# Patient Record
Sex: FEMALE | URBAN - METROPOLITAN AREA
[De-identification: names, ages, dates, MRNs, and addresses within clinical notes are randomized per-mention and may not be internally consistent; named-entity substitution may affect disease eponyms.]

---

## 2017-12-03 ENCOUNTER — INPATIENT (INPATIENT)
Facility: HOSPITAL | Age: 62
LOS: 0 days | Discharge: HOME | DRG: 204 | End: 2017-12-04
Attending: STUDENT IN AN ORGANIZED HEALTH CARE EDUCATION/TRAINING PROGRAM | Admitting: STUDENT IN AN ORGANIZED HEALTH CARE EDUCATION/TRAINING PROGRAM
Payer: COMMERCIAL

## 2017-12-03 VITALS
RESPIRATION RATE: 18 BRPM | SYSTOLIC BLOOD PRESSURE: 166 MMHG | DIASTOLIC BLOOD PRESSURE: 88 MMHG | HEIGHT: 65 IN | HEART RATE: 88 BPM | WEIGHT: 208.56 LBS | TEMPERATURE: 98 F | OXYGEN SATURATION: 99 %

## 2017-12-03 LAB
ALBUMIN SERPL ELPH-MCNC: 4.3 G/DL — SIGNIFICANT CHANGE UP (ref 3.3–5)
ALP SERPL-CCNC: 104 U/L — SIGNIFICANT CHANGE UP (ref 40–120)
ALT FLD-CCNC: 15 U/L — SIGNIFICANT CHANGE UP (ref 10–45)
ANION GAP SERPL CALC-SCNC: 15 MMOL/L — SIGNIFICANT CHANGE UP (ref 5–17)
AST SERPL-CCNC: 20 U/L — SIGNIFICANT CHANGE UP (ref 10–40)
BASOPHILS NFR BLD AUTO: 0.4 % — SIGNIFICANT CHANGE UP (ref 0–2)
BILIRUB SERPL-MCNC: 1 MG/DL — SIGNIFICANT CHANGE UP (ref 0.2–1.2)
BUN SERPL-MCNC: 16 MG/DL — SIGNIFICANT CHANGE UP (ref 7–23)
CALCIUM SERPL-MCNC: 9.3 MG/DL — SIGNIFICANT CHANGE UP (ref 8.4–10.5)
CHLORIDE SERPL-SCNC: 103 MMOL/L — SIGNIFICANT CHANGE UP (ref 96–108)
CO2 SERPL-SCNC: 23 MMOL/L — SIGNIFICANT CHANGE UP (ref 22–31)
CREAT SERPL-MCNC: 1.08 MG/DL — SIGNIFICANT CHANGE UP (ref 0.5–1.3)
EOSINOPHIL NFR BLD AUTO: 0.8 % — SIGNIFICANT CHANGE UP (ref 0–6)
GLUCOSE SERPL-MCNC: 94 MG/DL — SIGNIFICANT CHANGE UP (ref 70–99)
HCT VFR BLD CALC: 35.8 % — SIGNIFICANT CHANGE UP (ref 34.5–45)
HGB BLD-MCNC: 11.6 G/DL — SIGNIFICANT CHANGE UP (ref 11.5–15.5)
LACTATE SERPL-SCNC: 1.4 MMOL/L — SIGNIFICANT CHANGE UP (ref 0.5–2)
LIDOCAIN IGE QN: 109 U/L — HIGH (ref 7–60)
LYMPHOCYTES # BLD AUTO: 26.3 % — SIGNIFICANT CHANGE UP (ref 13–44)
MCHC RBC-ENTMCNC: 26.6 PG — LOW (ref 27–34)
MCHC RBC-ENTMCNC: 32.4 G/DL — SIGNIFICANT CHANGE UP (ref 32–36)
MCV RBC AUTO: 82.1 FL — SIGNIFICANT CHANGE UP (ref 80–100)
MONOCYTES NFR BLD AUTO: 7.4 % — SIGNIFICANT CHANGE UP (ref 2–14)
NEUTROPHILS NFR BLD AUTO: 65.1 % — SIGNIFICANT CHANGE UP (ref 43–77)
PLATELET # BLD AUTO: 369 K/UL — SIGNIFICANT CHANGE UP (ref 150–400)
POTASSIUM SERPL-MCNC: 4.4 MMOL/L — SIGNIFICANT CHANGE UP (ref 3.5–5.3)
POTASSIUM SERPL-SCNC: 4.4 MMOL/L — SIGNIFICANT CHANGE UP (ref 3.5–5.3)
PROT SERPL-MCNC: 8 G/DL — SIGNIFICANT CHANGE UP (ref 6–8.3)
RBC # BLD: 4.36 M/UL — SIGNIFICANT CHANGE UP (ref 3.8–5.2)
RBC # FLD: 13.9 % — SIGNIFICANT CHANGE UP (ref 10.3–16.9)
SODIUM SERPL-SCNC: 141 MMOL/L — SIGNIFICANT CHANGE UP (ref 135–145)
WBC # BLD: 7.5 K/UL — SIGNIFICANT CHANGE UP (ref 3.8–10.5)
WBC # FLD AUTO: 7.5 K/UL — SIGNIFICANT CHANGE UP (ref 3.8–10.5)

## 2017-12-03 PROCEDURE — 71020: CPT | Mod: 26

## 2017-12-03 PROCEDURE — 71275 CT ANGIOGRAPHY CHEST: CPT | Mod: 26

## 2017-12-03 PROCEDURE — 99285 EMERGENCY DEPT VISIT HI MDM: CPT

## 2017-12-03 PROCEDURE — 99223 1ST HOSP IP/OBS HIGH 75: CPT | Mod: GC

## 2017-12-03 RX ORDER — SODIUM CHLORIDE 9 MG/ML
1000 INJECTION INTRAMUSCULAR; INTRAVENOUS; SUBCUTANEOUS ONCE
Qty: 0 | Refills: 0 | Status: COMPLETED | OUTPATIENT
Start: 2017-12-03 | End: 2017-12-03

## 2017-12-03 RX ORDER — MORPHINE SULFATE 50 MG/1
4 CAPSULE, EXTENDED RELEASE ORAL ONCE
Qty: 0 | Refills: 0 | Status: DISCONTINUED | OUTPATIENT
Start: 2017-12-03 | End: 2017-12-03

## 2017-12-03 RX ORDER — SODIUM CHLORIDE 9 MG/ML
3 INJECTION INTRAMUSCULAR; INTRAVENOUS; SUBCUTANEOUS ONCE
Qty: 0 | Refills: 0 | Status: COMPLETED | OUTPATIENT
Start: 2017-12-03 | End: 2017-12-03

## 2017-12-03 RX ADMIN — SODIUM CHLORIDE 1000 MILLILITER(S): 9 INJECTION INTRAMUSCULAR; INTRAVENOUS; SUBCUTANEOUS at 20:50

## 2017-12-03 RX ADMIN — MORPHINE SULFATE 4 MILLIGRAM(S): 50 CAPSULE, EXTENDED RELEASE ORAL at 20:48

## 2017-12-03 RX ADMIN — SODIUM CHLORIDE 3 MILLILITER(S): 9 INJECTION INTRAMUSCULAR; INTRAVENOUS; SUBCUTANEOUS at 20:40

## 2017-12-03 RX ADMIN — MORPHINE SULFATE 4 MILLIGRAM(S): 50 CAPSULE, EXTENDED RELEASE ORAL at 23:28

## 2017-12-03 NOTE — ED ADULT NURSE NOTE - OBJECTIVE STATEMENT
Pt presents to ED c/o RUQ pain progressively worsening for 4 days. Pt states "on wednesday 11/29 I tripped and caught myself, I didn't hit any part of my body but I'm not sure if this is from that." Pt reports RUQ pain currently 8/10 since Thursday 11/30 with mild nausea. Pt denies fevers, chills, HA, dizziness/lightheadedness, CP, SOB, vomiting, diarrhea, light stools, difficulty urinating. Pt presents in NAD speaking full sentences ambulatory through triage.

## 2017-12-03 NOTE — ED PROVIDER NOTE - MUSCULOSKELETAL, MLM
Spine appears normal, range of motion is not limited, no muscle or joint tenderness except for right lower chest wall

## 2017-12-03 NOTE — H&P ADULT - PROBLEM SELECTOR PLAN 4
-on metformin at home  -will give 1L bolus as pt is post CTA with contrast  -SSI while in house -hypertensive on admission most likely 2/2 pain; patient endorses compliance with home BP meds and states she took her amlodipine and valsartan prior to coming to ED today  -cont home meds in AM -incental adrenal mass noted on CTA  -recommend further workup with abdominal CT o/p  -no inpatient intervention needed at this time

## 2017-12-03 NOTE — ED PROVIDER NOTE - CONSTITUTIONAL, MLM
normal... diaphoretic appearing, well nourished, awake, alert, oriented to person, place, time/situation and in mild apparent distress.

## 2017-12-03 NOTE — H&P ADULT - HISTORY OF PRESENT ILLNESS
62 yr old female with a PMHx of HTN, DMT2 (on metformin at home) and remote smoking hx (quit in 1999; 13 pack yr hx) that presented to ED with sharp R chest and R epigastric chest pain for the past 3-4 days. patient was in her usual state of health until 3-4 days ago when she tripped and felt she sprained her chest when she went to catch herself after falling. Ever since she tripped she states she has had persistent R lower chest and R epigastric pain that hasnt improved with NSAIDs. She has no hx of gallstones, and the pain she is experiencing is not worsened with eating and not relieved by anything. The pain is sharp, doesnt radiate and is reproducible. She denies any concomitant shortness of breath, chest pressure, nausea, vomiting or diaphoresis associated with the pain. on ROS, patient does endorse a 1 mo hx of drenching night sweats. She denies any fevers, chills, recent weight loss, anorexia, change in bowel habits, nausea, or vomiting.  ED Course:  VS   T(F): 98.5 HR: 88 BP: 166/88 RR: 18 SpO2: 99%  CBC and CMP wnl  CXR with large RLL infiltrate vs mass  CTA chest (-) for PE but significant for large R lung mass with perihiral and sibcarinal lymphadenopathy

## 2017-12-03 NOTE — H&P ADULT - PROBLEM SELECTOR PLAN 6
-lovenox 40 qd for dvt ppx -consistent carb diet -hypertensive on admission most likely 2/2 pain; patient endorses compliance with home BP meds and states she took her amlodipine and valsartan prior to coming to ED today  -cont home meds in AM

## 2017-12-03 NOTE — H&P ADULT - NSHPSOCIALHISTORY_GEN_ALL_CORE
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

## 2017-12-03 NOTE — H&P ADULT - PROBLEM SELECTOR PLAN 2
-most likely 2/2 irritation of R lung mass when patient tripped vs pulmonary artery obstruction by R lung mass   -trop (-); BNP mildly elevated to 430  -EKG with RBBB without q-waves; again-this finding could be 2/2 pulmonary vein infitration by mass  -further evaluation by Echo in am  -repeat trops at 5:30 am   -symptomatic pain control with dilaudid

## 2017-12-03 NOTE — H&P ADULT - NSHPLABSRESULTS_GEN_ALL_CORE
.  LABS:                         11.6   7.5   )-----------( 369      ( 03 Dec 2017 20:47 )             35.8     12-03    141  |  103  |  16  ----------------------------<  94  4.4   |  23  |  1.08    Ca    9.3      03 Dec 2017 20:47    TPro  8.0  /  Alb  4.3  /  TBili  1.0  /  DBili  x   /  AST  20  /  ALT  15  /  AlkPhos  104  12-03        CARDIAC MARKERS ( 03 Dec 2017 20:47 )  x     / <0.01 ng/mL / 101 U/L / x     / 1.1 ng/mL      Serum Pro-Brain Natriuretic Peptide: 434 pg/mL (12-03 @ 20:47)    Lactate, Blood: 1.4 mmoL/L (12-03 @ 20:46)      RADIOLOGY, EKG & ADDITIONAL TESTS:   CT Angio Chest PE Protocol w/ IV Cont     IMPRESSION:   1.  No pulmonary embolism.  2.  4.3 cm mass within the right lower lobe with associated hilar and   subcarinal lymphadenopathy as above. Findings are highly suspicious for   malignancy.   3.  2.6 cm groundglass nodule within the right upper lobe.  4.  1.5 cm left adrenal mass. Recommend nonemergent follow-up examination   with a dedicated adrenal CT for further characterization.

## 2017-12-03 NOTE — H&P ADULT - PROBLEM SELECTOR PLAN 7
-lovenox 40 qd for dvt ppx -on metformin at home  -will give 1L bolus as pt is post CTA with contrast  -SSI while in house

## 2017-12-03 NOTE — H&P ADULT - ASSESSMENT
62 yr old female with a PMHx of HTN, DMT2, and remote smoking hx who presented to ED with 4 day hx of R lower chest and R epigastric abdominal pain, incidentally found to have RLL mass with adenopathy admitted to medicine for further workup.

## 2017-12-03 NOTE — H&P ADULT - PROBLEM SELECTOR PROBLEM 5
Nutrition, metabolism, and development symptoms Type 2 diabetes mellitus with other neurologic complication, without long-term current use of insulin Anxiety

## 2017-12-03 NOTE — H&P ADULT - NSHPPHYSICALEXAM_GEN_ALL_CORE
.  VITAL SIGNS:  T(F): 98.2 (12-04-17 @ 01:12), Max: 98.5 (12-03-17 @ 20:02)  HR: 70 (12-04-17 @ 01:12) (70 - 88)  BP: 149/82 (12-04-17 @ 01:12) (149/82 - 166/88)  BP(mean): --  RR: 15 (12-04-17 @ 01:12) (15 - 18)  SpO2: 99% (12-04-17 @ 01:12) (99% - 99%)    PHYSICAL EXAM:    Constitutional: WDWN resting comfortably in bed; NAD  HEENT: NC/AT, PERRL, EOMI, MMM  Neck: supple; no JVD or thyromegaly  Respiratory: CTA B/L; no W/R/R, no retractions  Cardiac: +S1/S2; RRR; no M/R/G; PMI non-displaced; reproducible R lower chest pain   Gastrointestinal: soft, NT/ND; no rebound or guarding; +BSx4  Extremities: WWP, no clubbing or cyanosis; no peripheral edema; b/l hand hypertrophic osteoarthropathy  Musculoskeletal: NROM x4; no joint swelling, tenderness or erythema  Vascular: 2+ radial, femoral, DP/PT pulses B/L  Dermatologic: skin warm, dry and intact; no rashes, wounds, or scars  Lymphatic: no submandibular or cervical LAD  Neurologic: AAOx3

## 2017-12-03 NOTE — H&P ADULT - FAMILY HISTORY
Sibling  Still living? Yes, Estimated age: Age Unknown  Family history of renal cell carcinoma, Age at diagnosis: Age Unknown  Family history of transitional cell carcinoma of bladder, Age at diagnosis: Age Unknown

## 2017-12-03 NOTE — ED PROVIDER NOTE - OBJECTIVE STATEMENT
DM, HTN, here with pain in right upper abd/lower rib cage for past 3-4 days.  Started after she stumbled and caught herself without actually falling or hitting anything.  Notes mild cough.  Pain worse with cough, deep breath.  No fever, n/v/d.  Unchanged with eating.  Took naproxen today and got very sweaty afterwords.

## 2017-12-03 NOTE — H&P ADULT - ATTENDING COMMENTS
patient seen and examined twice , once in ED holding and again on regional   reviewed vs, labs, CXR, CT report, EKG    agree w/ PE findings as above; in addition pt is anxious    1. R sided  lung mass: will need further evaluation by heme/onc and CT surgery services   2. chest pain : in setting of lung mass; monitor for worsening sxs; check ECHO given EKG findings   3. adrenal mass: outpt evaluation  4. anxiety: given ativan PO x 1 last night; monitor for recurrence

## 2017-12-03 NOTE — H&P ADULT - PROBLEM SELECTOR PROBLEM 4
Type 2 diabetes mellitus with other neurologic complication, without long-term current use of insulin Hypertension, unspecified type Adrenal mass, left

## 2017-12-03 NOTE — ED PROVIDER NOTE - DIAGNOSTIC INTERPRETATION
CXR: right middle lobe focal consolidation vs mass, normal bones, normal cardiac contour.  read by Dr. Iazguirre

## 2017-12-03 NOTE — H&P ADULT - PROBLEM SELECTOR PROBLEM 6
Need for prophylactic measure Nutrition, metabolism, and development symptoms Hypertension, unspecified type

## 2017-12-03 NOTE — H&P ADULT - NSHPREVIEWOFSYSTEMS_GEN_ALL_CORE
REVIEW OF SYSTEMS:    CONSTITUTIONAL: No weakness, fevers or chills; (+) drenching night sweats  EYES/ENT: No visual changes;  No vertigo or throat pain   NECK: No pain or stiffness  RESPIRATORY: No cough, wheezing, hemoptysis; No shortness of breath  CARDIOVASCULAR: (+) chest pain  GASTROINTESTINAL:(+) RUQ abdominal pain; No nausea, vomiting, or hematemesis; No diarrhea or constipation. No melena or hematochezia.  GENITOURINARY: No dysuria, frequency or hematuria  NEUROLOGICAL: No numbness or weakness  SKIN: No itching, rashes

## 2017-12-03 NOTE — ED PROVIDER NOTE - MEDICAL DECISION MAKING DETAILS
right chest pain post fall.  initial concern for biliary etiology given pain on exam.  labs obtained and unremarkable.  ruq us done without acute finding.  cxr with right sided abnormality.  cta of chest obtained showing large mass.  morphine for pain control without improvement.  will admit for pain control, further management

## 2017-12-03 NOTE — H&P ADULT - PROBLEM SELECTOR PLAN 1
-incidentally found R lung mass with perihilar and subcarinal lymphadenopathy  -no prior hx of malignancy per patient  -pt's brother follows with Dr. Avendaño o/p for RCC and bladder cancer; if heme/onc consulted in house, patient requested Dr. Avendaño  -supportive care with pain management for now -incidentally found R lung mass with perihilar and subcarinal lymphadenopathy  -no prior hx of malignancy per patient  -pt's brother follows with Dr. Tom o/p for RCC and bladder cancer; if heme/onc consulted in house, patient requested Dr. Tom  -please call CT Surgery to evaluate patient for possible intervention  -supportive care with pain management for now

## 2017-12-03 NOTE — H&P ADULT - PROBLEM SELECTOR PLAN 3
-hypertensive on admission most likely 2/2 pain; patient endorses compliance with home BP meds and states she took her amlodipine and valsartan prior to coming to ED today  -cont home meds in AM -incental adrenal mass noted on CTA  -recommend further workup with abdominal CT o/p  -no inpatient intervention needed at this time see #2

## 2017-12-03 NOTE — H&P ADULT - PROBLEM SELECTOR PROBLEM 7
Need for prophylactic measure Type 2 diabetes mellitus with other neurologic complication, without long-term current use of insulin

## 2017-12-04 ENCOUNTER — TRANSCRIPTION ENCOUNTER (OUTPATIENT)
Age: 62
End: 2017-12-04

## 2017-12-04 VITALS
DIASTOLIC BLOOD PRESSURE: 74 MMHG | OXYGEN SATURATION: 96 % | RESPIRATION RATE: 16 BRPM | HEART RATE: 81 BPM | SYSTOLIC BLOOD PRESSURE: 160 MMHG

## 2017-12-04 DIAGNOSIS — R91.8 OTHER NONSPECIFIC ABNORMAL FINDING OF LUNG FIELD: ICD-10-CM

## 2017-12-04 DIAGNOSIS — E11.49 TYPE 2 DIABETES MELLITUS WITH OTHER DIABETIC NEUROLOGICAL COMPLICATION: ICD-10-CM

## 2017-12-04 DIAGNOSIS — I10 ESSENTIAL (PRIMARY) HYPERTENSION: ICD-10-CM

## 2017-12-04 DIAGNOSIS — F41.9 ANXIETY DISORDER, UNSPECIFIED: ICD-10-CM

## 2017-12-04 DIAGNOSIS — I45.10 UNSPECIFIED RIGHT BUNDLE-BRANCH BLOCK: ICD-10-CM

## 2017-12-04 DIAGNOSIS — R07.9 CHEST PAIN, UNSPECIFIED: ICD-10-CM

## 2017-12-04 DIAGNOSIS — Z29.9 ENCOUNTER FOR PROPHYLACTIC MEASURES, UNSPECIFIED: ICD-10-CM

## 2017-12-04 DIAGNOSIS — E27.9 DISORDER OF ADRENAL GLAND, UNSPECIFIED: ICD-10-CM

## 2017-12-04 DIAGNOSIS — R63.8 OTHER SYMPTOMS AND SIGNS CONCERNING FOOD AND FLUID INTAKE: ICD-10-CM

## 2017-12-04 LAB
ALBUMIN SERPL ELPH-MCNC: 4.2 G/DL — SIGNIFICANT CHANGE UP (ref 3.3–5)
ALP SERPL-CCNC: 111 U/L — SIGNIFICANT CHANGE UP (ref 40–120)
ALT FLD-CCNC: 12 U/L — SIGNIFICANT CHANGE UP (ref 10–45)
ANION GAP SERPL CALC-SCNC: 16 MMOL/L — SIGNIFICANT CHANGE UP (ref 5–17)
AST SERPL-CCNC: 15 U/L — SIGNIFICANT CHANGE UP (ref 10–40)
BASOPHILS NFR BLD AUTO: 0.2 % — SIGNIFICANT CHANGE UP (ref 0–2)
BILIRUB SERPL-MCNC: 0.4 MG/DL — SIGNIFICANT CHANGE UP (ref 0.2–1.2)
BUN SERPL-MCNC: 11 MG/DL — SIGNIFICANT CHANGE UP (ref 7–23)
CALCIUM SERPL-MCNC: 9.5 MG/DL — SIGNIFICANT CHANGE UP (ref 8.4–10.5)
CHLORIDE SERPL-SCNC: 98 MMOL/L — SIGNIFICANT CHANGE UP (ref 96–108)
CK MB CFR SERPL CALC: 1.1 NG/ML — SIGNIFICANT CHANGE UP (ref 0–6.7)
CK MB CFR SERPL CALC: 1.1 NG/ML — SIGNIFICANT CHANGE UP (ref 0–6.7)
CK MB CFR SERPL CALC: 1.2 NG/ML — SIGNIFICANT CHANGE UP (ref 0–6.7)
CK SERPL-CCNC: 101 U/L — SIGNIFICANT CHANGE UP (ref 25–170)
CK SERPL-CCNC: 83 U/L — SIGNIFICANT CHANGE UP (ref 25–170)
CK SERPL-CCNC: 95 U/L — SIGNIFICANT CHANGE UP (ref 25–170)
CO2 SERPL-SCNC: 22 MMOL/L — SIGNIFICANT CHANGE UP (ref 22–31)
CREAT SERPL-MCNC: 0.87 MG/DL — SIGNIFICANT CHANGE UP (ref 0.5–1.3)
EOSINOPHIL NFR BLD AUTO: 0.2 % — SIGNIFICANT CHANGE UP (ref 0–6)
GLUCOSE BLDC GLUCOMTR-MCNC: 110 MG/DL — HIGH (ref 70–99)
GLUCOSE BLDC GLUCOMTR-MCNC: 111 MG/DL — HIGH (ref 70–99)
GLUCOSE BLDC GLUCOMTR-MCNC: 125 MG/DL — HIGH (ref 70–99)
GLUCOSE SERPL-MCNC: 99 MG/DL — SIGNIFICANT CHANGE UP (ref 70–99)
HBA1C BLD-MCNC: 6.2 % — HIGH (ref 4–5.6)
HCT VFR BLD CALC: 35.5 % — SIGNIFICANT CHANGE UP (ref 34.5–45)
HGB BLD-MCNC: 11.9 G/DL — SIGNIFICANT CHANGE UP (ref 11.5–15.5)
INR BLD: 1.17 — HIGH (ref 0.88–1.16)
LYMPHOCYTES # BLD AUTO: 20.5 % — SIGNIFICANT CHANGE UP (ref 13–44)
MCHC RBC-ENTMCNC: 27.6 PG — SIGNIFICANT CHANGE UP (ref 27–34)
MCHC RBC-ENTMCNC: 33.5 G/DL — SIGNIFICANT CHANGE UP (ref 32–36)
MCV RBC AUTO: 82.4 FL — SIGNIFICANT CHANGE UP (ref 80–100)
MONOCYTES NFR BLD AUTO: 6.2 % — SIGNIFICANT CHANGE UP (ref 2–14)
NEUTROPHILS NFR BLD AUTO: 72.9 % — SIGNIFICANT CHANGE UP (ref 43–77)
NT-PROBNP SERPL-SCNC: 434 PG/ML — HIGH (ref 0–300)
PLATELET # BLD AUTO: 318 K/UL — SIGNIFICANT CHANGE UP (ref 150–400)
POTASSIUM SERPL-MCNC: 3.7 MMOL/L — SIGNIFICANT CHANGE UP (ref 3.5–5.3)
POTASSIUM SERPL-SCNC: 3.7 MMOL/L — SIGNIFICANT CHANGE UP (ref 3.5–5.3)
PROT SERPL-MCNC: 8.4 G/DL — HIGH (ref 6–8.3)
PROTHROM AB SERPL-ACNC: 13 SEC — HIGH (ref 9.8–12.7)
RBC # BLD: 4.31 M/UL — SIGNIFICANT CHANGE UP (ref 3.8–5.2)
RBC # FLD: 14 % — SIGNIFICANT CHANGE UP (ref 10.3–16.9)
SODIUM SERPL-SCNC: 136 MMOL/L — SIGNIFICANT CHANGE UP (ref 135–145)
TROPONIN T SERPL-MCNC: <0.01 NG/ML — SIGNIFICANT CHANGE UP (ref 0–0.01)
TROPONIN T SERPL-MCNC: <0.01 NG/ML — SIGNIFICANT CHANGE UP (ref 0–0.01)
WBC # BLD: 8.6 K/UL — SIGNIFICANT CHANGE UP (ref 3.8–10.5)
WBC # FLD AUTO: 8.6 K/UL — SIGNIFICANT CHANGE UP (ref 3.8–10.5)

## 2017-12-04 PROCEDURE — 83880 ASSAY OF NATRIURETIC PEPTIDE: CPT

## 2017-12-04 PROCEDURE — 82553 CREATINE MB FRACTION: CPT

## 2017-12-04 PROCEDURE — 71275 CT ANGIOGRAPHY CHEST: CPT

## 2017-12-04 PROCEDURE — 74177 CT ABD & PELVIS W/CONTRAST: CPT

## 2017-12-04 PROCEDURE — 87040 BLOOD CULTURE FOR BACTERIA: CPT

## 2017-12-04 PROCEDURE — 83605 ASSAY OF LACTIC ACID: CPT

## 2017-12-04 PROCEDURE — 80053 COMPREHEN METABOLIC PANEL: CPT

## 2017-12-04 PROCEDURE — 82962 GLUCOSE BLOOD TEST: CPT

## 2017-12-04 PROCEDURE — 82550 ASSAY OF CK (CPK): CPT

## 2017-12-04 PROCEDURE — 84484 ASSAY OF TROPONIN QUANT: CPT

## 2017-12-04 PROCEDURE — 85025 COMPLETE CBC W/AUTO DIFF WBC: CPT

## 2017-12-04 PROCEDURE — 99239 HOSP IP/OBS DSCHRG MGMT >30: CPT

## 2017-12-04 PROCEDURE — 76705 ECHO EXAM OF ABDOMEN: CPT | Mod: 26

## 2017-12-04 PROCEDURE — 85610 PROTHROMBIN TIME: CPT

## 2017-12-04 PROCEDURE — 99285 EMERGENCY DEPT VISIT HI MDM: CPT | Mod: 25

## 2017-12-04 PROCEDURE — 96374 THER/PROPH/DIAG INJ IV PUSH: CPT | Mod: XU

## 2017-12-04 PROCEDURE — 71046 X-RAY EXAM CHEST 2 VIEWS: CPT

## 2017-12-04 PROCEDURE — 76705 ECHO EXAM OF ABDOMEN: CPT

## 2017-12-04 PROCEDURE — 74177 CT ABD & PELVIS W/CONTRAST: CPT | Mod: 26

## 2017-12-04 PROCEDURE — 83690 ASSAY OF LIPASE: CPT

## 2017-12-04 PROCEDURE — 83036 HEMOGLOBIN GLYCOSYLATED A1C: CPT

## 2017-12-04 PROCEDURE — 36415 COLL VENOUS BLD VENIPUNCTURE: CPT

## 2017-12-04 RX ORDER — ONDANSETRON 8 MG/1
4 TABLET, FILM COATED ORAL ONCE
Qty: 0 | Refills: 0 | Status: COMPLETED | OUTPATIENT
Start: 2017-12-04 | End: 2017-12-04

## 2017-12-04 RX ORDER — AMLODIPINE BESYLATE 2.5 MG/1
10 TABLET ORAL DAILY
Qty: 0 | Refills: 0 | Status: DISCONTINUED | OUTPATIENT
Start: 2017-12-04 | End: 2017-12-04

## 2017-12-04 RX ORDER — DEXTROSE 50 % IN WATER 50 %
1 SYRINGE (ML) INTRAVENOUS ONCE
Qty: 0 | Refills: 0 | Status: DISCONTINUED | OUTPATIENT
Start: 2017-12-04 | End: 2017-12-04

## 2017-12-04 RX ORDER — INSULIN LISPRO 100/ML
VIAL (ML) SUBCUTANEOUS
Qty: 0 | Refills: 0 | Status: DISCONTINUED | OUTPATIENT
Start: 2017-12-04 | End: 2017-12-04

## 2017-12-04 RX ORDER — SODIUM CHLORIDE 9 MG/ML
1000 INJECTION INTRAMUSCULAR; INTRAVENOUS; SUBCUTANEOUS
Qty: 0 | Refills: 0 | Status: DISCONTINUED | OUTPATIENT
Start: 2017-12-04 | End: 2017-12-04

## 2017-12-04 RX ORDER — HYDROMORPHONE HYDROCHLORIDE 2 MG/ML
1 INJECTION INTRAMUSCULAR; INTRAVENOUS; SUBCUTANEOUS ONCE
Qty: 0 | Refills: 0 | Status: DISCONTINUED | OUTPATIENT
Start: 2017-12-04 | End: 2017-12-04

## 2017-12-04 RX ORDER — GLUCAGON INJECTION, SOLUTION 0.5 MG/.1ML
1 INJECTION, SOLUTION SUBCUTANEOUS ONCE
Qty: 0 | Refills: 0 | Status: DISCONTINUED | OUTPATIENT
Start: 2017-12-04 | End: 2017-12-04

## 2017-12-04 RX ORDER — SODIUM CHLORIDE 9 MG/ML
1000 INJECTION, SOLUTION INTRAVENOUS
Qty: 0 | Refills: 0 | Status: DISCONTINUED | OUTPATIENT
Start: 2017-12-04 | End: 2017-12-04

## 2017-12-04 RX ORDER — AMLODIPINE BESYLATE 2.5 MG/1
1 TABLET ORAL
Qty: 0 | Refills: 0 | COMMUNITY
Start: 2017-12-04

## 2017-12-04 RX ORDER — DEXTROSE 50 % IN WATER 50 %
12.5 SYRINGE (ML) INTRAVENOUS ONCE
Qty: 0 | Refills: 0 | Status: DISCONTINUED | OUTPATIENT
Start: 2017-12-04 | End: 2017-12-04

## 2017-12-04 RX ORDER — DEXTROSE 50 % IN WATER 50 %
25 SYRINGE (ML) INTRAVENOUS ONCE
Qty: 0 | Refills: 0 | Status: DISCONTINUED | OUTPATIENT
Start: 2017-12-04 | End: 2017-12-04

## 2017-12-04 RX ORDER — ENOXAPARIN SODIUM 100 MG/ML
40 INJECTION SUBCUTANEOUS EVERY 24 HOURS
Qty: 0 | Refills: 0 | Status: DISCONTINUED | OUTPATIENT
Start: 2017-12-04 | End: 2017-12-04

## 2017-12-04 RX ORDER — IOHEXOL 300 MG/ML
50 INJECTION, SOLUTION INTRAVENOUS ONCE
Qty: 0 | Refills: 0 | Status: COMPLETED | OUTPATIENT
Start: 2017-12-04 | End: 2017-12-04

## 2017-12-04 RX ORDER — AMLODIPINE BESYLATE 2.5 MG/1
0 TABLET ORAL
Qty: 0 | Refills: 0 | COMMUNITY

## 2017-12-04 RX ORDER — MORPHINE SULFATE 50 MG/1
2 CAPSULE, EXTENDED RELEASE ORAL ONCE
Qty: 0 | Refills: 0 | Status: DISCONTINUED | OUTPATIENT
Start: 2017-12-04 | End: 2017-12-04

## 2017-12-04 RX ORDER — VALSARTAN 80 MG/1
40 TABLET ORAL DAILY
Qty: 0 | Refills: 0 | Status: DISCONTINUED | OUTPATIENT
Start: 2017-12-04 | End: 2017-12-04

## 2017-12-04 RX ORDER — MORPHINE SULFATE 50 MG/1
2 CAPSULE, EXTENDED RELEASE ORAL EVERY 4 HOURS
Qty: 0 | Refills: 0 | Status: DISCONTINUED | OUTPATIENT
Start: 2017-12-04 | End: 2017-12-04

## 2017-12-04 RX ORDER — HYDROMORPHONE HYDROCHLORIDE 2 MG/ML
2 INJECTION INTRAMUSCULAR; INTRAVENOUS; SUBCUTANEOUS ONCE
Qty: 0 | Refills: 0 | Status: DISCONTINUED | OUTPATIENT
Start: 2017-12-04 | End: 2017-12-04

## 2017-12-04 RX ADMIN — HYDROMORPHONE HYDROCHLORIDE 1 MILLIGRAM(S): 2 INJECTION INTRAMUSCULAR; INTRAVENOUS; SUBCUTANEOUS at 09:17

## 2017-12-04 RX ADMIN — MORPHINE SULFATE 2 MILLIGRAM(S): 50 CAPSULE, EXTENDED RELEASE ORAL at 15:20

## 2017-12-04 RX ADMIN — ENOXAPARIN SODIUM 40 MILLIGRAM(S): 100 INJECTION SUBCUTANEOUS at 07:04

## 2017-12-04 RX ADMIN — ONDANSETRON 4 MILLIGRAM(S): 8 TABLET, FILM COATED ORAL at 12:52

## 2017-12-04 RX ADMIN — IOHEXOL 50 MILLILITER(S): 300 INJECTION, SOLUTION INTRAVENOUS at 11:00

## 2017-12-04 RX ADMIN — MORPHINE SULFATE 2 MILLIGRAM(S): 50 CAPSULE, EXTENDED RELEASE ORAL at 15:05

## 2017-12-04 RX ADMIN — Medication 0.5 MILLIGRAM(S): at 04:15

## 2017-12-04 RX ADMIN — SODIUM CHLORIDE 100 MILLILITER(S): 9 INJECTION INTRAMUSCULAR; INTRAVENOUS; SUBCUTANEOUS at 07:00

## 2017-12-04 RX ADMIN — SODIUM CHLORIDE 100 MILLILITER(S): 9 INJECTION INTRAMUSCULAR; INTRAVENOUS; SUBCUTANEOUS at 02:00

## 2017-12-04 RX ADMIN — IOHEXOL 50 MILLILITER(S): 300 INJECTION, SOLUTION INTRAVENOUS at 13:07

## 2017-12-04 RX ADMIN — MORPHINE SULFATE 2 MILLIGRAM(S): 50 CAPSULE, EXTENDED RELEASE ORAL at 01:00

## 2017-12-04 RX ADMIN — MORPHINE SULFATE 2 MILLIGRAM(S): 50 CAPSULE, EXTENDED RELEASE ORAL at 01:12

## 2017-12-04 RX ADMIN — HYDROMORPHONE HYDROCHLORIDE 1 MILLIGRAM(S): 2 INJECTION INTRAMUSCULAR; INTRAVENOUS; SUBCUTANEOUS at 09:35

## 2017-12-04 RX ADMIN — VALSARTAN 40 MILLIGRAM(S): 80 TABLET ORAL at 07:04

## 2017-12-04 NOTE — DISCHARGE NOTE ADULT - HOSPITAL COURSE
62F PMH of HTN, DM2 (on metformin), remote smoking hx (quit in 1999; 13 pack yr hx) presented to ED with sharp R chest pain for 3-4 days. ED Course: T(F): 98.5 HR: 88 BP: 166/88 RR: 18 SpO2: 99%. CBC and CMP wnl. CXR demonstrated large RLL infiltrate vs mass, a CTA chest was performed and showed 4.3cm R lung mass with perihilar and subcarinal lymphadenopathy, as well as 2.6cm R lung ground glass opacity and 1.5cm adrenal nodule. Heme/onc team was consulted in light of these findings, recommended CT abdomen and pelvis with contrast. This scan showed 62F PMH of HTN, DM2 (on metformin), remote smoking hx (quit in 1999; 13 pack yr hx) presented to ED with sharp R chest pain for 3-4 days. ED Course: T(F): 98.5 HR: 88 BP: 166/88 RR: 18 SpO2: 99%. CBC and CMP wnl. CXR demonstrated large RLL infiltrate vs mass, a CTA chest was performed and showed 4.3cm R lung mass with perihilar and subcarinal lymphadenopathy, as well as 2.6cm R lung ground glass opacity and 1.5cm adrenal nodule. Heme/onc team was consulted in light of these findings, recommended CT abdomen and pelvis with contrast. This scan showed no additional masses. Patient was discharged, HD stable, 12/4.     *Of note, patient will need outpatient PET scan, radiation oncology and CT surgery follow up, MRI brain due to headaches

## 2017-12-04 NOTE — DISCHARGE NOTE ADULT - CARE PLAN
Principal Discharge DX:	Lung mass  Secondary Diagnosis:	Hypertension, unspecified type  Goal:	Normal blood pressure  Instructions for follow-up, activity and diet:	You have high blood pressure, and should continue to take your home blood pressure medications as prescribed. If you notice any dizziness or lightheadedness upon standing, please follow up with your primary care provider. If you notice any sudden severe headaches, palpitations, or weakness, please proceed to your nearest emergency department or call 911.  Secondary Diagnosis:	Chest pain  Secondary Diagnosis:	Type 2 diabetes mellitus with other neurologic complication, without long-term current use of insulin Principal Discharge DX:	Lung mass  Goal:	Discharge home with outpatient follow up  Instructions for follow-up, activity and diet:	You were admitted due to chest pain, and received a CT scan which demonstrated a 4.3 centimeter mass in your lung. As this is concerning for cancer, you received an additional CT scan of the abdomen and pelvis which demonstrated     The oncology service was consulted regarding these findings and recommended that you follow up outpatient with Dr. Noonan. Please attend your appointment with Dr. Noonan.  Secondary Diagnosis:	Hypertension, unspecified type  Goal:	Normal blood pressure  Instructions for follow-up, activity and diet:	You have high blood pressure, and should continue to take your home blood pressure medications as prescribed. If you notice any dizziness or lightheadedness upon standing, please follow up with your primary care provider. If you notice any sudden severe headaches, palpitations, or weakness, please proceed to your nearest emergency department or call 911.  Secondary Diagnosis:	Chest pain  Goal:	Workup outpatient  Instructions for follow-up, activity and diet:	You were admitted with chest pain, and found to have a mass in your lung which was the most likely cause of your chest pain. You received a CT scan and the oncology service was consulted regarding workup of the lung mass. Please follow up outpatient with Dr. Noonan.  Secondary Diagnosis:	Type 2 diabetes mellitus with other neurologic complication, without long-term current use of insulin  Goal:	Normal blood sugar levels  Instructions for follow-up, activity and diet:	You have a history of diabetes, and should continue to take your home medication regimen as prescribed. Please follow up with your primary care provider and continue to monitor your blood sugar levels.

## 2017-12-04 NOTE — DISCHARGE NOTE ADULT - CARE PROVIDER_API CALL
Afshan Noonan), Internal Medicine; Medical Oncology  [address] [place] [address]  Phone: [phone]  Fax: [phone]

## 2017-12-04 NOTE — DISCHARGE NOTE ADULT - PATIENT PORTAL LINK FT
“You can access the FollowHealth Patient Portal, offered by [facility], by registering with the following website: [email or web address]”

## 2017-12-04 NOTE — DISCHARGE NOTE ADULT - MEDICATION SUMMARY - MEDICATIONS TO TAKE
I will START or STAY ON the medications listed below when I get home from the hospital:    valsartan 40 mg oral tablet  -- 1 tab(s) by mouth 2 times a day  -- Indication: For Hypertension    metFORMIN  -- Indication: For Diabetes    amLODIPine 10 mg oral tablet  -- 1 tab(s) by mouth once a day  -- Indication: For Hypertension

## 2017-12-04 NOTE — PROGRESS NOTE ADULT - ATTENDING COMMENTS
diagnoses:  lung mass, rule out malignancy - for ct a/p , and may need a biopsy of lung mass depending on findings of any biopsiable lesions on ct a/p   right sided chest pain -etiology unclear,  suspect due to lung mass mass vs musculoskeletal dz. though no bony mets seen on CT  adrenal mass - will need biopsy , depending on ct a/p results  hypertension- amlodipine  DM II - SSI

## 2017-12-04 NOTE — PATIENT PROFILE ADULT. - MUTUALITY COMMENT, PROFILE
patient reassured that doctors will make rounds everyday & she should verbalized her concerns & whatever is haunting her

## 2017-12-04 NOTE — CONSULT NOTE ADULT - SUBJECTIVE AND OBJECTIVE BOX
Hematology Oncology Consult Note (Dr Noonan)    The patient was seen and examined at bedside.    63 yo F with PMHx HTN, DM2 and former 20 pk yr smoker presented to [facility] with sharped R sided chest pain under her R breast after tripping and falling on her chest. She reports pain became intractable over 3-4 days and broguth ehr to hospital. She had CTA chest done to r/o PE and found to have incidental RUL and RLL lung mass. Pt reprots she has been having drenching ngiht sweats over past month. No loss of weigh, no loss of appetite or energy, no fevers/chills. She has been able to continue her daily routine of activities without any limitations. She has never had imaging of her chest in the past. Last c -scope a couple of yrs ago was normal, papsmear and mammogram earlier this yr were normal.    Of note, pt has brother with diagnosis of RCC s/p nephrectomy and mom  at age 37 due to Hogkin's lymphoma. She denies any complaints right now besides a headache that she has had in past.    ROS is otherwise negative.    PAST MEDICAL & SURGICAL HISTORY:  Hypertension  Diabetes  No significant past surgical history    Allergies: NKDA    Medications:  MEDICATIONS  (STANDING):  amLODIPine   Tablet 10 milliGRAM(s) Oral daily  dextrose 5%. 1000 milliLiter(s) IV Continuous <Continuous>  dextrose 50% Injectable 12.5 Gram(s) IV Push once  dextrose 50% Injectable 25 Gram(s) IV Push once  dextrose 50% Injectable 25 Gram(s) IV Push once  enoxaparin Injectable 40 milliGRAM(s) SubCutaneous every 24 hours  insulin lispro (HumaLOG) corrective regimen sliding scale   SubCutaneous Before meals and at bedtime  sodium chloride 0.9%. 1000 milliLiter(s) IV Continuous <Continuous>  valsartan 40 milliGRAM(s) Oral daily      Social History: 20-25 pk yr smoker (quit ), no illicit drug use, no alcohol use, former  for 311 sv around  area on that day    FAMILY HISTORY:  Family history of transitional cell carcinoma of bladder (Sibling)  Family history of renal cell carcinoma (Sibling)      PHYSICAL EXAM:    T(F): 98 (17 @ 09:34), Max: 98.5 (17 @ 20:02)  HR: 83 (17 @ 09:34) (70 - 88)  BP: 158/86 (17 @ 09:34) (129/58 - 166/88)  RR: 15 (17 @ 09:34) (14 - 18)  SpO2: 98% (17 @ 09:34) (98% - 99%)    Daily Height in cm: 165.1 (03 Dec 2017 20:02)      Gen: well developed, well nourished, comfortable  HEENT: normocephalic/atraumatic, no conjunctival pallor, no scleral icterus, no oral thrush/mucosal bleeding/mucositis  Neck: supple, no masses, no JVD  Back: nontender  Chest: TTP under right breast and right ribs  Cardiovascular: RR, nl S1S2, no murmurs/rubs/gallops  Respiratory: clear air entry   Gastrointestinal: BS+, soft, NT/ND, no masses, no splenomegaly, no hepatomegaly, no evidence for ascites  Extremities: no clubbing/cyanosis, no edema, no calf tenderness, DP/PT 2+ b/l  Neurological: no focal deficits  Skin: no rash on visible skin  Lymph Nodes:  no cervical/supraclavicular LAD, no axillary/groin LAD                              11.9   8.6   )-----------( 318      ( 04 Dec 2017 13:07 )             35.5         CBC Full  -  ( 04 Dec 2017 13:07 )  WBC Count : 8.6 K/uL  Hemoglobin : 11.9 g/dL  Hematocrit : 35.5 %  Platelet Count - Automated : 318 K/uL  Mean Cell Volume : 82.4 fL  Mean Cell Hemoglobin : 27.6 pg  Mean Cell Hemoglobin Concentration : 33.5 g/dL  Auto Neutrophil % : 72.9 %  Auto Lymphocyte % : 20.5 %  Auto Monocyte % : 6.2 %  Auto Eosinophil % : 0.2 %  Auto Basophil % : 0.2 %            136  |  98  |  11  ----------------------------<  99  3.7   |  22  |  0.87    Ca    9.5      04 Dec 2017 13:07    TPro  8.4<H>  /  Alb  4.2  /  TBili  0.4  /  DBili  x   /  AST  15  /  ALT  12  /  AlkPhos  111  12-        PT/INR - ( 04 Dec 2017 02:19 )   PT: 13.0 sec;   INR: 1.17

## 2017-12-04 NOTE — PROGRESS NOTE ADULT - SUBJECTIVE AND OBJECTIVE BOX
OVERNIGHT EVENTS: admission to [facility]    SUBJECTIVE / INTERVAL HPI: Patient seen and examined at bedside. Patient complains of a few months night sweats, R ear ache since Gregorio day in October, and R low chest pain (high RUQ area). Of note, patient with FH mother with Hodgkins lymphoma ( 37), father smoking hx with lung cancer ( at 52 years old).     Review of systems negative except as noted above.     VITAL SIGNS:  Vital Signs Last 24 Hrs  T(C): 36.7 (04 Dec 2017 07:00), Max: 36.9 (03 Dec 2017 20:02)  T(F): 98.1 (04 Dec 2017 07:00), Max: 98.5 (03 Dec 2017 20:02)  HR: 74 (04 Dec 2017 07:00) (70 - 88)  BP: 129/58 (04 Dec 2017 07:00) (129/58 - 166/88)  BP(mean): --  RR: 14 (04 Dec 2017 07:00) (14 - 18)  SpO2: 98% (04 Dec 2017 07:00) (98% - 99%)      17 @ 07:01  -  17 @ 07:00  --------------------------------------------------------  IN: 200 mL / OUT: 400 mL / NET: -200 mL        PHYSICAL EXAM:    General: WDWN F in NAD, resting comfortably in bed at time of exam  HEENT: NC/AT; anicteric sclera  Neck: supple, no JVD, no lymphadenopathy in supraclavicular lymph nodes  Cardiovascular: +S1/S2; RRR, no murmurs, rubs, or gallops  Respiratory: CTA B/L; no W/R/R  Gastrointestinal: soft, NTND with exception of far lateral and superior RUQ TTP. +BS x4  Extremities: WWP; no edema. Of note, clubbing present in digits  Vascular: 2+ radial, DP pulses B/L  Neurological: AAOx3; no focal deficits    MEDICATIONS:  MEDICATIONS  (STANDING):  amLODIPine   Tablet 10 milliGRAM(s) Oral daily  dextrose 5%. 1000 milliLiter(s) (50 mL/Hr) IV Continuous <Continuous>  dextrose 50% Injectable 12.5 Gram(s) IV Push once  dextrose 50% Injectable 25 Gram(s) IV Push once  dextrose 50% Injectable 25 Gram(s) IV Push once  enoxaparin Injectable 40 milliGRAM(s) SubCutaneous every 24 hours  insulin lispro (HumaLOG) corrective regimen sliding scale   SubCutaneous Before meals and at bedtime  sodium chloride 0.9%. 1000 milliLiter(s) (100 mL/Hr) IV Continuous <Continuous>  valsartan 40 milliGRAM(s) Oral daily    MEDICATIONS  (PRN):  dextrose Gel 1 Dose(s) Oral once PRN Blood Glucose LESS THAN 70 milliGRAM(s)/deciliter  glucagon  Injectable 1 milliGRAM(s) IntraMuscular once PRN Glucose LESS THAN 70 milligrams/deciliter      ALLERGIES:  Allergies    penicillins (Hives)    Intolerances        LABS:                        11.6   7.5   )-----------( 369      ( 03 Dec 2017 20:47 )             35.8     12-    141  |  103  |  16  ----------------------------<  94  4.4   |  23  |  1.08    Ca    9.3      03 Dec 2017 20:47    TPro  8.0  /  Alb  4.3  /  TBili  1.0  /  DBili  x   /  AST  20  /  ALT  15  /  AlkPhos  104  12-03    PT/INR - ( 04 Dec 2017 02:19 )   PT: 13.0 sec;   INR: 1.17              CAPILLARY BLOOD GLUCOSE      POCT Blood Glucose.: 111 mg/dL (04 Dec 2017 06:11)    CARDIAC MARKERS ( 04 Dec 2017 02:19 )  x     / <0.01 ng/mL / 83 U/L / x     / 1.1 ng/mL  CARDIAC MARKERS ( 03 Dec 2017 20:47 )  x     / <0.01 ng/mL / 101 U/L / x     / 1.1 ng/mL        RADIOLOGY & ADDITIONAL TESTS: Reviewed.

## 2017-12-04 NOTE — CONSULT NOTE ADULT - ASSESSMENT
63 yo F with finding of incidental lung mass w/ concern for lung malignancy    #Lung Mass  - etiology suspected 2/2 to primary lung malignancy due to significant smoking hx  - CT Chest shows 4.3 cm mass within the right lower lobe with associated hilar and   subcarinal lymphadenopathy as above. Findings are highly suspicious for   malignancy, 2.6 cm ground glass nodule within the right upper lobe.  - CT Abd/Pelvis shows adrenal nodule concerning for mets but no other gross metastatic site. Will need PET scan to evaluate for FDG avidity of adrenal nodule to confirm Stage IV disease. For now, subcarinal lymphadenopathy suggests atleast a radiographic Stage IIIA or IIIB tumor.  - given headaches, pt should have MRI head to r/o brain mets  - needs rad/onc and CT surgery evaluation as well (can be done as outpt)  - will discuss case with Dr Noonan.

## 2017-12-04 NOTE — PATIENT PROFILE ADULT. - VISION (WITH CORRECTIVE LENSES IF THE PATIENT USUALLY WEARS THEM):
wears reading eyeglass/Partially impaired: cannot see medication labels or newsprint, but can see obstacles in path, and the surrounding layout; can count fingers at arm's length

## 2017-12-04 NOTE — DISCHARGE NOTE ADULT - SECONDARY DIAGNOSIS.
Hypertension, unspecified type Chest pain Type 2 diabetes mellitus with other neurologic complication, without long-term current use of insulin

## 2017-12-04 NOTE — PROGRESS NOTE ADULT - ASSESSMENT
62F PMH HTN, DMT2, 13PY smoking hx presented to ED with 4d R lower chest and R epigastric abdominal pain, found to have RLL mass with adenopathy

## 2017-12-04 NOTE — PROGRESS NOTE ADULT - PROBLEM SELECTOR PLAN 5
- HTN on admission most likely 2/2 pain; patient endorses compliance with home BP meds and states she took her amlodipine and valsartan prior to coming to ED today  - continue Norvasc 10mg qd and Valsartan 40mg qd

## 2017-12-04 NOTE — PROGRESS NOTE ADULT - PROBLEM SELECTOR PLAN 7
- No IVF following 1L NS post contrast  - Replete K<4 Mg<2  - Consistent carb diet - 50cc/hr gentle NS hydration with second dose contrast for CT A/P  - Replete K<4 Mg<2  - Consistent carb diet

## 2017-12-04 NOTE — PROGRESS NOTE ADULT - PROBLEM SELECTOR PLAN 1
- 4.3cm R lung mass with perihilar and subcarinal lymphadenopathy  - Patient with 13PY smoking history, former smoker  - pt's brother follows with Dr. Noonan o/p for RCC and bladder cancer; if heme/onc consulted in house, patient requested Dr. Noonan  - will c/s CT Surgery to evaluate patient  - supportive care with pain management for now - 4.3cm R lung mass with perihilar and subcarinal lymphadenopathy  - Patient with 13PY smoking history, former smoker  - pt's brother follows with Dr. Noonan o/p for RCC and bladder cancer; patient requested Dr. Noonan if possible.   - supportive care with pain management for now - 4.3cm R lung mass with perihilar and subcarinal lymphadenopathy  - Patient with 13PY smoking history, former smoker  - Heme onc consulted, will obtain CT A/P with contrast. Will follow with Dr. Noonan outpatient  - supportive care with pain management for now

## 2017-12-04 NOTE — PROGRESS NOTE ADULT - PROBLEM SELECTOR PLAN 2
- most likely 2/2 irritation of R lung mass when patient tripped vs pulmonary artery obstruction by R lung mass. EKG with RBBB, unlikely cardiac chest pain  - RUQ echo negative  - troponins negative x2

## 2017-12-04 NOTE — DISCHARGE NOTE ADULT - PLAN OF CARE
Normal blood pressure You have high blood pressure, and should continue to take your home blood pressure medications as prescribed. If you notice any dizziness or lightheadedness upon standing, please follow up with your primary care provider. If you notice any sudden severe headaches, palpitations, or weakness, please proceed to your nearest emergency department or call 911. Discharge home with outpatient follow up You were admitted due to chest pain, and received a CT scan which demonstrated a 4.3 centimeter mass in your lung. As this is concerning for cancer, you received an additional CT scan of the abdomen and pelvis which demonstrated     The oncology service was consulted regarding these findings and recommended that you follow up outpatient with Dr. Noonan. Please attend your appointment with Dr. Noonan. Workup outpatient You were admitted with chest pain, and found to have a mass in your lung which was the most likely cause of your chest pain. You received a CT scan and the oncology service was consulted regarding workup of the lung mass. Please follow up outpatient with Dr. Noonan. Normal blood sugar levels You have a history of diabetes, and should continue to take your home medication regimen as prescribed. Please follow up with your primary care provider and continue to monitor your blood sugar levels.

## 2017-12-06 DIAGNOSIS — E11.49 TYPE 2 DIABETES MELLITUS WITH OTHER DIABETIC NEUROLOGICAL COMPLICATION: ICD-10-CM

## 2017-12-06 DIAGNOSIS — Z88.0 ALLERGY STATUS TO PENICILLIN: ICD-10-CM

## 2017-12-06 DIAGNOSIS — R51 HEADACHE: ICD-10-CM

## 2017-12-06 DIAGNOSIS — H92.01 OTALGIA, RIGHT EAR: ICD-10-CM

## 2017-12-06 DIAGNOSIS — Z80.51 FAMILY HISTORY OF MALIGNANT NEOPLASM OF KIDNEY: ICD-10-CM

## 2017-12-06 DIAGNOSIS — Z87.891 PERSONAL HISTORY OF NICOTINE DEPENDENCE: ICD-10-CM

## 2017-12-06 DIAGNOSIS — R91.8 OTHER NONSPECIFIC ABNORMAL FINDING OF LUNG FIELD: ICD-10-CM

## 2017-12-06 DIAGNOSIS — Z80.7 FAMILY HISTORY OF OTHER MALIGNANT NEOPLASMS OF LYMPHOID, HEMATOPOIETIC AND RELATED TISSUES: ICD-10-CM

## 2017-12-06 DIAGNOSIS — R07.9 CHEST PAIN, UNSPECIFIED: ICD-10-CM

## 2017-12-06 DIAGNOSIS — F41.9 ANXIETY DISORDER, UNSPECIFIED: ICD-10-CM

## 2017-12-06 DIAGNOSIS — E27.9 DISORDER OF ADRENAL GLAND, UNSPECIFIED: ICD-10-CM

## 2017-12-06 DIAGNOSIS — I45.10 UNSPECIFIED RIGHT BUNDLE-BRANCH BLOCK: ICD-10-CM

## 2017-12-06 DIAGNOSIS — I10 ESSENTIAL (PRIMARY) HYPERTENSION: ICD-10-CM

## 2017-12-06 DIAGNOSIS — Z79.84 LONG TERM (CURRENT) USE OF ORAL HYPOGLYCEMIC DRUGS: ICD-10-CM

## 2017-12-06 PROBLEM — E11.9 TYPE 2 DIABETES MELLITUS WITHOUT COMPLICATIONS: Chronic | Status: ACTIVE | Noted: 2017-12-03

## 2017-12-08 ENCOUNTER — RESULT REVIEW (OUTPATIENT)
Age: 62
End: 2017-12-08

## 2017-12-08 ENCOUNTER — OUTPATIENT (OUTPATIENT)
Dept: OUTPATIENT SERVICES | Facility: HOSPITAL | Age: 62
LOS: 1 days | End: 2017-12-08
Payer: COMMERCIAL

## 2017-12-08 PROCEDURE — 77012 CT SCAN FOR NEEDLE BIOPSY: CPT | Mod: 26

## 2017-12-08 PROCEDURE — 71035: CPT | Mod: 26

## 2017-12-08 PROCEDURE — 32405: CPT

## 2017-12-08 PROCEDURE — C1769: CPT

## 2017-12-08 PROCEDURE — 71035: CPT

## 2017-12-08 PROCEDURE — 88305 TISSUE EXAM BY PATHOLOGIST: CPT

## 2017-12-08 PROCEDURE — 88173 CYTOPATH EVAL FNA REPORT: CPT

## 2017-12-08 PROCEDURE — 88341 IMHCHEM/IMCYTCHM EA ADD ANTB: CPT

## 2017-12-08 PROCEDURE — 77012 CT SCAN FOR NEEDLE BIOPSY: CPT

## 2017-12-09 LAB
CULTURE RESULTS: SIGNIFICANT CHANGE UP
CULTURE RESULTS: SIGNIFICANT CHANGE UP
SPECIMEN SOURCE: SIGNIFICANT CHANGE UP
SPECIMEN SOURCE: SIGNIFICANT CHANGE UP

## 2017-12-11 ENCOUNTER — OUTPATIENT (OUTPATIENT)
Dept: OUTPATIENT SERVICES | Facility: HOSPITAL | Age: 62
LOS: 1 days | End: 2017-12-11
Payer: COMMERCIAL

## 2017-12-11 LAB
GLUCOSE BLDC GLUCOMTR-MCNC: 97 MG/DL — SIGNIFICANT CHANGE UP (ref 70–99)
NON-GYNECOLOGICAL CYTOLOGY STUDY: SIGNIFICANT CHANGE UP

## 2017-12-11 PROCEDURE — A9552: CPT

## 2017-12-11 PROCEDURE — 78815 PET IMAGE W/CT SKULL-THIGH: CPT | Mod: 26

## 2017-12-11 PROCEDURE — 70552 MRI BRAIN STEM W/DYE: CPT

## 2017-12-11 PROCEDURE — 70552 MRI BRAIN STEM W/DYE: CPT | Mod: 26

## 2017-12-11 PROCEDURE — A9585: CPT

## 2017-12-11 PROCEDURE — 78815 PET IMAGE W/CT SKULL-THIGH: CPT

## 2017-12-11 PROCEDURE — 82962 GLUCOSE BLOOD TEST: CPT

## 2017-12-12 LAB — SURGICAL PATHOLOGY STUDY: SIGNIFICANT CHANGE UP

## 2017-12-27 ENCOUNTER — APPOINTMENT (OUTPATIENT)
Dept: NEUROSURGERY | Facility: CLINIC | Age: 62
End: 2017-12-27
Payer: COMMERCIAL

## 2017-12-27 ENCOUNTER — APPOINTMENT (OUTPATIENT)
Dept: RADIATION ONCOLOGY | Facility: CLINIC | Age: 62
End: 2017-12-27
Payer: COMMERCIAL

## 2017-12-27 VITALS
HEART RATE: 88 BPM | BODY MASS INDEX: 34.31 KG/M2 | WEIGHT: 201 LBS | OXYGEN SATURATION: 96 % | SYSTOLIC BLOOD PRESSURE: 117 MMHG | DIASTOLIC BLOOD PRESSURE: 74 MMHG | HEIGHT: 64 IN

## 2017-12-27 VITALS
HEART RATE: 98 BPM | SYSTOLIC BLOOD PRESSURE: 123 MMHG | WEIGHT: 201 LBS | OXYGEN SATURATION: 99 % | DIASTOLIC BLOOD PRESSURE: 79 MMHG

## 2017-12-27 DIAGNOSIS — Z86.79 PERSONAL HISTORY OF OTHER DISEASES OF THE CIRCULATORY SYSTEM: ICD-10-CM

## 2017-12-27 DIAGNOSIS — C79.31 SECONDARY MALIGNANT NEOPLASM OF BRAIN: ICD-10-CM

## 2017-12-27 DIAGNOSIS — C34.91 MALIGNANT NEOPLASM OF UNSPECIFIED PART OF RIGHT BRONCHUS OR LUNG: ICD-10-CM

## 2017-12-27 DIAGNOSIS — E11.9 TYPE 2 DIABETES MELLITUS W/OUT COMPLICATIONS: ICD-10-CM

## 2017-12-27 PROCEDURE — 99205 OFFICE O/P NEW HI 60 MIN: CPT

## 2017-12-27 PROCEDURE — 99245 OFF/OP CONSLTJ NEW/EST HI 55: CPT | Mod: 25

## 2018-01-02 VITALS
HEART RATE: 102 BPM | BODY MASS INDEX: 34.48 KG/M2 | WEIGHT: 200.9 LBS | DIASTOLIC BLOOD PRESSURE: 76 MMHG | OXYGEN SATURATION: 98 % | RESPIRATION RATE: 18 BRPM | SYSTOLIC BLOOD PRESSURE: 113 MMHG

## 2018-01-02 DIAGNOSIS — C79.51 SECONDARY MALIGNANT NEOPLASM OF BONE: ICD-10-CM

## 2018-01-02 RX ORDER — ONDANSETRON 8 MG/1
8 TABLET, ORALLY DISINTEGRATING ORAL EVERY 8 HOURS
Qty: 21 | Refills: 0 | Status: ACTIVE | COMMUNITY
Start: 2018-01-02 | End: 1900-01-01

## 2018-01-04 ENCOUNTER — OUTPATIENT (OUTPATIENT)
Dept: OUTPATIENT SERVICES | Facility: HOSPITAL | Age: 63
LOS: 1 days | Discharge: HOME | End: 2018-01-04
Payer: COMMERCIAL

## 2018-01-09 ENCOUNTER — OUTPATIENT (OUTPATIENT)
Dept: OUTPATIENT SERVICES | Facility: HOSPITAL | Age: 63
LOS: 1 days | End: 2018-01-09
Payer: COMMERCIAL

## 2018-01-09 ENCOUNTER — APPOINTMENT (OUTPATIENT)
Dept: MRI IMAGING | Facility: IMAGING CENTER | Age: 63
End: 2018-01-09

## 2018-01-09 ENCOUNTER — APPOINTMENT (OUTPATIENT)
Dept: NEUROSURGERY | Facility: AMBULATORY SURGERY CENTER | Age: 63
End: 2018-01-09
Payer: COMMERCIAL

## 2018-01-09 DIAGNOSIS — C79.51 SECONDARY MALIGNANT NEOPLASM OF BONE: ICD-10-CM

## 2018-01-09 PROCEDURE — 61797 SRS CRAN LES SIMPLE ADDL: CPT

## 2018-01-09 PROCEDURE — 77295 3-D RADIOTHERAPY PLAN: CPT | Mod: 26

## 2018-01-09 PROCEDURE — 61796 SRS CRANIAL LESION SIMPLE: CPT

## 2018-01-09 PROCEDURE — 82565 ASSAY OF CREATININE: CPT

## 2018-01-09 PROCEDURE — 77263 THER RADIOLOGY TX PLNG CPLX: CPT

## 2018-01-09 PROCEDURE — A9585: CPT

## 2018-01-09 PROCEDURE — 77432 STEREOTACTIC RADIATION TRMT: CPT

## 2018-01-09 PROCEDURE — 61800 APPLY SRS HEADFRAME ADD-ON: CPT

## 2018-01-09 PROCEDURE — 76498 UNLISTED MR PROCEDURE: CPT

## 2018-01-09 PROCEDURE — 77334 RADIATION TREATMENT AID(S): CPT | Mod: 26

## 2018-01-09 PROCEDURE — 77300 RADIATION THERAPY DOSE PLAN: CPT | Mod: 26

## 2018-01-09 RX ORDER — AMLODIPINE BESYLATE 5 MG/1
5 TABLET ORAL
Refills: 0 | Status: ACTIVE | COMMUNITY

## 2018-01-09 RX ORDER — OXYCODONE AND ACETAMINOPHEN 5; 325 MG/1; MG/1
5-325 TABLET ORAL
Refills: 0 | Status: ACTIVE | COMMUNITY

## 2018-01-09 RX ORDER — VALSARTAN 40 MG/1
40 TABLET, COATED ORAL
Refills: 0 | Status: ACTIVE | COMMUNITY

## 2018-01-13 ENCOUNTER — INPATIENT (INPATIENT)
Facility: HOSPITAL | Age: 63
LOS: 12 days | Discharge: HOME HEALTH | DRG: 853 | End: 2018-01-26
Attending: INTERNAL MEDICINE | Admitting: INTERNAL MEDICINE
Payer: COMMERCIAL

## 2018-01-13 VITALS
TEMPERATURE: 100 F | HEIGHT: 64 IN | DIASTOLIC BLOOD PRESSURE: 78 MMHG | OXYGEN SATURATION: 97 % | RESPIRATION RATE: 18 BRPM | HEART RATE: 118 BPM | WEIGHT: 198.42 LBS | SYSTOLIC BLOOD PRESSURE: 130 MMHG

## 2018-01-13 LAB
ALBUMIN SERPL ELPH-MCNC: 3.2 G/DL — LOW (ref 3.3–5)
ALP SERPL-CCNC: 113 U/L — SIGNIFICANT CHANGE UP (ref 40–120)
ALT FLD-CCNC: 36 U/L — SIGNIFICANT CHANGE UP (ref 10–45)
ANION GAP SERPL CALC-SCNC: 17 MMOL/L — SIGNIFICANT CHANGE UP (ref 5–17)
APTT BLD: 30.8 SEC — SIGNIFICANT CHANGE UP (ref 27.5–37.4)
AST SERPL-CCNC: 22 U/L — SIGNIFICANT CHANGE UP (ref 10–40)
BASOPHILS NFR BLD AUTO: 0.2 % — SIGNIFICANT CHANGE UP (ref 0–2)
BILIRUB SERPL-MCNC: 0.4 MG/DL — SIGNIFICANT CHANGE UP (ref 0.2–1.2)
BUN SERPL-MCNC: 12 MG/DL — SIGNIFICANT CHANGE UP (ref 7–23)
CALCIUM SERPL-MCNC: 9 MG/DL — SIGNIFICANT CHANGE UP (ref 8.4–10.5)
CHLORIDE SERPL-SCNC: 99 MMOL/L — SIGNIFICANT CHANGE UP (ref 96–108)
CO2 SERPL-SCNC: 23 MMOL/L — SIGNIFICANT CHANGE UP (ref 22–31)
CREAT SERPL-MCNC: 1.04 MG/DL — SIGNIFICANT CHANGE UP (ref 0.5–1.3)
EOSINOPHIL NFR BLD AUTO: 0 % — SIGNIFICANT CHANGE UP (ref 0–6)
GLUCOSE SERPL-MCNC: 124 MG/DL — HIGH (ref 70–99)
HCT VFR BLD CALC: 28.6 % — LOW (ref 34.5–45)
HGB BLD-MCNC: 9.3 G/DL — LOW (ref 11.5–15.5)
INR BLD: 1.33 — HIGH (ref 0.88–1.16)
LYMPHOCYTES # BLD AUTO: 11 % — LOW (ref 13–44)
MCHC RBC-ENTMCNC: 25.9 PG — LOW (ref 27–34)
MCHC RBC-ENTMCNC: 32.5 G/DL — SIGNIFICANT CHANGE UP (ref 32–36)
MCV RBC AUTO: 79.7 FL — LOW (ref 80–100)
MONOCYTES NFR BLD AUTO: 5.5 % — SIGNIFICANT CHANGE UP (ref 2–14)
NEUTROPHILS NFR BLD AUTO: 83.3 % — HIGH (ref 43–77)
PLATELET # BLD AUTO: 615 K/UL — HIGH (ref 150–400)
POTASSIUM SERPL-MCNC: 3.4 MMOL/L — LOW (ref 3.5–5.3)
POTASSIUM SERPL-SCNC: 3.4 MMOL/L — LOW (ref 3.5–5.3)
PROT SERPL-MCNC: 7.7 G/DL — SIGNIFICANT CHANGE UP (ref 6–8.3)
PROTHROM AB SERPL-ACNC: 14.9 SEC — HIGH (ref 9.8–12.7)
RBC # BLD: 3.59 M/UL — LOW (ref 3.8–5.2)
RBC # FLD: 14.9 % — SIGNIFICANT CHANGE UP (ref 10.3–16.9)
SODIUM SERPL-SCNC: 139 MMOL/L — SIGNIFICANT CHANGE UP (ref 135–145)
WBC # BLD: 12.1 K/UL — HIGH (ref 3.8–10.5)
WBC # FLD AUTO: 12.1 K/UL — HIGH (ref 3.8–10.5)

## 2018-01-13 PROCEDURE — 99223 1ST HOSP IP/OBS HIGH 75: CPT | Mod: GC

## 2018-01-13 PROCEDURE — 71045 X-RAY EXAM CHEST 1 VIEW: CPT | Mod: 26

## 2018-01-13 PROCEDURE — 99285 EMERGENCY DEPT VISIT HI MDM: CPT | Mod: 25

## 2018-01-13 PROCEDURE — 93010 ELECTROCARDIOGRAM REPORT: CPT

## 2018-01-13 RX ORDER — METFORMIN HYDROCHLORIDE 850 MG/1
0 TABLET ORAL
Qty: 0 | Refills: 0 | COMMUNITY

## 2018-01-13 RX ORDER — ONDANSETRON 8 MG/1
4 TABLET, FILM COATED ORAL ONCE
Qty: 0 | Refills: 0 | Status: COMPLETED | OUTPATIENT
Start: 2018-01-13 | End: 2018-01-13

## 2018-01-13 RX ORDER — VALSARTAN 80 MG/1
1 TABLET ORAL
Qty: 0 | Refills: 0 | COMMUNITY

## 2018-01-13 RX ORDER — FAMOTIDINE 10 MG/ML
20 INJECTION INTRAVENOUS ONCE
Qty: 0 | Refills: 0 | Status: COMPLETED | OUTPATIENT
Start: 2018-01-13 | End: 2018-01-13

## 2018-01-13 RX ORDER — SODIUM CHLORIDE 9 MG/ML
1000 INJECTION INTRAMUSCULAR; INTRAVENOUS; SUBCUTANEOUS ONCE
Qty: 0 | Refills: 0 | Status: COMPLETED | OUTPATIENT
Start: 2018-01-13 | End: 2018-01-13

## 2018-01-13 RX ADMIN — ONDANSETRON 4 MILLIGRAM(S): 8 TABLET, FILM COATED ORAL at 22:36

## 2018-01-13 RX ADMIN — FAMOTIDINE 20 MILLIGRAM(S): 10 INJECTION INTRAVENOUS at 22:36

## 2018-01-13 RX ADMIN — SODIUM CHLORIDE 1000 MILLILITER(S): 9 INJECTION INTRAMUSCULAR; INTRAVENOUS; SUBCUTANEOUS at 22:36

## 2018-01-13 NOTE — ED ADULT NURSE NOTE - OBJECTIVE STATEMENT
pt has a hx of newly diagnosed stage 4 lung cancer with mets to the brain and right rib.  pt began radiation this week and since has had n/v/d with malaise and low grade fever.

## 2018-01-13 NOTE — ED PROVIDER NOTE - CARE PLAN
Principal Discharge DX:	Vomiting and diarrhea  Secondary Diagnosis:	Malignant neoplasm of right lung, unspecified part of lung

## 2018-01-13 NOTE — ED PROVIDER NOTE - MEDICAL DECISION MAKING DETAILS
Pt p/w 4 days n/v/d, low-grade fever today at home, afebrile in the ED, unable to tolerate PO, gen weakness. Likely dehydration, r/o underlying electrolyte abnormalities, infection. benign, non tender abdomen. Likely admit for continued treatment

## 2018-01-13 NOTE — ED PROVIDER NOTE - RESPIRATORY, MLM
Scattered wheezing RLL. No increased WOB, tachypnea, hypoxia, or accessory mm use. Pt speaks in full sentences.

## 2018-01-13 NOTE — ED PROVIDER NOTE - OBJECTIVE STATEMENT
Pt w/ PMHx stage IV lung CA w/ mets to brain and bone, recently dx'd, underwent RT to bony mets on L rib last week, and had gamma knife tx to brain 5 days ago p/w 4 days of n/v/d, daily, non bloody, non bilious. No abdominal pain. + worsening, persistent cough, sometimes w/ blood-tinged sputum. no naresh hemoptysis. No CP, SOB. + dry / sore throat. No urinary complaints. No flank pain. Pt has been unable to tolerate PO. Pt was supposed to start chemo this week, but has been unable to 2/2 v/d. Pt had low grade fever today of 101, at 5 pm, took Tylenol at that time.

## 2018-01-13 NOTE — ED PROVIDER NOTE - DIAGNOSTIC INTERPRETATION
ER Physician: Supriya Marcus, DO  CHEST XRAY INTERPRETATION: RLL mass, heart shadow normal, bony structures intact

## 2018-01-14 DIAGNOSIS — Z92.3 PERSONAL HISTORY OF IRRADIATION: Chronic | ICD-10-CM

## 2018-01-14 DIAGNOSIS — R65.10 SYSTEMIC INFLAMMATORY RESPONSE SYNDROME (SIRS) OF NON-INFECTIOUS ORIGIN WITHOUT ACUTE ORGAN DYSFUNCTION: ICD-10-CM

## 2018-01-14 DIAGNOSIS — C34.90 MALIGNANT NEOPLASM OF UNSPECIFIED PART OF UNSPECIFIED BRONCHUS OR LUNG: ICD-10-CM

## 2018-01-14 DIAGNOSIS — R11.10 VOMITING, UNSPECIFIED: ICD-10-CM

## 2018-01-14 DIAGNOSIS — D64.9 ANEMIA, UNSPECIFIED: ICD-10-CM

## 2018-01-14 DIAGNOSIS — E11.9 TYPE 2 DIABETES MELLITUS WITHOUT COMPLICATIONS: ICD-10-CM

## 2018-01-14 DIAGNOSIS — Z29.9 ENCOUNTER FOR PROPHYLACTIC MEASURES, UNSPECIFIED: ICD-10-CM

## 2018-01-14 DIAGNOSIS — I10 ESSENTIAL (PRIMARY) HYPERTENSION: ICD-10-CM

## 2018-01-14 DIAGNOSIS — C71.9 MALIGNANT NEOPLASM OF BRAIN, UNSPECIFIED: ICD-10-CM

## 2018-01-14 DIAGNOSIS — C79.51 SECONDARY MALIGNANT NEOPLASM OF BONE: ICD-10-CM

## 2018-01-14 DIAGNOSIS — R94.31 ABNORMAL ELECTROCARDIOGRAM [ECG] [EKG]: ICD-10-CM

## 2018-01-14 DIAGNOSIS — I45.10 UNSPECIFIED RIGHT BUNDLE-BRANCH BLOCK: ICD-10-CM

## 2018-01-14 LAB
ANION GAP SERPL CALC-SCNC: 13 MMOL/L — SIGNIFICANT CHANGE UP (ref 5–17)
BASOPHILS NFR BLD AUTO: 0.2 % — SIGNIFICANT CHANGE UP (ref 0–2)
BLD GP AB SCN SERPL QL: POSITIVE — SIGNIFICANT CHANGE UP
BUN SERPL-MCNC: 11 MG/DL — SIGNIFICANT CHANGE UP (ref 7–23)
CALCIUM SERPL-MCNC: 8.7 MG/DL — SIGNIFICANT CHANGE UP (ref 8.4–10.5)
CHLORIDE SERPL-SCNC: 101 MMOL/L — SIGNIFICANT CHANGE UP (ref 96–108)
CO2 SERPL-SCNC: 24 MMOL/L — SIGNIFICANT CHANGE UP (ref 22–31)
CREAT SERPL-MCNC: 0.93 MG/DL — SIGNIFICANT CHANGE UP (ref 0.5–1.3)
EOSINOPHIL NFR BLD AUTO: 0.1 % — SIGNIFICANT CHANGE UP (ref 0–6)
FERRITIN SERPL-MCNC: 413.5 NG/ML — HIGH (ref 15–150)
GLUCOSE BLDC GLUCOMTR-MCNC: 108 MG/DL — HIGH (ref 70–99)
GLUCOSE BLDC GLUCOMTR-MCNC: 122 MG/DL — HIGH (ref 70–99)
GLUCOSE BLDC GLUCOMTR-MCNC: 136 MG/DL — HIGH (ref 70–99)
GLUCOSE BLDC GLUCOMTR-MCNC: 99 MG/DL — SIGNIFICANT CHANGE UP (ref 70–99)
GLUCOSE SERPL-MCNC: 112 MG/DL — HIGH (ref 70–99)
HBA1C BLD-MCNC: 6.3 % — HIGH (ref 4–5.6)
HCT VFR BLD CALC: 26.5 % — LOW (ref 34.5–45)
HCV AB S/CO SERPL IA: 0.11 S/CO — SIGNIFICANT CHANGE UP
HCV AB SERPL-IMP: SIGNIFICANT CHANGE UP
HGB BLD-MCNC: 8.2 G/DL — LOW (ref 11.5–15.5)
HIV 1+2 AB+HIV1 P24 AG SERPL QL IA: SIGNIFICANT CHANGE UP
IRON SATN MFR SERPL: 12 % — LOW (ref 14–50)
IRON SATN MFR SERPL: 16 UG/DL — LOW (ref 30–160)
LACTATE SERPL-SCNC: 0.9 MMOL/L — SIGNIFICANT CHANGE UP (ref 0.5–2)
LYMPHOCYTES # BLD AUTO: 13.3 % — SIGNIFICANT CHANGE UP (ref 13–44)
MAGNESIUM SERPL-MCNC: 2.1 MG/DL — SIGNIFICANT CHANGE UP (ref 1.6–2.6)
MCHC RBC-ENTMCNC: 25 PG — LOW (ref 27–34)
MCHC RBC-ENTMCNC: 30.9 G/DL — LOW (ref 32–36)
MCV RBC AUTO: 80.8 FL — SIGNIFICANT CHANGE UP (ref 80–100)
MONOCYTES NFR BLD AUTO: 6.5 % — SIGNIFICANT CHANGE UP (ref 2–14)
NEUTROPHILS NFR BLD AUTO: 79.9 % — HIGH (ref 43–77)
PLATELET # BLD AUTO: 631 K/UL — HIGH (ref 150–400)
POTASSIUM SERPL-MCNC: 3.8 MMOL/L — SIGNIFICANT CHANGE UP (ref 3.5–5.3)
POTASSIUM SERPL-SCNC: 3.8 MMOL/L — SIGNIFICANT CHANGE UP (ref 3.5–5.3)
RAPID RVP RESULT: SIGNIFICANT CHANGE UP
RBC # BLD: 3.28 M/UL — LOW (ref 3.8–5.2)
RBC # BLD: 3.28 M/UL — LOW (ref 3.8–5.2)
RBC # FLD: 14.9 % — SIGNIFICANT CHANGE UP (ref 10.3–16.9)
RETICS/RBC NFR: 1.8 % — SIGNIFICANT CHANGE UP (ref 0.5–2.5)
RH IG SCN BLD-IMP: NEGATIVE — SIGNIFICANT CHANGE UP
SODIUM SERPL-SCNC: 138 MMOL/L — SIGNIFICANT CHANGE UP (ref 135–145)
TIBC SERPL-MCNC: 129 UG/DL — LOW (ref 220–430)
TRANSFERRIN SERPL-MCNC: 108 MG/DL — LOW (ref 200–360)
TROPONIN T SERPL-MCNC: <0.01 NG/ML — SIGNIFICANT CHANGE UP (ref 0–0.01)
UIBC SERPL-MCNC: 113 UG/DL — SIGNIFICANT CHANGE UP (ref 110–370)
WBC # BLD: 11.4 K/UL — HIGH (ref 3.8–10.5)
WBC # FLD AUTO: 11.4 K/UL — HIGH (ref 3.8–10.5)

## 2018-01-14 PROCEDURE — 71275 CT ANGIOGRAPHY CHEST: CPT | Mod: 26

## 2018-01-14 PROCEDURE — 99233 SBSQ HOSP IP/OBS HIGH 50: CPT

## 2018-01-14 PROCEDURE — 70450 CT HEAD/BRAIN W/O DYE: CPT | Mod: 26

## 2018-01-14 PROCEDURE — 86077 PHYS BLOOD BANK SERV XMATCH: CPT

## 2018-01-14 RX ORDER — MEROPENEM 1 G/30ML
1000 INJECTION INTRAVENOUS EVERY 8 HOURS
Qty: 0 | Refills: 0 | Status: DISCONTINUED | OUTPATIENT
Start: 2018-01-15 | End: 2018-01-15

## 2018-01-14 RX ORDER — INSULIN LISPRO 100/ML
VIAL (ML) SUBCUTANEOUS
Qty: 0 | Refills: 0 | Status: DISCONTINUED | OUTPATIENT
Start: 2018-01-14 | End: 2018-01-26

## 2018-01-14 RX ORDER — ONDANSETRON 8 MG/1
4 TABLET, FILM COATED ORAL EVERY 6 HOURS
Qty: 0 | Refills: 0 | Status: DISCONTINUED | OUTPATIENT
Start: 2018-01-14 | End: 2018-01-14

## 2018-01-14 RX ORDER — OXYCODONE HYDROCHLORIDE 5 MG/1
10 TABLET ORAL EVERY 12 HOURS
Qty: 0 | Refills: 0 | Status: DISCONTINUED | OUTPATIENT
Start: 2018-01-14 | End: 2018-01-14

## 2018-01-14 RX ORDER — VANCOMYCIN HCL 1 G
1250 VIAL (EA) INTRAVENOUS EVERY 12 HOURS
Qty: 0 | Refills: 0 | Status: DISCONTINUED | OUTPATIENT
Start: 2018-01-14 | End: 2018-01-16

## 2018-01-14 RX ORDER — IPRATROPIUM/ALBUTEROL SULFATE 18-103MCG
3 AEROSOL WITH ADAPTER (GRAM) INHALATION EVERY 6 HOURS
Qty: 0 | Refills: 0 | Status: DISCONTINUED | OUTPATIENT
Start: 2018-01-14 | End: 2018-01-20

## 2018-01-14 RX ORDER — OXYCODONE HYDROCHLORIDE 5 MG/1
5 TABLET ORAL EVERY 4 HOURS
Qty: 0 | Refills: 0 | Status: DISCONTINUED | OUTPATIENT
Start: 2018-01-14 | End: 2018-01-19

## 2018-01-14 RX ORDER — GLUCAGON INJECTION, SOLUTION 0.5 MG/.1ML
1 INJECTION, SOLUTION SUBCUTANEOUS ONCE
Qty: 0 | Refills: 0 | Status: DISCONTINUED | OUTPATIENT
Start: 2018-01-14 | End: 2018-01-26

## 2018-01-14 RX ORDER — OLANZAPINE 15 MG/1
5 TABLET, FILM COATED ORAL DAILY
Qty: 0 | Refills: 0 | Status: DISCONTINUED | OUTPATIENT
Start: 2018-01-14 | End: 2018-01-22

## 2018-01-14 RX ORDER — OXYCODONE HYDROCHLORIDE 5 MG/1
5 TABLET ORAL EVERY 4 HOURS
Qty: 0 | Refills: 0 | Status: DISCONTINUED | OUTPATIENT
Start: 2018-01-14 | End: 2018-01-14

## 2018-01-14 RX ORDER — DEXTROSE 50 % IN WATER 50 %
12.5 SYRINGE (ML) INTRAVENOUS ONCE
Qty: 0 | Refills: 0 | Status: DISCONTINUED | OUTPATIENT
Start: 2018-01-14 | End: 2018-01-26

## 2018-01-14 RX ORDER — DEXTROSE 50 % IN WATER 50 %
25 SYRINGE (ML) INTRAVENOUS ONCE
Qty: 0 | Refills: 0 | Status: DISCONTINUED | OUTPATIENT
Start: 2018-01-14 | End: 2018-01-26

## 2018-01-14 RX ORDER — SODIUM CHLORIDE 9 MG/ML
250 INJECTION INTRAMUSCULAR; INTRAVENOUS; SUBCUTANEOUS ONCE
Qty: 0 | Refills: 0 | Status: COMPLETED | OUTPATIENT
Start: 2018-01-14 | End: 2018-01-14

## 2018-01-14 RX ORDER — MEROPENEM 1 G/30ML
INJECTION INTRAVENOUS
Qty: 0 | Refills: 0 | Status: DISCONTINUED | OUTPATIENT
Start: 2018-01-14 | End: 2018-01-14

## 2018-01-14 RX ORDER — LOPERAMIDE HCL 2 MG
2 TABLET ORAL DAILY
Qty: 0 | Refills: 0 | Status: DISCONTINUED | OUTPATIENT
Start: 2018-01-14 | End: 2018-01-14

## 2018-01-14 RX ORDER — AMLODIPINE BESYLATE 2.5 MG/1
10 TABLET ORAL DAILY
Qty: 0 | Refills: 0 | Status: DISCONTINUED | OUTPATIENT
Start: 2018-01-14 | End: 2018-01-26

## 2018-01-14 RX ORDER — OXYCODONE HYDROCHLORIDE 5 MG/1
5 TABLET ORAL EVERY 6 HOURS
Qty: 0 | Refills: 0 | Status: DISCONTINUED | OUTPATIENT
Start: 2018-01-14 | End: 2018-01-14

## 2018-01-14 RX ORDER — ALPRAZOLAM 0.25 MG
0.25 TABLET ORAL AT BEDTIME
Qty: 0 | Refills: 0 | Status: DISCONTINUED | OUTPATIENT
Start: 2018-01-14 | End: 2018-01-19

## 2018-01-14 RX ORDER — DEXTROSE 50 % IN WATER 50 %
1 SYRINGE (ML) INTRAVENOUS ONCE
Qty: 0 | Refills: 0 | Status: DISCONTINUED | OUTPATIENT
Start: 2018-01-14 | End: 2018-01-26

## 2018-01-14 RX ORDER — MEROPENEM 1 G/30ML
1000 INJECTION INTRAVENOUS EVERY 8 HOURS
Qty: 0 | Refills: 0 | Status: COMPLETED | OUTPATIENT
Start: 2018-01-14 | End: 2018-01-15

## 2018-01-14 RX ORDER — PSYLLIUM SEED (WITH DEXTROSE)
1 POWDER (GRAM) ORAL
Qty: 0 | Refills: 0 | Status: DISCONTINUED | OUTPATIENT
Start: 2018-01-14 | End: 2018-01-24

## 2018-01-14 RX ORDER — ENOXAPARIN SODIUM 100 MG/ML
40 INJECTION SUBCUTANEOUS DAILY
Qty: 0 | Refills: 0 | Status: DISCONTINUED | OUTPATIENT
Start: 2018-01-14 | End: 2018-01-17

## 2018-01-14 RX ORDER — POTASSIUM CHLORIDE 20 MEQ
20 PACKET (EA) ORAL ONCE
Qty: 0 | Refills: 0 | Status: COMPLETED | OUTPATIENT
Start: 2018-01-14 | End: 2018-01-14

## 2018-01-14 RX ORDER — SODIUM CHLORIDE 9 MG/ML
1000 INJECTION, SOLUTION INTRAVENOUS
Qty: 0 | Refills: 0 | Status: DISCONTINUED | OUTPATIENT
Start: 2018-01-14 | End: 2018-01-26

## 2018-01-14 RX ORDER — OXYCODONE HYDROCHLORIDE 5 MG/1
4 TABLET ORAL EVERY 6 HOURS
Qty: 0 | Refills: 0 | Status: DISCONTINUED | OUTPATIENT
Start: 2018-01-14 | End: 2018-01-14

## 2018-01-14 RX ORDER — LOPERAMIDE HCL 2 MG
2 TABLET ORAL
Qty: 0 | Refills: 0 | Status: DISCONTINUED | OUTPATIENT
Start: 2018-01-14 | End: 2018-01-23

## 2018-01-14 RX ORDER — ACETAMINOPHEN 500 MG
650 TABLET ORAL EVERY 6 HOURS
Qty: 0 | Refills: 0 | Status: DISCONTINUED | OUTPATIENT
Start: 2018-01-14 | End: 2018-01-26

## 2018-01-14 RX ORDER — SODIUM CHLORIDE 9 MG/ML
1000 INJECTION INTRAMUSCULAR; INTRAVENOUS; SUBCUTANEOUS
Qty: 0 | Refills: 0 | Status: DISCONTINUED | OUTPATIENT
Start: 2018-01-14 | End: 2018-01-15

## 2018-01-14 RX ORDER — OXYCODONE HYDROCHLORIDE 5 MG/1
5 TABLET ORAL EVERY 6 HOURS
Qty: 0 | Refills: 0 | Status: DISCONTINUED | OUTPATIENT
Start: 2018-01-14 | End: 2018-01-15

## 2018-01-14 RX ORDER — POTASSIUM CHLORIDE 20 MEQ
40 PACKET (EA) ORAL EVERY 4 HOURS
Qty: 0 | Refills: 0 | Status: COMPLETED | OUTPATIENT
Start: 2018-01-14 | End: 2018-01-14

## 2018-01-14 RX ADMIN — SODIUM CHLORIDE 1000 MILLILITER(S): 9 INJECTION INTRAMUSCULAR; INTRAVENOUS; SUBCUTANEOUS at 01:43

## 2018-01-14 RX ADMIN — Medication 100 MILLIGRAM(S): at 21:04

## 2018-01-14 RX ADMIN — OLANZAPINE 5 MILLIGRAM(S): 15 TABLET, FILM COATED ORAL at 13:03

## 2018-01-14 RX ADMIN — OXYCODONE HYDROCHLORIDE 5 MILLIGRAM(S): 5 TABLET ORAL at 14:49

## 2018-01-14 RX ADMIN — OXYCODONE HYDROCHLORIDE 5 MILLIGRAM(S): 5 TABLET ORAL at 21:00

## 2018-01-14 RX ADMIN — Medication 100 MILLIGRAM(S): at 06:57

## 2018-01-14 RX ADMIN — Medication 40 MILLIEQUIVALENT(S): at 01:45

## 2018-01-14 RX ADMIN — Medication 0.25 MILLIGRAM(S): at 21:04

## 2018-01-14 RX ADMIN — Medication 2 MILLIGRAM(S): at 14:49

## 2018-01-14 RX ADMIN — SODIUM CHLORIDE 100 MILLILITER(S): 9 INJECTION INTRAMUSCULAR; INTRAVENOUS; SUBCUTANEOUS at 19:33

## 2018-01-14 RX ADMIN — MEROPENEM 100 MILLIGRAM(S): 1 INJECTION INTRAVENOUS at 19:33

## 2018-01-14 RX ADMIN — Medication 3 MILLILITER(S): at 21:04

## 2018-01-14 RX ADMIN — Medication 110 MILLIGRAM(S): at 09:27

## 2018-01-14 RX ADMIN — Medication 1 PACKET(S): at 17:19

## 2018-01-14 RX ADMIN — Medication 20 MILLIEQUIVALENT(S): at 11:16

## 2018-01-14 RX ADMIN — Medication 3 MILLILITER(S): at 17:18

## 2018-01-14 RX ADMIN — AMLODIPINE BESYLATE 10 MILLIGRAM(S): 2.5 TABLET ORAL at 17:18

## 2018-01-14 RX ADMIN — SODIUM CHLORIDE 100 MILLILITER(S): 9 INJECTION INTRAMUSCULAR; INTRAVENOUS; SUBCUTANEOUS at 08:02

## 2018-01-14 RX ADMIN — SODIUM CHLORIDE 100 MILLILITER(S): 9 INJECTION INTRAMUSCULAR; INTRAVENOUS; SUBCUTANEOUS at 01:43

## 2018-01-14 RX ADMIN — Medication 40 MILLIEQUIVALENT(S): at 06:57

## 2018-01-14 RX ADMIN — Medication 100 MILLIGRAM(S): at 13:04

## 2018-01-14 RX ADMIN — Medication 650 MILLIGRAM(S): at 17:18

## 2018-01-14 RX ADMIN — Medication 166.67 MILLIGRAM(S): at 20:43

## 2018-01-14 RX ADMIN — ENOXAPARIN SODIUM 40 MILLIGRAM(S): 100 INJECTION SUBCUTANEOUS at 11:15

## 2018-01-14 NOTE — H&P ADULT - ATTENDING COMMENTS
patient seen and examined; son present in room ; son and patient report increasing cough x 2 weeks, sometime severe, requesting cough medication; pt w/ dyspnea over 1 month prior to lung cancer diagnosis. pt reports fevers x 1days  reviewed vs, labs, CXR,  ekg  agree w/ PE findings as above w/ additions/ exceptions/ pertinent findings: pt in NAD, awake, alert, slightly anxious; no JVD,  +decreased breath sounds at R base; no lower ext tenderness b/l , no edema b/l     1. vomiting and diarrhea: occuring in setting of gamma radiation therapy, on IVFs and antiemetics prn; follow up CT head report  2. metastatic lung cancer/ cough:  CXR appears to show increasing lung mass, though may be positional; in setting of worsening cough, blood tinged sputum and now fevers, will need to consider postobstructive PNA ; will start abx empirically, obtain CTA lungs and call pulm for possible bronch if needed.   3. anemia: monitor hb/hct,   4. RBBB: ECHO, check CTA  5. DM: on ISS patient seen and examined; son present in room ; son and patient report increasing cough x 2 weeks, sometime severe, requesting cough medication; pt w/ dyspnea over 1 month prior to lung cancer diagnosis. pt reports fevers x 1days  reviewed vs, labs, CXR,  ekg  agree w/ PE findings as above w/ additions/ exceptions/ pertinent findings: pt in NAD, awake, alert, slightly anxious; no JVD,  +decreased breath sounds at R base; no lower ext tenderness b/l , no edema b/l     1. vomiting and diarrhea: occuring in setting of gamma radiation therapy, on IVFs and antiemetics prn; follow up CT head report  2. metastatic lung cancer/ cough:  CXR appears to show increasing lung mass, though may be positional; in setting of worsening cough, blood tinged sputum and now fevers, will need to consider postobstructive PNA ; will start abx empirically, obtain CTA lungs and call pulm for possible bronch if needed.   3. anemia: monitor hb/hct,   4. RBBB: RBBB seen on last admission (see 12/2017 h&P); ECHO ordered   5. DM: on ISS patient seen and examined; son present in room ; son and patient report increasing cough x 2 weeks, sometime severe, requesting cough medication; pt w/ dyspnea over 1 month prior to lung cancer diagnosis. pt reports fevers x 1days  reviewed vs, labs, CXR,  ekg  agree w/ PE findings as above w/ additions/ exceptions/ pertinent findings: pt in NAD, awake, alert, slightly anxious; no JVD,  +decreased breath sounds at R base; no lower ext tenderness b/l , no edema b/l     1. vomiting and diarrhea: occuring in setting of gamma radiation therapy, on IVFs and antiemetics prn; follow up CT head report  2. metastatic lung cancer/ cough:  CXR appears to show increasing lung mass, though may be positional; in setting of worsening cough, blood tinged sputum and now fevers, will need to consider postobstructive PNA ; will start abx empirically, obtain CTA lungs and call pulm for possible bronch if needed.   3. anemia: monitor hb/hct,   4. RBBB: RBBB seen on last admission (see 12/2017 h&P); ECHO ordered   5. DM: on ISS  6. HTN: pt on amlodipine and valsartan per last admission; monitor BP, restart BP medications if BP elevated

## 2018-01-14 NOTE — DIETITIAN INITIAL EVALUATION ADULT. - PROBLEM SELECTOR PLAN 1
Patient does meet SIRS criteria. Most likely related to gamma knife tx since symptoms in close temporal proximity to treatment. Symptoms have not progressed. Fever may also be from tumor burden. Today, she only had 1 loose BM. CTH done and prelim read without acute process. Patient without abdominal pain thus making intra-abdominal pathology less likely. Viral URI or pna is still possible. Atypical ACS must be considered given RBBB w/o prior to compare to.   -NS at 100 cc/hr.  -F/u Troponin.   -F/u RVP.   -F/u Dr. Noonan -- notified.   -F/u Bcx.   -If patient shows signs of sepsis, would start broad spectrum antibiotics.  -The patient had only 1 loose stool in 24 hrs. If patient meets criteria for 3 loose BMs per 24 hours, would send stool studies including C.diff.

## 2018-01-14 NOTE — H&P ADULT - PROBLEM SELECTOR PLAN 8
MSSI  -Hga1c in AM Lovenox ppx    #FEN  -S/p 1.25 L bolus. NS at 100 cc/hr.   -Replete lytes prn.  -Regular diet.    #CODE  -FULL CODE

## 2018-01-14 NOTE — DIETITIAN INITIAL EVALUATION ADULT. - PROBLEM SELECTOR PLAN 9
-pt on BP medications per last admission note; restart BP medications if BP remains elevated (was on amlodipine and valsartan)

## 2018-01-14 NOTE — H&P ADULT - PROBLEM SELECTOR PLAN 3
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 Iqbal aware. Likely cause of patient's cough. 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 Iqbal aware -- f/u in AM Likely cause of patient's cough. 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 Noonan aware -- f/u in AM

## 2018-01-14 NOTE — H&P ADULT - PROBLEM SELECTOR PLAN 7
MSSI  -Hga1c in AM Denies orthopnea. Denies heart attack or cardiac stress test in the past. WELLS SCORE 3.5 for tachycardia, active malignancy, and blood tinged sputum from cough. PE still unlikely based on score. Fever can be from potential VTE however more likely cause would be from tumor burden. Tachycardia likely hypovolemia as it improved with fluid resuscitation. Also currently had fever, thus contributing to tachycardia. Further patient is NOT tachypneic and oyxgen saturation stable on RA. HR only mildly improved after Tylenol given and further fluid resuscitation, thus would do CTA.   -Echocardiogram and EKG in AM.  -CTA PE protocol

## 2018-01-14 NOTE — H&P ADULT - PROBLEM/PLAN-4
History and Physical      July Haynes is a 19 y.o. year old female   at 35w0d via 24w US who presents for LOF. Patient states that she felt a gush of fluid around 7am this morning. She states the fluid was clear and she continued to leak fluid after the gush. Also feeling contractions that are painful and occurring every 5-10 minutes. Feeling nausea and had several episodes of diarrhea overnight and one episode of vomiting. Feels better this AM. Denies VB, HA, vision changes, SOB, chest pain, unusual swelling of hands and feet. +FM.    Subjective:   positive  For CTXS.   positive Feels pain   positive for LOF  negative for vaginal bleeding.   positive for fetal movement    ROS: Pertinent items are noted in HPI.    No past medical history on file.  No past surgical history on file.  OB History    Para Term  AB Living   3       3     SAB TAB Ectopic Molar Multiple Live Births   2   1            # Outcome Date GA Lbr Maurilio/2nd Weight Sex Delivery Anes PTL Lv   3 Ectopic 2016 6w0d             Birth Comments: Right fallopian tube. Passed on its own   2 2016 6w0d             Birth Comments: Passed on its own   1 2012              Birth Comments: Passed on its own        Social History     Social History   • Marital status: Single     Spouse name: N/A   • Number of children: N/A   • Years of education: N/A     Occupational History   • Not on file.     Social History Main Topics   • Smoking status: Never Smoker   • Smokeless tobacco: Never Used   • Alcohol use No   • Drug use: No   • Sexual activity: Yes     Partners: Male      Comment: Planned pregnancy     Other Topics Concern   • Not on file     Social History Narrative   • No narrative on file     Allergies: Patient has no known allergies.    Current Facility-Administered Medications:   •  LR infusion, , Intravenous, Continuous, Karis Hernandez M.D.  •  fentaNYL (SUBLIMAZE) injection 50 mcg, 50 mcg, Intravenous, Q HOUR PRN,  "Karis Hernandez M.D.  •  fentaNYL (SUBLIMAZE) injection 100 mcg, 100 mcg, Intravenous, Q HOUR PRN, Karis Hernandez M.D.  •  hydrOXYzine HCl (ATARAX) tablet 50 mg, 50 mg, Oral, Q6HRS PRN, Karis Hernandez M.D.  •  mag hydrox-al hydrox-simeth (MAALOX PLUS ES or MYLANTA DS) suspension 30 mL, 30 mL, Oral, Q6HRS PRN, Karis Hernandez M.D.  •  penicillin G potassium injection 5 Million Units, 5 Million Units, Intravenous, Once **FOLLOWED BY** penicillin G potassium 2.5 Million Units in  mL IVPB, 2.5 Million Units, Intravenous, Q4HRS, Karis Hernandez M.D.  •  oxytocin (PITOCIN) injection 10 Units, 10 Units, Intramuscular, Once PRN, Karis Hernandez M.D.  •  misoprostol (CYTOTEC) tablet 800 mcg, 800 mcg, Rectal, Once PRN, Karis Hernandez M.D.  •  methylergonovine (METHERGINE) injection 0.2 mg, 0.2 mg, Intramuscular, Once PRN, Karis Hernandez M.D.    Prenatal care with [facility] starting at 24w with the following problems:  Patient Active Problem List    Diagnosis Date Noted   • Pregnancy 01/12/2018   • Rubella non-immune status, antepartum 11/01/2017   • Supervision of normal pregnancy 10/31/2017               Objective:      Height 1.702 m (5' 7\"), weight 99.8 kg (220 lb), last menstrual period 05/12/2017, unknown if currently breastfeeding.    General:   no acute distress   Skin:   normal   HEENT:  EOMI   Lungs:   CTA bilateral   Heart:   S1, S2 normal, no murmur, click, rub or gallop, regular rate and rhythm, brisk carotid upstroke without bruits, peripheral pulses very brisk, chest is clear without rales or wheezing, no pedal edema, no JVD, no hepatosplenomegaly   Abdomen:   gravid, NT   EFW:  3500g   Pelvis:  adequate with gynecoid pelvis   FHT:  135 BPM   Uterine Size: S=D   Presentations: Cephalic   Cervix:     Dilation: 2-3 cm    Effacement: 80%    Station:  -1    Consistency: Soft    Position:      Lab Review  Lab:   Blood type: O     Recent Results (from the past 5880 " hour(s))   CBC WITH DIFFERENTIAL    Collection Time: 08/03/17  9:29 PM   Result Value Ref Range    WBC 12.2 (H) 4.8 - 10.8 K/uL    RBC 4.99 4.20 - 5.40 M/uL    Hemoglobin 14.4 12.0 - 16.0 g/dL    Hematocrit 42.2 37.0 - 47.0 %    MCV 84.6 81.4 - 97.8 fL    MCH 28.9 27.0 - 33.0 pg    MCHC 34.1 33.6 - 35.0 g/dL    RDW 41.1 35.9 - 50.0 fL    Platelet Count 279 164 - 446 K/uL    MPV 10.1 9.0 - 12.9 fL    Neutrophils-Polys 68.40 44.00 - 72.00 %    Lymphocytes 22.20 22.00 - 41.00 %    Monocytes 7.90 0.00 - 13.40 %    Eosinophils 0.80 0.00 - 6.90 %    Basophils 0.30 0.00 - 1.80 %    Immature Granulocytes 0.40 0.00 - 0.90 %    Nucleated RBC 0.00 /100 WBC    Neutrophils (Absolute) 8.34 (H) 2.00 - 7.15 K/uL    Lymphs (Absolute) 2.71 1.00 - 4.80 K/uL    Monos (Absolute) 0.96 (H) 0.00 - 0.85 K/uL    Eos (Absolute) 0.10 0.00 - 0.51 K/uL    Baso (Absolute) 0.04 0.00 - 0.12 K/uL    Immature Granulocytes (abs) 0.05 0.00 - 0.11 K/uL    NRBC (Absolute) 0.00 K/uL   COMP METABOLIC PANEL    Collection Time: 08/03/17  9:29 PM   Result Value Ref Range    Sodium 137 135 - 145 mmol/L    Potassium 3.7 3.6 - 5.5 mmol/L    Chloride 105 96 - 112 mmol/L    Co2 24 20 - 33 mmol/L    Anion Gap 8.0 0.0 - 11.9    Glucose 82 65 - 99 mg/dL    Bun 7 (L) 8 - 22 mg/dL    Creatinine 0.66 0.50 - 1.40 mg/dL    Calcium 9.6 8.5 - 10.5 mg/dL    AST(SGOT) 17 12 - 45 U/L    ALT(SGPT) 17 2 - 50 U/L    Alkaline Phosphatase 41 30 - 99 U/L    Total Bilirubin 0.3 0.1 - 1.5 mg/dL    Albumin 4.2 3.2 - 4.9 g/dL    Total Protein 7.7 6.0 - 8.2 g/dL    Globulin 3.5 1.9 - 3.5 g/dL    A-G Ratio 1.2 g/dL   HCG QUANTITATIVE SERUM    Collection Time: 08/03/17  9:29 PM   Result Value Ref Range    Bhcg 25880.8 (H) 0.0 - 5.0 mIU/mL   RH TYPE FOR RHOGAM FROM E.D.    Collection Time: 08/03/17  9:29 PM   Result Value Ref Range    Emergency Department Rh Typing POS     Number Of Rh Doses Indicated ZERO    ESTIMATED GFR    Collection Time: 08/03/17  9:29 PM   Result Value Ref Range     GFR If African American >60 >60 mL/min/1.73 m 2    GFR If Non African American >60 >60 mL/min/1.73 m 2   POCT Urinalysis    Collection Time: 09/22/17  8:31 AM   Result Value Ref Range    POC Color  Negative    POC Appearance  Negative    POC Leukocyte Esterase negitive Negative    POC Nitrites negative Negative    POC Urobiligen  Negative (0.2) mg/dL    POC Protein trace Negative mg/dL    POC Urine PH 6.5 5.0 - 8.0    POC Blood negative Negative    POC Specific Gravity 1.015 <1.005 - >1.030    POC Ketones trace Negative mg/dL    POC Biliruben  Negative mg/dL    POC Glucose negative Negative mg/dL   POCT Urinalysis    Collection Time: 10/31/17  1:00 PM   Result Value Ref Range    POC Color  Negative    POC Appearance  Negative    POC Leukocyte Esterase Neg Negative    POC Nitrites Neg Negative    POC Urobiligen  Negative (0.2) mg/dL    POC Protein Neg Negative mg/dL    POC Urine PH 7.5 5.0 - 8.0    POC Blood Neg Negative    POC Specific Gravity 1.010 <1.005 - >1.030    POC Ketones Neg Negative mg/dL    POC Biliruben  Negative mg/dL    POC Glucose Neg Negative mg/dL   CHLAMYDIA/GC PCR URINE OR SWAB    Collection Time: 10/31/17  2:25 PM   Result Value Ref Range    Source Vaginal     C. trachomatis by PCR Negative Negative    N. gonorrhoeae by PCR Negative Negative   PREG CNTR PRENATAL PN    Collection Time: 10/31/17  3:43 PM   Result Value Ref Range    WBC 15.0 (H) 4.8 - 10.8 K/uL    RBC 4.42 4.20 - 5.40 M/uL    Hemoglobin 12.9 12.0 - 16.0 g/dL    Hematocrit 39.2 37.0 - 47.0 %    MCV 88.7 81.4 - 97.8 fL    MCH 29.2 27.0 - 33.0 pg    MCHC 32.9 (L) 33.6 - 35.0 g/dL    RDW 45.3 35.9 - 50.0 fL    Platelet Count 334 164 - 446 K/uL    MPV 9.9 9.0 - 12.9 fL    Neutrophils-Polys 75.00 (H) 44.00 - 72.00 %    Lymphocytes 14.80 (L) 22.00 - 41.00 %    Monocytes 6.20 0.00 - 13.40 %    Eosinophils 0.60 0.00 - 6.90 %    Basophils 0.90 0.00 - 1.80 %    Immature Granulocytes 2.50 (H) 0.00 - 0.90 %    Nucleated RBC 0.00 /100 WBC     Neutrophils (Absolute) 11.22 (H) 2.00 - 7.15 K/uL    Lymphs (Absolute) 2.22 1.00 - 4.80 K/uL    Monos (Absolute) 0.93 (H) 0.00 - 0.85 K/uL    Eos (Absolute) 0.09 0.00 - 0.51 K/uL    Baso (Absolute) 0.13 (H) 0.00 - 0.12 K/uL    Immature Granulocytes (abs) 0.37 (H) 0.00 - 0.11 K/uL    NRBC (Absolute) 0.00 K/uL    Color Yellow     Character Turbid (A)     Specific Gravity 1.022 <1.035    Ph 7.0 5.0 - 8.0    Glucose Negative Negative mg/dL    Ketones Trace (A) Negative mg/dL    Protein Negative Negative mg/dL    Bilirubin Negative Negative    Urobilinogen, Urine 0.2 Negative    Nitrite Negative Negative    Leukocyte Esterase Negative Negative    Occult Blood Negative Negative    Micro Urine Req Microscopic     Culture Indicated No UA Culture    Rubella IgG Antibody 4.10 IU/mL    Syphilis, Treponemal Qual Non Reactive Non Reactive    Hepatitis B Surface Antigen Negative Negative   GLUCOSE 1HR GESTATIONAL    Collection Time: 10/31/17  3:43 PM   Result Value Ref Range    Glucose, Post Dose 107 70 - 139 mg/dL   HIV ANTIBODIES    Collection Time: 10/31/17  3:43 PM   Result Value Ref Range    HIV Ag/Ab Combo Assay Non Reactive Non Reactive   OP PRENATAL PANEL-BLOOD BANK    Collection Time: 10/31/17  3:43 PM   Result Value Ref Range    ABO Grouping Only O     Rh Grouping Only POS     Antibody Screen Scrn NEG    URINE MICROSCOPIC (W/UA)    Collection Time: 10/31/17  3:43 PM   Result Value Ref Range    WBC 2-5 /hpf    RBC 0-2 /hpf    Bacteria Few (A) None /hpf    Epithelial Cells Few /hpf    Ca Oxalate Crystal Few /hpf    Hyaline Cast 0-2 /lpf   POC UA    Collection Time: 11/29/17 11:35 PM   Result Value Ref Range    POC Color Yellow     POC Appearance Clear     POC Glucose Negative Negative mg/dL    POC Ketones Negative Negative mg/dL    POC Specific Gravity 1.010 1.005 - 1.030    POC Blood Negative Negative    POC Urine PH 7.0 5.0 - 8.0    POC Protein Negative Negative mg/dL    POC Nitrites Negative Negative    POC Leukocyte  Esterase Trace (A) Negative        Assessment:   July Haynes at 35w0d via 24 week US  Labor status: PPROM  Eric regularly on TOCO Q 5-6 min  Category 1 FHT  Rubella non-immune  GBS unknown    Obstetrical history significant for   Patient Active Problem List    Diagnosis Date Noted   • Pregnancy 01/12/2018   • Rubella non-immune status, antepartum 11/01/2017   • Supervision of normal pregnancy 10/31/2017   .      Plan:     Admit to L&D  GBS unknown  PCN for GBS prophylaxis  Augment labor with Pitocin if needed              DISPLAY PLAN FREE TEXT

## 2018-01-14 NOTE — PROGRESS NOTE ADULT - ASSESSMENT
63 yo F with  1. Stage 4 lung ca with mets to LN, rib, and brain  - s/p radiation to rib, gamma knife to brain  - starting chemo with [facility] on tues, will find out regimen- pt needs to be medically optimized before starting tx as written below  - awaiting CTA to r/o infectious causes like post obs PNA  2. Nausea/vomiting  - pt with prolonged QT, repeat EKG today  - no zofran, will start zyprexa ODT 5 mg daily   - if needs additional meds will add on ativan and decadron as these are not QT prolonging and will help with other sx  3. Prolonged QT  - repeat EKG today  4. Diarrhea  - start metamucil and immodium, noninfectious and chornic  5. Shortness of breath 2/2 lung ca   - oxycodone 5 mg q6hrs standing with 5 mg q4hrs prn for sob/pain  6. Anxiety  - xanax qhs  7. Insomnia  - xanax qhs  8. ANemia  - likley ACD  9. HTN  - will f/u once sx better managed  10. DM  - FS well controlled  11. Chronic cough  - oxycodone should help with this, on tessalon perles as well  12. Severe protein calorie malnutrition  - will f/u response to xyprexa and if still appetite remains poor will supplement with steroids if FS tolerate  - nutrition consult

## 2018-01-14 NOTE — DIETITIAN INITIAL EVALUATION ADULT. - ENERGY NEEDS
Height: 64" Weight: 196lbs, IBW 120lbs+/-10%, %%, BMI - 33.8  IBW used to calculate energy needs due to pt's current body weight exceeding 120% of IBW   Nutrient needs based on [facility] standards of care for repletion in adults.

## 2018-01-14 NOTE — H&P ADULT - PROBLEM SELECTOR PLAN 1
Patient does meet SIRS criteria. But no clear source of infection. Most likely related to gamma knife tx since symptoms in close temporal proximity to treatment. Symptoms have not progressed. Fever may also be from tumor burden. Today, she only had 1 loose BM. CTH done and prelim read without acute process. Patient without abdominal pain thus making intra-abdominal pathology less likely. CXR showing malignancy in lungs but not showing definite focal infiltrate. Viral URI or pna is still possible. Atypical ACS must be considered given RBBB w/o prior to compare to.   -NS at 100 cc/hr.  -F/u Troponin.   -F/u RVP.   -F/u Dr. Noonan -- notified.   -F/u Bcx.   -If patient shows signs of sepsis, would start broad spectrum antibiotics. Patient does meet SIRS criteria. But no clear source of infection. Most likely related to gamma knife tx since symptoms in close temporal proximity to treatment. Symptoms have not progressed. Fever may also be from tumor burden. Today, she only had 1 loose BM. CTH done and prelim read without acute process. Patient without abdominal pain thus making intra-abdominal pathology less likely. CXR showing malignancy in lungs but not showing definite focal infiltrate. Viral URI or pna is still possible. Atypical ACS must be considered given RBBB w/o prior to compare to.   -NS at 100 cc/hr.  -F/u Troponin.   -F/u RVP.   -F/u Dr. Noonan -- notified.   -F/u Bcx.   -If patient shows signs of sepsis, would start broad spectrum antibiotics.  -The patient had only 1 loose stool in 24 hrs. If patient meets criteria for 3 loose BMs per 24 hours, would send stool studies including C.diff. Patient does meet SIRS criteria. But no clear source of infection. Most likely related to gamma knife tx since symptoms in close temporal proximity to treatment. Symptoms have not progressed. Fever may also be from tumor burden. Today, she only had 1 loose BM. CTH done and prelim read without acute process. Patient without abdominal pain thus making intra-abdominal pathology less likely. CXR showing malignancy in lungs but not showing definite focal infiltrate. Viral URI or pna is still possible. Atypical ACS must be considered given RBBB w/o prior to compare to.   -NS at 100 cc/hr.  -F/u Troponin.   -F/u RVP.   -F/u Dr. Noonan -- notified.   -F/u Bcx.   -If patient shows signs of sepsis, would start broad spectrum antibiotics.  -The patient had only 1 loose stool in 24 hrs. If patient meets criteria for 3 loose BMs per 24 hours, would send stool studies including C.diff.  -Lactate pending given AG. However higher suspicion for starvation ketoacidosis. Patient does meet SIRS criteria. Pt with 2 weeks of cough. Repeat CXR showing possible worsening of mass. Possible that patient has post-obstructive pneumonia. Nausea and vomiting most likely related to gamma knife tx since symptoms in close temporal proximity to treatment. Symptoms have not progressed. Fever may also be from tumor burden. Today, she only had 1 loose BM. CTH done and prelim read without acute process. Patient without abdominal pain thus making intra-abdominal pathology less likely. Viral URI or pna is still possible. Atypical ACS and PE must be considered given RBBB w/o prior to compare to. Of note, PE still on differential as patient is still mildly tachycardic despite treating fever and giving IVF.   -Levaquin for empiric coverage. Repeat EKG to monitor QTC.  -CTA to eval for PE and for possible post-obstructive pneumonia.   -Pulm consult in AM for possible bronch.   -NS at 100 cc/hr.  -F/u Troponin -- negative.   -F/u RVP - negative.   -F/u Dr. Noonan -- notified.   -F/u Bcx.   -If patient shows signs of sepsis, would start broad spectrum antibiotics.  -The patient had only 1 loose stool in 24 hrs. If patient meets criteria for 3 loose BMs per 24 hours, would send stool studies including C.diff. Patient does meet SIRS criteria. Pt with 2 weeks of cough. Repeat CXR showing possible worsening of mass. Possible that patient has post-obstructive pneumonia. Nausea and vomiting most likely related to gamma knife tx since symptoms in close temporal proximity to treatment. Symptoms have not progressed. Fever may also be from tumor burden. Today, she only had 1 loose BM. CTH done and prelim read without acute process. Patient without abdominal pain thus making intra-abdominal pathology less likely. Viral URI or pna is still possible. Atypical ACS and PE must be considered given RBBB w/o prior to compare to. Of note, PE still on differential as patient is still mildly tachycardic despite treating fever and giving IVF.   -Doxycycline for empiric coverage of gram positives and atypicals. Avoiding macrolide and levaquin given QTc of 513.   -CTA to eval for PE and for possible post-obstructive pneumonia.   -Pulm consult in AM for possible bronch.   -NS at 100 cc/hr.  -F/u Troponin -- negative.   -F/u RVP - negative.   -F/u Dr. Noonan -- notified.   -F/u Bcx.   -If patient shows signs of sepsis, would start broad spectrum antibiotics.  -The patient had only 1 loose stool in 24 hrs. If patient meets criteria for 3 loose BMs per 24 hours, would send stool studies including C.diff. Patient does meet SIRS criteria. Pt with 2 weeks of cough. Repeat CXR showing possible worsening of mass. Possible that patient has post-obstructive pneumonia. Nausea and vomiting most likely related to gamma knife tx since symptoms in close temporal proximity to treatment. Symptoms have not progressed. Fever may also be from tumor burden. Today, she only had 1 loose BM. CTH done and prelim read without acute process. Patient without abdominal pain thus making intra-abdominal pathology less likely. Viral URI or pna is still possible. Atypical ACS and PE must be considered given RBBB w/o prior to compare to. Of note, PE still on differential as patient is still mildly tachycardic despite treating fever and giving IVF.   -Doxycycline for empiric coverage of gram positives and atypicals. Avoiding macrolide and levaquin given QTc of 513. Of note, patient has severe anaphylactoid reaction to PCN in the past, thus avoiding CTX as well. Consider PCN skin testing.   -CTA to eval for PE and for possible post-obstructive pneumonia.   -Pulm consult in AM for possible bronch.   -NS at 100 cc/hr.  -F/u Troponin -- negative.   -F/u RVP - negative.   -F/u Dr. Noonan -- notified.   -F/u Bcx.   -If patient shows signs of sepsis, would start broad spectrum antibiotics.  -The patient had only 1 loose stool in 24 hrs. If patient meets criteria for 3 loose BMs per 24 hours, would send stool studies including C.diff. Patient does meet SIRS criteria. Most likely related to gamma knife tx since symptoms in close temporal proximity to treatment. Symptoms have not progressed. Fever may also be from tumor burden. Today, she only had 1 loose BM. CTH done and prelim read without acute process. Patient without abdominal pain thus making intra-abdominal pathology less likely. Viral URI or pna is still possible. Atypical ACS must be considered given RBBB w/o prior to compare to.   -NS at 100 cc/hr.  -F/u Troponin.   -F/u RVP.   -F/u Dr. Noonan -- notified.   -F/u Bcx.   -If patient shows signs of sepsis, would start broad spectrum antibiotics.  -The patient had only 1 loose stool in 24 hrs. If patient meets criteria for 3 loose BMs per 24 hours, would send stool studies including C.diff.

## 2018-01-14 NOTE — H&P ADULT - PROBLEM SELECTOR PLAN 6
Denies orthopnea. Denies heart attack or cardiac stress test in the past. WELLS SCORE 3.5 for tachycardia, active malignancy, and blood tinged sputum from cough. PE still unlikely based on score. Fever can be from potential VTE however more likely cause would be from tumor burden. Tachycardia likely hypovolemia as it improved with fluid resuscitation. Also currently had fever, thus contributing to tachycardia. Further patient is NOT tachypneic and oyxgen saturation stable on RA.  -Recheck HR after Tylenol given and further fluid resuscitation. If HR does not correct, would consider PE w/u.   -Echocardiogram and EKG in AM. Denies orthopnea. Denies heart attack or cardiac stress test in the past. WELLS SCORE 3.5 for tachycardia, active malignancy, and blood tinged sputum from cough. PE still unlikely based on score. Fever can be from potential VTE however more likely cause would be from tumor burden. Tachycardia likely hypovolemia as it improved with fluid resuscitation. Also currently had fever, thus contributing to tachycardia. Further patient is NOT tachypneic and oyxgen saturation stable on RA. HR improved after Tylenol given and further fluid resuscitation making PE less likely. Possible that patient has pHTN secondary to lung pathology or left heart structural disease.   -Echocardiogram and EKG in AM.  -If patient's respiratory status worsens, can consider CTA PE protocol. Microcytic.   -T&S in AM.  -Maintain 2 large peripheral IVs.  -workup:  Retic and iron panel.

## 2018-01-14 NOTE — H&P ADULT - NSHPLABSRESULTS_GEN_ALL_CORE
LABS:                        9.3    12.1  )-----------( 615      ( 13 Jan 2018 22:20 )             28.6     01-13    139  |  99  |  12  ----------------------------<  124<H>  3.4<L>   |  23  |  1.04    Ca    9.0      13 Jan 2018 22:20  Phos  2.7     01-13  Mg     2.1     01-13    TPro  7.7  /  Alb  3.2<L>  /  TBili  0.4  /  DBili  x   /  AST  22  /  ALT  36  /  AlkPhos  113  01-13    PT/INR - ( 13 Jan 2018 22:20 )   PT: 14.9 sec;   INR: 1.33          PTT - ( 13 Jan 2018 22:20 )  PTT:30.8 sec LABS:                        9.3    12.1  )-----------( 615      ( 13 Jan 2018 22:20 )             28.6     01-13    139  |  99  |  12  ----------------------------<  124<H>  3.4<L>   |  23  |  1.04    Ca    9.0      13 Jan 2018 22:20  Phos  2.7     01-13  Mg     2.1     01-13    TPro  7.7  /  Alb  3.2<L>  /  TBili  0.4  /  DBili  x   /  AST  22  /  ALT  36  /  AlkPhos  113  01-13    PT/INR - ( 13 Jan 2018 22:20 )   PT: 14.9 sec;   INR: 1.33        PTT - ( 13 Jan 2018 22:20 )  PTT:30.8 sec

## 2018-01-14 NOTE — DIETITIAN INITIAL EVALUATION ADULT. - PROBLEM SELECTOR PLAN 7
Denies orthopnea. Denies heart attack or cardiac stress test in the past. WELLS SCORE 3.5 for tachycardia, active malignancy, and blood tinged sputum from cough. PE still unlikely based on score. Fever can be from potential VTE however more likely cause would be from tumor burden. Tachycardia likely hypovolemia as it improved with fluid resuscitation. Also currently had fever, thus contributing to tachycardia. Further patient is NOT tachypneic and oyxgen saturation stable on RA. HR only mildly improved after Tylenol given and further fluid resuscitation, thus would do CTA.   -Echocardiogram and EKG in AM.  -CTA PE protocol

## 2018-01-14 NOTE — PROGRESS NOTE ADULT - ADDITIONAL PE
pt in NAD, pt in NAD initially but after walking from bathroom it took her several minutes to catch her breath, pt slightly tachypneic, RRR, lungs with b/l mild wheezing throughout, abd soft, LE no edema

## 2018-01-14 NOTE — H&P ADULT - PROBLEM SELECTOR PLAN 4
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 Iqbal aware.  -Tylenol prn. 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 Iqbal aware. -see #3  -Tylenol prn.

## 2018-01-14 NOTE — CHART NOTE - NSCHARTNOTEFT_GEN_A_CORE
Upon Nutritional Assessment by the Registered Dietitian your patient was determined to meet criteria / has evidence of the following diagnosis/diagnoses:          [ ]  Mild Protein Calorie Malnutrition        [ ]  Moderate Protein Calorie Malnutrition        [X] Severe Protein Calorie Malnutrition        [ ] Unspecified Protein Calorie Malnutrition        [ ] Underweight / BMI <19        [ ] Morbid Obesity / BMI > 40      Findings as based on:  •  Comprehensive nutrition assessment and consultation  •  Calorie counts (nutrient intake analysis)  •  Food acceptance and intake status from observations by staff  •  Follow up  •  Patient education  •  Intervention secondary to interdisciplinary rounds  •   concerns      Treatment:    The following diet has been recommended:  1.) Rec. Ensure Clear TID       PROVIDER Section:     By signing this assessment you are acknowledging and agree with the diagnosis/diagnoses assigned by the Registered Dietitian    Comments:

## 2018-01-14 NOTE — DIETITIAN INITIAL EVALUATION ADULT. - OTHER INFO
61 y/o F with a PMHX of HTN, DM, and Stage IV lung CA w/ mets to brain and bone who presents with 4 days of n/v/d.  Pt reports poor PO intake PTA.  Repots nausea/vomiting. Only able to consume small amounts of liquid and some fruit. Pt agreeable to trying Ensure Clear supplements. Reports the regular Ensure upsets her stomach.  Pt reports ~15 lb wt loss over the past couple months due to decreased PO intake. NKFA. Pain controlled.

## 2018-01-14 NOTE — H&P ADULT - PROBLEM SELECTOR PLAN 5
Microcytic.   -T&S in AM.  -Maintain 2 large peripheral IVs.  -workup:  Retic and iron panel. 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 Iqbal aware.  -Tylenol prn. seen on EKG, monitor serial daily EKGs, avoid QTc prolonging medications including antiemetics; may need telemetry if worsening

## 2018-01-14 NOTE — H&P ADULT - NSHPPHYSICALEXAM_GEN_ALL_CORE
Appearance: NAD. Speaking in full sentences.   HEENT:   Conjunctiva clear b/l. Dry oral mucosa.   Cardiovascular: Tachycardic but regular.   Respiratory: Decreased breath sounds of the right lung base. Good air entry b/l. No w/r/r b/l.   Gastrointestinal:  Soft, nontender. Non-distended. Non-rigid. No ecchymosis of the back or flanks. No CVA tenderness b/l. 	  Extremities: No edema b/l. No erythema b/l. LE WWP b/l. Thigh comparments soft w/o signs of hematoma or ecchymosis b/l.   Vascular: DP 2+ b/l.  Neurologic:  Alert and awake. Moving all extremities. Following commands. Making eye contact.

## 2018-01-14 NOTE — DIETITIAN INITIAL EVALUATION ADULT. - PROBLEM SELECTOR PLAN 2
Patient does meet SIRS criteria. Pt with 2 weeks of cough. Repeat CXR showing possible worsening of mass. Possible that patient has post-obstructive pneumonia. Nausea and vomiting most likely related to gamma knife tx since symptoms in close temporal proximity to treatment. Symptoms have not progressed. Fever may also be from tumor burden. Today, she only had 1 loose BM. CTH done and prelim read without acute process. Patient without abdominal pain thus making intra-abdominal pathology less likely. Viral URI or pna is still possible. Atypical ACS and PE must be considered given RBBB w/o prior to compare to. Of note, PE still on differential as patient is still mildly tachycardic despite treating fever and giving IVF.   -Doxycycline for empiric coverage of gram positives and atypicals. Avoiding macrolide and levaquin given QTc of 513. Of note, patient has severe anaphylactoid reaction to PCN in the past, thus avoiding CTX as well. Consider PCN skin testing.   -CTA to eval for PE and for possible post-obstructive pneumonia.   -Pulm consult in AM for possible bronch.   -NS at 100 cc/hr.  -F/u Troponin -- negative.   -F/u RVP - negative.   -F/u Dr. Noonan -- notified.   -F/u Bcx.   -If patient shows signs of sepsis, would start broad spectrum antibiotics.  -The patient had only 1 loose stool in 24 hrs. If patient meets criteria for 3 loose BMs per 24 hours, would send stool studies including C.diff.

## 2018-01-14 NOTE — PROGRESS NOTE ADULT - SUBJECTIVE AND OBJECTIVE BOX
Pt seen and examined at bedside.  Pt c/o minimal pain, SOB at rest and exertion, n/v, diarrhea, appetite decreased, 15 lbs weight loss, poor sleep.  Son endorses depression/anxiety as well. Pt supposed to start CTX on tuesday.      INTERVAL HPI/OVERNIGHT EVENTS:    Review of Systems: 12 point review of systems otherwise negative    MEDICATIONS  (STANDING):  ALBUTerol/ipratropium for Nebulization 3 milliLiter(s) Nebulizer every 6 hours  ALPRAZolam 0.25 milliGRAM(s) Oral at bedtime  amLODIPine   Tablet 10 milliGRAM(s) Oral daily  benzonatate 100 milliGRAM(s) Oral every 8 hours  dextrose 5%. 1000 milliLiter(s) (50 mL/Hr) IV Continuous <Continuous>  dextrose 50% Injectable 12.5 Gram(s) IV Push once  dextrose 50% Injectable 25 Gram(s) IV Push once  dextrose 50% Injectable 25 Gram(s) IV Push once  doxycycline IVPB 100 milliGRAM(s) IV Intermittent every 12 hours  doxycycline IVPB      enoxaparin Injectable 40 milliGRAM(s) SubCutaneous daily  insulin lispro (HumaLOG) corrective regimen sliding scale   SubCutaneous Before meals and at bedtime  OLANZapine Disintegrating Tablet 5 milliGRAM(s) Oral daily  oxyCODONE    IR 5 milliGRAM(s) Oral every 6 hours  psyllium Powder 1 Packet(s) Oral two times a day  sodium chloride 0.9%. 1000 milliLiter(s) (100 mL/Hr) IV Continuous <Continuous>    MEDICATIONS  (PRN):  dextrose Gel 1 Dose(s) Oral once PRN Blood Glucose LESS THAN 70 milliGRAM(s)/deciliter  glucagon  Injectable 1 milliGRAM(s) IntraMuscular once PRN Glucose LESS THAN 70 milligrams/deciliter  loperamide 2 milliGRAM(s) Oral every 2 hours PRN Diarrhea  oxyCODONE    IR 5 milliGRAM(s) Oral every 4 hours PRN Severe Pain (7 - 10), SOB      Allergies    penicillins (Hives)    Intolerances        Vital Signs Last 24 Hrs  T(C): 37.1 (14 Jan 2018 08:46), Max: 38.2 (14 Jan 2018 01:35)  T(F): 98.8 (14 Jan 2018 08:46), Max: 100.8 (14 Jan 2018 01:35)  HR: 106 (14 Jan 2018 08:46) (101 - 118)  BP: 144/88 (14 Jan 2018 08:46) (130/78 - 153/78)  BP(mean): --  RR: 18 (14 Jan 2018 08:46) (17 - 18)  SpO2: 95% (14 Jan 2018 08:46) (94% - 97%)  CAPILLARY BLOOD GLUCOSE      POCT Blood Glucose.: 122 mg/dL (14 Jan 2018 11:44)  POCT Blood Glucose.: 99 mg/dL (14 Jan 2018 07:16)      01-13 @ 07:01  -  01-14 @ 07:00  --------------------------------------------------------  IN: 950 mL / OUT: 0 mL / NET: 950 mL    01-14 @ 07:01 - 01-14 @ 14:22  --------------------------------------------------------  IN: 600 mL / OUT: 0 mL / NET: 600 mL        LABS:                        8.2    11.4  )-----------( 631      ( 14 Jan 2018 06:53 )             26.5     01-14    138  |  101  |  11  ----------------------------<  112<H>  3.8   |  24  |  0.93    Ca    8.7      14 Jan 2018 06:53  Phos  2.7     01-13  Mg     2.1     01-14    TPro  7.7  /  Alb  3.2<L>  /  TBili  0.4  /  DBili  x   /  AST  22  /  ALT  36  /  AlkPhos  113  01-13    PT/INR - ( 13 Jan 2018 22:20 )   PT: 14.9 sec;   INR: 1.33          PTT - ( 13 Jan 2018 22:20 )  PTT:30.8 sec          RADIOLOGY & ADDITIONAL TESTS:    ---------------------------------------------------------------------------  I personally reviewed: [  ]EKG   [  ]CXR    [  ] CT    [  ]Other  ---------------------------------------------------------------------------

## 2018-01-14 NOTE — DIETITIAN INITIAL EVALUATION ADULT. - PROBLEM SELECTOR PLAN 5
seen on EKG, monitor serial daily EKGs, avoid QTc prolonging medications including antiemetics; may need telemetry if worsening

## 2018-01-14 NOTE — DIETITIAN INITIAL EVALUATION ADULT. - PROBLEM SELECTOR PLAN 10
Lovenox ppx    #FEN  -S/p 1.25 L bolus. NS at 100 cc/hr.   -Replete lytes prn.  -Regular diet.    #CODE  -FULL CODE

## 2018-01-14 NOTE — DIETITIAN INITIAL EVALUATION ADULT. - PROBLEM SELECTOR PLAN 3
Likely cause of patient's cough. 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 Noonan aware -- f/u in AM

## 2018-01-14 NOTE — H&P ADULT - ASSESSMENT
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

## 2018-01-14 NOTE — H&P ADULT - PROBLEM SELECTOR PLAN 2
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 Iqbal aware -- f/u in AM Likely cause of patient's cough. 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 Iqbal aware -- f/u in AM Patient does meet SIRS criteria. Most likely related to gamma knife tx since symptoms in close temporal proximity to treatment. Symptoms have not progressed. Fever may also be from tumor burden. Today, she only had 1 loose BM. CTH done and prelim read without acute process. Patient without abdominal pain thus making intra-abdominal pathology less likely. Viral URI or pna is still possible. Atypical ACS must be considered given RBBB w/o prior to compare to.   -NS at 100 cc/hr.  -F/u Troponin.   -F/u RVP.   -F/u Dr. Noonan -- notified.   -F/u Bcx.   -If patient shows signs of sepsis, would start broad spectrum antibiotics.  -The patient had only 1 loose stool in 24 hrs. If patient meets criteria for 3 loose BMs per 24 hours, would send stool studies including C.diff. Patient does meet SIRS criteria. Pt with 2 weeks of cough. Repeat CXR showing possible worsening of mass. Possible that patient has post-obstructive pneumonia. Nausea and vomiting most likely related to gamma knife tx since symptoms in close temporal proximity to treatment. Symptoms have not progressed. Fever may also be from tumor burden. Today, she only had 1 loose BM. CTH done and prelim read without acute process. Patient without abdominal pain thus making intra-abdominal pathology less likely. Viral URI or pna is still possible. Atypical ACS and PE must be considered given RBBB w/o prior to compare to. Of note, PE still on differential as patient is still mildly tachycardic despite treating fever and giving IVF.   -Doxycycline for empiric coverage of gram positives and atypicals. Avoiding macrolide and levaquin given QTc of 513. Of note, patient has severe anaphylactoid reaction to PCN in the past, thus avoiding CTX as well. Consider PCN skin testing.   -CTA to eval for PE and for possible post-obstructive pneumonia.   -Pulm consult in AM for possible bronch.   -NS at 100 cc/hr.  -F/u Troponin -- negative.   -F/u RVP - negative.   -F/u Dr. Noonan -- notified.   -F/u Bcx.   -If patient shows signs of sepsis, would start broad spectrum antibiotics.  -The patient had only 1 loose stool in 24 hrs. If patient meets criteria for 3 loose BMs per 24 hours, would send stool studies including C.diff.

## 2018-01-14 NOTE — H&P ADULT - HISTORY OF PRESENT ILLNESS
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    Patient endorses cough as well sometimes w/ blood-tinged sputum, but no naresh hemoptysis.     Denies chest pain or shortness of breath. Denies dysuria or frequency.    Pt had low grade fever today of 101, at 5 pm, took Tylenol at that time.    ED vitals notable for rectal temp of 100. . BP stable. RR and O2 stable.  ED gave:  Famotidine 20 mg iv, zofran 4 mg iv and 1L NS bolus.   Repeat HR improved to 101. 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 Her vomitus is NBNB. Her stool is without blood. Denies hematemesis. Patient endorses cough as well sometimes w/ blood-tinged sputum, but no naresh hemoptysis, for several weeks since her diagnosis of lung cancer. Denies chest pain or shortness of breath. Denies abd pain, dysuria or frequency. Denies rash.   Pt had low grade fever today of 101, at 5 pm, took Tylenol at that time. The patient reports never having any cardiac event. Denies heart attack or cardiac stress test in the past.     ED vitals notable for rectal temp of 100. . BP stable. RR and O2 stable.  ED gave:  Famotidine 20 mg iv, zofran 4 mg iv and 1L NS bolus.   Repeat HR improved to 101. 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 Her vomitus is NBNB. She states that her stool has been lose but she has not been eating much. Some days she has 3 loose BMs, but today she only had 1 loose BM. Her stool is without blood. Denies hematemesis. Patient endorses cough as well sometimes w/ blood-tinged sputum, but no naresh hemoptysis, for several weeks since her diagnosis of lung cancer. She also endorses shortness of breath with exertion since a month prior to her diagnosis of lung cancer which is stable in severity. She does endorse chest pain associated with cough. Denies chest pain with exertion or at rest. Denies orthopnea. Denies heart attack or cardiac stress test in the past.  Denies abd pain, dysuria or frequency. Denies rash. Pt had low grade fever today of 101, at 5 pm, took Tylenol at that time. The patient reports never having any cardiac event. Denies sick contacts or recent travel. Last c -scope a couple of yrs ago was normal, pap smear and mammogram earlier last yr were normal.    ED vitals notable for rectal temp of 100. . BP stable. RR and O2 stable.  ED gave:  Famotidine 20 mg iv, zofran 4 mg iv and 1L NS bolus.   Repeat HR improved to 101.   CT head noncon done en route to floor -- prelim read from resident --> no acute process.     On the floors, the patient had a oral temp of 100.5    cc bolus given and NS started at 100 cc/hr.

## 2018-01-15 LAB
ALBUMIN SERPL ELPH-MCNC: 2.6 G/DL — LOW (ref 3.3–5)
ALP SERPL-CCNC: 84 U/L — SIGNIFICANT CHANGE UP (ref 40–120)
ALT FLD-CCNC: 26 U/L — SIGNIFICANT CHANGE UP (ref 10–45)
ANION GAP SERPL CALC-SCNC: 10 MMOL/L — SIGNIFICANT CHANGE UP (ref 5–17)
ANION GAP SERPL CALC-SCNC: 14 MMOL/L — SIGNIFICANT CHANGE UP (ref 5–17)
APPEARANCE UR: CLEAR — SIGNIFICANT CHANGE UP
AST SERPL-CCNC: 20 U/L — SIGNIFICANT CHANGE UP (ref 10–40)
BASOPHILS NFR BLD AUTO: 0.1 % — SIGNIFICANT CHANGE UP (ref 0–2)
BILIRUB SERPL-MCNC: 0.3 MG/DL — SIGNIFICANT CHANGE UP (ref 0.2–1.2)
BILIRUB UR-MCNC: NEGATIVE — SIGNIFICANT CHANGE UP
BLD GP AB SCN SERPL QL: POSITIVE — SIGNIFICANT CHANGE UP
BUN SERPL-MCNC: 6 MG/DL — LOW (ref 7–23)
BUN SERPL-MCNC: 8 MG/DL — SIGNIFICANT CHANGE UP (ref 7–23)
CALCIUM SERPL-MCNC: 7.9 MG/DL — LOW (ref 8.4–10.5)
CALCIUM SERPL-MCNC: 8.9 MG/DL — SIGNIFICANT CHANGE UP (ref 8.4–10.5)
CHLORIDE SERPL-SCNC: 97 MMOL/L — SIGNIFICANT CHANGE UP (ref 96–108)
CHLORIDE SERPL-SCNC: 98 MMOL/L — SIGNIFICANT CHANGE UP (ref 96–108)
CO2 SERPL-SCNC: 23 MMOL/L — SIGNIFICANT CHANGE UP (ref 22–31)
CO2 SERPL-SCNC: 24 MMOL/L — SIGNIFICANT CHANGE UP (ref 22–31)
COLOR SPEC: YELLOW — SIGNIFICANT CHANGE UP
CREAT SERPL-MCNC: 0.89 MG/DL — SIGNIFICANT CHANGE UP (ref 0.5–1.3)
CREAT SERPL-MCNC: 1.08 MG/DL — SIGNIFICANT CHANGE UP (ref 0.5–1.3)
DIFF PNL FLD: NEGATIVE — SIGNIFICANT CHANGE UP
EOSINOPHIL NFR BLD AUTO: 0.2 % — SIGNIFICANT CHANGE UP (ref 0–6)
GLUCOSE BLDC GLUCOMTR-MCNC: 113 MG/DL — HIGH (ref 70–99)
GLUCOSE BLDC GLUCOMTR-MCNC: 131 MG/DL — HIGH (ref 70–99)
GLUCOSE BLDC GLUCOMTR-MCNC: 142 MG/DL — HIGH (ref 70–99)
GLUCOSE BLDC GLUCOMTR-MCNC: 152 MG/DL — HIGH (ref 70–99)
GLUCOSE SERPL-MCNC: 125 MG/DL — HIGH (ref 70–99)
GLUCOSE SERPL-MCNC: 156 MG/DL — HIGH (ref 70–99)
GLUCOSE UR QL: NEGATIVE — SIGNIFICANT CHANGE UP
HCT VFR BLD CALC: 27.9 % — LOW (ref 34.5–45)
HCT VFR BLD CALC: 29 % — LOW (ref 34.5–45)
HGB BLD-MCNC: 8.7 G/DL — LOW (ref 11.5–15.5)
HGB BLD-MCNC: 8.9 G/DL — LOW (ref 11.5–15.5)
KETONES UR-MCNC: NEGATIVE — SIGNIFICANT CHANGE UP
LEUKOCYTE ESTERASE UR-ACNC: NEGATIVE — SIGNIFICANT CHANGE UP
LYMPHOCYTES # BLD AUTO: 10.1 % — LOW (ref 13–44)
MAGNESIUM SERPL-MCNC: 1.6 MG/DL — SIGNIFICANT CHANGE UP (ref 1.6–2.6)
MAGNESIUM SERPL-MCNC: 2 MG/DL — SIGNIFICANT CHANGE UP (ref 1.6–2.6)
MCHC RBC-ENTMCNC: 24.7 PG — LOW (ref 27–34)
MCHC RBC-ENTMCNC: 25.2 PG — LOW (ref 27–34)
MCHC RBC-ENTMCNC: 30.7 G/DL — LOW (ref 32–36)
MCHC RBC-ENTMCNC: 31.2 G/DL — LOW (ref 32–36)
MCV RBC AUTO: 80.6 FL — SIGNIFICANT CHANGE UP (ref 80–100)
MCV RBC AUTO: 80.9 FL — SIGNIFICANT CHANGE UP (ref 80–100)
MONOCYTES NFR BLD AUTO: 6.8 % — SIGNIFICANT CHANGE UP (ref 2–14)
NEUTROPHILS NFR BLD AUTO: 82.8 % — HIGH (ref 43–77)
NITRITE UR-MCNC: NEGATIVE — SIGNIFICANT CHANGE UP
PH UR: 5.5 — SIGNIFICANT CHANGE UP (ref 5–8)
PLATELET # BLD AUTO: 630 K/UL — HIGH (ref 150–400)
PLATELET # BLD AUTO: 688 K/UL — HIGH (ref 150–400)
POTASSIUM SERPL-MCNC: 3.2 MMOL/L — LOW (ref 3.5–5.3)
POTASSIUM SERPL-MCNC: 4 MMOL/L — SIGNIFICANT CHANGE UP (ref 3.5–5.3)
POTASSIUM SERPL-SCNC: 3.2 MMOL/L — LOW (ref 3.5–5.3)
POTASSIUM SERPL-SCNC: 4 MMOL/L — SIGNIFICANT CHANGE UP (ref 3.5–5.3)
PROT SERPL-MCNC: 6.2 G/DL — SIGNIFICANT CHANGE UP (ref 6–8.3)
PROT UR-MCNC: NEGATIVE MG/DL — SIGNIFICANT CHANGE UP
RBC # BLD: 3.45 M/UL — LOW (ref 3.8–5.2)
RBC # BLD: 3.6 M/UL — LOW (ref 3.8–5.2)
RBC # FLD: 14.9 % — SIGNIFICANT CHANGE UP (ref 10.3–16.9)
RBC # FLD: 15 % — SIGNIFICANT CHANGE UP (ref 10.3–16.9)
RH IG SCN BLD-IMP: NEGATIVE — SIGNIFICANT CHANGE UP
SODIUM SERPL-SCNC: 131 MMOL/L — LOW (ref 135–145)
SODIUM SERPL-SCNC: 135 MMOL/L — SIGNIFICANT CHANGE UP (ref 135–145)
SP GR SPEC: 1.01 — SIGNIFICANT CHANGE UP (ref 1–1.03)
UROBILINOGEN FLD QL: 0.2 E.U./DL — SIGNIFICANT CHANGE UP
WBC # BLD: 12.7 K/UL — HIGH (ref 3.8–10.5)
WBC # BLD: 14.5 K/UL — HIGH (ref 3.8–10.5)
WBC # FLD AUTO: 12.7 K/UL — HIGH (ref 3.8–10.5)
WBC # FLD AUTO: 14.5 K/UL — HIGH (ref 3.8–10.5)

## 2018-01-15 PROCEDURE — 93010 ELECTROCARDIOGRAM REPORT: CPT

## 2018-01-15 PROCEDURE — 99233 SBSQ HOSP IP/OBS HIGH 50: CPT

## 2018-01-15 PROCEDURE — 93306 TTE W/DOPPLER COMPLETE: CPT | Mod: 26

## 2018-01-15 PROCEDURE — 71045 X-RAY EXAM CHEST 1 VIEW: CPT | Mod: 26

## 2018-01-15 RX ORDER — SODIUM CHLORIDE 9 MG/ML
1000 INJECTION INTRAMUSCULAR; INTRAVENOUS; SUBCUTANEOUS
Qty: 0 | Refills: 0 | Status: DISCONTINUED | OUTPATIENT
Start: 2018-01-15 | End: 2018-01-17

## 2018-01-15 RX ORDER — AZTREONAM 2 G
2000 VIAL (EA) INJECTION EVERY 8 HOURS
Qty: 0 | Refills: 0 | Status: DISCONTINUED | OUTPATIENT
Start: 2018-01-15 | End: 2018-01-15

## 2018-01-15 RX ORDER — POTASSIUM CHLORIDE 20 MEQ
40 PACKET (EA) ORAL
Qty: 0 | Refills: 0 | Status: COMPLETED | OUTPATIENT
Start: 2018-01-15 | End: 2018-01-15

## 2018-01-15 RX ORDER — MEROPENEM 1 G/30ML
1000 INJECTION INTRAVENOUS EVERY 8 HOURS
Qty: 0 | Refills: 0 | Status: DISCONTINUED | OUTPATIENT
Start: 2018-01-16 | End: 2018-01-19

## 2018-01-15 RX ORDER — OXYCODONE HYDROCHLORIDE 5 MG/1
10 TABLET ORAL EVERY 6 HOURS
Qty: 0 | Refills: 0 | Status: DISCONTINUED | OUTPATIENT
Start: 2018-01-15 | End: 2018-01-22

## 2018-01-15 RX ORDER — MAGNESIUM SULFATE 500 MG/ML
2 VIAL (ML) INJECTION ONCE
Qty: 0 | Refills: 0 | Status: COMPLETED | OUTPATIENT
Start: 2018-01-15 | End: 2018-01-15

## 2018-01-15 RX ORDER — POTASSIUM CHLORIDE 20 MEQ
40 PACKET (EA) ORAL
Qty: 0 | Refills: 0 | Status: DISCONTINUED | OUTPATIENT
Start: 2018-01-15 | End: 2018-01-15

## 2018-01-15 RX ORDER — SODIUM CHLORIDE 9 MG/ML
250 INJECTION INTRAMUSCULAR; INTRAVENOUS; SUBCUTANEOUS ONCE
Qty: 0 | Refills: 0 | Status: COMPLETED | OUTPATIENT
Start: 2018-01-15 | End: 2018-01-15

## 2018-01-15 RX ORDER — ACETAMINOPHEN 500 MG
650 TABLET ORAL ONCE
Qty: 0 | Refills: 0 | Status: COMPLETED | OUTPATIENT
Start: 2018-01-15 | End: 2018-01-15

## 2018-01-15 RX ADMIN — Medication 3 MILLILITER(S): at 05:09

## 2018-01-15 RX ADMIN — Medication 100 MILLIGRAM(S): at 21:11

## 2018-01-15 RX ADMIN — SODIUM CHLORIDE 100 MILLILITER(S): 9 INJECTION INTRAMUSCULAR; INTRAVENOUS; SUBCUTANEOUS at 11:22

## 2018-01-15 RX ADMIN — Medication 166.67 MILLIGRAM(S): at 17:31

## 2018-01-15 RX ADMIN — OXYCODONE HYDROCHLORIDE 5 MILLIGRAM(S): 5 TABLET ORAL at 11:26

## 2018-01-15 RX ADMIN — OXYCODONE HYDROCHLORIDE 5 MILLIGRAM(S): 5 TABLET ORAL at 03:59

## 2018-01-15 RX ADMIN — SODIUM CHLORIDE 100 MILLILITER(S): 9 INJECTION INTRAMUSCULAR; INTRAVENOUS; SUBCUTANEOUS at 05:23

## 2018-01-15 RX ADMIN — Medication 50 GRAM(S): at 21:14

## 2018-01-15 RX ADMIN — Medication 2: at 09:02

## 2018-01-15 RX ADMIN — ENOXAPARIN SODIUM 40 MILLIGRAM(S): 100 INJECTION SUBCUTANEOUS at 11:21

## 2018-01-15 RX ADMIN — Medication 3 MILLILITER(S): at 21:11

## 2018-01-15 RX ADMIN — MEROPENEM 100 MILLIGRAM(S): 1 INJECTION INTRAVENOUS at 14:07

## 2018-01-15 RX ADMIN — OXYCODONE HYDROCHLORIDE 5 MILLIGRAM(S): 5 TABLET ORAL at 09:34

## 2018-01-15 RX ADMIN — Medication 40 MILLIEQUIVALENT(S): at 22:05

## 2018-01-15 RX ADMIN — OXYCODONE HYDROCHLORIDE 5 MILLIGRAM(S): 5 TABLET ORAL at 09:32

## 2018-01-15 RX ADMIN — Medication 40 MILLIEQUIVALENT(S): at 23:58

## 2018-01-15 RX ADMIN — OXYCODONE HYDROCHLORIDE 10 MILLIGRAM(S): 5 TABLET ORAL at 22:10

## 2018-01-15 RX ADMIN — SODIUM CHLORIDE 750 MILLILITER(S): 9 INJECTION INTRAMUSCULAR; INTRAVENOUS; SUBCUTANEOUS at 16:50

## 2018-01-15 RX ADMIN — AMLODIPINE BESYLATE 10 MILLIGRAM(S): 2.5 TABLET ORAL at 05:09

## 2018-01-15 RX ADMIN — Medication 650 MILLIGRAM(S): at 16:54

## 2018-01-15 RX ADMIN — OXYCODONE HYDROCHLORIDE 10 MILLIGRAM(S): 5 TABLET ORAL at 21:10

## 2018-01-15 RX ADMIN — Medication 166.67 MILLIGRAM(S): at 05:10

## 2018-01-15 RX ADMIN — OXYCODONE HYDROCHLORIDE 5 MILLIGRAM(S): 5 TABLET ORAL at 11:31

## 2018-01-15 RX ADMIN — Medication 1 PACKET(S): at 05:09

## 2018-01-15 RX ADMIN — OLANZAPINE 5 MILLIGRAM(S): 15 TABLET, FILM COATED ORAL at 14:07

## 2018-01-15 RX ADMIN — Medication 100 MILLIGRAM(S): at 14:07

## 2018-01-15 RX ADMIN — Medication 650 MILLIGRAM(S): at 06:00

## 2018-01-15 RX ADMIN — Medication 3 MILLILITER(S): at 09:32

## 2018-01-15 RX ADMIN — OXYCODONE HYDROCHLORIDE 5 MILLIGRAM(S): 5 TABLET ORAL at 03:00

## 2018-01-15 RX ADMIN — Medication 650 MILLIGRAM(S): at 15:03

## 2018-01-15 RX ADMIN — Medication 3 MILLILITER(S): at 16:50

## 2018-01-15 RX ADMIN — MEROPENEM 100 MILLIGRAM(S): 1 INJECTION INTRAVENOUS at 05:09

## 2018-01-15 RX ADMIN — Medication 100 MILLIGRAM(S): at 05:09

## 2018-01-15 RX ADMIN — SODIUM CHLORIDE 1000 MILLILITER(S): 9 INJECTION INTRAMUSCULAR; INTRAVENOUS; SUBCUTANEOUS at 19:06

## 2018-01-15 RX ADMIN — Medication 0.25 MILLIGRAM(S): at 21:11

## 2018-01-15 NOTE — PROVIDER CONTACT NOTE (CHANGE IN STATUS NOTIFICATION) - SITUATION
Patient currently febrile to 100.7 orally, tachycardic to 120, oxygen saturation 91 to 93% on room air.

## 2018-01-15 NOTE — PROVIDER CONTACT NOTE (CHANGE IN STATUS NOTIFICATION) - ASSESSMENT
Patient lying supine in bed, no c/o CP at this time.  Temp 100.7 orally, /71, , respirations 20-22, oxygen 90 to 93% on room air.

## 2018-01-15 NOTE — PROGRESS NOTE ADULT - ASSESSMENT
61 yo F with  1. Stage 4 lung ca with mets to LN, rib, and brain  - s/p radiation to rib, gamma knife to brain  - starting chemo with [facility] on tues, will find out regimen- pt needs to be medically optimized before starting tx and no longer infected; pt understands that chemo will likely be delayed and agreeable  2. Post obstructive PNA 2/2/ lung ca  - vanco/meropenem  #. Nausea/vomiting  - pt with prolonged QT, repeat EKG normal  - no zofran, started zyprexa ODT 5 mg daily   - if needs additional meds will add on decadron as these are not QT prolonging and will help with other sx  3. Prolonged QT  - repeat EKG   4. Diarrhea  - start metamucil and immodium, noninfectious and chronic- now improved so both are prn  5. Shortness of breath 2/2 lung ca   - oxycodone increased to 10 mg q6hrs standing with 5 mg q4hrs prn for sob/pain- pt feels incomplete relief with periods of breathlessness on 5 mg dosing  6. Anxiety  - xanax qhs  7. Insomnia  - xanax qhs  8. ANemia  - likley ACD  9. HTN  - will f/u once sx better managed  10. DM  - FS well controlled  11. Chronic cough  - oxycodone should help with this, on tessalon perles as well  12. Severe protein calorie malnutrition  - will f/u response to xyprexa and if still appetite remains poor will supplement with steroids if FS tolerate and when no longer infected  - nutrition consult  13. Sinus tachycardia  - will control sx- eg SOB, pain and f/u response

## 2018-01-15 NOTE — PROVIDER CONTACT NOTE (CHANGE IN STATUS NOTIFICATION) - BACKGROUND
Patient with history of lung Ca, DM, HTN, bone metastasis. febrile earlier to 100.4 at 1515, received 650 mg Tylenol PO, can become tachy to 118-119 while febrile, baseline -110.

## 2018-01-15 NOTE — PROGRESS NOTE ADULT - SUBJECTIVE AND OBJECTIVE BOX
Pt seen and examined at bedside.  Says she feels much better overall.    INTERVAL HPI/OVERNIGHT EVENTS:    Review of Systems: 12 point review of systems otherwise negative    MEDICATIONS  (STANDING):  ALBUTerol/ipratropium for Nebulization 3 milliLiter(s) Nebulizer every 6 hours  ALPRAZolam 0.25 milliGRAM(s) Oral at bedtime  amLODIPine   Tablet 10 milliGRAM(s) Oral daily  benzonatate 100 milliGRAM(s) Oral every 8 hours  dextrose 5%. 1000 milliLiter(s) (50 mL/Hr) IV Continuous <Continuous>  dextrose 50% Injectable 12.5 Gram(s) IV Push once  dextrose 50% Injectable 25 Gram(s) IV Push once  dextrose 50% Injectable 25 Gram(s) IV Push once  enoxaparin Injectable 40 milliGRAM(s) SubCutaneous daily  insulin lispro (HumaLOG) corrective regimen sliding scale   SubCutaneous Before meals and at bedtime  meropenem IVPB 1000 milliGRAM(s) IV Intermittent every 8 hours  meropenem IVPB 1000 milliGRAM(s) IV Intermittent every 8 hours  OLANZapine Disintegrating Tablet 5 milliGRAM(s) Oral daily  oxyCODONE    IR 10 milliGRAM(s) Oral every 6 hours  psyllium Powder 1 Packet(s) Oral two times a day  sodium chloride 0.9%. 1000 milliLiter(s) (100 mL/Hr) IV Continuous <Continuous>  vancomycin  IVPB 1250 milliGRAM(s) IV Intermittent every 12 hours    MEDICATIONS  (PRN):  acetaminophen   Tablet 650 milliGRAM(s) Oral every 6 hours PRN For Temp greater than 38 C (100.4 F)  dextrose Gel 1 Dose(s) Oral once PRN Blood Glucose LESS THAN 70 milliGRAM(s)/deciliter  glucagon  Injectable 1 milliGRAM(s) IntraMuscular once PRN Glucose LESS THAN 70 milligrams/deciliter  loperamide 2 milliGRAM(s) Oral every 2 hours PRN Diarrhea  oxyCODONE    IR 5 milliGRAM(s) Oral every 4 hours PRN Severe Pain (7 - 10), SOB      Allergies    penicillins (Hives)    Intolerances        Vital Signs Last 24 Hrs  T(C): 37.7 (15 Ignacio 2018 11:30), Max: 38.7 (2018 16:58)  T(F): 99.8 (15 Ignacio 2018 11:30), Max: 101.7 (2018 16:58)  HR: 110 (15 Ignacio 2018 11:30) (108 - 119)  BP: 125/73 (15 Ignacio 2018 11:30) (111/64 - 161/72)  BP(mean): --  RR: 20 (15 Ignacio 2018 11:30) (16 - 20)  SpO2: 96% (15 Ignacio 2018 11:30) (94% - 96%)  CAPILLARY BLOOD GLUCOSE      POCT Blood Glucose.: 142 mg/dL (15 Ignacio 2018 11:40)  POCT Blood Glucose.: 152 mg/dL (15 Ignacio 2018 07:14)  POCT Blood Glucose.: 136 mg/dL (2018 21:38)  POCT Blood Glucose.: 108 mg/dL (2018 17:37)       @ 07:01  -  01-15 @ 07:00  --------------------------------------------------------  IN: 1100 mL / OUT: 0 mL / NET: 1100 mL    01-15 @ 07:01  -  01-15 @ 13:43  --------------------------------------------------------  IN: 600 mL / OUT: 0 mL / NET: 600 mL        LABS:                        8.9    12.7  )-----------( 688      ( 15 Ignacio 2018 05:49 )             29.0     01-15    135  |  97  |  6<L>  ----------------------------<  125<H>  4.0   |  24  |  0.89    Ca    8.9      15 Ignacio 2018 05:49  Phos  2.7       Mg     2.0     01-15    TPro  7.7  /  Alb  3.2<L>  /  TBili  0.4  /  DBili  x   /  AST  22  /  ALT  36  /  AlkPhos  113      PT/INR - ( 2018 22:20 )   PT: 14.9 sec;   INR: 1.33          PTT - ( 2018 22:20 )  PTT:30.8 sec  Urinalysis Basic - ( 15 Ignacio 2018 09:37 )    Color: Yellow / Appearance: Clear / S.010 / pH: x  Gluc: x / Ketone: NEGATIVE  / Bili: Negative / Urobili: 0.2 E.U./dL   Blood: x / Protein: NEGATIVE mg/dL / Nitrite: NEGATIVE   Leuk Esterase: NEGATIVE / RBC: x / WBC x   Sq Epi: x / Non Sq Epi: x / Bacteria: x      Culture Results:   No growth at 1 day. ( @ 07:35)  Culture Results:   No growth at 1 day. ( @ 07:35)        RADIOLOGY & ADDITIONAL TESTS:    ---------------------------------------------------------------------------  I personally reviewed: [  ]EKG   [  ]CXR    [  ] CT    [  ]Other  ---------------------------------------------------------------------------

## 2018-01-15 NOTE — PROGRESS NOTE ADULT - ADDITIONAL PE
pt in NAD initially but became labored while talking, pt slightly tachypneic, RRR, lungs with b/l mild wheezing throughout, abd soft, LE no edema

## 2018-01-16 DIAGNOSIS — J18.9 PNEUMONIA, UNSPECIFIED ORGANISM: ICD-10-CM

## 2018-01-16 LAB
ANION GAP SERPL CALC-SCNC: 13 MMOL/L — SIGNIFICANT CHANGE UP (ref 5–17)
ANION GAP SERPL CALC-SCNC: 9 MMOL/L — SIGNIFICANT CHANGE UP (ref 5–17)
BUN SERPL-MCNC: 8 MG/DL — SIGNIFICANT CHANGE UP (ref 7–23)
BUN SERPL-MCNC: 9 MG/DL — SIGNIFICANT CHANGE UP (ref 7–23)
CALCIUM SERPL-MCNC: 8.2 MG/DL — LOW (ref 8.4–10.5)
CALCIUM SERPL-MCNC: 8.2 MG/DL — LOW (ref 8.4–10.5)
CHLORIDE SERPL-SCNC: 102 MMOL/L — SIGNIFICANT CHANGE UP (ref 96–108)
CHLORIDE SERPL-SCNC: 98 MMOL/L — SIGNIFICANT CHANGE UP (ref 96–108)
CO2 SERPL-SCNC: 20 MMOL/L — LOW (ref 22–31)
CO2 SERPL-SCNC: 23 MMOL/L — SIGNIFICANT CHANGE UP (ref 22–31)
CREAT SERPL-MCNC: 0.89 MG/DL — SIGNIFICANT CHANGE UP (ref 0.5–1.3)
CREAT SERPL-MCNC: 1.01 MG/DL — SIGNIFICANT CHANGE UP (ref 0.5–1.3)
GLUCOSE BLDC GLUCOMTR-MCNC: 119 MG/DL — HIGH (ref 70–99)
GLUCOSE BLDC GLUCOMTR-MCNC: 125 MG/DL — HIGH (ref 70–99)
GLUCOSE BLDC GLUCOMTR-MCNC: 147 MG/DL — HIGH (ref 70–99)
GLUCOSE BLDC GLUCOMTR-MCNC: 90 MG/DL — SIGNIFICANT CHANGE UP (ref 70–99)
GLUCOSE SERPL-MCNC: 115 MG/DL — HIGH (ref 70–99)
GLUCOSE SERPL-MCNC: 154 MG/DL — HIGH (ref 70–99)
HCT VFR BLD CALC: 26.2 % — LOW (ref 34.5–45)
HGB BLD-MCNC: 8.2 G/DL — LOW (ref 11.5–15.5)
MAGNESIUM SERPL-MCNC: 2.2 MG/DL — SIGNIFICANT CHANGE UP (ref 1.6–2.6)
MCHC RBC-ENTMCNC: 25.4 PG — LOW (ref 27–34)
MCHC RBC-ENTMCNC: 31.3 G/DL — LOW (ref 32–36)
MCV RBC AUTO: 81.1 FL — SIGNIFICANT CHANGE UP (ref 80–100)
PLATELET # BLD AUTO: 610 K/UL — HIGH (ref 150–400)
POTASSIUM SERPL-MCNC: 4 MMOL/L — SIGNIFICANT CHANGE UP (ref 3.5–5.3)
POTASSIUM SERPL-MCNC: 4.5 MMOL/L — SIGNIFICANT CHANGE UP (ref 3.5–5.3)
POTASSIUM SERPL-SCNC: 4 MMOL/L — SIGNIFICANT CHANGE UP (ref 3.5–5.3)
POTASSIUM SERPL-SCNC: 4.5 MMOL/L — SIGNIFICANT CHANGE UP (ref 3.5–5.3)
RAPID RVP RESULT: SIGNIFICANT CHANGE UP
RBC # BLD: 3.23 M/UL — LOW (ref 3.8–5.2)
RBC # FLD: 15 % — SIGNIFICANT CHANGE UP (ref 10.3–16.9)
SODIUM SERPL-SCNC: 130 MMOL/L — LOW (ref 135–145)
SODIUM SERPL-SCNC: 135 MMOL/L — SIGNIFICANT CHANGE UP (ref 135–145)
VANCOMYCIN TROUGH SERPL-MCNC: 14 UG/ML — SIGNIFICANT CHANGE UP (ref 10–20)
VANCOMYCIN TROUGH SERPL-MCNC: 54 UG/ML — CRITICAL HIGH (ref 10–20)
WBC # BLD: 15.5 K/UL — HIGH (ref 3.8–10.5)
WBC # FLD AUTO: 15.5 K/UL — HIGH (ref 3.8–10.5)

## 2018-01-16 PROCEDURE — 99497 ADVNCD CARE PLAN 30 MIN: CPT

## 2018-01-16 PROCEDURE — 71045 X-RAY EXAM CHEST 1 VIEW: CPT | Mod: 26

## 2018-01-16 PROCEDURE — 99223 1ST HOSP IP/OBS HIGH 75: CPT | Mod: GC

## 2018-01-16 PROCEDURE — 99222 1ST HOSP IP/OBS MODERATE 55: CPT

## 2018-01-16 PROCEDURE — 99233 SBSQ HOSP IP/OBS HIGH 50: CPT

## 2018-01-16 RX ORDER — IBUPROFEN 200 MG
400 TABLET ORAL ONCE
Qty: 0 | Refills: 0 | Status: COMPLETED | OUTPATIENT
Start: 2018-01-16 | End: 2018-01-16

## 2018-01-16 RX ORDER — SODIUM CHLORIDE 9 MG/ML
500 INJECTION INTRAMUSCULAR; INTRAVENOUS; SUBCUTANEOUS ONCE
Qty: 0 | Refills: 0 | Status: COMPLETED | OUTPATIENT
Start: 2018-01-16 | End: 2018-01-16

## 2018-01-16 RX ORDER — VANCOMYCIN HCL 1 G
1500 VIAL (EA) INTRAVENOUS EVERY 12 HOURS
Qty: 0 | Refills: 0 | Status: DISCONTINUED | OUTPATIENT
Start: 2018-01-16 | End: 2018-01-19

## 2018-01-16 RX ORDER — SODIUM CHLORIDE 9 MG/ML
250 INJECTION INTRAMUSCULAR; INTRAVENOUS; SUBCUTANEOUS
Qty: 0 | Refills: 0 | Status: COMPLETED | OUTPATIENT
Start: 2018-01-16 | End: 2018-01-16

## 2018-01-16 RX ADMIN — SODIUM CHLORIDE 100 MILLILITER(S): 9 INJECTION INTRAMUSCULAR; INTRAVENOUS; SUBCUTANEOUS at 21:39

## 2018-01-16 RX ADMIN — SODIUM CHLORIDE 100 MILLILITER(S): 9 INJECTION INTRAMUSCULAR; INTRAVENOUS; SUBCUTANEOUS at 04:23

## 2018-01-16 RX ADMIN — Medication 3 MILLILITER(S): at 04:22

## 2018-01-16 RX ADMIN — OXYCODONE HYDROCHLORIDE 10 MILLIGRAM(S): 5 TABLET ORAL at 22:00

## 2018-01-16 RX ADMIN — OXYCODONE HYDROCHLORIDE 10 MILLIGRAM(S): 5 TABLET ORAL at 21:38

## 2018-01-16 RX ADMIN — Medication 400 MILLIGRAM(S): at 17:05

## 2018-01-16 RX ADMIN — OXYCODONE HYDROCHLORIDE 10 MILLIGRAM(S): 5 TABLET ORAL at 05:22

## 2018-01-16 RX ADMIN — Medication 650 MILLIGRAM(S): at 08:18

## 2018-01-16 RX ADMIN — Medication 0.25 MILLIGRAM(S): at 21:40

## 2018-01-16 RX ADMIN — Medication 100 MILLIGRAM(S): at 13:27

## 2018-01-16 RX ADMIN — Medication 3 MILLILITER(S): at 21:38

## 2018-01-16 RX ADMIN — OXYCODONE HYDROCHLORIDE 10 MILLIGRAM(S): 5 TABLET ORAL at 10:30

## 2018-01-16 RX ADMIN — SODIUM CHLORIDE 100 MILLILITER(S): 9 INJECTION INTRAMUSCULAR; INTRAVENOUS; SUBCUTANEOUS at 13:27

## 2018-01-16 RX ADMIN — AMLODIPINE BESYLATE 10 MILLIGRAM(S): 2.5 TABLET ORAL at 05:20

## 2018-01-16 RX ADMIN — SODIUM CHLORIDE 250 MILLILITER(S): 9 INJECTION INTRAMUSCULAR; INTRAVENOUS; SUBCUTANEOUS at 07:25

## 2018-01-16 RX ADMIN — Medication 100 MILLIGRAM(S): at 05:20

## 2018-01-16 RX ADMIN — OLANZAPINE 5 MILLIGRAM(S): 15 TABLET, FILM COATED ORAL at 13:27

## 2018-01-16 RX ADMIN — OXYCODONE HYDROCHLORIDE 10 MILLIGRAM(S): 5 TABLET ORAL at 04:22

## 2018-01-16 RX ADMIN — Medication 1 PACKET(S): at 05:20

## 2018-01-16 RX ADMIN — MEROPENEM 100 MILLIGRAM(S): 1 INJECTION INTRAVENOUS at 02:02

## 2018-01-16 RX ADMIN — MEROPENEM 100 MILLIGRAM(S): 1 INJECTION INTRAVENOUS at 18:39

## 2018-01-16 RX ADMIN — OXYCODONE HYDROCHLORIDE 10 MILLIGRAM(S): 5 TABLET ORAL at 16:43

## 2018-01-16 RX ADMIN — Medication 3 MILLILITER(S): at 16:43

## 2018-01-16 RX ADMIN — OXYCODONE HYDROCHLORIDE 10 MILLIGRAM(S): 5 TABLET ORAL at 09:54

## 2018-01-16 RX ADMIN — SODIUM CHLORIDE 500 MILLILITER(S): 9 INJECTION INTRAMUSCULAR; INTRAVENOUS; SUBCUTANEOUS at 15:40

## 2018-01-16 RX ADMIN — Medication 100 MILLIGRAM(S): at 21:40

## 2018-01-16 RX ADMIN — ENOXAPARIN SODIUM 40 MILLIGRAM(S): 100 INJECTION SUBCUTANEOUS at 13:27

## 2018-01-16 RX ADMIN — Medication 3 MILLILITER(S): at 09:54

## 2018-01-16 RX ADMIN — Medication 166.67 MILLIGRAM(S): at 05:20

## 2018-01-16 RX ADMIN — MEROPENEM 100 MILLIGRAM(S): 1 INJECTION INTRAVENOUS at 09:53

## 2018-01-16 RX ADMIN — Medication 650 MILLIGRAM(S): at 14:09

## 2018-01-16 RX ADMIN — Medication 300 MILLIGRAM(S): at 23:25

## 2018-01-16 RX ADMIN — OXYCODONE HYDROCHLORIDE 10 MILLIGRAM(S): 5 TABLET ORAL at 17:00

## 2018-01-16 NOTE — CONSULT NOTE ADULT - CONSULT REASON
Interventional Pulmonology Consultation    Reason for consult: Post obstructive pneumonia/ adenocarcinoma of the lung / possible bronchoscopy

## 2018-01-16 NOTE — PROGRESS NOTE ADULT - SUBJECTIVE AND OBJECTIVE BOX
OVERNIGHT EVENTS/SUBJECTIVE/ROS: Pt febrile overnight to 100.1 at 7:40p and 101.4 at 5:20am. Tachycardic to 125.     VITAL SIGNS:  Vital Signs Last 24 Hrs  T(C): 38.1 (2018 14:12), Max: 39.4 (15 Ignacio 2018 16:35)  T(F): 100.5 (2018 14:12), Max: 102.9 (15 Ignacio 2018 16:35)  HR: 112 (2018 13:32) (99 - 125)  BP: 114/70 (2018 13:32) (105/66 - 141/77)  BP(mean): --  RR: 18 (2018 13:32) (18 - 22)  SpO2: 96% (2018 13:32) (94% - 96%)    PHYSICAL EXAM:    General: lying in bed, in NAD  HEENT: EOMI anicteric  Neck: supple  Cardiovascular: S1 S2 RRR   Respiratory: decreased breath sounds at RLL  Gastrointestinal: soft NTND abdomen, +BS  Extremities: no peripheral edema  Vascular: 2+ pulses radial; DP/PT  Neurological: alert awake oriented    MEDICATIONS:  MEDICATIONS  (STANDING):  ALBUTerol/ipratropium for Nebulization 3 milliLiter(s) Nebulizer every 6 hours  ALPRAZolam 0.25 milliGRAM(s) Oral at bedtime  amLODIPine   Tablet 10 milliGRAM(s) Oral daily  benzonatate 100 milliGRAM(s) Oral every 8 hours  dextrose 5%. 1000 milliLiter(s) (50 mL/Hr) IV Continuous <Continuous>  dextrose 50% Injectable 12.5 Gram(s) IV Push once  dextrose 50% Injectable 25 Gram(s) IV Push once  dextrose 50% Injectable 25 Gram(s) IV Push once  enoxaparin Injectable 40 milliGRAM(s) SubCutaneous daily  insulin lispro (HumaLOG) corrective regimen sliding scale   SubCutaneous Before meals and at bedtime  levoFLOXacin IVPB 750 milliGRAM(s) IV Intermittent every 24 hours  meropenem IVPB 1000 milliGRAM(s) IV Intermittent every 8 hours  OLANZapine Disintegrating Tablet 5 milliGRAM(s) Oral daily  oxyCODONE    IR 10 milliGRAM(s) Oral every 6 hours  psyllium Powder 1 Packet(s) Oral two times a day  sodium chloride 0.9%. 1000 milliLiter(s) (100 mL/Hr) IV Continuous <Continuous>    MEDICATIONS  (PRN):  acetaminophen   Tablet 650 milliGRAM(s) Oral every 6 hours PRN For Temp greater than 38 C (100.4 F)  dextrose Gel 1 Dose(s) Oral once PRN Blood Glucose LESS THAN 70 milliGRAM(s)/deciliter  glucagon  Injectable 1 milliGRAM(s) IntraMuscular once PRN Glucose LESS THAN 70 milligrams/deciliter  loperamide 2 milliGRAM(s) Oral every 2 hours PRN Diarrhea  oxyCODONE    IR 5 milliGRAM(s) Oral every 4 hours PRN Severe Pain (7 - 10), SOB      ALLERGIES:  Allergies    penicillins (Hives)    Intolerances        LABS:                        8.2    15.5  )-----------( 610      ( 2018 07:47 )             26.2     01-16    130<L>  |  98  |  8   ----------------------------<  154<H>  4.5   |  23  |  1.01    Ca    8.2<L>      2018 07:47  Mg     2.2     -16    TPro  6.2  /  Alb  2.6<L>  /  TBili  0.3  /  DBili  x   /  AST  20  /  ALT  26  /  AlkPhos  84  -15      Urinalysis Basic - ( 15 Ignacio 2018 09:37 )    Color: Yellow / Appearance: Clear / S.010 / pH: x  Gluc: x / Ketone: NEGATIVE  / Bili: Negative / Urobili: 0.2 E.U./dL   Blood: x / Protein: NEGATIVE mg/dL / Nitrite: NEGATIVE   Leuk Esterase: NEGATIVE / RBC: x / WBC x   Sq Epi: x / Non Sq Epi: x / Bacteria: x      CAPILLARY BLOOD GLUCOSE      POCT Blood Glucose.: 125 mg/dL (2018 11:48)      RADIOLOGY & ADDITIONAL TESTS: Reviewed.

## 2018-01-16 NOTE — CONSULT NOTE ADULT - SUBJECTIVE AND OBJECTIVE BOX
Per outpatient record 12/20     New diagnosis s/p biopsy of RLL mass consistent with adenocarcinoma. PET/CT c/w FDG avidity in R 8th rib, hilar, supraclavicular, and mediastinal nodes. MRI brain c/w 7mm lesion s/p Gamma knife with Dr. Storm in . Stage IV lung adenocarcinoma, PDL1 40%, next gen sequencing shows KRAS mutation no EGFR mutation. FISH studies negative for ALK/ROS, thus patient not ideal candidate for targeted agents. Intent of systemic treatment palliative with cytotoxic chemotherapy with the addition of immunotherapy based upon the KEYNOTE 021 trial which showed improvement in PATIÑO and PFS in patients with PDL1 <50.     Patient with active post-obstructive pneumonia, poor PO intake on IV antibiotics broad spectrum. Continue with sepsis treatment, pulm following for likely bronch. Hematology Oncology Consult Note (Dr. Noonan)     62 year old female with Stage IV lung adenocarcinoma, PDL1 40%, EGFR/ROS negative, HTN, DM, who underwent RT to bony mets on R 8th rib last week, and had gamma knife tx to brain () who presented with 4 days of n/v. Her vomitus is NBNB. She states that her stool has been lose but she has not been eating much. Some days she has 3 loose BMs, but today she only had 1 loose BM. Her stool is without blood. Denies hematemesis. Patient endorses cough as well sometimes w/ blood-tinged sputum, but no naresh hemoptysis, for several weeks since her diagnosis of lung cancer. She also endorses shortness of breath with exertion since a month prior to her diagnosis of lung cancer which is stable in severity. She does endorse chest pain associated with cough. Denies chest pain with exertion or at rest. Denies orthopnea. Denies heart attack or cardiac stress test in the past.  Denies abd pain, dysuria or frequency. Denies rash. Pt had low grade fever today of 101, at 5 pm, took Tylenol at that time. The patient reports never having any cardiac event. Denies sick contacts or recent travel. Last c -scope a couple of yrs ago was normal, pap smear and mammogram earlier last yr were normal. Pulmonary consulted.     PAST MEDICAL & SURGICAL HISTORY:  Bone metastasis  Brain cancer  Lung cancer  Hypertension  Diabetes  History of radiation therapy  Status post gamma knife treatment      Allergies: penicillins       Medications:  enoxaparin Injectable 40 milliGRAM(s) SubCutaneous daily        Social History:  Prior smoker. Quit 20 years ago.    	Denies etoh or illicit substance use.    FAMILY HISTORY:  Family history of transitional cell carcinoma of bladder (Sibling)  Family history of renal cell carcinoma (Sibling)      PHYSICAL EXAM:    T(F): 100.5 (18 @ 14:12), Max: 102.9 (01-15-18 @ 16:35)  HR: 112 (18 @ 13:32) (99 - 125)  BP: 114/70 (18 @ 13:32) (105/66 - 141/77)  RR: 18 (18 @ 13:32) (18 - 22)  SpO2: 96% (18 @ 13:32) (94% - 96%)  Wt(kg): --    Daily     Daily     GEN: mild distress, tremulous at times  HEENT: AT/NC, EOMI, no oral lesions  NECK: supple  CVS: +S1S2 tachycardic  LUNG: Decerased breath sounds on R, good air entry  ABD: NT, +BS, obese  : no cva tenderness  EXT: No c/c/e  NEURO: aaox3, non focal, no sensory changes          Labs:                          8.2    15.5  )-----------( 610      ( 2018 07:47 )             26.2     CBC Full  -  ( 2018 07:47 )  WBC Count : 15.5 K/uL  Hemoglobin : 8.2 g/dL  Hematocrit : 26.2 %  Platelet Count - Automated : 610 K/uL  Mean Cell Volume : 81.1 fL  Mean Cell Hemoglobin : 25.4 pg  Mean Cell Hemoglobin Concentration : 31.3 g/dL  Auto Neutrophil # : x  Auto Lymphocyte # : x  Auto Monocyte # : x  Auto Eosinophil # : x  Auto Basophil # : x  Auto Neutrophil % : x  Auto Lymphocyte % : x  Auto Monocyte % : x  Auto Eosinophil % : x  Auto Basophil % : x        01-16    130<L>  |  98  |  8   ----------------------------<  154<H>  4.5   |  23  |  1.01    Ca    8.2<L>      2018 07:47  Mg     2.2     -16    TPro  6.2  /  Alb  2.6<L>  /  TBili  0.3  /  DBili  x   /  AST  20  /  ALT  26  /  AlkPhos  84  -15      Urinalysis Basic - ( 15 Ignacio 2018 09:37 )    Color: Yellow / Appearance: Clear / S.010 / pH: x  Gluc: x / Ketone: NEGATIVE  / Bili: Negative / Urobili: 0.2 E.U./dL   Blood: x / Protein: NEGATIVE mg/dL / Nitrite: NEGATIVE   Leuk Esterase: NEGATIVE / RBC: x / WBC x   Sq Epi: x / Non Sq Epi: x / Bacteria: x      CXR - FINDINGS: There is confluent opacity noted over the right midlung zone   unchanged in extent. The remainder the lung zones are clear. Soft tissues   and osseous structures are unchanged.    IMPRESSION:  No significant interval change.    TTE = The left atrium is borderline dilated. Right atrial size is normal.There   is   mild aortic valve thickening. No aortic regurgitation noted.There is mild   mitral valve thickening. There is mild to moderate mitral regurgitation.   Structurally normal tricuspid valve. No tricuspid regurgitation   noted.There   was insufficient TR detected from which to calculate pulmonary artery   systolic   pressure.  The pulmonic valve is not well visualized. There is trace to   mild   pulmonic regurgitation.  There is mild concentric left ventricular   hypertrophy. The left ventricular wall motion is normal. The left   ventricle is   hyperdynamic and the overall ejection fraction is increased (>75%).   There is   significant left ventricular outflow obstruction. The resting gradient   across   LVOT is 48 mmHg which increases to 98 mmHg with Valsalva.  No aortic root   dilatation.A small to moderate pericardial effusion noted. No chamber   collapse   seen. There is no echocardiographic evidence for cardiac tamponade.  No   prior   study for comparison.      CT CHEST angio 18 - 1.  No pulmonary embolus, pulmonary artery dilatation or right   ventricular strain.  2. Marked interval increase in the size of a right hilar and right lower   lobe lesion which are now more coalescent extending from the right hilum   to the right lower lobe with masslike consolidation which is in part   represent a postobstructive pneumonitis.  obstructive.   3. Progressive encasement of the right tracheal bronchial tree with   narrowing and obstruction of the bronchus intermedius, right lower right   middle lobe bronchi. There is attenuation in the caliber of the right   upper lobe bronchus with progressive thickening of its posterior wall   compatible with tumor infiltration. And right lower lobe bronchus with a   probable postobstructive pneumonitis with additional narrowing and cut   off of the right middle lobe bronchus. There is progressive encasement of   the right upper lobe bronchus by confluent progressive right   paratracheal, right hilar as well as subcarinal and progressive   azygoesophageal lymphadenopathy.  3. No naresh productive or lytic osseous lesions are identified.  4. Slight interval increase in right-sided pleural effusion with some   adjacent compressive atelectasis.

## 2018-01-16 NOTE — CONSULT NOTE ADULT - PROBLEM SELECTOR RECOMMENDATION 9
Given that she has progression of the right lower lobe consolidation with hilar and perihilar fullness and narrowing of the bronchus intermedius we would recommend full diagnostic airway evaluation with therapeutic bronchoscopy which could potentially include cryotherapy, laser therapy, and tumor debulking.  Plan would be for Bronchoscopy on Thursday 1/18/2018.  She would need to be NPO after midnight 1/18/2018 and prophylactic anticoagulation with Lovenox should be held the evening of 1/17/2018.  Agree with continuing antibiotics

## 2018-01-16 NOTE — CONSULT NOTE ADULT - ASSESSMENT
Mrs. Bhatti is a 63 yo Female with a PMH of adenocarcinoma of the right lung with brain and bone metastasis s/p radiation to 8th rib and gammaknife radiation to brain mets.  She is initially admitted with nausea and vomiting with shortness of breath.  CT scan of the chest shows progression of her right lower lobe consolidation with narrowing of the right bronchus intermedius. These imaging findings are consistent from a post-obstructive pneumonia.  Blood cultures have no growth to date and she is on meropenem and levofloxacin.

## 2018-01-16 NOTE — PROGRESS NOTE ADULT - ADDITIONAL PE
pt in bed, speaking in full sentences but beccomes labored while talking though pt says she is less SOB, tachycardic with RRR, lungs with b/l mild wheezing throughout, abd soft, LE no edema, RUE phlebitis

## 2018-01-16 NOTE — PROGRESS NOTE ADULT - PROBLEM SELECTOR PLAN 3
Likely cause of patient's cough. 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

## 2018-01-16 NOTE — PROGRESS NOTE ADULT - ASSESSMENT
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

## 2018-01-16 NOTE — PROGRESS NOTE ADULT - PROBLEM SELECTOR PLAN 2
symptoms may be 2/2 gamma knife tx since symptoms in close temporal proximity to treatment, now improved without diarrhea, vomiting or nausea. CTH (1/14) negative for acute changes.   -NS at 100 cc/hr.  -RVP neg  -BCx NGTD  -c/w metamucil and Imodium

## 2018-01-16 NOTE — CONSULT NOTE ADULT - SUBJECTIVE AND OBJECTIVE BOX
PULMONARY SERVICE INITIAL CONSULT NOTE    HPI:  The patient is a 61 y/o F with a PMHx of HTN, pre-DM and recent diagnosis of lung adenocarcinoma (s/p RTx, stage IV, metastases to brain and bone) who presented three days ago with nonspecific symptoms of N/V, diarrhea and two weeks of cough/shortness of breath. She was initially ruled out for PE, however the CT scan showed a possible consolidation near the area of the RLL mass. During the course of her admission she has continued to have fevers, the curve of which is uptrending despite broad-spectrum treatment with vancomycin and meropenem. She was tentatively scheduled to start her first round of chemotherapy today with Dr. Noonan, however, this is being delayed due to her continued fevers.         REVIEW OF SYSTEMS:  Constitutional: +fevers, + weight loss, +fatigue  Eyes: No eye pain, visual disturbances, or discharge  ENMT:  No difficulty hearing, tinnitus, vertigo; No sinus or throat pain  Neck: No pain, stiffness or neck swelling  Respiratory: +shortness of breath  Cardiovascular: No chest pain, palpitations, +leg swelling  Gastrointestinal: No abdominal or epigastric pain. No nausea, vomiting or hematemesis; No diarrhea or constipation. No melena or hematochezia.  Genitourinary: No dysuria, frequency, hematuria or incontinence  Neurological: No headaches, memory loss, loss of strength, numbness or tremors  Skin: No itching, burning, rashes or lesions   Lymph Nodes: No enlarged glands  Endocrine: No heat or cold intolerance; No hair loss  Musculoskeletal: No joint pain or swelling; No muscle, back or extremity pain  Psychiatric: No depression, mood swings, +anxiety  Heme/Lymph: No easy bruising or bleeding gums  Allergy and Immunologic: No hives or eczema    PAST MEDICAL & SURGICAL HISTORY:  Bone metastasis  Brain cancer  Lung cancer  Hypertension  Pre-diabetes  History of radiation therapy  Status post gamma knife treatment      FAMILY HISTORY:  Family history of transitional cell carcinoma of bladder (Sibling)  Family history of renal cell carcinoma (Sibling)      SOCIAL HISTORY:  Smoking Status: [ ] Current, [x] Former, [ ] Never      MEDICATIONS:  Pulmonary:  ALBUTerol/ipratropium for Nebulization 3 milliLiter(s) Nebulizer every 6 hours  benzonatate 100 milliGRAM(s) Oral every 8 hours    Antimicrobials:  meropenem IVPB 1000 milliGRAM(s) IV Intermittent every 8 hours    Anticoagulants:  enoxaparin Injectable 40 milliGRAM(s) SubCutaneous daily    Onc:    GI/:  loperamide 2 milliGRAM(s) Oral every 2 hours PRN  psyllium Powder 1 Packet(s) Oral two times a day    Endocrine:  dextrose 50% Injectable 12.5 Gram(s) IV Push once  dextrose 50% Injectable 25 Gram(s) IV Push once  dextrose 50% Injectable 25 Gram(s) IV Push once  dextrose Gel 1 Dose(s) Oral once PRN  glucagon  Injectable 1 milliGRAM(s) IntraMuscular once PRN  insulin lispro (HumaLOG) corrective regimen sliding scale   SubCutaneous Before meals and at bedtime    Cardiac:  amLODIPine   Tablet 10 milliGRAM(s) Oral daily    Other Medications:  acetaminophen   Tablet 650 milliGRAM(s) Oral every 6 hours PRN  ALPRAZolam 0.25 milliGRAM(s) Oral at bedtime  dextrose 5%. 1000 milliLiter(s) IV Continuous <Continuous>  OLANZapine Disintegrating Tablet 5 milliGRAM(s) Oral daily  oxyCODONE    IR 5 milliGRAM(s) Oral every 4 hours PRN  oxyCODONE    IR 10 milliGRAM(s) Oral every 6 hours  sodium chloride 0.9%. 1000 milliLiter(s) IV Continuous <Continuous>      Allergies    penicillins (Hives)    Intolerances        Vital Signs Last 24 Hrs  T(C): 37.7 (2018 09:52), Max: 39.4 (15 Ignacio 2018 16:35)  T(F): 99.9 (2018 09:52), Max: 102.9 (15 Ignacio 2018 16:35)  HR: 117 (2018 09:52) (99 - 125)  BP: 105/66 (2018 09:52) (105/66 - 141/77)  BP(mean): --  RR: 22 (2018 09:52) (18 - 22)  SpO2: 95% (2018 09:52) (94% - 96%)    01-15 @ 07:  -  16 @ 07:00  --------------------------------------------------------  IN: 2950 mL / OUT: 0 mL / NET: 2950 mL          PHYSICAL EXAM:  Constitutional: WDWN  Head: NC/AT  EENT: PERRL, anicteric sclera; oropharynx clear, MMM  Neck: supple, no appreciable JVD  Respiratory: decreased breath sounds right base, rhonchi right middle, inspiratory crackles left base  Cardiovascular: +S1/S2, tachycardia, regular rhythym  Gastrointestinal: soft, NT/ND; +BSx4  Extremities: WWP; no clubbing or cyanosis, +2 pitting edema b/l LE  Vascular: 2+ radial, DP/PT pulses B/L  Neurological: AAOx3; no focal deficits    LABS:      CBC Full  -  ( 2018 07:47 )  WBC Count : 15.5 K/uL  Hemoglobin : 8.2 g/dL  Hematocrit : 26.2 %  Platelet Count - Automated : 610 K/uL  Mean Cell Volume : 81.1 fL  Mean Cell Hemoglobin : 25.4 pg  Mean Cell Hemoglobin Concentration : 31.3 g/dL  Auto Neutrophil # : x  Auto Lymphocyte # : x  Auto Monocyte # : x  Auto Eosinophil # : x  Auto Basophil # : x  Auto Neutrophil % : x  Auto Lymphocyte % : x  Auto Monocyte % : x  Auto Eosinophil % : x  Auto Basophil % : x    01-16    130<L>  |  98  |  8   ----------------------------<  154<H>  4.5   |  23  |  1.01    Ca    8.2<L>      2018 07:47  Mg     2.2     -16    TPro  6.2  /  Alb  2.6<L>  /  TBili  0.3  /  DBili  x   /  AST  20  /  ALT  26  /  AlkPhos  84  01-15          Urinalysis Basic - ( 15 Ignacio 2018 09:37 )    Color: Yellow / Appearance: Clear / S.010 / pH: x  Gluc: x / Ketone: NEGATIVE  / Bili: Negative / Urobili: 0.2 E.U./dL   Blood: x / Protein: NEGATIVE mg/dL / Nitrite: NEGATIVE   Leuk Esterase: NEGATIVE / RBC: x / WBC x   Sq Epi: x / Non Sq Epi: x / Bacteria: x                RADIOLOGY & ADDITIONAL STUDIES:      EXAM:  CT ANGIO CHEST PE PROTOCOL IC                          PROCEDURE DATE:  2018      IMPRESSION:     1. 6 x 3 cm mass in the right chest wall (7:126). There is a lytic   lesion involving the right eighth rib.  This has increased in size   significantly since December. Consistent with metastatic disease    2. Small right pleural effusion.    3. Mass in the right hilum extends to the pleural surface in the right   lower lobe. The mass has  increased in size since the prior study. It measures 6.5 x 7.7 cm.   consistent with malignancy    4. 2.5 x 2 cm right paratracheal mass. Adenopathy or metastatic disease    5. Pathologic Post caval/precarinal node 3.3 x 2.4 cm. adenopathy or   metastatic disease    6. Consolidation more inferiorly in the right lower lobe may represent   pneumonia PULMONARY SERVICE INITIAL CONSULT NOTE    HPI:  The patient is a 61 y/o F with a PMHx of HTN, pre-DM and recent diagnosis of lung adenocarcinoma (stage IV w/ metastases to brain and bone s/p RTx to bony rib mets as well as brain mets) who presented three days ago with nonspecific symptoms of N/V, diarrhea and two weeks of productive cough/shortness of breath. Her productive cough is occasionally blood tinged. She was initially ruled out for PE, however the CT scan showed a possible consolidation near the area of the RLL mass. During the course of her admission she has continued to have fevers, the curve of which is uptrending despite broad-spectrum treatment with vancomycin and meropenem. She was tentatively scheduled to start her first round of chemotherapy today with Dr. Noonan, however, this is being delayed due to her continued fevers.         REVIEW OF SYSTEMS:  Constitutional: +fevers, + weight loss, +fatigue  Eyes: No eye pain, visual disturbances, or discharge  ENMT:  No difficulty hearing, tinnitus, vertigo; No sinus or throat pain  Neck: No pain, stiffness or neck swelling  Respiratory: +shortness of breath  Cardiovascular: No chest pain, palpitations, +leg swelling  Gastrointestinal: No abdominal or epigastric pain. No nausea, vomiting or hematemesis; No diarrhea or constipation. No melena or hematochezia.  Genitourinary: No dysuria, frequency, hematuria or incontinence  Neurological: No headaches, memory loss, loss of strength, numbness or tremors  Skin: No itching, burning, rashes or lesions   Lymph Nodes: No enlarged glands  Endocrine: No heat or cold intolerance; No hair loss  Musculoskeletal: No joint pain or swelling; No muscle, back or extremity pain  Psychiatric: No depression, mood swings, +anxiety  Heme/Lymph: No easy bruising or bleeding gums  Allergy and Immunologic: No hives or eczema    PAST MEDICAL & SURGICAL HISTORY:  Bone metastasis  Brain cancer  Lung cancer  Hypertension  Pre-diabetes  History of radiation therapy  Status post gamma knife treatment      FAMILY HISTORY:  Family history of transitional cell carcinoma of bladder (Sibling)  Family history of renal cell carcinoma (Sibling)      SOCIAL HISTORY:  Smoking Status: [ ] Current, [x] Former, [ ] Never      MEDICATIONS:  Pulmonary:  ALBUTerol/ipratropium for Nebulization 3 milliLiter(s) Nebulizer every 6 hours  benzonatate 100 milliGRAM(s) Oral every 8 hours    Antimicrobials:  meropenem IVPB 1000 milliGRAM(s) IV Intermittent every 8 hours    Anticoagulants:  enoxaparin Injectable 40 milliGRAM(s) SubCutaneous daily    Onc:    GI/:  loperamide 2 milliGRAM(s) Oral every 2 hours PRN  psyllium Powder 1 Packet(s) Oral two times a day    Endocrine:  dextrose 50% Injectable 12.5 Gram(s) IV Push once  dextrose 50% Injectable 25 Gram(s) IV Push once  dextrose 50% Injectable 25 Gram(s) IV Push once  dextrose Gel 1 Dose(s) Oral once PRN  glucagon  Injectable 1 milliGRAM(s) IntraMuscular once PRN  insulin lispro (HumaLOG) corrective regimen sliding scale   SubCutaneous Before meals and at bedtime    Cardiac:  amLODIPine   Tablet 10 milliGRAM(s) Oral daily    Other Medications:  acetaminophen   Tablet 650 milliGRAM(s) Oral every 6 hours PRN  ALPRAZolam 0.25 milliGRAM(s) Oral at bedtime  dextrose 5%. 1000 milliLiter(s) IV Continuous <Continuous>  OLANZapine Disintegrating Tablet 5 milliGRAM(s) Oral daily  oxyCODONE    IR 5 milliGRAM(s) Oral every 4 hours PRN  oxyCODONE    IR 10 milliGRAM(s) Oral every 6 hours  sodium chloride 0.9%. 1000 milliLiter(s) IV Continuous <Continuous>      Allergies    penicillins (Hives)    Intolerances        Vital Signs Last 24 Hrs  T(C): 37.7 (2018 09:52), Max: 39.4 (15 Ignacio 2018 16:35)  T(F): 99.9 (2018 09:52), Max: 102.9 (15 Ignacio 2018 16:35)  HR: 117 (2018 09:52) (99 - 125)  BP: 105/66 (2018 09:52) (105/66 - 141/77)  BP(mean): --  RR: 22 (2018 09:52) (18 - 22)  SpO2: 95% (2018 09:52) (94% - 96%)    01-15 @ 07:01  -  -16 @ 07:00  --------------------------------------------------------  IN: 2950 mL / OUT: 0 mL / NET: 2950 mL          PHYSICAL EXAM:  Constitutional: WDWN  Head: NC/AT  EENT: PERRL, anicteric sclera; oropharynx clear, MMM  Neck: supple, no appreciable JVD  Respiratory: decreased breath sounds right base, rhonchi right middle lung field, inspiratory crackles left base  Cardiovascular: +S1/S2, tachycardia, regular rhythym  Gastrointestinal: soft, NT/ND; +BSx4  Extremities: WWP; no cyanosis, +clubbing, +2 pitting edema b/l LE  Vascular: 2+ radial, DP/PT pulses B/L  Neurological: AAOx3; no focal deficits    LABS:      CBC Full  -  ( 2018 07:47 )  WBC Count : 15.5 K/uL  Hemoglobin : 8.2 g/dL  Hematocrit : 26.2 %  Platelet Count - Automated : 610 K/uL  Mean Cell Volume : 81.1 fL  Mean Cell Hemoglobin : 25.4 pg  Mean Cell Hemoglobin Concentration : 31.3 g/dL  Auto Neutrophil # : x  Auto Lymphocyte # : x  Auto Monocyte # : x  Auto Eosinophil # : x  Auto Basophil # : x  Auto Neutrophil % : x  Auto Lymphocyte % : x  Auto Monocyte % : x  Auto Eosinophil % : x  Auto Basophil % : x    01-16    130<L>  |  98  |  8   ----------------------------<  154<H>  4.5   |  23  |  1.01    Ca    8.2<L>      2018 07:47  Mg     2.2     -    TPro  6.2  /  Alb  2.6<L>  /  TBili  0.3  /  DBili  x   /  AST  20  /  ALT  26  /  AlkPhos  84  01-15          Urinalysis Basic - ( 15 Ignacio 2018 09:37 )    Color: Yellow / Appearance: Clear / S.010 / pH: x  Gluc: x / Ketone: NEGATIVE  / Bili: Negative / Urobili: 0.2 E.U./dL   Blood: x / Protein: NEGATIVE mg/dL / Nitrite: NEGATIVE   Leuk Esterase: NEGATIVE / RBC: x / WBC x   Sq Epi: x / Non Sq Epi: x / Bacteria: x                RADIOLOGY & ADDITIONAL STUDIES:      EXAM:  CT ANGIO CHEST PE PROTOCOL IC                          PROCEDURE DATE:  2018      IMPRESSION:     1. 6 x 3 cm mass in the right chest wall (7:126). There is a lytic   lesion involving the right eighth rib.  This has increased in size   significantly since December. Consistent with metastatic disease    2. Small right pleural effusion.    3. Mass in the right hilum extends to the pleural surface in the right   lower lobe. The mass has  increased in size since the prior study. It measures 6.5 x 7.7 cm.   consistent with malignancy    4. 2.5 x 2 cm right paratracheal mass. Adenopathy or metastatic disease    5. Pathologic Post caval/precarinal node 3.3 x 2.4 cm. adenopathy or   metastatic disease    6. Consolidation more inferiorly in the right lower lobe may represent   pneumonia

## 2018-01-16 NOTE — CONSULT NOTE ADULT - PROBLEM SELECTOR RECOMMENDATION 9
Recommend trial of 72 hours of broad spectrum IV antibiotics. If she continues to be febrile through the end of tomorrow, will plan for bronchoscopy on Thursday 1/18/18 AM as post-obstructive pneumonia would be the likely etiology of her sepsis. She has adeno ca ,metastatic s/p gamma knife and radiation to the rib now presenting with possible post obstructive PNA. She has also increasing masslike lesion in RLL with complete obliteration of the RLL bronchus since 6 weeks ago. She may have endobronchial lesion as she has hemoptysis. Recommend trial of 72 hours of broad spectrum IV antibiotics. If she continues to be febrile through the end of tomorrow, will plan for bronchoscopy on Thursday 1/18/18 AM as post-obstructive pneumonia would be the likely etiology of her sepsis. Bronchoscopy will be both therapeutic and diagnostic. Should she found to have endobronchial obstructive lesion she may need debulking/cryo Vs balloon dilatation to drain secretion. Otherwise she is on radiation treatment with a plan to try chemo with oncology.  Continue supplemental o2  Will follow

## 2018-01-16 NOTE — CONSULT NOTE ADULT - ASSESSMENT
61 y/o F with recent diagnosis of stage IV lung adenocarcinoma admitted with fevers, which she is continuing to have. CT scan shows progression of lung mass from 12/3/17. Unable to r/o post-obstructive pneumonia based on CT scan. She has currently been on broad spectrum IV antibiotics for ~40 hours.

## 2018-01-16 NOTE — PROGRESS NOTE ADULT - ASSESSMENT
61 yo F with  1. Stage 4 lung ca with mets to LN, rib, and brain  - s/p radiation to rib, gamma knife to brain  - chemo will be delayed- pt needs to be medically optimized before starting tx and no longer infected; pt understands and agreeable  2. Sepsis 2/2 post obstructive PNA 2/2/ lung ca  - vanco/meropenem  - pulm consult, bronchoscopy if pt still febrile on abx for another day  3. Nausea/vomiting  - pt with prolonged QT, now resolved  - no zofran, started zyprexa ODT 5 mg daily and will uptitrate to 10 mg if still naseous   4. Prolonged QT  - repeat EKG normal  5. Diarrhea  - start metamucil and immodium, noninfectious and chronic- now improved so both are prn  6. Shortness of breath 2/2 lung ca   - oxycodone increased to 10 mg q6hrs standing with 5 mg q4hrs prn for sob/pain- feeling better  7. Anxiety  - xanax qhs  8. Insomnia  - xanax qhs  9. Anemia  - likely ACD  10. HTN  - will f/u once sx better managed  11. DM  - FS well controlled  12. Chronic cough  - oxycodone should help with this, on tessalon perles as well  13. Severe protein calorie malnutrition  - appetite improved on xyprexa  - nutrition consult  14. Sinus tachycardia  - from sepsis, treating underlying disease  15. Phlebitis  - c/w vanco and arm elevation/ice  16. Advanced directives  - spoke with pt and daughter about advanced directives- 16 min on conversation- daughter believes pt wants to be "comfortable" if she declines but she would like to review conversation with pt.  Will discuss further tomorrow. 63 yo F with  1. Stage 4 lung ca with mets to LN, rib, and brain  - s/p radiation to rib, gamma knife to brain  - chemo will be delayed- pt needs to be medically optimized before starting tx and no longer infected; pt understands and agreeable  2. Sepsis 2/2 post obstructive PNA 2/2/ lung ca  - sepsis was present on admission  - vanco/meropenem  - pulm consult, bronchoscopy if pt still febrile on abx for another day  3. Nausea/vomiting  - pt with prolonged QT, now resolved  - no zofran, started zyprexa ODT 5 mg daily and will uptitrate to 10 mg if still naseous   4. Prolonged QT  - repeat EKG normal  5. Diarrhea  - start metamucil and immodium, noninfectious and chronic- now improved so both are prn  6. Shortness of breath 2/2 lung ca   - oxycodone increased to 10 mg q6hrs standing with 5 mg q4hrs prn for sob/pain- feeling better  7. Anxiety  - xanax qhs  8. Insomnia  - xanax qhs  9. Anemia  - likely ACD  10. HTN  - will f/u once sx better managed  11. DM  - FS well controlled  12. Chronic cough  - oxycodone should help with this, on tessalon perles as well  13. Severe protein calorie malnutrition  - appetite improved on xyprexa  - nutrition consult  14. Sinus tachycardia  - from sepsis, treating underlying disease  15. Phlebitis  - c/w vanco and arm elevation/ice  16. Advanced directives  - spoke with pt and daughter about advanced directives- 16 min on conversation- daughter believes pt wants to be "comfortable" if she declines but she would like to review conversation with pt.  Will discuss further tomorrow.

## 2018-01-16 NOTE — PROGRESS NOTE ADULT - PROBLEM SELECTOR PLAN 1
SIRS 2/2 postobstructive pneumonia  - c/w NS @ 100cc/hr  - CTA chest showed enlargement of lung mass and postobstructive pneumonia of RLL, no PE  - off doxycycline  - c/w Vancomycin (1/14- ), meropenem (1/14- ), and IV Levaquin (1/16- )  - as per pulm, no bronch at this time  - RVP neg, BCx NGTD

## 2018-01-16 NOTE — CONSULT NOTE ADULT - SUBJECTIVE AND OBJECTIVE BOX
Interventional Pulmonology Consultation    Patient is a 62y old  Female who presents with a chief complaint of n/v/diarrhea, worsening cough (2018 00:25)    HPI:  Mrs. Bhatti is a 63 y/o F with a PMHX of HTN, DM, and Stage IV lung CA diagnosed in w/ mets to brain and bone (Dec 8th, 2017) who underwent RT to bony mets on R 8th rib last week, and had gamma knife tx to brain () who presents with 4 days of n/v.     Patient endorses intermittent blood streaked sputum.  Last time she noticed blood tinged sputum was three days ago.  Is having right sided pleuritic type chest pain with cough.  States that she has shortness of breath at baseline but is having symptomatic relief since admission to the hospital.    Denies chest pain, palpitations,     ED vitals notable for rectal temp of 100. . BP stable. RR and O2 stable.  ED gave:  Famotidine 20 mg iv, zofran 4 mg iv and 1L NS bolus.   Repeat HR improved to 101.   CT head noncon done en route to floor -- prelim read from resident --> no acute process.     On the floors, the patient had a oral temp of 100.5    cc bolus given and NS started at 100 cc/hr. (2018 00:25)      REVIEW OF SYSTEMS:  Constitutional: No fever, weight loss or fatigue  Eyes: No eye pain, visual disturbances, or discharge  ENMT:  No difficulty hearing, tinnitus, vertigo; No sinus or throat pain  Neck: No pain, stiffness or neck swelling  Respiratory: No cough, wheezing, chills or hemoptysis  Cardiovascular: No chest pain, palpitations, dizziness or leg swelling  Gastrointestinal: No abdominal or epigastric pain. No nausea, vomiting or hematemesis; No diarrhea or constipation. No melena or hematochezia.  Genitourinary: No dysuria, frequency, hematuria or incontinence  Neurological: No headaches, memory loss, loss of strength, numbness or tremors  Skin: No itching, burning, rashes or lesions   Lymph Nodes: No enlarged glands  Endocrine: No heat or cold intolerance; No hair loss  Musculoskeletal: No joint pain or swelling; No muscle, back or extremity pain  Psychiatric: No depression, anxiety, mood swings or difficulty sleeping  Heme/Lymph: No easy bruising or bleeding gums  Allergy and Immunologic: No hives or eczema    PAST MEDICAL & SURGICAL HISTORY:  Bone metastasis  Brain cancer  Lung cancer  Hypertension  Diabetes  History of radiation therapy  Status post gamma knife treatment      FAMILY HISTORY:  Family history of transitional cell carcinoma of bladder (Sibling)  Family history of renal cell carcinoma (Sibling)      SOCIAL HISTORY:  Smoking Status: [ ] Current, [ ] Former, [ ] Never  Pack Years:    MEDICATIONS:  Pulmonary:  ALBUTerol/ipratropium for Nebulization 3 milliLiter(s) Nebulizer every 6 hours  benzonatate 100 milliGRAM(s) Oral every 8 hours    Antimicrobials:  levoFLOXacin IVPB 750 milliGRAM(s) IV Intermittent every 24 hours  meropenem IVPB 1000 milliGRAM(s) IV Intermittent every 8 hours    Anticoagulants:  enoxaparin Injectable 40 milliGRAM(s) SubCutaneous daily    Onc:    GI/:  loperamide 2 milliGRAM(s) Oral every 2 hours PRN  psyllium Powder 1 Packet(s) Oral two times a day    Endocrine:  dextrose 50% Injectable 12.5 Gram(s) IV Push once  dextrose 50% Injectable 25 Gram(s) IV Push once  dextrose 50% Injectable 25 Gram(s) IV Push once  dextrose Gel 1 Dose(s) Oral once PRN  glucagon  Injectable 1 milliGRAM(s) IntraMuscular once PRN  insulin lispro (HumaLOG) corrective regimen sliding scale   SubCutaneous Before meals and at bedtime    Cardiac:  amLODIPine   Tablet 10 milliGRAM(s) Oral daily    Other Medications:  acetaminophen   Tablet 650 milliGRAM(s) Oral every 6 hours PRN  ALPRAZolam 0.25 milliGRAM(s) Oral at bedtime  dextrose 5%. 1000 milliLiter(s) IV Continuous <Continuous>  OLANZapine Disintegrating Tablet 5 milliGRAM(s) Oral daily  oxyCODONE    IR 5 milliGRAM(s) Oral every 4 hours PRN  oxyCODONE    IR 10 milliGRAM(s) Oral every 6 hours  sodium chloride 0.9%. 1000 milliLiter(s) IV Continuous <Continuous>      Allergies    penicillins (Hives)    Intolerances        Vital Signs Last 24 Hrs  T(C): 37.9 (2018 15:25), Max: 39.4 (15 Ignacio 2018 16:35)  T(F): 100.2 (2018 15:25), Max: 102.9 (15 Ignacio 2018 16:35)  HR: 110 (2018 15:25) (99 - 125)  BP: 100/61 (2018 15:25) (100/61 - 130/72)  BP(mean): --  RR: 20 (2018 15:25) (18 - 22)  SpO2: 96% (2018 15:25) (94% - 96%)    01-15 @ 07: @ 07:00  --------------------------------------------------------  IN: 2950 mL / OUT: 0 mL / NET: 2950 mL     @ 07: @ 15:59  --------------------------------------------------------  IN: 1100 mL / OUT: 310 mL / NET: 790 mL          PHYSICAL EXAM:    General: Well developed; well nourished; in no acute distress  Eyes: PERRL, EOM intact; conjunctiva and sclera clear  Head: Normocephalic; atraumatic  ENMT: No nasal discharge; airway clear  Neck: Supple; non tender; no masses  Respiratory: Clear to auscultation bilaterally without wheezing, rhonchi or rales  Cardiovascular: Regular rate and rhythm. S1 and S2 Normal; No murmurs, gallops or rubs  Gastrointestinal: Soft non-tender non-distended; Normal bowel sounds; No hepatosplenomegaly  Genitourinary: No costovertebral angle tenderness  Extremities: Normal range of motion, No clubbing, cyanosis or edema  Vascular: Peripheral pulses palpable 2+ bilaterally  Neurological: Alert and oriented x3  Skin: Warm and dry. No obvious rash  Lymph Nodes: No acute cervical or supraclavicular adenopathy  Musculoskeletal: Normal gait, tone, without deformities  Psychiatric: Cooperative and appropriate mood    LABS:      CBC Full  -  ( 2018 07:47 )  WBC Count : 15.5 K/uL  Hemoglobin : 8.2 g/dL  Hematocrit : 26.2 %  Platelet Count - Automated : 610 K/uL  Mean Cell Volume : 81.1 fL  Mean Cell Hemoglobin : 25.4 pg  Mean Cell Hemoglobin Concentration : 31.3 g/dL  Auto Neutrophil # : x  Auto Lymphocyte # : x  Auto Monocyte # : x  Auto Eosinophil # : x  Auto Basophil # : x  Auto Neutrophil % : x  Auto Lymphocyte % : x  Auto Monocyte % : x  Auto Eosinophil % : x  Auto Basophil % : x    -    130<L>  |  98  |  8   ----------------------------<  154<H>  4.5   |  23  |  1.01    Ca    8.2<L>      2018 07:47  Mg     2.2         TPro  6.2  /  Alb  2.6<L>  /  TBili  0.3  /  DBili  x   /  AST  20  /  ALT  26  /  AlkPhos  84  01-15          Urinalysis Basic - ( 15 Ignacio 2018 09:37 )    Color: Yellow / Appearance: Clear / S.010 / pH: x  Gluc: x / Ketone: NEGATIVE  / Bili: Negative / Urobili: 0.2 E.U./dL   Blood: x / Protein: NEGATIVE mg/dL / Nitrite: NEGATIVE   Leuk Esterase: NEGATIVE / RBC: x / WBC x   Sq Epi: x / Non Sq Epi: x / Bacteria: x                RADIOLOGY & ADDITIONAL STUDIES (The following images were personally reviewed): Interventional Pulmonology Consultation    Patient is a 62y old  Female who presents with a chief complaint of n/v/diarrhea, worsening cough (2018 00:25)    HPI:  Mrs. Bhatti is a 63 y/o F with a PMHX of HTN, DM, and Stage IV lung CA diagnosed in w/ mets to brain and bone (Dec 8th, 2017, FNA of RIGHT lung mass) who underwent RT to bony mets on R 8th rib last week, and had gamma knife tx to brain () who presents with 4 days of n/v.     Patient endorses intermittent blood streaked sputum.  Last time she noticed blood tinged sputum was three days ago.  Is having right sided pleuritic type chest pain with cough.  States that she has shortness of breath at baseline but is having symptomatic relief since admission to the hospital.      Denies chest pain, palpitations, orthopnea, wheeze, nausea, vomiting, abdominal pain, or syncope.    Patient is an ex-smoker with >20 pack year history.  She quit in .  No occupational exposure.    REVIEW OF SYSTEMS:  Constitutional: Endorses fever, weight loss  ENMT:  No difficulty hearing, tinnitus, vertigo; No sinus or throat pain  Neck: No pain, stiffness or neck swelling  Respiratory: dry cough sometimes blood tinged  Cardiovascular: No chest pain, palpitations, dizziness or leg swelling  Gastrointestinal: Having 1 loose bowel movement per day.  No abdominal or epigastric pain. No nausea, vomiting or hematemesis;. No melena or hematochezia.  Genitourinary: No dysuria, frequency, hematuria or incontinence  Neurological: No headaches, memory loss, loss of strength, numbness or tremors  Skin: No itching, burning, rashes or lesions   Lymph Nodes: No enlarged glands  Endocrine: No heat or cold intolerance; No hair loss  Musculoskeletal: No joint pain or swelling; No muscle, back or extremity pain  Psychiatric: No depression, anxiety, mood swings or difficulty sleeping  Heme/Lymph: No easy bruising or bleeding gums  Allergy and Immunologic: No hives or eczema    PAST MEDICAL & SURGICAL HISTORY:  Bone metastasis  Brain cancer  Lung cancer  Hypertension  Diabetes  History of radiation therapy  Status post gamma knife treatment      FAMILY HISTORY:  Family history of transitional cell carcinoma of bladder (Sibling)  Family history of renal cell carcinoma (Sibling)      SOCIAL HISTORY:  Smoking Status: [ ] Current, [xx ] Former, [ ] Never  Pack Years: approx 20 pack year    MEDICATIONS:  Pulmonary:  ALBUTerol/ipratropium for Nebulization 3 milliLiter(s) Nebulizer every 6 hours  benzonatate 100 milliGRAM(s) Oral every 8 hours    Antimicrobials:  levoFLOXacin IVPB 750 milliGRAM(s) IV Intermittent every 24 hours  meropenem IVPB 1000 milliGRAM(s) IV Intermittent every 8 hours    Anticoagulants:  enoxaparin Injectable 40 milliGRAM(s) SubCutaneous daily    Onc:    GI/:  loperamide 2 milliGRAM(s) Oral every 2 hours PRN  psyllium Powder 1 Packet(s) Oral two times a day    Endocrine:  dextrose 50% Injectable 12.5 Gram(s) IV Push once  dextrose 50% Injectable 25 Gram(s) IV Push once  dextrose 50% Injectable 25 Gram(s) IV Push once  dextrose Gel 1 Dose(s) Oral once PRN  glucagon  Injectable 1 milliGRAM(s) IntraMuscular once PRN  insulin lispro (HumaLOG) corrective regimen sliding scale   SubCutaneous Before meals and at bedtime    Cardiac:  amLODIPine   Tablet 10 milliGRAM(s) Oral daily    Other Medications:  acetaminophen   Tablet 650 milliGRAM(s) Oral every 6 hours PRN  ALPRAZolam 0.25 milliGRAM(s) Oral at bedtime  dextrose 5%. 1000 milliLiter(s) IV Continuous <Continuous>  OLANZapine Disintegrating Tablet 5 milliGRAM(s) Oral daily  oxyCODONE    IR 5 milliGRAM(s) Oral every 4 hours PRN  oxyCODONE    IR 10 milliGRAM(s) Oral every 6 hours  sodium chloride 0.9%. 1000 milliLiter(s) IV Continuous <Continuous>      Allergies:  penicillins (Hives)    Vital Signs Last 24 Hrs  T(C): 37.9 (2018 15:25), Max: 39.4 (15 Ignacio 2018 16:35)  T(F): 100.2 (2018 15:25), Max: 102.9 (15 Ignacio 2018 16:35)  HR: 110 (2018 15:25) (99 - 125)  BP: 100/61 (2018 15:25) (100/61 - 130/72)  BP(mean): --  RR: 20 (2018 15:25) (18 - 22)  SpO2: 96% (2018 15:25) (94% - 96%)    -15 @ 07:01  -  -16 @ 07:00  --------------------------------------------------------  IN: 2950 mL / OUT: 0 mL / NET: 2950 mL     @ 07:01  -   @ 15:59  --------------------------------------------------------  IN: 1100 mL / OUT: 310 mL / NET: 790 mL          PHYSICAL EXAM:    General: toxic appearing  Eyes: PERRL, EOM intact; conjunctiva and sclera clear  Head: Normocephalic; atraumatic  ENMT: No nasal discharge; airway clear  Neck: Supple; non tender; no masses  Respiratory: bronchial breath sounds in the right middle lung zone.  Rales appreciated in right lower lung zone.  Cardiovascular: tachycardic, s1/s2, no murmurs appreciated   Gastrointestinal: Soft non-tender non-distended; Normal bowel sounds; No hepatosplenomegaly  Genitourinary: No costovertebral angle tenderness  Extremities: Normal range of motion, No clubbing, cyanosis or edema  Vascular: Peripheral pulses palpable 2+ bilaterally  Neurological: Alert and oriented x3, mild resting tremor noted  Skin: Warm and dry. No obvious rash  Lymph Nodes: No acute cervical or supraclavicular adenopathy  Musculoskeletal: FROM x 4  Psychiatric: Cooperative and appropriate mood    LABS:  CBC Full  -  ( 2018 07:47 )  WBC Count : 15.5 K/uL  Hemoglobin : 8.2 g/dL  Hematocrit : 26.2 %  Platelet Count - Automated : 610 K/uL  Mean Cell Volume : 81.1 fL  Mean Cell Hemoglobin : 25.4 pg  Mean Cell Hemoglobin Concentration : 31.3 g/dL        130<L>  |  98  |  8   ----------------------------<  154<H>  4.5   |  23  |  1.01    Ca    8.2<L>      2018 07:47  Mg     2.2         TPro  6.2  /  Alb  2.6<L>  /  TBili  0.3  /  DBili  x   /  AST  20  /  ALT  26  /  AlkPhos  84  01-15    Urinalysis Basic - ( 15 Ignacio 2018 09:37 )    Color: Yellow / Appearance: Clear / S.010 / pH: x  Gluc: x / Ketone: NEGATIVE  / Bili: Negative / Urobili: 0.2 E.U./dL   Blood: x / Protein: NEGATIVE mg/dL / Nitrite: NEGATIVE   Leuk Esterase: NEGATIVE / RBC: x / WBC x   Sq Epi: x / Non Sq Epi: x / Bacteria: x

## 2018-01-16 NOTE — CONSULT NOTE ADULT - ASSESSMENT
62 year old female with Stage IV lung adenocarcinoma, PDL1 40%, EGFR/ALK/ROS negative, HTN, DM, who underwent RT to bony mets on R 8th rib last week, and had gamma knife tx to brain (Jan 9th) who presented with 4 days of n/v, cough found to have post obstructive pneumonia.     #Stage IV lung adenocarcinoma - metastatic to brain s/p gamma knife in LI and bone seen on PET/CT. Now with marked increased in RLL and R hilar lesions however also in setting of pneumonia and other inflammatory changes. Patient has PDL1 expression of 40%, however with other negative targets including EGFR/ALK/ROS, not a candidate for targeted agents. Intent of systemic treatment palliative not curative with cytotoxic chemotherapy with the addition of immunotherapy based on the KEYNOTE 021 trial which showed improvement in PATIÑO and PFS in patients with PDL <50.     -Continue with supportive care and broad spectrum antibiotics   -cultures currently negative to date  -pulmonary recs   -will hold off treatment with chemo/immunotherapy until infection/sepsis has resolved     Discussed with Dr. Noonan 62 year old female with Stage IV lung adenocarcinoma, PDL1 40%, EGFR/ALK/ROS negative, HTN, DM, who underwent RT to bony mets on R 8th rib last week, and had gamma knife tx to brain (Jan 9th) who presented with 4 days of n/v, cough found to have post obstructive pneumonia.     #Stage IV lung adenocarcinoma - Metastatic to brain s/p gamma knife in LI and bone seen on PET/CT. Now with marked increased in RLL and R hilar lesions however also in setting of pneumonia and other inflammatory changes. Patient has PDL1 expression of 40%, however with other negative targets including EGFR/ALK/ROS, not a candidate for targeted agents. Intent of systemic treatment palliative not curative with cytotoxic chemotherapy with the addition of immunotherapy based on the KEYNOTE 021 trial which showed improvement in PATIÑO and PFS in patients with PDL <50.     -Continue with supportive care and broad spectrum antibiotics   -cultures currently negative to date  -pulmonary recs   -will hold off treatment with chemo/immunotherapy until infection/sepsis has resolved     #Anemia - Likely of chronic inflammation given iron studies and ferritin level. No evidence of bilirubinemia, jaundice. No gross signs of bleeding. Retic 1.8%. Plts likely reactive, mild coagulopathy in setting of infection likely.     -Trend Hg, if persistently dropping consider occult stool testing  -Transfuse for Hg>7  -lovenox for dvt ppx    Discussed with Dr. Noonan

## 2018-01-16 NOTE — PROGRESS NOTE ADULT - SUBJECTIVE AND OBJECTIVE BOX
Pt seen and examined at bedside.  Says she feels better in terms of breathing, however overnight was febrile and tachycardic persistently.      INTERVAL HPI/OVERNIGHT EVENTS:    Review of Systems: 12 point review of systems otherwise negative    MEDICATIONS  (STANDING):  ALBUTerol/ipratropium for Nebulization 3 milliLiter(s) Nebulizer every 6 hours  ALPRAZolam 0.25 milliGRAM(s) Oral at bedtime  amLODIPine   Tablet 10 milliGRAM(s) Oral daily  benzonatate 100 milliGRAM(s) Oral every 8 hours  dextrose 5%. 1000 milliLiter(s) (50 mL/Hr) IV Continuous <Continuous>  dextrose 50% Injectable 12.5 Gram(s) IV Push once  dextrose 50% Injectable 25 Gram(s) IV Push once  dextrose 50% Injectable 25 Gram(s) IV Push once  enoxaparin Injectable 40 milliGRAM(s) SubCutaneous daily  insulin lispro (HumaLOG) corrective regimen sliding scale   SubCutaneous Before meals and at bedtime  levoFLOXacin IVPB 750 milliGRAM(s) IV Intermittent every 24 hours  meropenem IVPB 1000 milliGRAM(s) IV Intermittent every 8 hours  OLANZapine Disintegrating Tablet 5 milliGRAM(s) Oral daily  oxyCODONE    IR 10 milliGRAM(s) Oral every 6 hours  psyllium Powder 1 Packet(s) Oral two times a day  sodium chloride 0.9%. 1000 milliLiter(s) (100 mL/Hr) IV Continuous <Continuous>    MEDICATIONS  (PRN):  acetaminophen   Tablet 650 milliGRAM(s) Oral every 6 hours PRN For Temp greater than 38 C (100.4 F)  dextrose Gel 1 Dose(s) Oral once PRN Blood Glucose LESS THAN 70 milliGRAM(s)/deciliter  glucagon  Injectable 1 milliGRAM(s) IntraMuscular once PRN Glucose LESS THAN 70 milligrams/deciliter  loperamide 2 milliGRAM(s) Oral every 2 hours PRN Diarrhea  oxyCODONE    IR 5 milliGRAM(s) Oral every 4 hours PRN Severe Pain (7 - 10), SOB      Allergies    penicillins (Hives)    Intolerances        Vital Signs Last 24 Hrs  T(C): 38.1 (2018 14:12), Max: 39.4 (15 Ignacio 2018 16:35)  T(F): 100.5 (2018 14:12), Max: 102.9 (15 Ignacio 2018 16:35)  HR: 112 (2018 13:32) (99 - 125)  BP: 114/70 (2018 13:32) (105/66 - 141/77)  BP(mean): --  RR: 18 (2018 13:32) (18 - 22)  SpO2: 96% (2018 13:32) (94% - 96%)  CAPILLARY BLOOD GLUCOSE      POCT Blood Glucose.: 125 mg/dL (2018 11:48)  POCT Blood Glucose.: 147 mg/dL (2018 07:41)  POCT Blood Glucose.: 131 mg/dL (15 Ignacio 2018 21:47)  POCT Blood Glucose.: 113 mg/dL (15 Ignacio 2018 17:00)      01-15 @ 07:  -  16 @ 07:00  --------------------------------------------------------  IN: 2950 mL / OUT: 0 mL / NET: 2950 mL     @ 07:01   @ 15:01  --------------------------------------------------------  IN: 900 mL / OUT: 310 mL / NET: 590 mL        LABS:                        8.2    15.5  )-----------( 610      ( 2018 07:47 )             26.2     -    130<L>  |  98  |  8   ----------------------------<  154<H>  4.5   |  23  |  1.01    Ca    8.2<L>      2018 07:47  Mg     2.2         TPro  6.2  /  Alb  2.6<L>  /  TBili  0.3  /  DBili  x   /  AST  20  /  ALT  26  /  AlkPhos  84  01-15      Urinalysis Basic - ( 15 Ignacio 2018 09:37 )    Color: Yellow / Appearance: Clear / S.010 / pH: x  Gluc: x / Ketone: NEGATIVE  / Bili: Negative / Urobili: 0.2 E.U./dL   Blood: x / Protein: NEGATIVE mg/dL / Nitrite: NEGATIVE   Leuk Esterase: NEGATIVE / RBC: x / WBC x   Sq Epi: x / Non Sq Epi: x / Bacteria: x            RADIOLOGY & ADDITIONAL TESTS:    ---------------------------------------------------------------------------  I personally reviewed: [  ]EKG   [  ]CXR    [  ] CT    [  ]Other  ---------------------------------------------------------------------------

## 2018-01-17 LAB
ANION GAP SERPL CALC-SCNC: 11 MMOL/L — SIGNIFICANT CHANGE UP (ref 5–17)
BUN SERPL-MCNC: 8 MG/DL — SIGNIFICANT CHANGE UP (ref 7–23)
CALCIUM SERPL-MCNC: 8.1 MG/DL — LOW (ref 8.4–10.5)
CHLORIDE SERPL-SCNC: 102 MMOL/L — SIGNIFICANT CHANGE UP (ref 96–108)
CO2 SERPL-SCNC: 22 MMOL/L — SIGNIFICANT CHANGE UP (ref 22–31)
CREAT SERPL-MCNC: 0.86 MG/DL — SIGNIFICANT CHANGE UP (ref 0.5–1.3)
GLUCOSE BLDC GLUCOMTR-MCNC: 119 MG/DL — HIGH (ref 70–99)
GLUCOSE BLDC GLUCOMTR-MCNC: 137 MG/DL — HIGH (ref 70–99)
GLUCOSE BLDC GLUCOMTR-MCNC: 155 MG/DL — HIGH (ref 70–99)
GLUCOSE BLDC GLUCOMTR-MCNC: 95 MG/DL — SIGNIFICANT CHANGE UP (ref 70–99)
GLUCOSE SERPL-MCNC: 114 MG/DL — HIGH (ref 70–99)
HCT VFR BLD CALC: 24.6 % — LOW (ref 34.5–45)
HGB BLD-MCNC: 7.7 G/DL — LOW (ref 11.5–15.5)
MAGNESIUM SERPL-MCNC: 1.9 MG/DL — SIGNIFICANT CHANGE UP (ref 1.6–2.6)
MCHC RBC-ENTMCNC: 24.9 PG — LOW (ref 27–34)
MCHC RBC-ENTMCNC: 31.3 G/DL — LOW (ref 32–36)
MCV RBC AUTO: 79.6 FL — LOW (ref 80–100)
PLATELET # BLD AUTO: 566 K/UL — HIGH (ref 150–400)
POTASSIUM SERPL-MCNC: 4.3 MMOL/L — SIGNIFICANT CHANGE UP (ref 3.5–5.3)
POTASSIUM SERPL-SCNC: 4.3 MMOL/L — SIGNIFICANT CHANGE UP (ref 3.5–5.3)
RBC # BLD: 3.09 M/UL — LOW (ref 3.8–5.2)
RBC # FLD: 14.9 % — SIGNIFICANT CHANGE UP (ref 10.3–16.9)
SODIUM SERPL-SCNC: 135 MMOL/L — SIGNIFICANT CHANGE UP (ref 135–145)
WBC # BLD: 12.9 K/UL — HIGH (ref 3.8–10.5)
WBC # FLD AUTO: 12.9 K/UL — HIGH (ref 3.8–10.5)

## 2018-01-17 PROCEDURE — 99233 SBSQ HOSP IP/OBS HIGH 50: CPT | Mod: GC

## 2018-01-17 PROCEDURE — 99232 SBSQ HOSP IP/OBS MODERATE 35: CPT

## 2018-01-17 RX ORDER — IBUPROFEN 200 MG
400 TABLET ORAL ONCE
Qty: 0 | Refills: 0 | Status: COMPLETED | OUTPATIENT
Start: 2018-01-17 | End: 2018-01-17

## 2018-01-17 RX ORDER — POLYETHYLENE GLYCOL 3350 17 G/17G
17 POWDER, FOR SOLUTION ORAL ONCE
Qty: 0 | Refills: 0 | Status: COMPLETED | OUTPATIENT
Start: 2018-01-17 | End: 2018-01-17

## 2018-01-17 RX ORDER — POLYETHYLENE GLYCOL 3350 17 G/17G
17 POWDER, FOR SOLUTION ORAL DAILY
Qty: 0 | Refills: 0 | Status: DISCONTINUED | OUTPATIENT
Start: 2018-01-17 | End: 2018-01-20

## 2018-01-17 RX ORDER — SENNA PLUS 8.6 MG/1
2 TABLET ORAL AT BEDTIME
Qty: 0 | Refills: 0 | Status: DISCONTINUED | OUTPATIENT
Start: 2018-01-17 | End: 2018-01-26

## 2018-01-17 RX ADMIN — MEROPENEM 100 MILLIGRAM(S): 1 INJECTION INTRAVENOUS at 01:34

## 2018-01-17 RX ADMIN — SODIUM CHLORIDE 100 MILLILITER(S): 9 INJECTION INTRAMUSCULAR; INTRAVENOUS; SUBCUTANEOUS at 09:36

## 2018-01-17 RX ADMIN — MEROPENEM 100 MILLIGRAM(S): 1 INJECTION INTRAVENOUS at 09:29

## 2018-01-17 RX ADMIN — Medication 650 MILLIGRAM(S): at 21:33

## 2018-01-17 RX ADMIN — Medication 100 MILLIGRAM(S): at 05:28

## 2018-01-17 RX ADMIN — Medication 100 MILLIGRAM(S): at 21:38

## 2018-01-17 RX ADMIN — Medication 3 MILLILITER(S): at 17:33

## 2018-01-17 RX ADMIN — Medication 650 MILLIGRAM(S): at 05:28

## 2018-01-17 RX ADMIN — Medication 300 MILLIGRAM(S): at 22:40

## 2018-01-17 RX ADMIN — OXYCODONE HYDROCHLORIDE 10 MILLIGRAM(S): 5 TABLET ORAL at 22:34

## 2018-01-17 RX ADMIN — POLYETHYLENE GLYCOL 3350 17 GRAM(S): 17 POWDER, FOR SOLUTION ORAL at 17:33

## 2018-01-17 RX ADMIN — OXYCODONE HYDROCHLORIDE 10 MILLIGRAM(S): 5 TABLET ORAL at 10:08

## 2018-01-17 RX ADMIN — OXYCODONE HYDROCHLORIDE 10 MILLIGRAM(S): 5 TABLET ORAL at 21:34

## 2018-01-17 RX ADMIN — Medication 400 MILLIGRAM(S): at 10:07

## 2018-01-17 RX ADMIN — OXYCODONE HYDROCHLORIDE 10 MILLIGRAM(S): 5 TABLET ORAL at 05:29

## 2018-01-17 RX ADMIN — OLANZAPINE 5 MILLIGRAM(S): 15 TABLET, FILM COATED ORAL at 13:32

## 2018-01-17 RX ADMIN — Medication 300 MILLIGRAM(S): at 10:33

## 2018-01-17 RX ADMIN — OXYCODONE HYDROCHLORIDE 10 MILLIGRAM(S): 5 TABLET ORAL at 05:26

## 2018-01-17 RX ADMIN — OXYCODONE HYDROCHLORIDE 10 MILLIGRAM(S): 5 TABLET ORAL at 17:44

## 2018-01-17 RX ADMIN — Medication 3 MILLILITER(S): at 21:33

## 2018-01-17 RX ADMIN — Medication 1 PACKET(S): at 17:33

## 2018-01-17 RX ADMIN — Medication 3 MILLILITER(S): at 05:27

## 2018-01-17 RX ADMIN — OXYCODONE HYDROCHLORIDE 10 MILLIGRAM(S): 5 TABLET ORAL at 17:33

## 2018-01-17 RX ADMIN — OXYCODONE HYDROCHLORIDE 10 MILLIGRAM(S): 5 TABLET ORAL at 09:30

## 2018-01-17 RX ADMIN — Medication 0.25 MILLIGRAM(S): at 21:34

## 2018-01-17 RX ADMIN — MEROPENEM 100 MILLIGRAM(S): 1 INJECTION INTRAVENOUS at 17:33

## 2018-01-17 RX ADMIN — Medication 100 MILLIGRAM(S): at 13:33

## 2018-01-17 RX ADMIN — AMLODIPINE BESYLATE 10 MILLIGRAM(S): 2.5 TABLET ORAL at 05:28

## 2018-01-17 RX ADMIN — Medication 1 PACKET(S): at 05:28

## 2018-01-17 RX ADMIN — Medication 3 MILLILITER(S): at 09:29

## 2018-01-17 RX ADMIN — Medication 400 MILLIGRAM(S): at 09:29

## 2018-01-17 RX ADMIN — Medication 2: at 13:33

## 2018-01-17 NOTE — PROGRESS NOTE ADULT - SUBJECTIVE AND OBJECTIVE BOX
OVERNIGHT EVENTS/SUBJECTIVE/ROS: Pt with fevers overnight, given Tylenol. Pt with no complaints this AM, no nausea/vomiting/diarrhea; pt with gas    VITAL SIGNS:  Vital Signs Last 24 Hrs  T(C): 38.1 (17 Jan 2018 08:57), Max: 38.1 (17 Jan 2018 08:57)  T(F): 100.5 (17 Jan 2018 08:57), Max: 100.5 (17 Jan 2018 08:57)  HR: 116 (17 Jan 2018 08:57) (101 - 121)  BP: 144/73 (17 Jan 2018 08:57) (100/61 - 144/73)  BP(mean): --  RR: 20 (17 Jan 2018 08:57) (16 - 22)  SpO2: 94% (17 Jan 2018 08:57) (92% - 96%)    PHYSICAL EXAM:    General: lying in bed in NAD, warm to touch  HEENT: EOMI, anicteric  Neck: supple  Cardiovascular: S1, S2, RRR   Respiratory: diffuse rhonchi bilaterally  Gastrointestinal: soft NTND abdomen, +BS  Extremities: bilateral pitting edema to knees  Vascular: 2+ pulses radial; dp/pt  Neurological: alert awake oriented    MEDICATIONS:  MEDICATIONS  (STANDING):  ALBUTerol/ipratropium for Nebulization 3 milliLiter(s) Nebulizer every 6 hours  ALPRAZolam 0.25 milliGRAM(s) Oral at bedtime  amLODIPine   Tablet 10 milliGRAM(s) Oral daily  benzonatate 100 milliGRAM(s) Oral every 8 hours  dextrose 5%. 1000 milliLiter(s) (50 mL/Hr) IV Continuous <Continuous>  dextrose 50% Injectable 12.5 Gram(s) IV Push once  dextrose 50% Injectable 25 Gram(s) IV Push once  dextrose 50% Injectable 25 Gram(s) IV Push once  insulin lispro (HumaLOG) corrective regimen sliding scale   SubCutaneous Before meals and at bedtime  levoFLOXacin IVPB 750 milliGRAM(s) IV Intermittent every 24 hours  meropenem IVPB 1000 milliGRAM(s) IV Intermittent every 8 hours  OLANZapine Disintegrating Tablet 5 milliGRAM(s) Oral daily  oxyCODONE    IR 10 milliGRAM(s) Oral every 6 hours  psyllium Powder 1 Packet(s) Oral two times a day  vancomycin  IVPB 1500 milliGRAM(s) IV Intermittent every 12 hours    MEDICATIONS  (PRN):  acetaminophen   Tablet 650 milliGRAM(s) Oral every 6 hours PRN For Temp greater than 38 C (100.4 F)  dextrose Gel 1 Dose(s) Oral once PRN Blood Glucose LESS THAN 70 milliGRAM(s)/deciliter  glucagon  Injectable 1 milliGRAM(s) IntraMuscular once PRN Glucose LESS THAN 70 milligrams/deciliter  loperamide 2 milliGRAM(s) Oral every 2 hours PRN Diarrhea  oxyCODONE    IR 5 milliGRAM(s) Oral every 4 hours PRN Severe Pain (7 - 10), SOB      ALLERGIES:  Allergies    penicillins (Hives)    Intolerances        LABS:                        7.7    12.9  )-----------( 566      ( 17 Jan 2018 08:07 )             24.6     01-17    135  |  102  |  8   ----------------------------<  114<H>  4.3   |  22  |  0.86    Ca    8.1<L>      17 Jan 2018 08:07  Mg     1.9     01-17    TPro  6.2  /  Alb  2.6<L>  /  TBili  0.3  /  DBili  x   /  AST  20  /  ALT  26  /  AlkPhos  84  01-15        CAPILLARY BLOOD GLUCOSE      POCT Blood Glucose.: 155 mg/dL (17 Jan 2018 11:45)      RADIOLOGY & ADDITIONAL TESTS: Reviewed. OVERNIGHT EVENTS/SUBJECTIVE/ROS: Pt with fevers overnight, given Tylenol. Pt with no complaints this AM, no nausea/vomiting/diarrhea; +gas, fevers, chills    VITAL SIGNS:  Vital Signs Last 24 Hrs  T(C): 38.1 (17 Jan 2018 08:57), Max: 38.1 (17 Jan 2018 08:57)  T(F): 100.5 (17 Jan 2018 08:57), Max: 100.5 (17 Jan 2018 08:57)  HR: 116 (17 Jan 2018 08:57) (101 - 121)  BP: 144/73 (17 Jan 2018 08:57) (100/61 - 144/73)  BP(mean): --  RR: 20 (17 Jan 2018 08:57) (16 - 22)  SpO2: 94% (17 Jan 2018 08:57) (92% - 96%)    PHYSICAL EXAM:    General: lying in bed in NAD, warm to touch  HEENT: EOMI, anicteric  Neck: supple  Cardiovascular: S1, S2, RRR   Respiratory: diffuse rhonchi bilaterally  Gastrointestinal: soft NTND abdomen, +BS  Extremities: bilateral pitting edema to knees  Vascular: 2+ pulses radial; dp/pt  Neurological: alert awake oriented    MEDICATIONS:  MEDICATIONS  (STANDING):  ALBUTerol/ipratropium for Nebulization 3 milliLiter(s) Nebulizer every 6 hours  ALPRAZolam 0.25 milliGRAM(s) Oral at bedtime  amLODIPine   Tablet 10 milliGRAM(s) Oral daily  benzonatate 100 milliGRAM(s) Oral every 8 hours  dextrose 5%. 1000 milliLiter(s) (50 mL/Hr) IV Continuous <Continuous>  dextrose 50% Injectable 12.5 Gram(s) IV Push once  dextrose 50% Injectable 25 Gram(s) IV Push once  dextrose 50% Injectable 25 Gram(s) IV Push once  insulin lispro (HumaLOG) corrective regimen sliding scale   SubCutaneous Before meals and at bedtime  levoFLOXacin IVPB 750 milliGRAM(s) IV Intermittent every 24 hours  meropenem IVPB 1000 milliGRAM(s) IV Intermittent every 8 hours  OLANZapine Disintegrating Tablet 5 milliGRAM(s) Oral daily  oxyCODONE    IR 10 milliGRAM(s) Oral every 6 hours  psyllium Powder 1 Packet(s) Oral two times a day  vancomycin  IVPB 1500 milliGRAM(s) IV Intermittent every 12 hours    MEDICATIONS  (PRN):  acetaminophen   Tablet 650 milliGRAM(s) Oral every 6 hours PRN For Temp greater than 38 C (100.4 F)  dextrose Gel 1 Dose(s) Oral once PRN Blood Glucose LESS THAN 70 milliGRAM(s)/deciliter  glucagon  Injectable 1 milliGRAM(s) IntraMuscular once PRN Glucose LESS THAN 70 milligrams/deciliter  loperamide 2 milliGRAM(s) Oral every 2 hours PRN Diarrhea  oxyCODONE    IR 5 milliGRAM(s) Oral every 4 hours PRN Severe Pain (7 - 10), SOB      ALLERGIES:  Allergies    penicillins (Hives)    Intolerances        LABS:                        7.7    12.9  )-----------( 566      ( 17 Jan 2018 08:07 )             24.6     01-17    135  |  102  |  8   ----------------------------<  114<H>  4.3   |  22  |  0.86    Ca    8.1<L>      17 Jan 2018 08:07  Mg     1.9     01-17    TPro  6.2  /  Alb  2.6<L>  /  TBili  0.3  /  DBili  x   /  AST  20  /  ALT  26  /  AlkPhos  84  01-15        CAPILLARY BLOOD GLUCOSE      POCT Blood Glucose.: 155 mg/dL (17 Jan 2018 11:45)      RADIOLOGY & ADDITIONAL TESTS: Reviewed. Transfer Note from [facility] to [facility]:  62F with PMH DM, HTN, Stage 4 lung cancer with mets to brain and bone (s/p RT to R 8th rib last week and gamma knife to brain 1/9) presenting with nausea, vomiting and diarrhea for 4 days. Pt found to have a RLL pneumonia in area of RLL mass, concerning for postobstructive pneumonia, starting on doxycycline initially as pt allergic to PCN and later switched to vancomycin and meropenem (started 1/14- ). Pt spiked fevers despite antibiotic therapy and therefore started on IV Levaquin for double Pseudomonas coverage (1/16- ). RVP negative. On admission, EKG showed RBBB with no prior EKG to determine age, so CTA PE protocol performed to r/o PE and evaluate lung mass and pneumonia. CTA chest showing R hilar mass extending to pleural surface in RLL and "possible RLL infiltrate". Echocardiogram also performed 1/15 showing significant LVOT obstruction; EF>75%; moderate MR. Initially pt was placed on fluids to cover for insensible losses but pt started to become overloaded with lower extremity edema, so fluids were discontinued. Pt placed on oxycodone 10mg q6h IR around the clock with       OVERNIGHT EVENTS/SUBJECTIVE/ROS: Pt with fevers overnight, given Tylenol. Pt with no complaints this AM, no nausea/vomiting/diarrhea; +gas, fevers, chills    VITAL SIGNS:  Vital Signs Last 24 Hrs  T(C): 38.1 (17 Jan 2018 08:57), Max: 38.1 (17 Jan 2018 08:57)  T(F): 100.5 (17 Jan 2018 08:57), Max: 100.5 (17 Jan 2018 08:57)  HR: 116 (17 Jan 2018 08:57) (101 - 121)  BP: 144/73 (17 Jan 2018 08:57) (100/61 - 144/73)  BP(mean): --  RR: 20 (17 Jan 2018 08:57) (16 - 22)  SpO2: 94% (17 Jan 2018 08:57) (92% - 96%)    PHYSICAL EXAM:    General: lying in bed in NAD, warm to touch  HEENT: EOMI, anicteric  Neck: supple  Cardiovascular: S1, S2, RRR   Respiratory: diffuse rhonchi bilaterally  Gastrointestinal: soft NTND abdomen, +BS  Extremities: bilateral pitting edema to knees  Vascular: 2+ pulses radial; dp/pt  Neurological: alert awake oriented    MEDICATIONS:  MEDICATIONS  (STANDING):  ALBUTerol/ipratropium for Nebulization 3 milliLiter(s) Nebulizer every 6 hours  ALPRAZolam 0.25 milliGRAM(s) Oral at bedtime  amLODIPine   Tablet 10 milliGRAM(s) Oral daily  benzonatate 100 milliGRAM(s) Oral every 8 hours  dextrose 5%. 1000 milliLiter(s) (50 mL/Hr) IV Continuous <Continuous>  dextrose 50% Injectable 12.5 Gram(s) IV Push once  dextrose 50% Injectable 25 Gram(s) IV Push once  dextrose 50% Injectable 25 Gram(s) IV Push once  insulin lispro (HumaLOG) corrective regimen sliding scale   SubCutaneous Before meals and at bedtime  levoFLOXacin IVPB 750 milliGRAM(s) IV Intermittent every 24 hours  meropenem IVPB 1000 milliGRAM(s) IV Intermittent every 8 hours  OLANZapine Disintegrating Tablet 5 milliGRAM(s) Oral daily  oxyCODONE    IR 10 milliGRAM(s) Oral every 6 hours  psyllium Powder 1 Packet(s) Oral two times a day  vancomycin  IVPB 1500 milliGRAM(s) IV Intermittent every 12 hours    MEDICATIONS  (PRN):  acetaminophen   Tablet 650 milliGRAM(s) Oral every 6 hours PRN For Temp greater than 38 C (100.4 F)  dextrose Gel 1 Dose(s) Oral once PRN Blood Glucose LESS THAN 70 milliGRAM(s)/deciliter  glucagon  Injectable 1 milliGRAM(s) IntraMuscular once PRN Glucose LESS THAN 70 milligrams/deciliter  loperamide 2 milliGRAM(s) Oral every 2 hours PRN Diarrhea  oxyCODONE    IR 5 milliGRAM(s) Oral every 4 hours PRN Severe Pain (7 - 10), SOB      ALLERGIES:  Allergies    penicillins (Hives)    Intolerances        LABS:                        7.7    12.9  )-----------( 566      ( 17 Jan 2018 08:07 )             24.6     01-17    135  |  102  |  8   ----------------------------<  114<H>  4.3   |  22  |  0.86    Ca    8.1<L>      17 Jan 2018 08:07  Mg     1.9     01-17    TPro  6.2  /  Alb  2.6<L>  /  TBili  0.3  /  DBili  x   /  AST  20  /  ALT  26  /  AlkPhos  84  01-15        CAPILLARY BLOOD GLUCOSE      POCT Blood Glucose.: 155 mg/dL (17 Jan 2018 11:45)      RADIOLOGY & ADDITIONAL TESTS: Reviewed. Transfer Note from [facility] to [facility]:  62F with PMH DM, HTN, Stage 4 lung cancer with mets to brain and bone (s/p RT to R 8th rib last week and gamma knife to brain 1/9) presenting with nausea, vomiting and diarrhea for 4 days. Pt found to have a RLL pneumonia in area of RLL mass, concerning for postobstructive pneumonia, starting on doxycycline initially as pt allergic to PCN and later switched to vancomycin and meropenem (started 1/14- ). Pt spiked fevers despite antibiotic therapy and therefore started on IV Levaquin for double Pseudomonas coverage (1/16- ). RVP negative. On admission, EKG showed RBBB with no prior EKG to determine age, so CTA PE protocol performed to r/o PE and evaluate lung mass and pneumonia. CTA chest showing R hilar mass extending to pleural surface in RLL and "possible RLL infiltrate". Echocardiogram also performed 1/15 showing significant LVOT obstruction; EF>75%; moderate MR. Initially pt was placed on fluids to cover for insensible losses but pt started to become overloaded with lower extremity edema, so fluids were discontinued. Pt placed on oxycodone 10mg q6h IR around the clock with PRN oxycodone 5mg q4h IR PRN for SOB/pain. Xanax 0.25mg placed for insomnia. Patient was taken for bronch on 1/18 which revealed RLL occlusion with slight oozing and hemorrhage. MICU consulted for airway management post bronch and pt transferred to [facility] post-bronch.      OVERNIGHT EVENTS/SUBJECTIVE/ROS: Pt with fevers overnight, given Tylenol. Pt with no complaints this AM, no nausea/vomiting/diarrhea; +gas, fevers, chills    VITAL SIGNS:  Vital Signs Last 24 Hrs  T(C): 38.1 (17 Jan 2018 08:57), Max: 38.1 (17 Jan 2018 08:57)  T(F): 100.5 (17 Jan 2018 08:57), Max: 100.5 (17 Jan 2018 08:57)  HR: 116 (17 Jan 2018 08:57) (101 - 121)  BP: 144/73 (17 Jan 2018 08:57) (100/61 - 144/73)  BP(mean): --  RR: 20 (17 Jan 2018 08:57) (16 - 22)  SpO2: 94% (17 Jan 2018 08:57) (92% - 96%)    PHYSICAL EXAM:    General: lying in bed in NAD, warm to touch  HEENT: EOMI, anicteric  Neck: supple  Cardiovascular: S1, S2, RRR   Respiratory: diffuse rhonchi bilaterally  Gastrointestinal: soft NTND abdomen, +BS  Extremities: bilateral pitting edema to knees  Vascular: 2+ pulses radial; dp/pt  Neurological: alert awake oriented    MEDICATIONS:  MEDICATIONS  (STANDING):  ALBUTerol/ipratropium for Nebulization 3 milliLiter(s) Nebulizer every 6 hours  ALPRAZolam 0.25 milliGRAM(s) Oral at bedtime  amLODIPine   Tablet 10 milliGRAM(s) Oral daily  benzonatate 100 milliGRAM(s) Oral every 8 hours  dextrose 5%. 1000 milliLiter(s) (50 mL/Hr) IV Continuous <Continuous>  dextrose 50% Injectable 12.5 Gram(s) IV Push once  dextrose 50% Injectable 25 Gram(s) IV Push once  dextrose 50% Injectable 25 Gram(s) IV Push once  insulin lispro (HumaLOG) corrective regimen sliding scale   SubCutaneous Before meals and at bedtime  levoFLOXacin IVPB 750 milliGRAM(s) IV Intermittent every 24 hours  meropenem IVPB 1000 milliGRAM(s) IV Intermittent every 8 hours  OLANZapine Disintegrating Tablet 5 milliGRAM(s) Oral daily  oxyCODONE    IR 10 milliGRAM(s) Oral every 6 hours  psyllium Powder 1 Packet(s) Oral two times a day  vancomycin  IVPB 1500 milliGRAM(s) IV Intermittent every 12 hours    MEDICATIONS  (PRN):  acetaminophen   Tablet 650 milliGRAM(s) Oral every 6 hours PRN For Temp greater than 38 C (100.4 F)  dextrose Gel 1 Dose(s) Oral once PRN Blood Glucose LESS THAN 70 milliGRAM(s)/deciliter  glucagon  Injectable 1 milliGRAM(s) IntraMuscular once PRN Glucose LESS THAN 70 milligrams/deciliter  loperamide 2 milliGRAM(s) Oral every 2 hours PRN Diarrhea  oxyCODONE    IR 5 milliGRAM(s) Oral every 4 hours PRN Severe Pain (7 - 10), SOB      ALLERGIES:  Allergies    penicillins (Hives)    Intolerances        LABS:                        7.7    12.9  )-----------( 566      ( 17 Jan 2018 08:07 )             24.6     01-17    135  |  102  |  8   ----------------------------<  114<H>  4.3   |  22  |  0.86    Ca    8.1<L>      17 Jan 2018 08:07  Mg     1.9     01-17    TPro  6.2  /  Alb  2.6<L>  /  TBili  0.3  /  DBili  x   /  AST  20  /  ALT  26  /  AlkPhos  84  01-15        CAPILLARY BLOOD GLUCOSE      POCT Blood Glucose.: 155 mg/dL (17 Jan 2018 11:45)      RADIOLOGY & ADDITIONAL TESTS: Reviewed.

## 2018-01-17 NOTE — PROGRESS NOTE ADULT - ASSESSMENT
61 y/o F with recent diagnosis of stage IV lung adenocarcinoma admitted with fevers, which she is continuing to have. CT scan shows progression of lung mass from 12/3/17. Unable to r/o post-obstructive pneumonia based on CT scan. She has currently been on broad spectrum IV antibiotics for ~40 hours. 61 y/o F with recent diagnosis of stage IV lung adenocarcinoma admitted with fevers, which she is continuing to have. CT scan shows progression of lung mass from 12/3/17. Unable to r/o post-obstructive pneumonia based on CT scan.

## 2018-01-17 NOTE — PROGRESS NOTE ADULT - SUBJECTIVE AND OBJECTIVE BOX
Patient seen and examined today Plan discussed/Questions answered  (with patient/ancillary staff/colleagues) Chart notes reviewd More detailed Pulm/Critical Care notes, assessment and plan  will be added later today as needed after reviewing further labs as they become available      INTERVAL HISTORY  Shortness of breath and cough unchanged. Fever curve somewhat improved today, however continues to have low grade fever.     ALLERGIES  penicillins (Hives)    Home Medications:  Zofran ODT 4 mg oral tablet, disintegratin milligram(s) orally , As Needed (2018 21:32)      HOSPITAL MEDS   MEDICATIONS  (STANDING):  ALBUTerol/ipratropium for Nebulization 3 milliLiter(s) Nebulizer every 6 hours  ALPRAZolam 0.25 milliGRAM(s) Oral at bedtime  amLODIPine   Tablet 10 milliGRAM(s) Oral daily  benzonatate 100 milliGRAM(s) Oral every 8 hours  dextrose 5%. 1000 milliLiter(s) (50 mL/Hr) IV Continuous <Continuous>  dextrose 50% Injectable 12.5 Gram(s) IV Push once  dextrose 50% Injectable 25 Gram(s) IV Push once  dextrose 50% Injectable 25 Gram(s) IV Push once  enoxaparin Injectable 40 milliGRAM(s) SubCutaneous daily  insulin lispro (HumaLOG) corrective regimen sliding scale   SubCutaneous Before meals and at bedtime  levoFLOXacin IVPB 750 milliGRAM(s) IV Intermittent every 24 hours  meropenem IVPB 1000 milliGRAM(s) IV Intermittent every 8 hours  OLANZapine Disintegrating Tablet 5 milliGRAM(s) Oral daily  oxyCODONE    IR 10 milliGRAM(s) Oral every 6 hours  psyllium Powder 1 Packet(s) Oral two times a day  sodium chloride 0.9%. 1000 milliLiter(s) (100 mL/Hr) IV Continuous <Continuous>  vancomycin  IVPB 1500 milliGRAM(s) IV Intermittent every 12 hours      PAST MEDICAL & SURGICAL HISTORY:  Bone metastasis  Brain cancer  Lung cancer  Hypertension  Diabetes  History of radiation therapy  Status post gamma knife treatment      Vital Signs Last 24 Hrs  T(C): 38.1 (2018 08:57), Max: 38.1 (2018 14:12)  T(F): 100.5 (2018 08:57), Max: 100.5 (2018 14:12)  HR: 116 (2018 08:57) (101 - 121)  BP: 144/73 (2018 08:57) (100/61 - 144/73)  BP(mean): --  RR: 20 (2018 08:57) (16 - 22)  SpO2: 94% (2018 08:57) (92% - 96%)    PHYSICAL EXAMINATION:    Constitutional: WDWN  Head: NC/AT  EENT: PERRL, anicteric sclera; oropharynx clear, MMM  Neck: supple, no appreciable JVD  Respiratory: decreased breath sounds right base, rhonchi right middle lung field, inspiratory crackles left base. Unchanged from yesterday.  Cardiovascular: +S1/S2, tachycardia, regular rhythm  Gastrointestinal: soft, NT/ND; +BSx4  Extremities: WWP; no cyanosis, +clubbing, +2 pitting edema b/l LE  Vascular: 2+ radial, DP/PT pulses B/L  Neurological: AAOx3; no focal deficits but with intention tremor of upper extremities today    LABS:                        7.7    12.9  )-----------( 566      ( 2018 08:07 )             24.6     01-    135  |  102  |  8   ----------------------------<  114<H>  4.3   |  22  |  0.86    Ca    8.1<L>      2018 08:07  Mg     1.9     -17    TPro  6.2  /  Alb  2.6<L>  /  TBili  0.3  /  DBili  x   /  AST  20  /  ALT  26  /  AlkPhos  84  01-15              Culture - Blood (collected 2018 13:33)  Source: .Blood Blood  Preliminary Report (2018 02:01):    No growth at 12 hours

## 2018-01-17 NOTE — PROGRESS NOTE ADULT - PROBLEM SELECTOR PLAN 1
Fever curve somewhat improved, but still with low grade temp. Will continue to monitor her status today on IV antibiotics and unless fevers resolved, will stay with plan for bronchoscopy tomorrow AM. Hold today's Lovenox dose (scheduled for noon). NPO after midnight Fever curve somewhat improved and leukocytosis slightly down, but still with low grade temp. Will continue to monitor her status today on IV antibiotics and unless fevers resolved, will stay with plan for bronchoscopy tomorrow AM. Hold today's Lovenox dose (scheduled for noon). NPO after midnight

## 2018-01-17 NOTE — CONSULT NOTE ADULT - SUBJECTIVE AND OBJECTIVE BOX
Interventional Pulmonology Follow Up Note    Interval Events:  Patient seen and examined at bedside.  States that her breathing has improved since yesterday.  Fever curve is improving.  Tmax 100.5 F.  No hemoptysis overnight.  Is having cough with some productive sputum.    REVIEW OF SYSTEMS:  Constitutional: fever  Respiratory: As per subjective  Cardiovascular: No chest pain, palpitations, dizziness.  lower extremity edema  Gastrointestinal: No abdominal or epigastric pain. No nausea, vomiting or hematemesis; No diarrhea or constipation. No melena or hematochezia.  Neurological: has an intention tremor. No headaches, memory loss, loss of strength, numbness.  Skin: No itching, burning, rashes or lesions     MEDICATIONS:  Pulmonary:  ALBUTerol/ipratropium for Nebulization 3 milliLiter(s) Nebulizer every 6 hours  benzonatate 100 milliGRAM(s) Oral every 8 hours    Antimicrobials:  levoFLOXacin IVPB 750 milliGRAM(s) IV Intermittent every 24 hours  meropenem IVPB 1000 milliGRAM(s) IV Intermittent every 8 hours  vancomycin  IVPB 1500 milliGRAM(s) IV Intermittent every 12 hours    Anticoagulants:    Cardiac:  amLODIPine   Tablet 10 milliGRAM(s) Oral daily      Allergies    penicillins (Hives)    Intolerances    Vital Signs Last 24 Hrs  T(C): 36.8 (17 Jan 2018 15:22), Max: 38.1 (17 Jan 2018 08:57)  T(F): 98.3 (17 Jan 2018 15:22), Max: 100.5 (17 Jan 2018 08:57)  HR: 97 (17 Jan 2018 15:22) (97 - 121)  BP: 109/66 (17 Jan 2018 15:22) (100/61 - 144/73)  BP(mean): --  RR: 19 (17 Jan 2018 15:22) (16 - 22)  SpO2: 97% (17 Jan 2018 15:22) (92% - 97%)    01-16 @ 07:01  -  01-17 @ 07:00  --------------------------------------------------------  IN: 1450 mL / OUT: 1485 mL / NET: -35 mL          PHYSICAL EXAM:    General: NAD, speaking in full sentences  Eyes: moist mucus membranes  ENMT: No nasal discharge; airway clear  Neck: Supple; non tender; no masses  Respiratory: bronchial breath sounds in right middle lung zone, decreased breath sounds in right lower lung field.    Cardiovascular: Regular rate and rhythm. S1 and S2 Normal; No murmurs, gallops or rubs  Gastrointestinal: Soft non-tender non-distended; Normal bowel sounds  Extremities: +1 lower extremity edema  Neurological: Alert and oriented x3  Skin: Warm and dry. No obvious rash    LABS:      CBC Full  -  ( 17 Jan 2018 08:07 )  WBC Count : 12.9 K/uL  Hemoglobin : 7.7 g/dL  Hematocrit : 24.6 %  Platelet Count - Automated : 566 K/uL  Mean Cell Volume : 79.6 fL  Mean Cell Hemoglobin : 24.9 pg  Mean Cell Hemoglobin Concentration : 31.3 g/dL  Auto Neutrophil # : x  Auto Lymphocyte # : x  Auto Monocyte # : x  Auto Eosinophil # : x  Auto Basophil # : x  Auto Neutrophil % : x  Auto Lymphocyte % : x  Auto Monocyte % : x  Auto Eosinophil % : x  Auto Basophil % : x    01-17    135  |  102  |  8   ----------------------------<  114<H>  4.3   |  22  |  0.86    Ca    8.1<L>      17 Jan 2018 08:07  Mg     1.9     01-17    TPro  6.2  /  Alb  2.6<L>  /  TBili  0.3  /  DBili  x   /  AST  20  /  ALT  26  /  AlkPhos  84  01-15                      RADIOLOGY & ADDITIONAL STUDIES (The following images were personally reviewed):

## 2018-01-17 NOTE — CONSULT NOTE ADULT - PROBLEM SELECTOR PROBLEM 1
Pneumonia of right lung due to infectious organism, unspecified part of lung
Pneumonia of right lung due to infectious organism, unspecified part of lung
Pneumonia

## 2018-01-17 NOTE — CONSULT NOTE ADULT - PROBLEM SELECTOR RECOMMENDATION 9
Mrs. Bhatti's history of adenocarcinoma with CT evidence of enlargement of the tumor site and narrowing of the RLL airways make the diagnosis of post-obstructive pneumonia very likely.  Airway examination will likely show an endobronchial lesion.  If she does have an endobronchial lesion then we would recommend therapeutic intervention with tumor debulking with laser, cryo, and balloon dilation.  We will discuss with Dr. Madden how to proceed as a BAL for micro evaluation will also be helpful in guiding antibiotic management.

## 2018-01-17 NOTE — PROGRESS NOTE ADULT - PROBLEM SELECTOR PLAN 3
Likely cause of patient's cough. pt has pmhx of Stage IV lung CA w/ mets to brain and bone (Dec 2nd) who underwent RT to bony mets on R 8th rib last week, and had gamma knife tx to brain (Jan 9th)  -Heme Onc following, continue to treat infection at this time  -oxycodone

## 2018-01-17 NOTE — PROGRESS NOTE ADULT - ATTENDING COMMENTS
Patient seen and examined with house-staff during bedside rounds.  Resident note read, including vitals, physical findings, laboratory data, and radiological reports.   Revisions included below.  Direct personal management at bed side and extensive interpretation of the data.  Plan was outlined and discussed in details with the housestaff.  Decision making of high complexity  No hemoptysis.  I explaine dthe idication fro bronchoscopy and risk associated

## 2018-01-17 NOTE — PROGRESS NOTE ADULT - PROBLEM SELECTOR PLAN 2
symptoms may be 2/2 gamma knife tx since symptoms in close temporal proximity to treatment, now improved without diarrhea, vomiting or nausea. CTH (1/14) negative for acute changes.   -NS at 100 cc/hr.  -RVP neg  -BCx NGTD  -c/w metamucil and Imodium  - disintegrating Zyprexa for nausea

## 2018-01-17 NOTE — PROGRESS NOTE ADULT - PROBLEM SELECTOR PLAN 1
SIRS 2/2 postobstructive pneumonia  - dc'd fluids as pt with lower extremity edema  - CTA chest showed enlargement of lung mass and postobstructive pneumonia of RLL, no PE  - off doxycycline  - c/w Vancomycin (1/14- ), meropenem (1/14- ), and IV Levaquin (1/16- )  - as per pulm, bronch tomorrow (1/18), NPO aftermidnight  - RVP neg, BCx NGTD  - oxycodone 10mg q6h IR for SOB/pain w/ PRN IVP oxycodone 5mg q4h  - bowel regimen Miralax and senna in place SIRS 2/2 postobstructive pneumonia  - dc'd fluids as pt with lower extremity edema  - CTA chest showed enlargement of lung mass and postobstructive pneumonia of RLL, no PE  - off doxycycline  - c/w Vancomycin (1/14- ), meropenem (1/14- ), and IV Levaquin (1/16- )  - as per pulm, bronch tomorrow (1/18), NPO after midnight  - RVP neg, BCx NGTD  - Tylenol PRN for fever, add on ibuprofen 400mg for fever as well  - oxycodone 10mg q6h IR for SOB/pain w/ PRN IVP oxycodone 5mg q4h  - bowel regimen Miralax and senna in place

## 2018-01-17 NOTE — PROGRESS NOTE ADULT - ATTENDING COMMENTS
Pt. seen and examined by me at 9:15AM; I have read Dr. White's note, I agree w/ her findings and plan of care as documented; cont. abx, supportive care, f/u Pulm and Heme-Onc recs; plan for bronchoscopy tomorrow

## 2018-01-17 NOTE — CONSULT NOTE ADULT - ASSESSMENT
Mrs. 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  Presents with nausea and vomiting and blood streaked hemoptysis.  Found to have enlarging RLL mass with consolidation.

## 2018-01-18 LAB
ANION GAP SERPL CALC-SCNC: 12 MMOL/L — SIGNIFICANT CHANGE UP (ref 5–17)
APTT BLD: 29.5 SEC — SIGNIFICANT CHANGE UP (ref 27.5–37.4)
BASOPHILS NFR BLD AUTO: 0.1 % — SIGNIFICANT CHANGE UP (ref 0–2)
BLD GP AB SCN SERPL QL: POSITIVE — SIGNIFICANT CHANGE UP
BUN SERPL-MCNC: 7 MG/DL — SIGNIFICANT CHANGE UP (ref 7–23)
CALCIUM SERPL-MCNC: 8.4 MG/DL — SIGNIFICANT CHANGE UP (ref 8.4–10.5)
CHLORIDE SERPL-SCNC: 95 MMOL/L — LOW (ref 96–108)
CO2 SERPL-SCNC: 23 MMOL/L — SIGNIFICANT CHANGE UP (ref 22–31)
CREAT SERPL-MCNC: 0.79 MG/DL — SIGNIFICANT CHANGE UP (ref 0.5–1.3)
CULTURE RESULTS: SIGNIFICANT CHANGE UP
EOSINOPHIL NFR BLD AUTO: 0.8 % — SIGNIFICANT CHANGE UP (ref 0–6)
GLUCOSE BLDC GLUCOMTR-MCNC: 119 MG/DL — HIGH (ref 70–99)
GLUCOSE BLDC GLUCOMTR-MCNC: 127 MG/DL — HIGH (ref 70–99)
GLUCOSE SERPL-MCNC: 124 MG/DL — HIGH (ref 70–99)
GRAM STN FLD: SIGNIFICANT CHANGE UP
HCT VFR BLD CALC: 27.2 % — LOW (ref 34.5–45)
HGB BLD-MCNC: 8.2 G/DL — LOW (ref 11.5–15.5)
INR BLD: 1.38 — HIGH (ref 0.88–1.16)
LYMPHOCYTES # BLD AUTO: 7 % — LOW (ref 13–44)
MAGNESIUM SERPL-MCNC: 1.8 MG/DL — SIGNIFICANT CHANGE UP (ref 1.6–2.6)
MCHC RBC-ENTMCNC: 25.5 PG — LOW (ref 27–34)
MCHC RBC-ENTMCNC: 30.1 G/DL — LOW (ref 32–36)
MCV RBC AUTO: 84.7 FL — SIGNIFICANT CHANGE UP (ref 80–100)
MONOCYTES NFR BLD AUTO: 7.3 % — SIGNIFICANT CHANGE UP (ref 2–14)
NEUTROPHILS NFR BLD AUTO: 84.8 % — HIGH (ref 43–77)
NT-PROBNP SERPL-SCNC: 380 PG/ML — HIGH (ref 0–300)
PHOSPHATE SERPL-MCNC: 3 MG/DL — SIGNIFICANT CHANGE UP (ref 2.5–4.5)
PLATELET # BLD AUTO: 600 K/UL — HIGH (ref 150–400)
POTASSIUM SERPL-MCNC: 3.9 MMOL/L — SIGNIFICANT CHANGE UP (ref 3.5–5.3)
POTASSIUM SERPL-SCNC: 3.9 MMOL/L — SIGNIFICANT CHANGE UP (ref 3.5–5.3)
PROTHROM AB SERPL-ACNC: 15.4 SEC — HIGH (ref 9.8–12.7)
RBC # BLD: 3.21 M/UL — LOW (ref 3.8–5.2)
RBC # FLD: 15.5 % — SIGNIFICANT CHANGE UP (ref 10.3–16.9)
RH IG SCN BLD-IMP: NEGATIVE — SIGNIFICANT CHANGE UP
SODIUM SERPL-SCNC: 130 MMOL/L — LOW (ref 135–145)
SPECIMEN SOURCE: SIGNIFICANT CHANGE UP
VANCOMYCIN TROUGH SERPL-MCNC: 14 UG/ML — SIGNIFICANT CHANGE UP (ref 10–20)
WBC # BLD: 11 K/UL — HIGH (ref 3.8–10.5)
WBC # FLD AUTO: 11 K/UL — HIGH (ref 3.8–10.5)

## 2018-01-18 PROCEDURE — 99222 1ST HOSP IP/OBS MODERATE 55: CPT

## 2018-01-18 PROCEDURE — 99233 SBSQ HOSP IP/OBS HIGH 50: CPT | Mod: 25,GC

## 2018-01-18 PROCEDURE — 31622 DX BRONCHOSCOPE/WASH: CPT | Mod: GC

## 2018-01-18 RX ORDER — ONDANSETRON 8 MG/1
4 TABLET, FILM COATED ORAL ONCE
Qty: 0 | Refills: 0 | Status: COMPLETED | OUTPATIENT
Start: 2018-01-18 | End: 2018-01-18

## 2018-01-18 RX ORDER — MAGNESIUM SULFATE 500 MG/ML
2 VIAL (ML) INJECTION ONCE
Qty: 0 | Refills: 0 | Status: DISCONTINUED | OUTPATIENT
Start: 2018-01-18 | End: 2018-01-23

## 2018-01-18 RX ADMIN — Medication 3 MILLILITER(S): at 10:01

## 2018-01-18 RX ADMIN — Medication 3 MILLILITER(S): at 21:41

## 2018-01-18 RX ADMIN — Medication 3 MILLILITER(S): at 16:31

## 2018-01-18 RX ADMIN — Medication 650 MILLIGRAM(S): at 15:25

## 2018-01-18 RX ADMIN — MEROPENEM 100 MILLIGRAM(S): 1 INJECTION INTRAVENOUS at 01:18

## 2018-01-18 RX ADMIN — MEROPENEM 100 MILLIGRAM(S): 1 INJECTION INTRAVENOUS at 19:28

## 2018-01-18 RX ADMIN — AMLODIPINE BESYLATE 10 MILLIGRAM(S): 2.5 TABLET ORAL at 05:42

## 2018-01-18 RX ADMIN — ONDANSETRON 4 MILLIGRAM(S): 8 TABLET, FILM COATED ORAL at 17:08

## 2018-01-18 RX ADMIN — Medication 300 MILLIGRAM(S): at 17:40

## 2018-01-18 RX ADMIN — Medication 100 MILLIGRAM(S): at 05:42

## 2018-01-18 RX ADMIN — OXYCODONE HYDROCHLORIDE 10 MILLIGRAM(S): 5 TABLET ORAL at 15:35

## 2018-01-18 RX ADMIN — Medication 1 PACKET(S): at 05:42

## 2018-01-18 RX ADMIN — Medication 3 MILLILITER(S): at 04:03

## 2018-01-18 RX ADMIN — OXYCODONE HYDROCHLORIDE 10 MILLIGRAM(S): 5 TABLET ORAL at 03:58

## 2018-01-18 NOTE — CONSULT NOTE ADULT - SUBJECTIVE AND OBJECTIVE BOX
Patient is a 62y old  Female who presents with a chief complaint of n/v/diarrhea, worsening cough (14 Jan 2018 00:25)      HPI:  61 y/o F with a PMHX of HTN, DM, and Stage IV lung CA w/ mets to brain and bone (Dec 2nd) who underwent RT to bony mets on R 8th rib last week, and had gamma knife tx to brain (Jan 9th) who presents with 4 days of n/v. Her vomitus is NBNB. She states that her stool has been lose but she has not been eating much. Some days she has 3 loose BMs, but today she only had 1 loose BM. Her stool is without blood. Denies hematemesis. Patient endorses cough as well sometimes w/ blood-tinged sputum, but no naresh hemoptysis, for several weeks since her diagnosis of lung cancer. She also endorses shortness of breath with exertion since a month prior to her diagnosis of lung cancer which is stable in severity. She does endorse chest pain associated with cough. Denies chest pain with exertion or at rest. Denies orthopnea. Denies heart attack or cardiac stress test in the past.  Denies abd pain, dysuria or frequency. Denies rash. Pt had low grade fever today of 101, at 5 pm, took Tylenol at that time. The patient reports never having any cardiac event. Denies sick contacts or recent travel. Last c -scope a couple of yrs ago was normal, pap smear and mammogram earlier last yr were normal.    ED vitals notable for rectal temp of 100. . BP stable. RR and O2 stable.  ED gave:  Famotidine 20 mg iv, zofran 4 mg iv and 1L NS bolus.   Repeat HR improved to 101.   CT head noncon done en route to floor -- prelim read from resident --> no acute process.     On the floors, the patient had a oral temp of 100.5    cc bolus given and NS started at 100 cc/hr. (14 Jan 2018 00:25)      Allergies    penicillins (Hives)    Intolerances        MEDICATIONS  (STANDING):  ALBUTerol/ipratropium for Nebulization 3 milliLiter(s) Nebulizer every 6 hours  ALPRAZolam 0.25 milliGRAM(s) Oral at bedtime  amLODIPine   Tablet 10 milliGRAM(s) Oral daily  benzonatate 100 milliGRAM(s) Oral every 8 hours  dextrose 5%. 1000 milliLiter(s) (50 mL/Hr) IV Continuous <Continuous>  dextrose 50% Injectable 12.5 Gram(s) IV Push once  dextrose 50% Injectable 25 Gram(s) IV Push once  dextrose 50% Injectable 25 Gram(s) IV Push once  insulin lispro (HumaLOG) corrective regimen sliding scale   SubCutaneous Before meals and at bedtime  levoFLOXacin IVPB 750 milliGRAM(s) IV Intermittent every 24 hours  magnesium sulfate  IVPB 2 Gram(s) IV Intermittent once  meropenem IVPB 1000 milliGRAM(s) IV Intermittent every 8 hours  OLANZapine Disintegrating Tablet 5 milliGRAM(s) Oral daily  oxyCODONE    IR 10 milliGRAM(s) Oral every 6 hours  psyllium Powder 1 Packet(s) Oral two times a day  senna 2 Tablet(s) Oral at bedtime  vancomycin  IVPB 1500 milliGRAM(s) IV Intermittent every 12 hours    MEDICATIONS  (PRN):  acetaminophen   Tablet 650 milliGRAM(s) Oral every 6 hours PRN For Temp greater than 38 C (100.4 F)  dextrose Gel 1 Dose(s) Oral once PRN Blood Glucose LESS THAN 70 milliGRAM(s)/deciliter  glucagon  Injectable 1 milliGRAM(s) IntraMuscular once PRN Glucose LESS THAN 70 milligrams/deciliter  loperamide 2 milliGRAM(s) Oral every 2 hours PRN Diarrhea  oxyCODONE    IR 5 milliGRAM(s) Oral every 4 hours PRN Severe Pain (7 - 10), SOB  polyethylene glycol 3350 17 Gram(s) Oral daily PRN Constipation      Drug Dosing Weight  Height (cm): 162.56 (14 Jan 2018 01:33)  Weight (kg): 89.2 (14 Jan 2018 01:33)  BMI (kg/m2): 33.8 (14 Jan 2018 01:33)  BSA (m2): 1.94 (14 Jan 2018 01:33)    PAST MEDICAL & SURGICAL HISTORY:  Bone metastasis  Brain cancer  Lung cancer  Hypertension  Diabetes  History of radiation therapy  Status post gamma knife treatment      FAMILY HISTORY:  Family history of transitional cell carcinoma of bladder (Sibling)  Family history of renal cell carcinoma (Sibling)      SOCIAL HISTORY:    ADVANCE DIRECTIVES:    REVIEW OF SYSTEMS:    CONSTITUTIONAL: No weakness, fevers or chills  EYES/ENT: No visual changes;  No vertigo or throat pain   NECK: No pain or stiffness  RESPIRATORY: No cough, wheezing, hemoptysis; No shortness of breath  CARDIOVASCULAR: No chest pain or palpitations  GASTROINTESTINAL: No abdominal or epigastric pain. No nausea, vomiting, or hematemesis; No diarrhea or constipation. No melena or hematochezia.  GENITOURINARY: No dysuria, frequency or hematuria  NEUROLOGICAL: No numbness or weakness  SKIN: No itching, burning, rashes, or lesions   All other review of systems is negative unless indicated above.        ICU Vital Signs Last 24 Hrs  T(C): 37.6 (18 Jan 2018 05:10), Max: 38.3 (17 Jan 2018 21:18)  T(F): 99.6 (18 Jan 2018 05:10), Max: 100.9 (17 Jan 2018 21:18)  HR: 118 (18 Jan 2018 05:10) (97 - 118)  BP: 125/68 (18 Jan 2018 05:10) (109/66 - 132/65)  BP(mean): --  ABP: --  ABP(mean): --  RR: 19 (18 Jan 2018 05:10) (19 - 19)  SpO2: 93% (18 Jan 2018 05:10) (93% - 97%)          I&O's Detail      PHYSICAL EXAM:    Constitutional: WDWN resting comfortably in bed; NAD  Head: NC/AT  Eyes: PERRL, EOMI, clear conjunctiva  ENT: no nasal discharge; uvula midline, no oropharyngeal erythema or exudates; MMM  Neck: supple; no JVD or thyromegaly  Respiratory: CTA B/L; no W/R/R, no retractions  Cardiac: +S1/S2; RRR; no M/R/G; PMI non-displaced  Gastrointestinal: soft, NT/ND; no rebound or guarding; +BSx4  Genitourinary: normal external genitalia  Back: spine midline, no bony tenderness or step-offs; no CVAT B/L  Extremities: WWP, no clubbing or cyanosis; no peripheral edema  Musculoskeletal: NROM x4; no joint swelling, tenderness or erythema  Vascular: 2+ radial, femoral, DP/PT pulses B/L  Dermatologic: skin warm, dry and intact; no rashes, wounds, or scars  Lymphatic: no submandibular or cervical LAD  Neurologic: AAOx3; CNII-XII grossly intact; no focal deficits  Psychiatric: affect and characteristics of appearance, verbalizations, behaviors are appropriate    LABS:  CBC Full  -  ( 18 Jan 2018 05:41 )  WBC Count : 11.0 K/uL  Hemoglobin : 8.2 g/dL  Hematocrit : 27.2 %  Platelet Count - Automated : 600 K/uL  Mean Cell Volume : 84.7 fL  Mean Cell Hemoglobin : 25.5 pg  Mean Cell Hemoglobin Concentration : 30.1 g/dL  Auto Neutrophil # : x  Auto Lymphocyte # : x  Auto Monocyte # : x  Auto Eosinophil # : x  Auto Basophil # : x  Auto Neutrophil % : 84.8 %  Auto Lymphocyte % : 7.0 %  Auto Monocyte % : 7.3 %  Auto Eosinophil % : 0.8 %  Auto Basophil % : 0.1 %    01-18    130<L>  |  95<L>  |  7   ----------------------------<  124<H>  3.9   |  23  |  0.79    Ca    8.4      18 Jan 2018 05:41  Phos  3.0     01-18  Mg     1.8     01-18      CAPILLARY BLOOD GLUCOSE      POCT Blood Glucose.: 127 mg/dL (18 Jan 2018 06:52)    PT/INR - ( 18 Jan 2018 05:41 )   PT: 15.4 sec;   INR: 1.38          PTT - ( 18 Jan 2018 05:41 )  PTT:29.5 sec      EKG:    ECHO, US:    RADIOLOGY:    CRITICAL CARE TIME SPENT: Patient is a 62y old  Female who presents with a chief complaint of n/v/diarrhea, worsening cough (14 Jan 2018 00:25)      HPI:  62F with PMH DM, HTN, Stage 4 lung cancer with mets to brain and bone (s/p RT to R 8th rib last week and gamma knife to brain 1/9) presenting with nausea, vomiting and diarrhea for 4 days. Pt found to have a RLL pneumonia in area of RLL mass. Admitted for sepsis 2/2 postobstructive pneumonia, starting on doxycycline initially as pt allergic to PCN and later switched to vancomycin and meropenem (started 1/14- ). Pt spiked fevers despite antibiotic therapy and therefore started on IV Levaquin for double Pseudomonas coverage (1/16- ). RVP negative. On admission, EKG showed RBBB with no prior EKG to determine age, so CTA PE protocol performed to r/o PE and evaluate lung mass and pneumonia. CTA chest showing R hilar mass extending to pleural surface in RLL and "possible RLL infiltrate". Echocardiogram also performed 1/15 showing significant LVOT obstruction; EF>75%; moderate MR. Initially pt was placed on fluids to cover for insensible losses but pt started to become overloaded with lower extremity edema, so fluids were discontinued. Patient taken for bronch on 1/18 which revealed RLL occlusion with slight oozing and hmorrhage. MICU consulted for airway management post bronch.      Allergies    penicillins (Hives)    Intolerances        MEDICATIONS  (STANDING):  ALBUTerol/ipratropium for Nebulization 3 milliLiter(s) Nebulizer every 6 hours  ALPRAZolam 0.25 milliGRAM(s) Oral at bedtime  amLODIPine   Tablet 10 milliGRAM(s) Oral daily  benzonatate 100 milliGRAM(s) Oral every 8 hours  dextrose 5%. 1000 milliLiter(s) (50 mL/Hr) IV Continuous <Continuous>  dextrose 50% Injectable 12.5 Gram(s) IV Push once  dextrose 50% Injectable 25 Gram(s) IV Push once  dextrose 50% Injectable 25 Gram(s) IV Push once  insulin lispro (HumaLOG) corrective regimen sliding scale   SubCutaneous Before meals and at bedtime  levoFLOXacin IVPB 750 milliGRAM(s) IV Intermittent every 24 hours  magnesium sulfate  IVPB 2 Gram(s) IV Intermittent once  meropenem IVPB 1000 milliGRAM(s) IV Intermittent every 8 hours  OLANZapine Disintegrating Tablet 5 milliGRAM(s) Oral daily  oxyCODONE    IR 10 milliGRAM(s) Oral every 6 hours  psyllium Powder 1 Packet(s) Oral two times a day  senna 2 Tablet(s) Oral at bedtime  vancomycin  IVPB 1500 milliGRAM(s) IV Intermittent every 12 hours    MEDICATIONS  (PRN):  acetaminophen   Tablet 650 milliGRAM(s) Oral every 6 hours PRN For Temp greater than 38 C (100.4 F)  dextrose Gel 1 Dose(s) Oral once PRN Blood Glucose LESS THAN 70 milliGRAM(s)/deciliter  glucagon  Injectable 1 milliGRAM(s) IntraMuscular once PRN Glucose LESS THAN 70 milligrams/deciliter  loperamide 2 milliGRAM(s) Oral every 2 hours PRN Diarrhea  oxyCODONE    IR 5 milliGRAM(s) Oral every 4 hours PRN Severe Pain (7 - 10), SOB  polyethylene glycol 3350 17 Gram(s) Oral daily PRN Constipation      Drug Dosing Weight  Height (cm): 162.56 (14 Jan 2018 01:33)  Weight (kg): 89.2 (14 Jan 2018 01:33)  BMI (kg/m2): 33.8 (14 Jan 2018 01:33)  BSA (m2): 1.94 (14 Jan 2018 01:33)    PAST MEDICAL & SURGICAL HISTORY:  Bone metastasis  Brain cancer  Lung cancer  Hypertension  Diabetes  History of radiation therapy  Status post gamma knife treatment      FAMILY HISTORY:  Family history of transitional cell carcinoma of bladder (Sibling)  Family history of renal cell carcinoma (Sibling)      SOCIAL HISTORY:    ADVANCE DIRECTIVES:    REVIEW OF SYSTEMS:    CONSTITUTIONAL: No weakness, fevers or chills  EYES/ENT: No visual changes;  No vertigo or throat pain   NECK: No pain or stiffness  RESPIRATORY: No cough, wheezing, hemoptysis; No shortness of breath  CARDIOVASCULAR: No chest pain or palpitations  GASTROINTESTINAL: No abdominal or epigastric pain. No nausea, vomiting, or hematemesis; No diarrhea or constipation. No melena or hematochezia.  GENITOURINARY: No dysuria, frequency or hematuria  NEUROLOGICAL: No numbness or weakness  SKIN: No itching, burning, rashes, or lesions   All other review of systems is negative unless indicated above.        ICU Vital Signs Last 24 Hrs  T(C): 37.6 (18 Jan 2018 05:10), Max: 38.3 (17 Jan 2018 21:18)  T(F): 99.6 (18 Jan 2018 05:10), Max: 100.9 (17 Jan 2018 21:18)  HR: 118 (18 Jan 2018 05:10) (97 - 118)  BP: 125/68 (18 Jan 2018 05:10) (109/66 - 132/65)  BP(mean): --  ABP: --  ABP(mean): --  RR: 19 (18 Jan 2018 05:10) (19 - 19)  SpO2: 93% (18 Jan 2018 05:10) (93% - 97%)          I&O's Detail      PHYSICAL EXAM: Pt seen in holding post bronchoscopy.    Constitutional: drowsy, mild respiratory distress  Head: NC/AT  Eyes: PERRL, EOMI, clear conjunctiva  ENT: no nasal discharge; uvula midline, no oropharyngeal erythema or exudates; MMM  Neck: supple; no JVD or thyromegaly  Respiratory: Rhonchi b/l across multiple posterior lung fields, decreased breath sounds at R lung base. No retractions  Cardiac: +S1/S2; tachy, RR; no M/R/G; PMI non-displaced  Gastrointestinal: soft, NT/ND; no rebound or guarding; +BSx4  Genitourinary: normal external genitalia  Extremities: WWP, no clubbing or cyanosis; no peripheral edema  Vascular: 2+ radial, femoral, DP/PT pulses B/L  Lymphatic: no submandibular or cervical LAD  Neurologic: AAOx3; CNII-XII grossly intact; no focal deficits  Psychiatric: affect and characteristics of appearance, verbalizations, behaviors are appropriate    LABS:  CBC Full  -  ( 18 Jan 2018 05:41 )  WBC Count : 11.0 K/uL  Hemoglobin : 8.2 g/dL  Hematocrit : 27.2 %  Platelet Count - Automated : 600 K/uL  Mean Cell Volume : 84.7 fL  Mean Cell Hemoglobin : 25.5 pg  Mean Cell Hemoglobin Concentration : 30.1 g/dL  Auto Neutrophil # : x  Auto Lymphocyte # : x  Auto Monocyte # : x  Auto Eosinophil # : x  Auto Basophil # : x  Auto Neutrophil % : 84.8 %  Auto Lymphocyte % : 7.0 %  Auto Monocyte % : 7.3 %  Auto Eosinophil % : 0.8 %  Auto Basophil % : 0.1 %    01-18    130<L>  |  95<L>  |  7   ----------------------------<  124<H>  3.9   |  23  |  0.79    Ca    8.4      18 Jan 2018 05:41  Phos  3.0     01-18  Mg     1.8     01-18      CAPILLARY BLOOD GLUCOSE      POCT Blood Glucose.: 127 mg/dL (18 Jan 2018 06:52)    PT/INR - ( 18 Jan 2018 05:41 )   PT: 15.4 sec;   INR: 1.38          PTT - ( 18 Jan 2018 05:41 )  PTT:29.5 sec      EKG:    ECHO, US:    RADIOLOGY:    CRITICAL CARE TIME SPENT:

## 2018-01-18 NOTE — CONSULT NOTE ADULT - ASSESSMENT
62/female with stage 4 lung cancer, s/p recent radiation to right rib lesion and gamma knife to brain lesion, presenting with nausea, vomiting, SOB, productive cough. Found to have postobstructive pneumonia.   -Radiation team will follow; can offer radiation if bronchoscopy unsuccessful in opening airway 62/female with stage 4 lung cancer, s/p recent radiation to right rib lesion and gamma knife to brain lesion, presenting with nausea, vomiting, SOB, productive cough. Found to have postobstructive pneumonia.   -Radiation team will follow; can offer radiation if bronchoscopy unsuccessful in opening airway  -The staging, natural history, and management options of metastatic cancer to the mediastinal and hilar lymph nodes causing compression of the large airways leading to respiratory compromise and postobstructive pneumonia was reviewed today with the patient. Management options include bronchoscopy with debulking and cautery, observation, systemic therapy, or consideration of a course of palliative radiotherapy. These can also be performed in combination. The rationale, risks, benefits, alternatives treatments, as was expected short long-term outcome side effects of each of these management approaches with emphasis on radiotherapy was reviewed today with the patient and her family members at the bedside. She showed good apparent understanding of this conversation and tentatively offered witnessed informed consent to proceed with treatment. She did decline to sign the informed consent form but will do so prior to any treatment. We will continue to coordinate with her team and will followup with the results of the bronchoscopy. I will also coordinate her care with Dr. Noonan. Contact information was provided and confirmed.

## 2018-01-18 NOTE — PROGRESS NOTE ADULT - PROBLEM SELECTOR PLAN 2
Patient has Stage IV lung CA w/ mets to brain and bone s/p RT to bony mets on R 8th rib last week, and had gamma knife tx to brain (Jan 9th). Likely etiology of Post obstructive PNA.  -Heme Onc following, continue to treat infection at this time  - Rad Onc consulted today. F/u recs.

## 2018-01-18 NOTE — CONSULT NOTE ADULT - SUBJECTIVE AND OBJECTIVE BOX
Hematology/Oncology Consult Note     Patient is a 62year old female with stage IV lung cancer, mets to brain, recently diagnosed in December 2017 after initial presentation with Right lower wall chest pain.   Notably, she recently underwent radiation treatments to a metastasis to the right 8th rib on 1/2/18, as well as gamma knife to a brain lesion on 1/9/18.  She has not yet received chemotherapy.     She presented to [facility] on 1/14/18 with nausea, vomiting, diarrhea, shortness of breath, and worsening productive cough, clear and blood tinged. She additionally with with low grade fevers. These symptoms started worsening on 1/10/18. She underwent a CXR on admission which revealed opacities in her right lung. CT angio revealed a 6X3 cm mass in the right chest wall, a mass in the right hilum extending to the pleural surface int he right lower lobe, increased in size. There was additionally a 2.5 X2 cm paratracheal mass, adenopathy vs. metastatic diseaseThere was pathologic post caval/precarinal node 3.3 X 2.4 cm, adenopathy or metastatic. Consolidation in the RLL may represent PNA.     She was started on vancomycin, merem and levaquin.     A bronchoscopy is planned to attempt to open up her airway, through possible intervention.    Overall, she is feeling better than when she arrived in the hospital. Nausea and vomiting are improving. She continues to have fevers.       PAST MEDICAL & SURGICAL HISTORY:  Bone metastasis  Brain cancer  Lung cancer  Hypertension  Diabetes  History of radiation therapy  Status post gamma knife treatment      Allergies:      Social History:    FAMILY HISTORY:  Family history of transitional cell carcinoma of bladder (Sibling)  Family history of renal cell carcinoma (Sibling)      MEDICATIONS  (STANDING):  ALBUTerol/ipratropium for Nebulization 3 milliLiter(s) Nebulizer every 6 hours  ALPRAZolam 0.25 milliGRAM(s) Oral at bedtime  amLODIPine   Tablet 10 milliGRAM(s) Oral daily  benzonatate 100 milliGRAM(s) Oral every 8 hours  dextrose 5%. 1000 milliLiter(s) (50 mL/Hr) IV Continuous <Continuous>  dextrose 50% Injectable 12.5 Gram(s) IV Push once  dextrose 50% Injectable 25 Gram(s) IV Push once  dextrose 50% Injectable 25 Gram(s) IV Push once  insulin lispro (HumaLOG) corrective regimen sliding scale   SubCutaneous Before meals and at bedtime  levoFLOXacin IVPB 750 milliGRAM(s) IV Intermittent every 24 hours  magnesium sulfate  IVPB 2 Gram(s) IV Intermittent once  meropenem IVPB 1000 milliGRAM(s) IV Intermittent every 8 hours  OLANZapine Disintegrating Tablet 5 milliGRAM(s) Oral daily  oxyCODONE    IR 10 milliGRAM(s) Oral every 6 hours  psyllium Powder 1 Packet(s) Oral two times a day  senna 2 Tablet(s) Oral at bedtime  vancomycin  IVPB 1500 milliGRAM(s) IV Intermittent every 12 hours    MEDICATIONS  (PRN):  acetaminophen   Tablet 650 milliGRAM(s) Oral every 6 hours PRN For Temp greater than 38 C (100.4 F)  dextrose Gel 1 Dose(s) Oral once PRN Blood Glucose LESS THAN 70 milliGRAM(s)/deciliter  glucagon  Injectable 1 milliGRAM(s) IntraMuscular once PRN Glucose LESS THAN 70 milligrams/deciliter  loperamide 2 milliGRAM(s) Oral every 2 hours PRN Diarrhea  oxyCODONE    IR 5 milliGRAM(s) Oral every 4 hours PRN Severe Pain (7 - 10), SOB  polyethylene glycol 3350 17 Gram(s) Oral daily PRN Constipation      PHYSICAL EXAM:    T(F): 101.2 (01-18-18 @ 14:00), Max: 101.2 (01-18-18 @ 14:00)  HR: 120 (01-18-18 @ 16:00) (112 - 122)  BP: 140/53 (01-18-18 @ 16:00) (122/60 - 151/61)  RR: 28 (01-18-18 @ 16:00) (19 - 28)  SpO2: 94% (01-18-18 @ 16:00) (93% - 97%)  Wt(kg): --    Daily     Daily     Gen: well developed, well nourished, comfortable  Neck: supple, no masses, no JVD  Cardiovascular: RR, nl S1S2, no murmurs/rubs/gallops  Respiratory: expiratory wheezing noted to entire left side  Gastrointestinal: BS+, soft, NT/ND, no masses, no splenomegaly, no hepatomegaly, no evidence for ascites  Extremities: +2 edemal bilateral LE  Vascular:  DP/PT 2+ b/l  Neurological: CN 2-12 grossly intact, no focal deficits  Skin: no rash on visible skin  Musculoskeletal:  full ROM  Psychiatric:  mood stable        CBC Full  -  ( 18 Jan 2018 05:41 )  WBC Count : 11.0 K/uL  Hemoglobin : 8.2 g/dL  Hematocrit : 27.2 %  Platelet Count - Automated : 600 K/uL  Mean Cell Volume : 84.7 fL  Mean Cell Hemoglobin : 25.5 pg  Mean Cell Hemoglobin Concentration : 30.1 g/dL  Auto Neutrophil # : x  Auto Lymphocyte # : x  Auto Monocyte # : x  Auto Eosinophil # : x  Auto Basophil # : x  Auto Neutrophil % : 84.8 %  Auto Lymphocyte % : 7.0 %  Auto Monocyte % : 7.3 %  Auto Eosinophil % : 0.8 %  Auto Basophil % : 0.1 %      01-18    130<L>  |  95<L>  |  7   ----------------------------<  124<H>  3.9   |  23  |  0.79    Ca    8.4      18 Jan 2018 05:41  Phos  3.0     01-18  Mg     1.8     01-18        PT/INR - ( 18 Jan 2018 05:41 )   PT: 15.4 sec;   INR: 1.38          PTT - ( 18 Jan 2018 05:41 )  PTT:29.5 sec

## 2018-01-18 NOTE — PROGRESS NOTE ADULT - SUBJECTIVE AND OBJECTIVE BOX
TRANSFER Acceptance [facility] to [facility]    Hospital Course        Interval History:   ROS:    OBJECTIVE  Vitals:  T(C): 37.6 (01-18-18 @ 05:10), Max: 38.3 (01-17-18 @ 21:18)  HR: 118 (01-18-18 @ 05:10) (97 - 118)  BP: 125/68 (01-18-18 @ 05:10) (109/66 - 132/65)  RR: 19 (01-18-18 @ 05:10) (19 - 19)  SpO2: 93% (01-18-18 @ 05:10) (93% - 97%)  Wt(kg): --    I/O:  I&O's Summary      PHYSICAL EXAM:  Appearance: NAD. Speaking in full sentences. No respiratory  HEENT:   Conjunctiva clear b/l. Moist oral mucosa.  Cardiovascular: RRR with no murmurs.  Respiratory: Lungs CTAB.   Gastrointestinal:  Soft, nontender. Non-distended. Non-rigid.	  Extremities: No edema b/l. No erythema b/l. LE WWP b/l.  Vascular: DP 2+ b/l.  Neurologic:  Alert and awake. Moving all extremities. Following commands. Making eye contact.  	  LABS:                        8.2    11.0  )-----------( 600      ( 18 Jan 2018 05:41 )             27.2     01-18    130<L>  |  95<L>  |  7   ----------------------------<  124<H>  3.9   |  23  |  0.79    Ca    8.4      18 Jan 2018 05:41  Phos  3.0     01-18  Mg     1.8     01-18      PT/INR - ( 18 Jan 2018 05:41 )   PT: 15.4 sec;   INR: 1.38          PTT - ( 18 Jan 2018 05:41 )  PTT:29.5 sec      RADIOLOGY & ADDITIONAL TESTS:  Reviewed .    MEDICATIONS  (STANDING):  ALBUTerol/ipratropium for Nebulization 3 milliLiter(s) Nebulizer every 6 hours  ALPRAZolam 0.25 milliGRAM(s) Oral at bedtime  amLODIPine   Tablet 10 milliGRAM(s) Oral daily  benzonatate 100 milliGRAM(s) Oral every 8 hours  dextrose 5%. 1000 milliLiter(s) (50 mL/Hr) IV Continuous <Continuous>  dextrose 50% Injectable 12.5 Gram(s) IV Push once  dextrose 50% Injectable 25 Gram(s) IV Push once  dextrose 50% Injectable 25 Gram(s) IV Push once  insulin lispro (HumaLOG) corrective regimen sliding scale   SubCutaneous Before meals and at bedtime  levoFLOXacin IVPB 750 milliGRAM(s) IV Intermittent every 24 hours  magnesium sulfate  IVPB 2 Gram(s) IV Intermittent once  meropenem IVPB 1000 milliGRAM(s) IV Intermittent every 8 hours  OLANZapine Disintegrating Tablet 5 milliGRAM(s) Oral daily  oxyCODONE    IR 10 milliGRAM(s) Oral every 6 hours  psyllium Powder 1 Packet(s) Oral two times a day  senna 2 Tablet(s) Oral at bedtime  vancomycin  IVPB 1500 milliGRAM(s) IV Intermittent every 12 hours    MEDICATIONS  (PRN):  acetaminophen   Tablet 650 milliGRAM(s) Oral every 6 hours PRN For Temp greater than 38 C (100.4 F)  dextrose Gel 1 Dose(s) Oral once PRN Blood Glucose LESS THAN 70 milliGRAM(s)/deciliter  glucagon  Injectable 1 milliGRAM(s) IntraMuscular once PRN Glucose LESS THAN 70 milligrams/deciliter  loperamide 2 milliGRAM(s) Oral every 2 hours PRN Diarrhea  oxyCODONE    IR 5 milliGRAM(s) Oral every 4 hours PRN Severe Pain (7 - 10), SOB  polyethylene glycol 3350 17 Gram(s) Oral daily PRN Constipation      ASSESSMENT:    PLAN: TRANSFER Acceptance [facility] to [facility]    Hospital Course        Interval History:   ROS:    OBJECTIVE  Vitals:  T(C): 37.6 (01-18-18 @ 05:10), Max: 38.3 (01-17-18 @ 21:18)  HR: 118 (01-18-18 @ 05:10) (97 - 118)  BP: 125/68 (01-18-18 @ 05:10) (109/66 - 132/65)  RR: 19 (01-18-18 @ 05:10) (19 - 19)  SpO2: 93% (01-18-18 @ 05:10) (93% - 97%)  Wt(kg): --    I/O:  I&O's Summary      PHYSICAL EXAM:  Appearance: NAD. Speaking in full sentences. No respiratory accessory muscle use.  HEENT:   Conjunctiva clear b/l. Moist oral mucosa.  Cardiovascular: Tachycardia with regular rhythm, S1, S2 no murmurs appreciated  Respiratory: Rhonchi b/l with wheezes intermittently throughout, decreased breath sounds on R base.   Gastrointestinal:  Soft, nontender. Non-distended. Non-rigid.	  Extremities: No edema b/l. No erythema b/l. LE WWP b/l.  Vascular: DP 2+ b/l.  Neurologic:  Alert and awake. Moving all extremities. Following commands. Making eye contact.  	  LABS:                        8.2    11.0  )-----------( 600      ( 18 Jan 2018 05:41 )             27.2     01-18    130<L>  |  95<L>  |  7   ----------------------------<  124<H>  3.9   |  23  |  0.79    Ca    8.4      18 Jan 2018 05:41  Phos  3.0     01-18  Mg     1.8     01-18      PT/INR - ( 18 Jan 2018 05:41 )   PT: 15.4 sec;   INR: 1.38          PTT - ( 18 Jan 2018 05:41 )  PTT:29.5 sec      RADIOLOGY & ADDITIONAL TESTS:  Reviewed .    MEDICATIONS  (STANDING):  ALBUTerol/ipratropium for Nebulization 3 milliLiter(s) Nebulizer every 6 hours  ALPRAZolam 0.25 milliGRAM(s) Oral at bedtime  amLODIPine   Tablet 10 milliGRAM(s) Oral daily  benzonatate 100 milliGRAM(s) Oral every 8 hours  dextrose 5%. 1000 milliLiter(s) (50 mL/Hr) IV Continuous <Continuous>  dextrose 50% Injectable 12.5 Gram(s) IV Push once  dextrose 50% Injectable 25 Gram(s) IV Push once  dextrose 50% Injectable 25 Gram(s) IV Push once  insulin lispro (HumaLOG) corrective regimen sliding scale   SubCutaneous Before meals and at bedtime  levoFLOXacin IVPB 750 milliGRAM(s) IV Intermittent every 24 hours  magnesium sulfate  IVPB 2 Gram(s) IV Intermittent once  meropenem IVPB 1000 milliGRAM(s) IV Intermittent every 8 hours  OLANZapine Disintegrating Tablet 5 milliGRAM(s) Oral daily  oxyCODONE    IR 10 milliGRAM(s) Oral every 6 hours  psyllium Powder 1 Packet(s) Oral two times a day  senna 2 Tablet(s) Oral at bedtime  vancomycin  IVPB 1500 milliGRAM(s) IV Intermittent every 12 hours    MEDICATIONS  (PRN):  acetaminophen   Tablet 650 milliGRAM(s) Oral every 6 hours PRN For Temp greater than 38 C (100.4 F)  dextrose Gel 1 Dose(s) Oral once PRN Blood Glucose LESS THAN 70 milliGRAM(s)/deciliter  glucagon  Injectable 1 milliGRAM(s) IntraMuscular once PRN Glucose LESS THAN 70 milligrams/deciliter  loperamide 2 milliGRAM(s) Oral every 2 hours PRN Diarrhea  oxyCODONE    IR 5 milliGRAM(s) Oral every 4 hours PRN Severe Pain (7 - 10), SOB  polyethylene glycol 3350 17 Gram(s) Oral daily PRN Constipation TRANSFER Acceptance [facility] to [facility]    Hospital Course    62F with PMH DM, HTN, Stage 4 lung cancer with mets to brain and bone (s/p RT to R 8th rib last week and gamma knife to brain 1/9) presenting with nausea, vomiting and diarrhea for 4 days. Pt found to have a RLL pneumonia in area of RLL mass, concerning for postobstructive pneumonia, starting on doxycycline initially as pt allergic to PCN and later switched to vancomycin and meropenem (started 1/14- ). Pt spiked fevers despite antibiotic therapy and therefore started on IV Levaquin for double Pseudomonas coverage (1/16- ). RVP negative. On admission, EKG showed RBBB with no prior EKG to determine age, so CTA PE protocol performed to r/o PE and evaluate lung mass and pneumonia. CTA chest showing R hilar mass extending to pleural surface in RLL and "possible RLL infiltrate". Echocardiogram also performed 1/15 showing significant LVOT obstruction; EF>75%; moderate MR. Initially pt was placed on fluids to cover for insensible losses but pt started to become overloaded with lower extremity edema, so fluids were discontinued. Pt placed on oxycodone 10mg q6h IR around the clock with PRN oxycodone 5mg q4h IR PRN for SOB/pain. Xanax 0.25mg placed for insomnia. Patient was taken for bronch on 1/18 which revealed RLL occlusion with slight oozing and hemorrhage. MICU consulted for airway management post bronch and pt transferred to [facility] post-bronch.    Interval History: No pain s/p bronch. No Nausea, vomiting, abdominal pain, shortness of breath or chest pain. 0  ROS: As above.    OBJECTIVE  Vitals:  T(C): 37.6 (01-18-18 @ 05:10), Max: 38.3 (01-17-18 @ 21:18)  HR: 118 (01-18-18 @ 05:10) (97 - 118)  BP: 125/68 (01-18-18 @ 05:10) (109/66 - 132/65)  RR: 19 (01-18-18 @ 05:10) (19 - 19)  SpO2: 93% (01-18-18 @ 05:10) (93% - 97%)  Wt(kg): --    I/O:  I&O's Summary      PHYSICAL EXAM:  Appearance: NAD. Speaking in full sentences. No respiratory accessory muscle use.  HEENT:   Conjunctiva clear b/l. Moist oral mucosa.  Cardiovascular: Tachycardia with regular rhythm, S1, S2 no murmurs appreciated  Respiratory: Rhonchi b/l with wheezes intermittently throughout, decreased breath sounds on R base.   Gastrointestinal:  Soft, nontender. Non-distended. Non-rigid.	  Extremities: No edema b/l. No erythema b/l. LE WWP b/l.  Vascular: DP 2+ b/l.  Neurologic:  Alert and awake. Moving all extremities. Following commands. Making eye contact.  	  LABS:                        8.2    11.0  )-----------( 600      ( 18 Jan 2018 05:41 )             27.2     01-18    130<L>  |  95<L>  |  7   ----------------------------<  124<H>  3.9   |  23  |  0.79    Ca    8.4      18 Jan 2018 05:41  Phos  3.0     01-18  Mg     1.8     01-18      PT/INR - ( 18 Jan 2018 05:41 )   PT: 15.4 sec;   INR: 1.38          PTT - ( 18 Jan 2018 05:41 )  PTT:29.5 sec      RADIOLOGY & ADDITIONAL TESTS:  Reviewed .    MEDICATIONS  (STANDING):  ALBUTerol/ipratropium for Nebulization 3 milliLiter(s) Nebulizer every 6 hours  ALPRAZolam 0.25 milliGRAM(s) Oral at bedtime  amLODIPine   Tablet 10 milliGRAM(s) Oral daily  benzonatate 100 milliGRAM(s) Oral every 8 hours  dextrose 5%. 1000 milliLiter(s) (50 mL/Hr) IV Continuous <Continuous>  dextrose 50% Injectable 12.5 Gram(s) IV Push once  dextrose 50% Injectable 25 Gram(s) IV Push once  dextrose 50% Injectable 25 Gram(s) IV Push once  insulin lispro (HumaLOG) corrective regimen sliding scale   SubCutaneous Before meals and at bedtime  levoFLOXacin IVPB 750 milliGRAM(s) IV Intermittent every 24 hours  magnesium sulfate  IVPB 2 Gram(s) IV Intermittent once  meropenem IVPB 1000 milliGRAM(s) IV Intermittent every 8 hours  OLANZapine Disintegrating Tablet 5 milliGRAM(s) Oral daily  oxyCODONE    IR 10 milliGRAM(s) Oral every 6 hours  psyllium Powder 1 Packet(s) Oral two times a day  senna 2 Tablet(s) Oral at bedtime  vancomycin  IVPB 1500 milliGRAM(s) IV Intermittent every 12 hours    MEDICATIONS  (PRN):  acetaminophen   Tablet 650 milliGRAM(s) Oral every 6 hours PRN For Temp greater than 38 C (100.4 F)  dextrose Gel 1 Dose(s) Oral once PRN Blood Glucose LESS THAN 70 milliGRAM(s)/deciliter  glucagon  Injectable 1 milliGRAM(s) IntraMuscular once PRN Glucose LESS THAN 70 milligrams/deciliter  loperamide 2 milliGRAM(s) Oral every 2 hours PRN Diarrhea  oxyCODONE    IR 5 milliGRAM(s) Oral every 4 hours PRN Severe Pain (7 - 10), SOB  polyethylene glycol 3350 17 Gram(s) Oral daily PRN Constipation TRANSFER Acceptance [facility] to [facility]    Hospital Course    62F with PMH DM, HTN, Stage 4 lung cancer with mets to brain and bone (s/p RT to R 8th rib and gamma knife to brain 1/9) presented with nausea, vomiting and diarrhea for 4 days. On admission, EKG significant for RBBB with no prior EKG to determine age, so CTA PE protocol performed to r/o PE and evaluate lung mass and pneumonia. CTA chest showing R hilar mass extending to pleural surface in RLL and "possible RLL infiltrate". Echocardiogram also performed 1/15 showing significant LVOT obstruction; EF>75%; moderate MR. Pt found to have a RLL pneumonia in area of RLL mass and admitted to [facility] for sepsis  concerned for postobstructive pneumonia. Patient initially started on doxycycline (PCN allergy) but transitioned to vancomycin and meropenem (1/14 ). Pt spiked fevers through regimen and therefore IV Levaquin added for double Pseudomonas coverage (1/16). RVP negative.  Initially pt was placed on fluids to cover for insensible losses but pt started to become overloaded with lower extremity edema, so fluids were discontinued. Pt placed on oxycodone 10mg q6h IR around the clock with PRN oxycodone 5mg q4h IR PRN for SOB/pain. Xanax 0.25mg placed for insomnia. Patient was taken for bronch on 1/18 which revealed RLL occlusion with slight oozing and hemorrhage. MICU consulted for airway management post bronch and pt transferred to [facility] post-bronch.    Interval History: No pain s/p bronch. No Nausea, vomiting, abdominal pain, shortness of breath or chest pain. 0  ROS: As above.    OBJECTIVE  Vitals:  T(C): 37.6 (01-18-18 @ 05:10), Max: 38.3 (01-17-18 @ 21:18)  HR: 118 (01-18-18 @ 05:10) (97 - 118)  BP: 125/68 (01-18-18 @ 05:10) (109/66 - 132/65)  RR: 19 (01-18-18 @ 05:10) (19 - 19)  SpO2: 93% (01-18-18 @ 05:10) (93% - 97%)  Wt(kg): --    I/O:  I&O's Summary      PHYSICAL EXAM:  Appearance: NAD. Speaking in full sentences. No respiratory accessory muscle use.  HEENT:   Conjunctiva clear b/l. Moist oral mucosa.  Cardiovascular: Tachycardia with regular rhythm, S1, S2 no murmurs appreciated  Respiratory: Rhonchi b/l with wheezes intermittently throughout, decreased breath sounds on R base.   Gastrointestinal:  Soft, nontender. Non-distended. Non-rigid.	  Extremities: No edema b/l. No erythema b/l. LE WWP b/l.  Vascular: DP 2+ b/l.  Neurologic:  Alert and awake. Moving all extremities. Following commands. Making eye contact.  	  LABS:                        8.2    11.0  )-----------( 600      ( 18 Jan 2018 05:41 )             27.2     01-18    130<L>  |  95<L>  |  7   ----------------------------<  124<H>  3.9   |  23  |  0.79    Ca    8.4      18 Jan 2018 05:41  Phos  3.0     01-18  Mg     1.8     01-18      PT/INR - ( 18 Jan 2018 05:41 )   PT: 15.4 sec;   INR: 1.38          PTT - ( 18 Jan 2018 05:41 )  PTT:29.5 sec      RADIOLOGY & ADDITIONAL TESTS:  Reviewed .    MEDICATIONS  (STANDING):  ALBUTerol/ipratropium for Nebulization 3 milliLiter(s) Nebulizer every 6 hours  ALPRAZolam 0.25 milliGRAM(s) Oral at bedtime  amLODIPine   Tablet 10 milliGRAM(s) Oral daily  benzonatate 100 milliGRAM(s) Oral every 8 hours  dextrose 5%. 1000 milliLiter(s) (50 mL/Hr) IV Continuous <Continuous>  dextrose 50% Injectable 12.5 Gram(s) IV Push once  dextrose 50% Injectable 25 Gram(s) IV Push once  dextrose 50% Injectable 25 Gram(s) IV Push once  insulin lispro (HumaLOG) corrective regimen sliding scale   SubCutaneous Before meals and at bedtime  levoFLOXacin IVPB 750 milliGRAM(s) IV Intermittent every 24 hours  magnesium sulfate  IVPB 2 Gram(s) IV Intermittent once  meropenem IVPB 1000 milliGRAM(s) IV Intermittent every 8 hours  OLANZapine Disintegrating Tablet 5 milliGRAM(s) Oral daily  oxyCODONE    IR 10 milliGRAM(s) Oral every 6 hours  psyllium Powder 1 Packet(s) Oral two times a day  senna 2 Tablet(s) Oral at bedtime  vancomycin  IVPB 1500 milliGRAM(s) IV Intermittent every 12 hours    MEDICATIONS  (PRN):  acetaminophen   Tablet 650 milliGRAM(s) Oral every 6 hours PRN For Temp greater than 38 C (100.4 F)  dextrose Gel 1 Dose(s) Oral once PRN Blood Glucose LESS THAN 70 milliGRAM(s)/deciliter  glucagon  Injectable 1 milliGRAM(s) IntraMuscular once PRN Glucose LESS THAN 70 milligrams/deciliter  loperamide 2 milliGRAM(s) Oral every 2 hours PRN Diarrhea  oxyCODONE    IR 5 milliGRAM(s) Oral every 4 hours PRN Severe Pain (7 - 10), SOB  polyethylene glycol 3350 17 Gram(s) Oral daily PRN Constipation TRANSFER Acceptance [facility] to [facility]    Hospital Course    62F with PMH DM, HTN, Stage 4 lung cancer with mets to brain and bone (s/p RT to R 8th rib and gamma knife to brain 1/9) presented with nausea, vomiting and diarrhea for 4 days. On admission, EKG significant for RBBB with no prior EKG to determine age, so CTA PE protocol performed to r/o PE and evaluate lung mass and pneumonia. CTA chest showing R hilar mass extending to pleural surface in RLL and "possible RLL infiltrate". Echocardiogram also performed 1/15 showing significant LVOT obstruction; EF>75%; moderate MR. Pt found to have a RLL pneumonia in area of RLL mass and admitted to [facility] for sepsis  concerned for postobstructive pneumonia. Patient initially started on doxycycline (PCN allergy) but transitioned to vancomycin and meropenem (1/14 ). Pt spiked fevers through regimen and therefore IV Levaquin added for double Pseudomonas coverage (1/16). RVP negative.  Initially pt was placed on fluids to cover for insensible losses but pt started to become overloaded with lower extremity edema, so fluids were discontinued. Pt placed on oxycodone 10mg q6h IR around the clock with PRN oxycodone 5mg q4h IR PRN for SOB/pain. Xanax 0.25mg placed for insomnia. Patient was taken for bronch on 1/18 which revealed RLL occlusion with slight oozing and hemorrhage. MICU consulted for airway management post bronch and pt transferred to [facility] post-bronch.    Interval History: No pain s/p bronch. No Nausea, vomiting, abdominal pain, shortness of breath or chest pain. 0  ROS: As above.    OBJECTIVE  Vitals:  T(C): 37.6 (01-18-18 @ 05:10), Max: 38.3 (01-17-18 @ 21:18)  HR: 118 (01-18-18 @ 05:10) (97 - 118)  BP: 125/68 (01-18-18 @ 05:10) (109/66 - 132/65)  RR: 19 (01-18-18 @ 05:10) (19 - 19)  SpO2: 93% (01-18-18 @ 05:10) (93% - 97%)  Wt(kg): --    I/O:  I&O's Summary      PHYSICAL EXAM:  Appearance: NAD. Speaking in full sentences. No respiratory accessory muscle use.  HEENT:   Conjunctiva clear b/l. Moist oral mucosa.  Cardiovascular: Tachycardia with regular rhythm, S1, S2.  Respiratory: Rhonchi b/l with wheezes intermittently throughout, decreased breath sounds on R base.   Gastrointestinal:  Soft, nontender. Non-distended. Non-rigid.	  Extremities: No edema b/l. No erythema b/l. LE WWP b/l.  Vascular: DP 2+ b/l.  Neurologic:  Alert and awake. Moving all extremities. Following commands. Making eye contact.  	  LABS:                        8.2    11.0  )-----------( 600      ( 18 Jan 2018 05:41 )             27.2     01-18    130<L>  |  95<L>  |  7   ----------------------------<  124<H>  3.9   |  23  |  0.79    Ca    8.4      18 Jan 2018 05:41  Phos  3.0     01-18  Mg     1.8     01-18      PT/INR - ( 18 Jan 2018 05:41 )   PT: 15.4 sec;   INR: 1.38          PTT - ( 18 Jan 2018 05:41 )  PTT:29.5 sec      RADIOLOGY & ADDITIONAL TESTS:  Reviewed .    MEDICATIONS  (STANDING):  ALBUTerol/ipratropium for Nebulization 3 milliLiter(s) Nebulizer every 6 hours  ALPRAZolam 0.25 milliGRAM(s) Oral at bedtime  amLODIPine   Tablet 10 milliGRAM(s) Oral daily  benzonatate 100 milliGRAM(s) Oral every 8 hours  dextrose 5%. 1000 milliLiter(s) (50 mL/Hr) IV Continuous <Continuous>  dextrose 50% Injectable 12.5 Gram(s) IV Push once  dextrose 50% Injectable 25 Gram(s) IV Push once  dextrose 50% Injectable 25 Gram(s) IV Push once  insulin lispro (HumaLOG) corrective regimen sliding scale   SubCutaneous Before meals and at bedtime  levoFLOXacin IVPB 750 milliGRAM(s) IV Intermittent every 24 hours  magnesium sulfate  IVPB 2 Gram(s) IV Intermittent once  meropenem IVPB 1000 milliGRAM(s) IV Intermittent every 8 hours  OLANZapine Disintegrating Tablet 5 milliGRAM(s) Oral daily  oxyCODONE    IR 10 milliGRAM(s) Oral every 6 hours  psyllium Powder 1 Packet(s) Oral two times a day  senna 2 Tablet(s) Oral at bedtime  vancomycin  IVPB 1500 milliGRAM(s) IV Intermittent every 12 hours    MEDICATIONS  (PRN):  acetaminophen   Tablet 650 milliGRAM(s) Oral every 6 hours PRN For Temp greater than 38 C (100.4 F)  dextrose Gel 1 Dose(s) Oral once PRN Blood Glucose LESS THAN 70 milliGRAM(s)/deciliter  glucagon  Injectable 1 milliGRAM(s) IntraMuscular once PRN Glucose LESS THAN 70 milligrams/deciliter  loperamide 2 milliGRAM(s) Oral every 2 hours PRN Diarrhea  oxyCODONE    IR 5 milliGRAM(s) Oral every 4 hours PRN Severe Pain (7 - 10), SOB  polyethylene glycol 3350 17 Gram(s) Oral daily PRN Constipation TRANSFER Acceptance [facility] to [facility]    Hospital Course    62F with PMH DM, HTN, Stage 4 lung cancer with mets to brain and bone (s/p RT to R 8th rib and gamma knife to brain 1/9) presented with nausea, vomiting and diarrhea for 4 days. On admission, EKG significant for RBBB with no prior EKG to determine age, so CTA PE protocol performed to r/o PE and evaluate lung mass and pneumonia. CTA chest showing R hilar mass extending to pleural surface in RLL and "possible RLL infiltrate". Echocardiogram also performed 1/15 showing significant LVOT obstruction; EF>75%; moderate MR. Pt found to have a RLL pneumonia in area of RLL mass and admitted to [facility] for sepsis  concerned for postobstructive pneumonia. Patient initially started on doxycycline (PCN allergy) but transitioned to vancomycin and meropenem (1/14). Pt spiked fevers through regimen and therefore IV Levaquin added for double Pseudomonas coverage (1/16). RVP negative.  Initially pt was placed on fluids to cover for insensible losses but pt started to become overloaded with lower extremity edema, so fluids were discontinued. Pt placed on oxycodone 10mg q6h IR around the clock with PRN oxycodone 5mg q4h IR PRN for SOB/pain. Xanax 0.25mg placed for insomnia. Patient was taken for bronch on 1/18 which revealed RLL occlusion with slight oozing and hemorrhage. MICU consulted for airway management post bronch and pt transferred to [facility] post-bronch.    Interval History: No pain s/p bronch. No Nausea, vomiting, abdominal pain, shortness of breath or chest pain.   ROS: As above.    OBJECTIVE  Vitals:  T(C): 37.6 (01-18-18 @ 05:10), Max: 38.3 (01-17-18 @ 21:18)  HR: 118 (01-18-18 @ 05:10) (97 - 118)  BP: 125/68 (01-18-18 @ 05:10) (109/66 - 132/65)  RR: 19 (01-18-18 @ 05:10) (19 - 19)  SpO2: 93% (01-18-18 @ 05:10) (93% - 97%)  Wt(kg): --    I/O:  I&O's Summary      PHYSICAL EXAM:  Appearance: NAD. Speaking in full sentences. No respiratory accessory muscle use.  HEENT:   Conjunctiva clear b/l. Moist oral mucosa.  Cardiovascular: Tachycardia with regular rhythm, S1, S2.  Respiratory: Rhonchi b/l with wheezes intermittently throughout, decreased breath sounds on R base.   Gastrointestinal:  Soft, nontender. Non-distended. Non-rigid.	  Extremities: No edema b/l. No erythema b/l. LE WWP b/l.  Vascular: DP 2+ b/l.  Neurologic:  Alert and awake. Moving all extremities. Following commands. Making eye contact.  	  LABS:                        8.2    11.0  )-----------( 600      ( 18 Jan 2018 05:41 )             27.2     01-18    130<L>  |  95<L>  |  7   ----------------------------<  124<H>  3.9   |  23  |  0.79    Ca    8.4      18 Jan 2018 05:41  Phos  3.0     01-18  Mg     1.8     01-18      PT/INR - ( 18 Jan 2018 05:41 )   PT: 15.4 sec;   INR: 1.38          PTT - ( 18 Jan 2018 05:41 )  PTT:29.5 sec      RADIOLOGY & ADDITIONAL TESTS:  Reviewed .    MEDICATIONS  (STANDING):  ALBUTerol/ipratropium for Nebulization 3 milliLiter(s) Nebulizer every 6 hours  ALPRAZolam 0.25 milliGRAM(s) Oral at bedtime  amLODIPine   Tablet 10 milliGRAM(s) Oral daily  benzonatate 100 milliGRAM(s) Oral every 8 hours  dextrose 5%. 1000 milliLiter(s) (50 mL/Hr) IV Continuous <Continuous>  dextrose 50% Injectable 12.5 Gram(s) IV Push once  dextrose 50% Injectable 25 Gram(s) IV Push once  dextrose 50% Injectable 25 Gram(s) IV Push once  insulin lispro (HumaLOG) corrective regimen sliding scale   SubCutaneous Before meals and at bedtime  levoFLOXacin IVPB 750 milliGRAM(s) IV Intermittent every 24 hours  magnesium sulfate  IVPB 2 Gram(s) IV Intermittent once  meropenem IVPB 1000 milliGRAM(s) IV Intermittent every 8 hours  OLANZapine Disintegrating Tablet 5 milliGRAM(s) Oral daily  oxyCODONE    IR 10 milliGRAM(s) Oral every 6 hours  psyllium Powder 1 Packet(s) Oral two times a day  senna 2 Tablet(s) Oral at bedtime  vancomycin  IVPB 1500 milliGRAM(s) IV Intermittent every 12 hours    MEDICATIONS  (PRN):  acetaminophen   Tablet 650 milliGRAM(s) Oral every 6 hours PRN For Temp greater than 38 C (100.4 F)  dextrose Gel 1 Dose(s) Oral once PRN Blood Glucose LESS THAN 70 milliGRAM(s)/deciliter  glucagon  Injectable 1 milliGRAM(s) IntraMuscular once PRN Glucose LESS THAN 70 milligrams/deciliter  loperamide 2 milliGRAM(s) Oral every 2 hours PRN Diarrhea  oxyCODONE    IR 5 milliGRAM(s) Oral every 4 hours PRN Severe Pain (7 - 10), SOB  polyethylene glycol 3350 17 Gram(s) Oral daily PRN Constipation

## 2018-01-18 NOTE — CONSULT NOTE ADULT - CONSULT REQUESTED DATE/TIME
15-Ignacio-2018
16-Jan-2018
16-Jan-2018 09:25
17-Jan-2018 15:29
18-Jan-2018 08:45
18-Jan-2018 16:07
16-Jan-2018 04:49

## 2018-01-18 NOTE — PROGRESS NOTE ADULT - PROBLEM SELECTOR PLAN 9
F: No IVF- PO intake  E: Replete electrolytes to maintain K>4 and Mg>2.   N: NPO F: No IVF- PO intake  E: Replete electrolytes to maintain K>4 and Mg>2.   N: NPO    VTE: HSQ held in setting of intervention.    Dispo: Transferred to MICU for further respiratory monitoring.

## 2018-01-18 NOTE — CONSULT NOTE ADULT - ATTENDING COMMENTS
Patient seen and examined with house-staff during bedside rounds.  Resident note read, including vitals, physical findings, laboratory data, and radiological reports.   Revisions included below.  Direct personal management at bed side and extensive interpretation of the data.  Plan was outlined and discussed in details with the housestaff.  Decision making of high complexity  bronch findings discussed with IP and she is scheduled for intervention tomorrow.  ICU to monitor for hemoptysis and airway. Discussed with radiation oncologist
Patient seen and examined with house-staff during bedside rounds.  Resident note read, including vitals, physical findings, laboratory data, and radiological reports.   Revisions included below.  Direct personal management at bed side and extensive interpretation of the data.  Plan was outlined and discussed in details with the housestaff.  Decision making of high complexity  I reviewed the case with the fellow and will plan for bronchoscopy and evalaution fo the airways.  continue treatment for postobstrutive pneumonia

## 2018-01-18 NOTE — PROGRESS NOTE ADULT - PROBLEM SELECTOR PLAN 8
Hx of HTN on amlodipne and valasartan last admission. Held in setting of presenting sepsis and remains normotensive.  -will continue to hold and monitor HD.

## 2018-01-18 NOTE — PROGRESS NOTE ADULT - SUBJECTIVE AND OBJECTIVE BOX
Heme/Onc Progress Note (Dr. Noonan)       Interval History: Patient s/p bronchoscopy found to have RLL occlusion with oozing and hemorrhage. Patient clinically upgraded to ICu and planned for interventional procedure on 1/19. Patient admits to fatigue and shortness of breath. No further bleeding noted, including brbpr, melena or hematuria.     ROS is otherwise negative.      Allergies    penicillins (Hives)    Intolerances        Medications:  MEDICATIONS  (STANDING):  ALBUTerol/ipratropium for Nebulization 3 milliLiter(s) Nebulizer every 6 hours  ALPRAZolam 0.25 milliGRAM(s) Oral at bedtime  amLODIPine   Tablet 10 milliGRAM(s) Oral daily  benzonatate 100 milliGRAM(s) Oral every 8 hours  dextrose 5%. 1000 milliLiter(s) (50 mL/Hr) IV Continuous <Continuous>  dextrose 50% Injectable 12.5 Gram(s) IV Push once  dextrose 50% Injectable 25 Gram(s) IV Push once  dextrose 50% Injectable 25 Gram(s) IV Push once  insulin lispro (HumaLOG) corrective regimen sliding scale   SubCutaneous Before meals and at bedtime  levoFLOXacin IVPB 750 milliGRAM(s) IV Intermittent every 24 hours  magnesium sulfate  IVPB 2 Gram(s) IV Intermittent once  meropenem IVPB 1000 milliGRAM(s) IV Intermittent every 8 hours  OLANZapine Disintegrating Tablet 5 milliGRAM(s) Oral daily  oxyCODONE    IR 10 milliGRAM(s) Oral every 6 hours  psyllium Powder 1 Packet(s) Oral two times a day  senna 2 Tablet(s) Oral at bedtime  vancomycin  IVPB 1500 milliGRAM(s) IV Intermittent every 12 hours    MEDICATIONS  (PRN):  acetaminophen   Tablet 650 milliGRAM(s) Oral every 6 hours PRN For Temp greater than 38 C (100.4 F)  dextrose Gel 1 Dose(s) Oral once PRN Blood Glucose LESS THAN 70 milliGRAM(s)/deciliter  glucagon  Injectable 1 milliGRAM(s) IntraMuscular once PRN Glucose LESS THAN 70 milligrams/deciliter  loperamide 2 milliGRAM(s) Oral every 2 hours PRN Diarrhea  oxyCODONE    IR 5 milliGRAM(s) Oral every 4 hours PRN Severe Pain (7 - 10), SOB  polyethylene glycol 3350 17 Gram(s) Oral daily PRN Constipation            PHYSICAL EXAM:    T(F): 99.6 (01-18-18 @ 05:10), Max: 100.9 (01-17-18 @ 21:18)  HR: 118 (01-18-18 @ 05:10) (97 - 118)  BP: 125/68 (01-18-18 @ 05:10) (109/66 - 132/65)  RR: 19 (01-18-18 @ 05:10) (19 - 19)  SpO2: 93% (01-18-18 @ 05:10) (93% - 97%)  Wt(kg): --    Daily     Daily     GEN: fatigued, no resp accessory muscle use  HEENT: AT/NC, EOMI, no oral lesions  NECK: supple  CVS: +S1S2 tachycardic  LUNG: Decerased breath sounds on R,  ABD: NT, +BS, obese  : no cva tenderness  EXT: No c/c/e  NEURO: aaox3, non focal, no sensory changes      Labs:                          8.2    11.0  )-----------( 600      ( 18 Jan 2018 05:41 )             27.2     CBC Full  -  ( 18 Jan 2018 05:41 )  WBC Count : 11.0 K/uL  Hemoglobin : 8.2 g/dL  Hematocrit : 27.2 %  Platelet Count - Automated : 600 K/uL  Mean Cell Volume : 84.7 fL  Mean Cell Hemoglobin : 25.5 pg  Mean Cell Hemoglobin Concentration : 30.1 g/dL  Auto Neutrophil # : x  Auto Lymphocyte # : x  Auto Monocyte # : x  Auto Eosinophil # : x  Auto Basophil # : x  Auto Neutrophil % : 84.8 %  Auto Lymphocyte % : 7.0 %  Auto Monocyte % : 7.3 %  Auto Eosinophil % : 0.8 %  Auto Basophil % : 0.1 %    PT/INR - ( 18 Jan 2018 05:41 )   PT: 15.4 sec;   INR: 1.38          PTT - ( 18 Jan 2018 05:41 )  PTT:29.5 sec    01-18    130<L>  |  95<L>  |  7   ----------------------------<  124<H>  3.9   |  23  |  0.79    Ca    8.4      18 Jan 2018 05:41  Phos  3.0     01-18  Mg     1.8     01-18

## 2018-01-18 NOTE — PROGRESS NOTE ADULT - PROBLEM SELECTOR PLAN 1
P/w sepsis found to have post-obstructive PNA of RLL (in setting of metastatic lung Ca) for which patient went for bronchoscopy found RLL oozing and hemorrhage and transferred to MICU for respiratory status monitoring. Has been on vancomycin (1/14/18), meropenem (1/14/18) and levaquin (1/16/18)- levaquin added for breakthrough fevers for dual pseudomonas coverage.  -c/w Vanc and meropenem (Day 5). Vanc trough today therapeutic though of note- drawn and given late.   -c/w Levaquin (Day 3)  -c/w monitoring respiratory status, with concern for hemopytosis as found hemorrhage on bronch.   - Interventional pulm procedure planned for tomorrow.

## 2018-01-18 NOTE — PROGRESS NOTE ADULT - ASSESSMENT
62 year old female with Stage IV lung adenocarcinoma, PDL1 40%, EGFR/ALK/ROS negative, HTN, DM, who underwent RT to bony mets on R 8th rib last week, and had gamma knife tx to brain (Jan 9th) who presented with 4 days of n/v, cough found to have post obstructive pneumonia.       #Post obstructive Pneumonia - 2/2 RLL endobronchial lesion on broad spectrum abx, following cultures    -Continue with supportive care and broad spectrum antibiotics   -will hold off treatment with chemo/immunotherapy until infection/sepsis has resolved   -consider XRT consult     #Stage IV lung adenocarcinoma - Metastatic to brain s/p gamma knife in LI and bone seen on PET/CT. Now with marked increased in RLL and R hilar lesions however also in setting of pneumonia and other inflammatory changes. Patient has PDL1 expression of 40%, however with other negative targets including EGFR/ALK/ROS, not a candidate for targeted agents. Intent of systemic treatment palliative not curative with cytotoxic chemotherapy with the addition of immunotherapy based on the KEYNOTE 021 trial which showed improvement in PATIÑO and PFS in patients with PDL <50.     #Anemia - Likely of chronic inflammation given iron studies and ferritin level. No evidence of bilirubinemia, jaundice. Pulm oozing/hemorrhage seen on bronch. Retic 1.8%. Plts likely reactive, mild coagulopathy in setting of infection likely.     -Trend Hg, if persistently dropping consider occult stool testing  -Transfuse for Hg>7  -off lovenox for dvt ppx given pulm hemorrhage, consider SCDs    Discussed with Dr. Noonan 62 year old female with Stage IV lung adenocarcinoma, PDL1 40%, EGFR/ALK/ROS negative, HTN, DM, who underwent RT to bony mets on R 8th rib last week, and had gamma knife tx to brain (Jan 9th) who presented with 4 days of n/v, cough found to have post obstructive pneumonia.       #Post obstructive Pneumonia - 2/2 RLL endobronchial lesion on broad spectrum abx, following cultures    -Continue with supportive care and broad spectrum antibiotics   -will hold off treatment with chemo/immunotherapy until infection/sepsis has resolved   -discussed case with Dr. Ace from Radiation Oncology, will follow along for possible intervention    #Stage IV lung adenocarcinoma - Metastatic to brain s/p gamma knife in LI and bone seen on PET/CT. Now with marked increased in RLL and R hilar lesions however also in setting of pneumonia and other inflammatory changes. Patient has PDL1 expression of 40%, however with other negative targets including EGFR/ALK/ROS, not a candidate for targeted agents. Intent of systemic treatment palliative not curative with cytotoxic chemotherapy with the addition of immunotherapy based on the KEYNOTE 021 trial which showed improvement in PATIÑO and PFS in patients with PDL <50.     #Anemia - Likely of chronic inflammation given iron studies and ferritin level. No evidence of bilirubinemia, jaundice. Pulm oozing/hemorrhage seen on bronch. Retic 1.8%. Plts likely reactive, mild coagulopathy in setting of infection likely.     -Trend Hg, if persistently dropping consider occult stool testing  -Transfuse for Hg>7  -off lovenox for dvt ppx given pulm hemorrhage, consider SCDs    Discussed with Dr. Noonan

## 2018-01-18 NOTE — CONSULT NOTE ADULT - CONSULT REASON
62/female with with stage 62/female with stage IV lung cancer, now with post-obstructive pneumonia. Evaluate patient for radiation therapy

## 2018-01-18 NOTE — PROGRESS NOTE ADULT - ASSESSMENT
62F PMHX of HTN, DM, and Stage IV lung CA w/ mets to brain and bone s/p RT to bony met on R rib and gamma knife tx to brain presented with 4 days of nausea, vomiting, diarrhea admitted initially to F for sepsis 2/2 to obstructive PNA in setting of metastatic lung cancer s/p bronschoscopy and now transferred to MICU for bronchoscopy.

## 2018-01-19 ENCOUNTER — RESULT REVIEW (OUTPATIENT)
Age: 63
End: 2018-01-19

## 2018-01-19 LAB
ANION GAP SERPL CALC-SCNC: 12 MMOL/L — SIGNIFICANT CHANGE UP (ref 5–17)
BUN SERPL-MCNC: 7 MG/DL — SIGNIFICANT CHANGE UP (ref 7–23)
CALCIUM SERPL-MCNC: 8.5 MG/DL — SIGNIFICANT CHANGE UP (ref 8.4–10.5)
CHLORIDE SERPL-SCNC: 96 MMOL/L — SIGNIFICANT CHANGE UP (ref 96–108)
CO2 SERPL-SCNC: 26 MMOL/L — SIGNIFICANT CHANGE UP (ref 22–31)
CREAT SERPL-MCNC: 0.82 MG/DL — SIGNIFICANT CHANGE UP (ref 0.5–1.3)
CULTURE RESULTS: SIGNIFICANT CHANGE UP
CULTURE RESULTS: SIGNIFICANT CHANGE UP
GLUCOSE BLDC GLUCOMTR-MCNC: 110 MG/DL — HIGH (ref 70–99)
GLUCOSE BLDC GLUCOMTR-MCNC: 112 MG/DL — HIGH (ref 70–99)
GLUCOSE BLDC GLUCOMTR-MCNC: 114 MG/DL — HIGH (ref 70–99)
GLUCOSE BLDC GLUCOMTR-MCNC: 116 MG/DL — HIGH (ref 70–99)
GLUCOSE BLDC GLUCOMTR-MCNC: 136 MG/DL — HIGH (ref 70–99)
GLUCOSE SERPL-MCNC: 113 MG/DL — HIGH (ref 70–99)
GRAM STN FLD: SIGNIFICANT CHANGE UP
HCT VFR BLD CALC: 25.2 % — LOW (ref 34.5–45)
HGB BLD-MCNC: 8 G/DL — LOW (ref 11.5–15.5)
MAGNESIUM SERPL-MCNC: 2 MG/DL — SIGNIFICANT CHANGE UP (ref 1.6–2.6)
MCHC RBC-ENTMCNC: 25.1 PG — LOW (ref 27–34)
MCHC RBC-ENTMCNC: 31.7 G/DL — LOW (ref 32–36)
MCV RBC AUTO: 79 FL — LOW (ref 80–100)
PHOSPHATE SERPL-MCNC: 3.4 MG/DL — SIGNIFICANT CHANGE UP (ref 2.5–4.5)
PLATELET # BLD AUTO: 580 K/UL — HIGH (ref 150–400)
POTASSIUM SERPL-MCNC: 4.3 MMOL/L — SIGNIFICANT CHANGE UP (ref 3.5–5.3)
POTASSIUM SERPL-SCNC: 4.3 MMOL/L — SIGNIFICANT CHANGE UP (ref 3.5–5.3)
RBC # BLD: 3.19 M/UL — LOW (ref 3.8–5.2)
RBC # FLD: 15.1 % — SIGNIFICANT CHANGE UP (ref 10.3–16.9)
SODIUM SERPL-SCNC: 134 MMOL/L — LOW (ref 135–145)
SPECIMEN SOURCE: SIGNIFICANT CHANGE UP
VANCOMYCIN TROUGH SERPL-MCNC: 14 UG/ML — SIGNIFICANT CHANGE UP (ref 10–20)
WBC # BLD: 12 K/UL — HIGH (ref 3.8–10.5)
WBC # FLD AUTO: 12 K/UL — HIGH (ref 3.8–10.5)

## 2018-01-19 PROCEDURE — 99232 SBSQ HOSP IP/OBS MODERATE 35: CPT | Mod: 25

## 2018-01-19 PROCEDURE — 31641 BRONCHOSCOPY TREAT BLOCKAGE: CPT

## 2018-01-19 PROCEDURE — 99233 SBSQ HOSP IP/OBS HIGH 50: CPT | Mod: GC

## 2018-01-19 PROCEDURE — 31630 BRONCHOSCOPY DILATE/FX REPR: CPT

## 2018-01-19 PROCEDURE — 31640 BRONCHOSCOPY W/TUMOR EXCISE: CPT | Mod: 59

## 2018-01-19 PROCEDURE — 31624 DX BRONCHOSCOPE/LAVAGE: CPT

## 2018-01-19 RX ORDER — OXYCODONE HYDROCHLORIDE 5 MG/1
5 TABLET ORAL EVERY 4 HOURS
Qty: 0 | Refills: 0 | Status: DISCONTINUED | OUTPATIENT
Start: 2018-01-19 | End: 2018-01-19

## 2018-01-19 RX ORDER — SODIUM CHLORIDE 9 MG/ML
500 INJECTION INTRAMUSCULAR; INTRAVENOUS; SUBCUTANEOUS ONCE
Qty: 0 | Refills: 0 | Status: COMPLETED | OUTPATIENT
Start: 2018-01-19 | End: 2018-01-19

## 2018-01-19 RX ORDER — ACETAMINOPHEN 500 MG
1000 TABLET ORAL ONCE
Qty: 0 | Refills: 0 | Status: COMPLETED | OUTPATIENT
Start: 2018-01-19 | End: 2018-01-19

## 2018-01-19 RX ORDER — ALPRAZOLAM 0.25 MG
0.25 TABLET ORAL AT BEDTIME
Qty: 0 | Refills: 0 | Status: COMPLETED | OUTPATIENT
Start: 2018-01-19 | End: 2018-01-26

## 2018-01-19 RX ORDER — ENOXAPARIN SODIUM 100 MG/ML
40 INJECTION SUBCUTANEOUS DAILY
Qty: 0 | Refills: 0 | Status: DISCONTINUED | OUTPATIENT
Start: 2018-01-19 | End: 2018-01-22

## 2018-01-19 RX ADMIN — Medication 3 MILLILITER(S): at 16:36

## 2018-01-19 RX ADMIN — SODIUM CHLORIDE 1000 MILLILITER(S): 9 INJECTION INTRAMUSCULAR; INTRAVENOUS; SUBCUTANEOUS at 01:00

## 2018-01-19 RX ADMIN — Medication 3 MILLILITER(S): at 05:00

## 2018-01-19 RX ADMIN — OXYCODONE HYDROCHLORIDE 10 MILLIGRAM(S): 5 TABLET ORAL at 01:17

## 2018-01-19 RX ADMIN — Medication 0.25 MILLIGRAM(S): at 00:37

## 2018-01-19 RX ADMIN — AMLODIPINE BESYLATE 10 MILLIGRAM(S): 2.5 TABLET ORAL at 06:11

## 2018-01-19 RX ADMIN — Medication 100 MILLIGRAM(S): at 21:31

## 2018-01-19 RX ADMIN — MEROPENEM 100 MILLIGRAM(S): 1 INJECTION INTRAVENOUS at 11:15

## 2018-01-19 RX ADMIN — ENOXAPARIN SODIUM 40 MILLIGRAM(S): 100 INJECTION SUBCUTANEOUS at 21:31

## 2018-01-19 RX ADMIN — Medication 100 MILLIGRAM(S): at 14:27

## 2018-01-19 RX ADMIN — MEROPENEM 100 MILLIGRAM(S): 1 INJECTION INTRAVENOUS at 01:22

## 2018-01-19 RX ADMIN — OXYCODONE HYDROCHLORIDE 10 MILLIGRAM(S): 5 TABLET ORAL at 19:45

## 2018-01-19 RX ADMIN — Medication 300 MILLIGRAM(S): at 06:13

## 2018-01-19 RX ADMIN — OXYCODONE HYDROCHLORIDE 10 MILLIGRAM(S): 5 TABLET ORAL at 15:00

## 2018-01-19 RX ADMIN — SENNA PLUS 2 TABLET(S): 8.6 TABLET ORAL at 00:37

## 2018-01-19 RX ADMIN — Medication 100 MILLIGRAM(S): at 06:22

## 2018-01-19 RX ADMIN — Medication 400 MILLIGRAM(S): at 12:25

## 2018-01-19 RX ADMIN — Medication 650 MILLIGRAM(S): at 19:45

## 2018-01-19 RX ADMIN — Medication 650 MILLIGRAM(S): at 00:37

## 2018-01-19 RX ADMIN — OXYCODONE HYDROCHLORIDE 10 MILLIGRAM(S): 5 TABLET ORAL at 20:15

## 2018-01-19 RX ADMIN — Medication 1 PACKET(S): at 17:54

## 2018-01-19 RX ADMIN — Medication 3 MILLILITER(S): at 21:40

## 2018-01-19 RX ADMIN — OLANZAPINE 5 MILLIGRAM(S): 15 TABLET, FILM COATED ORAL at 14:28

## 2018-01-19 RX ADMIN — OXYCODONE HYDROCHLORIDE 10 MILLIGRAM(S): 5 TABLET ORAL at 14:30

## 2018-01-19 RX ADMIN — Medication 0.25 MILLIGRAM(S): at 21:31

## 2018-01-19 RX ADMIN — OXYCODONE HYDROCHLORIDE 10 MILLIGRAM(S): 5 TABLET ORAL at 06:59

## 2018-01-19 RX ADMIN — OXYCODONE HYDROCHLORIDE 10 MILLIGRAM(S): 5 TABLET ORAL at 00:36

## 2018-01-19 RX ADMIN — Medication 100 MILLIGRAM(S): at 00:38

## 2018-01-19 NOTE — PROGRESS NOTE ADULT - SUBJECTIVE AND OBJECTIVE BOX
OVERNIGHT EVENTS:   No acute events overnight.  Febrile to 101.2F    VITALS:  T(C): , Max: 38.4 (01-18-18 @ 14:00)  HR:  (96 - 120)  BP:  (96/51 - 151/61)  ABP: --  RR:  (19 - 45)  SpO2:  (91% - 98%)  Wt(kg): --      01-18-18 @ 07:01  -  01-19-18 @ 07:00  --------------------------------------------------------  IN: 2140 mL / OUT: 1025 mL / NET: 1115 mL    01-19-18 @ 07:01  -  01-19-18 @ 13:38  --------------------------------------------------------  IN: 150 mL / OUT: 0 mL / NET: 150 mL      LABS:  Na: 134 (01-19 @ 06:22), 130 (01-18 @ 05:41), 135 (01-17 @ 08:07), 135 (01-16 @ 18:32)  K: 4.3 (01-19 @ 06:22), 3.9 (01-18 @ 05:41), 4.3 (01-17 @ 08:07), 4.0 (01-16 @ 18:32)  Cl: 96 (01-19 @ 06:22), 95 (01-18 @ 05:41), 102 (01-17 @ 08:07), 102 (01-16 @ 18:32)  CO2: 26 (01-19 @ 06:22), 23 (01-18 @ 05:41), 22 (01-17 @ 08:07), 20 (01-16 @ 18:32)  BUN: 7 (01-19 @ 06:22), 7 (01-18 @ 05:41), 8 (01-17 @ 08:07), 9 (01-16 @ 18:32)  Cr: 0.82 (01-19 @ 06:22), 0.79 (01-18 @ 05:41), 0.86 (01-17 @ 08:07), 0.89 (01-16 @ 18:32)  Glu: 113(01-19 @ 06:22), 124(01-18 @ 05:41), 114(01-17 @ 08:07), 115(01-16 @ 18:32)    Hgb: 8.0 (01-19 @ 06:22), 8.2 (01-18 @ 05:41), 7.7 (01-17 @ 08:07)  Hct: 25.2 (01-19 @ 06:22), 27.2 (01-18 @ 05:41), 24.6 (01-17 @ 08:07)  WBC: 12.0 (01-19 @ 06:22), 11.0 (01-18 @ 05:41), 12.9 (01-17 @ 08:07)  Plt: 580 (01-19 @ 06:22), 600 (01-18 @ 05:41), 566 (01-17 @ 08:07)    INR: 1.38 01-18-18 @ 05:41  PTT: 29.5 01-18-18 @ 05:41    IMAGING:   Recent imaging studies were reviewed.    MEDICATIONS:  acetaminophen   Tablet 650 milliGRAM(s) Oral every 6 hours PRN  ALBUTerol/ipratropium for Nebulization 3 milliLiter(s) Nebulizer every 6 hours  ALPRAZolam 0.25 milliGRAM(s) Oral at bedtime  amLODIPine   Tablet 10 milliGRAM(s) Oral daily  benzonatate 100 milliGRAM(s) Oral every 8 hours  dextrose 5%. 1000 milliLiter(s) IV Continuous <Continuous>  dextrose 50% Injectable 12.5 Gram(s) IV Push once  dextrose 50% Injectable 25 Gram(s) IV Push once  dextrose 50% Injectable 25 Gram(s) IV Push once  dextrose Gel 1 Dose(s) Oral once PRN  glucagon  Injectable 1 milliGRAM(s) IntraMuscular once PRN  insulin lispro (HumaLOG) corrective regimen sliding scale   SubCutaneous Before meals and at bedtime  levoFLOXacin  Tablet 750 milliGRAM(s) Oral every 24 hours  loperamide 2 milliGRAM(s) Oral every 2 hours PRN  magnesium sulfate  IVPB 2 Gram(s) IV Intermittent once  OLANZapine Disintegrating Tablet 5 milliGRAM(s) Oral daily  oxyCODONE    IR 5 milliGRAM(s) Oral every 4 hours PRN  oxyCODONE    IR 10 milliGRAM(s) Oral every 6 hours  polyethylene glycol 3350 17 Gram(s) Oral daily PRN  psyllium Powder 1 Packet(s) Oral two times a day  senna 2 Tablet(s) Oral at bedtime    EXAMINATION:  General:  NAD  HEENT: MP: Neck is supple, No JVD  Cardio: s1s2 RRR. No murmurs.   Respiratory: Decreased BS b/l bases.   Adomen:  soft, NT  Extremities:  no edema  Skin:  warm/dry  Neuro:  awake, alert, oriented x 3, follows commands, moves all extremities

## 2018-01-19 NOTE — PROGRESS NOTE ADULT - PROBLEM SELECTOR PLAN 7
Hb1AC at 6.3-demonstrating good control.  -c/w Mod ISS. Hx of HTN on amlodipne and valasartan last admission. Held in setting of presenting sepsis and remains normotensive.  -will continue to hold and monitor HD.

## 2018-01-19 NOTE — PROGRESS NOTE ADULT - ASSESSMENT
62F PMHX of HTN, DM, and Stage IV lung CA w/ mets to brain and bone s/p RT to bony met on R rib and gamma knife tx to brain presented with 4 days of nausea, vomiting, diarrhea admitted initially to F for sepsis 2/2 to obstructive PNA in setting of metastatic lung cancer s/p bronschoscopy and now transferred to MICU for bronchoscopy. 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 Patient went for intervention today for which RLL obstructing mass debulked with balloon dilation, lazer and cryo for bleeding.

## 2018-01-19 NOTE — PROGRESS NOTE ADULT - PROBLEM SELECTOR PLAN 1
Mrs. Bhatti's history of adenocarcinoma with CT evidence of enlargement of the tumor site and narrowing of the RLL airways make the diagnosis of post-obstructive pneumonia very likely.  Airway examination will likely show an endobronchial lesion.  If she does have an endobronchial lesion then we would recommend therapeutic intervention with tumor debulking with laser, cryo, and balloon dilation.  We have discussed with Dr. Madden to proceed with rigid bronchoscopy for tumor debulking.

## 2018-01-19 NOTE — PROGRESS NOTE ADULT - SUBJECTIVE AND OBJECTIVE BOX
PROGRESS NOTE    CC: nausea, vomiting, diarrhea, RLL post obstructive PNA  Overnight Events:   Interval History: Breathing at time of exam reported to be good, states intermittently short of breath, but improved that she could sleep overnight. No chest pain, nausea, vomiting, abdominal pain.  ROS: As above.    OBJECTIVE  Vitals:  T(C): 37.6 (01-19-18 @ 01:50), Max: 38.4 (01-18-18 @ 14:00)  HR: 112 (01-19-18 @ 06:25) (96 - 122)  BP: 126/60 (01-19-18 @ 06:00) (96/51 - 151/61)  RR: 19 (01-19-18 @ 06:00) (19 - 28)  SpO2: 94% (01-19-18 @ 06:00) (93% - 98%)  Wt(kg): --    I/O:  I&O's Summary    18 Jan 2018 07:01  -  19 Jan 2018 06:58  --------------------------------------------------------  IN: 1590 mL / OUT: 1025 mL / NET: 565 mL        PHYSICAL EXAM:  Appearance: NAD. Speaking in full sentences- on NC.  HEENT:  PERRL.  Conjunctiva clear b/l. Moist oral mucosa.  Cardiovascular: Tachycardia, S1, S2 with 2/6 systolic murmur along left sb.  Respiratory: Lungs wheezing R>L with egophany, decreased breath sounds on R.   Gastrointestinal:  Soft, nontender. Non-distended. Non-rigid.	  Extremities: No edema b/l. No erythema b/l. LE WWP b/l.  Vascular: DP 2+ b/l.  Neurologic:  Alert and awake. Moving all extremities. Following commands. Making eye contact.  	  LABS:                        8.0    12.0  )-----------( 580      ( 19 Jan 2018 06:22 )             25.2     01-18    130<L>  |  95<L>  |  7   ----------------------------<  124<H>  3.9   |  23  |  0.79    Ca    8.4      18 Jan 2018 05:41  Phos  3.0     01-18  Mg     1.8     01-18      PT/INR - ( 18 Jan 2018 05:41 )   PT: 15.4 sec;   INR: 1.38          PTT - ( 18 Jan 2018 05:41 )  PTT:29.5 sec      RADIOLOGY & ADDITIONAL TESTS:  Reviewed .    MEDICATIONS  (STANDING):  ALBUTerol/ipratropium for Nebulization 3 milliLiter(s) Nebulizer every 6 hours  ALPRAZolam 0.25 milliGRAM(s) Oral at bedtime  amLODIPine   Tablet 10 milliGRAM(s) Oral daily  benzonatate 100 milliGRAM(s) Oral every 8 hours  dextrose 5%. 1000 milliLiter(s) (50 mL/Hr) IV Continuous <Continuous>  dextrose 50% Injectable 12.5 Gram(s) IV Push once  dextrose 50% Injectable 25 Gram(s) IV Push once  dextrose 50% Injectable 25 Gram(s) IV Push once  insulin lispro (HumaLOG) corrective regimen sliding scale   SubCutaneous Before meals and at bedtime  levoFLOXacin IVPB 750 milliGRAM(s) IV Intermittent every 24 hours  magnesium sulfate  IVPB 2 Gram(s) IV Intermittent once  meropenem IVPB 1000 milliGRAM(s) IV Intermittent every 8 hours  OLANZapine Disintegrating Tablet 5 milliGRAM(s) Oral daily  oxyCODONE    IR 10 milliGRAM(s) Oral every 6 hours  psyllium Powder 1 Packet(s) Oral two times a day  senna 2 Tablet(s) Oral at bedtime  vancomycin  IVPB 1500 milliGRAM(s) IV Intermittent every 12 hours    MEDICATIONS  (PRN):  acetaminophen   Tablet 650 milliGRAM(s) Oral every 6 hours PRN For Temp greater than 38 C (100.4 F)  dextrose Gel 1 Dose(s) Oral once PRN Blood Glucose LESS THAN 70 milliGRAM(s)/deciliter  glucagon  Injectable 1 milliGRAM(s) IntraMuscular once PRN Glucose LESS THAN 70 milligrams/deciliter  loperamide 2 milliGRAM(s) Oral every 2 hours PRN Diarrhea  oxyCODONE    IR 5 milliGRAM(s) Oral every 4 hours PRN Severe Pain (7 - 10), SOB  polyethylene glycol 3350 17 Gram(s) Oral daily PRN Constipation      ASSESSMENT:    PLAN: PROGRESS NOTE    CC: nausea, vomiting, diarrhea, RLL post obstructive PNA  Overnight Events: Complaining of cough overnight, nonproductive. Otherwise maintained O2 sats. VSS.  Interval History: Breathing at time of exam reported to be good, states intermittently short of breath, but improved that she could sleep overnight. No chest pain, nausea, vomiting, abdominal pain.  ROS: As above.    OBJECTIVE  Vitals:  T(C): 37.6 (01-19-18 @ 01:50), Max: 38.4 (01-18-18 @ 14:00)  HR: 112 (01-19-18 @ 06:25) (96 - 122)  BP: 126/60 (01-19-18 @ 06:00) (96/51 - 151/61)  RR: 19 (01-19-18 @ 06:00) (19 - 28)  SpO2: 94% (01-19-18 @ 06:00) (93% - 98%)  Wt(kg): --    I/O:  I&O's Summary    18 Jan 2018 07:01  -  19 Jan 2018 06:58  --------------------------------------------------------  IN: 1590 mL / OUT: 1025 mL / NET: 565 mL        PHYSICAL EXAM:  Appearance: NAD. Speaking in full sentences- on NC.  HEENT:  PERRL.  Conjunctiva clear b/l. Moist oral mucosa.  Cardiovascular: Tachycardia, S1, S2 with 2/6 systolic murmur along left sb.  Respiratory: Lungs wheezing R>L with egophany, decreased breath sounds on R.   Gastrointestinal:  Soft, nontender. Non-distended. Non-rigid.	  Extremities: No edema b/l. No erythema b/l. LE WWP b/l.  Vascular: DP 2+ b/l.  Neurologic:  Alert and awake. Moving all extremities. Following commands. Making eye contact.  	  LABS:                        8.0    12.0  )-----------( 580      ( 19 Jan 2018 06:22 )             25.2     01-18    130<L>  |  95<L>  |  7   ----------------------------<  124<H>  3.9   |  23  |  0.79    Ca    8.4      18 Jan 2018 05:41  Phos  3.0     01-18  Mg     1.8     01-18      PT/INR - ( 18 Jan 2018 05:41 )   PT: 15.4 sec;   INR: 1.38          PTT - ( 18 Jan 2018 05:41 )  PTT:29.5 sec      RADIOLOGY & ADDITIONAL TESTS:  Reviewed .    MEDICATIONS  (STANDING):  ALBUTerol/ipratropium for Nebulization 3 milliLiter(s) Nebulizer every 6 hours  ALPRAZolam 0.25 milliGRAM(s) Oral at bedtime  amLODIPine   Tablet 10 milliGRAM(s) Oral daily  benzonatate 100 milliGRAM(s) Oral every 8 hours  dextrose 5%. 1000 milliLiter(s) (50 mL/Hr) IV Continuous <Continuous>  dextrose 50% Injectable 12.5 Gram(s) IV Push once  dextrose 50% Injectable 25 Gram(s) IV Push once  dextrose 50% Injectable 25 Gram(s) IV Push once  insulin lispro (HumaLOG) corrective regimen sliding scale   SubCutaneous Before meals and at bedtime  levoFLOXacin IVPB 750 milliGRAM(s) IV Intermittent every 24 hours  magnesium sulfate  IVPB 2 Gram(s) IV Intermittent once  meropenem IVPB 1000 milliGRAM(s) IV Intermittent every 8 hours  OLANZapine Disintegrating Tablet 5 milliGRAM(s) Oral daily  oxyCODONE    IR 10 milliGRAM(s) Oral every 6 hours  psyllium Powder 1 Packet(s) Oral two times a day  senna 2 Tablet(s) Oral at bedtime  vancomycin  IVPB 1500 milliGRAM(s) IV Intermittent every 12 hours    MEDICATIONS  (PRN):  acetaminophen   Tablet 650 milliGRAM(s) Oral every 6 hours PRN For Temp greater than 38 C (100.4 F)  dextrose Gel 1 Dose(s) Oral once PRN Blood Glucose LESS THAN 70 milliGRAM(s)/deciliter  glucagon  Injectable 1 milliGRAM(s) IntraMuscular once PRN Glucose LESS THAN 70 milligrams/deciliter  loperamide 2 milliGRAM(s) Oral every 2 hours PRN Diarrhea  oxyCODONE    IR 5 milliGRAM(s) Oral every 4 hours PRN Severe Pain (7 - 10), SOB  polyethylene glycol 3350 17 Gram(s) Oral daily PRN Constipation      ASSESSMENT:    PLAN:

## 2018-01-19 NOTE — PROGRESS NOTE ADULT - PROBLEM SELECTOR PLAN 8
Hx of HTN on amlodipne and valasartan last admission. Held in setting of presenting sepsis and remains normotensive.  -will continue to hold and monitor HD. F: PO intake  E: Replete electrolytes to maintain K>4 and Mg>2  N:  Restarted DASH/TLC/DM Diet as tolerated    VTE: Restarted on Lovenox    FULL CODE    DIspo: F: PO intake  E: Replete electrolytes to maintain K>4 and Mg>2  N:  Restarted DASH/TLC/DM Diet as tolerated    VTE: Restarted on Lovenox    FULL CODE    DIspo: Admitted to MICU for monitoring respiratory status in setting of bronchoscopy for debulking of obstructive mass on RLL.

## 2018-01-19 NOTE — PROGRESS NOTE ADULT - PROBLEM SELECTOR PLAN 1
P/w sepsis found to have post-obstructive PNA of RLL (in setting of metastatic lung Ca) for which patient went for bronchoscopy found RLL oozing and hemorrhage and transferred to MICU for respiratory status monitoring. Has been on vancomycin (1/14/18), meropenem (1/14/18) and levaquin (1/16/18)- levaquin added for breakthrough fevers for dual pseudomonas coverage.  -c/w Vanc and meropenem (Day 6).   -c/w Levaquin (Day 3)  -c/w monitoring respiratory status, with concern for hemopytosis as found hemorrhage on bronch.   - Interventional pulm procedure planned for today. P/w sepsis found to have post-obstructive PNA of RLL (in setting of metastatic lung Ca) for which patient went for bronchoscopy found RLL oozing and hemorrhage and transferred to MICU for respiratory status monitoring. Has been on vancomycin (1/14/18), meropenem (1/14/18) and levaquin (1/16/18)- levaquin added for breakthrough fevers for dual pseudomonas coverage.   -Went for bronch today with findings of obstructed RUL, patent Rmiddle Lobe, and obstructive mass in RLL debulked with lazer and balloon dilation. Cryo for bleeding stopped.   -c/w Levaquin (Day 3). D/c Vanc and Zosyn No evidence of acute post-obstructive PNA based on bronch - therefore will only continue with levaquin.  F/u BAL.   -Radiation planned for lungs on Monday.

## 2018-01-19 NOTE — PROGRESS NOTE ADULT - PROBLEM SELECTOR PLAN 2
Patient has Stage IV lung CA w/ mets to brain and bone s/p RT to bony mets on R 8th rib last week, and had gamma knife tx to brain (Jan 9th). Likely etiology of Post obstructive PNA.  -Heme Onc  - Rad Onc Patient has Stage IV lung CA w/ mets to brain and bone s/p RT to bony mets on R 8th rib last week, and had gamma knife tx to brain (Jan 9th). Likely etiology of Post obstructive PNA.  - S/p Bronch today- with debulking of obstructive tumor with lazer, balloon dilation and cry for bleeding.  -Plan for Radiation to lung on Monday

## 2018-01-19 NOTE — PROGRESS NOTE ADULT - PROBLEM SELECTOR PLAN 5
Findings of RBBB on EKG. Repeat EKG ordred and pending. Findings of RBBB on EKG. Repeat EKG ordered and pending.

## 2018-01-19 NOTE — PROGRESS NOTE ADULT - PROBLEM SELECTOR PLAN 2
No evidence of acute post-obstructive pneumonia. F/U BAL cultures however not likely to have significant infectious burden.

## 2018-01-19 NOTE — PROGRESS NOTE ADULT - SUBJECTIVE AND OBJECTIVE BOX
REASON FOR CONSULTATION: Patient with stage 4 lung cancer, presenting with tumor progression due to tumor.    INTERVAL HISTORY:  Patient underwent bronchoscopy today. Cryotherapy and cautery were used. No stent was placed. At the time of evaluation this afternoon Ms. Bhatti is feeling well.     Allergies    penicillins (Hives)    Intolerances        MEDICATIONS  (STANDING):  ALBUTerol/ipratropium for Nebulization 3 milliLiter(s) Nebulizer every 6 hours  ALPRAZolam 0.25 milliGRAM(s) Oral at bedtime  amLODIPine   Tablet 10 milliGRAM(s) Oral daily  benzonatate 100 milliGRAM(s) Oral every 8 hours  dextrose 5%. 1000 milliLiter(s) (50 mL/Hr) IV Continuous <Continuous>  dextrose 50% Injectable 12.5 Gram(s) IV Push once  dextrose 50% Injectable 25 Gram(s) IV Push once  dextrose 50% Injectable 25 Gram(s) IV Push once  insulin lispro (HumaLOG) corrective regimen sliding scale   SubCutaneous Before meals and at bedtime  levoFLOXacin  Tablet 750 milliGRAM(s) Oral every 24 hours  magnesium sulfate  IVPB 2 Gram(s) IV Intermittent once  OLANZapine Disintegrating Tablet 5 milliGRAM(s) Oral daily  oxyCODONE    IR 10 milliGRAM(s) Oral every 6 hours  psyllium Powder 1 Packet(s) Oral two times a day  senna 2 Tablet(s) Oral at bedtime    MEDICATIONS  (PRN):  acetaminophen   Tablet 650 milliGRAM(s) Oral every 6 hours PRN For Temp greater than 38 C (100.4 F)  dextrose Gel 1 Dose(s) Oral once PRN Blood Glucose LESS THAN 70 milliGRAM(s)/deciliter  glucagon  Injectable 1 milliGRAM(s) IntraMuscular once PRN Glucose LESS THAN 70 milligrams/deciliter  loperamide 2 milliGRAM(s) Oral every 2 hours PRN Diarrhea  oxyCODONE    IR 5 milliGRAM(s) Oral every 4 hours PRN Severe Pain (7 - 10), SOB  polyethylene glycol 3350 17 Gram(s) Oral daily PRN Constipation      Vital Signs Last 24 Hrs  T(C): 37.1 (19 Jan 2018 13:00), Max: 38.4 (19 Jan 2018 00:00)  T(F): 98.7 (19 Jan 2018 13:00), Max: 101.2 (19 Jan 2018 00:00)  HR: 104 (19 Jan 2018 16:00) (98 - 114)  BP: 93/48 (19 Jan 2018 15:05) (77/54 - 136/58)  BP(mean): 58 (19 Jan 2018 15:05) (58 - 85)  RR: 21 (19 Jan 2018 16:00) (15 - 45)  SpO2: 90% (19 Jan 2018 16:00) (89% - 98%)      LABS:                        8.0    12.0  )-----------( 580      ( 19 Jan 2018 06:22 )             25.2     01-19    134<L>  |  96  |  7   ----------------------------<  113<H>  4.3   |  26  |  0.82    Ca    8.5      19 Jan 2018 06:22  Phos  3.4     01-19  Mg     2.0     01-19      PT/INR - ( 18 Jan 2018 05:41 )   PT: 15.4 sec;   INR: 1.38          PTT - ( 18 Jan 2018 05:41 )  PTT:29.5 sec        RADIOLOGY & ADDITIONAL STUDIES:    PATHOLOGY:

## 2018-01-19 NOTE — PROGRESS NOTE ADULT - PROBLEM SELECTOR PLAN 3
Found to have prolonged QTc last at 490. Repeat EKG today demonstrates: Found to have prolonged QTc last at 490. Pending repeat EKG- went for bronchoscopy prior to AM EKG, Reordered for tomorrow.

## 2018-01-19 NOTE — PROGRESS NOTE ADULT - ASSESSMENT
62/female with stage 4 lung cancer, s/p recent radiation to right rib lesion and gamma knife to brain lesion, presenting with nausea, vomiting, SOB, productive cough. Found to have postobstructive pneumonia.   -will plan for radiation simulation likely monday in order to prevent further bleeding and minimize airway obstruction 2/2 tumor.   -will see patient on monday for consent and further explanation

## 2018-01-19 NOTE — PROVIDER CONTACT NOTE (OTHER) - BACKGROUND
Patient with stage IV Lung CA with METS to the brain.  Patient underwent bronchoscopy today and had pieces of lung mass removed to assist with patient breathing

## 2018-01-20 LAB
ANION GAP SERPL CALC-SCNC: 13 MMOL/L — SIGNIFICANT CHANGE UP (ref 5–17)
BLD GP AB SCN SERPL QL: POSITIVE — SIGNIFICANT CHANGE UP
BUN SERPL-MCNC: 10 MG/DL — SIGNIFICANT CHANGE UP (ref 7–23)
CALCIUM SERPL-MCNC: 8 MG/DL — LOW (ref 8.4–10.5)
CHLORIDE SERPL-SCNC: 95 MMOL/L — LOW (ref 96–108)
CO2 SERPL-SCNC: 25 MMOL/L — SIGNIFICANT CHANGE UP (ref 22–31)
CREAT SERPL-MCNC: 0.96 MG/DL — SIGNIFICANT CHANGE UP (ref 0.5–1.3)
GLUCOSE BLDC GLUCOMTR-MCNC: 114 MG/DL — HIGH (ref 70–99)
GLUCOSE BLDC GLUCOMTR-MCNC: 114 MG/DL — HIGH (ref 70–99)
GLUCOSE BLDC GLUCOMTR-MCNC: 116 MG/DL — HIGH (ref 70–99)
GLUCOSE BLDC GLUCOMTR-MCNC: 117 MG/DL — HIGH (ref 70–99)
GLUCOSE SERPL-MCNC: 112 MG/DL — HIGH (ref 70–99)
HCT VFR BLD CALC: 23.4 % — LOW (ref 34.5–45)
HGB BLD-MCNC: 7.5 G/DL — LOW (ref 11.5–15.5)
MAGNESIUM SERPL-MCNC: 1.8 MG/DL — SIGNIFICANT CHANGE UP (ref 1.6–2.6)
MCHC RBC-ENTMCNC: 25.1 PG — LOW (ref 27–34)
MCHC RBC-ENTMCNC: 32.1 G/DL — SIGNIFICANT CHANGE UP (ref 32–36)
MCV RBC AUTO: 78.3 FL — LOW (ref 80–100)
NIGHT BLUE STAIN TISS: SIGNIFICANT CHANGE UP
PHOSPHATE SERPL-MCNC: 3.1 MG/DL — SIGNIFICANT CHANGE UP (ref 2.5–4.5)
PLATELET # BLD AUTO: 520 K/UL — HIGH (ref 150–400)
POTASSIUM SERPL-MCNC: 4 MMOL/L — SIGNIFICANT CHANGE UP (ref 3.5–5.3)
POTASSIUM SERPL-SCNC: 4 MMOL/L — SIGNIFICANT CHANGE UP (ref 3.5–5.3)
RBC # BLD: 2.99 M/UL — LOW (ref 3.8–5.2)
RBC # FLD: 15.3 % — SIGNIFICANT CHANGE UP (ref 10.3–16.9)
RH IG SCN BLD-IMP: NEGATIVE — SIGNIFICANT CHANGE UP
SODIUM SERPL-SCNC: 133 MMOL/L — LOW (ref 135–145)
SPECIMEN SOURCE: SIGNIFICANT CHANGE UP
WBC # BLD: 11.8 K/UL — HIGH (ref 3.8–10.5)
WBC # FLD AUTO: 11.8 K/UL — HIGH (ref 3.8–10.5)

## 2018-01-20 PROCEDURE — 99233 SBSQ HOSP IP/OBS HIGH 50: CPT | Mod: GC

## 2018-01-20 PROCEDURE — 71045 X-RAY EXAM CHEST 1 VIEW: CPT | Mod: 26

## 2018-01-20 PROCEDURE — 99232 SBSQ HOSP IP/OBS MODERATE 35: CPT

## 2018-01-20 RX ORDER — SODIUM CHLORIDE 9 MG/ML
500 INJECTION INTRAMUSCULAR; INTRAVENOUS; SUBCUTANEOUS ONCE
Qty: 0 | Refills: 0 | Status: DISCONTINUED | OUTPATIENT
Start: 2018-01-20 | End: 2018-01-20

## 2018-01-20 RX ORDER — MAGNESIUM SULFATE 500 MG/ML
1 VIAL (ML) INJECTION ONCE
Qty: 0 | Refills: 0 | Status: COMPLETED | OUTPATIENT
Start: 2018-01-20 | End: 2018-01-20

## 2018-01-20 RX ORDER — IPRATROPIUM/ALBUTEROL SULFATE 18-103MCG
3 AEROSOL WITH ADAPTER (GRAM) INHALATION ONCE
Qty: 0 | Refills: 0 | Status: COMPLETED | OUTPATIENT
Start: 2018-01-20 | End: 2018-01-20

## 2018-01-20 RX ORDER — ONDANSETRON 8 MG/1
4 TABLET, FILM COATED ORAL ONCE
Qty: 0 | Refills: 0 | Status: COMPLETED | OUTPATIENT
Start: 2018-01-20 | End: 2018-01-20

## 2018-01-20 RX ORDER — IPRATROPIUM/ALBUTEROL SULFATE 18-103MCG
3 AEROSOL WITH ADAPTER (GRAM) INHALATION EVERY 4 HOURS
Qty: 0 | Refills: 0 | Status: DISCONTINUED | OUTPATIENT
Start: 2018-01-20 | End: 2018-01-26

## 2018-01-20 RX ORDER — POLYETHYLENE GLYCOL 3350 17 G/17G
17 POWDER, FOR SOLUTION ORAL AT BEDTIME
Qty: 0 | Refills: 0 | Status: DISCONTINUED | OUTPATIENT
Start: 2018-01-20 | End: 2018-01-26

## 2018-01-20 RX ADMIN — OXYCODONE HYDROCHLORIDE 10 MILLIGRAM(S): 5 TABLET ORAL at 07:08

## 2018-01-20 RX ADMIN — Medication 100 MILLIGRAM(S): at 12:47

## 2018-01-20 RX ADMIN — ENOXAPARIN SODIUM 40 MILLIGRAM(S): 100 INJECTION SUBCUTANEOUS at 11:05

## 2018-01-20 RX ADMIN — OXYCODONE HYDROCHLORIDE 10 MILLIGRAM(S): 5 TABLET ORAL at 19:00

## 2018-01-20 RX ADMIN — OXYCODONE HYDROCHLORIDE 10 MILLIGRAM(S): 5 TABLET ORAL at 06:25

## 2018-01-20 RX ADMIN — Medication 3 MILLILITER(S): at 18:14

## 2018-01-20 RX ADMIN — Medication 0.25 MILLIGRAM(S): at 22:44

## 2018-01-20 RX ADMIN — OLANZAPINE 5 MILLIGRAM(S): 15 TABLET, FILM COATED ORAL at 11:05

## 2018-01-20 RX ADMIN — Medication 100 GRAM(S): at 07:31

## 2018-01-20 RX ADMIN — OXYCODONE HYDROCHLORIDE 10 MILLIGRAM(S): 5 TABLET ORAL at 18:15

## 2018-01-20 RX ADMIN — SENNA PLUS 2 TABLET(S): 8.6 TABLET ORAL at 22:44

## 2018-01-20 RX ADMIN — OXYCODONE HYDROCHLORIDE 10 MILLIGRAM(S): 5 TABLET ORAL at 13:59

## 2018-01-20 RX ADMIN — Medication 650 MILLIGRAM(S): at 12:46

## 2018-01-20 RX ADMIN — AMLODIPINE BESYLATE 10 MILLIGRAM(S): 2.5 TABLET ORAL at 06:24

## 2018-01-20 RX ADMIN — Medication 3 MILLILITER(S): at 08:43

## 2018-01-20 RX ADMIN — ONDANSETRON 4 MILLIGRAM(S): 8 TABLET, FILM COATED ORAL at 11:05

## 2018-01-20 RX ADMIN — Medication 100 MILLIGRAM(S): at 06:24

## 2018-01-20 RX ADMIN — Medication 100 MILLIGRAM(S): at 22:43

## 2018-01-20 RX ADMIN — Medication 3 MILLILITER(S): at 12:01

## 2018-01-20 RX ADMIN — OXYCODONE HYDROCHLORIDE 10 MILLIGRAM(S): 5 TABLET ORAL at 12:46

## 2018-01-20 NOTE — PROGRESS NOTE ADULT - SUBJECTIVE AND OBJECTIVE BOX
PROGRESS NOTE    CC:   Overnight Events: No additional fevers, some desaturations but asymptomatic on 4L NC.  Interval History:    ROS:    OBJECTIVE  Vitals:  T(C): 37.4 (01-20-18 @ 04:11), Max: 38.2 (01-19-18 @ 11:00)  HR: 116 (01-20-18 @ 07:00) (104 - 118)  BP: 119/72 (01-20-18 @ 07:00) (77/54 - 157/66)  RR: 18 (01-20-18 @ 07:00) (15 - 45)  SpO2: 89% (01-20-18 @ 07:00) (86% - 95%)  Wt(kg): --    I/O:  I&O's Summary    19 Jan 2018 07:01  -  20 Jan 2018 07:00  --------------------------------------------------------  IN: 530 mL / OUT: 150 mL / NET: 380 mL        PHYSICAL EXAM:  Appearance: NAD. Speaking in full sentences.   HEENT:   Conjunctiva clear b/l. Moist oral mucosa.  Cardiovascular: RRR with no murmurs.  Respiratory: Lungs CTAB.   Gastrointestinal:  Soft, nontender. Non-distended. Non-rigid.	  Extremities: No edema b/l. No erythema b/l. LE WWP b/l.  Vascular: DP 2+ b/l.  Neurologic:  Alert and awake. Moving all extremities. Following commands. Making eye contact.  	  LABS:                        7.5    11.8  )-----------( 520      ( 20 Jan 2018 04:52 )             23.4     01-20    133<L>  |  95<L>  |  10  ----------------------------<  112<H>  4.0   |  25  |  0.96    Ca    8.0<L>      20 Jan 2018 04:52  Phos  3.1     01-20  Mg     1.8     01-20            RADIOLOGY & ADDITIONAL TESTS:  Reviewed .    MEDICATIONS  (STANDING):  ALBUTerol/ipratropium for Nebulization 3 milliLiter(s) Nebulizer every 6 hours  ALPRAZolam 0.25 milliGRAM(s) Oral at bedtime  amLODIPine   Tablet 10 milliGRAM(s) Oral daily  benzonatate 100 milliGRAM(s) Oral every 8 hours  dextrose 5%. 1000 milliLiter(s) (50 mL/Hr) IV Continuous <Continuous>  dextrose 50% Injectable 12.5 Gram(s) IV Push once  dextrose 50% Injectable 25 Gram(s) IV Push once  dextrose 50% Injectable 25 Gram(s) IV Push once  enoxaparin Injectable 40 milliGRAM(s) SubCutaneous daily  insulin lispro (HumaLOG) corrective regimen sliding scale   SubCutaneous Before meals and at bedtime  levoFLOXacin  Tablet 750 milliGRAM(s) Oral every 24 hours  magnesium sulfate  IVPB 2 Gram(s) IV Intermittent once  magnesium sulfate  IVPB 1 Gram(s) IV Intermittent once  OLANZapine Disintegrating Tablet 5 milliGRAM(s) Oral daily  oxyCODONE    IR 10 milliGRAM(s) Oral every 6 hours  psyllium Powder 1 Packet(s) Oral two times a day  senna 2 Tablet(s) Oral at bedtime    MEDICATIONS  (PRN):  acetaminophen   Tablet 650 milliGRAM(s) Oral every 6 hours PRN For Temp greater than 38 C (100.4 F)  dextrose Gel 1 Dose(s) Oral once PRN Blood Glucose LESS THAN 70 milliGRAM(s)/deciliter  glucagon  Injectable 1 milliGRAM(s) IntraMuscular once PRN Glucose LESS THAN 70 milligrams/deciliter  loperamide 2 milliGRAM(s) Oral every 2 hours PRN Diarrhea  oxyCODONE    IR 5 milliGRAM(s) Oral every 4 hours PRN Severe Pain (7 - 10) or SOB  polyethylene glycol 3350 17 Gram(s) Oral daily PRN Constipation      ASSESSMENT:    PLAN: PROGRESS NOTE    CC: nausea, vomiting diarrhea, RLL obstructing mass concerning for post obstructive PNA.   Overnight Events: No additional fevers, some desaturations but asymptomatic on 4L NC.  Interval History:  Reports some dyspnea intermittently. No pain otherwise. No chest pain, nausea vomiting. Of note, after initial exam, called to bedside for vomiting after starting IV Mag supplementation. Given 500cc bolus, stopped magnesium and repeating 12 lead to evaluate QTc.   ROS: As above.    OBJECTIVE  Vitals:  T(C): 37.4 (01-20-18 @ 04:11), Max: 38.2 (01-19-18 @ 11:00)  HR: 116 (01-20-18 @ 07:00) (104 - 118)  BP: 119/72 (01-20-18 @ 07:00) (77/54 - 157/66)  RR: 18 (01-20-18 @ 07:00) (15 - 45)  SpO2: 89% (01-20-18 @ 07:00) (86% - 95%)  Wt(kg): --    I/O:  I&O's Summary    19 Jan 2018 07:01  -  20 Jan 2018 07:00  --------------------------------------------------------  IN: 530 mL / OUT: 150 mL / NET: 380 mL        PHYSICAL EXAM:  Appearance: NAD. Speaking in full sentences- though noted desats after talking for prolonged time- no noted accessory muscle use.  HEENT:   Conjunctiva clear b/l. Dry oral mucosa.  Cardiovascular: Tachycardia with regular rhythm. S1, S2 with 3/6 systolic murmur along upper right and left sternal border.   Respiratory: Lungs with intermittent wheezing throughout and decreased breath sounds on R side.   Gastrointestinal:  Soft, nontender. Non-distended. Non-rigid.	  Extremities: No edema b/l. No erythema b/l. LE WWP b/l.  Vascular: DP 2+ b/l.  Neurologic:  Alert and awake. Moving all extremities. Following commands. Making eye contact.  	  LABS:                        7.5    11.8  )-----------( 520      ( 20 Jan 2018 04:52 )             23.4     01-20    133<L>  |  95<L>  |  10  ----------------------------<  112<H>  4.0   |  25  |  0.96    Ca    8.0<L>      20 Jan 2018 04:52  Phos  3.1     01-20  Mg     1.8     01-20            RADIOLOGY & ADDITIONAL TESTS:  Reviewed .    MEDICATIONS  (STANDING):  ALBUTerol/ipratropium for Nebulization 3 milliLiter(s) Nebulizer every 6 hours  ALPRAZolam 0.25 milliGRAM(s) Oral at bedtime  amLODIPine   Tablet 10 milliGRAM(s) Oral daily  benzonatate 100 milliGRAM(s) Oral every 8 hours  dextrose 5%. 1000 milliLiter(s) (50 mL/Hr) IV Continuous <Continuous>  dextrose 50% Injectable 12.5 Gram(s) IV Push once  dextrose 50% Injectable 25 Gram(s) IV Push once  dextrose 50% Injectable 25 Gram(s) IV Push once  enoxaparin Injectable 40 milliGRAM(s) SubCutaneous daily  insulin lispro (HumaLOG) corrective regimen sliding scale   SubCutaneous Before meals and at bedtime  levoFLOXacin  Tablet 750 milliGRAM(s) Oral every 24 hours  magnesium sulfate  IVPB 2 Gram(s) IV Intermittent once  magnesium sulfate  IVPB 1 Gram(s) IV Intermittent once  OLANZapine Disintegrating Tablet 5 milliGRAM(s) Oral daily  oxyCODONE    IR 10 milliGRAM(s) Oral every 6 hours  psyllium Powder 1 Packet(s) Oral two times a day  senna 2 Tablet(s) Oral at bedtime    MEDICATIONS  (PRN):  acetaminophen   Tablet 650 milliGRAM(s) Oral every 6 hours PRN For Temp greater than 38 C (100.4 F)  dextrose Gel 1 Dose(s) Oral once PRN Blood Glucose LESS THAN 70 milliGRAM(s)/deciliter  glucagon  Injectable 1 milliGRAM(s) IntraMuscular once PRN Glucose LESS THAN 70 milligrams/deciliter  loperamide 2 milliGRAM(s) Oral every 2 hours PRN Diarrhea  oxyCODONE    IR 5 milliGRAM(s) Oral every 4 hours PRN Severe Pain (7 - 10) or SOB  polyethylene glycol 3350 17 Gram(s) Oral daily PRN Constipation      ASSESSMENT:    PLAN:

## 2018-01-20 NOTE — PROGRESS NOTE ADULT - PROBLEM SELECTOR PLAN 5
Findings of RBBB on EKG. Repeat 12 lead in setting of vomiting: Findings of RBBB on EKG. 12 lead from monitor consistent with RBBB with NSR. Official EKG ordered and pending.

## 2018-01-20 NOTE — PROGRESS NOTE ADULT - PROBLEM SELECTOR PLAN 2
Patient has Stage IV lung CA w/ mets to brain and bone s/p RT to bony mets on R 8th rib last week, and had gamma knife tx to brain (Jan 9th). Concern initially for post obstructive PNA ongoing from RLL obstructing mass S/p Bronch with debulking of obstructive tumor with lazer, balloon dilation and cry for bleeding. Findings on bronch not consistent with infectious- though will continue to monitor and remains on levaquin.  -c/w levaquin  -Plan for Radiation to lung on Monday Patient has Stage IV lung CA w/ mets to brain and bone s/p RT to bony mets on R 8th rib last week, and had gamma knife tx to brain (Jan 9th). Concern initially for post obstructive PNA ongoing from RLL obstructing mass S/p Bronch with debulking of obstructive tumor with lazer, balloon dilation and cry for bleeding. Findings on bronch not consistent with infectious- though will continue to monitor and remains on levaquin.  -c/w levaquin PO  -Plan for Radiation to lung on Monday

## 2018-01-20 NOTE — PROGRESS NOTE ADULT - ASSESSMENT
Mrs. 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  Presents with nausea and vomiting and blood streaked hemoptysis.  Found to have enlarging RLL mass with consolidation now s/p debulking of endobronchial lesion in RLL.

## 2018-01-20 NOTE — PROGRESS NOTE ADULT - PROBLEM SELECTOR PLAN 2
No evidence of acute post-obstructive pneumonia. F/U BAL cultures however not likely to have significant infectious burden. Agree with de-escalation of antibiotics and recommend completing course of levaquin  - SOB this morning more likely related to vomiting episodes. Would avoid BiPAP if she continues to feel nauseated/bringing up secretions. Oxygen saturation has been stable, if needed can try high flow nasal cannula

## 2018-01-20 NOTE — PROGRESS NOTE ADULT - PROBLEM SELECTOR PLAN 8
F: PO intake  E: Replete electrolytes to maintain K>4 and Mg>2  N:  Restarted DASH/TLC/DM Diet as tolerated    VTE: Lovenox    FULL CODE    DIspo: Admitted to MICU for monitoring respiratory status in setting of bronchoscopy for debulking of obstructive mass on RLL.

## 2018-01-20 NOTE — PROGRESS NOTE ADULT - PROBLEM SELECTOR PLAN 1
Met sepsis criteria on presentation with concern for etiology of post-obstructive PNA of RLL (in setting of metastatic lung Ca) for which patient went for bronchoscopy found RLL oozing and hemorrhage and transferred to MICU for respiratory status monitoring. Has been on vancomycin (1/14/18), meropenem (1/14/18) and levaquin (1/16/18)- levaquin added for breakthrough fevers for dual pseudomonas coverage. Went for bronch with findings of obstructed RUL, patent Rmiddle Lobe, and obstructive mass in RLL debulked with lazer and balloon dilation. Cryo for bleeding stopped. Also based on findings no evidence for infectious process ongoing- BAL obtained.   -c/w Levaquin (Day 4). No evidence of acute post-obstructive PNA based on bronch (discontinued vanc and meropenem yesterday)  F/u BAL and monitor for signs of infection.    -Radiation planned for lungs on Monday. Met sepsis criteria on presentation with concern for etiology of post-obstructive PNA of RLL (in setting of metastatic lung Ca) for which patient went for bronchoscopy found RLL oozing and hemorrhage and transferred to MICU for respiratory status monitoring. Has been on vancomycin (1/14/18), meropenem (1/14/18) and levaquin (1/16/18)- levaquin added for breakthrough fevers for dual pseudomonas coverage. Went for bronch with findings of obstructed RUL, patent Rmiddle Lobe, and obstructive mass in RLL debulked with lazer and balloon dilation. Cryo for bleeding stopped. Also based on findings no evidence for infectious process ongoing- BAL obtained.   -c/w Levaquin (Day 4). No evidence of acute post-obstructive PNA based on bronch (discontinued vanc and meropenem yesterday)  F/u BAL and monitor for signs of infection and can further broaden.  -Radiation planned for lungs on Monday.

## 2018-01-20 NOTE — PROGRESS NOTE ADULT - PROBLEM SELECTOR PLAN 3
Found to have prolonged QTc last at 490. EKG not performed prior to bronch yesterday- official EKG ordered, s/p vomiting 12 lead from monitor obtained: Found to have prolonged QTc last at 490. EKG not performed prior to bronch yesterday- official EKG ordered, s/p vomiting 12 lead from monitor obtained: QTc 498. Cautious with QT prolonging agents.

## 2018-01-20 NOTE — PROGRESS NOTE ADULT - SUBJECTIVE AND OBJECTIVE BOX
OVERNIGHT EVENTS:   Pt felt extremely well after procedure, this morning had an episode of vomiting after getting magnesium repletion and felt some SOB. Continues to cough up clear to whitish phlegm. Low grade fevers overnight.    ICU Vital Signs Last 24 Hrs  T(C): 37.6 (20 Jan 2018 09:58), Max: 38 (19 Jan 2018 19:00)  T(F): 99.7 (20 Jan 2018 09:58), Max: 100.4 (19 Jan 2018 19:00)  HR: 116 (20 Jan 2018 11:00) (104 - 154)  BP: 105/54 (20 Jan 2018 11:00) (77/54 - 157/66)  BP(mean): 75 (20 Jan 2018 11:00) (55 - 98)  ABP: --  ABP(mean): --  RR: 21 (20 Jan 2018 11:00) (15 - 27)  SpO2: 97% (20 Jan 2018 11:00) (86% - 98%)    01-19 @ 07:01  -  01-20 @ 07:00  --------------------------------------------------------  IN: 530 mL / OUT: 150 mL / NET: 380 mL          MEDICATIONS:      EXAMINATION:  General:  Appears slightly uncomfortable, had just vomited, but no acute respiratory distress  HEENT: MP: Neck is supple, No JVD  Cardio: s1s2 RRR. No murmurs.   Respiratory: Decreased BS b/l bases, mild expiratory wheezes   Adomen:  soft, NT  Extremities:  no edema  Skin:  warm/dry  Neuro:  awake, alert, oriented x 3, follows commands, moves all extremities    < from: Xray Chest 1 View AP -PORTABLE-Routine (01.20.18 @ 06:07) >  Impression: Right effusion. Underlying mass and/or infiltrates cannot be   excluded    < end of copied text >

## 2018-01-20 NOTE — PROGRESS NOTE ADULT - PROBLEM SELECTOR PLAN 1
Mrs. Bhatti's history of adenocarcinoma with CT evidence of enlargement of the tumor site and narrowing of the RLL airways make the diagnosis of post-obstructive pneumonia very likely.    - She is now s/p rigid bronchoscopy with tumor debulking and opening of the medial basal segment of RLL. There was no apparent pus in airway once opened, less likely acute ongoing infection and more likely symptoms related to tumor progression  - pt to receive palliative radiation therapy.

## 2018-01-20 NOTE — PROGRESS NOTE ADULT - PROBLEM SELECTOR PLAN 7
Hx of HTN on amlodipine and valasartan last admission. Held in setting of presenting sepsis and remains normotensive.  -will continue to hold and monitor HD.

## 2018-01-20 NOTE — PROGRESS NOTE ADULT - ASSESSMENT
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 Patient went for intervention today for which RLL obstructing mass debulked with balloon dilation, lazer and cryo for bleeding.

## 2018-01-21 LAB
ANION GAP SERPL CALC-SCNC: 12 MMOL/L — SIGNIFICANT CHANGE UP (ref 5–17)
APPEARANCE UR: (no result)
BILIRUB UR-MCNC: (no result)
BUN SERPL-MCNC: 11 MG/DL — SIGNIFICANT CHANGE UP (ref 7–23)
CALCIUM SERPL-MCNC: 8.4 MG/DL — SIGNIFICANT CHANGE UP (ref 8.4–10.5)
CHLORIDE SERPL-SCNC: 97 MMOL/L — SIGNIFICANT CHANGE UP (ref 96–108)
CO2 SERPL-SCNC: 26 MMOL/L — SIGNIFICANT CHANGE UP (ref 22–31)
COLOR SPEC: YELLOW — SIGNIFICANT CHANGE UP
CREAT SERPL-MCNC: 1 MG/DL — SIGNIFICANT CHANGE UP (ref 0.5–1.3)
CULTURE RESULTS: NO GROWTH — SIGNIFICANT CHANGE UP
CULTURE RESULTS: SIGNIFICANT CHANGE UP
DIFF PNL FLD: (no result)
GLUCOSE BLDC GLUCOMTR-MCNC: 111 MG/DL — HIGH (ref 70–99)
GLUCOSE BLDC GLUCOMTR-MCNC: 121 MG/DL — HIGH (ref 70–99)
GLUCOSE BLDC GLUCOMTR-MCNC: 161 MG/DL — HIGH (ref 70–99)
GLUCOSE SERPL-MCNC: 113 MG/DL — HIGH (ref 70–99)
GLUCOSE UR QL: NEGATIVE — SIGNIFICANT CHANGE UP
HCT VFR BLD CALC: 23.1 % — LOW (ref 34.5–45)
HGB BLD-MCNC: 7.2 G/DL — LOW (ref 11.5–15.5)
KETONES UR-MCNC: (no result) MG/DL
LEGIONELLA AG UR QL: NEGATIVE — SIGNIFICANT CHANGE UP
LEUKOCYTE ESTERASE UR-ACNC: NEGATIVE — SIGNIFICANT CHANGE UP
MAGNESIUM SERPL-MCNC: 2 MG/DL — SIGNIFICANT CHANGE UP (ref 1.6–2.6)
MCHC RBC-ENTMCNC: 25.1 PG — LOW (ref 27–34)
MCHC RBC-ENTMCNC: 31.2 G/DL — LOW (ref 32–36)
MCV RBC AUTO: 80.5 FL — SIGNIFICANT CHANGE UP (ref 80–100)
NITRITE UR-MCNC: NEGATIVE — SIGNIFICANT CHANGE UP
PH UR: 5.5 — SIGNIFICANT CHANGE UP (ref 5–8)
PHOSPHATE SERPL-MCNC: 3 MG/DL — SIGNIFICANT CHANGE UP (ref 2.5–4.5)
PLATELET # BLD AUTO: 606 K/UL — HIGH (ref 150–400)
POTASSIUM SERPL-MCNC: 3.9 MMOL/L — SIGNIFICANT CHANGE UP (ref 3.5–5.3)
POTASSIUM SERPL-SCNC: 3.9 MMOL/L — SIGNIFICANT CHANGE UP (ref 3.5–5.3)
PROT UR-MCNC: (no result) MG/DL
RBC # BLD: 2.87 M/UL — LOW (ref 3.8–5.2)
RBC # FLD: 15.3 % — SIGNIFICANT CHANGE UP (ref 10.3–16.9)
SODIUM SERPL-SCNC: 135 MMOL/L — SIGNIFICANT CHANGE UP (ref 135–145)
SP GR SPEC: 1.02 — SIGNIFICANT CHANGE UP (ref 1–1.03)
SPECIMEN SOURCE: SIGNIFICANT CHANGE UP
SPECIMEN SOURCE: SIGNIFICANT CHANGE UP
UROBILINOGEN FLD QL: 0.2 E.U./DL — SIGNIFICANT CHANGE UP
WBC # BLD: 13.6 K/UL — HIGH (ref 3.8–10.5)
WBC # FLD AUTO: 13.6 K/UL — HIGH (ref 3.8–10.5)

## 2018-01-21 PROCEDURE — 99232 SBSQ HOSP IP/OBS MODERATE 35: CPT

## 2018-01-21 PROCEDURE — 99233 SBSQ HOSP IP/OBS HIGH 50: CPT | Mod: GC

## 2018-01-21 PROCEDURE — 93010 ELECTROCARDIOGRAM REPORT: CPT

## 2018-01-21 PROCEDURE — 71045 X-RAY EXAM CHEST 1 VIEW: CPT | Mod: 26

## 2018-01-21 RX ORDER — POTASSIUM CHLORIDE 20 MEQ
10 PACKET (EA) ORAL ONCE
Qty: 0 | Refills: 0 | Status: COMPLETED | OUTPATIENT
Start: 2018-01-21 | End: 2018-01-21

## 2018-01-21 RX ADMIN — Medication 3 MILLILITER(S): at 05:12

## 2018-01-21 RX ADMIN — OXYCODONE HYDROCHLORIDE 10 MILLIGRAM(S): 5 TABLET ORAL at 19:04

## 2018-01-21 RX ADMIN — Medication 1 PACKET(S): at 06:09

## 2018-01-21 RX ADMIN — Medication 0.25 MILLIGRAM(S): at 22:45

## 2018-01-21 RX ADMIN — Medication 2: at 23:31

## 2018-01-21 RX ADMIN — POLYETHYLENE GLYCOL 3350 17 GRAM(S): 17 POWDER, FOR SOLUTION ORAL at 22:45

## 2018-01-21 RX ADMIN — Medication 300 MILLIGRAM(S): at 23:10

## 2018-01-21 RX ADMIN — AMLODIPINE BESYLATE 10 MILLIGRAM(S): 2.5 TABLET ORAL at 06:09

## 2018-01-21 RX ADMIN — Medication 1 PACKET(S): at 19:15

## 2018-01-21 RX ADMIN — OXYCODONE HYDROCHLORIDE 10 MILLIGRAM(S): 5 TABLET ORAL at 06:24

## 2018-01-21 RX ADMIN — Medication 650 MILLIGRAM(S): at 12:43

## 2018-01-21 RX ADMIN — OLANZAPINE 5 MILLIGRAM(S): 15 TABLET, FILM COATED ORAL at 12:43

## 2018-01-21 RX ADMIN — ENOXAPARIN SODIUM 40 MILLIGRAM(S): 100 INJECTION SUBCUTANEOUS at 13:30

## 2018-01-21 RX ADMIN — OXYCODONE HYDROCHLORIDE 10 MILLIGRAM(S): 5 TABLET ORAL at 01:07

## 2018-01-21 RX ADMIN — Medication 2: at 17:04

## 2018-01-21 RX ADMIN — Medication 100 MILLIGRAM(S): at 16:28

## 2018-01-21 RX ADMIN — Medication 100 MILLIGRAM(S): at 06:09

## 2018-01-21 RX ADMIN — OXYCODONE HYDROCHLORIDE 10 MILLIGRAM(S): 5 TABLET ORAL at 18:00

## 2018-01-21 RX ADMIN — Medication 10 MILLIEQUIVALENT(S): at 13:29

## 2018-01-21 RX ADMIN — OXYCODONE HYDROCHLORIDE 10 MILLIGRAM(S): 5 TABLET ORAL at 12:44

## 2018-01-21 RX ADMIN — Medication 3 MILLILITER(S): at 23:31

## 2018-01-21 RX ADMIN — Medication 3 MILLILITER(S): at 02:06

## 2018-01-21 RX ADMIN — OXYCODONE HYDROCHLORIDE 10 MILLIGRAM(S): 5 TABLET ORAL at 13:09

## 2018-01-21 RX ADMIN — Medication 100 MILLIGRAM(S): at 22:45

## 2018-01-21 RX ADMIN — Medication 3 MILLILITER(S): at 11:47

## 2018-01-21 RX ADMIN — POLYETHYLENE GLYCOL 3350 17 GRAM(S): 17 POWDER, FOR SOLUTION ORAL at 06:14

## 2018-01-21 RX ADMIN — Medication 3 MILLILITER(S): at 19:15

## 2018-01-21 RX ADMIN — Medication 650 MILLIGRAM(S): at 01:07

## 2018-01-21 RX ADMIN — SENNA PLUS 2 TABLET(S): 8.6 TABLET ORAL at 22:48

## 2018-01-21 RX ADMIN — OXYCODONE HYDROCHLORIDE 10 MILLIGRAM(S): 5 TABLET ORAL at 01:20

## 2018-01-21 RX ADMIN — OXYCODONE HYDROCHLORIDE 5 MILLIGRAM(S): 5 TABLET ORAL at 06:15

## 2018-01-21 NOTE — PROGRESS NOTE ADULT - ATTENDING COMMENTS
Patient seen and examined with house-staff during bedside rounds.  Resident note read, including vitals, physical findings, laboratory data, and radiological reports.   Revisions included below.  Direct personal management at bed side and extensive interpretation of the data.  Plan was outlined and discussed in details with the housestaff.  Decision making of high complexity  she is stable and she is to continue on the current regimen.  Await RT

## 2018-01-21 NOTE — PROGRESS NOTE ADULT - PROBLEM SELECTOR PLAN 8
F: PO intake  E: Replete electrolytes to maintain K>4 and Mg>2  N:  Restarted DASH/TLC/DM Diet as tolerated    VTE: Lovenox    FULL CODE    DIspo: [id number]

## 2018-01-21 NOTE — PROGRESS NOTE ADULT - PROBLEM SELECTOR PLAN 2
Patient has Stage IV lung CA w/ mets to brain and bone s/p RT to bony mets on R 8th rib last week, and had gamma knife tx to brain (Jan 9th).   -Plan for Radiation to lung on Monday

## 2018-01-21 NOTE — PROGRESS NOTE ADULT - SUBJECTIVE AND OBJECTIVE BOX
INTERVAL HPI/OVERNIGHT EVENTS:    SUBJECTIVE: Patient seen and examined at bedside.    OBJECTIVE:    VITAL SIGNS:  ICU Vital Signs Last 24 Hrs  T(C): 36.9 (2018 04:35), Max: 38.7 (2018 13:00)  T(F): 98.5 (2018 04:35), Max: 101.7 (2018 13:00)  HR: 112 (2018 05:00) (102 - 154)  BP: 119/56 (2018 05:00) (91/52 - 141/60)  BP(mean): 71 (2018 05:00) (60 - 92)  ABP: --  ABP(mean): --  RR: 23 (2018 05:00) (12 - 25)  SpO2: 95% (2018 05:00) (88% - 98%)         @ 07:  -  20 @ 07:00  --------------------------------------------------------  IN: 530 mL / OUT: 150 mL / NET: 380 mL     @ 07:01  -  -21 @ 06:14  --------------------------------------------------------  IN: 300 mL / OUT: 1125 mL / NET: -825 mL      CAPILLARY BLOOD GLUCOSE  116 (2018 11:15)      POCT Blood Glucose.: 116 mg/dL (2018 22:17)      PHYSICAL EXAM:    Constitutional: WDWN resting comfortably in bed; NAD  HEENT: NC/AT; PERRL, anicteric sclera; MMM  Neck: supple, no JVD  Cardiovascular: +S1/S2; RRR; no M/R/G  Respiratory: CTA B/L; no W/R/R; respirations appear non-labored  Gastrointestinal: abdomen soft, NT/ND; +BS x4  Extremities: WWP; no clubbing, cyanosis or edema  Vascular: 2+ radial, femoral, DP/PT pulses B/L  Dermatologic: skin normal color and turgor; no visible rashes  Neurological:     MEDICATIONS:  MEDICATIONS  (STANDING):  ALBUTerol/ipratropium for Nebulization 3 milliLiter(s) Nebulizer every 4 hours  ALPRAZolam 0.25 milliGRAM(s) Oral at bedtime  amLODIPine   Tablet 10 milliGRAM(s) Oral daily  benzonatate 100 milliGRAM(s) Oral every 8 hours  dextrose 5%. 1000 milliLiter(s) (50 mL/Hr) IV Continuous <Continuous>  dextrose 50% Injectable 12.5 Gram(s) IV Push once  dextrose 50% Injectable 25 Gram(s) IV Push once  dextrose 50% Injectable 25 Gram(s) IV Push once  enoxaparin Injectable 40 milliGRAM(s) SubCutaneous daily  insulin lispro (HumaLOG) corrective regimen sliding scale   SubCutaneous Before meals and at bedtime  levoFLOXacin  Tablet 750 milliGRAM(s) Oral every 24 hours  magnesium sulfate  IVPB 2 Gram(s) IV Intermittent once  OLANZapine Disintegrating Tablet 5 milliGRAM(s) Oral daily  oxyCODONE    IR 10 milliGRAM(s) Oral every 6 hours  polyethylene glycol 3350 17 Gram(s) Oral at bedtime  psyllium Powder 1 Packet(s) Oral two times a day  senna 2 Tablet(s) Oral at bedtime    MEDICATIONS  (PRN):  acetaminophen   Tablet 650 milliGRAM(s) Oral every 6 hours PRN For Temp greater than 38 C (100.4 F)  dextrose Gel 1 Dose(s) Oral once PRN Blood Glucose LESS THAN 70 milliGRAM(s)/deciliter  glucagon  Injectable 1 milliGRAM(s) IntraMuscular once PRN Glucose LESS THAN 70 milligrams/deciliter  loperamide 2 milliGRAM(s) Oral every 2 hours PRN Diarrhea  oxyCODONE    IR 5 milliGRAM(s) Oral every 4 hours PRN Severe Pain (7 - 10) or SOB      ALLERGIES:  Allergies    penicillins (Hives)    Intolerances        LABS:                        7.2    13.6  )-----------( 606      ( 2018 04:20 )             23.1     01-21    135  |  97  |  11  ----------------------------<  113<H>  3.9   |  26  |  1.00    Ca    8.4      2018 04:20  Phos  3.0       Mg     2.0               Urinalysis Basic - ( 2018 01:04 )    Color: Yellow / Appearance: Cloudy / S.025 / pH: x  Gluc: x / Ketone: Trace mg/dL  / Bili: Small / Urobili: 0.2 E.U./dL   Blood: x / Protein: Trace mg/dL / Nitrite: NEGATIVE   Leuk Esterase: NEGATIVE / RBC: < 5 /HPF / WBC < 5 /HPF   Sq Epi: x / Non Sq Epi: 5-10 /HPF / Bacteria: Many /HPF            RADIOLOGY & ADDITIONAL TESTS: Reviewed. INTERVAL HPI/OVERNIGHT EVENTS:  Increased frequency of duonebs to q4h for increased wheezing. Patient was retaining 800ccs at 10PM, UA sent. Patient given tylenol for fever to 101.1.    SUBJECTIVE:   Patient seen and examined at bedside. Patient says she felt feverish overnight but is now feeling better. She denies cough, shortness of breath, chills, headache, arthralgias, nausea or vomiting. She does express concern about the lability of her fevers.     OBJECTIVE:    VITAL SIGNS:  ICU Vital Signs Last 24 Hrs  T(C): 36.9 (2018 04:35), Max: 38.7 (2018 13:00)  T(F): 98.5 (2018 04:35), Max: 101.7 (2018 13:00)  HR: 112 (2018 05:00) (102 - 154)  BP: 119/56 (2018 05:00) (91/52 - 141/60)  BP(mean): 71 (2018 05:00) (60 - 92)  ABP: --  ABP(mean): --  RR: 23 (2018 05:00) (12 - 25)  SpO2: 95% (2018 05:00) (88% - 98%)         @ 07: @ 07:00  --------------------------------------------------------  IN: 530 mL / OUT: 150 mL / NET: 380 mL     @ 07: @ 06:14  --------------------------------------------------------  IN: 300 mL / OUT: 1125 mL / NET: -825 mL      CAPILLARY BLOOD GLUCOSE  116 (2018 11:15)      POCT Blood Glucose.: 116 mg/dL (2018 22:17)      PHYSICAL EXAM:    Constitutional: WDWN resting comfortably in bed; NAD  HEENT: NC/AT; PERRL, anicteric sclera; MMM  Neck: supple, no JVD  Cardiovascular: +S1/S2; RRR; no M/R/G  Respiratory: course breath sounds in the right lower lobe. respirations appear non-labored  Gastrointestinal: abdomen soft, NT/ND; +BS x4  Extremities: WWP; no clubbing, cyanosis or edema  Vascular: 2+ radial, femoral, DP/PT pulses B/L  Dermatologic: skin normal color and turgor; no visible rashes  Neurological: AAOx3, appropriately interactive    MEDICATIONS:  MEDICATIONS  (STANDING):  ALBUTerol/ipratropium for Nebulization 3 milliLiter(s) Nebulizer every 4 hours  ALPRAZolam 0.25 milliGRAM(s) Oral at bedtime  amLODIPine   Tablet 10 milliGRAM(s) Oral daily  benzonatate 100 milliGRAM(s) Oral every 8 hours  dextrose 5%. 1000 milliLiter(s) (50 mL/Hr) IV Continuous <Continuous>  dextrose 50% Injectable 12.5 Gram(s) IV Push once  dextrose 50% Injectable 25 Gram(s) IV Push once  dextrose 50% Injectable 25 Gram(s) IV Push once  enoxaparin Injectable 40 milliGRAM(s) SubCutaneous daily  insulin lispro (HumaLOG) corrective regimen sliding scale   SubCutaneous Before meals and at bedtime  levoFLOXacin  Tablet 750 milliGRAM(s) Oral every 24 hours  magnesium sulfate  IVPB 2 Gram(s) IV Intermittent once  OLANZapine Disintegrating Tablet 5 milliGRAM(s) Oral daily  oxyCODONE    IR 10 milliGRAM(s) Oral every 6 hours  polyethylene glycol 3350 17 Gram(s) Oral at bedtime  psyllium Powder 1 Packet(s) Oral two times a day  senna 2 Tablet(s) Oral at bedtime    MEDICATIONS  (PRN):  acetaminophen   Tablet 650 milliGRAM(s) Oral every 6 hours PRN For Temp greater than 38 C (100.4 F)  dextrose Gel 1 Dose(s) Oral once PRN Blood Glucose LESS THAN 70 milliGRAM(s)/deciliter  glucagon  Injectable 1 milliGRAM(s) IntraMuscular once PRN Glucose LESS THAN 70 milligrams/deciliter  loperamide 2 milliGRAM(s) Oral every 2 hours PRN Diarrhea  oxyCODONE    IR 5 milliGRAM(s) Oral every 4 hours PRN Severe Pain (7 - 10) or SOB      ALLERGIES:  Allergies    penicillins (Hives)    Intolerances        LABS:                        7.2    13.6  )-----------( 606      ( 2018 04:20 )             23.1         135  |  97  |  11  ----------------------------<  113<H>  3.9   |  26  |  1.00    Ca    8.4      2018 04:20  Phos  3.0       Mg     2.0               Urinalysis Basic - ( 2018 01:04 )    Color: Yellow / Appearance: Cloudy / S.025 / pH: x  Gluc: x / Ketone: Trace mg/dL  / Bili: Small / Urobili: 0.2 E.U./dL   Blood: x / Protein: Trace mg/dL / Nitrite: NEGATIVE   Leuk Esterase: NEGATIVE / RBC: < 5 /HPF / WBC < 5 /HPF   Sq Epi: x / Non Sq Epi: 5-10 /HPF / Bacteria: Many /HPF            RADIOLOGY & ADDITIONAL TESTS: Reviewed. TRANSFER NOTE 7EA TO 7LA    HOSPITAL COURSE:     Patient is a 62 year  PMHX of HTN, DM, and Stage IV lung CA w/ mets to brain and bone s/p RT to bony met on R rib and gamma knife tx to brain presented with 4 days of nausea, vomiting, diarrhea admitted initially to [facility] for sepsis 2/2 to obstructive PNA in setting of metastatic lung cancer s/p bronschoscopy and now transferred to MICU for monitoring respiratory status s/p bronchoscopy as found to have hemorrhage in RLL. Patient went for intervention yesterday for which RLL obstructing mass debulked with balloon dilation, lazer and cryo for bleeding. Patient remains intermittently febrile with negative bcx but saturating well on room air.       INTERVAL HPI/OVERNIGHT EVENTS:  Increased frequency of duonebs to q4h for increased wheezing. Patient was retaining 800ccs at 10PM, UA sent. Patient given tylenol for fever to 101.1.    SUBJECTIVE:   Patient seen and examined at bedside. Patient says she felt feverish overnight but is now feeling better. She denies cough, shortness of breath, chills, headache, arthralgias, nausea or vomiting. She does express concern about the lability of her fevers.     OBJECTIVE:    VITAL SIGNS:  ICU Vital Signs Last 24 Hrs  T(C): 36.9 (2018 04:35), Max: 38.7 (2018 13:00)  T(F): 98.5 (2018 04:35), Max: 101.7 (2018 13:00)  HR: 112 (2018 05:00) (102 - 154)  BP: 119/56 (2018 05:00) (91/52 - 141/60)  BP(mean): 71 (2018 05:00) (60 - 92)  ABP: --  ABP(mean): --  RR: 23 (2018 05:00) (12 - 25)  SpO2: 95% (2018 05:00) (88% - 98%)         @ 07:01  -   @ 07:00  --------------------------------------------------------  IN: 530 mL / OUT: 150 mL / NET: 380 mL     @ 07: @ 06:14  --------------------------------------------------------  IN: 300 mL / OUT: 1125 mL / NET: -825 mL      CAPILLARY BLOOD GLUCOSE  116 (2018 11:15)      POCT Blood Glucose.: 116 mg/dL (2018 22:17)      PHYSICAL EXAM:    Constitutional: WDWN resting comfortably in bed; NAD  HEENT: NC/AT; PERRL, anicteric sclera; MMM  Neck: supple, no JVD  Cardiovascular: +S1/S2; RRR; no M/R/G  Respiratory: course breath sounds in the right lower lobe. respirations appear non-labored  Gastrointestinal: abdomen soft, NT/ND; +BS x4  Extremities: WWP; no clubbing, cyanosis or edema  Vascular: 2+ radial, femoral, DP/PT pulses B/L  Dermatologic: skin normal color and turgor; no visible rashes  Neurological: AAOx3, appropriately interactive    MEDICATIONS:  MEDICATIONS  (STANDING):  ALBUTerol/ipratropium for Nebulization 3 milliLiter(s) Nebulizer every 4 hours  ALPRAZolam 0.25 milliGRAM(s) Oral at bedtime  amLODIPine   Tablet 10 milliGRAM(s) Oral daily  benzonatate 100 milliGRAM(s) Oral every 8 hours  dextrose 5%. 1000 milliLiter(s) (50 mL/Hr) IV Continuous <Continuous>  dextrose 50% Injectable 12.5 Gram(s) IV Push once  dextrose 50% Injectable 25 Gram(s) IV Push once  dextrose 50% Injectable 25 Gram(s) IV Push once  enoxaparin Injectable 40 milliGRAM(s) SubCutaneous daily  insulin lispro (HumaLOG) corrective regimen sliding scale   SubCutaneous Before meals and at bedtime  levoFLOXacin  Tablet 750 milliGRAM(s) Oral every 24 hours  magnesium sulfate  IVPB 2 Gram(s) IV Intermittent once  OLANZapine Disintegrating Tablet 5 milliGRAM(s) Oral daily  oxyCODONE    IR 10 milliGRAM(s) Oral every 6 hours  polyethylene glycol 3350 17 Gram(s) Oral at bedtime  psyllium Powder 1 Packet(s) Oral two times a day  senna 2 Tablet(s) Oral at bedtime    MEDICATIONS  (PRN):  acetaminophen   Tablet 650 milliGRAM(s) Oral every 6 hours PRN For Temp greater than 38 C (100.4 F)  dextrose Gel 1 Dose(s) Oral once PRN Blood Glucose LESS THAN 70 milliGRAM(s)/deciliter  glucagon  Injectable 1 milliGRAM(s) IntraMuscular once PRN Glucose LESS THAN 70 milligrams/deciliter  loperamide 2 milliGRAM(s) Oral every 2 hours PRN Diarrhea  oxyCODONE    IR 5 milliGRAM(s) Oral every 4 hours PRN Severe Pain (7 - 10) or SOB      ALLERGIES:  Allergies    penicillins (Hives)    Intolerances        LABS:                        7.2    13.6  )-----------( 606      ( 2018 04:20 )             23.1     01-    135  |  97  |  11  ----------------------------<  113<H>  3.9   |  26  |  1.00    Ca    8.4      2018 04:20  Phos  3.0     -  Mg     2.0               Urinalysis Basic - ( 2018 01:04 )    Color: Yellow / Appearance: Cloudy / S.025 / pH: x  Gluc: x / Ketone: Trace mg/dL  / Bili: Small / Urobili: 0.2 E.U./dL   Blood: x / Protein: Trace mg/dL / Nitrite: NEGATIVE   Leuk Esterase: NEGATIVE / RBC: < 5 /HPF / WBC < 5 /HPF   Sq Epi: x / Non Sq Epi: 5-10 /HPF / Bacteria: Many /HPF            RADIOLOGY & ADDITIONAL TESTS: Reviewed. TRANSFER NOTE 7EA TO 7LA    HOSPITAL COURSE:     Patient is a 62 year  PMHX of HTN, DM, and Stage IV lung CA w/ mets to brain and bone s/p RT to bony met on R rib and gamma knife tx to brain presented with 4 days of nausea, vomiting, diarrhea admitted initially to [facility] for sepsis 2/2 to obstructive PNA in setting of metastatic lung cancer. Patient underwent bronchoscopy on  which identified a RLL hemorrhage. Patient was subsequently transferred to the MICU for monitoring of her respiratory status.   In the MICU, patient has been intermittently febrile    levaquin for post-obstructive pna       RUL obstructed by tumor, R middle lobe patent, RLL obstruction w/ necrotic tissue and debulked with balloon, stopped bleeding with cryotherapy. Radiation oncology plans to start on Monday.    Patient went for intervention yesterday for which RLL obstructing mass debulked with balloon dilation, lazer and cryo for bleeding. Patient remains intermittently febrile with negative bcx but saturating well on room air.       INTERVAL HPI/OVERNIGHT EVENTS:  Increased frequency of duonebs to q4h for increased wheezing. Patient was retaining 800ccs at 10PM, UA sent. Patient given tylenol for fever to 101.1.    SUBJECTIVE:   Patient seen and examined at bedside. Patient says she felt feverish overnight but is now feeling better. She denies cough, shortness of breath, chills, headache, arthralgias, nausea or vomiting. She does express concern about the lability of her fevers.     OBJECTIVE:    VITAL SIGNS:  ICU Vital Signs Last 24 Hrs  T(C): 36.9 (2018 04:35), Max: 38.7 (2018 13:00)  T(F): 98.5 (2018 04:35), Max: 101.7 (2018 13:00)  HR: 112 (2018 05:00) (102 - 154)  BP: 119/56 (2018 05:00) (91/52 - 141/60)  BP(mean): 71 (2018 05:00) (60 - 92)  ABP: --  ABP(mean): --  RR: 23 (2018 05:00) (12 - 25)  SpO2: 95% (2018 05:00) (88% - 98%)        - @ 07: @ 07:00  --------------------------------------------------------  IN: 530 mL / OUT: 150 mL / NET: 380 mL     @ 07:01   @ 06:14  --------------------------------------------------------  IN: 300 mL / OUT: 1125 mL / NET: -825 mL      CAPILLARY BLOOD GLUCOSE  116 (2018 11:15)      POCT Blood Glucose.: 116 mg/dL (2018 22:17)      PHYSICAL EXAM:    Constitutional: WDWN resting comfortably in bed; NAD  HEENT: NC/AT; PERRL, anicteric sclera; MMM  Neck: supple, no JVD  Cardiovascular: +S1/S2; RRR; no M/R/G  Respiratory: course breath sounds in the right lower lobe. respirations appear non-labored  Gastrointestinal: abdomen soft, NT/ND; +BS x4  Extremities: WWP; no clubbing, cyanosis or edema  Vascular: 2+ radial, femoral, DP/PT pulses B/L  Dermatologic: skin normal color and turgor; no visible rashes  Neurological: AAOx3, appropriately interactive    MEDICATIONS:  MEDICATIONS  (STANDING):  ALBUTerol/ipratropium for Nebulization 3 milliLiter(s) Nebulizer every 4 hours  ALPRAZolam 0.25 milliGRAM(s) Oral at bedtime  amLODIPine   Tablet 10 milliGRAM(s) Oral daily  benzonatate 100 milliGRAM(s) Oral every 8 hours  dextrose 5%. 1000 milliLiter(s) (50 mL/Hr) IV Continuous <Continuous>  dextrose 50% Injectable 12.5 Gram(s) IV Push once  dextrose 50% Injectable 25 Gram(s) IV Push once  dextrose 50% Injectable 25 Gram(s) IV Push once  enoxaparin Injectable 40 milliGRAM(s) SubCutaneous daily  insulin lispro (HumaLOG) corrective regimen sliding scale   SubCutaneous Before meals and at bedtime  levoFLOXacin  Tablet 750 milliGRAM(s) Oral every 24 hours  magnesium sulfate  IVPB 2 Gram(s) IV Intermittent once  OLANZapine Disintegrating Tablet 5 milliGRAM(s) Oral daily  oxyCODONE    IR 10 milliGRAM(s) Oral every 6 hours  polyethylene glycol 3350 17 Gram(s) Oral at bedtime  psyllium Powder 1 Packet(s) Oral two times a day  senna 2 Tablet(s) Oral at bedtime    MEDICATIONS  (PRN):  acetaminophen   Tablet 650 milliGRAM(s) Oral every 6 hours PRN For Temp greater than 38 C (100.4 F)  dextrose Gel 1 Dose(s) Oral once PRN Blood Glucose LESS THAN 70 milliGRAM(s)/deciliter  glucagon  Injectable 1 milliGRAM(s) IntraMuscular once PRN Glucose LESS THAN 70 milligrams/deciliter  loperamide 2 milliGRAM(s) Oral every 2 hours PRN Diarrhea  oxyCODONE    IR 5 milliGRAM(s) Oral every 4 hours PRN Severe Pain (7 - 10) or SOB      ALLERGIES:  Allergies    penicillins (Hives)    Intolerances        LABS:                        7.2    13.6  )-----------( 606      ( 2018 04:20 )             23.1     01-21    135  |  97  |  11  ----------------------------<  113<H>  3.9   |  26  |  1.00    Ca    8.4      2018 04:20  Phos  3.0     01-21  Mg     2.0     -          Urinalysis Basic - ( 2018 01:04 )    Color: Yellow / Appearance: Cloudy / S.025 / pH: x  Gluc: x / Ketone: Trace mg/dL  / Bili: Small / Urobili: 0.2 E.U./dL   Blood: x / Protein: Trace mg/dL / Nitrite: NEGATIVE   Leuk Esterase: NEGATIVE / RBC: < 5 /HPF / WBC < 5 /HPF   Sq Epi: x / Non Sq Epi: 5-10 /HPF / Bacteria: Many /HPF            RADIOLOGY & ADDITIONAL TESTS: Reviewed. TRANSFER NOTE 7EA TO 7LA    HOSPITAL COURSE:     Patient is a 62 year  PMHX of HTN, DM, and Stage IV lung CA w/ mets to brain and bone s/p RT to bony met on R rib and gamma knife tx to brain presented with 4 days of nausea, vomiting, diarrhea admitted initially to [facility] for sepsis 2/2 to obstructive PNA in setting of metastatic lung cancer. Patient underwent bronchoscopy on  which identified a RLL hemorrhage. Patient was subsequently transferred to the MICU for monitoring of her respiratory status.     In the MICU, patient was treated with levaquin for post-obstructive pneumonia secondary to compression of the lung from her tumor burden. She is currently        RUL obstructed by tumor, R middle lobe patent, RLL obstruction w/ necrotic tissue and debulked with balloon, stopped bleeding with cryotherapy. Radiation oncology plans to start on Monday.    Patient went for intervention yesterday for which RLL obstructing mass debulked with balloon dilation, lazer and cryo for bleeding. Patient remains intermittently febrile with negative bcx but saturating well on room air.       INTERVAL HPI/OVERNIGHT EVENTS:  Increased frequency of duonebs to q4h for increased wheezing. Patient was retaining 800ccs at 10PM, UA sent. Patient given tylenol for fever to 101.1.    SUBJECTIVE:   Patient seen and examined at bedside. Patient says she felt feverish overnight but is now feeling better. She denies cough, shortness of breath, chills, headache, arthralgias, nausea or vomiting. She does express concern about the lability of her fevers.     OBJECTIVE:    VITAL SIGNS:  ICU Vital Signs Last 24 Hrs  T(C): 36.9 (2018 04:35), Max: 38.7 (2018 13:00)  T(F): 98.5 (2018 04:35), Max: 101.7 (2018 13:00)  HR: 112 (2018 05:00) (102 - 154)  BP: 119/56 (2018 05:00) (91/52 - 141/60)  BP(mean): 71 (2018 05:00) (60 - 92)  ABP: --  ABP(mean): --  RR: 23 (2018 05:00) (12 - 25)  SpO2: 95% (2018 05:00) (88% - 98%)         @ 07: @ 07:00  --------------------------------------------------------  IN: 530 mL / OUT: 150 mL / NET: 380 mL     @ 07: @ 06:14  --------------------------------------------------------  IN: 300 mL / OUT: 1125 mL / NET: -825 mL      CAPILLARY BLOOD GLUCOSE  116 (2018 11:15)      POCT Blood Glucose.: 116 mg/dL (2018 22:17)      PHYSICAL EXAM:    Constitutional: WDWN resting comfortably in bed; NAD  HEENT: NC/AT; PERRL, anicteric sclera; MMM  Neck: supple, no JVD  Cardiovascular: +S1/S2; RRR; no M/R/G  Respiratory: course breath sounds in the right lower lobe. respirations appear non-labored  Gastrointestinal: abdomen soft, NT/ND; +BS x4  Extremities: WWP; no clubbing, cyanosis or edema  Vascular: 2+ radial, femoral, DP/PT pulses B/L  Dermatologic: skin normal color and turgor; no visible rashes  Neurological: AAOx3, appropriately interactive    MEDICATIONS:  MEDICATIONS  (STANDING):  ALBUTerol/ipratropium for Nebulization 3 milliLiter(s) Nebulizer every 4 hours  ALPRAZolam 0.25 milliGRAM(s) Oral at bedtime  amLODIPine   Tablet 10 milliGRAM(s) Oral daily  benzonatate 100 milliGRAM(s) Oral every 8 hours  dextrose 5%. 1000 milliLiter(s) (50 mL/Hr) IV Continuous <Continuous>  dextrose 50% Injectable 12.5 Gram(s) IV Push once  dextrose 50% Injectable 25 Gram(s) IV Push once  dextrose 50% Injectable 25 Gram(s) IV Push once  enoxaparin Injectable 40 milliGRAM(s) SubCutaneous daily  insulin lispro (HumaLOG) corrective regimen sliding scale   SubCutaneous Before meals and at bedtime  levoFLOXacin  Tablet 750 milliGRAM(s) Oral every 24 hours  magnesium sulfate  IVPB 2 Gram(s) IV Intermittent once  OLANZapine Disintegrating Tablet 5 milliGRAM(s) Oral daily  oxyCODONE    IR 10 milliGRAM(s) Oral every 6 hours  polyethylene glycol 3350 17 Gram(s) Oral at bedtime  psyllium Powder 1 Packet(s) Oral two times a day  senna 2 Tablet(s) Oral at bedtime    MEDICATIONS  (PRN):  acetaminophen   Tablet 650 milliGRAM(s) Oral every 6 hours PRN For Temp greater than 38 C (100.4 F)  dextrose Gel 1 Dose(s) Oral once PRN Blood Glucose LESS THAN 70 milliGRAM(s)/deciliter  glucagon  Injectable 1 milliGRAM(s) IntraMuscular once PRN Glucose LESS THAN 70 milligrams/deciliter  loperamide 2 milliGRAM(s) Oral every 2 hours PRN Diarrhea  oxyCODONE    IR 5 milliGRAM(s) Oral every 4 hours PRN Severe Pain (7 - 10) or SOB      ALLERGIES:  Allergies    penicillins (Hives)    Intolerances        LABS:                        7.2    13.6  )-----------( 606      ( 2018 04:20 )             23.1     01-    135  |  97  |  11  ----------------------------<  113<H>  3.9   |  26  |  1.00    Ca    8.4      2018 04:20  Phos  3.0     -  Mg     2.0     -          Urinalysis Basic - ( 2018 01:04 )    Color: Yellow / Appearance: Cloudy / S.025 / pH: x  Gluc: x / Ketone: Trace mg/dL  / Bili: Small / Urobili: 0.2 E.U./dL   Blood: x / Protein: Trace mg/dL / Nitrite: NEGATIVE   Leuk Esterase: NEGATIVE / RBC: < 5 /HPF / WBC < 5 /HPF   Sq Epi: x / Non Sq Epi: 5-10 /HPF / Bacteria: Many /HPF            RADIOLOGY & ADDITIONAL TESTS: Reviewed. TRANSFER NOTE 7EA TO 7LA    HOSPITAL COURSE:     Patient is a 62 year  PMHX of HTN, DM, and Stage IV lung CA w/ mets to brain and bone s/p RT to bony met on R rib and gamma knife tx to brain presented with 4 days of nausea, vomiting, diarrhea admitted initially to [facility] for sepsis 2/2 to obstructive PNA in setting of metastatic lung cancer. Patient underwent bronchoscopy on  which identified a RLL hemorrhage. Patient was subsequently transferred to the MICU for monitoring of her respiratory status.     In the MICU, patient was treated with levaquin for post-obstructive pneumonia secondary to compression of the lung from her tumor burden. She is currently intermittently febrile on day 6 of treatment. Followed by radiation oncology, heme/onc and pulm services, patient went for second bronchoscopy on  to release obstruction of RLL by necrotic tissue. Obstructive tumor was debulked and bleeding was cauderized with cryotherapy. To date, culture from bronchoalveolar lavage taken during intervention has remained negative, as have blood cultures, legionella and RVP. Patient is scheduled for radiation therapy on Monday, . At this time, patient is saturating well on room air with vital signs stable and is appropriate for step down to telemetry floor for ongoing management of cancer and pneumonia treatment.        INTERVAL HPI/OVERNIGHT EVENTS:  Increased frequency of duonebs to q4h for increased wheezing. Patient was retaining 800ccs at 10PM, UA sent. Patient given tylenol for fever to 101.1.    SUBJECTIVE:   Patient seen and examined at bedside. Patient says she felt feverish overnight but is now feeling better. She denies cough, shortness of breath, chills, headache, arthralgias, nausea or vomiting. She does express concern about the lability of her fevers.     OBJECTIVE:    VITAL SIGNS:  ICU Vital Signs Last 24 Hrs  T(C): 36.9 (2018 04:35), Max: 38.7 (2018 13:00)  T(F): 98.5 (2018 04:35), Max: 101.7 (2018 13:00)  HR: 112 (2018 05:00) (102 - 154)  BP: 119/56 (2018 05:00) (91/52 - 141/60)  BP(mean): 71 (2018 05:00) (60 - 92)  ABP: --  ABP(mean): --  RR: 23 (2018 05:00) (12 - 25)  SpO2: 95% (2018 05:00) (88% - 98%)         @ 07: @ 07:00  --------------------------------------------------------  IN: 530 mL / OUT: 150 mL / NET: 380 mL     @ 07: @ 06:14  --------------------------------------------------------  IN: 300 mL / OUT: 1125 mL / NET: -825 mL      CAPILLARY BLOOD GLUCOSE  116 (2018 11:15)      POCT Blood Glucose.: 116 mg/dL (2018 22:17)      PHYSICAL EXAM:    Constitutional: WDWN resting comfortably in bed; NAD  HEENT: NC/AT; PERRL, anicteric sclera; MMM  Neck: supple, no JVD  Cardiovascular: +S1/S2; RRR; no M/R/G  Respiratory: course breath sounds in the right lower lobe. respirations appear non-labored  Gastrointestinal: abdomen soft, NT/ND; +BS x4  Extremities: WWP; no clubbing, cyanosis or edema  Vascular: 2+ radial, femoral, DP/PT pulses B/L  Dermatologic: skin normal color and turgor; no visible rashes  Neurological: AAOx3, appropriately interactive    MEDICATIONS:  MEDICATIONS  (STANDING):  ALBUTerol/ipratropium for Nebulization 3 milliLiter(s) Nebulizer every 4 hours  ALPRAZolam 0.25 milliGRAM(s) Oral at bedtime  amLODIPine   Tablet 10 milliGRAM(s) Oral daily  benzonatate 100 milliGRAM(s) Oral every 8 hours  dextrose 5%. 1000 milliLiter(s) (50 mL/Hr) IV Continuous <Continuous>  dextrose 50% Injectable 12.5 Gram(s) IV Push once  dextrose 50% Injectable 25 Gram(s) IV Push once  dextrose 50% Injectable 25 Gram(s) IV Push once  enoxaparin Injectable 40 milliGRAM(s) SubCutaneous daily  insulin lispro (HumaLOG) corrective regimen sliding scale   SubCutaneous Before meals and at bedtime  levoFLOXacin  Tablet 750 milliGRAM(s) Oral every 24 hours  magnesium sulfate  IVPB 2 Gram(s) IV Intermittent once  OLANZapine Disintegrating Tablet 5 milliGRAM(s) Oral daily  oxyCODONE    IR 10 milliGRAM(s) Oral every 6 hours  polyethylene glycol 3350 17 Gram(s) Oral at bedtime  psyllium Powder 1 Packet(s) Oral two times a day  senna 2 Tablet(s) Oral at bedtime    MEDICATIONS  (PRN):  acetaminophen   Tablet 650 milliGRAM(s) Oral every 6 hours PRN For Temp greater than 38 C (100.4 F)  dextrose Gel 1 Dose(s) Oral once PRN Blood Glucose LESS THAN 70 milliGRAM(s)/deciliter  glucagon  Injectable 1 milliGRAM(s) IntraMuscular once PRN Glucose LESS THAN 70 milligrams/deciliter  loperamide 2 milliGRAM(s) Oral every 2 hours PRN Diarrhea  oxyCODONE    IR 5 milliGRAM(s) Oral every 4 hours PRN Severe Pain (7 - 10) or SOB      ALLERGIES:  Allergies    penicillins (Hives)    Intolerances        LABS:                        7.2    13.6  )-----------( 606      ( 2018 04:20 )             23.1     -    135  |  97  |  11  ----------------------------<  113<H>  3.9   |  26  |  1.00    Ca    8.4      2018 04:20  Phos  3.0     -  Mg     2.0     -          Urinalysis Basic - ( 2018 01:04 )    Color: Yellow / Appearance: Cloudy / S.025 / pH: x  Gluc: x / Ketone: Trace mg/dL  / Bili: Small / Urobili: 0.2 E.U./dL   Blood: x / Protein: Trace mg/dL / Nitrite: NEGATIVE   Leuk Esterase: NEGATIVE / RBC: < 5 /HPF / WBC < 5 /HPF   Sq Epi: x / Non Sq Epi: 5-10 /HPF / Bacteria: Many /HPF            RADIOLOGY & ADDITIONAL TESTS: Reviewed. TRANSFER NOTE 7EA TO 7LA    HOSPITAL COURSE:     Patient is a 62 year  PMHX of HTN, DM, and Stage IV lung CA w/ mets to brain and bone s/p RT to bony met on R rib and gamma knife tx to brain presented with 4 days of nausea, vomiting, diarrhea admitted initially to [facility] for sepsis 2/2 to obstructive PNA in setting of metastatic lung cancer. Patient underwent bronchoscopy on  which identified a RLL hemorrhage. Patient was subsequently transferred to the MICU for monitoring of her respiratory status.     In the MICU, patient was treated with levaquin for post-obstructive pneumonia secondary to compression of the lung from her tumor burden. She is currently intermittently febrile on day 6 of treatment. Followed by radiation oncology, heme/onc and pulm services, patient went for second bronchoscopy on  to release obstruction of RLL by necrotic tissue. Obstructive tumor was debulked and bleeding was cauderized with cryotherapy. To date, culture from bronchoalveolar lavage taken during intervention has remained negative, as have blood cultures, legionella and RVP. Given negative cultures and known cancer burden in lungs, likely fevers are tumor oriented rather than infectious. However, patient continues on levaquin for breakthrough fevers as await further culture results. Patient is scheduled for radiation therapy on Monday, . At this time, patient is saturating well on room air with vital signs stable and is appropriate for step down to telemetry floor for ongoing management of cancer treatment.        INTERVAL HPI/OVERNIGHT EVENTS:  Increased frequency of duonebs to q4h for increased wheezing. Patient was retaining 800ccs at 10PM, UA sent. Patient given tylenol for fever to 101.1.    SUBJECTIVE:   Patient seen and examined at bedside. Patient says she felt feverish overnight but is now feeling better. She denies cough, shortness of breath, chills, headache, arthralgias, nausea or vomiting. She does express concern about the lability of her fevers.     OBJECTIVE:    VITAL SIGNS:  ICU Vital Signs Last 24 Hrs  T(C): 36.9 (2018 04:35), Max: 38.7 (2018 13:00)  T(F): 98.5 (2018 04:35), Max: 101.7 (2018 13:00)  HR: 112 (2018 05:00) (102 - 154)  BP: 119/56 (2018 05:00) (91/52 - 141/60)  BP(mean): 71 (2018 05:00) (60 - 92)  ABP: --  ABP(mean): --  RR: 23 (2018 05:00) (12 - 25)  SpO2: 95% (2018 05:00) (88% - 98%)         @ 07: @ 07:00  --------------------------------------------------------  IN: 530 mL / OUT: 150 mL / NET: 380 mL     @ 07:  -   @ 06:14  --------------------------------------------------------  IN: 300 mL / OUT: 1125 mL / NET: -825 mL      CAPILLARY BLOOD GLUCOSE  116 (2018 11:15)      POCT Blood Glucose.: 116 mg/dL (2018 22:17)      PHYSICAL EXAM:    Constitutional: WDWN resting comfortably in bed; NAD  HEENT: NC/AT; PERRL, anicteric sclera; MMM  Neck: supple, no JVD  Cardiovascular: +S1/S2; RRR; no M/R/G  Respiratory: course breath sounds in the right lower lobe. respirations appear non-labored  Gastrointestinal: abdomen soft, NT/ND; +BS x4  Extremities: WWP; no clubbing, cyanosis or edema  Vascular: 2+ radial, femoral, DP/PT pulses B/L  Dermatologic: skin normal color and turgor; no visible rashes  Neurological: AAOx3, appropriately interactive    MEDICATIONS:  MEDICATIONS  (STANDING):  ALBUTerol/ipratropium for Nebulization 3 milliLiter(s) Nebulizer every 4 hours  ALPRAZolam 0.25 milliGRAM(s) Oral at bedtime  amLODIPine   Tablet 10 milliGRAM(s) Oral daily  benzonatate 100 milliGRAM(s) Oral every 8 hours  dextrose 5%. 1000 milliLiter(s) (50 mL/Hr) IV Continuous <Continuous>  dextrose 50% Injectable 12.5 Gram(s) IV Push once  dextrose 50% Injectable 25 Gram(s) IV Push once  dextrose 50% Injectable 25 Gram(s) IV Push once  enoxaparin Injectable 40 milliGRAM(s) SubCutaneous daily  insulin lispro (HumaLOG) corrective regimen sliding scale   SubCutaneous Before meals and at bedtime  levoFLOXacin  Tablet 750 milliGRAM(s) Oral every 24 hours  magnesium sulfate  IVPB 2 Gram(s) IV Intermittent once  OLANZapine Disintegrating Tablet 5 milliGRAM(s) Oral daily  oxyCODONE    IR 10 milliGRAM(s) Oral every 6 hours  polyethylene glycol 3350 17 Gram(s) Oral at bedtime  psyllium Powder 1 Packet(s) Oral two times a day  senna 2 Tablet(s) Oral at bedtime    MEDICATIONS  (PRN):  acetaminophen   Tablet 650 milliGRAM(s) Oral every 6 hours PRN For Temp greater than 38 C (100.4 F)  dextrose Gel 1 Dose(s) Oral once PRN Blood Glucose LESS THAN 70 milliGRAM(s)/deciliter  glucagon  Injectable 1 milliGRAM(s) IntraMuscular once PRN Glucose LESS THAN 70 milligrams/deciliter  loperamide 2 milliGRAM(s) Oral every 2 hours PRN Diarrhea  oxyCODONE    IR 5 milliGRAM(s) Oral every 4 hours PRN Severe Pain (7 - 10) or SOB      ALLERGIES:  Allergies    penicillins (Hives)    Intolerances        LABS:                        7.2    13.6  )-----------( 606      ( 2018 04:20 )             23.1     -    135  |  97  |  11  ----------------------------<  113<H>  3.9   |  26  |  1.00    Ca    8.4      2018 04:20  Phos  3.0     -  Mg     2.0               Urinalysis Basic - ( 2018 01:04 )    Color: Yellow / Appearance: Cloudy / S.025 / pH: x  Gluc: x / Ketone: Trace mg/dL  / Bili: Small / Urobili: 0.2 E.U./dL   Blood: x / Protein: Trace mg/dL / Nitrite: NEGATIVE   Leuk Esterase: NEGATIVE / RBC: < 5 /HPF / WBC < 5 /HPF   Sq Epi: x / Non Sq Epi: 5-10 /HPF / Bacteria: Many /HPF            RADIOLOGY & ADDITIONAL TESTS: Reviewed. TRANSFER NOTE 7EA TO 7LA    HOSPITAL COURSE:     Patient is a 62 year  PMHX of HTN, DM, and Stage IV lung CA w/ mets to brain and bone s/p RT to bony met on R rib and gamma knife tx to brain presented with 4 days of nausea, vomiting, diarrhea admitted initially to [facility] for sepsis 2/2 to obstructive PNA in setting of metastatic lung cancer. Patient underwent bronchoscopy on  which identified a RLL hemorrhage. Patient was subsequently transferred to the MICU for monitoring of her respiratory status.     In the MICU, patient was treated with levaquin for post-obstructive pneumonia secondary to compression of the lung from her tumor burden. She is currently intermittently febrile on day 6 of treatment. Followed by radiation oncology, heme/onc and pulm services, patient went for second bronchoscopy on  to release obstruction of RLL by necrotic tissue. Obstructive tumor was debulked and bleeding was cauderized with cryotherapy.     To date, cultures from bronchoalveolar lavage taken during bronchoscopy  have remained negative, as have blood cultures, legionella and RVP. Given negative cultures and known cancer burden in lungs, likely fevers are tumor oriented rather than infectious. However, patient continues on levaquin for breakthrough fevers as await further culture results.     Patient is scheduled for radiation therapy on Monday, . At this time, patient is saturating well on room air with vital signs stable and is appropriate for step down to telemetry floor for ongoing management of cancer treatment.        INTERVAL HPI/OVERNIGHT EVENTS:  Increased frequency of duonebs to q4h for increased wheezing. Patient was retaining 800ccs at 10PM, UA sent. Patient given tylenol for fever to 101.1.    SUBJECTIVE:   Patient seen and examined at bedside. Patient says she felt feverish overnight but is now feeling better. She denies cough, shortness of breath, chills, headache, arthralgias, nausea or vomiting. She does express concern about the lability of her fevers.     OBJECTIVE:    VITAL SIGNS:  ICU Vital Signs Last 24 Hrs  T(C): 36.9 (2018 04:35), Max: 38.7 (2018 13:00)  T(F): 98.5 (2018 04:35), Max: 101.7 (2018 13:00)  HR: 112 (2018 05:00) (102 - 154)  BP: 119/56 (2018 05:00) (91/52 - 141/60)  BP(mean): 71 (2018 05:00) (60 - 92)  ABP: --  ABP(mean): --  RR: 23 (2018 05:00) (12 - 25)  SpO2: 95% (2018 05:00) (88% - 98%)         @ 07:  -   @ 07:00  --------------------------------------------------------  IN: 530 mL / OUT: 150 mL / NET: 380 mL     @ 07:01  -   @ 06:14  --------------------------------------------------------  IN: 300 mL / OUT: 1125 mL / NET: -825 mL      CAPILLARY BLOOD GLUCOSE  116 (2018 11:15)      POCT Blood Glucose.: 116 mg/dL (2018 22:17)      PHYSICAL EXAM:    Constitutional: WDWN resting comfortably in bed; NAD  HEENT: NC/AT; PERRL, anicteric sclera; MMM  Neck: supple, no JVD  Cardiovascular: +S1/S2; RRR; no M/R/G  Respiratory: course breath sounds in the right lower lobe. respirations appear non-labored  Gastrointestinal: abdomen soft, NT/ND; +BS x4  Extremities: WWP; no clubbing, cyanosis or edema  Vascular: 2+ radial, femoral, DP/PT pulses B/L  Dermatologic: skin normal color and turgor; no visible rashes  Neurological: AAOx3, appropriately interactive    MEDICATIONS:  MEDICATIONS  (STANDING):  ALBUTerol/ipratropium for Nebulization 3 milliLiter(s) Nebulizer every 4 hours  ALPRAZolam 0.25 milliGRAM(s) Oral at bedtime  amLODIPine   Tablet 10 milliGRAM(s) Oral daily  benzonatate 100 milliGRAM(s) Oral every 8 hours  dextrose 5%. 1000 milliLiter(s) (50 mL/Hr) IV Continuous <Continuous>  dextrose 50% Injectable 12.5 Gram(s) IV Push once  dextrose 50% Injectable 25 Gram(s) IV Push once  dextrose 50% Injectable 25 Gram(s) IV Push once  enoxaparin Injectable 40 milliGRAM(s) SubCutaneous daily  insulin lispro (HumaLOG) corrective regimen sliding scale   SubCutaneous Before meals and at bedtime  levoFLOXacin  Tablet 750 milliGRAM(s) Oral every 24 hours  magnesium sulfate  IVPB 2 Gram(s) IV Intermittent once  OLANZapine Disintegrating Tablet 5 milliGRAM(s) Oral daily  oxyCODONE    IR 10 milliGRAM(s) Oral every 6 hours  polyethylene glycol 3350 17 Gram(s) Oral at bedtime  psyllium Powder 1 Packet(s) Oral two times a day  senna 2 Tablet(s) Oral at bedtime    MEDICATIONS  (PRN):  acetaminophen   Tablet 650 milliGRAM(s) Oral every 6 hours PRN For Temp greater than 38 C (100.4 F)  dextrose Gel 1 Dose(s) Oral once PRN Blood Glucose LESS THAN 70 milliGRAM(s)/deciliter  glucagon  Injectable 1 milliGRAM(s) IntraMuscular once PRN Glucose LESS THAN 70 milligrams/deciliter  loperamide 2 milliGRAM(s) Oral every 2 hours PRN Diarrhea  oxyCODONE    IR 5 milliGRAM(s) Oral every 4 hours PRN Severe Pain (7 - 10) or SOB      ALLERGIES:  Allergies    penicillins (Hives)    Intolerances        LABS:                        7.2    13.6  )-----------( 606      ( 2018 04:20 )             23.1     -    135  |  97  |  11  ----------------------------<  113<H>  3.9   |  26  |  1.00    Ca    8.4      2018 04:20  Phos  3.0     -  Mg     2.0               Urinalysis Basic - ( 2018 01:04 )    Color: Yellow / Appearance: Cloudy / S.025 / pH: x  Gluc: x / Ketone: Trace mg/dL  / Bili: Small / Urobili: 0.2 E.U./dL   Blood: x / Protein: Trace mg/dL / Nitrite: NEGATIVE   Leuk Esterase: NEGATIVE / RBC: < 5 /HPF / WBC < 5 /HPF   Sq Epi: x / Non Sq Epi: 5-10 /HPF / Bacteria: Many /HPF            RADIOLOGY & ADDITIONAL TESTS: Reviewed.

## 2018-01-21 NOTE — PROGRESS NOTE ADULT - PROBLEM SELECTOR PLAN 2
Patient has Stage IV lung CA w/ mets to brain and bone s/p RT to bony mets on R 8th rib last week, and had gamma knife tx to brain (Jan 9th). Concern initially for post obstructive PNA ongoing from RLL obstructing mass S/p Bronch with debulking of obstructive tumor with lazer, balloon dilation and cry for bleeding. Findings on bronch not consistent with infectious- though will continue to monitor and remains on levaquin.  -c/w levaquin PO  -Plan for Radiation to lung on Monday Patient has Stage IV lung CA w/ mets to brain and bone s/p RT to bony mets on R 8th rib last week, and had gamma knife tx to brain (Jan 9th). Concern initially for post obstructive PNA ongoing from RLL obstructing mass S/p Bronch with debulking of obstructive tumor with lazer, balloon dilation and cry for bleeding. Findings on bronch not consistent with infectious- though will continue to monitor and remains on levaquin.  -c/w levaquin PO (day 6/7)  -Plan for Radiation to lung on Monday

## 2018-01-21 NOTE — PROGRESS NOTE ADULT - PROBLEM SELECTOR PLAN 1
Met sepsis criteria on presentation with concern for etiology of post-obstructive PNA of RLL (in setting of metastatic lung Ca) for which patient went for bronchoscopy found RLL oozing and hemorrhage and transferred to MICU for respiratory status monitoring. Has been on vancomycin (1/14/18), meropenem (1/14/18) and levaquin (1/16/18)- levaquin added for breakthrough fevers for dual pseudomonas coverage. Went for bronch with findings of obstructed RUL, patent Rmiddle Lobe, and obstructive mass in RLL debulked with lazer and balloon dilation. Cryo for bleeding stopped. Also based on findings no evidence for infectious process ongoing- BAL obtained. Persistent low grade fever in setting of post-procedure and high tumor burden.   -c/w Levaquin (1/16-->)   - F/u BAL and monitor for signs of infection and can further broaden. Thus far, remains clear.

## 2018-01-21 NOTE — PROGRESS NOTE ADULT - PROBLEM SELECTOR PLAN 8
F: PO intake  E: Replete electrolytes to maintain K>4 and Mg>2  N:  Restarted DASH/TLC/DM Diet as tolerated    VTE: Lovenox    FULL CODE    DIspo: Admitted to MICU for monitoring respiratory status in setting of bronchoscopy for debulking of obstructive mass on RLL. F: PO intake  E: Replete electrolytes to maintain K>4 and Mg>2  N:  Restarted DASH/TLC/DM Diet as tolerated    VTE: Lovenox    FULL CODE    DIspo: Admitted to MICU for monitoring respiratory status in setting of bronchoscopy for debulking of obstructive mass on RLL. Now stable for transfer to [facility].

## 2018-01-21 NOTE — PROGRESS NOTE ADULT - SUBJECTIVE AND OBJECTIVE BOX
TRANSFER NOTE:    62F PMHX of HTN, DM, and Stage IV lung CA w/ mets to brain and bone s/p RT to bony met on R rib and gamma knife tx to brain presented with 4 days of nausea, vomiting, diarrhea admitted initially to [facility] for sepsis 2/2 to obstructive PNA in setting of metastatic lung cancer s/p bronschoscopy x2 with tumor debulking, laser, and cryo. Also found to have hemorrhage in RLL. Post procedure patient remained intermittently febrile with negative bcx but saturating well on room air. Now stable on 4L NC and duonebs standing q4.     OBJECTIVE:    VITAL SIGNS:  ICU Vital Signs Last 24 Hrs  T(C): 36.6 (2018 17:00), Max: 38.8 (2018 12:38)  T(F): 97.8 (2018 17:00), Max: 101.9 (2018 12:38)  HR: 112 (2018 17:00) (102 - 118)  BP: 113/47 (2018 17:00) (104/54 - 141/60)  BP(mean): 64 (2018 17:00) (63 - 89)  ABP: --  ABP(mean): --  RR: 19 (2018 17:00) (12 - 37)  SpO2: 97% (2018 17:00) (90% - 98%)         @ 07: @ 07:00  --------------------------------------------------------  IN: 400 mL / OUT: 1125 mL / NET: -725 mL     @ 07: @ 18:27  --------------------------------------------------------  IN: 357 mL / OUT: 400 mL / NET: -43 mL      CAPILLARY BLOOD GLUCOSE      POCT Blood Glucose.: 161 mg/dL (2018 16:26)      PHYSICAL EXAM:    Constitutional: WDWN resting comfortably in bed; NAD  HEENT: NC/AT; PERRL, anicteric sclera; MMM  Neck: supple, no JVD  Cardiovascular: +S1/S2; RRR; no M/R/G  Respiratory: course breath sounds in the right lower lobe. respirations appear non-labored  Gastrointestinal: abdomen soft, NT/ND; +BS x4  Extremities: WWP; no clubbing, cyanosis or edema  Vascular: 2+ radial, femoral, DP/PT pulses B/L  Dermatologic: skin normal color and turgor; no visible rashes  Neurological: AAOx3, appropriately interactive    MEDICATIONS:  MEDICATIONS  (STANDING):  ALBUTerol/ipratropium for Nebulization 3 milliLiter(s) Nebulizer every 4 hours  ALPRAZolam 0.25 milliGRAM(s) Oral at bedtime  amLODIPine   Tablet 10 milliGRAM(s) Oral daily  benzonatate 100 milliGRAM(s) Oral every 8 hours  dextrose 5%. 1000 milliLiter(s) (50 mL/Hr) IV Continuous <Continuous>  dextrose 50% Injectable 12.5 Gram(s) IV Push once  dextrose 50% Injectable 25 Gram(s) IV Push once  dextrose 50% Injectable 25 Gram(s) IV Push once  enoxaparin Injectable 40 milliGRAM(s) SubCutaneous daily  insulin lispro (HumaLOG) corrective regimen sliding scale   SubCutaneous Before meals and at bedtime  levoFLOXacin  Tablet 750 milliGRAM(s) Oral every 24 hours  magnesium sulfate  IVPB 2 Gram(s) IV Intermittent once  OLANZapine Disintegrating Tablet 5 milliGRAM(s) Oral daily  oxyCODONE    IR 10 milliGRAM(s) Oral every 6 hours  polyethylene glycol 3350 17 Gram(s) Oral at bedtime  psyllium Powder 1 Packet(s) Oral two times a day  senna 2 Tablet(s) Oral at bedtime    MEDICATIONS  (PRN):  acetaminophen   Tablet 650 milliGRAM(s) Oral every 6 hours PRN For Temp greater than 38 C (100.4 F)  dextrose Gel 1 Dose(s) Oral once PRN Blood Glucose LESS THAN 70 milliGRAM(s)/deciliter  glucagon  Injectable 1 milliGRAM(s) IntraMuscular once PRN Glucose LESS THAN 70 milligrams/deciliter  loperamide 2 milliGRAM(s) Oral every 2 hours PRN Diarrhea  oxyCODONE    IR 5 milliGRAM(s) Oral every 4 hours PRN Severe Pain (7 - 10) or SOB      ALLERGIES:  Allergies    penicillins (Hives)    Intolerances        LABS:                        7.2    13.6  )-----------( 606      ( 2018 04:20 )             23.1         135  |  97  |  11  ----------------------------<  113<H>  3.9   |  26  |  1.00    Ca    8.4      2018 04:20  Phos  3.0       Mg     2.0             Urinalysis Basic - ( 2018 01:04 )    Color: Yellow / Appearance: Cloudy / S.025 / pH: x  Gluc: x / Ketone: Trace mg/dL  / Bili: Small / Urobili: 0.2 E.U./dL   Blood: x / Protein: Trace mg/dL / Nitrite: NEGATIVE   Leuk Esterase: NEGATIVE / RBC: < 5 /HPF / WBC < 5 /HPF   Sq Epi: x / Non Sq Epi: 5-10 /HPF / Bacteria: Many /HPF        RADIOLOGY & ADDITIONAL TESTS: Reviewed.

## 2018-01-21 NOTE — PROGRESS NOTE ADULT - PROBLEM SELECTOR PLAN 1
Met sepsis criteria on presentation with concern for etiology of post-obstructive PNA of RLL (in setting of metastatic lung Ca) for which patient went for bronchoscopy found RLL oozing and hemorrhage and transferred to MICU for respiratory status monitoring. Has been on vancomycin (1/14/18), meropenem (1/14/18) and levaquin (1/16/18)- levaquin added for breakthrough fevers for dual pseudomonas coverage. Went for bronch with findings of obstructed RUL, patent Rmiddle Lobe, and obstructive mass in RLL debulked with lazer and balloon dilation. Cryo for bleeding stopped. Also based on findings no evidence for infectious process ongoing- BAL obtained.   -c/w Levaquin (Day 4). No evidence of acute post-obstructive PNA based on bronch (discontinued vanc and meropenem yesterday)  F/u BAL and monitor for signs of infection and can further broaden.  -Radiation planned for lungs on Monday. Met sepsis criteria on presentation with concern for etiology of post-obstructive PNA of RLL (in setting of metastatic lung Ca) for which patient went for bronchoscopy found RLL oozing and hemorrhage and transferred to MICU for respiratory status monitoring. Has been on vancomycin (1/14/18), meropenem (1/14/18) and levaquin (1/16/18)- levaquin added for breakthrough fevers for dual pseudomonas coverage. Went for bronch with findings of obstructed RUL, patent Rmiddle Lobe, and obstructive mass in RLL debulked with lazer and balloon dilation. Cryo for bleeding stopped. Also based on findings no evidence for infectious process ongoing- BAL obtained.   -c/w Levaquin (Day 6). No evidence of acute post-obstructive PNA based on bronch (discontinued vanc and meropenem)  F/u BAL and monitor for signs of infection and can further broaden. Thus far, remains clear.   -Radiation planned for lungs on Monday.

## 2018-01-21 NOTE — PROGRESS NOTE ADULT - ASSESSMENT
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 Patient went for intervention yesterday for which RLL obstructing mass debulked with balloon dilation, lazer and cryo for bleeding. Patient remains intermittently febrile with negative bcx but saturating well on room air.

## 2018-01-21 NOTE — PROGRESS NOTE ADULT - ASSESSMENT
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 Patient went for intervention today for which RLL obstructing mass debulked with balloon dilation, lazer and cryo for bleeding. 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 Patient went for intervention yesterday for which RLL obstructing mass debulked with balloon dilation, lazer and cryo for bleeding. Patient remains intermittently febrile with negative bcx but saturating well on room air.

## 2018-01-21 NOTE — PROGRESS NOTE ADULT - PROBLEM SELECTOR PLAN 5
Findings of RBBB on EKG. 12 lead from monitor consistent with RBBB with NSR. Official EKG ordered and pending.

## 2018-01-21 NOTE — PROGRESS NOTE ADULT - PROBLEM SELECTOR PLAN 2
No evidence of acute post-obstructive pneumonia. F/U BAL cultures however not likely to have significant infectious burden. Agree with de-escalation of antibiotics and recommend completing course of levaquin  - Vomiting and SOB improved, pt likely stable for step down to telemetry

## 2018-01-21 NOTE — PROGRESS NOTE ADULT - SUBJECTIVE AND OBJECTIVE BOX
Interval Events:  Patient seen and examined at bedside. Feeling better compared with yesterday, no nausea, SOB improved.     MEDICATIONS:  Pulmonary:  ALBUTerol/ipratropium for Nebulization 3 milliLiter(s) Nebulizer every 4 hours  benzonatate 100 milliGRAM(s) Oral every 8 hours    Antimicrobials:  levoFLOXacin  Tablet 750 milliGRAM(s) Oral every 24 hours    Anticoagulants:  enoxaparin Injectable 40 milliGRAM(s) SubCutaneous daily    Cardiac:  amLODIPine   Tablet 10 milliGRAM(s) Oral daily    Endocrine:  dextrose 50% Injectable 12.5 Gram(s) IV Push once  dextrose 50% Injectable 25 Gram(s) IV Push once  dextrose 50% Injectable 25 Gram(s) IV Push once  dextrose Gel 1 Dose(s) Oral once PRN  glucagon  Injectable 1 milliGRAM(s) IntraMuscular once PRN  insulin lispro (HumaLOG) corrective regimen sliding scale   SubCutaneous Before meals and at bedtime    Allergies    penicillins (Hives)    Intolerances        Vital Signs Last 24 Hrs  T(C): 38.8 (2018 12:38), Max: 38.8 (2018 12:38)  T(F): 101.9 (2018 12:38), Max: 101.9 (2018 12:38)  HR: 108 (2018 14:00) (102 - 118)  BP: 116/56 (2018 14:00) (104/54 - 141/60)  BP(mean): 71 (2018 14:00) (60 - 89)  RR: 21 (2018 14:00) (12 - 37)  SpO2: 92% (2018 14:00) (90% - 98%)     @ : @ 07:00  --------------------------------------------------------  IN: 400 mL / OUT: 1125 mL / NET: -725 mL     @ 07: @ 15:27  --------------------------------------------------------  IN: 120 mL / OUT: 400 mL / NET: -280 mL      Physical Exam  General - NAD, laying comfortably in bed  HEENT - MMM, PERRL, EOMI  Neck - No JVD  Chest - B/L rhonchi, decreased breath sounds on RLL  Cardiac - S1S2 tachycardic, no M/R/G  Abd - soft, nondistended, nontender  Ext - no C/C/E    LABS:      CBC Full  -  ( 2018 04:20 )  WBC Count : 13.6 K/uL  Hemoglobin : 7.2 g/dL  Hematocrit : 23.1 %  Platelet Count - Automated : 606 K/uL  Mean Cell Volume : 80.5 fL  Mean Cell Hemoglobin : 25.1 pg  Mean Cell Hemoglobin Concentration : 31.2 g/dL        135  |  97  |  11  ----------------------------<  113<H>  3.9   |  26  |  1.00    Ca    8.4      2018 04:20  Phos  3.0       Mg     2.0                 Urinalysis Basic - ( 2018 01:04 )    Color: Yellow / Appearance: Cloudy / S.025 / pH: x  Gluc: x / Ketone: Trace mg/dL  / Bili: Small / Urobili: 0.2 E.U./dL   Blood: x / Protein: Trace mg/dL / Nitrite: NEGATIVE   Leuk Esterase: NEGATIVE / RBC: < 5 /HPF / WBC < 5 /HPF   Sq Epi: x / Non Sq Epi: 5-10 /HPF / Bacteria: Many /HPF                RADIOLOGY & ADDITIONAL STUDIES (The following images were personally reviewed):  < from: Xray Chest 1 View AP -PORTABLE-Routine (18 @ 02:47) >  Impression: No significant change lung pathology    < end of copied text >

## 2018-01-21 NOTE — PROGRESS NOTE ADULT - PROBLEM SELECTOR PLAN 3
Found to have prolonged QTc last at 490. EKG not performed prior to bronch yesterday- official EKG ordered, s/p vomiting 12 lead from monitor obtained: QTc 498. Cautious with QT prolonging agents.

## 2018-01-22 LAB
ALBUMIN SERPL ELPH-MCNC: 2.3 G/DL — LOW (ref 3.3–5)
ALP SERPL-CCNC: 92 U/L — SIGNIFICANT CHANGE UP (ref 40–120)
ALT FLD-CCNC: 25 U/L — SIGNIFICANT CHANGE UP (ref 10–45)
ANION GAP SERPL CALC-SCNC: 13 MMOL/L — SIGNIFICANT CHANGE UP (ref 5–17)
AST SERPL-CCNC: 27 U/L — SIGNIFICANT CHANGE UP (ref 10–40)
BILIRUB SERPL-MCNC: 0.4 MG/DL — SIGNIFICANT CHANGE UP (ref 0.2–1.2)
BUN SERPL-MCNC: 13 MG/DL — SIGNIFICANT CHANGE UP (ref 7–23)
CALCIUM SERPL-MCNC: 8.5 MG/DL — SIGNIFICANT CHANGE UP (ref 8.4–10.5)
CHLORIDE SERPL-SCNC: 95 MMOL/L — LOW (ref 96–108)
CO2 SERPL-SCNC: 26 MMOL/L — SIGNIFICANT CHANGE UP (ref 22–31)
CREAT SERPL-MCNC: 1.03 MG/DL — SIGNIFICANT CHANGE UP (ref 0.5–1.3)
GLUCOSE BLDC GLUCOMTR-MCNC: 119 MG/DL — HIGH (ref 70–99)
GLUCOSE BLDC GLUCOMTR-MCNC: 123 MG/DL — HIGH (ref 70–99)
GLUCOSE BLDC GLUCOMTR-MCNC: 124 MG/DL — HIGH (ref 70–99)
GLUCOSE BLDC GLUCOMTR-MCNC: 130 MG/DL — HIGH (ref 70–99)
GLUCOSE SERPL-MCNC: 116 MG/DL — HIGH (ref 70–99)
HCT VFR BLD CALC: 22.1 % — LOW (ref 34.5–45)
HGB BLD-MCNC: 7 G/DL — CRITICAL LOW (ref 11.5–15.5)
MAGNESIUM SERPL-MCNC: 1.9 MG/DL — SIGNIFICANT CHANGE UP (ref 1.6–2.6)
MCHC RBC-ENTMCNC: 24.7 PG — LOW (ref 27–34)
MCHC RBC-ENTMCNC: 31.7 G/DL — LOW (ref 32–36)
MCV RBC AUTO: 78.1 FL — LOW (ref 80–100)
PHOSPHATE SERPL-MCNC: 3.6 MG/DL — SIGNIFICANT CHANGE UP (ref 2.5–4.5)
PLATELET # BLD AUTO: 552 K/UL — HIGH (ref 150–400)
POTASSIUM SERPL-MCNC: 3.9 MMOL/L — SIGNIFICANT CHANGE UP (ref 3.5–5.3)
POTASSIUM SERPL-SCNC: 3.9 MMOL/L — SIGNIFICANT CHANGE UP (ref 3.5–5.3)
PROT SERPL-MCNC: 6.1 G/DL — SIGNIFICANT CHANGE UP (ref 6–8.3)
RBC # BLD: 2.83 M/UL — LOW (ref 3.8–5.2)
RBC # FLD: 15.6 % — SIGNIFICANT CHANGE UP (ref 10.3–16.9)
SODIUM SERPL-SCNC: 134 MMOL/L — LOW (ref 135–145)
SURGICAL PATHOLOGY STUDY: SIGNIFICANT CHANGE UP
WBC # BLD: 12.9 K/UL — HIGH (ref 3.8–10.5)
WBC # FLD AUTO: 12.9 K/UL — HIGH (ref 3.8–10.5)

## 2018-01-22 PROCEDURE — 71045 X-RAY EXAM CHEST 1 VIEW: CPT | Mod: 26

## 2018-01-22 PROCEDURE — 77263 THER RADIOLOGY TX PLNG CPLX: CPT

## 2018-01-22 PROCEDURE — 74018 RADEX ABDOMEN 1 VIEW: CPT | Mod: 26,59

## 2018-01-22 PROCEDURE — 99232 SBSQ HOSP IP/OBS MODERATE 35: CPT | Mod: GC

## 2018-01-22 PROCEDURE — 99232 SBSQ HOSP IP/OBS MODERATE 35: CPT

## 2018-01-22 PROCEDURE — 99233 SBSQ HOSP IP/OBS HIGH 50: CPT | Mod: GC

## 2018-01-22 RX ORDER — LACTULOSE 10 G/15ML
10 SOLUTION ORAL DAILY
Qty: 0 | Refills: 0 | Status: DISCONTINUED | OUTPATIENT
Start: 2018-01-22 | End: 2018-01-24

## 2018-01-22 RX ORDER — APREPITANT 80 MG/1
40 CAPSULE ORAL
Qty: 0 | Refills: 0 | Status: DISCONTINUED | OUTPATIENT
Start: 2018-01-22 | End: 2018-01-22

## 2018-01-22 RX ORDER — OXYCODONE HYDROCHLORIDE 5 MG/1
10 TABLET ORAL EVERY 6 HOURS
Qty: 0 | Refills: 0 | Status: DISCONTINUED | OUTPATIENT
Start: 2018-01-22 | End: 2018-01-23

## 2018-01-22 RX ORDER — LACTULOSE 10 G/15ML
10 SOLUTION ORAL ONCE
Qty: 0 | Refills: 0 | Status: COMPLETED | OUTPATIENT
Start: 2018-01-22 | End: 2018-01-22

## 2018-01-22 RX ADMIN — Medication 3 MILLILITER(S): at 21:37

## 2018-01-22 RX ADMIN — Medication 3 MILLILITER(S): at 23:25

## 2018-01-22 RX ADMIN — POLYETHYLENE GLYCOL 3350 17 GRAM(S): 17 POWDER, FOR SOLUTION ORAL at 21:37

## 2018-01-22 RX ADMIN — Medication 3 MILLILITER(S): at 12:27

## 2018-01-22 RX ADMIN — Medication 100 MILLIGRAM(S): at 15:18

## 2018-01-22 RX ADMIN — SENNA PLUS 2 TABLET(S): 8.6 TABLET ORAL at 21:37

## 2018-01-22 RX ADMIN — Medication 3 MILLILITER(S): at 15:19

## 2018-01-22 RX ADMIN — Medication 300 MILLIGRAM(S): at 23:26

## 2018-01-22 RX ADMIN — OLANZAPINE 5 MILLIGRAM(S): 15 TABLET, FILM COATED ORAL at 12:30

## 2018-01-22 RX ADMIN — AMLODIPINE BESYLATE 10 MILLIGRAM(S): 2.5 TABLET ORAL at 06:14

## 2018-01-22 RX ADMIN — OXYCODONE HYDROCHLORIDE 10 MILLIGRAM(S): 5 TABLET ORAL at 23:25

## 2018-01-22 RX ADMIN — OXYCODONE HYDROCHLORIDE 10 MILLIGRAM(S): 5 TABLET ORAL at 03:00

## 2018-01-22 RX ADMIN — OXYCODONE HYDROCHLORIDE 10 MILLIGRAM(S): 5 TABLET ORAL at 07:50

## 2018-01-22 RX ADMIN — Medication 650 MILLIGRAM(S): at 06:09

## 2018-01-22 RX ADMIN — Medication 3 MILLILITER(S): at 06:14

## 2018-01-22 RX ADMIN — Medication 100 MILLIGRAM(S): at 06:10

## 2018-01-22 RX ADMIN — OXYCODONE HYDROCHLORIDE 10 MILLIGRAM(S): 5 TABLET ORAL at 12:29

## 2018-01-22 RX ADMIN — OXYCODONE HYDROCHLORIDE 10 MILLIGRAM(S): 5 TABLET ORAL at 19:18

## 2018-01-22 RX ADMIN — Medication 3 MILLILITER(S): at 12:31

## 2018-01-22 RX ADMIN — Medication 10 MILLIGRAM(S): at 12:28

## 2018-01-22 RX ADMIN — Medication 1 PACKET(S): at 06:10

## 2018-01-22 RX ADMIN — Medication 0.25 MILLIGRAM(S): at 21:38

## 2018-01-22 RX ADMIN — OXYCODONE HYDROCHLORIDE 10 MILLIGRAM(S): 5 TABLET ORAL at 08:04

## 2018-01-22 RX ADMIN — OXYCODONE HYDROCHLORIDE 10 MILLIGRAM(S): 5 TABLET ORAL at 00:32

## 2018-01-22 RX ADMIN — LACTULOSE 10 GRAM(S): 10 SOLUTION ORAL at 15:17

## 2018-01-22 RX ADMIN — Medication 100 MILLIGRAM(S): at 21:38

## 2018-01-22 RX ADMIN — OXYCODONE HYDROCHLORIDE 10 MILLIGRAM(S): 5 TABLET ORAL at 18:38

## 2018-01-22 NOTE — PROGRESS NOTE ADULT - ATTENDING COMMENTS
Patient seen and examined with house-staff during bedside rounds.  Resident note read, including vitals, physical findings, laboratory data, and radiological reports.   Revisions included below.  Direct personal management at bed side and extensive interpretation of the data.  Plan was outlined and discussed in details with the housestaff.  Decision making of high complexity  For RT to chest and no hemoptysis

## 2018-01-22 NOTE — PROGRESS NOTE ADULT - ASSESSMENT
Mrs. Bhatti is a pleasant 63 yo F with PMH of Adenocarcinoma Ca of the lung with metastasis to the Right 8th rib and brain undergoing radiation therapy for bone metastasis and gammaknife radiation for brain mets.  Presents with nausea and vomiting and blood streaked hemoptysis.  Found to have enlarging RLL mass with consolidation now s/p debulking of endobronchial lesion in RLL.

## 2018-01-22 NOTE — PROGRESS NOTE ADULT - ASSESSMENT
62 year old female with Stage IV lung adenocarcinoma, PDL1 40%, EGFR/ALK/ROS negative, HTN, DM, who underwent RT to bony mets on R 8th rib last week, and had gamma knife tx to brain (Jan 9th) who presented with 4 days of n/v, cough found to have post obstructive pneumonia.       #Post obstructive Pneumonia - 2/2 RLL endobronchial lesion on broad spectrum abx, following cultures    -Continue with supportive care and broad spectrum antibiotics   -will hold off treatment with chemo/immunotherapy until infection/sepsis has resolved   -discussed case with Dr. Ace from Radiation Oncology, will follow along for possible intervention    #Stage IV lung adenocarcinoma - Metastatic to brain s/p gamma knife in LI and bone seen on PET/CT. Now with marked increased in RLL and R hilar lesions however also in setting of pneumonia and other inflammatory changes. Patient has PDL1 expression of 40%, however with other negative targets including EGFR/ALK/ROS, not a candidate for targeted agents. Intent of systemic treatment palliative not curative with cytotoxic chemotherapy with the addition of immunotherapy based on the KEYNOTE 021 trial which showed improvement in PATIÑO and PFS in patients with PDL <50.     #Anemia - Likely of chronic inflammation given iron studies and ferritin level. No evidence of bilirubinemia, jaundice. Pulm oozing/hemorrhage seen on bronch. Retic 1.8%. Plts likely reactive, mild coagulopathy in setting of infection likely.     -Trend Hg, if persistently dropping consider occult stool testing  -Transfuse for Hg>7  -off lovenox for dvt ppx given pulm hemorrhage, consider SCDs    Discussed with Dr. Noonan 62 year old female with Stage IV lung adenocarcinoma, PDL1 40%, EGFR/ALK/ROS negative, HTN, DM, who underwent RT to bony mets on R 8th rib last week, and had gamma knife tx to brain (Jan 9th) who presented with 4 days of n/v, cough found to have post obstructive pneumonia.       #Post obstructive Pneumonia - 2/2 RLL endobronchial lesion on broad spectrum abx, s/p interventional bronchoscopy with tumor debulking and airway opening, patient continues to have fevers, following cultures    -Continue with supportive care and broad spectrum antibiotics   -will hold off treatment with chemo/immunotherapy until infection/sepsis has resolved   -[facility] on board, planned for palliative XRT    #Stage IV lung adenocarcinoma - Metastatic to brain s/p gamma knife in LI and bone seen on PET/CT. Now with marked increased in RLL and R hilar lesions however also in setting of pneumonia and other inflammatory changes. Patient has PDL1 expression of 40%, however with other negative targets including EGFR/ALK/ROS, not a candidate for targeted agents. Intent of systemic treatment palliative not curative with cytotoxic chemotherapy with the addition of immunotherapy based on the KEYNOTE 021 trial which showed improvement in PATIÑO and PFS in patients with PDL <50.     #Anemia - Likely of chronic inflammation given iron studies and ferritin level. No evidence of bilirubinemia, jaundice. Pulm oozing/hemorrhage seen on bronch. Retic 1.8%. Plts likely reactive, mild coagulopathy in setting of infection likely.     -Trend Hg, if persistently dropping consider occult stool testing  -Transfuse for Hg>7  -off lovenox for dvt ppx given pulm hemorrhage, consider SCDs    Discussed with Dr. Noonan 62 year old female with Stage IV lung adenocarcinoma, PDL1 40%, EGFR/ALK/ROS negative, HTN, DM, who underwent RT to bony mets on R 8th rib last week, and had gamma knife tx to brain (Jan 9th) who presented with 4 days of n/v, cough found to have post obstructive pneumonia.       #Post obstructive Pneumonia - 2/2 RLL endobronchial lesion on broad spectrum abx, s/p interventional bronchoscopy with tumor debulking and airway opening, patient continues to have fevers, following cultures    -Continue with supportive care and broad spectrum antibiotics   -will hold off treatment with chemo/immunotherapy until infection/sepsis has resolved   -[facility] on board, planned for palliative XRT    #Stage IV lung adenocarcinoma - Metastatic to brain s/p gamma knife in LI and bone seen on PET/CT. Now with marked increased in RLL and R hilar lesions however also in setting of pneumonia and other inflammatory changes. Patient has PDL1 expression of 40%, however with other negative targets including EGFR/ALK/ROS, not a candidate for targeted agents. Intent of systemic treatment palliative not curative with cytotoxic chemotherapy with the addition of immunotherapy based on the KEYNOTE 021 trial which showed improvement in PATIÑO and PFS in patients with PDL <50.     #Anemia - Likely of chronic inflammation given iron studies and ferritin level. No evidence of bilirubinemia, jaundice. Pulm oozing/hemorrhage seen on bronch. Retic 1.8%. Plts likely reactive, mild coagulopathy in setting of infection likely.     -Trend Hg, if persistently dropping consider occult stool testing  -Transfuse for goal Hg>7  -off lovenox for dvt ppx given pulm hemorrhage, consider SCDs    Discussed with Dr. Noonan

## 2018-01-22 NOTE — PROGRESS NOTE ADULT - SUBJECTIVE AND OBJECTIVE BOX
INTERVAL HPI/OVERNIGHT EVENTS: Mild nausea treated with tygan x1. Spiked several low temperatures intermittently and one of 101 this am.     SUBJECTIVE: Patient seen and examined at bedside. ros negative.     OBJECTIVE:    VITAL SIGNS:  ICU Vital Signs Last 24 Hrs  T(C): 38.8 (2018 05:05), Max: 38.8 (2018 12:38)  T(F): 101.9 (2018 05:05), Max: 101.9 (2018 12:38)  HR: 118 (2018 05:00) (108 - 118)  BP: 124/54 (2018 05:00) (107/55 - 128/56)  BP(mean): 78 (2018 05:00) (64 - 89)  ABP: --  ABP(mean): --  RR: 15 (2018 05:00) (15 - 37)  SpO2: 86% (2018 05:00) (86% - 97%)        - @ 07:01  -  - @ 07:00  --------------------------------------------------------  IN: 357 mL / OUT: 400 mL / NET: -43 mL      CAPILLARY BLOOD GLUCOSE      POCT Blood Glucose.: 124 mg/dL (2018 07:32)      PHYSICAL EXAM:    Constitutional: WDWN resting comfortably in bed; NAD  HEENT: NC/AT; PERRL, anicteric sclera; MMM  Neck: supple, no JVD  Cardiovascular: +S1/S2; RRR; no M/R/G  Respiratory: course breath sounds in the right lower lobe. respirations appear non-labored  Gastrointestinal: abdomen soft, NT/ND; +BS x4  Extremities: WWP; no clubbing, cyanosis or edema  Vascular: 2+ radial, femoral, DP/PT pulses B/L  Dermatologic: skin normal color and turgor; no visible rashes  Neurological: AAOx3, appropriately interactive    MEDICATIONS:  MEDICATIONS  (STANDING):  ALBUTerol/ipratropium for Nebulization 3 milliLiter(s) Nebulizer every 4 hours  ALPRAZolam 0.25 milliGRAM(s) Oral at bedtime  amLODIPine   Tablet 10 milliGRAM(s) Oral daily  benzonatate 100 milliGRAM(s) Oral every 8 hours  dextrose 5%. 1000 milliLiter(s) (50 mL/Hr) IV Continuous <Continuous>  dextrose 50% Injectable 12.5 Gram(s) IV Push once  dextrose 50% Injectable 25 Gram(s) IV Push once  dextrose 50% Injectable 25 Gram(s) IV Push once  enoxaparin Injectable 40 milliGRAM(s) SubCutaneous daily  insulin lispro (HumaLOG) corrective regimen sliding scale   SubCutaneous Before meals and at bedtime  levoFLOXacin  Tablet 750 milliGRAM(s) Oral every 24 hours  magnesium sulfate  IVPB 2 Gram(s) IV Intermittent once  OLANZapine Disintegrating Tablet 5 milliGRAM(s) Oral daily  oxyCODONE    IR 10 milliGRAM(s) Oral every 6 hours  polyethylene glycol 3350 17 Gram(s) Oral at bedtime  psyllium Powder 1 Packet(s) Oral two times a day  senna 2 Tablet(s) Oral at bedtime    MEDICATIONS  (PRN):  acetaminophen   Tablet 650 milliGRAM(s) Oral every 6 hours PRN For Temp greater than 38 C (100.4 F)  dextrose Gel 1 Dose(s) Oral once PRN Blood Glucose LESS THAN 70 milliGRAM(s)/deciliter  glucagon  Injectable 1 milliGRAM(s) IntraMuscular once PRN Glucose LESS THAN 70 milligrams/deciliter  loperamide 2 milliGRAM(s) Oral every 2 hours PRN Diarrhea  oxyCODONE    IR 5 milliGRAM(s) Oral every 4 hours PRN Severe Pain (7 - 10) or SOB  trimethobenzamide 300 milliGRAM(s) Oral two times a day PRN Nausea and/or Vomiting      ALLERGIES:  Allergies    penicillins (Hives)    Intolerances        LABS:                        7.0    12.9  )-----------( 552      ( 2018 06:11 )             22.1     -    134<L>  |  95<L>  |  13  ----------------------------<  116<H>  3.9   |  26  |  1.03    Ca    8.5      2018 06:11  Phos  3.6     -  Mg     1.9         TPro  6.1  /  Alb  2.3<L>  /  TBili  0.4  /  DBili  x   /  AST  27  /  ALT  25  /  AlkPhos  92  -      Urinalysis Basic - ( 2018 01:04 )    Color: Yellow / Appearance: Cloudy / S.025 / pH: x  Gluc: x / Ketone: Trace mg/dL  / Bili: Small / Urobili: 0.2 E.U./dL   Blood: x / Protein: Trace mg/dL / Nitrite: NEGATIVE   Leuk Esterase: NEGATIVE / RBC: < 5 /HPF / WBC < 5 /HPF   Sq Epi: x / Non Sq Epi: 5-10 /HPF / Bacteria: Many /HPF        RADIOLOGY & ADDITIONAL TESTS: Reviewed. INTERVAL HPI/OVERNIGHT EVENTS: Mild nausea treated with tygan x1. Spiked several low temperatures intermittently and one of 101 this am.     SUBJECTIVE: Patient seen and examined at bedside. ros negative.     OBJECTIVE:    VITAL SIGNS:  ICU Vital Signs Last 24 Hrs  T(C): 38.8 (2018 05:05), Max: 38.8 (2018 12:38)  T(F): 101.9 (2018 05:05), Max: 101.9 (2018 12:38)  HR: 118 (2018 05:00) (108 - 118)  BP: 124/54 (2018 05:00) (107/55 - 128/56)  BP(mean): 78 (2018 05:00) (64 - 89)  ABP: --  ABP(mean): --  RR: 15 (2018 05:00) (15 - 37)  SpO2: 86% (2018 05:00) (86% - 97%)        - @ 07:01  -  - @ 07:00  --------------------------------------------------------  IN: 357 mL / OUT: 400 mL / NET: -43 mL      CAPILLARY BLOOD GLUCOSE      POCT Blood Glucose.: 124 mg/dL (2018 07:32)      PHYSICAL EXAM:    Constitutional: WDWN resting comfortably in bed; NAD  HEENT: NC/AT; PERRL, anicteric sclera; MMM  Neck: supple, no JVD  Cardiovascular: +S1/S2; RRR; no M/R/G  Respiratory: course breath sounds in the right lower lobe. respirations appear non-labored. Loud superior expiratory wheeze bilaterally.   Gastrointestinal: abdomen soft, NT/ND; +BS x4  Extremities: WWP; no clubbing, cyanosis, with mild edema in the upper extremities bilaterally   Vascular: 2+ radial, femoral, DP/PT pulses B/L  Dermatologic: skin normal color and turgor; no visible rashes  Neurological: AAOx3, appropriately interactive    MEDICATIONS:  MEDICATIONS  (STANDING):  ALBUTerol/ipratropium for Nebulization 3 milliLiter(s) Nebulizer every 4 hours  ALPRAZolam 0.25 milliGRAM(s) Oral at bedtime  amLODIPine   Tablet 10 milliGRAM(s) Oral daily  benzonatate 100 milliGRAM(s) Oral every 8 hours  dextrose 5%. 1000 milliLiter(s) (50 mL/Hr) IV Continuous <Continuous>  dextrose 50% Injectable 12.5 Gram(s) IV Push once  dextrose 50% Injectable 25 Gram(s) IV Push once  dextrose 50% Injectable 25 Gram(s) IV Push once  enoxaparin Injectable 40 milliGRAM(s) SubCutaneous daily  insulin lispro (HumaLOG) corrective regimen sliding scale   SubCutaneous Before meals and at bedtime  levoFLOXacin  Tablet 750 milliGRAM(s) Oral every 24 hours  magnesium sulfate  IVPB 2 Gram(s) IV Intermittent once  OLANZapine Disintegrating Tablet 5 milliGRAM(s) Oral daily  oxyCODONE    IR 10 milliGRAM(s) Oral every 6 hours  polyethylene glycol 3350 17 Gram(s) Oral at bedtime  psyllium Powder 1 Packet(s) Oral two times a day  senna 2 Tablet(s) Oral at bedtime    MEDICATIONS  (PRN):  acetaminophen   Tablet 650 milliGRAM(s) Oral every 6 hours PRN For Temp greater than 38 C (100.4 F)  dextrose Gel 1 Dose(s) Oral once PRN Blood Glucose LESS THAN 70 milliGRAM(s)/deciliter  glucagon  Injectable 1 milliGRAM(s) IntraMuscular once PRN Glucose LESS THAN 70 milligrams/deciliter  loperamide 2 milliGRAM(s) Oral every 2 hours PRN Diarrhea  oxyCODONE    IR 5 milliGRAM(s) Oral every 4 hours PRN Severe Pain (7 - 10) or SOB  trimethobenzamide 300 milliGRAM(s) Oral two times a day PRN Nausea and/or Vomiting      ALLERGIES:  Allergies    penicillins (Hives)    Intolerances        LABS:                        7.0    12.9  )-----------( 552      ( 2018 06:11 )             22.1     -    134<L>  |  95<L>  |  13  ----------------------------<  116<H>  3.9   |  26  |  1.03    Ca    8.5      2018 06:11  Phos  3.6     -22  Mg     1.9         TPro  6.1  /  Alb  2.3<L>  /  TBili  0.4  /  DBili  x   /  AST  27  /  ALT  25  /  AlkPhos  92        Urinalysis Basic - ( 2018 01:04 )    Color: Yellow / Appearance: Cloudy / S.025 / pH: x  Gluc: x / Ketone: Trace mg/dL  / Bili: Small / Urobili: 0.2 E.U./dL   Blood: x / Protein: Trace mg/dL / Nitrite: NEGATIVE   Leuk Esterase: NEGATIVE / RBC: < 5 /HPF / WBC < 5 /HPF   Sq Epi: x / Non Sq Epi: 5-10 /HPF / Bacteria: Many /HPF        RADIOLOGY & ADDITIONAL TESTS: Reviewed. INTERVAL HPI/OVERNIGHT EVENTS: Mild nausea treated with tygan x1. Spiked several low temperatures intermittently and one of 101 this am.     SUBJECTIVE: Patient seen and examined at bedside. ros positive for slight SOB with nausea and constipation.     OBJECTIVE:    VITAL SIGNS:  ICU Vital Signs Last 24 Hrs  T(C): 38.8 (2018 05:05), Max: 38.8 (2018 12:38)  T(F): 101.9 (2018 05:05), Max: 101.9 (2018 12:38)  HR: 118 (2018 05:00) (108 - 118)  BP: 124/54 (2018 05:00) (107/55 - 128/56)  BP(mean): 78 (2018 05:00) (64 - 89)  ABP: --  ABP(mean): --  RR: 15 (2018 05:00) (15 - 37)  SpO2: 86% (2018 05:00) (86% - 97%)        - @ 07:01  -  - @ 07:00  --------------------------------------------------------  IN: 357 mL / OUT: 400 mL / NET: -43 mL      CAPILLARY BLOOD GLUCOSE      POCT Blood Glucose.: 124 mg/dL (2018 07:32)      PHYSICAL EXAM:    Constitutional: WDWN resting comfortably in bed; NAD  HEENT: NC/AT; PERRL, anicteric sclera; MMM  Neck: supple, no JVD  Cardiovascular: +S1/S2; RRR; no M/R/G  Respiratory: course breath sounds in the right lower lobe. respirations appear non-labored. Loud superior expiratory wheeze bilaterally.   Gastrointestinal: abdomen soft, NT/ND; +BS x4  Extremities: WWP; no clubbing, cyanosis, with mild edema in the upper extremities bilaterally   Vascular: 2+ radial, femoral, DP/PT pulses B/L  Dermatologic: skin normal color and turgor; no visible rashes  Neurological: AAOx3, appropriately interactive    MEDICATIONS:  MEDICATIONS  (STANDING):  ALBUTerol/ipratropium for Nebulization 3 milliLiter(s) Nebulizer every 4 hours  ALPRAZolam 0.25 milliGRAM(s) Oral at bedtime  amLODIPine   Tablet 10 milliGRAM(s) Oral daily  benzonatate 100 milliGRAM(s) Oral every 8 hours  dextrose 5%. 1000 milliLiter(s) (50 mL/Hr) IV Continuous <Continuous>  dextrose 50% Injectable 12.5 Gram(s) IV Push once  dextrose 50% Injectable 25 Gram(s) IV Push once  dextrose 50% Injectable 25 Gram(s) IV Push once  enoxaparin Injectable 40 milliGRAM(s) SubCutaneous daily  insulin lispro (HumaLOG) corrective regimen sliding scale   SubCutaneous Before meals and at bedtime  levoFLOXacin  Tablet 750 milliGRAM(s) Oral every 24 hours  magnesium sulfate  IVPB 2 Gram(s) IV Intermittent once  OLANZapine Disintegrating Tablet 5 milliGRAM(s) Oral daily  oxyCODONE    IR 10 milliGRAM(s) Oral every 6 hours  polyethylene glycol 3350 17 Gram(s) Oral at bedtime  psyllium Powder 1 Packet(s) Oral two times a day  senna 2 Tablet(s) Oral at bedtime    MEDICATIONS  (PRN):  acetaminophen   Tablet 650 milliGRAM(s) Oral every 6 hours PRN For Temp greater than 38 C (100.4 F)  dextrose Gel 1 Dose(s) Oral once PRN Blood Glucose LESS THAN 70 milliGRAM(s)/deciliter  glucagon  Injectable 1 milliGRAM(s) IntraMuscular once PRN Glucose LESS THAN 70 milligrams/deciliter  loperamide 2 milliGRAM(s) Oral every 2 hours PRN Diarrhea  oxyCODONE    IR 5 milliGRAM(s) Oral every 4 hours PRN Severe Pain (7 - 10) or SOB  trimethobenzamide 300 milliGRAM(s) Oral two times a day PRN Nausea and/or Vomiting      ALLERGIES:  Allergies    penicillins (Hives)    Intolerances        LABS:                        7.0    12.9  )-----------( 552      ( 2018 06:11 )             22.1     01-22    134<L>  |  95<L>  |  13  ----------------------------<  116<H>  3.9   |  26  |  1.03    Ca    8.5      2018 06:11  Phos  3.6     01-22  Mg     1.9         TPro  6.1  /  Alb  2.3<L>  /  TBili  0.4  /  DBili  x   /  AST  27  /  ALT  25  /  AlkPhos  92        Urinalysis Basic - ( 2018 01:04 )    Color: Yellow / Appearance: Cloudy / S.025 / pH: x  Gluc: x / Ketone: Trace mg/dL  / Bili: Small / Urobili: 0.2 E.U./dL   Blood: x / Protein: Trace mg/dL / Nitrite: NEGATIVE   Leuk Esterase: NEGATIVE / RBC: < 5 /HPF / WBC < 5 /HPF   Sq Epi: x / Non Sq Epi: 5-10 /HPF / Bacteria: Many /HPF        RADIOLOGY & ADDITIONAL TESTS: Reviewed.

## 2018-01-22 NOTE — PROGRESS NOTE ADULT - SUBJECTIVE AND OBJECTIVE BOX
Heme/Onc Progress Note (Dr. Noonan)     Interval History: Patient admitted to cough, nausea and lack of BM. Patient otherwise denies acute bruising or bleeding, no BRBPR, melena or hematuria. Denies chest pain or acute shortness of breath. Patient able to walk around. Continues to have fever/chills.     ROS is otherwise negative.      Allergies    penicillins (Hives)    Intolerances        Medications:  MEDICATIONS  (STANDING):  ALBUTerol/ipratropium for Nebulization 3 milliLiter(s) Nebulizer every 4 hours  ALPRAZolam 0.25 milliGRAM(s) Oral at bedtime  amLODIPine   Tablet 10 milliGRAM(s) Oral daily  benzonatate 100 milliGRAM(s) Oral every 8 hours  bisacodyl Suppository 10 milliGRAM(s) Rectal daily  dextrose 5%. 1000 milliLiter(s) (50 mL/Hr) IV Continuous <Continuous>  dextrose 50% Injectable 12.5 Gram(s) IV Push once  dextrose 50% Injectable 25 Gram(s) IV Push once  dextrose 50% Injectable 25 Gram(s) IV Push once  insulin lispro (HumaLOG) corrective regimen sliding scale   SubCutaneous Before meals and at bedtime  levoFLOXacin  Tablet 750 milliGRAM(s) Oral every 24 hours  magnesium sulfate  IVPB 2 Gram(s) IV Intermittent once  OLANZapine Disintegrating Tablet 5 milliGRAM(s) Oral daily  oxyCODONE    IR 10 milliGRAM(s) Oral every 6 hours  polyethylene glycol 3350 17 Gram(s) Oral at bedtime  psyllium Powder 1 Packet(s) Oral two times a day  senna 2 Tablet(s) Oral at bedtime    MEDICATIONS  (PRN):  acetaminophen   Tablet 650 milliGRAM(s) Oral every 6 hours PRN For Temp greater than 38 C (100.4 F)  dextrose Gel 1 Dose(s) Oral once PRN Blood Glucose LESS THAN 70 milliGRAM(s)/deciliter  glucagon  Injectable 1 milliGRAM(s) IntraMuscular once PRN Glucose LESS THAN 70 milligrams/deciliter  loperamide 2 milliGRAM(s) Oral every 2 hours PRN Diarrhea  oxyCODONE    IR 5 milliGRAM(s) Oral every 4 hours PRN Severe Pain (7 - 10) or SOB  trimethobenzamide 300 milliGRAM(s) Oral two times a day PRN Nausea and/or Vomiting            PHYSICAL EXAM:    T(F): 99.5 (18 @ 09:05), Max: 101.9 (18 @ 12:38)  HR: 110 (18 @ 08:50) (108 - 118)  BP: 112/57 (18 @ 08:50) (107/55 - 128/56)  RR: 19 (18 @ 08:50) (15 - 37)  SpO2: 95% (18 @ 08:50) (86% - 97%)  Wt(kg): --    Daily     Daily     GEN: fatigued, no resp accessory muscle use  HEENT: AT/NC, EOMI, no oral lesions  NECK: supple  CVS: +S1S2 tachycardic  LUNG: Decerased breath sounds on R,  ABD: NT, +BS, obese  : no cva tenderness  EXT: No c/c/e  NEURO: aaox3, non focal, no sensory changes        Labs:                          7.0    12.9  )-----------( 552      ( 2018 06:11 )             22.1     CBC Full  -  ( 2018 06:11 )  WBC Count : 12.9 K/uL  Hemoglobin : 7.0 g/dL  Hematocrit : 22.1 %  Platelet Count - Automated : 552 K/uL  Mean Cell Volume : 78.1 fL  Mean Cell Hemoglobin : 24.7 pg  Mean Cell Hemoglobin Concentration : 31.7 g/dL  Auto Neutrophil # : x  Auto Lymphocyte # : x  Auto Monocyte # : x  Auto Eosinophil # : x  Auto Basophil # : x  Auto Neutrophil % : x  Auto Lymphocyte % : x  Auto Monocyte % : x  Auto Eosinophil % : x  Auto Basophil % : x            134<L>  |  95<L>  |  13  ----------------------------<  116<H>  3.9   |  26  |  1.03    Ca    8.5      2018 06:11  Phos  3.6       Mg     1.9         TPro  6.1  /  Alb  2.3<L>  /  TBili  0.4  /  DBili  x   /  AST  27  /  ALT  25  /  AlkPhos  92        Urinalysis Basic - ( 2018 01:04 )    Color: Yellow / Appearance: Cloudy / S.025 / pH: x  Gluc: x / Ketone: Trace mg/dL  / Bili: Small / Urobili: 0.2 E.U./dL   Blood: x / Protein: Trace mg/dL / Nitrite: NEGATIVE   Leuk Esterase: NEGATIVE / RBC: < 5 /HPF / WBC < 5 /HPF   Sq Epi: x / Non Sq Epi: 5-10 /HPF / Bacteria: Many /HPF    CXR -   IMPRESSION:  No significant interval change from 2018. Correlation should be made   with CTPA dated 2018.    Legionella - negative

## 2018-01-22 NOTE — PROGRESS NOTE ADULT - PROBLEM SELECTOR PLAN 1
- s/p rigid bronchoscopy with tumor debulking of the RLL endobronchial lesion and opening of the medial basal segment of RLL on 01/19/18. There was no apparent pus in airway once opened.  - She continues to have fever. 101.9 overnight, but there does not appear to be any on going pneumonia at this point. WBC is stable, bronchoscopy cultures are negative and CXR has improved right base haziness. Rec completing the course of levaquin.  - Should start concomitant chemo/RT as soon as possible with Dr Saran DUNLAP and Incentive anton use.  - Bowel regimen - s/p rigid bronchoscopy with tumor debulking of the RLL endobronchial lesion and opening of the medial basal segment of RLL on 01/19/18. There was no apparent pus in airway once opened. 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 101.9 overnight, but there does not appear to be any on going pneumonia at this point. WBC is stable, bronchoscopy cultures are negative and CXR has improved right base haziness. Rec completing the course of levaquin.  - Should start concomitant chemo/RT as soon as possible with Dr Saran DUNLAP and Incentive anton use.  - Bowel regimen

## 2018-01-22 NOTE — PROGRESS NOTE ADULT - SUBJECTIVE AND OBJECTIVE BOX
Interval Events:  Patient seen and examined at bedside. Breathing better. Has cough, but no hemoptysis. One episode of fever.    REVIEW OF SYSTEMS:  Constitutional: No fever, weight loss or fatigue  Respiratory: As per subjective  Cardiovascular: No chest pain, palpitations, dizziness or leg swelling  Gastrointestinal: No abdominal or epigastric pain. No nausea, vomiting or hematemesis; No diarrhea or constipation. No melena or hematochezia.  Neurological: No headaches, memory loss, loss of strength, numbness or tremors  Skin: No itching, burning, rashes or lesions     MEDICATIONS:  Pulmonary:  ALBUTerol/ipratropium for Nebulization 3 milliLiter(s) Nebulizer every 4 hours  benzonatate 100 milliGRAM(s) Oral every 8 hours    Antimicrobials:  levoFLOXacin  Tablet 750 milliGRAM(s) Oral every 24 hours    Anticoagulants:    Cardiac:  amLODIPine   Tablet 10 milliGRAM(s) Oral daily      Allergies    penicillins (Hives)    Vital Signs Last 24 Hrs  T(C): 38.8 (2018 05:05), Max: 38.8 (2018 12:38)  T(F): 101.9 (2018 05:05), Max: 101.9 (2018 12:38)  HR: 118 (2018 05:00) (108 - 118)  BP: 124/54 (2018 05:00) (107/55 - 128/56)  BP(mean): 78 (2018 05:00) (64 - 84)  RR: 15 (2018 05:00) (15 - 37)  SpO2: 86% (2018 05:00) (86% - 97%)     @ 07:01  -   @ 07:00  --------------------------------------------------------  IN: 357 mL / OUT: 400 mL / NET: -43 mL      PHYSICAL EXAM:    General: Well developed; well nourished; in no acute distress  Eyes: PERRL, EOM intact; conjunctiva and sclera clear  ENMT: No nasal discharge; airway clear  Neck: Supple; non tender; no masses  Respiratory: Decreased breath sounds in RLL zone. No wheezing, rhonchi or rales  Cardiovascular: Regular rate and rhythm. S1 and S2 Normal; No murmurs, gallops or rubs  Gastrointestinal: Soft non-tender non-distended; Normal bowel sounds  Extremities: Normal range of motion, No clubbing, cyanosis or edema  Neurological: Alert and oriented x3  Skin: Warm and dry. No obvious rash    LABS:      CBC Full  -  ( 2018 06:11 )  WBC Count : 12.9 K/uL  Hemoglobin : 7.0 g/dL  Hematocrit : 22.1 %  Platelet Count - Automated : 552 K/uL  Mean Cell Volume : 78.1 fL  Mean Cell Hemoglobin : 24.7 pg  Mean Cell Hemoglobin Concentration : 31.7 g/dL        134<L>  |  95<L>  |  13  ----------------------------<  116<H>  3.9   |  26  |  1.03    Ca    8.5      2018 06:11  Phos  3.6       Mg     1.9         TPro  6.1  /  Alb  2.3<L>  /  TBili  0.4  /  DBili  x   /  AST  27  /  ALT  25  /  AlkPhos  92        Urinalysis Basic - ( 2018 01:04 )    Color: Yellow / Appearance: Cloudy / S.025 / pH: x  Gluc: x / Ketone: Trace mg/dL  / Bili: Small / Urobili: 0.2 E.U./dL   Blood: x / Protein: Trace mg/dL / Nitrite: NEGATIVE   Leuk Esterase: NEGATIVE / RBC: < 5 /HPF / WBC < 5 /HPF   Sq Epi: x / Non Sq Epi: 5-10 /HPF / Bacteria: Many /HPF    RADIOLOGY & ADDITIONAL STUDIES (The following images were personally reviewed):< from: Xray Chest 1 View AP -PORTABLE-Routine (18 @ 02:47) >   Portable examination of chest demonstrates no significant change lung   pathology in comparison to prior examination of the chest 2015.    < end of copied text >

## 2018-01-22 NOTE — PROGRESS NOTE ADULT - ATTENDING COMMENTS
Patient clinically improved, s/p 1st treatment of palliative radiation this morning and tolerated well.  Plan to finish full course of 5 days and start patient on systemic chemotherapy with Carbo/Alimta +/- Pembrolizumab at that time if clinically stable and afebrile for past 48 hours.  Discussed in detail with patient and her daughter who understand and agree with plan.  Will give IV or IM therapy for current mild nausea.  Patient seen with Dr. Alvarado on 1/23.

## 2018-01-22 NOTE — PROGRESS NOTE ADULT - ASSESSMENT
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 Patient went for intervention 1/20 for which RLL obstructing mass debulked with balloon dilation, lazer and cryo for bleeding. Patient remains intermittently febrile with negative bcx but saturating well on room air.

## 2018-01-22 NOTE — PROGRESS NOTE ADULT - PROBLEM SELECTOR PLAN 1
Clinically stable, no hemoptysis. She is now s/p rigid bronchoscopy with tumor debulking and opening of the medial basal segment of RLL. There was no apparent pus in airway once opened, less likely acute ongoing infection and more likely symptoms related to tumor progression.  Has adenocarcinoma with CT evidence of enlargement of the tumor site and narrowing of the RLL airways with a  post-obstructive pneumonia.    - She was previously treated with Gamma Knife and palliation radiation to chest. She is to palliative radiation therapy. Clinically stable, no hemoptysis. She is now s/p rigid bronchoscopy with tumor debulking and opening of the medial basal segment of RLL. There was no apparent pus in airway once opened, less likely acute ongoing infection and more likely symptoms related to tumor progression.  Has adenocarcinoma with CT evidence of enlargement of the tumor site and narrowing of the RLL airways with a  post-obstructive pneumonia.    - She was previously treated with Gamma Knife and palliation radiation to chest. -She is to have a palliative radiation therapy and may be concomitant chemo

## 2018-01-23 DIAGNOSIS — K59.00 CONSTIPATION, UNSPECIFIED: ICD-10-CM

## 2018-01-23 DIAGNOSIS — R60.9 EDEMA, UNSPECIFIED: ICD-10-CM

## 2018-01-23 DIAGNOSIS — R52 PAIN, UNSPECIFIED: ICD-10-CM

## 2018-01-23 DIAGNOSIS — A41.9 SEPSIS, UNSPECIFIED ORGANISM: ICD-10-CM

## 2018-01-23 LAB
ANION GAP SERPL CALC-SCNC: 13 MMOL/L — SIGNIFICANT CHANGE UP (ref 5–17)
BUN SERPL-MCNC: 13 MG/DL — SIGNIFICANT CHANGE UP (ref 7–23)
CALCIUM SERPL-MCNC: 8.6 MG/DL — SIGNIFICANT CHANGE UP (ref 8.4–10.5)
CHLORIDE SERPL-SCNC: 94 MMOL/L — LOW (ref 96–108)
CO2 SERPL-SCNC: 26 MMOL/L — SIGNIFICANT CHANGE UP (ref 22–31)
CREAT SERPL-MCNC: 1.02 MG/DL — SIGNIFICANT CHANGE UP (ref 0.5–1.3)
GLUCOSE BLDC GLUCOMTR-MCNC: 125 MG/DL — HIGH (ref 70–99)
GLUCOSE BLDC GLUCOMTR-MCNC: 145 MG/DL — HIGH (ref 70–99)
GLUCOSE SERPL-MCNC: 118 MG/DL — HIGH (ref 70–99)
HCT VFR BLD CALC: 26.2 % — LOW (ref 34.5–45)
HGB BLD-MCNC: 8.3 G/DL — LOW (ref 11.5–15.5)
MCHC RBC-ENTMCNC: 25.2 PG — LOW (ref 27–34)
MCHC RBC-ENTMCNC: 31.7 G/DL — LOW (ref 32–36)
MCV RBC AUTO: 79.4 FL — LOW (ref 80–100)
PLATELET # BLD AUTO: 674 K/UL — HIGH (ref 150–400)
POTASSIUM SERPL-MCNC: 4.1 MMOL/L — SIGNIFICANT CHANGE UP (ref 3.5–5.3)
POTASSIUM SERPL-SCNC: 4.1 MMOL/L — SIGNIFICANT CHANGE UP (ref 3.5–5.3)
RBC # BLD: 3.3 M/UL — LOW (ref 3.8–5.2)
RBC # FLD: 15.9 % — SIGNIFICANT CHANGE UP (ref 10.3–16.9)
SODIUM SERPL-SCNC: 133 MMOL/L — LOW (ref 135–145)
WBC # BLD: 12.5 K/UL — HIGH (ref 3.8–10.5)
WBC # FLD AUTO: 12.5 K/UL — HIGH (ref 3.8–10.5)

## 2018-01-23 PROCEDURE — 77334 RADIATION TREATMENT AID(S): CPT | Mod: 26

## 2018-01-23 PROCEDURE — 99233 SBSQ HOSP IP/OBS HIGH 50: CPT | Mod: GC

## 2018-01-23 PROCEDURE — 99232 SBSQ HOSP IP/OBS MODERATE 35: CPT

## 2018-01-23 PROCEDURE — 77300 RADIATION THERAPY DOSE PLAN: CPT | Mod: 26

## 2018-01-23 PROCEDURE — 99232 SBSQ HOSP IP/OBS MODERATE 35: CPT | Mod: GC

## 2018-01-23 PROCEDURE — 77295 3-D RADIOTHERAPY PLAN: CPT | Mod: 26

## 2018-01-23 PROCEDURE — 93010 ELECTROCARDIOGRAM REPORT: CPT

## 2018-01-23 PROCEDURE — 77333 RADIATION TREATMENT AID(S): CPT | Mod: 26,59

## 2018-01-23 PROCEDURE — 74019 RADEX ABDOMEN 2 VIEWS: CPT | Mod: 26

## 2018-01-23 RX ORDER — OXYCODONE HYDROCHLORIDE 5 MG/1
10 TABLET ORAL EVERY 6 HOURS
Qty: 0 | Refills: 0 | Status: DISCONTINUED | OUTPATIENT
Start: 2018-01-23 | End: 2018-01-25

## 2018-01-23 RX ORDER — ONDANSETRON 8 MG/1
4 TABLET, FILM COATED ORAL ONCE
Qty: 0 | Refills: 0 | Status: COMPLETED | OUTPATIENT
Start: 2018-01-23 | End: 2018-01-23

## 2018-01-23 RX ORDER — POTASSIUM CHLORIDE 20 MEQ
10 PACKET (EA) ORAL
Qty: 0 | Refills: 0 | Status: COMPLETED | OUTPATIENT
Start: 2018-01-23 | End: 2018-01-23

## 2018-01-23 RX ORDER — POTASSIUM CHLORIDE 20 MEQ
40 PACKET (EA) ORAL ONCE
Qty: 0 | Refills: 0 | Status: COMPLETED | OUTPATIENT
Start: 2018-01-23 | End: 2018-01-23

## 2018-01-23 RX ORDER — FUROSEMIDE 40 MG
20 TABLET ORAL ONCE
Qty: 0 | Refills: 0 | Status: COMPLETED | OUTPATIENT
Start: 2018-01-23 | End: 2018-01-23

## 2018-01-23 RX ADMIN — Medication 300 MILLIGRAM(S): at 18:04

## 2018-01-23 RX ADMIN — Medication 3 MILLILITER(S): at 03:41

## 2018-01-23 RX ADMIN — Medication 300 MILLIGRAM(S): at 12:14

## 2018-01-23 RX ADMIN — Medication 0.25 MILLIGRAM(S): at 22:47

## 2018-01-23 RX ADMIN — Medication 3 MILLILITER(S): at 23:32

## 2018-01-23 RX ADMIN — Medication 100 MILLIGRAM(S): at 06:46

## 2018-01-23 RX ADMIN — Medication 300 MILLIGRAM(S): at 23:35

## 2018-01-23 RX ADMIN — POLYETHYLENE GLYCOL 3350 17 GRAM(S): 17 POWDER, FOR SOLUTION ORAL at 22:47

## 2018-01-23 RX ADMIN — Medication 100 MILLIEQUIVALENT(S): at 18:09

## 2018-01-23 RX ADMIN — OXYCODONE HYDROCHLORIDE 10 MILLIGRAM(S): 5 TABLET ORAL at 06:46

## 2018-01-23 RX ADMIN — Medication 3 MILLILITER(S): at 09:54

## 2018-01-23 RX ADMIN — Medication 10 MILLIGRAM(S): at 12:11

## 2018-01-23 RX ADMIN — Medication 100 MILLIGRAM(S): at 23:32

## 2018-01-23 RX ADMIN — LACTULOSE 10 GRAM(S): 10 SOLUTION ORAL at 12:11

## 2018-01-23 RX ADMIN — Medication 3 MILLILITER(S): at 18:03

## 2018-01-23 RX ADMIN — Medication 100 MILLIEQUIVALENT(S): at 14:17

## 2018-01-23 RX ADMIN — SENNA PLUS 2 TABLET(S): 8.6 TABLET ORAL at 22:46

## 2018-01-23 RX ADMIN — ONDANSETRON 4 MILLIGRAM(S): 8 TABLET, FILM COATED ORAL at 19:20

## 2018-01-23 RX ADMIN — Medication 100 MILLIEQUIVALENT(S): at 15:40

## 2018-01-23 RX ADMIN — Medication 650 MILLIGRAM(S): at 13:12

## 2018-01-23 RX ADMIN — OXYCODONE HYDROCHLORIDE 10 MILLIGRAM(S): 5 TABLET ORAL at 12:09

## 2018-01-23 RX ADMIN — OXYCODONE HYDROCHLORIDE 10 MILLIGRAM(S): 5 TABLET ORAL at 23:32

## 2018-01-23 RX ADMIN — Medication 300 MILLIGRAM(S): at 06:45

## 2018-01-23 RX ADMIN — Medication 20 MILLIGRAM(S): at 13:12

## 2018-01-23 RX ADMIN — OXYCODONE HYDROCHLORIDE 10 MILLIGRAM(S): 5 TABLET ORAL at 19:20

## 2018-01-23 RX ADMIN — AMLODIPINE BESYLATE 10 MILLIGRAM(S): 2.5 TABLET ORAL at 06:46

## 2018-01-23 RX ADMIN — Medication 650 MILLIGRAM(S): at 01:03

## 2018-01-23 RX ADMIN — Medication 3 MILLILITER(S): at 12:11

## 2018-01-23 RX ADMIN — OXYCODONE HYDROCHLORIDE 10 MILLIGRAM(S): 5 TABLET ORAL at 14:45

## 2018-01-23 RX ADMIN — Medication 100 MILLIGRAM(S): at 15:39

## 2018-01-23 RX ADMIN — Medication 40 MILLIEQUIVALENT(S): at 13:12

## 2018-01-23 NOTE — PROGRESS NOTE ADULT - PROBLEM SELECTOR PLAN 3
Given lasix 20mg IVP x1 w/ 40mg K+ for increasing peripheral edema. Met sepsis criteria on presentation with concern for etiology of post-obstructive PNA of RLL (in setting of metastatic lung Ca) for which patient went for bronchoscopy found RLL oozing and hemorrhage and transferred to MICU for respiratory status monitoring. Has been on vancomycin (1/14/18), meropenem (1/14/18) and levaquin (1/16/18)- levaquin added for breakthrough fevers for dual pseudomonas coverage. Went for bronch with findings of obstructed RUL, patent Rmiddle Lobe, and obstructive mass in RLL debulked with lazer and balloon dilation. Cryo for bleeding stopped. Also based on findings no evidence for infectious process ongoing- BAL obtained. Persistent low grade fever in setting of post-procedure and high tumor burden.   - Levaquin (1/16-->1/23)   - F/u BAL and monitor for signs of infection and can further broaden. Thus far, remains clear.

## 2018-01-23 NOTE — PROGRESS NOTE ADULT - PROBLEM SELECTOR PLAN 9
Hx of HTN on amlodipine and valasartan last admission.  - c/w home amlodipine 10mg, currently normotensive Hx of HTN on amlodipine and valasartan last admission.  - c/w home amlodipine 10mg, currently normotensive    F: PO intake  E: Replete electrolytes to maintain K>4 and Mg>2  N:  Restarted DASH/TLC/DM Diet as tolerated    VTE: Lovenox    FULL CODE    DIspo: 7LA

## 2018-01-23 NOTE — CHART NOTE - NSCHARTNOTEFT_GEN_A_CORE
Admitting Diagnosis:   Patient is a 62y old  Female admitted initially to [facility] for sepsis 2/2 to obstructive PNA in setting of metastatic lung cancer s/p bronschoscopy and now transferred to MICU for monitoring respiratory status s/p bronchoscopy as found to have hemorrhage in RLL. Patient went for intervention 1/20 for which RLL obstructing mass debulked with balloon dilation, lazer and cryo for bleeding. Patient remains intermittently febrile with negative bcx but saturating well on room air.         PAST MEDICAL & SURGICAL HISTORY:  Bone metastasis  Brain cancer  Lung cancer  Hypertension  Diabetes  History of radiation therapy  Status post gamma knife treatment      Current Nutrition Order: Regular w/ Ensure Clear TID       PO Intake: Good (%) [   ]  Fair (50-75%) [ X  ] Poor (<25%) [   ]    GI Issues: NONE    Pain:none    Skin Integrity: intact    Labs:   01-22    134<L>  |  95<L>  |  13  ----------------------------<  116<H>  3.9   |  26  |  1.03    Ca    8.5      22 Jan 2018 06:11  Phos  3.6     01-22  Mg     1.9     01-22    TPro  6.1  /  Alb  2.3<L>  /  TBili  0.4  /  DBili  x   /  AST  27  /  ALT  25  /  AlkPhos  92  01-22    CAPILLARY BLOOD GLUCOSE      POCT Blood Glucose.: 145 mg/dL (23 Jan 2018 11:09)  POCT Blood Glucose.: 125 mg/dL (23 Jan 2018 06:52)  POCT Blood Glucose.: 123 mg/dL (22 Jan 2018 21:29)      Medications:  MEDICATIONS  (STANDING):  ALBUTerol/ipratropium for Nebulization 3 milliLiter(s) Nebulizer every 4 hours  ALPRAZolam 0.25 milliGRAM(s) Oral at bedtime  amLODIPine   Tablet 10 milliGRAM(s) Oral daily  benzonatate 100 milliGRAM(s) Oral every 8 hours  bisacodyl Suppository 10 milliGRAM(s) Rectal daily  dextrose 5%. 1000 milliLiter(s) (50 mL/Hr) IV Continuous <Continuous>  dextrose 50% Injectable 12.5 Gram(s) IV Push once  dextrose 50% Injectable 25 Gram(s) IV Push once  dextrose 50% Injectable 25 Gram(s) IV Push once  insulin lispro (HumaLOG) corrective regimen sliding scale   SubCutaneous Before meals and at bedtime  lactulose Syrup 10 Gram(s) Oral daily  magnesium sulfate  IVPB 2 Gram(s) IV Intermittent once  oxyCODONE    IR 10 milliGRAM(s) Oral every 6 hours  polyethylene glycol 3350 17 Gram(s) Oral at bedtime  potassium chloride  10 mEq/100 mL IVPB 10 milliEquivalent(s) IV Intermittent every 1 hour  psyllium Powder 1 Packet(s) Oral two times a day  senna 2 Tablet(s) Oral at bedtime  trimethobenzamide 300 milliGRAM(s) Oral every 6 hours    MEDICATIONS  (PRN):  acetaminophen   Tablet 650 milliGRAM(s) Oral every 6 hours PRN For Temp greater than 38 C (100.4 F)  dextrose Gel 1 Dose(s) Oral once PRN Blood Glucose LESS THAN 70 milliGRAM(s)/deciliter  glucagon  Injectable 1 milliGRAM(s) IntraMuscular once PRN Glucose LESS THAN 70 milligrams/deciliter  oxyCODONE    IR 5 milliGRAM(s) Oral every 4 hours PRN Severe Pain (7 - 10) or SOB      Weight: 1/19: 93.3kg  1/14: 89.2kg  Daily     Daily     Weight Change: 4.1kg wt gain noted, however pt w/ 1+ L/R arm, 2+ generalized edema.   Estimated energy needs:   IBW: 54.5kg  %IBW: 166  BMI:  34.3; IBW utilized for nutritional needs as ABW >120% of IBW; Nutrient needs based on [facility] standards of care for repletion in adults.  Kcal: 30-35kcal/IBW: 3571-5566  protein: 1.2-1.4g/IBW: 65.3-76.2g  fluid: 30-35ml/IBW: 1632-1904ml     Subjective: Prepots eating 30-50% from meals and drinking all the ensure clear; encouraged to increase po intake w/ meals; denies N/V/D/C; last BM was yesterday.     Previous Nutrition Diagnosis: Unintended weight loss r/t decreased PO intake 2/2 n/v  AEB 7% wt loss over the past 1-2 months    Active [ X  ]  Resolved [   ]    If resolved, new PES:     Goal: Pt to meet 75% of needs PO    Recommendations: Continue on Ensure Clear tid (720kcal and 24g protein) and  encourage po intake w/ meals and snacks; provide pt's food preferences.   -please update wt and trend changes    Education: increased nutritional needs    Risk Level: High [   ] Moderate [   X] Low [   ]

## 2018-01-23 NOTE — PROGRESS NOTE ADULT - PROBLEM SELECTOR PLAN 9
Hx of HTN on amlodipine and valasartan last admission.  - c/w home amlodipine 10mg, currently normotensive

## 2018-01-23 NOTE — PROGRESS NOTE ADULT - SUBJECTIVE AND OBJECTIVE BOX
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 Also found to have hemorrhage in RLL. Post procedure patient remained intermittently febrile with negative bcx but saturating well on room air. Unlikely post-obstructive in nature, completed course of levaquin 5 days. Persistent low grade temperatures and tachycardia to the  range likely central in nature from BM. Improving SOB and serial CXRs, 90% on 4L NC. Some increasing peripheral edema treated with 20mg Lasix IVP x1. Severe constipation >1wk treated w/ standing regimen, 1 small BM on 1/22, no evidence of SBO on AXR, moderate stool burden (oxy likely contributing).    INTERVAL HPI/OVERNIGHT EVENTS:  Patient was seen and examined at bedside. As per nurse and patient, no o/n events, patient resting comfortably. Endorses nausea, shortness of breath, mild hemoptysis, fevers, and chills. Patient denies: dizziness, weakness, HA, Changes in vision, CP, palpitations, dysuria. ROS otherwise negative.    VITAL SIGNS:  T(F): 101.4 (01-23-18 @ 14:00)  HR: 114 (01-23-18 @ 14:17)  BP: 107/60 (01-23-18 @ 14:17)  RR: 21 (01-23-18 @ 14:17)  SpO2: 98% (01-23-18 @ 14:17)  Wt(kg): --    PHYSICAL EXAM:    Constitutional: WDWN, NAD  HEENT: PERRL, EOMI, sclera non-icteric, neck supple, trachea midline, no masses, no JVD, MMM, good dentition  Respiratory: End expiratory rhonchi of right middle lobe, CTA otherwise, good air entry b/l, no wheezing, no rales, without accessory muscle use and no intercostal retractions  Cardiovascular: RRR, normal S1S2, no M/R/G  Gastrointestinal: soft, NTND, no masses palpable, BS normal  Extremities: Warm, well perfused, pulses equal bilateral upper and lower extremities, no edema, no clubbing  Neurological: Intention tremor of b/l extremities.  AAOx3, CN Grossly intact  Skin: Normal temperature, warm, dry    MEDICATIONS  (STANDING):  ALBUTerol/ipratropium for Nebulization 3 milliLiter(s) Nebulizer every 4 hours  ALPRAZolam 0.25 milliGRAM(s) Oral at bedtime  amLODIPine   Tablet 10 milliGRAM(s) Oral daily  benzonatate 100 milliGRAM(s) Oral every 8 hours  bisacodyl Suppository 10 milliGRAM(s) Rectal daily  dextrose 5%. 1000 milliLiter(s) (50 mL/Hr) IV Continuous <Continuous>  dextrose 50% Injectable 12.5 Gram(s) IV Push once  dextrose 50% Injectable 25 Gram(s) IV Push once  dextrose 50% Injectable 25 Gram(s) IV Push once  insulin lispro (HumaLOG) corrective regimen sliding scale   SubCutaneous Before meals and at bedtime  lactulose Syrup 10 Gram(s) Oral daily  magnesium sulfate  IVPB 2 Gram(s) IV Intermittent once  oxyCODONE    IR 10 milliGRAM(s) Oral every 6 hours  polyethylene glycol 3350 17 Gram(s) Oral at bedtime  potassium chloride  10 mEq/100 mL IVPB 10 milliEquivalent(s) IV Intermittent every 1 hour  psyllium Powder 1 Packet(s) Oral two times a day  senna 2 Tablet(s) Oral at bedtime  trimethobenzamide 300 milliGRAM(s) Oral every 6 hours    MEDICATIONS  (PRN):  acetaminophen   Tablet 650 milliGRAM(s) Oral every 6 hours PRN For Temp greater than 38 C (100.4 F)  dextrose Gel 1 Dose(s) Oral once PRN Blood Glucose LESS THAN 70 milliGRAM(s)/deciliter  glucagon  Injectable 1 milliGRAM(s) IntraMuscular once PRN Glucose LESS THAN 70 milligrams/deciliter  oxyCODONE    IR 5 milliGRAM(s) Oral every 4 hours PRN Severe Pain (7 - 10) or SOB      Allergies    penicillins (Hives)    Intolerances        LABS:                        7.0    12.9  )-----------( 552      ( 22 Jan 2018 06:11 )             22.1     01-22    134<L>  |  95<L>  |  13  ----------------------------<  116<H>  3.9   |  26  |  1.03    Ca    8.5      22 Jan 2018 06:11  Phos  3.6     01-22  Mg     1.9     01-22    TPro  6.1  /  Alb  2.3<L>  /  TBili  0.4  /  DBili  x   /  AST  27  /  ALT  25  /  AlkPhos  92  01-22

## 2018-01-23 NOTE — PROGRESS NOTE ADULT - PROBLEM SELECTOR PLAN 1
- s/p rigid bronchoscopy with tumor debulking of the RLL endobronchial lesion and opening of the medial basal segment of RLL on 01/19/18. There was no apparent pus in airway once opened. 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 WBC is stable, bronchoscopy cultures are negative and CXR has improved right base haziness. Levaquin course completed.   - Should start concomitant chemo/RT as soon as possible with Dr Saran DUNLAP and Incentive anton use.  - Bowel regimen

## 2018-01-23 NOTE — PROGRESS NOTE ADULT - SUBJECTIVE AND OBJECTIVE BOX
INTERVAL HPI/OVERNIGHT EVENTS: none, intermittent nausea/ vomiting x1.     SUBJECTIVE: Patient seen and examined at bedside. Feels better, some SOB, but improved.     OBJECTIVE:    VITAL SIGNS:  ICU Vital Signs Last 24 Hrs  T(C): 37.9 (23 Jan 2018 09:16), Max: 38.2 (22 Jan 2018 17:34)  T(F): 100.2 (23 Jan 2018 09:16), Max: 100.8 (22 Jan 2018 17:34)  HR: 108 (23 Jan 2018 08:33) (106 - 116)  BP: 128/57 (23 Jan 2018 08:33) (117/53 - 128/57)  BP(mean): 82 (23 Jan 2018 08:33) (76 - 82)  ABP: --  ABP(mean): --  RR: 25 (23 Jan 2018 08:33) (17 - 25)  SpO2: 95% (23 Jan 2018 08:33) (90% - 95%)        CAPILLARY BLOOD GLUCOSE      POCT Blood Glucose.: 145 mg/dL (23 Jan 2018 11:09)      PHYSICAL EXAM:    Constitutional: WDWN resting comfortably in bed; NAD  HEENT: NC/AT; PERRL, anicteric sclera; MMM  Neck: supple, no JVD  Cardiovascular: +S1/S2; RRR; no M/R/G  Respiratory: course breath sounds in the right lower lobe. respirations appear non-labored. No wheeze.   Gastrointestinal: abdomen soft, NT/ND; +BS x4  Extremities: WWP; no clubbing, cyanosis, with increased edema in all four extremities.   Vascular: 2+ radial, femoral, DP/PT pulses B/L  Dermatologic: skin normal color and turgor; no visible rashes  Neurological: AAOx3, appropriately interactive    MEDICATIONS:  MEDICATIONS  (STANDING):  ALBUTerol/ipratropium for Nebulization 3 milliLiter(s) Nebulizer every 4 hours  ALPRAZolam 0.25 milliGRAM(s) Oral at bedtime  amLODIPine   Tablet 10 milliGRAM(s) Oral daily  benzonatate 100 milliGRAM(s) Oral every 8 hours  bisacodyl Suppository 10 milliGRAM(s) Rectal daily  dextrose 5%. 1000 milliLiter(s) (50 mL/Hr) IV Continuous <Continuous>  dextrose 50% Injectable 12.5 Gram(s) IV Push once  dextrose 50% Injectable 25 Gram(s) IV Push once  dextrose 50% Injectable 25 Gram(s) IV Push once  insulin lispro (HumaLOG) corrective regimen sliding scale   SubCutaneous Before meals and at bedtime  lactulose Syrup 10 Gram(s) Oral daily  magnesium sulfate  IVPB 2 Gram(s) IV Intermittent once  oxyCODONE    IR 10 milliGRAM(s) Oral every 6 hours  polyethylene glycol 3350 17 Gram(s) Oral at bedtime  psyllium Powder 1 Packet(s) Oral two times a day  senna 2 Tablet(s) Oral at bedtime  trimethobenzamide 300 milliGRAM(s) Oral every 6 hours    MEDICATIONS  (PRN):  acetaminophen   Tablet 650 milliGRAM(s) Oral every 6 hours PRN For Temp greater than 38 C (100.4 F)  dextrose Gel 1 Dose(s) Oral once PRN Blood Glucose LESS THAN 70 milliGRAM(s)/deciliter  glucagon  Injectable 1 milliGRAM(s) IntraMuscular once PRN Glucose LESS THAN 70 milligrams/deciliter  loperamide 2 milliGRAM(s) Oral every 2 hours PRN Diarrhea  oxyCODONE    IR 5 milliGRAM(s) Oral every 4 hours PRN Severe Pain (7 - 10) or SOB      ALLERGIES:  Allergies    penicillins (Hives)    Intolerances        LABS:                        7.0    12.9  )-----------( 552      ( 22 Jan 2018 06:11 )             22.1     01-22    134<L>  |  95<L>  |  13  ----------------------------<  116<H>  3.9   |  26  |  1.03    Ca    8.5      22 Jan 2018 06:11  Phos  3.6     01-22  Mg     1.9     01-22    TPro  6.1  /  Alb  2.3<L>  /  TBili  0.4  /  DBili  x   /  AST  27  /  ALT  25  /  AlkPhos  92  01-22          RADIOLOGY & ADDITIONAL TESTS: Reviewed. TRANSFER NOTE:    62F PMHX of HTN, DM, and Stage IV lung CA w/ mets to brain and bone s/p RT to bony met on R rib and gamma knife tx to brain presented with 4 days of nausea, vomiting, diarrhea admitted initially to [facility] for sepsis 2/2 to obstructive PNA in setting of metastatic lung cancer s/p bronchoscopy x2 with tumor debulking, laser, and cryo. Also found to have hemorrhage in RLL. Post procedure patient remained intermittently febrile with negative bcx but saturating well on room air. Unlikely post-obstructive in nature, completed course of levaquin 5 days. Persistent low grade temperatures and tachycardia to the  range likely central in nature from BM. Improving SOB and serial CXRs, 90% on 4L NC. Some increasing peripheral edema treated with 20mg Lasix IVP x1. Severe constipation >1wk treated w/ standing regimen, 1 small BM on 1/22, no evidence of SBO on AXR, moderate stool burden (oxy likely contributing).      Currently undergoing palliative RT therapy. BAL from bronch pending.           INTERVAL HPI/OVERNIGHT EVENTS: none, intermittent nausea/ vomiting x1.     SUBJECTIVE: Patient seen and examined at bedside. Feels better, some SOB, but improved.     OBJECTIVE:    VITAL SIGNS:  ICU Vital Signs Last 24 Hrs  T(C): 37.9 (23 Jan 2018 09:16), Max: 38.2 (22 Jan 2018 17:34)  T(F): 100.2 (23 Jan 2018 09:16), Max: 100.8 (22 Jan 2018 17:34)  HR: 108 (23 Jan 2018 08:33) (106 - 116)  BP: 128/57 (23 Jan 2018 08:33) (117/53 - 128/57)  BP(mean): 82 (23 Jan 2018 08:33) (76 - 82)  ABP: --  ABP(mean): --  RR: 25 (23 Jan 2018 08:33) (17 - 25)  SpO2: 95% (23 Jan 2018 08:33) (90% - 95%)        CAPILLARY BLOOD GLUCOSE      POCT Blood Glucose.: 145 mg/dL (23 Jan 2018 11:09)      PHYSICAL EXAM:    Constitutional: WDWN resting comfortably in bed; NAD  HEENT: NC/AT; PERRL, anicteric sclera; MMM  Neck: supple, no JVD  Cardiovascular: +S1/S2; RRR; no M/R/G  Respiratory: course breath sounds in the right lower lobe. respirations appear non-labored. No wheeze.   Gastrointestinal: abdomen soft, NT/ND; +BS x4  Extremities: WWP; no clubbing, cyanosis, with increased edema in all four extremities.   Vascular: 2+ radial, femoral, DP/PT pulses B/L  Dermatologic: skin normal color and turgor; no visible rashes  Neurological: AAOx3, appropriately interactive    MEDICATIONS:  MEDICATIONS  (STANDING):  ALBUTerol/ipratropium for Nebulization 3 milliLiter(s) Nebulizer every 4 hours  ALPRAZolam 0.25 milliGRAM(s) Oral at bedtime  amLODIPine   Tablet 10 milliGRAM(s) Oral daily  benzonatate 100 milliGRAM(s) Oral every 8 hours  bisacodyl Suppository 10 milliGRAM(s) Rectal daily  dextrose 5%. 1000 milliLiter(s) (50 mL/Hr) IV Continuous <Continuous>  dextrose 50% Injectable 12.5 Gram(s) IV Push once  dextrose 50% Injectable 25 Gram(s) IV Push once  dextrose 50% Injectable 25 Gram(s) IV Push once  insulin lispro (HumaLOG) corrective regimen sliding scale   SubCutaneous Before meals and at bedtime  lactulose Syrup 10 Gram(s) Oral daily  magnesium sulfate  IVPB 2 Gram(s) IV Intermittent once  oxyCODONE    IR 10 milliGRAM(s) Oral every 6 hours  polyethylene glycol 3350 17 Gram(s) Oral at bedtime  psyllium Powder 1 Packet(s) Oral two times a day  senna 2 Tablet(s) Oral at bedtime  trimethobenzamide 300 milliGRAM(s) Oral every 6 hours    MEDICATIONS  (PRN):  acetaminophen   Tablet 650 milliGRAM(s) Oral every 6 hours PRN For Temp greater than 38 C (100.4 F)  dextrose Gel 1 Dose(s) Oral once PRN Blood Glucose LESS THAN 70 milliGRAM(s)/deciliter  glucagon  Injectable 1 milliGRAM(s) IntraMuscular once PRN Glucose LESS THAN 70 milligrams/deciliter  loperamide 2 milliGRAM(s) Oral every 2 hours PRN Diarrhea  oxyCODONE    IR 5 milliGRAM(s) Oral every 4 hours PRN Severe Pain (7 - 10) or SOB      ALLERGIES:  Allergies    penicillins (Hives)    Intolerances        LABS:                        7.0    12.9  )-----------( 552      ( 22 Jan 2018 06:11 )             22.1     01-22    134<L>  |  95<L>  |  13  ----------------------------<  116<H>  3.9   |  26  |  1.03    Ca    8.5      22 Jan 2018 06:11  Phos  3.6     01-22  Mg     1.9     01-22    TPro  6.1  /  Alb  2.3<L>  /  TBili  0.4  /  DBili  x   /  AST  27  /  ALT  25  /  AlkPhos  92  01-22          RADIOLOGY & ADDITIONAL TESTS: Reviewed.

## 2018-01-23 NOTE — PROGRESS NOTE ADULT - SUBJECTIVE AND OBJECTIVE BOX
Interval Events:  Patient seen and examined at bedside. Breathing stable. Mild cough, but no hemoptysis. Had low gradae fever. Got planned radiation treatment.    REVIEW OF SYSTEMS:  Constitutional: No fever, weight loss or fatigue  Respiratory: As per subjective  Cardiovascular: No chest pain, palpitations, dizziness or leg swelling  Gastrointestinal: No abdominal or epigastric pain. No nausea, vomiting or hematemesis; No diarrhea or constipation. No melena or hematochezia.  Neurological: No headaches, memory loss, loss of strength, numbness or tremors  Skin: No itching, burning, rashes or lesions     MEDICATIONS:  Pulmonary:  ALBUTerol/ipratropium for Nebulization 3 milliLiter(s) Nebulizer every 4 hours  benzonatate 100 milliGRAM(s) Oral every 8 hours    Antimicrobials:    Anticoagulants:    Cardiac:  amLODIPine   Tablet 10 milliGRAM(s) Oral daily      Allergies    penicillins (Hives)    Intolerances        Vital Signs Last 24 Hrs  T(C): 37.9 (23 Jan 2018 09:16), Max: 38.2 (22 Jan 2018 17:34)  T(F): 100.2 (23 Jan 2018 09:16), Max: 100.8 (22 Jan 2018 17:34)  HR: 108 (23 Jan 2018 08:33) (106 - 116)  BP: 128/57 (23 Jan 2018 08:33) (117/53 - 128/57)  BP(mean): 82 (23 Jan 2018 08:33) (76 - 82)  RR: 25 (23 Jan 2018 08:33) (17 - 25)  SpO2: 95% (23 Jan 2018 08:33) (90% - 95%)        PHYSICAL EXAM:      General: Well developed; well nourished; in no acute distress  Eyes: PERRL, EOM intact; conjunctiva and sclera clear  ENMT: No nasal discharge; airway clear  Neck: Supple; non tender; no masses  Respiratory: Decreased breath sounds in RLL zone with some BBS noted. Scattered wheezing, rhonchi or rales  Cardiovascular: Regular rate and rhythm. S1 and S2 Normal; No murmurs, gallops or rubs  Gastrointestinal: Soft non-tender non-distended; Normal bowel sounds  Extremities: Normal range of motion, No clubbing, cyanosis or edema  Neurological: Alert and oriented x3  Skin: Warm and dry. No obvious rash        LABS:      CBC Full  -  ( 22 Jan 2018 06:11 )  WBC Count : 12.9 K/uL  Hemoglobin : 7.0 g/dL  Hematocrit : 22.1 %  Platelet Count - Automated : 552 K/uL  Mean Cell Volume : 78.1 fL  Mean Cell Hemoglobin : 24.7 pg  Mean Cell Hemoglobin Concentration : 31.7 g/dL  Auto Neutrophil # : x      01-22    134<L>  |  95<L>  |  13  ----------------------------<  116<H>  3.9   |  26  |  1.03    Ca    8.5      22 Jan 2018 06:11  Phos  3.6     01-22  Mg     1.9     01-22    TPro  6.1  /  Alb  2.3<L>  /  TBili  0.4  /  DBili  x   /  AST  27  /  ALT  25  /  AlkPhos  92  01-22      RADIOLOGY & ADDITIONAL STUDIES (The following images were personally reviewed):< from: Xray Chest 1 View AP- PORTABLE-Urgent (01.22.18 @ 09:15) >    FINDINGS: Heart size, mediastinal and hilar contours are unchanged. There   is continued silhouetting of the right cardiac silhouette. There may be   some fluid extending into the minor fissure. There is silhouetting of the   right hemidiaphragm medially. These findings may represent a combination   of right middle and right lower lobe pneumonia/atelectasis. No   pneumothorax is identified. Ipsilateral hilar mass and/or lymphadenopathy   cannot be excluded. Soft tissues and osseous structures are within normal   limits.    < end of copied text >

## 2018-01-23 NOTE — PROGRESS NOTE ADULT - PROBLEM SELECTOR PLAN 1
Met sepsis criteria on presentation with concern for etiology of post-obstructive PNA of RLL (in setting of metastatic lung Ca) for which patient went for bronchoscopy found RLL oozing and hemorrhage and transferred to MICU for respiratory status monitoring. Has been on vancomycin (1/14/18), meropenem (1/14/18) and levaquin (1/16/18)- levaquin added for breakthrough fevers for dual pseudomonas coverage. Went for bronch with findings of obstructed RUL, patent Rmiddle Lobe, and obstructive mass in RLL debulked with lazer and balloon dilation. Cryo for bleeding stopped. Also based on findings no evidence for infectious process ongoing- BAL obtained. Persistent low grade fever in setting of post-procedure and high tumor burden.   - Levaquin (1/16-->1/23)   - F/u BAL and monitor for signs of infection and can further broaden. Thus far, remains clear. Patient with pain 2/2 to metastatic lung cancer with bone met  - Oxycodone 10mg IR q6h standing with holding parameter for oversedation  - Oxcodone 5mg IR q4h PRN for severe breakthrough pain  - Reassess in AM and adjust dosing

## 2018-01-23 NOTE — PROGRESS NOTE ADULT - PROBLEM SELECTOR PLAN 10
F: PO intake  E: Replete electrolytes to maintain K>4 and Mg>2  N:  Restarted DASH/TLC/DM Diet as tolerated    VTE: Lovenox    FULL CODE    DIspo: 7LA Patient initially treated for post-obstructive pneumonia with 5 day course of levaquin  - Now resolved  - Patient continues to be febrile likely central in etiology vs. reactive to tumor burden

## 2018-01-23 NOTE — PROGRESS NOTE ADULT - PROBLEM SELECTOR PLAN 2
Patient has Stage IV lung CA w/ mets to brain and bone s/p RT to bony mets on R 8th rib last week, and had gamma knife tx to brain (Jan 9th).   - Palliative radiation treatment for 5 days (1/22 first treatment)  - Oxycodone standing for pain w/ good control Patient has Stage IV lung CA w/ mets to brain and bone s/p RT to bony mets on R 8th rib last week, and had gamma knife tx to brain (Jan 9th).   - Palliative radiation treatment for 5 days (1/22 first treatment)

## 2018-01-23 NOTE — PHYSICAL THERAPY INITIAL EVALUATION ADULT - ADDITIONAL COMMENTS
Patient reports independence with all ADLs/IADLs prior to admission. No HHA. Denies history of mechanical falls. No Home O2. Patient reports  will not be able to take care of patient upon discharge home should she need increased assist. Reports there are 15-steps in apartment to go down to living room area however full bathroom and bedroom are on main floor.

## 2018-01-23 NOTE — PROGRESS NOTE ADULT - PROBLEM SELECTOR PLAN 5
Some hemorrhage noted on initial bronch with cyro to stop bleeding on last bronch/intervention. No bloody output. Hgb at 7.5 from 8. Will continue to monitor for signs of bleeding. Mixed iron def and anemia of chronic disease on iron studies.   -Keep active T&S, monitor H&H.

## 2018-01-23 NOTE — PHYSICAL THERAPY INITIAL EVALUATION ADULT - GAIT DEVIATIONS NOTED, PT EVAL
decreased velocity of limb motion/decreased step length/fairly steady gait, no LOB noted however several standing rest breaks 2/2 reports of "feeling tired" and increased respiratory rate; ambulated on 3LO2NC and SpO2=96%/decreased shobha

## 2018-01-23 NOTE — PROGRESS NOTE ADULT - PROBLEM SELECTOR PLAN 1
S/P first dose of radiation today. Clinically stable, no hemoptysis. S/p rigid bronchoscopy with tumor debulking and opening of the medial basal segment of RLL. There was no apparent pus in airway once opened, less likely acute ongoing infection and more likely symptoms related to tumor progression.  Adenocarcinoma with CT evidence of enlargement of the tumor site and narrowing of the RLL airways with a  post-obstructive pneumonia.    - She was previously treated with Gamma Knife and palliation radiation to chest. -She is to have a palliative radiation therapy and may be concomitant chemo  -Continue on current ABX

## 2018-01-23 NOTE — PROGRESS NOTE ADULT - PROBLEM SELECTOR PLAN 2
Patient has Stage IV lung CA w/ mets to brain and bone s/p RT to bony mets on R 8th rib last week, and had gamma knife tx to brain (Jan 9th).   -Palliative radiation treatment for 5 days (1/22 first treatment) Patient has Stage IV lung CA w/ mets to brain and bone s/p RT to bony mets on R 8th rib last week, and had gamma knife tx to brain (Jan 9th).   -Palliative radiation treatment for 5 days (1/22 first treatment)  - oxycodone standing for pain w/ good control

## 2018-01-23 NOTE — PROGRESS NOTE ADULT - SUBJECTIVE AND OBJECTIVE BOX
Interventional Pulmonary    OVERNIGHT EVENTS:   No acute events overnight.    VITALS:  T(C): , Max: 38.6 (01-23-18 @ 14:00)  HR:  (104 - 116)  BP:  (105/59 - 128/57)  ABP: --  RR:  (17 - 27)  SpO2:  (91% - 99%)  Wt(kg): --      LABS:  Na: 133 (01-23 @ 19:18), 134 (01-22 @ 06:11), 135 (01-21 @ 04:20)  K: 4.1 (01-23 @ 19:18), 3.9 (01-22 @ 06:11), 3.9 (01-21 @ 04:20)  Cl: 94 (01-23 @ 19:18), 95 (01-22 @ 06:11), 97 (01-21 @ 04:20)  CO2: 26 (01-23 @ 19:18), 26 (01-22 @ 06:11), 26 (01-21 @ 04:20)  BUN: 13 (01-23 @ 19:18), 13 (01-22 @ 06:11), 11 (01-21 @ 04:20)  Cr: 1.02 (01-23 @ 19:18), 1.03 (01-22 @ 06:11), 1.00 (01-21 @ 04:20)  Glu: 118(01-23 @ 19:18), 116(01-22 @ 06:11), 113(01-21 @ 04:20)    Hgb: 8.3 (01-23 @ 19:18), 7.0 (01-22 @ 06:11), 7.2 (01-21 @ 04:20)  Hct: 26.2 (01-23 @ 19:18), 22.1 (01-22 @ 06:11), 23.1 (01-21 @ 04:20)  WBC: 12.5 (01-23 @ 19:18), 12.9 (01-22 @ 06:11), 13.6 (01-21 @ 04:20)  Plt: 674 (01-23 @ 19:18), 552 (01-22 @ 06:11), 606 (01-21 @ 04:20)    INR:   PTT:     IMAGING:   Recent imaging studies were reviewed.    MEDICATIONS:  acetaminophen   Tablet 650 milliGRAM(s) Oral every 6 hours PRN  ALBUTerol/ipratropium for Nebulization 3 milliLiter(s) Nebulizer every 4 hours  ALPRAZolam 0.25 milliGRAM(s) Oral at bedtime  amLODIPine   Tablet 10 milliGRAM(s) Oral daily  benzonatate 100 milliGRAM(s) Oral every 8 hours  bisacodyl Suppository 10 milliGRAM(s) Rectal daily  dextrose 5%. 1000 milliLiter(s) IV Continuous <Continuous>  dextrose 50% Injectable 12.5 Gram(s) IV Push once  dextrose 50% Injectable 25 Gram(s) IV Push once  dextrose 50% Injectable 25 Gram(s) IV Push once  dextrose Gel 1 Dose(s) Oral once PRN  glucagon  Injectable 1 milliGRAM(s) IntraMuscular once PRN  insulin lispro (HumaLOG) corrective regimen sliding scale   SubCutaneous Before meals and at bedtime  lactulose Syrup 10 Gram(s) Oral daily  oxyCODONE    IR 5 milliGRAM(s) Oral every 4 hours PRN  oxyCODONE    IR 10 milliGRAM(s) Oral every 6 hours  polyethylene glycol 3350 17 Gram(s) Oral at bedtime  psyllium Powder 1 Packet(s) Oral two times a day  senna 2 Tablet(s) Oral at bedtime  trimethobenzamide 300 milliGRAM(s) Oral every 6 hours    EXAMINATION:  General:  NAD  HEENT: MP: Neck is supple, No JVD  Cardio: s1s2 RRR. No murmurs.   Respiratory: Decreased BS at the right base.  Adomen:  soft, NT  Extremities:  no edema  Skin:  warm/dry  Neuro:  awake, alert, oriented x 3, follows commands, moves all extremities

## 2018-01-23 NOTE — PHYSICAL THERAPY INITIAL EVALUATION ADULT - PERTINENT HX OF CURRENT PROBLEM, REHAB EVAL
63 y/o F with a PMHX of HTN, DM, and Stage IV lung CA w/ mets to brain and bone (Dec 2nd) who underwent RT to bony mets on R 8th rib last week, and had gamma knife tx to brain (Jan 9th) who presents with 4 days of n/v. Her vomitus is NBNB. She states that her stool has been lose but she has not been eating much. Some days she has 3 loose BMs, but today she only had 1 loose BM. Her stool is without blood. Please refer to H&P on [place] remaining.

## 2018-01-23 NOTE — PROGRESS NOTE ADULT - PROBLEM SELECTOR PLAN 1
Met sepsis criteria on presentation with concern for etiology of post-obstructive PNA of RLL (in setting of metastatic lung Ca) for which patient went for bronchoscopy found RLL oozing and hemorrhage and transferred to MICU for respiratory status monitoring. Has been on vancomycin (1/14/18), meropenem (1/14/18) and levaquin (1/16/18)- levaquin added for breakthrough fevers for dual pseudomonas coverage. Went for bronch with findings of obstructed RUL, patent Rmiddle Lobe, and obstructive mass in RLL debulked with lazer and balloon dilation. Cryo for bleeding stopped. Also based on findings no evidence for infectious process ongoing- BAL obtained. Persistent low grade fever in setting of post-procedure and high tumor burden.   - Levaquin (1/16-->1/23)   - F/u BAL and monitor for signs of infection and can further broaden. Thus far, remains clear.

## 2018-01-24 DIAGNOSIS — C34.30 MALIGNANT NEOPLASM OF LOWER LOBE, UNSPECIFIED BRONCHUS OR LUNG: ICD-10-CM

## 2018-01-24 LAB
ANION GAP SERPL CALC-SCNC: 12 MMOL/L — SIGNIFICANT CHANGE UP (ref 5–17)
BUN SERPL-MCNC: 13 MG/DL — SIGNIFICANT CHANGE UP (ref 7–23)
CALCIUM SERPL-MCNC: 8.3 MG/DL — LOW (ref 8.4–10.5)
CHLORIDE SERPL-SCNC: 95 MMOL/L — LOW (ref 96–108)
CO2 SERPL-SCNC: 26 MMOL/L — SIGNIFICANT CHANGE UP (ref 22–31)
CREAT SERPL-MCNC: 1.01 MG/DL — SIGNIFICANT CHANGE UP (ref 0.5–1.3)
GLUCOSE SERPL-MCNC: 112 MG/DL — HIGH (ref 70–99)
HCT VFR BLD CALC: 23.9 % — LOW (ref 34.5–45)
HCT VFR BLD CALC: 25.9 % — LOW (ref 34.5–45)
HGB BLD-MCNC: 7.6 G/DL — LOW (ref 11.5–15.5)
HGB BLD-MCNC: 8.1 G/DL — LOW (ref 11.5–15.5)
MAGNESIUM SERPL-MCNC: 1.9 MG/DL — SIGNIFICANT CHANGE UP (ref 1.6–2.6)
MCHC RBC-ENTMCNC: 25 PG — LOW (ref 27–34)
MCHC RBC-ENTMCNC: 25.2 PG — LOW (ref 27–34)
MCHC RBC-ENTMCNC: 31.3 G/DL — LOW (ref 32–36)
MCHC RBC-ENTMCNC: 31.8 G/DL — LOW (ref 32–36)
MCV RBC AUTO: 79.1 FL — LOW (ref 80–100)
MCV RBC AUTO: 79.9 FL — LOW (ref 80–100)
PHOSPHATE SERPL-MCNC: 3 MG/DL — SIGNIFICANT CHANGE UP (ref 2.5–4.5)
PLATELET # BLD AUTO: 575 K/UL — HIGH (ref 150–400)
PLATELET # BLD AUTO: 649 K/UL — HIGH (ref 150–400)
POTASSIUM SERPL-MCNC: 4.3 MMOL/L — SIGNIFICANT CHANGE UP (ref 3.5–5.3)
POTASSIUM SERPL-SCNC: 4.3 MMOL/L — SIGNIFICANT CHANGE UP (ref 3.5–5.3)
RBC # BLD: 3.02 M/UL — LOW (ref 3.8–5.2)
RBC # BLD: 3.24 M/UL — LOW (ref 3.8–5.2)
RBC # FLD: 16 % — SIGNIFICANT CHANGE UP (ref 10.3–16.9)
RBC # FLD: 16.2 % — SIGNIFICANT CHANGE UP (ref 10.3–16.9)
SODIUM SERPL-SCNC: 133 MMOL/L — LOW (ref 135–145)
WBC # BLD: 13.4 K/UL — HIGH (ref 3.8–10.5)
WBC # BLD: 13.8 K/UL — HIGH (ref 3.8–10.5)
WBC # FLD AUTO: 13.4 K/UL — HIGH (ref 3.8–10.5)
WBC # FLD AUTO: 13.8 K/UL — HIGH (ref 3.8–10.5)

## 2018-01-24 PROCEDURE — 93010 ELECTROCARDIOGRAM REPORT: CPT

## 2018-01-24 PROCEDURE — 99497 ADVNCD CARE PLAN 30 MIN: CPT

## 2018-01-24 PROCEDURE — 99232 SBSQ HOSP IP/OBS MODERATE 35: CPT

## 2018-01-24 PROCEDURE — 99233 SBSQ HOSP IP/OBS HIGH 50: CPT

## 2018-01-24 PROCEDURE — G6002: CPT | Mod: 26

## 2018-01-24 PROCEDURE — 77280 THER RAD SIMULAJ FIELD SMPL: CPT | Mod: 26

## 2018-01-24 RX ADMIN — OXYCODONE HYDROCHLORIDE 10 MILLIGRAM(S): 5 TABLET ORAL at 23:25

## 2018-01-24 RX ADMIN — Medication 3 MILLILITER(S): at 15:57

## 2018-01-24 RX ADMIN — Medication 1 PACKET(S): at 07:11

## 2018-01-24 RX ADMIN — OXYCODONE HYDROCHLORIDE 10 MILLIGRAM(S): 5 TABLET ORAL at 07:03

## 2018-01-24 RX ADMIN — Medication 300 MILLIGRAM(S): at 23:28

## 2018-01-24 RX ADMIN — Medication 300 MILLIGRAM(S): at 18:15

## 2018-01-24 RX ADMIN — Medication 3 MILLILITER(S): at 20:05

## 2018-01-24 RX ADMIN — OXYCODONE HYDROCHLORIDE 10 MILLIGRAM(S): 5 TABLET ORAL at 11:49

## 2018-01-24 RX ADMIN — OXYCODONE HYDROCHLORIDE 10 MILLIGRAM(S): 5 TABLET ORAL at 07:44

## 2018-01-24 RX ADMIN — Medication 3 MILLILITER(S): at 07:14

## 2018-01-24 RX ADMIN — Medication 0.25 MILLIGRAM(S): at 21:01

## 2018-01-24 RX ADMIN — Medication 650 MILLIGRAM(S): at 09:32

## 2018-01-24 RX ADMIN — Medication 300 MILLIGRAM(S): at 11:49

## 2018-01-24 RX ADMIN — AMLODIPINE BESYLATE 10 MILLIGRAM(S): 2.5 TABLET ORAL at 07:03

## 2018-01-24 RX ADMIN — Medication 100 MILLIGRAM(S): at 15:59

## 2018-01-24 RX ADMIN — OXYCODONE HYDROCHLORIDE 10 MILLIGRAM(S): 5 TABLET ORAL at 01:26

## 2018-01-24 RX ADMIN — Medication 3 MILLILITER(S): at 11:49

## 2018-01-24 RX ADMIN — Medication 650 MILLIGRAM(S): at 21:00

## 2018-01-24 RX ADMIN — OXYCODONE HYDROCHLORIDE 10 MILLIGRAM(S): 5 TABLET ORAL at 18:14

## 2018-01-24 RX ADMIN — OXYCODONE HYDROCHLORIDE 10 MILLIGRAM(S): 5 TABLET ORAL at 12:15

## 2018-01-24 RX ADMIN — Medication 100 MILLIGRAM(S): at 21:01

## 2018-01-24 RX ADMIN — Medication 2: at 21:01

## 2018-01-24 RX ADMIN — Medication 3 MILLILITER(S): at 23:24

## 2018-01-24 RX ADMIN — Medication 100 MILLIGRAM(S): at 07:02

## 2018-01-24 RX ADMIN — Medication 300 MILLIGRAM(S): at 07:03

## 2018-01-24 NOTE — PROGRESS NOTE ADULT - ATTENDING COMMENTS
Pt seen and examined, c/o nausea, improved SOB, fevers.  VS reviewed- tachycardic and tmax 101.9.  Labs reviewed, EKG reviewed.  61 yo F with  1. Stage 4 lung ca with mets to LN, rib, and brain  - s/p radiation to rib, gamma knife to brain, actived started RT again tx 2/5  - outpt chemo with Dr Noonan  2. Sepsis 2/2 post obstructive PNA 2/2 lung ca  - completed tx with abx  3. Nausea/vomiting  - pt with prolonged QT  - no zofran, on Tigan, discussed starting steroids and pt wants tow ait until tomorrow   4. Prolonged QT  - repeat EKG with QTc 513, will /fu daily and replete electrolytes aggressively  5. Diarrhea  - miralax  6. Shortness of breath 2/2 lung ca   - oxycodone increased to 10 mg q6hrs standing with 5 mg q4hrs prn for sob/pain- feeling better  7. Anxiety  - xanax qhs  8. Insomnia  - xanax qhs  9. Anemia  - likely ACD  10. HTN  - will f/u once sx better managed  11. DM  - FS well controlled  12. Chronic cough  - oxycodone should help with this, on tessalon perles as well  13. Severe protein calorie malnutrition  - appetite improved on xyprexa  - nutrition consult  14. Sinus tachycardia  - from sepsis, treating underlying disease  15. Phlebitis  - resolved

## 2018-01-24 NOTE — PROGRESS NOTE ADULT - SUBJECTIVE AND OBJECTIVE BOX
INTERVAL HPI/OVERNIGHT EVENTS: none, given ondansetron x1.     SUBJECTIVE: Patient seen and examined at bedside. ros neg.     OBJECTIVE:    VITAL SIGNS:  ICU Vital Signs Last 24 Hrs  T(C): 38 (24 Jan 2018 04:57), Max: 38.6 (23 Jan 2018 14:00)  T(F): 100.4 (24 Jan 2018 04:57), Max: 101.4 (23 Jan 2018 14:00)  HR: 116 (24 Jan 2018 04:57) (100 - 116)  BP: 104/50 (24 Jan 2018 04:57) (104/50 - 139/71)  BP(mean): 75 (23 Jan 2018 15:56) (75 - 82)  ABP: --  ABP(mean): --  RR: 18 (24 Jan 2018 04:57) (17 - 27)  SpO2: 95% (24 Jan 2018 04:57) (95% - 99%)        CAPILLARY BLOOD GLUCOSE      POCT Blood Glucose.: 109 mg/dL (23 Jan 2018 21:47)      PHYSICAL EXAM:    Constitutional: WDWN, NAD  HEENT: PERRL, EOMI, sclera non-icteric, neck supple, trachea midline, no masses, no JVD, MMM, good dentition  Respiratory: End expiratory rhonchi of right middle lobe, CTA otherwise, good air entry b/l, no wheezing, no rales, without accessory muscle use and no intercostal retractions  Cardiovascular: RRR, normal S1S2, no M/R/G  Gastrointestinal: soft, NTND, no masses palpable, BS normal  Extremities: Warm, well perfused, pulses equal bilateral upper and lower extremities, no edema, no clubbing  Neurological: Intention tremor of b/l extremities.  AAOx3, CN Grossly intact  Skin: Normal temperature, warm, dry    MEDICATIONS:  MEDICATIONS  (STANDING):  ALBUTerol/ipratropium for Nebulization 3 milliLiter(s) Nebulizer every 4 hours  ALPRAZolam 0.25 milliGRAM(s) Oral at bedtime  amLODIPine   Tablet 10 milliGRAM(s) Oral daily  benzonatate 100 milliGRAM(s) Oral every 8 hours  bisacodyl Suppository 10 milliGRAM(s) Rectal daily  dextrose 5%. 1000 milliLiter(s) (50 mL/Hr) IV Continuous <Continuous>  dextrose 50% Injectable 12.5 Gram(s) IV Push once  dextrose 50% Injectable 25 Gram(s) IV Push once  dextrose 50% Injectable 25 Gram(s) IV Push once  insulin lispro (HumaLOG) corrective regimen sliding scale   SubCutaneous Before meals and at bedtime  lactulose Syrup 10 Gram(s) Oral daily  oxyCODONE    IR 10 milliGRAM(s) Oral every 6 hours  polyethylene glycol 3350 17 Gram(s) Oral at bedtime  psyllium Powder 1 Packet(s) Oral two times a day  senna 2 Tablet(s) Oral at bedtime  trimethobenzamide 300 milliGRAM(s) Oral every 6 hours    MEDICATIONS  (PRN):  acetaminophen   Tablet 650 milliGRAM(s) Oral every 6 hours PRN For Temp greater than 38 C (100.4 F)  dextrose Gel 1 Dose(s) Oral once PRN Blood Glucose LESS THAN 70 milliGRAM(s)/deciliter  glucagon  Injectable 1 milliGRAM(s) IntraMuscular once PRN Glucose LESS THAN 70 milligrams/deciliter  oxyCODONE    IR 5 milliGRAM(s) Oral every 4 hours PRN Severe Pain (7 - 10) or SOB      ALLERGIES:  Allergies    penicillins (Hives)    Intolerances        LABS:                        8.3    12.5  )-----------( 674      ( 23 Jan 2018 19:18 )             26.2     01-23    133<L>  |  94<L>  |  13  ----------------------------<  118<H>  4.1   |  26  |  1.02    Ca    8.6      23 Jan 2018 19:18            RADIOLOGY & ADDITIONAL TESTS: Reviewed.

## 2018-01-24 NOTE — PROGRESS NOTE ADULT - PROBLEM SELECTOR PLAN 2
Patient has Stage IV lung CA w/ mets to brain and bone s/p RT to bony mets on R 8th rib last week, and had gamma knife tx to brain (Jan 9th).   - Palliative radiation treatment for 5 days (1/22 first treatment)

## 2018-01-24 NOTE — PROGRESS NOTE ADULT - ASSESSMENT
Mrs. 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  Presents with nausea and vomiting and blood streaked hemoptysis.  Found to have enlarging RLL mass with consolidation now s/p debulking of endobronchial lesion in RLL.  Has started radiation therapy while as an inpatient.

## 2018-01-24 NOTE — PROGRESS NOTE ADULT - PROBLEM SELECTOR PLAN 1
Patient with pain 2/2 to metastatic lung cancer with bone met  - Oxycodone 10mg IR q6h standing with holding parameter for oversedation  - Oxcodone 5mg IR q4h PRN for severe breakthrough pain  - Reassess in AM and adjust dosing

## 2018-01-24 NOTE — PROGRESS NOTE ADULT - SUBJECTIVE AND OBJECTIVE BOX
Interventional Pulmonology Progress Note    Interval Events:  Patient seen and examined at bedside.  Continues to have fever.  Dry cough.  States that she is feeling better.        REVIEW OF SYSTEMS:  Constitutional: Fever  Respiratory: As per subjective  Cardiovascular: No chest pain, palpitations, dizziness or leg swelling  Gastrointestinal: No abdominal or epigastric pain. No nausea, vomiting or hematemesis; No diarrhea or constipation. No melena or hematochezia.  Neurological: No headaches, memory loss, loss of strength, numbness or tremors  Skin: No itching, burning, rashes or lesions     MEDICATIONS:  Pulmonary:  ALBUTerol/ipratropium for Nebulization 3 milliLiter(s) Nebulizer every 4 hours  benzonatate 100 milliGRAM(s) Oral every 8 hours    Cardiac:  amLODIPine   Tablet 10 milliGRAM(s) Oral daily    Allergies  penicillins (Hives)    Vital Signs Last 24 Hrs  T(C): 38.8 (24 Jan 2018 09:14), Max: 38.8 (24 Jan 2018 09:14)  T(F): 101.9 (24 Jan 2018 09:14), Max: 101.9 (24 Jan 2018 09:14)  HR: 114 (24 Jan 2018 09:14) (100 - 116)  BP: 109/65 (24 Jan 2018 09:14) (104/50 - 139/71)  BP(mean): 75 (23 Jan 2018 15:56) (75 - 76)  RR: 17 (24 Jan 2018 09:14) (17 - 27)  SpO2: 94% (24 Jan 2018 09:14) (94% - 99%)    PHYSICAL EXAM:  General: no acute distress  Eyes: PERRL, EOM intact; conjunctiva and sclera clear  ENMT: No nasal discharge; airway clear  Neck: Supple; non tender; no masses  Respiratory: Decrease right lower zone breath sounds. scattered ronchi  Cardiovascular: Regular rate and rhythm. S1 and S2 Normal; No murmurs, gallops or rubs  Gastrointestinal: Soft non-tender non-distended; Normal bowel sounds  Extremities: Normal range of motion,  Patient has finger clubbing  Neurological: Alert and oriented x3  Skin: Warm and dry. No obvious rash    LABS:      CBC Full  -  ( 24 Jan 2018 08:13 )  WBC Count : 13.4 K/uL  Hemoglobin : 7.6 g/dL  Hematocrit : 23.9 %  Platelet Count - Automated : 575 K/uL  Mean Cell Volume : 79.1 fL  Mean Cell Hemoglobin : 25.2 pg  Mean Cell Hemoglobin Concentration : 31.8 g/dL    01-24    133<L>  |  95<L>  |  13  ----------------------------<  112<H>  4.3   |  26  |  1.01    Ca    8.3<L>      24 Jan 2018 08:12  Phos  3.0     01-24  Mg     1.9     01-24    RADIOLOGY & ADDITIONAL STUDIES (The following images were personally reviewed):

## 2018-01-24 NOTE — PROGRESS NOTE ADULT - PROBLEM SELECTOR PLAN 9
Hx of HTN on amlodipine and valasartan last admission.  - c/w home amlodipine 10mg, currently normotensive    F: PO intake  E: Replete electrolytes to maintain K>4 and Mg>2  N:  Restarted DASH/TLC/DM Diet as tolerated    VTE: Lovenox    FULL CODE    DIspo: 7LA

## 2018-01-24 NOTE — PROGRESS NOTE ADULT - PROBLEM SELECTOR PLAN 10
Patient initially treated for post-obstructive pneumonia with 5 day course of levaquin  - Now resolved  - Patient continues to be febrile likely central in etiology vs. reactive to tumor burden

## 2018-01-24 NOTE — PROGRESS NOTE ADULT - PROBLEM SELECTOR PLAN 1
s/p rigid bronchoscopy with tumor debulking of the RLL endobronchial lesion and opening of the medial basal segment of RLL on 01/19/18. There was no apparent pus in airway once opened. Endobronchial biopsies have confirmed adenocarinoma of lung primary.  - She continues to have intermittent fever but there does not appear to be any on going pneumonia at this point. WBC is stable, bronchoscopy cultures are negative and CXR has improved right base haziness. Levaquin course completed.   - Patient received radiation therapy yesterday as per Heme/Onc (Dr. Noonan).  Plan is  to start concomitant chemo.  - OOB and Incentive anton use.  - Bowel regimen.  -Patient is scheduled to follow up in outpatient clinic in 1 week.

## 2018-01-25 ENCOUNTER — TRANSCRIPTION ENCOUNTER (OUTPATIENT)
Age: 63
End: 2018-01-25

## 2018-01-25 VITALS
SYSTOLIC BLOOD PRESSURE: 96 MMHG | DIASTOLIC BLOOD PRESSURE: 52 MMHG | OXYGEN SATURATION: 99 % | RESPIRATION RATE: 18 BRPM | HEART RATE: 102 BPM

## 2018-01-25 LAB
ANION GAP SERPL CALC-SCNC: 11 MMOL/L — SIGNIFICANT CHANGE UP (ref 5–17)
BUN SERPL-MCNC: 13 MG/DL — SIGNIFICANT CHANGE UP (ref 7–23)
CALCIUM SERPL-MCNC: 8.1 MG/DL — LOW (ref 8.4–10.5)
CHLORIDE SERPL-SCNC: 95 MMOL/L — LOW (ref 96–108)
CO2 SERPL-SCNC: 27 MMOL/L — SIGNIFICANT CHANGE UP (ref 22–31)
CREAT SERPL-MCNC: 0.98 MG/DL — SIGNIFICANT CHANGE UP (ref 0.5–1.3)
CULTURE RESULTS: SIGNIFICANT CHANGE UP
GLUCOSE BLDC GLUCOMTR-MCNC: 102 MG/DL — HIGH (ref 70–99)
GLUCOSE BLDC GLUCOMTR-MCNC: 133 MG/DL — HIGH (ref 70–99)
GLUCOSE SERPL-MCNC: 133 MG/DL — HIGH (ref 70–99)
HCT VFR BLD CALC: 24.1 % — LOW (ref 34.5–45)
HGB BLD-MCNC: 7.5 G/DL — LOW (ref 11.5–15.5)
MAGNESIUM SERPL-MCNC: 2.1 MG/DL — SIGNIFICANT CHANGE UP (ref 1.6–2.6)
MCHC RBC-ENTMCNC: 25.1 PG — LOW (ref 27–34)
MCHC RBC-ENTMCNC: 31.1 G/DL — LOW (ref 32–36)
MCV RBC AUTO: 80.6 FL — SIGNIFICANT CHANGE UP (ref 80–100)
PHOSPHATE SERPL-MCNC: 3.3 MG/DL — SIGNIFICANT CHANGE UP (ref 2.5–4.5)
PLATELET # BLD AUTO: 637 K/UL — HIGH (ref 150–400)
POTASSIUM SERPL-MCNC: 3.9 MMOL/L — SIGNIFICANT CHANGE UP (ref 3.5–5.3)
POTASSIUM SERPL-SCNC: 3.9 MMOL/L — SIGNIFICANT CHANGE UP (ref 3.5–5.3)
RBC # BLD: 2.99 M/UL — LOW (ref 3.8–5.2)
RBC # FLD: 16.3 % — SIGNIFICANT CHANGE UP (ref 10.3–16.9)
SODIUM SERPL-SCNC: 133 MMOL/L — LOW (ref 135–145)
SPECIMEN SOURCE: SIGNIFICANT CHANGE UP
WBC # BLD: 13.3 K/UL — HIGH (ref 3.8–10.5)
WBC # FLD AUTO: 13.3 K/UL — HIGH (ref 3.8–10.5)

## 2018-01-25 PROCEDURE — 99356: CPT

## 2018-01-25 PROCEDURE — 99232 SBSQ HOSP IP/OBS MODERATE 35: CPT

## 2018-01-25 PROCEDURE — G6002: CPT | Mod: 26

## 2018-01-25 PROCEDURE — 99233 SBSQ HOSP IP/OBS HIGH 50: CPT

## 2018-01-25 RX ORDER — OXYCODONE HYDROCHLORIDE 5 MG/1
20 TABLET ORAL EVERY 12 HOURS
Qty: 0 | Refills: 0 | Status: DISCONTINUED | OUTPATIENT
Start: 2018-01-25 | End: 2018-01-26

## 2018-01-25 RX ORDER — DEXAMETHASONE 0.5 MG/5ML
4 ELIXIR ORAL
Qty: 0 | Refills: 0 | Status: DISCONTINUED | OUTPATIENT
Start: 2018-01-25 | End: 2018-01-26

## 2018-01-25 RX ADMIN — Medication 650 MILLIGRAM(S): at 05:58

## 2018-01-25 RX ADMIN — Medication 3 MILLILITER(S): at 05:57

## 2018-01-25 RX ADMIN — Medication 4 MILLIGRAM(S): at 19:24

## 2018-01-25 RX ADMIN — Medication 3 MILLILITER(S): at 12:07

## 2018-01-25 RX ADMIN — Medication 650 MILLIGRAM(S): at 15:20

## 2018-01-25 RX ADMIN — OXYCODONE HYDROCHLORIDE 20 MILLIGRAM(S): 5 TABLET ORAL at 19:26

## 2018-01-25 RX ADMIN — AMLODIPINE BESYLATE 10 MILLIGRAM(S): 2.5 TABLET ORAL at 05:58

## 2018-01-25 RX ADMIN — Medication 650 MILLIGRAM(S): at 21:32

## 2018-01-25 RX ADMIN — Medication 3 MILLILITER(S): at 21:32

## 2018-01-25 RX ADMIN — OXYCODONE HYDROCHLORIDE 10 MILLIGRAM(S): 5 TABLET ORAL at 00:25

## 2018-01-25 RX ADMIN — Medication 300 MILLIGRAM(S): at 05:59

## 2018-01-25 RX ADMIN — OXYCODONE HYDROCHLORIDE 10 MILLIGRAM(S): 5 TABLET ORAL at 12:06

## 2018-01-25 RX ADMIN — Medication 0.25 MILLIGRAM(S): at 21:32

## 2018-01-25 RX ADMIN — OXYCODONE HYDROCHLORIDE 10 MILLIGRAM(S): 5 TABLET ORAL at 07:01

## 2018-01-25 RX ADMIN — Medication 100 MILLIGRAM(S): at 13:34

## 2018-01-25 RX ADMIN — Medication 0.5 MILLIGRAM(S): at 14:15

## 2018-01-25 RX ADMIN — Medication 3 MILLILITER(S): at 09:09

## 2018-01-25 RX ADMIN — OXYCODONE HYDROCHLORIDE 20 MILLIGRAM(S): 5 TABLET ORAL at 20:30

## 2018-01-25 RX ADMIN — OXYCODONE HYDROCHLORIDE 10 MILLIGRAM(S): 5 TABLET ORAL at 06:01

## 2018-01-25 RX ADMIN — Medication 3 MILLILITER(S): at 17:24

## 2018-01-25 RX ADMIN — Medication 100 MILLIGRAM(S): at 05:58

## 2018-01-25 RX ADMIN — OXYCODONE HYDROCHLORIDE 10 MILLIGRAM(S): 5 TABLET ORAL at 12:44

## 2018-01-25 NOTE — PROGRESS NOTE ADULT - ASSESSMENT
62 year old female with Stage IV lung adenocarcinoma, PDL1 40%, EGFR/ALK/ROS negative, HTN, DM, who underwent RT to bony mets on R 8th rib last week, and had gamma knife tx to brain (Jan 9th) who presented with 4 days of n/v, cough found to have post obstructive pneumonia.     #Post obstructive Pneumonia - 2/2 RLL endobronchial lesion on broad spectrum abx, s/p interventional bronchoscopy with tumor debulking and airway opening, patient continues to have fevers, following cultures    -Continue with supportive care off broad spectrum antibiotics  -will hold off treatment with chemo/immunotherapy until fevers have resolved     -[facility] on board patient getting XRT, will complete treatment monday 1/29    #Stage IV lung adenocarcinoma - Metastatic to brain s/p gamma knife in LI and bone seen on PET/CT. Now with marked increased in RLL and R hilar lesions however also in setting of pneumonia and other inflammatory changes. Patient has PDL1 expression of 40%, however with other negative targets including EGFR/ALK/ROS, not a candidate for targeted agents. Intent of systemic treatment palliative not curative with cytotoxic chemotherapy with the addition of immunotherapy based on the KEYNOTE 021 trial which showed improvement in PATIÑO and PFS in patients with PDL <50.     #Anemia - Likely of chronic inflammation given iron studies and ferritin level. No evidence of bilirubinemia, jaundice. Pulm oozing/hemorrhage seen on bronch. Retic 1.8%. Plts likely reactive, mild coagulopathy in setting of infection likely.      -Trend Hg, if persistently dropping consider occult stool testing  -Transfuse for goal Hg>7  -off lovenox for dvt ppx given pulm hemorrhage, consider SCDs    Discussed with Dr. Noonan

## 2018-01-25 NOTE — PROGRESS NOTE ADULT - PROBLEM SELECTOR PROBLEM 9
Need for prophylactic measure
Need for prophylactic measure
Essential hypertension

## 2018-01-25 NOTE — PROGRESS NOTE ADULT - SUBJECTIVE AND OBJECTIVE BOX
INTERVAL HPI/OVERNIGHT EVENTS: none     SUBJECTIVE: Patient seen and examined at bedside. ros neg, reduced n/v.     OBJECTIVE:    VITAL SIGNS:  ICU Vital Signs Last 24 Hrs  T(C): 37.7 (25 Jan 2018 08:52), Max: 38.8 (24 Jan 2018 09:14)  T(F): 99.8 (25 Jan 2018 08:52), Max: 101.9 (24 Jan 2018 09:14)  HR: 105 (25 Jan 2018 08:52) (101 - 115)  BP: 96/53 (25 Jan 2018 08:52) (96/53 - 127/66)  BP(mean): --  ABP: --  ABP(mean): --  RR: 18 (25 Jan 2018 08:52) (17 - 19)  SpO2: 94% (25 Jan 2018 08:52) (94% - 97%)        CAPILLARY BLOOD GLUCOSE      POCT Blood Glucose.: 129 mg/dL (25 Jan 2018 07:25)      PHYSICAL EXAM:    Constitutional: WDWN, NAD  HEENT: PERRL, EOMI, sclera non-icteric, neck supple, trachea midline, no masses, no JVD, MMM, good dentition  Respiratory: End expiratory rhonchi, CTA otherwise, good air entry b/l, no wheezing, no rales, without accessory muscle use and no intercostal retractions  Cardiovascular: RRR, normal S1S2, no M/R/G  Gastrointestinal: soft, NTND, no masses palpable, BS normal  Extremities: Warm, well perfused, pulses equal bilateral upper and lower extremities, no edema, no clubbing  Neurological: Intention tremor of b/l extremities.  AAOx3, CN Grossly intact  Skin: Normal temperature, warm, dry      MEDICATIONS:  MEDICATIONS  (STANDING):  ALBUTerol/ipratropium for Nebulization 3 milliLiter(s) Nebulizer every 4 hours  ALPRAZolam 0.25 milliGRAM(s) Oral at bedtime  amLODIPine   Tablet 10 milliGRAM(s) Oral daily  benzonatate 100 milliGRAM(s) Oral every 8 hours  dextrose 5%. 1000 milliLiter(s) (50 mL/Hr) IV Continuous <Continuous>  dextrose 50% Injectable 12.5 Gram(s) IV Push once  dextrose 50% Injectable 25 Gram(s) IV Push once  dextrose 50% Injectable 25 Gram(s) IV Push once  insulin lispro (HumaLOG) corrective regimen sliding scale   SubCutaneous Before meals and at bedtime  oxyCODONE    IR 10 milliGRAM(s) Oral every 6 hours  polyethylene glycol 3350 17 Gram(s) Oral at bedtime  senna 2 Tablet(s) Oral at bedtime    MEDICATIONS  (PRN):  acetaminophen   Tablet 650 milliGRAM(s) Oral every 6 hours PRN For Temp greater than 38 C (100.4 F)  dextrose Gel 1 Dose(s) Oral once PRN Blood Glucose LESS THAN 70 milliGRAM(s)/deciliter  glucagon  Injectable 1 milliGRAM(s) IntraMuscular once PRN Glucose LESS THAN 70 milligrams/deciliter  oxyCODONE    IR 5 milliGRAM(s) Oral every 4 hours PRN Severe Pain (7 - 10) or SOB  trimethobenzamide 300 milliGRAM(s) Oral every 6 hours PRN Nausea and/or Vomiting      ALLERGIES:  Allergies    penicillins (Hives)    Intolerances        LABS:                        7.5    13.3  )-----------( 637      ( 25 Jan 2018 08:30 )             24.1     01-25    133<L>  |  95<L>  |  13  ----------------------------<  133<H>  3.9   |  27  |  0.98    Ca    8.1<L>      25 Jan 2018 08:29  Phos  3.3     01-25  Mg     2.1     01-25            RADIOLOGY & ADDITIONAL TESTS: Reviewed.

## 2018-01-25 NOTE — DISCHARGE NOTE ADULT - CARE PLAN
Principal Discharge DX:	Pneumonia  Goal:	You were admitted because of a lung infection  Assessment and plan of treatment:	You were treated for a pneumonia with antibiotics. You're continued fevers are more likely because of malignancy and not an infection. There was no infection found when you were scoped.  Secondary Diagnosis:	Vomiting and diarrhea  Assessment and plan of treatment:	Please continue to use Ativan 0.5mg by mouth intermittently for nausea. you have a prolonged rhythm in your heart that makes taking other anti-emetics risky. Please consult with your primary care doctor or oncologist for better recommendations should the ativan stop working.  Secondary Diagnosis:	Bone metastasis  Assessment and plan of treatment:	Continue with radiation.  Secondary Diagnosis:	Constipation  Assessment and plan of treatment:	You can take an over the counter constipation medication as needed.  Secondary Diagnosis:	Essential hypertension  Assessment and plan of treatment:	Continue with amlodipine 10mg as prescribed  Secondary Diagnosis:	Pain management  Assessment and plan of treatment:	Continue with your pain medications as prescribed and follow up with pain management (listed).  Secondary Diagnosis:	Lung cancer  Assessment and plan of treatment:	Please follow up with your oncologist in 1-2 weeks time after radiation (listed). Principal Discharge DX:	Pneumonia  Goal:	You were admitted because of a lung infection  Assessment and plan of treatment:	You were treated for a pneumonia with antibiotics. You're continued fevers are more likely because of malignancy and not an infection. There was no infection found when you were scoped.  Secondary Diagnosis:	Vomiting and diarrhea  Assessment and plan of treatment:	Please continue to use Ativan 0.5mg by mouth intermittently for nausea. you have a prolonged rhythm in your heart that makes taking other anti-emetics risky. Please consult with your primary care doctor or oncologist for better recommendations should the ativan stop working.  Secondary Diagnosis:	Bone metastasis  Assessment and plan of treatment:	Continue with radiation.  Secondary Diagnosis:	Constipation  Assessment and plan of treatment:	You can take an over the counter constipation medication as needed.  Secondary Diagnosis:	Essential hypertension  Assessment and plan of treatment:	Continue with amlodipine 10mg as prescribed  Secondary Diagnosis:	Pain management  Assessment and plan of treatment:	Continue with your pain medications as prescribed and follow up with pain management (listed).  Secondary Diagnosis:	Insomnia  Assessment and plan of treatment:	Please use Ativan as needed for sleep (as well as nausea and vomiting)

## 2018-01-25 NOTE — PROGRESS NOTE ADULT - PROBLEM SELECTOR PLAN 2
Patient has Stage IV lung CA w/ mets to brain and bone s/p RT to bony mets on R 8th rib last week, and had gamma knife tx to brain (Jan 9th).   - Palliative radiation treatment for 5 days (1/23 first treatment)

## 2018-01-25 NOTE — DISCHARGE NOTE ADULT - HOSPITAL COURSE
62F PMHX of HTN, DM, and Stage IV lung CA w/ mets to brain and bone s/p RT to bony met on R rib and gamma knife tx to brain presented with 4 days of nausea, vomiting, diarrhea admitted initially to [facility] for sepsis 2/2 to obstructive PNA in setting of metastatic lung cancer s/p bronchoscopy x2 with tumor debulking, laser, and cryo. Also found to have hemorrhage in RLL. Post procedure patient remained intermittently febrile with negative bcx but saturating well on room air. Unlikely post-obstructive in nature, completed course of levaquin 5 days. Persistent low grade temperatures and tachycardia to the  range likely central in nature from BM. Improving SOB and serial CXRs, 90% on 4L NC. Some increasing peripheral edema treated with 20mg Lasix IVP x1. Severe constipation >1wk treated w/ standing regimen.

## 2018-01-25 NOTE — DISCHARGE NOTE ADULT - MEDICATION SUMMARY - MEDICATIONS TO STOP TAKING
I will STOP taking the medications listed below when I get home from the hospital:    Zofran ODT 4 mg oral tablet, disintegrating  -- 4 milligram(s) by mouth , As Needed

## 2018-01-25 NOTE — DISCHARGE NOTE ADULT - PLAN OF CARE
You were admitted because of a lung infection You were treated for a pneumonia with antibiotics. You're continued fevers are more likely because of malignancy and not an infection. There was no infection found when you were scoped. Please continue to use Ativan 0.5mg by mouth intermittently for nausea. you have a prolonged rhythm in your heart that makes taking other anti-emetics risky. Please consult with your primary care doctor or oncologist for better recommendations should the ativan stop working. Continue with radiation. You can take an over the counter constipation medication as needed. Continue with amlodipine 10mg as prescribed Continue with your pain medications as prescribed and follow up with pain management (listed). Please follow up with your oncologist in 1-2 weeks time after radiation (listed). Please use Ativan as needed for sleep (as well as nausea and vomiting)

## 2018-01-25 NOTE — DISCHARGE NOTE ADULT - CARE PROVIDERS DIRECT ADDRESSES
[email or web address]

## 2018-01-25 NOTE — PROGRESS NOTE ADULT - PROBLEM SELECTOR PLAN 1
-s/p rigid bronchoscopy with tumor debulking of the RLL endobronchial lesion and opening of the medial basal segment of RLL on 01/19/18. There was no apparent pus in airway once opened. Endobronchial biopsies have confirmed adenocarinoma of lung primary.  - She continues to have intermittent fever but there does not appear to be any on going pneumonia at this point. WBC is stable, bronchoscopy cultures are negative and CXR has improved right base haziness. Levaquin course completed.   - Patient received radiation therapy yesterday as per Heme/Onc (Dr. Noonan).  Plan is  to start concomitant chemo.  Nausea is improved with anti-emetics  - OOB and Incentive anton use.  - Bowel regimen.  -Patient is scheduled to follow up in outpatient clinic in 1 week.

## 2018-01-25 NOTE — PROGRESS NOTE ADULT - SUBJECTIVE AND OBJECTIVE BOX
Heme/Onc Progress Note (Dr. Noonan)     Interval History: Patient with documented fever overnight. Also admitted to some blood tinged sputum. Otherwise patient admitted to improved nausea control. Patient denied acute chest pain, shortness of breath, or gross bleeding, including dark stool BRBPR, or melena.     ROS is otherwise negative.      Allergies    penicillins (Hives)    Intolerances        Medications:  MEDICATIONS  (STANDING):  ALBUTerol/ipratropium for Nebulization 3 milliLiter(s) Nebulizer every 4 hours  ALPRAZolam 0.25 milliGRAM(s) Oral at bedtime  amLODIPine   Tablet 10 milliGRAM(s) Oral daily  benzonatate 100 milliGRAM(s) Oral every 8 hours  dexamethasone     Tablet 4 milliGRAM(s) Oral two times a day  dextrose 5%. 1000 milliLiter(s) (50 mL/Hr) IV Continuous <Continuous>  dextrose 50% Injectable 12.5 Gram(s) IV Push once  dextrose 50% Injectable 25 Gram(s) IV Push once  dextrose 50% Injectable 25 Gram(s) IV Push once  insulin lispro (HumaLOG) corrective regimen sliding scale   SubCutaneous Before meals and at bedtime  oxyCODONE    IR 10 milliGRAM(s) Oral every 6 hours  polyethylene glycol 3350 17 Gram(s) Oral at bedtime  senna 2 Tablet(s) Oral at bedtime    MEDICATIONS  (PRN):  acetaminophen   Tablet 650 milliGRAM(s) Oral every 6 hours PRN For Temp greater than 38 C (100.4 F)  dextrose Gel 1 Dose(s) Oral once PRN Blood Glucose LESS THAN 70 milliGRAM(s)/deciliter  glucagon  Injectable 1 milliGRAM(s) IntraMuscular once PRN Glucose LESS THAN 70 milligrams/deciliter  oxyCODONE    IR 5 milliGRAM(s) Oral every 4 hours PRN Severe Pain (7 - 10) or SOB  trimethobenzamide 300 milliGRAM(s) Oral every 6 hours PRN Nausea and/or Vomiting            PHYSICAL EXAM:    T(F): 99.8 (01-25-18 @ 08:52), Max: 101.9 (01-24-18 @ 21:00)  HR: 105 (01-25-18 @ 08:52) (103 - 115)  BP: 96/53 (01-25-18 @ 08:52) (96/53 - 127/66)  RR: 18 (01-25-18 @ 08:52) (18 - 19)  SpO2: 94% (01-25-18 @ 08:52) (94% - 97%)  Wt(kg): --    Daily     Daily     GEN: NAD, no resp accessory muscle use  HEENT: AT/NC, EOMI, no oral lesions  NECK: supple  CVS: +S1S2 tachycardic  LUNG: Decerased breath sounds on R  ABD: NT, +BS, obese  : no cva tenderness  EXT: No c/c/e  NEURO: aaox3, non focal, no sensory changes      Labs:                          7.5    13.3  )-----------( 637      ( 25 Jan 2018 08:30 )             24.1     CBC Full  -  ( 25 Jan 2018 08:30 )  WBC Count : 13.3 K/uL  Hemoglobin : 7.5 g/dL  Hematocrit : 24.1 %  Platelet Count - Automated : 637 K/uL  Mean Cell Volume : 80.6 fL  Mean Cell Hemoglobin : 25.1 pg  Mean Cell Hemoglobin Concentration : 31.1 g/dL  Auto Neutrophil # : x  Auto Lymphocyte # : x  Auto Monocyte # : x  Auto Eosinophil # : x  Auto Basophil # : x  Auto Neutrophil % : x  Auto Lymphocyte % : x  Auto Monocyte % : x  Auto Eosinophil % : x  Auto Basophil % : x        01-25    133<L>  |  95<L>  |  13  ----------------------------<  133<H>  3.9   |  27  |  0.98    Ca    8.1<L>      25 Jan 2018 08:29  Phos  3.3     01-25  Mg     2.1     01-25

## 2018-01-25 NOTE — PROGRESS NOTE ADULT - PROBLEM SELECTOR PLAN 6
Hb1AC at 6.3-demonstrating good control.  -c/w Mod ISS.
Hb1AC at 6.3-demonstrating good control.  -c/w Mod ISS.
Findings of RBBB on EKG. 12 lead from monitor consistent with RBBB with NSR.   - f/u AM EKG
Microcytic  - Fe studies indicative of anemia of chronic disease  - maintain active T&S
Microcytic  - Fe studies indicative of anemia of chronic disease  - maintain active T&S
Hb1AC at 6.3-demonstrating good control.  -c/w Mod ISS.
Findings of RBBB on EKG. 12 lead from monitor consistent with RBBB with NSR.   - f/u AM EKG
Hb1AC at 6.3-demonstrating good control.  -c/w Mod ISS.
Hgb at 8.2 this AM. In setting of hemorrhage demonstrated on bronch today, continue to monitor bloody output. Likely component of hx of metastatic lung cancer.   -Monitor H/H and respiratory status for hemopytosis.
Hb1AC at 6.3-demonstrating good control.  -c/w Mod ISS.

## 2018-01-25 NOTE — DISCHARGE NOTE ADULT - CARE PROVIDER_API CALL
Afshan Noonan), Internal Medicine; Medical Oncology  [address] [address]  Phone: [phone]  Fax: [phone]    Stiven Xiong), Internal Medicine; Pulmonary Disease  [address] [address]  Phone: [phone]  Fax: [phone]    Tara Tam), Internal Medicine  [address] [address]  Phone: [phone]  Fax: [phone]

## 2018-01-25 NOTE — PROGRESS NOTE ADULT - PROBLEM SELECTOR PLAN 1
Patient with pain 2/2 to metastatic lung cancer with bone met  - Oxycodone 10mg IR q6h standing with holding parameter for oversedation  - Oxcodone 5mg IR q4h PRN for severe breakthrough pain

## 2018-01-25 NOTE — DISCHARGE NOTE ADULT - PATIENT PORTAL LINK FT
“You can access the Follow[employer] Patient Portal, offered by [facility], by registering with the following website: [email or web address]”

## 2018-01-25 NOTE — PROGRESS NOTE ADULT - SUBJECTIVE AND OBJECTIVE BOX
Interventional Pulmonology Progress Note    Interval Events:  Patient seen and examined at bedside.  Had radiation therapy yesterday.  Has been having nausea and vomiting today.  Started on IV anti-emetics with some relief.  Some decreased appetite.  Had fever this AM.     REVIEW OF SYSTEMS:  Constitutional: No fever, weight loss or fatigue  Respiratory: As per subjective  Cardiovascular: No chest pain, palpitations, dizziness or leg swelling  Gastrointestinal: No abdominal or epigastric pain. No nausea, vomiting or hematemesis; No diarrhea or constipation. No melena or hematochezia.  Neurological: No headaches, memory loss, loss of strength, numbness or tremors  Skin: No itching, burning, rashes or lesions     MEDICATIONS:  Pulmonary:  ALBUTerol/ipratropium for Nebulization 3 milliLiter(s) Nebulizer every 4 hours  benzonatate 100 milliGRAM(s) Oral every 8 hours    Antimicrobials:    Anticoagulants:    Cardiac:  amLODIPine   Tablet 10 milliGRAM(s) Oral daily      Allergies    penicillins (Hives)    Intolerances        Vital Signs Last 24 Hrs  T(C): 37.7 (25 Jan 2018 08:52), Max: 38.8 (24 Jan 2018 21:00)  T(F): 99.8 (25 Jan 2018 08:52), Max: 101.9 (24 Jan 2018 21:00)  HR: 105 (25 Jan 2018 08:52) (103 - 115)  BP: 96/53 (25 Jan 2018 08:52) (96/53 - 127/66)  BP(mean): --  RR: 18 (25 Jan 2018 08:52) (18 - 19)  SpO2: 94% (25 Jan 2018 08:52) (94% - 97%)        PHYSICAL EXAM:    General: No acute distress  Eyes: PERRL, EOM intact; conjunctiva and sclera clear  Neck: Supple  Respiratory: decreased right lower zone breath sounds, no use of accessory muscles  Cardiovascular: Regular rate and rhythm. S1 and S2 Normal; No murmurs, gallops or rubs  Gastrointestinal: Soft non-tender non-distended; Normal bowel sounds  Extremities: Normal range of motion, No clubbing, cyanosis or edema  Neurological: Alert and oriented x3  Skin: Warm and dry. No obvious rash    LABS:      CBC Full  -  ( 25 Jan 2018 08:30 )  WBC Count : 13.3 K/uL  Hemoglobin : 7.5 g/dL  Hematocrit : 24.1 %  Platelet Count - Automated : 637 K/uL  Mean Cell Volume : 80.6 fL  Mean Cell Hemoglobin : 25.1 pg  Mean Cell Hemoglobin Concentration : 31.1 g/dL  Auto Neutrophil # : x  Auto Lymphocyte # : x  Auto Monocyte # : x  Auto Eosinophil # : x  Auto Basophil # : x  Auto Neutrophil % : x  Auto Lymphocyte % : x  Auto Monocyte % : x  Auto Eosinophil % : x  Auto Basophil % : x    01-25    133<L>  |  95<L>  |  13  ----------------------------<  133<H>  3.9   |  27  |  0.98    Ca    8.1<L>      25 Jan 2018 08:29  Phos  3.3     01-25  Mg     2.1     01-25                        RADIOLOGY & ADDITIONAL STUDIES (The following images were personally reviewed):

## 2018-01-25 NOTE — PROGRESS NOTE ADULT - ATTENDING COMMENTS
Pt seen and examined, c/o nausea and vomited while in room, having fevers.  VS reviewed- tachycardic and tmax 101.3.  Exam as above. Labs reviewed, EKG reviewed.  61 yo F with  1. Stage 4 lung ca with mets to LN, rib, and brain  - s/p radiation to rib, gamma knife to brain, started RT again- completes next week  - outpt chemo with Dr Noonan  2. Sepsis 2/2 post obstructive PNA 2/2 lung ca  - completed tx with abx  3. Nausea/vomiting  - pt with prolonged QT  - on Tigan, please use ativan prn for nausea  - added decadron for nausea   4. Prolonged QT  - repeat EKG with QTc elevated, reviewed all meds and electrolytes and d/w pt's daughter  5. Diarrhea  - miralax  6. Shortness of breath 2/2 lung ca   - oxycodone 10 mg q6hrs standing with 5 mg q4hrs prn for sob/pain- feeling better  7. Anxiety  - xanax qhs  8. Insomnia  - xanax qhs  9. Anemia  - likely ACD  10. HTN  - will f/u once sx better managed  11. DM  - FS well controlled  12. Chronic cough  - oxycodone should help with this, on tessalon perles as well  13. Severe protein calorie malnutrition  - appetite poor, will f/u response to steroids  - nutrition consult  14. Sinus tachycardia  - treating underlying disease  15. Phlebitis  - resolved  16. Central fevers  - f/u response to decadron  D/c home tomorrow Pt seen and examined, c/o nausea and vomited while in room, having fevers.  VS reviewed- tachycardic and tmax 101.3.  Exam as above. Labs reviewed, EKG reviewed.  63 yo F with  1. Stage 4 lung ca with mets to LN, rib, and brain  - s/p radiation to rib, gamma knife to brain, started RT again- completes next week  - outpt chemo with Dr Noonan  2. Sepsis 2/2 post obstructive PNA 2/2 lung ca  - completed tx with abx  3. Nausea/vomiting  - pt with prolonged QT  - on Tigan, please use ativan prn for nausea  - added decadron for nausea   4. Prolonged QT  - repeat EKG with QTc elevated, reviewed all meds and electrolytes and d/w pt's daughter  5. Diarrhea  - miralax  6. Shortness of breath 2/2 lung ca   - oxycodone er 20 mg q12hrs standing with 5 mg q4hrs prn for sob/pain- feeling better  7. Anxiety  - xanax qhs  8. Insomnia  - xanax qhs  9. Anemia  - likely ACD  10. HTN  - will f/u once sx better managed  11. DM  - FS well controlled  12. Chronic cough  - oxycodone should help with this, on tessalon perles as well  13. Severe protein calorie malnutrition  - appetite poor, will f/u response to steroids  - nutrition consult  14. Sinus tachycardia  - treating underlying disease  15. Phlebitis  - resolved  16. Central fevers  - f/u response to decadron  D/c home tomorrow Pt seen and examined, c/o nausea and vomited while in room, having fevers.  VS reviewed- tachycardic and tmax 101.3.  Exam as above. Labs reviewed, EKG reviewed.  63 yo F with  1. Stage 4 lung ca with mets to LN, rib, and brain  - s/p radiation to rib, gamma knife to brain, started RT again- completes next week  - outpt chemo with Dr Noonan  2. Sepsis 2/2 post obstructive PNA 2/2 lung ca  - completed tx with abx  3. Nausea/vomiting  - pt with prolonged QT  - on Tigan, please use ativan prn for nausea  - added decadron for nausea   4. Prolonged QT  - repeat EKG with QTc elevated, reviewed all meds and electrolytes and d/w pt's daughter  5. Diarrhea  - miralax  6. Shortness of breath 2/2 lung ca   - oxycodone er 20 mg q12hrs standing with 5 mg q4hrs prn for sob/pain- feeling better  7. Anxiety  - xanax qhs  8. Insomnia  - xanax qhs  9. Anemia  - likely ACD  10. HTN  - will f/u once sx better managed  11. DM  - FS well controlled  12. Chronic cough  - oxycodone should help with this, on tessalon perles as well  13. Severe protein calorie malnutrition  - appetite poor, will f/u response to steroids  - nutrition consult  14. Sinus tachycardia  - treating underlying disease  15. Phlebitis  - resolved  16. Central fevers  - f/u response to decadron  Spoke with pt and daughter for 75 min regarding pt care, transition to home, and outpt f/u  D/c home tomorrow

## 2018-01-25 NOTE — PROGRESS NOTE ADULT - PROBLEM SELECTOR PROBLEM 2
Pneumonia
Lung cancer metastatic to brain
Pneumonia
Lung cancer metastatic to brain
Pneumonia
Vomiting and diarrhea
Vomiting and diarrhea
Lung cancer metastatic to brain

## 2018-01-25 NOTE — DISCHARGE NOTE ADULT - SECONDARY DIAGNOSIS.
Vomiting and diarrhea Bone metastasis Constipation Essential hypertension Pain management Lung cancer Insomnia

## 2018-01-25 NOTE — DISCHARGE NOTE ADULT - MEDICATION SUMMARY - MEDICATIONS TO TAKE
I will START or STAY ON the medications listed below when I get home from the hospital:    dexamethasone 4 mg oral tablet  -- 1 tab(s) by mouth 2 times a day  -- Indication: For Vomiting and diarrhea    amLODIPine 10 mg oral tablet  -- 1 tab(s) by mouth once a day  -- Indication: For Essential hypertension    polyethylene glycol 3350 oral powder for reconstitution  -- 17 gram(s) by mouth once a day (at bedtime)  -- Indication: For Constipation    senna oral tablet  -- 2 tab(s) by mouth once a day (at bedtime)  -- Indication: For Constipation

## 2018-01-25 NOTE — PROGRESS NOTE ADULT - ASSESSMENT
Mrs. 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  Presents with nausea and vomiting and blood streaked hemoptysis.  Found to have enlarging RLL mass with consolidation now s/p debulking of endobronchial lesion in RLL.  Has started radiation therapy while as an inpatient.  Continues to have daily fever but has no elevation in her leukocytosis, no change in her cough, or productive sputum.

## 2018-01-25 NOTE — PROGRESS NOTE ADULT - PROBLEM SELECTOR PLAN 4
Some hemorrhage noted on initial bronch with cyro to stop bleeding on last bronch/intervention. No bloody output. Hgb at 7.5 from 8. Will continue to monitor for signs of bleeding.  -Keep active T&S, monitor H&H.
Some hemorrhage noted on initial bronch with cyro to stop bleeding on last bronch/intervention. No bloody output. Hgb at 7.5 from 8. Will continue to monitor for signs of bleeding. Mixed iron def and anemia of chronic disease on iron studies.   -Keep active T&S, monitor H&H.
-see #3  -Tylenol PRN
-see #3  -Tylenol PRN
Found to have prolonged QTc last at 490. EKG not performed prior to bronch yesterday- official EKG ordered, s/p vomiting 12 lead from monitor obtained: QTc 498. Cautious with QT prolonging agents.  - c/w Tigan standing q6 (not as QTC prolonging)
Found to have prolonged QTc last at 490. EKG not performed prior to bronch yesterday- official EKG ordered, s/p vomiting 12 lead from monitor obtained: QTc 498. Cautious with QT prolonging agents.  - c/w Tigan standing q6 (not as QTC prolonging)
Found to have prolonged QTc last at 490. EKG not performed prior to bronch yesterday- official EKG ordered, s/p vomiting 12 lead from monitor obtained: QTc 518 on 1/25 (uptrending). Cautious with QT prolonging agents.  - c/w Tigan q6 PRN (not as QTC prolonging)
Some hemorrhage noted on initial bronch with cyro to stop bleeding on last bronch/intervention. No bloody output. Hgb at 7.5 from 8. Will continue to monitor for signs of bleeding.  -Keep active T&S, monitor H&H.
Found to have prolonged QTc last at 490. EKG not performed prior to bronch yesterday- official EKG ordered, s/p vomiting 12 lead from monitor obtained: QTc 498. Cautious with QT prolonging agents.  - c/w Tigan standing q6 (not as QTC prolonging)
Some hemorrhage noted on initial bronch with cyro to stop bleeding on last bronch/intervention. No bloody output. Hgb at 7.5 from 8. Will continue to monitor for signs of bleeding. Mixed iron def and anemia of chronic disease on iron studies.   -Keep active T&S, monitor H&H.
Found to have prolonged QT interval (last EKG demonstrates 490).   -Avoid QT prolonging meds. Given 1x zofran today and ordered for repeat EKG.
Some hemorrhage noted on bronch yesterday. Monitored overnight with no hemopytosis. Hgb Stable at 8.

## 2018-01-26 VITALS
SYSTOLIC BLOOD PRESSURE: 127 MMHG | RESPIRATION RATE: 18 BRPM | OXYGEN SATURATION: 97 % | HEART RATE: 112 BPM | DIASTOLIC BLOOD PRESSURE: 65 MMHG | TEMPERATURE: 98 F

## 2018-01-26 DIAGNOSIS — C34.90 MALIGNANT NEOPLASM OF UNSPECIFIED PART OF UNSPECIFIED BRONCHUS OR LUNG: ICD-10-CM

## 2018-01-26 PROBLEM — C79.51 SECONDARY MALIGNANT NEOPLASM OF BONE: Chronic | Status: ACTIVE | Noted: 2018-01-13

## 2018-01-26 LAB
ANION GAP SERPL CALC-SCNC: 14 MMOL/L — SIGNIFICANT CHANGE UP (ref 5–17)
BUN SERPL-MCNC: 15 MG/DL — SIGNIFICANT CHANGE UP (ref 7–23)
CALCIUM SERPL-MCNC: 8.3 MG/DL — LOW (ref 8.4–10.5)
CHLORIDE SERPL-SCNC: 97 MMOL/L — SIGNIFICANT CHANGE UP (ref 96–108)
CO2 SERPL-SCNC: 27 MMOL/L — SIGNIFICANT CHANGE UP (ref 22–31)
CREAT SERPL-MCNC: 0.87 MG/DL — SIGNIFICANT CHANGE UP (ref 0.5–1.3)
GLUCOSE BLDC GLUCOMTR-MCNC: 138 MG/DL — HIGH (ref 70–99)
GLUCOSE SERPL-MCNC: 148 MG/DL — HIGH (ref 70–99)
HCT VFR BLD CALC: 26.6 % — LOW (ref 34.5–45)
HGB BLD-MCNC: 8.3 G/DL — LOW (ref 11.5–15.5)
MAGNESIUM SERPL-MCNC: 2.4 MG/DL — SIGNIFICANT CHANGE UP (ref 1.6–2.6)
MCHC RBC-ENTMCNC: 24.5 PG — LOW (ref 27–34)
MCHC RBC-ENTMCNC: 31.2 G/DL — LOW (ref 32–36)
MCV RBC AUTO: 78.5 FL — LOW (ref 80–100)
PHOSPHATE SERPL-MCNC: 3.8 MG/DL — SIGNIFICANT CHANGE UP (ref 2.5–4.5)
PLATELET # BLD AUTO: 652 K/UL — HIGH (ref 150–400)
POTASSIUM SERPL-MCNC: 4.8 MMOL/L — SIGNIFICANT CHANGE UP (ref 3.5–5.3)
POTASSIUM SERPL-SCNC: 4.8 MMOL/L — SIGNIFICANT CHANGE UP (ref 3.5–5.3)
RBC # BLD: 3.39 M/UL — LOW (ref 3.8–5.2)
RBC # FLD: 16.1 % — SIGNIFICANT CHANGE UP (ref 10.3–16.9)
SODIUM SERPL-SCNC: 138 MMOL/L — SIGNIFICANT CHANGE UP (ref 135–145)
WBC # BLD: 12.1 K/UL — HIGH (ref 3.8–10.5)
WBC # FLD AUTO: 12.1 K/UL — HIGH (ref 3.8–10.5)

## 2018-01-26 PROCEDURE — 83880 ASSAY OF NATRIURETIC PEPTIDE: CPT

## 2018-01-26 PROCEDURE — 83735 ASSAY OF MAGNESIUM: CPT

## 2018-01-26 PROCEDURE — 93005 ELECTROCARDIOGRAM TRACING: CPT

## 2018-01-26 PROCEDURE — 86850 RBC ANTIBODY SCREEN: CPT

## 2018-01-26 PROCEDURE — 96375 TX/PRO/DX INJ NEW DRUG ADDON: CPT

## 2018-01-26 PROCEDURE — 86922 COMPATIBILITY TEST ANTIGLOB: CPT

## 2018-01-26 PROCEDURE — 85025 COMPLETE CBC W/AUTO DIFF WBC: CPT

## 2018-01-26 PROCEDURE — 71275 CT ANGIOGRAPHY CHEST: CPT

## 2018-01-26 PROCEDURE — 74019 RADEX ABDOMEN 2 VIEWS: CPT

## 2018-01-26 PROCEDURE — 87102 FUNGUS ISOLATION CULTURE: CPT

## 2018-01-26 PROCEDURE — 77333 RADIATION TREATMENT AID(S): CPT

## 2018-01-26 PROCEDURE — 99285 EMERGENCY DEPT VISIT HI MDM: CPT | Mod: 25

## 2018-01-26 PROCEDURE — 36430 TRANSFUSION BLD/BLD COMPNT: CPT

## 2018-01-26 PROCEDURE — 85730 THROMBOPLASTIN TIME PARTIAL: CPT

## 2018-01-26 PROCEDURE — 85045 AUTOMATED RETICULOCYTE COUNT: CPT

## 2018-01-26 PROCEDURE — 86921 COMPATIBILITY TEST INCUBATE: CPT

## 2018-01-26 PROCEDURE — 96374 THER/PROPH/DIAG INJ IV PUSH: CPT

## 2018-01-26 PROCEDURE — 87070 CULTURE OTHR SPECIMN AEROBIC: CPT

## 2018-01-26 PROCEDURE — 85027 COMPLETE CBC AUTOMATED: CPT

## 2018-01-26 PROCEDURE — 87798 DETECT AGENT NOS DNA AMP: CPT

## 2018-01-26 PROCEDURE — 87206 SMEAR FLUORESCENT/ACID STAI: CPT

## 2018-01-26 PROCEDURE — 87252 VIRUS INOCULATION TISSUE: CPT

## 2018-01-26 PROCEDURE — 71045 X-RAY EXAM CHEST 1 VIEW: CPT

## 2018-01-26 PROCEDURE — 80048 BASIC METABOLIC PNL TOTAL CA: CPT

## 2018-01-26 PROCEDURE — 80053 COMPREHEN METABOLIC PANEL: CPT

## 2018-01-26 PROCEDURE — 83605 ASSAY OF LACTIC ACID: CPT

## 2018-01-26 PROCEDURE — 87015 SPECIMEN INFECT AGNT CONCNTJ: CPT

## 2018-01-26 PROCEDURE — 85610 PROTHROMBIN TIME: CPT

## 2018-01-26 PROCEDURE — 87389 HIV-1 AG W/HIV-1&-2 AB AG IA: CPT

## 2018-01-26 PROCEDURE — 87486 CHLMYD PNEUM DNA AMP PROBE: CPT

## 2018-01-26 PROCEDURE — 77412 RADIATION TX DELIVERY LVL 3: CPT

## 2018-01-26 PROCEDURE — 77290 THER RAD SIMULAJ FIELD CPLX: CPT

## 2018-01-26 PROCEDURE — 88305 TISSUE EXAM BY PATHOLOGIST: CPT

## 2018-01-26 PROCEDURE — 77295 3-D RADIOTHERAPY PLAN: CPT

## 2018-01-26 PROCEDURE — 87581 M.PNEUMON DNA AMP PROBE: CPT

## 2018-01-26 PROCEDURE — 74018 RADEX ABDOMEN 1 VIEW: CPT

## 2018-01-26 PROCEDURE — 86870 RBC ANTIBODY IDENTIFICATION: CPT

## 2018-01-26 PROCEDURE — 77300 RADIATION THERAPY DOSE PLAN: CPT

## 2018-01-26 PROCEDURE — 80202 ASSAY OF VANCOMYCIN: CPT

## 2018-01-26 PROCEDURE — 82728 ASSAY OF FERRITIN: CPT

## 2018-01-26 PROCEDURE — 36415 COLL VENOUS BLD VENIPUNCTURE: CPT

## 2018-01-26 PROCEDURE — 84100 ASSAY OF PHOSPHORUS: CPT

## 2018-01-26 PROCEDURE — 77280 THER RAD SIMULAJ FIELD SMPL: CPT

## 2018-01-26 PROCEDURE — G6002: CPT | Mod: 26

## 2018-01-26 PROCEDURE — 82962 GLUCOSE BLOOD TEST: CPT

## 2018-01-26 PROCEDURE — 87103 BLOOD FUNGUS CULTURE: CPT

## 2018-01-26 PROCEDURE — 84466 ASSAY OF TRANSFERRIN: CPT

## 2018-01-26 PROCEDURE — 77334 RADIATION TREATMENT AID(S): CPT

## 2018-01-26 PROCEDURE — 87116 MYCOBACTERIA CULTURE: CPT

## 2018-01-26 PROCEDURE — 83550 IRON BINDING TEST: CPT

## 2018-01-26 PROCEDURE — 86880 COOMBS TEST DIRECT: CPT

## 2018-01-26 PROCEDURE — 99232 SBSQ HOSP IP/OBS MODERATE 35: CPT

## 2018-01-26 PROCEDURE — 94640 AIRWAY INHALATION TREATMENT: CPT

## 2018-01-26 PROCEDURE — 86900 BLOOD TYPING SEROLOGIC ABO: CPT

## 2018-01-26 PROCEDURE — 77387 GUIDANCE FOR RADJ TX DLVR: CPT

## 2018-01-26 PROCEDURE — 86904 BLOOD TYPING PATIENT SERUM: CPT

## 2018-01-26 PROCEDURE — 86803 HEPATITIS C AB TEST: CPT

## 2018-01-26 PROCEDURE — 87633 RESP VIRUS 12-25 TARGETS: CPT

## 2018-01-26 PROCEDURE — 87040 BLOOD CULTURE FOR BACTERIA: CPT

## 2018-01-26 PROCEDURE — 83036 HEMOGLOBIN GLYCOSYLATED A1C: CPT

## 2018-01-26 PROCEDURE — 86901 BLOOD TYPING SEROLOGIC RH(D): CPT

## 2018-01-26 PROCEDURE — 81001 URINALYSIS AUTO W/SCOPE: CPT

## 2018-01-26 PROCEDURE — P9016: CPT

## 2018-01-26 PROCEDURE — 87449 NOS EACH ORGANISM AG IA: CPT

## 2018-01-26 PROCEDURE — 84484 ASSAY OF TROPONIN QUANT: CPT

## 2018-01-26 PROCEDURE — 81003 URINALYSIS AUTO W/O SCOPE: CPT

## 2018-01-26 PROCEDURE — 83930 ASSAY OF BLOOD OSMOLALITY: CPT

## 2018-01-26 PROCEDURE — 70450 CT HEAD/BRAIN W/O DYE: CPT

## 2018-01-26 PROCEDURE — 99239 HOSP IP/OBS DSCHRG MGMT >30: CPT

## 2018-01-26 PROCEDURE — 93306 TTE W/DOPPLER COMPLETE: CPT

## 2018-01-26 RX ORDER — DEXAMETHASONE 0.5 MG/5ML
1 ELIXIR ORAL
Qty: 28 | Refills: 0 | OUTPATIENT
Start: 2018-01-26 | End: 2018-02-08

## 2018-01-26 RX ORDER — OXYCODONE HYDROCHLORIDE 5 MG/1
1 TABLET ORAL
Qty: 60 | Refills: 0 | OUTPATIENT
Start: 2018-01-26 | End: 2018-02-14

## 2018-01-26 RX ORDER — SENNA PLUS 8.6 MG/1
2 TABLET ORAL
Qty: 60 | Refills: 0 | OUTPATIENT
Start: 2018-01-26 | End: 2018-02-24

## 2018-01-26 RX ORDER — POLYETHYLENE GLYCOL 3350 17 G/17G
17 POWDER, FOR SOLUTION ORAL
Qty: 510 | Refills: 0 | OUTPATIENT
Start: 2018-01-26 | End: 2018-02-24

## 2018-01-26 RX ORDER — AMLODIPINE BESYLATE 2.5 MG/1
1 TABLET ORAL
Qty: 30 | Refills: 1 | OUTPATIENT
Start: 2018-01-26 | End: 2018-03-26

## 2018-01-26 RX ORDER — OXYCODONE HYDROCHLORIDE 5 MG/1
1 TABLET ORAL
Qty: 30 | Refills: 0 | OUTPATIENT
Start: 2018-01-26

## 2018-01-26 RX ORDER — ONDANSETRON 8 MG/1
4 TABLET, FILM COATED ORAL
Qty: 0 | Refills: 0 | COMMUNITY

## 2018-01-26 RX ADMIN — Medication 3 MILLILITER(S): at 06:29

## 2018-01-26 RX ADMIN — OXYCODONE HYDROCHLORIDE 20 MILLIGRAM(S): 5 TABLET ORAL at 06:29

## 2018-01-26 RX ADMIN — OXYCODONE HYDROCHLORIDE 20 MILLIGRAM(S): 5 TABLET ORAL at 07:29

## 2018-01-26 RX ADMIN — Medication 4 MILLIGRAM(S): at 06:29

## 2018-01-26 RX ADMIN — Medication 3 MILLILITER(S): at 10:55

## 2018-01-26 NOTE — PROGRESS NOTE ADULT - SUBJECTIVE AND OBJECTIVE BOX
Interventional Pulmonary    OVERNIGHT EVENTS:   No acute events overnight.    VITALS:  T(C): , Max: 38.3 (01-25-18 @ 15:22)  HR:  (93 - 116)  BP:  (102/65 - 139/66)  ABP: --  RR:  (18 - 19)  SpO2:  (93% - 97%)  Wt(kg): --      LABS:  Na: 138 (01-26 @ 07:40), 133 (01-25 @ 08:29), 133 (01-24 @ 08:12), 133 (01-23 @ 19:18)  K: 4.8 (01-26 @ 07:40), 3.9 (01-25 @ 08:29), 4.3 (01-24 @ 08:12), 4.1 (01-23 @ 19:18)  Cl: 97 (01-26 @ 07:40), 95 (01-25 @ 08:29), 95 (01-24 @ 08:12), 94 (01-23 @ 19:18)  CO2: 27 (01-26 @ 07:40), 27 (01-25 @ 08:29), 26 (01-24 @ 08:12), 26 (01-23 @ 19:18)  BUN: 15 (01-26 @ 07:40), 13 (01-25 @ 08:29), 13 (01-24 @ 08:12), 13 (01-23 @ 19:18)  Cr: 0.87 (01-26 @ 07:40), 0.98 (01-25 @ 08:29), 1.01 (01-24 @ 08:12), 1.02 (01-23 @ 19:18)  Glu: 148(01-26 @ 07:40), 133(01-25 @ 08:29), 112(01-24 @ 08:12), 118(01-23 @ 19:18)    Hgb: 8.3 (01-26 @ 07:40), 7.5 (01-25 @ 08:30), 8.1 (01-24 @ 19:06), 7.6 (01-24 @ 08:13), 8.3 (01-23 @ 19:18)  Hct: 26.6 (01-26 @ 07:40), 24.1 (01-25 @ 08:30), 25.9 (01-24 @ 19:06), 23.9 (01-24 @ 08:13), 26.2 (01-23 @ 19:18)  WBC: 12.1 (01-26 @ 07:40), 13.3 (01-25 @ 08:30), 13.8 (01-24 @ 19:06), 13.4 (01-24 @ 08:13), 12.5 (01-23 @ 19:18)  Plt: 652 (01-26 @ 07:40), 637 (01-25 @ 08:30), 649 (01-24 @ 19:06), 575 (01-24 @ 08:13), 674 (01-23 @ 19:18)    INR:   PTT:     IMAGING:   Recent imaging studies were reviewed.    MEDICATIONS:  acetaminophen   Tablet 650 milliGRAM(s) Oral every 6 hours PRN  ALBUTerol/ipratropium for Nebulization 3 milliLiter(s) Nebulizer every 4 hours  ALPRAZolam 0.25 milliGRAM(s) Oral at bedtime  amLODIPine   Tablet 10 milliGRAM(s) Oral daily  dexamethasone     Tablet 4 milliGRAM(s) Oral two times a day  dextrose 5%. 1000 milliLiter(s) IV Continuous <Continuous>  dextrose 50% Injectable 12.5 Gram(s) IV Push once  dextrose 50% Injectable 25 Gram(s) IV Push once  dextrose 50% Injectable 25 Gram(s) IV Push once  dextrose Gel 1 Dose(s) Oral once PRN  glucagon  Injectable 1 milliGRAM(s) IntraMuscular once PRN  insulin lispro (HumaLOG) corrective regimen sliding scale   SubCutaneous Before meals and at bedtime  oxyCODONE    IR 5 milliGRAM(s) Oral every 4 hours PRN  oxyCODONE  ER Tablet 20 milliGRAM(s) Oral every 12 hours  polyethylene glycol 3350 17 Gram(s) Oral at bedtime  senna 2 Tablet(s) Oral at bedtime    EXAMINATION:  General:  NAD  HEENT: MP: Neck is supple, No JVD  Cardio: s1s2 RRR. No murmurs.   Respiratory: Clear to auscultation b/l  Adomen:  soft, NT  Extremities:  no edema  Skin:  warm/dry  Neuro:  awake, alert, oriented x 3, follows commands, moves all extremities

## 2018-01-26 NOTE — PROGRESS NOTE ADULT - PROBLEM SELECTOR PLAN 1
s/p rigid bronchoscopy with tumor debulking of the RLL endobronchial lesion and opening of the medial basal segment of RLL on 01/19/18. There was no apparent pus in airway once opened. Endobronchial biopsies have confirmed adenocarinoma of lung primary.  - Fever has continued without clearly being of infectious etiology.  She completed broad spectrum antibiotics. Now off.    - Received radiation therapy today.  Is scheduled to have radiation on Monday and Tuesday of next week as an outpatient.  Recommend taking anti-emetic medications prior to going to radiation therapy to alleviate some of the nausea that she has been having post procedure.  - OOB and Incentive anton use.  - Bowel regimen.  - She will follow up Dr. Xiong in his office next week.

## 2018-01-26 NOTE — PROGRESS NOTE ADULT - ASSESSMENT
Mrs. Bhatti is a pleasant 63 yo F with PMH of Adenocarcinoma Ca of the lung with metastasis to the Right 8th rib and brain undergoing radiation therapy for bone metastasis and gammaknife radiation for brain mets.  Presents with nausea and vomiting and blood streaked hemoptysis.  Found to have enlarging RLL mass with consolidation now s/p debulking of endobronchial lesion in RLL.  Has started radiation therapy while as an inpatient.  Continues to have daily fever but has no elevation in her leukocytosis, no change in her cough, or productive sputum.

## 2018-01-26 NOTE — PROGRESS NOTE ADULT - SUBJECTIVE AND OBJECTIVE BOX
Interventional Pulmonology Progress Note:    Interval Events:  Patient seen and examined at bedside.  She has documented fever this AM.  Went for radiation treatment in the AM and developed transient nausea with vomiting which has now resolved.    Breathing easier.  No hemoptysis or blood tinged sputum in the last 24 hours.    REVIEW OF SYSTEMS:  Constitutional: no acute distress  Respiratory: As per subjective  Cardiovascular: No chest pain, palpitations, dizziness or leg swelling  Gastrointestinal: nausea and vomiting improved with anti-emetics  Neurological: No headaches, memory loss, loss of strength, numbness or tremors  Skin: No itching, burning, rashes or lesions     MEDICATIONS:  Pulmonary:  ALBUTerol/ipratropium for Nebulization 3 milliLiter(s) Nebulizer every 4 hours  benzonatate 100 milliGRAM(s) Oral every 8 hours PRN    Antimicrobials:    Anticoagulants:    Cardiac:  amLODIPine   Tablet 10 milliGRAM(s) Oral daily      Allergies    penicillins (Hives)    Intolerances        Vital Signs Last 24 Hrs  T(C): 36.7 (26 Jan 2018 08:42), Max: 38.3 (25 Jan 2018 15:22)  T(F): 98.1 (26 Jan 2018 08:42), Max: 101 (25 Jan 2018 21:30)  HR: 112 (26 Jan 2018 08:42) (93 - 116)  BP: 127/65 (26 Jan 2018 08:42) (102/65 - 139/66)  BP(mean): --  RR: 18 (26 Jan 2018 08:42) (18 - 19)  SpO2: 97% (26 Jan 2018 08:42) (93% - 97%)        PHYSICAL EXAM:    General: no acute distress  Neck: Supple; non tender; no masses  Respiratory: decreased breath sounds in right lower lung zone  Cardiovascular: Regular rate and rhythm. S1 and S2 Normal; No murmurs, gallops or rubs  Gastrointestinal: Soft non-tender non-distended; Normal bowel sounds  Extremities: finger clubbing in both hands.   no cyanosis  Neurological: Alert and oriented x3  Skin: Warm and dry. No obvious rash    LABS:      CBC Full  -  ( 26 Jan 2018 07:40 )  WBC Count : 12.1 K/uL  Hemoglobin : 8.3 g/dL  Hematocrit : 26.6 %  Platelet Count - Automated : 652 K/uL  Mean Cell Volume : 78.5 fL  Mean Cell Hemoglobin : 24.5 pg  Mean Cell Hemoglobin Concentration : 31.2 g/dL  Auto Neutrophil # : x  Auto Lymphocyte # : x  Auto Monocyte # : x  Auto Eosinophil # : x  Auto Basophil # : x  Auto Neutrophil % : x  Auto Lymphocyte % : x  Auto Monocyte % : x  Auto Eosinophil % : x  Auto Basophil % : x    01-26    138  |  97  |  15  ----------------------------<  148<H>  4.8   |  27  |  0.87    Ca    8.3<L>      26 Jan 2018 07:40  Phos  3.8     01-26  Mg     2.4     01-26

## 2018-01-26 NOTE — PROGRESS NOTE ADULT - ASSESSMENT
Mrs. 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  Presents with nausea and vomiting and blood streaked hemoptysis.  Found to have enlarging RLL mass with consolidation now s/p debulking of endobronchial lesion in RLL.  Has started radiation therapy while as an inpatient.  Continues to have daily fever.

## 2018-01-26 NOTE — PROGRESS NOTE ADULT - PROBLEM SELECTOR PLAN 1
-s/p rigid bronchoscopy with tumor debulking of the RLL endobronchial lesion and opening of the medial basal segment of RLL on 01/19/18. There was no apparent pus in airway once opened. Endobronchial biopsies have confirmed adenocarinoma of lung primary.  - She continues to have intermittent fever but there does not appear to be any on going pneumonia at this point. WBC is stable, bronchoscopy cultures are negative and CXR has improved right base haziness. Levaquin course completed.   - Patient received radiation therapy yesterday as per Heme/Onc (Dr. Noonan).  Plan is  to start concomitant chemo.  Nausea is improved with anti-emetics  - OOB and Incentive anton use.  - Bowel regimen.  -Patient is scheduled to follow up in outpatient clinic in 1 week.  - Ok for discharge from our point today.

## 2018-01-26 NOTE — PROGRESS NOTE ADULT - PROVIDER SPECIALTY LIST ADULT
Heme/Onc
Heme/Onc
Internal Medicine
MICU
Pulmonology
Rad Onc
Heme/Onc
Internal Medicine
Internal Medicine
Pulmonology
Pulmonology
Internal Medicine
MICU

## 2018-01-29 VITALS
OXYGEN SATURATION: 97 % | SYSTOLIC BLOOD PRESSURE: 111 MMHG | DIASTOLIC BLOOD PRESSURE: 76 MMHG | RESPIRATION RATE: 18 BRPM | HEART RATE: 109 BPM

## 2018-01-29 RX ORDER — NYSTATIN 100000 [USP'U]/ML
100000 SUSPENSION ORAL
Qty: 200 | Refills: 0 | Status: ACTIVE | COMMUNITY
Start: 2018-01-29 | End: 1900-01-01

## 2018-01-29 RX ORDER — OMEPRAZOLE 20 MG/1
20 TABLET, DELAYED RELEASE ORAL
Qty: 30 | Refills: 3 | Status: ACTIVE | COMMUNITY
Start: 2018-01-29 | End: 1900-01-01

## 2018-01-30 VITALS
RESPIRATION RATE: 18 BRPM | SYSTOLIC BLOOD PRESSURE: 130 MMHG | OXYGEN SATURATION: 98 % | HEART RATE: 109 BPM | DIASTOLIC BLOOD PRESSURE: 80 MMHG

## 2018-01-30 RX ORDER — DEXAMETHASONE 4 MG/1
4 TABLET ORAL 3 TIMES DAILY
Qty: 42 | Refills: 0 | Status: ACTIVE | COMMUNITY
Start: 2018-01-30 | End: 1900-01-01

## 2018-02-01 DIAGNOSIS — G47.00 INSOMNIA, UNSPECIFIED: ICD-10-CM

## 2018-02-01 DIAGNOSIS — K59.00 CONSTIPATION, UNSPECIFIED: ICD-10-CM

## 2018-02-01 DIAGNOSIS — E86.1 HYPOVOLEMIA: ICD-10-CM

## 2018-02-01 DIAGNOSIS — I45.10 UNSPECIFIED RIGHT BUNDLE-BRANCH BLOCK: ICD-10-CM

## 2018-02-01 DIAGNOSIS — Z88.0 ALLERGY STATUS TO PENICILLIN: ICD-10-CM

## 2018-02-01 DIAGNOSIS — A41.9 SEPSIS, UNSPECIFIED ORGANISM: ICD-10-CM

## 2018-02-01 DIAGNOSIS — J85.0 GANGRENE AND NECROSIS OF LUNG: ICD-10-CM

## 2018-02-01 DIAGNOSIS — D50.9 IRON DEFICIENCY ANEMIA, UNSPECIFIED: ICD-10-CM

## 2018-02-01 DIAGNOSIS — D63.8 ANEMIA IN OTHER CHRONIC DISEASES CLASSIFIED ELSEWHERE: ICD-10-CM

## 2018-02-01 DIAGNOSIS — E11.9 TYPE 2 DIABETES MELLITUS WITHOUT COMPLICATIONS: ICD-10-CM

## 2018-02-01 DIAGNOSIS — E86.0 DEHYDRATION: ICD-10-CM

## 2018-02-01 DIAGNOSIS — R04.89 HEMORRHAGE FROM OTHER SITES IN RESPIRATORY PASSAGES: ICD-10-CM

## 2018-02-01 DIAGNOSIS — I45.81 LONG QT SYNDROME: ICD-10-CM

## 2018-02-01 DIAGNOSIS — D68.9 COAGULATION DEFECT, UNSPECIFIED: ICD-10-CM

## 2018-02-01 DIAGNOSIS — Z87.891 PERSONAL HISTORY OF NICOTINE DEPENDENCE: ICD-10-CM

## 2018-02-01 DIAGNOSIS — C79.31 SECONDARY MALIGNANT NEOPLASM OF BRAIN: ICD-10-CM

## 2018-02-01 DIAGNOSIS — J18.9 PNEUMONIA, UNSPECIFIED ORGANISM: ICD-10-CM

## 2018-02-01 DIAGNOSIS — F41.9 ANXIETY DISORDER, UNSPECIFIED: ICD-10-CM

## 2018-02-01 DIAGNOSIS — C34.90 MALIGNANT NEOPLASM OF UNSPECIFIED PART OF UNSPECIFIED BRONCHUS OR LUNG: ICD-10-CM

## 2018-02-01 DIAGNOSIS — R50.81 FEVER PRESENTING WITH CONDITIONS CLASSIFIED ELSEWHERE: ICD-10-CM

## 2018-02-01 DIAGNOSIS — I80.8 PHLEBITIS AND THROMBOPHLEBITIS OF OTHER SITES: ICD-10-CM

## 2018-02-01 DIAGNOSIS — C77.1 SECONDARY AND UNSPECIFIED MALIGNANT NEOPLASM OF INTRATHORACIC LYMPH NODES: ICD-10-CM

## 2018-02-01 DIAGNOSIS — C79.51 SECONDARY MALIGNANT NEOPLASM OF BONE: ICD-10-CM

## 2018-02-01 DIAGNOSIS — E43 UNSPECIFIED SEVERE PROTEIN-CALORIE MALNUTRITION: ICD-10-CM

## 2018-02-01 DIAGNOSIS — I10 ESSENTIAL (PRIMARY) HYPERTENSION: ICD-10-CM

## 2018-02-02 RX ORDER — DEXAMETHASONE 0.5 MG/5ML
12 ELIXIR ORAL ONCE
Qty: 0 | Refills: 0 | Status: DISCONTINUED | OUTPATIENT
Start: 2018-02-06 | End: 2018-02-21

## 2018-02-02 RX ORDER — FOSAPREPITANT DIMEGLUMINE 150 MG/5ML
150 INJECTION, POWDER, LYOPHILIZED, FOR SOLUTION INTRAVENOUS ONCE
Qty: 0 | Refills: 0 | Status: DISCONTINUED | OUTPATIENT
Start: 2018-02-06 | End: 2018-02-21

## 2018-02-02 RX ORDER — ONDANSETRON 8 MG/1
16 TABLET, FILM COATED ORAL ONCE
Qty: 0 | Refills: 0 | Status: DISCONTINUED | OUTPATIENT
Start: 2018-02-06 | End: 2018-02-21

## 2018-02-02 RX ORDER — PEMBROLIZUMAB 25 MG/ML
200 INJECTION, SOLUTION INTRAVENOUS ONCE
Qty: 0 | Refills: 0 | Status: DISCONTINUED | OUTPATIENT
Start: 2018-02-06 | End: 2018-02-21

## 2018-02-05 LAB — RESPIRATORY PATH PNL SPEC CULT: SIGNIFICANT CHANGE UP

## 2018-02-05 RX ORDER — CARBOPLATIN 50 MG
497 VIAL (EA) INTRAVENOUS ONCE
Qty: 0 | Refills: 0 | Status: DISCONTINUED | OUTPATIENT
Start: 2018-02-06 | End: 2018-02-21

## 2018-02-05 RX ORDER — PEMETREXED 500 MG/20ML
1000 INJECTION, SOLUTION, CONCENTRATE INTRAVENOUS ONCE
Qty: 0 | Refills: 0 | Status: DISCONTINUED | OUTPATIENT
Start: 2018-02-06 | End: 2018-02-21

## 2018-02-06 ENCOUNTER — APPOINTMENT (OUTPATIENT)
Dept: INFUSION THERAPY | Facility: HOSPITAL | Age: 63
End: 2018-02-06

## 2018-02-06 ENCOUNTER — OUTPATIENT (OUTPATIENT)
Dept: OUTPATIENT SERVICES | Facility: HOSPITAL | Age: 63
LOS: 1 days | End: 2018-02-06
Payer: COMMERCIAL

## 2018-02-06 DIAGNOSIS — Z92.3 PERSONAL HISTORY OF IRRADIATION: Chronic | ICD-10-CM

## 2018-02-06 PROCEDURE — 96413 CHEMO IV INFUSION 1 HR: CPT

## 2018-02-06 PROCEDURE — 96367 TX/PROPH/DG ADDL SEQ IV INF: CPT

## 2018-02-06 PROCEDURE — 96417 CHEMO IV INFUS EACH ADDL SEQ: CPT

## 2018-02-06 PROCEDURE — 96375 TX/PRO/DX INJ NEW DRUG ADDON: CPT

## 2018-02-11 ENCOUNTER — INPATIENT (INPATIENT)
Facility: HOSPITAL | Age: 63
LOS: 8 days | Discharge: HOME HEALTH | DRG: 871 | End: 2018-02-20
Attending: INTERNAL MEDICINE | Admitting: INTERNAL MEDICINE
Payer: COMMERCIAL

## 2018-02-11 VITALS
TEMPERATURE: 101 F | OXYGEN SATURATION: 96 % | HEIGHT: 65 IN | DIASTOLIC BLOOD PRESSURE: 89 MMHG | RESPIRATION RATE: 23 BRPM | HEART RATE: 120 BPM | SYSTOLIC BLOOD PRESSURE: 131 MMHG | WEIGHT: 199.96 LBS

## 2018-02-11 DIAGNOSIS — E11.9 TYPE 2 DIABETES MELLITUS WITHOUT COMPLICATIONS: ICD-10-CM
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
## 2018-02-11 DIAGNOSIS — D64.9 ANEMIA, UNSPECIFIED: ICD-10-CM

## 2018-02-11 DIAGNOSIS — R82.71 BACTERIURIA: ICD-10-CM

## 2018-02-11 DIAGNOSIS — J11.1 INFLUENZA DUE TO UNIDENTIFIED INFLUENZA VIRUS WITH OTHER RESPIRATORY MANIFESTATIONS: ICD-10-CM

## 2018-02-11 DIAGNOSIS — A41.9 SEPSIS, UNSPECIFIED ORGANISM: ICD-10-CM

## 2018-02-11 DIAGNOSIS — I10 ESSENTIAL (PRIMARY) HYPERTENSION: ICD-10-CM

## 2018-02-11 DIAGNOSIS — C34.90 MALIGNANT NEOPLASM OF UNSPECIFIED PART OF UNSPECIFIED BRONCHUS OR LUNG: ICD-10-CM

## 2018-02-11 DIAGNOSIS — E87.1 HYPO-OSMOLALITY AND HYPONATREMIA: ICD-10-CM

## 2018-02-11 DIAGNOSIS — Z92.3 PERSONAL HISTORY OF IRRADIATION: Chronic | ICD-10-CM

## 2018-02-11 DIAGNOSIS — Z29.9 ENCOUNTER FOR PROPHYLACTIC MEASURES, UNSPECIFIED: ICD-10-CM

## 2018-02-11 LAB
ALBUMIN SERPL ELPH-MCNC: 2.5 G/DL — LOW (ref 3.3–5)
ALP SERPL-CCNC: 130 U/L — HIGH (ref 40–120)
ALT FLD-CCNC: 88 U/L — HIGH (ref 10–45)
ANION GAP SERPL CALC-SCNC: 15 MMOL/L — SIGNIFICANT CHANGE UP (ref 5–17)
APPEARANCE UR: (no result)
APTT BLD: 23.7 SEC — LOW (ref 27.5–37.4)
AST SERPL-CCNC: 57 U/L — HIGH (ref 10–40)
BASE EXCESS BLDV CALC-SCNC: 2.5 MMOL/L — SIGNIFICANT CHANGE UP
BASOPHILS NFR BLD AUTO: 0.1 % — SIGNIFICANT CHANGE UP (ref 0–2)
BILIRUB SERPL-MCNC: 0.9 MG/DL — SIGNIFICANT CHANGE UP (ref 0.2–1.2)
BILIRUB UR-MCNC: NEGATIVE — SIGNIFICANT CHANGE UP
BUN SERPL-MCNC: 23 MG/DL — SIGNIFICANT CHANGE UP (ref 7–23)
CALCIUM SERPL-MCNC: 9 MG/DL — SIGNIFICANT CHANGE UP (ref 8.4–10.5)
CHLORIDE SERPL-SCNC: 97 MMOL/L — SIGNIFICANT CHANGE UP (ref 96–108)
CO2 SERPL-SCNC: 22 MMOL/L — SIGNIFICANT CHANGE UP (ref 22–31)
COLOR SPEC: YELLOW — SIGNIFICANT CHANGE UP
CREAT SERPL-MCNC: 0.8 MG/DL — SIGNIFICANT CHANGE UP (ref 0.5–1.3)
DIFF PNL FLD: NEGATIVE — SIGNIFICANT CHANGE UP
FLUAV H1 2009 PAND RNA SPEC QL NAA+PROBE: DETECTED
GAS PNL BLDV: SIGNIFICANT CHANGE UP
GLUCOSE BLDC GLUCOMTR-MCNC: 105 MG/DL — HIGH (ref 70–99)
GLUCOSE SERPL-MCNC: 148 MG/DL — HIGH (ref 70–99)
GLUCOSE UR QL: NEGATIVE — SIGNIFICANT CHANGE UP
HCO3 BLDV-SCNC: 24 MMOL/L — SIGNIFICANT CHANGE UP (ref 20–27)
HCT VFR BLD CALC: 32.4 % — LOW (ref 34.5–45)
HGB BLD-MCNC: 10.3 G/DL — LOW (ref 11.5–15.5)
INR BLD: 1.33 — HIGH (ref 0.88–1.16)
KETONES UR-MCNC: NEGATIVE — SIGNIFICANT CHANGE UP
LACTATE SERPL-SCNC: 1.6 MMOL/L — SIGNIFICANT CHANGE UP (ref 0.5–2)
LEUKOCYTE ESTERASE UR-ACNC: NEGATIVE — SIGNIFICANT CHANGE UP
LIDOCAIN IGE QN: 40 U/L — SIGNIFICANT CHANGE UP (ref 7–60)
LYMPHOCYTES # BLD AUTO: 1.9 % — LOW (ref 13–44)
MCHC RBC-ENTMCNC: 24.7 PG — LOW (ref 27–34)
MCHC RBC-ENTMCNC: 31.8 G/DL — LOW (ref 32–36)
MCV RBC AUTO: 77.7 FL — LOW (ref 80–100)
MONOCYTES NFR BLD AUTO: 1.1 % — LOW (ref 2–14)
NEUTROPHILS NFR BLD AUTO: 96.9 % — HIGH (ref 43–77)
NITRITE UR-MCNC: POSITIVE
PCO2 BLDV: 28 MMHG — LOW (ref 41–51)
PH BLDV: 7.55 — HIGH (ref 7.32–7.43)
PH UR: 6 — SIGNIFICANT CHANGE UP (ref 5–8)
PLATELET # BLD AUTO: 294 K/UL — SIGNIFICANT CHANGE UP (ref 150–400)
PO2 BLDV: 112 MMHG — SIGNIFICANT CHANGE UP
POTASSIUM SERPL-MCNC: 4.2 MMOL/L — SIGNIFICANT CHANGE UP (ref 3.5–5.3)
POTASSIUM SERPL-SCNC: 4.2 MMOL/L — SIGNIFICANT CHANGE UP (ref 3.5–5.3)
PROT SERPL-MCNC: 6.9 G/DL — SIGNIFICANT CHANGE UP (ref 6–8.3)
PROT UR-MCNC: 30 MG/DL
PROTHROM AB SERPL-ACNC: 14.9 SEC — HIGH (ref 9.8–12.7)
RAPID RVP RESULT: DETECTED
RBC # BLD: 4.17 M/UL — SIGNIFICANT CHANGE UP (ref 3.8–5.2)
RBC # FLD: 18.3 % — HIGH (ref 10.3–16.9)
SAO2 % BLDV: 98 % — SIGNIFICANT CHANGE UP
SODIUM SERPL-SCNC: 134 MMOL/L — LOW (ref 135–145)
SP GR SPEC: 1.02 — SIGNIFICANT CHANGE UP (ref 1–1.03)
UROBILINOGEN FLD QL: 1 E.U./DL — SIGNIFICANT CHANGE UP
WBC # BLD: 10.3 K/UL — SIGNIFICANT CHANGE UP (ref 3.8–10.5)
WBC # FLD AUTO: 10.3 K/UL — SIGNIFICANT CHANGE UP (ref 3.8–10.5)

## 2018-02-11 PROCEDURE — 70450 CT HEAD/BRAIN W/O DYE: CPT | Mod: 26

## 2018-02-11 PROCEDURE — 99222 1ST HOSP IP/OBS MODERATE 55: CPT | Mod: GC

## 2018-02-11 PROCEDURE — 71045 X-RAY EXAM CHEST 1 VIEW: CPT | Mod: 26

## 2018-02-11 PROCEDURE — 99285 EMERGENCY DEPT VISIT HI MDM: CPT

## 2018-02-11 RX ORDER — POLYETHYLENE GLYCOL 3350 17 G/17G
17 POWDER, FOR SOLUTION ORAL AT BEDTIME
Qty: 0 | Refills: 0 | Status: DISCONTINUED | OUTPATIENT
Start: 2018-02-11 | End: 2018-02-11

## 2018-02-11 RX ORDER — ACETAMINOPHEN 500 MG
975 TABLET ORAL ONCE
Qty: 0 | Refills: 0 | Status: COMPLETED | OUTPATIENT
Start: 2018-02-11 | End: 2018-02-11

## 2018-02-11 RX ORDER — DEXTROSE 50 % IN WATER 50 %
25 SYRINGE (ML) INTRAVENOUS ONCE
Qty: 0 | Refills: 0 | Status: DISCONTINUED | OUTPATIENT
Start: 2018-02-11 | End: 2018-02-20

## 2018-02-11 RX ORDER — SENNA PLUS 8.6 MG/1
2 TABLET ORAL AT BEDTIME
Qty: 0 | Refills: 0 | Status: DISCONTINUED | OUTPATIENT
Start: 2018-02-11 | End: 2018-02-11

## 2018-02-11 RX ORDER — SODIUM CHLORIDE 9 MG/ML
500 INJECTION INTRAMUSCULAR; INTRAVENOUS; SUBCUTANEOUS
Qty: 0 | Refills: 0 | Status: COMPLETED | OUTPATIENT
Start: 2018-02-11 | End: 2018-02-11

## 2018-02-11 RX ORDER — VANCOMYCIN HCL 1 G
1750 VIAL (EA) INTRAVENOUS ONCE
Qty: 0 | Refills: 0 | Status: COMPLETED | OUTPATIENT
Start: 2018-02-11 | End: 2018-02-11

## 2018-02-11 RX ORDER — SODIUM CHLORIDE 9 MG/ML
1000 INJECTION, SOLUTION INTRAVENOUS
Qty: 0 | Refills: 0 | Status: DISCONTINUED | OUTPATIENT
Start: 2018-02-11 | End: 2018-02-20

## 2018-02-11 RX ORDER — INSULIN LISPRO 100/ML
VIAL (ML) SUBCUTANEOUS
Qty: 0 | Refills: 0 | Status: DISCONTINUED | OUTPATIENT
Start: 2018-02-11 | End: 2018-02-20

## 2018-02-11 RX ORDER — AZTREONAM 2 G
2000 VIAL (EA) INJECTION ONCE
Qty: 0 | Refills: 0 | Status: COMPLETED | OUTPATIENT
Start: 2018-02-11 | End: 2018-02-11

## 2018-02-11 RX ORDER — SODIUM CHLORIDE 9 MG/ML
3 INJECTION INTRAMUSCULAR; INTRAVENOUS; SUBCUTANEOUS ONCE
Qty: 0 | Refills: 0 | Status: COMPLETED | OUTPATIENT
Start: 2018-02-11 | End: 2018-02-11

## 2018-02-11 RX ORDER — ACETAMINOPHEN 500 MG
650 TABLET ORAL EVERY 6 HOURS
Qty: 0 | Refills: 0 | Status: DISCONTINUED | OUTPATIENT
Start: 2018-02-11 | End: 2018-02-20

## 2018-02-11 RX ORDER — DEXTROSE 50 % IN WATER 50 %
1 SYRINGE (ML) INTRAVENOUS ONCE
Qty: 0 | Refills: 0 | Status: DISCONTINUED | OUTPATIENT
Start: 2018-02-11 | End: 2018-02-20

## 2018-02-11 RX ORDER — AMLODIPINE BESYLATE 2.5 MG/1
10 TABLET ORAL DAILY
Qty: 0 | Refills: 0 | Status: DISCONTINUED | OUTPATIENT
Start: 2018-02-12 | End: 2018-02-11

## 2018-02-11 RX ORDER — DEXTROSE 50 % IN WATER 50 %
12.5 SYRINGE (ML) INTRAVENOUS ONCE
Qty: 0 | Refills: 0 | Status: DISCONTINUED | OUTPATIENT
Start: 2018-02-11 | End: 2018-02-20

## 2018-02-11 RX ORDER — GLUCAGON INJECTION, SOLUTION 0.5 MG/.1ML
1 INJECTION, SOLUTION SUBCUTANEOUS ONCE
Qty: 0 | Refills: 0 | Status: DISCONTINUED | OUTPATIENT
Start: 2018-02-11 | End: 2018-02-20

## 2018-02-11 RX ADMIN — SODIUM CHLORIDE 2000 MILLILITER(S): 9 INJECTION INTRAMUSCULAR; INTRAVENOUS; SUBCUTANEOUS at 15:15

## 2018-02-11 RX ADMIN — Medication 75 MILLIGRAM(S): at 16:36

## 2018-02-11 RX ADMIN — SODIUM CHLORIDE 3 MILLILITER(S): 9 INJECTION INTRAMUSCULAR; INTRAVENOUS; SUBCUTANEOUS at 15:06

## 2018-02-11 RX ADMIN — SODIUM CHLORIDE 2000 MILLILITER(S): 9 INJECTION INTRAMUSCULAR; INTRAVENOUS; SUBCUTANEOUS at 15:05

## 2018-02-11 RX ADMIN — SODIUM CHLORIDE 2000 MILLILITER(S): 9 INJECTION INTRAMUSCULAR; INTRAVENOUS; SUBCUTANEOUS at 14:59

## 2018-02-11 RX ADMIN — Medication 250 MILLIGRAM(S): at 15:59

## 2018-02-11 RX ADMIN — Medication 975 MILLIGRAM(S): at 15:04

## 2018-02-11 RX ADMIN — SODIUM CHLORIDE 2000 MILLILITER(S): 9 INJECTION INTRAMUSCULAR; INTRAVENOUS; SUBCUTANEOUS at 14:44

## 2018-02-11 RX ADMIN — SODIUM CHLORIDE 2000 MILLILITER(S): 9 INJECTION INTRAMUSCULAR; INTRAVENOUS; SUBCUTANEOUS at 14:29

## 2018-02-11 RX ADMIN — Medication 50 MILLIGRAM(S): at 15:15

## 2018-02-11 NOTE — H&P ADULT - PROBLEM SELECTOR PLAN 7
likely 2/2 to poor po intake/ baseline for pt   Na 134, pt has not been eating or drinking much since her chemotherapy   On last admission, pt was d/c at 138 however during most of the hospitalization pt was 130-135  - will continue to trend BMP

## 2018-02-11 NOTE — H&P ADULT - NSHPSOCIALHISTORY_GEN_ALL_CORE
Pt lives at home with  and brother   Pt can ambulate with walker, on home O2 2L   Denies etoh and illicit drug use   Former smoker 15 pack year hx, quit 20+years ago

## 2018-02-11 NOTE — ED PROVIDER NOTE - OBJECTIVE STATEMENT
61 yo  HTN, DM, and Stage IV lung CA s/p 1st chemo tx this prior Tuesday, follows w Dr. Saran johnson complaints of disorientation w fall today while going to bathroom, states has been suffering from diarrhea and was eager to reach to bathroom and then fell next to toilet. Daughter noted fever since yesterday, has been having productive cough since dc, inpt stay for pna, s/p bronch. Currently dnr/dni. Lately, over the last few days, sputum has been greener in color. no abd pain, sob, cp, headache, neck pain, leg swelling.

## 2018-02-11 NOTE — ED PROVIDER NOTE - CARE PLAN
Principal Discharge DX:	Sepsis  Secondary Diagnosis:	Influenza  Secondary Diagnosis:	UTI (urinary tract infection)

## 2018-02-11 NOTE — H&P ADULT - ASSESSMENT
61 y/o F with a PMHX of HTN, DM, and Stage IV lung CA w/ mets to brain and bone (Dec 2nd) who underwent RT to bony mets on 8th rib, and had gamma knife tx to brain (Jan 9th) and chemo to lung 2/15 who presents with 1 day of fever, chills, and cough. Admitted for sepsis 2/2 influenza

## 2018-02-11 NOTE — ED ADULT TRIAGE NOTE - CHIEF COMPLAINT QUOTE
Pt's daughter states "she has been having fever for 24 hrs, but she didn't take anything for fever yet. She had pneumonia 2 weeks ago and she is coughing again. She has stage 4 lung cancer and last chemo was on Tuesday."

## 2018-02-11 NOTE — H&P ADULT - HISTORY OF PRESENT ILLNESS
63 y/o F with a PMHX of HTN, DM, and Stage IV lung CA w/ mets to brain and bone (Dec 2nd) who underwent RT to bony mets on R 8th, and had gamma knife tx to brain (Jan 9th) who presents with 1 day of fever, chills, and cough. The patient was recently admitted 1/14 and d/c 1/26 for PNA, treated with levaquin at the time. On that admission, the pt went for bronch with findings of obstructed RUL, patent R middle Lobe, and obstructive mass in RLL debulked with lazer and balloon dilation. At the time, based on findings no evidence for infectious process ongoing. During her prior admission, the pt had persistent low grade fever in setting of post-procedure and high tumor burden. She underwent chemotherapy this past Tuesday. Since then, she has had minor diarrhea over the past few days. Yesterday, she started to have a fever, cough, and feeling weak. This morning, her temperature at home was 102.3. She typically uses a walker, but had to run to the bathroom this morning and walked without her 61 y/o F with a PMHX of HTN, DM, and Stage IV lung CA w/ mets to brain and bone (Dec 2nd) who underwent RT to bony mets on R 8th, and had gamma knife tx to brain (Jan 9th) who presents with 1 day of fever, chills, and cough. The patient was recently admitted 1/14 and d/c 1/26 for PNA, treated with levaquin at the time. On that admission, the pt went for bronch with findings of obstructed RUL, patent R middle Lobe, and obstructive mass in RLL debulked with lazer and balloon dilation. At the time, based on findings no evidence for infectious process ongoing. During her prior admission, the pt had persistent low grade fever in setting of post-procedure and high tumor burden. She underwent chemotherapy this past Tuesday. Since then, she has had minor diarrhea over the past few days. Yesterday, she started to have a fever, cough, and feeling weak. This morning, her temperature at home was 102.3. She typically uses a walker, but had to run to the bathroom this morning and walked without her walker. She turned quickly at the bathroom and fell over. Per the daughter who witnessed the fall, the patient had loss consciousness for a few seconds, however had no complaints prior to the fall. The patient was confused for about 10 minutes as well. The patient at baseline has poor memory s/p brain radiation and does not remember what happened. The daughter then brought her mother to the hospital. The pt denies any dizziness, HA, weakness, increased lethargy, n/v/c, abdominal pain, LE edema, chills, urinary changes, vision changes, numbness and tingling to her extremities. The patient and daughter do endorse poor PO intake and that the patient has not been eating or drinking much lately due to lack of appetite.     In ED, T 101.1, , /89. RR 23, 96% on 6LNC. Labs significant for Na 134, LFTs mildly elevated, no leukocytosis, lactate 1.6, bacteruria with positive nitrates, neg leuk esterase, WBC 0-5. Pt was given 2.5L NS, aztreonam, vanc, tamiflu.  CXR looks improved when compared to film from prior XR 61 y/o F with a PMHX of HTN, DM, and Stage IV lung CA w/ mets to brain and bone (Dec 2nd) who underwent RT to bony mets on 8th rib, and had gamma knife tx to brain (Jan 9th) who presents with 1 day of fever, chills, and cough. The patient was recently admitted 1/14 and d/c 1/26 for PNA, treated with levaquin at the time. On that admission, the pt went for bronch with findings of obstructed RUL, patent R middle Lobe, and obstructive mass in RLL debulked with lazer and balloon dilation. At the time, based on findings no evidence for infectious process ongoing. During her prior admission, the pt had persistent low grade fever in setting of post-procedure and high tumor burden. She underwent chemotherapy this past Tuesday. Since then, she has had minor diarrhea over the past few days. Yesterday, she started to have a fever, cough, and feeling weak. This morning, her temperature at home was 102.3. She typically uses a walker, but had to run to the bathroom this morning and walked without her walker. She turned quickly at the bathroom and fell over. Per the daughter who witnessed the fall, the patient had loss consciousness for a few seconds, hit her head, however had no prodromal complaints prior to the fall. The patient was confused for about 10 minutes as well. The patient at baseline has poor memory s/p brain radiation and does not remember what happened. The daughter then brought her mother to the hospital. The pt denies any dizziness, HA, weakness, increased lethargy, n/v/c, abdominal pain, LE edema, chills, urinary changes, vision changes, numbness and tingling to her extremities. The patient and daughter do endorse poor PO intake and that the patient has not been eating or drinking much lately due to lack of appetite.     In ED, T 101.1, , /89. RR 23, 96% on 6LNC. Labs significant for Na 134, LFTs mildly elevated, no leukocytosis, lactate 1.6, bacteruria with positive nitrates, neg leuk esterase, WBC 0-5. RVP: Flu+. Pt was given 2.5L NS, aztreonam, vanc, tamiflu.  CXR looks improved when compared to film from prior XR  CTH Negative

## 2018-02-11 NOTE — H&P ADULT - ATTENDING COMMENTS
62yoF w/ h/o Stage 4 Lung CA w/ mets to brain and bone undergoing tx w/ RT, gamma knife, chemo (last 2/6) who was recently admitted for post-obstructive PNA s/p debulking of mass and balloon dilation presents after witnessed fall w/ LOC.  No obvious sz activity.  ROS neg except decreased appetite, decreased po intake, fever, prod cough, SOB.  Remainder of history per resident note.  Febrile, tachy, normotensive, tachypneic, 96% on 6LO2.  Appears lethargic, soft spoken, MMM, crackles diffusely, tachy but regular, no c/c/e.  WBC 10.8, 97%N, Na 134, Cr 0.8, Lactate normal, +Flu, Head CT w/o bleed, CXR improved aeration b/l.  Flu - tamiflu, supportive care w/ IVF, cough suppressant, O2.  Lung cancer - f/u Dr. Noonan, Dr. Xiong for further management.  Anemia - Hb stable.  HTN - hold CCB 2/2 sepsis.  Sepsis - flu + SIRS, care as above.  DM - SSI.  DNR/I.

## 2018-02-11 NOTE — H&P ADULT - PROBLEM SELECTOR PLAN 2
likely 2/2 orthostatic hypotension vs sepsis vs TEM from sepsis  CTH was negative   Pt walks with walker, and did not use it this am  - will consult PT in AM for reeval if respiratory status is improved

## 2018-02-11 NOTE — H&P ADULT - PROBLEM SELECTOR PLAN 5
Hx of HTN on amlodipine 10mg, currently normotensive  In the setting of sepsis, will hold anti-HTN for now   Once pt is clinically improved, will restart as tolerated    #Prolonged QTc  On prior admission, QTc was 490 and uptrending and qtc prolonging medications were held   Currently pt is asx, however if develops nausea will try to avoid Zofran  - f/u AM EKG  - can give Tigan q6 PRN (not as QTC prolonging).

## 2018-02-11 NOTE — H&P ADULT - NSHPLABSRESULTS_GEN_ALL_CORE
.  LABS:                         10.3   10.3  )-----------( 294      ( 2018 14:53 )             32.4         134<L>  |  97  |  23  ----------------------------<  148<H>  4.2   |  22  |  0.80    Ca    9.0      2018 14:53    TPro  6.9  /  Alb  2.5<L>  /  TBili  0.9  /  DBili  x   /  AST  57<H>  /  ALT  88<H>  /  AlkPhos  130<H>      PT/INR - ( 2018 14:53 )   PT: 14.9 sec;   INR: 1.33          PTT - ( 2018 14:53 )  PTT:23.7 sec  Urinalysis Basic - ( 2018 16:07 )    Color: Yellow / Appearance: SL Cloudy / S.025 / pH: x  Gluc: x / Ketone: NEGATIVE  / Bili: Negative / Urobili: 1.0 E.U./dL   Blood: x / Protein: 30 mg/dL / Nitrite: POSITIVE   Leuk Esterase: NEGATIVE / RBC: < 5 /HPF / WBC < 5 /HPF   Sq Epi: x / Non Sq Epi: 0-5 /HPF / Bacteria: Present /HPF            Lactate, Blood: 1.6 mmoL/L ( @ 14:54)      RADIOLOGY, EKG & ADDITIONAL TESTS: Reviewed.   XR looks improved from prior XR from earlier admission .  LABS:                         10.3   10.3  )-----------( 294      ( 2018 14:53 )             32.4         134<L>  |  97  |  23  ----------------------------<  148<H>  4.2   |  22  |  0.80    Ca    9.0      2018 14:53    TPro  6.9  /  Alb  2.5<L>  /  TBili  0.9  /  DBili  x   /  AST  57<H>  /  ALT  88<H>  /  AlkPhos  130<H>      PT/INR - ( 2018 14:53 )   PT: 14.9 sec;   INR: 1.33          PTT - ( 2018 14:53 )  PTT:23.7 sec  Urinalysis Basic - ( 2018 16:07 )    Color: Yellow / Appearance: SL Cloudy / S.025 / pH: x  Gluc: x / Ketone: NEGATIVE  / Bili: Negative / Urobili: 1.0 E.U./dL   Blood: x / Protein: 30 mg/dL / Nitrite: POSITIVE   Leuk Esterase: NEGATIVE / RBC: < 5 /HPF / WBC < 5 /HPF   Sq Epi: x / Non Sq Epi: 0-5 /HPF / Bacteria: Present /HPF            Lactate, Blood: 1.6 mmoL/L ( @ 14:54)      RADIOLOGY, EKG & ADDITIONAL TESTS: Reviewed.   XR looks improved from prior XR from earlier admission  < from: CT Head No Cont (18 @ 15:13) >    IMPRESSION: No intracranial hemorrhage or fracture.    < end of copied text >

## 2018-02-11 NOTE — ED PROVIDER NOTE - MEDICAL DECISION MAKING DETAILS
Pt s/p fall, fever X2d w cough, sepsis protocol initiated, pt flu +, uti, likely weakness also contributed s/p chemo

## 2018-02-11 NOTE — ED ADULT NURSE NOTE - OBJECTIVE STATEMENT
aox3 verbal, weak, biba for fever. hx of lung ca with mets to brain and ribs. +weakness. recent chem 5days pta first treatment for ca, previous ca treatment was radiation as per daughter at bedside, o2 dependent 2L nc, lung sounds diminshed, perkins. fatigue and decreased po intake. dr trent at bedside for elevated temp, placed patient on reverse isolation for ca.

## 2018-02-11 NOTE — H&P ADULT - PROBLEM SELECTOR PLAN 4
Patient has Stage IV lung CA w/ mets to brain and bone s/p RT to bony mets on R 8th rib last week, and had gamma knife tx to brain (Jan 9th).   Underwent chemotherapy on Tuesday   Has some mild diarrhea after chemo   Can be cause of her weakness  - will consult Heme-Onc in AM to follow up with pt

## 2018-02-11 NOTE — H&P ADULT - PROBLEM SELECTOR PLAN 8
Improved from last admission, currently hgb 10.3  Anemia workup performed on last admission, no need to repeat at this time   likely mixed anemia with iron deficiency and AOCD  - will keep active T&S  - trend CBC  - Hgb goal >7

## 2018-02-11 NOTE — H&P ADULT - NSHPPHYSICALEXAM_GEN_ALL_CORE
.  VITAL SIGNS:  T(F): 99.7 (02-11-18 @ 16:29), Max: 101.1 (02-11-18 @ 14:17)  HR: 59 (02-11-18 @ 16:29) (59 - 120)  BP: 126/74 (02-11-18 @ 16:29) (126/74 - 131/89)  BP(mean): --  RR: 20 (02-11-18 @ 16:29) (20 - 24)  SpO2: 98% (02-11-18 @ 16:29) (94% - 98%)    PHYSICAL EXAM:    Constitutional: WDWN resting comfortably in bed; NAD  HEENT: NC/AT, PERRL, EOMI, anicteric sclera, no nasal discharge; uvula midline, no oropharyngeal erythema or exudates; MMM  Neck: supple; no JVD or thyromegaly  Respiratory: CTA B/L; no W/R/R, no retractions  Cardiac: +S1/S2; RRR; no M/R/G; PMI non-displaced  Gastrointestinal: soft, NT/ND; no rebound or guarding; +BSx4  Back: spine midline, no bony tenderness or step-offs; no CVAT B/L  Extremities: WWP, no clubbing or cyanosis; no peripheral edema  Musculoskeletal: NROM x4; no joint swelling, tenderness or erythema  Vascular: 2+ radial, femoral, DP/PT pulses B/L  Dermatologic: skin warm, dry and intact; no rashes, wounds, or scars  Lymphatic: no submandibular or cervical LAD  Neurologic: AAOx3; CNII-XII grossly intact; no focal deficits  Psychiatric: affect and characteristics of appearance, verbalizations, behaviors are appropriate, denies SI/HI/AH/VH

## 2018-02-11 NOTE — PATIENT PROFILE ADULT. - NS PRO OT REFERRAL QUES 2 YN
Middle Ear Infection (Adult)  You have an infection of the middle ear (the space behind the eardrum). This is also called acute otitis media (AOM). Sometimes it is caused by the common cold. This is because congestion can block the internal passage (eustachian tube) that drains fluid from the middle ear. When the middle ear fills with fluid, bacteria can grow there and cause an infection. Oral antibiotics are used to treat this illness, not ear drops. Symptoms usually start to improve within 1 to 2 days of treatment.    Home care  The following are general care guidelines:  · Finish all of the antibiotic medicine given, even though you may feel better after the first few days.  · You may use acetaminophen or ibuprofen to control pain, unless something else was prescribed. [NOTE: If you have chronic liver or kidney disease or have ever had a stomach ulcer or GI bleeding, talk with your doctor before using these medicines.] Do not give aspirin to anyone under 18 years of age who has a fever. It may cause severe liver damage.  Follow-up care  Follow up with your doctor in 2 weeks if all symptoms have not gotten better, or if hearing doesn't go back to normal within 1 month.  When to seek medical care  Get prompt medical attention if any of the following occur:  · Ear pain gets worse or does not improve after 3 days of treatment  · Unusual drowsiness or confusion  · Neck pain, stiff neck, or headache  · Fluid or blood draining from the ear canal  · Fever of 100.4°F (38°C) or higher after 3 days of antibiotics, or as directed by your health care provider  · Convulsion (seizure)  © 7202-1032 [employer]. [address] [place] [address], [place] [address]. All rights reserved. This information is not intended as a substitute for professional medical care. Always follow your healthcare professional's instructions.        
no

## 2018-02-11 NOTE — H&P ADULT - PROBLEM SELECTOR PLAN 10
F: s/p 2.5L NS, no need for further fluids at this time  E: Replete K>4, Mg >2  N: Carb consistent diet    DVT: SCDs, has prior hx of pulmonary hemorrhage, ppx was deferred on last admission, will currently hold AC until confirmed with her pulmonologist and then consider starting Lovenox     DNR/DNI  Dispo: [facility] for sepsis management, wants to DC to home

## 2018-02-11 NOTE — H&P ADULT - PROBLEM SELECTOR PLAN 9
UA positive for bacteria and nitrates, no leuk esterase, no WBC  Pt is asx  No need for abx at this time, will continue to monitor pt status

## 2018-02-11 NOTE — H&P ADULT - PROBLEM SELECTOR PLAN 1
Pt met 3/4 SIRS criteria on admission, likely 2/2 influenza   Requiring increased oxygen requirements in ED   - will c/w Tamiflu for a total of 5 days (2/11-2/15)  - will attempt to wean O2 to home dose of 2L   - no need for further abx at this time as pt had recent chemo, recent abx tx for PNA, XR looks improved and has high risk for possible c diff infection. If pt condition deteriorates, consider adding abx   - supportive management for cough

## 2018-02-12 ENCOUNTER — APPOINTMENT (OUTPATIENT)
Dept: PULMONOLOGY | Facility: CLINIC | Age: 63
End: 2018-02-12

## 2018-02-12 LAB
ANION GAP SERPL CALC-SCNC: 14 MMOL/L — SIGNIFICANT CHANGE UP (ref 5–17)
BUN SERPL-MCNC: 13 MG/DL — SIGNIFICANT CHANGE UP (ref 7–23)
CALCIUM SERPL-MCNC: 8.6 MG/DL — SIGNIFICANT CHANGE UP (ref 8.4–10.5)
CHLORIDE SERPL-SCNC: 95 MMOL/L — LOW (ref 96–108)
CO2 SERPL-SCNC: 24 MMOL/L — SIGNIFICANT CHANGE UP (ref 22–31)
CREAT SERPL-MCNC: 0.71 MG/DL — SIGNIFICANT CHANGE UP (ref 0.5–1.3)
GLUCOSE BLDC GLUCOMTR-MCNC: 101 MG/DL — HIGH (ref 70–99)
GLUCOSE BLDC GLUCOMTR-MCNC: 107 MG/DL — HIGH (ref 70–99)
GLUCOSE BLDC GLUCOMTR-MCNC: 153 MG/DL — HIGH (ref 70–99)
GLUCOSE BLDC GLUCOMTR-MCNC: 97 MG/DL — SIGNIFICANT CHANGE UP (ref 70–99)
GLUCOSE SERPL-MCNC: 106 MG/DL — HIGH (ref 70–99)
HCT VFR BLD CALC: 31 % — LOW (ref 34.5–45)
HGB BLD-MCNC: 9.8 G/DL — LOW (ref 11.5–15.5)
INR BLD: 1.39 — HIGH (ref 0.88–1.16)
MAGNESIUM SERPL-MCNC: 1.8 MG/DL — SIGNIFICANT CHANGE UP (ref 1.6–2.6)
MCHC RBC-ENTMCNC: 24.7 PG — LOW (ref 27–34)
MCHC RBC-ENTMCNC: 31.6 G/DL — LOW (ref 32–36)
MCV RBC AUTO: 78.1 FL — LOW (ref 80–100)
PLATELET # BLD AUTO: 266 K/UL — SIGNIFICANT CHANGE UP (ref 150–400)
POTASSIUM SERPL-MCNC: 3.4 MMOL/L — LOW (ref 3.5–5.3)
POTASSIUM SERPL-SCNC: 3.4 MMOL/L — LOW (ref 3.5–5.3)
PROCALCITONIN SERPL-MCNC: 1.32 NG/ML — HIGH (ref 0–0.04)
PROTHROM AB SERPL-ACNC: 15.5 SEC — HIGH (ref 9.8–12.7)
RBC # BLD: 3.97 M/UL — SIGNIFICANT CHANGE UP (ref 3.8–5.2)
RBC # FLD: 18.4 % — HIGH (ref 10.3–16.9)
SODIUM SERPL-SCNC: 133 MMOL/L — LOW (ref 135–145)
WBC # BLD: 4.8 K/UL — SIGNIFICANT CHANGE UP (ref 3.8–10.5)
WBC # FLD AUTO: 4.8 K/UL — SIGNIFICANT CHANGE UP (ref 3.8–10.5)

## 2018-02-12 PROCEDURE — 93010 ELECTROCARDIOGRAM REPORT: CPT

## 2018-02-12 PROCEDURE — 99233 SBSQ HOSP IP/OBS HIGH 50: CPT

## 2018-02-12 PROCEDURE — 99232 SBSQ HOSP IP/OBS MODERATE 35: CPT

## 2018-02-12 RX ORDER — CEFEPIME 1 G/1
2000 INJECTION, POWDER, FOR SOLUTION INTRAMUSCULAR; INTRAVENOUS ONCE
Qty: 0 | Refills: 0 | Status: DISCONTINUED | OUTPATIENT
Start: 2018-02-12 | End: 2018-02-12

## 2018-02-12 RX ORDER — CEFEPIME 1 G/1
2000 INJECTION, POWDER, FOR SOLUTION INTRAMUSCULAR; INTRAVENOUS EVERY 8 HOURS
Qty: 0 | Refills: 0 | Status: DISCONTINUED | OUTPATIENT
Start: 2018-02-12 | End: 2018-02-12

## 2018-02-12 RX ORDER — CEFEPIME 1 G/1
INJECTION, POWDER, FOR SOLUTION INTRAMUSCULAR; INTRAVENOUS
Qty: 0 | Refills: 0 | Status: DISCONTINUED | OUTPATIENT
Start: 2018-02-12 | End: 2018-02-12

## 2018-02-12 RX ORDER — POTASSIUM CHLORIDE 20 MEQ
20 PACKET (EA) ORAL ONCE
Qty: 0 | Refills: 0 | Status: COMPLETED | OUTPATIENT
Start: 2018-02-12 | End: 2018-02-12

## 2018-02-12 RX ORDER — SODIUM CHLORIDE 9 MG/ML
1000 INJECTION INTRAMUSCULAR; INTRAVENOUS; SUBCUTANEOUS
Qty: 0 | Refills: 0 | Status: DISCONTINUED | OUTPATIENT
Start: 2018-02-12 | End: 2018-02-13

## 2018-02-12 RX ORDER — NYSTATIN 500MM UNIT
500000 POWDER (EA) MISCELLANEOUS
Qty: 0 | Refills: 0 | Status: DISCONTINUED | OUTPATIENT
Start: 2018-02-12 | End: 2018-02-20

## 2018-02-12 RX ORDER — CEFEPIME 1 G/1
1000 INJECTION, POWDER, FOR SOLUTION INTRAMUSCULAR; INTRAVENOUS EVERY 8 HOURS
Qty: 0 | Refills: 0 | Status: DISCONTINUED | OUTPATIENT
Start: 2018-02-12 | End: 2018-02-12

## 2018-02-12 RX ORDER — CEFEPIME 1 G/1
1000 INJECTION, POWDER, FOR SOLUTION INTRAMUSCULAR; INTRAVENOUS ONCE
Qty: 0 | Refills: 0 | Status: DISCONTINUED | OUTPATIENT
Start: 2018-02-12 | End: 2018-02-12

## 2018-02-12 RX ORDER — MAGNESIUM SULFATE 500 MG/ML
1 VIAL (ML) INJECTION ONCE
Qty: 0 | Refills: 0 | Status: COMPLETED | OUTPATIENT
Start: 2018-02-12 | End: 2018-02-12

## 2018-02-12 RX ORDER — VANCOMYCIN HCL 1 G
1250 VIAL (EA) INTRAVENOUS EVERY 12 HOURS
Qty: 0 | Refills: 0 | Status: DISCONTINUED | OUTPATIENT
Start: 2018-02-12 | End: 2018-02-12

## 2018-02-12 RX ORDER — POTASSIUM CHLORIDE 20 MEQ
40 PACKET (EA) ORAL ONCE
Qty: 0 | Refills: 0 | Status: COMPLETED | OUTPATIENT
Start: 2018-02-12 | End: 2018-02-12

## 2018-02-12 RX ORDER — PANTOPRAZOLE SODIUM 20 MG/1
40 TABLET, DELAYED RELEASE ORAL
Qty: 0 | Refills: 0 | Status: DISCONTINUED | OUTPATIENT
Start: 2018-02-12 | End: 2018-02-20

## 2018-02-12 RX ADMIN — Medication 75 MILLIGRAM(S): at 09:32

## 2018-02-12 RX ADMIN — Medication 650 MILLIGRAM(S): at 23:31

## 2018-02-12 RX ADMIN — Medication 166.67 MILLIGRAM(S): at 10:41

## 2018-02-12 RX ADMIN — Medication 75 MILLIGRAM(S): at 23:30

## 2018-02-12 RX ADMIN — Medication 650 MILLIGRAM(S): at 01:27

## 2018-02-12 RX ADMIN — Medication 2: at 13:07

## 2018-02-12 RX ADMIN — Medication 500000 UNIT(S): at 18:37

## 2018-02-12 RX ADMIN — Medication 20 MILLIEQUIVALENT(S): at 09:32

## 2018-02-12 RX ADMIN — Medication 650 MILLIGRAM(S): at 07:47

## 2018-02-12 RX ADMIN — Medication 40 MILLIEQUIVALENT(S): at 14:49

## 2018-02-12 RX ADMIN — SODIUM CHLORIDE 75 MILLILITER(S): 9 INJECTION INTRAMUSCULAR; INTRAVENOUS; SUBCUTANEOUS at 03:25

## 2018-02-12 RX ADMIN — Medication 500000 UNIT(S): at 23:30

## 2018-02-12 RX ADMIN — PANTOPRAZOLE SODIUM 40 MILLIGRAM(S): 20 TABLET, DELAYED RELEASE ORAL at 14:49

## 2018-02-12 RX ADMIN — SODIUM CHLORIDE 75 MILLILITER(S): 9 INJECTION INTRAMUSCULAR; INTRAVENOUS; SUBCUTANEOUS at 18:40

## 2018-02-12 RX ADMIN — Medication 100 GRAM(S): at 09:32

## 2018-02-12 NOTE — PROGRESS NOTE ADULT - ASSESSMENT
Patient is a 61 yo F with PMHx of HTN, DM, lung CA with mets to brain and bone s/p tx with gamma knife and radiation respectively presents c/o fever/chills, cough and diarrhea. Patient is admitted to 7 WO for sepsis workup 2/2 influenza and supportive management.

## 2018-02-12 NOTE — PROGRESS NOTE ADULT - PROBLEM SELECTOR PLAN 2
-likely 2/2 orthostatic hypotension vs sepsis vs TEM from sepsis  -CTH was negative for hemorrhage or fracture  -patient normally uses walker at home, fall occurred will walking on her own  -consult PT for reevaluation once respiratory status has improved

## 2018-02-12 NOTE — PROGRESS NOTE ADULT - PROBLEM SELECTOR PLAN 1
-Pt met 3/4 SIRS criteria on admission, likely 2/2 influenza, requiring increased oxygen requirement   -will continue with Tamiflu for total of 5 days (2/11-2/15), tolerating it well today  -attempt to wean O2 to home dose of 2 L  -patient was given once dose of Vanco and Aztreonam in the ED  -no abx at this time due to recent chemotherapy, recent abx treatment for PNA with XR showing improvement of right sided effusion and high risk for development of C.dff, consider abx if patients condition worsens   -Supportive management for cough with guaifenesin -Pt met 3/4 SIRS criteria on admission, likely 2/2 influenza, requiring increased oxygen requirement   -will continue with Tamiflu for total of 5 days (2/11-2/15), tolerating it well today  -attempt to wean O2 to home dose of 2 L  -patient was given once dose of Vanco and Aztreonam in the ED  -no abx at this time due to recent chemotherapy, recent abx treatment for PNA with XR showing improvement of right sided effusion and high risk for development of C.dff, consider abx if patients condition worsens   -Supportive management for cough with guaifenesin  -appreciate pulm recs

## 2018-02-12 NOTE — CONSULT NOTE ADULT - ATTENDING COMMENTS
S/p 1st cycle of chemotherapy, admitted with symptoms of flu.  Continues to have fever spikes however, if neutropenic would start on broad-spectrum antibiotics for neutropenic fevers.  Otherwise continue to monitor until becomes afebrile with negative blood cultures, at which time can be discharged home.  Follow up with me next week.

## 2018-02-12 NOTE — PROGRESS NOTE ADULT - PROBLEM SELECTOR PLAN 5
-Hx of HTN controlled with amlodipine at home  -normotensive at this time with SBP in 130s-150s, holding HTN medication in the setting of sepsis  -once clinically improved restart BP meds as tolerated   -on previous visits patient had hx of prolonged QT @ 490 and uptrending, patient is asymptomatic but avoid Zofran for nausea considering hx   -f/u EKG and give Tigan q6 PRN for nausea if needed

## 2018-02-12 NOTE — CONSULT NOTE ADULT - SUBJECTIVE AND OBJECTIVE BOX
Hematology Oncology Consult Note (Dr. Noonan)     63 y/o F with a PMHX of HTN, DM, and Stage IV lung CA w/ mets to brain and bone (Dec 2nd) who underwent RT to bony mets on 8th rib, and had gamma knife tx to brain () who presents with 1 day of fever, chills, and cough. The patient was recently admitted  and d/c  for PNA, treated with levaquin at the time. On that admission, the pt went for bronch with findings of obstructed RUL, patent R middle Lobe, and obstructive mass in RLL debulked with lazer and balloon dilation. At the time, based on findings no evidence for infectious process ongoing. During her prior admission, the pt had persistent low grade fever in setting of post-procedure and high tumor burden. She underwent chemotherapy this past Tuesday. Since then, she has had minor diarrhea over the past few days. Yesterday, she started to have a fever, cough, and feeling weak. This morning, her temperature at home was 102.3. She typically uses a walker, but had to run to the bathroom this morning and walked without her walker. She turned quickly at the bathroom and fell over. Per the daughter who witnessed the fall, the patient had loss consciousness for a few seconds, hit her head, however had no prodromal complaints prior to the fall. The patient was confused for about 10 minutes as well. The patient at baseline has poor memory s/p brain radiation and does not remember what happened. The daughter then brought her mother to the hospital. The pt denies any dizziness, HA, weakness, increased lethargy, n/v/c, abdominal pain, LE edema, chills, urinary changes, vision changes, numbness and tingling to her extremities. The patient and daughter do endorse poor PO intake and that the patient has not been eating or drinking much lately due to lack of appetite.     In ED, T 101.1, , /89. RR 23, 96% on 6LNC. Labs significant for Na 134, LFTs mildly elevated, no leukocytosis, lactate 1.6, bacteruria with positive nitrates, neg leuk esterase, WBC 0-5. RVP: Flu+. Pt was given 2.5L NS, aztreonam, vanc, tamiflu.  CXR looks improved when compared to film from prior XR  CTH Negative    PAST MEDICAL & SURGICAL HISTORY:  Bone metastasis  Brain cancer  Lung cancer  Hypertension  Diabetes  History of radiation therapy  Status post gamma knife treatment      Allergies: penicillins: Drug Category, Hives      Medications: MEDICATIONS  (STANDING):  dextrose 5%. 1000 milliLiter(s) (50 mL/Hr) IV Continuous <Continuous>  dextrose 50% Injectable 12.5 Gram(s) IV Push once  dextrose 50% Injectable 25 Gram(s) IV Push once  dextrose 50% Injectable 25 Gram(s) IV Push once  insulin lispro (HumaLOG) corrective regimen sliding scale   SubCutaneous Before meals and at bedtime  oseltamivir 75 milliGRAM(s) Oral two times a day  potassium chloride   Powder 40 milliEquivalent(s) Oral once  sodium chloride 0.9%. 1000 milliLiter(s) (75 mL/Hr) IV Continuous <Continuous>    MEDICATIONS  (PRN):  acetaminophen   Tablet 650 milliGRAM(s) Oral every 6 hours PRN For Temp greater than 38 C (100.4 F)  dextrose Gel 1 Dose(s) Oral once PRN Blood Glucose LESS THAN 70 milliGRAM(s)/deciliter  glucagon  Injectable 1 milliGRAM(s) IntraMuscular once PRN Glucose LESS THAN 70 milligrams/deciliter  guaiFENesin    Syrup 100 milliGRAM(s) Oral every 6 hours PRN Cough          Social History: Pt lives at home with  and brother   	Pt can ambulate with walker, on home O2 2L   	Denies etoh and illicit drug use   Former smoker 15 pack year hx, quit 20+years ago    FAMILY HISTORY:  Family history of transitional cell carcinoma of bladder (Sibling)  Family history of renal cell carcinoma (Sibling)      PHYSICAL EXAM:    T(F): 98.7 (18 @ 10:36), Max: 102.3 (18 @ 01:15)  HR: 109 (18 @ 10:36) (59 - 129)  BP: 103/60 (18 @ 10:36) (103/60 - 151/79)  RR: 16 (18 @ 10:36) (14 - 24)  SpO2: 95% (18 @ 10:36) (94% - 98%)  Wt(kg): --    Daily Height in cm: 165.1 (2018 22:15)    Daily     GEN: fatigued   HEENT: AT/NC, thick oral secretions  NECK: supple  CVS: +S1S2 tachy  LUNG: CTA B/L   ABD: +BS, NT, ND  EXT: no c/c/e  NEURO: aaox3, non focal       Labs:                          10.3   10.3  )-----------( 294      ( 2018 14:53 )             32.4     CBC Full  -  ( 2018 14:53 )  WBC Count : 10.3 K/uL  Hemoglobin : 10.3 g/dL  Hematocrit : 32.4 %  Platelet Count - Automated : 294 K/uL  Mean Cell Volume : 77.7 fL  Mean Cell Hemoglobin : 24.7 pg  Mean Cell Hemoglobin Concentration : 31.8 g/dL  Auto Neutrophil # : x  Auto Lymphocyte # : x  Auto Monocyte # : x  Auto Eosinophil # : x  Auto Basophil # : x  Auto Neutrophil % : 96.9 %  Auto Lymphocyte % : 1.9 %  Auto Monocyte % : 1.1 %  Auto Eosinophil % : x  Auto Basophil % : 0.1 %    PT/INR - ( 2018 07:23 )   PT: 15.5 sec;   INR: 1.39          PTT - ( 2018 14:53 )  PTT:23.7 sec    0212    133<L>  |  95<L>  |  13  ----------------------------<  106<H>  3.4<L>   |  24  |  0.71    Ca    8.6      2018 07:23  Mg     1.8     02-12    TPro  6.9  /  Alb  2.5<L>  /  TBili  0.9  /  DBili  x   /  AST  57<H>  /  ALT  88<H>  /  AlkPhos  130<H>  11      Urinalysis Basic - ( 2018 16:07 )    Color: Yellow / Appearance: SL Cloudy / S.025 / pH: x  Gluc: x / Ketone: NEGATIVE  / Bili: Negative / Urobili: 1.0 E.U./dL   Blood: x / Protein: 30 mg/dL / Nitrite: POSITIVE   Leuk Esterase: NEGATIVE / RBC: < 5 /HPF / WBC < 5 /HPF   Sq Epi: x / Non Sq Epi: 0-5 /HPF / Bacteria: Present /HPF        CXR = IMPRESSION:  Improving right-sided pleural effusion.  No other significant interval findings as above.

## 2018-02-12 NOTE — CONSULT NOTE ADULT - SUBJECTIVE AND OBJECTIVE BOX
HPI:  61 y/o F with a PMHX of HTN, DM, and Stage IV lung CA w/ mets to brain (s/p gamma knife) and bone (RT) and recent admission for pneumonia (~; treated with levaquin) presents with 5 days of fever, chills, and cough.     On her latest prior admission, the pt went for bronch with findings of obstructed RUL, patent R middle Lobe, and obstructive mass in RLL debulked with lazer and balloon dilation. She underwent her first lung chemo this past Tuesday (scheduled every 3 weeks). Since then, she started to have a fever, cough, minor diarrhea and myalgia. Upon admission, her temperature was 101.1, RR 23 and  meeting 3/4 sepsis criteria. The pt denies any dizziness, HA, n/v/c, abdominal pain, LE edema or urinary changes. The patient and daughter do endorse poor PO intake due to lack of apatite since her chemotherapy.       ROS: as per HPI  PHYSICAL EXAM:    Constitutional: lethargic, frail     ENMT: dry mouth, oral thrush     Respiratory: diffuse wheezing and bronchial sounds in RML and RLL, vesicular sounds in LINDA, LLL    Gastrointestinal: non-tender, soft abdomen, decreased bowel sound    Extremities: no lower extremities edema    Vascular: bilateral DP +    Skin: dry and warm      PAST MEDICAL & SURGICAL HISTORY:  Bone metastasis  Brain cancer  Lung cancer  Hypertension  Diabetes  History of radiation therapy  Status post gamma knife treatment      FAMILY HISTORY:  Family history of transitional cell carcinoma of bladder (Sibling)  Family history of renal cell carcinoma (Sibling)      SOCIAL HISTORY:  Smoking Status: former smoker (quit 22 years ago)  Pack Years: 15 py     MEDICATIONS:  Pulmonary:  guaiFENesin    Syrup 100 milliGRAM(s) Oral every 6 hours PRN    Antimicrobials:  cefepime Injectable. 1000 milliGRAM(s) IV Push once  cefepime Injectable. 1000 milliGRAM(s) IV Push every 8 hours  oseltamivir 75 milliGRAM(s) Oral two times a day  vancomycin  IVPB 1250 milliGRAM(s) IV Intermittent every 12 hours      Endocrine:  dextrose 50% Injectable 12.5 Gram(s) IV Push once  dextrose 50% Injectable 25 Gram(s) IV Push once  dextrose 50% Injectable 25 Gram(s) IV Push once  dextrose Gel 1 Dose(s) Oral once PRN  glucagon  Injectable 1 milliGRAM(s) IntraMuscular once PRN  insulin lispro (HumaLOG) corrective regimen sliding scale   SubCutaneous Before meals and at bedtime    Cardiac:    Other Medications:  acetaminophen   Tablet 650 milliGRAM(s) Oral every 6 hours PRN  dextrose 5%. 1000 milliLiter(s) IV Continuous <Continuous>  dextrose 50% Injectable 12.5 Gram(s) IV Push once  dextrose 50% Injectable 25 Gram(s) IV Push once  dextrose 50% Injectable 25 Gram(s) IV Push once  dextrose Gel 1 Dose(s) Oral once PRN  glucagon  Injectable 1 milliGRAM(s) IntraMuscular once PRN  insulin lispro (HumaLOG) corrective regimen sliding scale   SubCutaneous Before meals and at bedtime  potassium chloride   Powder 40 milliEquivalent(s) Oral once  sodium chloride 0.9%. 1000 milliLiter(s) IV Continuous <Continuous>      Allergies    penicillins (Hives)    Intolerances        Vital Signs Last 24 Hrs  T(C): 37.1 (2018 10:36), Max: 39.1 (2018 01:15)  T(F): 98.7 (2018 10:36), Max: 102.3 (2018 01:15)  HR: 109 (2018 10:36) (59 - 129)  BP: 103/60 (2018 10:36) (103/60 - 151/79)  BP(mean): --  RR: 16 (2018 10:36) (14 - 24)  SpO2: 95% (2018 10:36) (94% - 98%)    - @ 07:01  -  -12 @ 07:00  --------------------------------------------------------  IN: 225 mL / OUT: 1000 mL / NET: -775 mL          LABS:      CBC Full  -  ( 2018 14:53 )  WBC Count : 10.3 K/uL  Hemoglobin : 10.3 g/dL  Hematocrit : 32.4 %  Platelet Count - Automated : 294 K/uL  Mean Cell Volume : 77.7 fL  Mean Cell Hemoglobin : 24.7 pg  Mean Cell Hemoglobin Concentration : 31.8 g/dL  Auto Neutrophil # : x  Auto Lymphocyte # : x  Auto Monocyte # : x  Auto Eosinophil # : x  Auto Basophil # : x  Auto Neutrophil % : 96.9 %  Auto Lymphocyte % : 1.9 %  Auto Monocyte % : 1.1 %  Auto Eosinophil % : x  Auto Basophil % : 0.1 %        133<L>  |  95<L>  |  13  ----------------------------<  106<H>  3.4<L>   |  24  |  0.71    Ca    8.6      2018 07:23  Mg     1.8         TPro  6.9  /  Alb  2.5<L>  /  TBili  0.9  /  DBili  x   /  AST  57<H>  /  ALT  88<H>  /  AlkPhos  130<H>      PT/INR - ( 2018 07:23 )   PT: 15.5 sec;   INR: 1.39          PTT - ( 2018 14:53 )  PTT:23.7 sec      Urinalysis Basic - ( 2018 16:07 )    Color: Yellow / Appearance: SL Cloudy / S.025 / pH: x  Gluc: x / Ketone: NEGATIVE  / Bili: Negative / Urobili: 1.0 E.U./dL   Blood: x / Protein: 30 mg/dL / Nitrite: POSITIVE   Leuk Esterase: NEGATIVE / RBC: < 5 /HPF / WBC < 5 /HPF   Sq Epi: x / Non Sq Epi: 0-5 /HPF / Bacteria: Present /HPF      RADIOLOGY & ADDITIONAL STUDIES (The following images were personally reviewed):  < from: Xray Chest 1 View AP/PA (18 @ 15:35) >  INTERPRETATION:    XR CHEST AP OR PA 1V dated 2018 3:35 PM    FINDINGS: Heart size, mediastinal and hilar contours are unchanged.   Evidence of widening of the superior mediastinum with leftward   displacement of the tracheal air column to suggest lymphadenopathy.There   is masslike consolidation involving the right hilum which is silhouetted.   The possibility of perihilar mass with postobstructive atelectasis and/or   pneumonitis involving the right lower lung zone cannot be excluded.   Correlation should be made with thoracic CT examination dated 2018.   There is been interval improvement in right-sided pleural effusion.    IMPRESSION:  Improving right-sided pleural effusion.  No other significant interval findings as above.

## 2018-02-12 NOTE — PROGRESS NOTE ADULT - PROBLEM SELECTOR PLAN 4
-Pt with stage IV lung CA with mets to bone and brain s/p radiation to mets on R 8th rib last week and gamma knife treatment to brain on Jan 8th.   -underwent 1st chemo on Tuesday and developed mild diarrhea afterwards  -Consult heme/onc for follow up with patient -Pt with stage IV lung CA with mets to bone and brain s/p radiation to mets on R 8th rib last week and gamma knife treatment to brain on Jan 8th.   -underwent 1st chemo on Tuesday and developed mild diarrhea afterwards  -Consult heme/onc for follow up with patient  -Patient taken decadron, marinol, oxycontin, and nystatin

## 2018-02-12 NOTE — PROGRESS NOTE ADULT - SUBJECTIVE AND OBJECTIVE BOX
INTERVAL HPI/OVERNIGHT EVENTS:    SUBJECTIVE: Patient seen and examined at bedside this am, resting comfortably. Per nurse and patient no events overnight. Patient c/o fever/chills, chronic cough related to hx of Lung CA, and non-mucoid, non-bloody green diarrhea. Otherwise patient denies and SOB, chest pain, abdominal pain, nausea, vomitting, constipation, increased urinary frequency, dysuria, hematuria and rashes.     OBJECTIVE:    VITAL SIGNS:  Vital Signs Last 24 Hrs  T(C): 37.1 (2018 10:36), Max: 39.1 (2018 01:15)  T(F): 98.7 (2018 10:36), Max: 102.3 (2018 01:15)  HR: 109 (2018 10:36) (59 - 129)  BP: 103/60 (2018 10:36) (103/60 - 151/79)  RR: 16 (2018 10:36) (14 - 24)  SpO2: 95% (2018 10:36) (94% - 98%)        -11 @ 07:01  -  -12 @ 07:00  --------------------------------------------------------  IN: 450 mL / OUT: 1200 mL / NET: -750 mL      CAPILLARY BLOOD GLUCOSE      POCT Blood Glucose.: 107 mg/dL (2018 09:10)      PHYSICAL EXAM:    Constitutional: WDWN resting comfortably in bed; NAD  HEENT: NC/AT; anicteric and nonerythematous sclera, PERRL, EOMI; oral membranes moist, oral candidiasis of the tongue and hard palate, no erythema, exudates or edema of the posterior oropharynx and tonsils  Neck: supple, nontender, no JVD  Cardiovascular: clear S1/S2; RRR; no M/R/G  Respiratory: pt on 4 L O2, wheezing and course rhonchi of the b/l upper lobes, mid and lower lobes CTA b/l  Gastrointestinal: normoactive BS x4, abdomen soft, NT/ND; no guarding or rigidity, no palpable masses   Extremities: WWP; no clubbing, cyanosis or edema  Vascular: 2+ radial, femoral, DP/PT pulses B/L  Dermatologic: skin normal color and turgor; no visible rashes  Neurological: AxOx3    MEDICATIONS:  MEDICATIONS  (STANDING):  cefepime Injectable. 1000 milliGRAM(s) IV Push once  cefepime Injectable. 1000 milliGRAM(s) IV Push every 8 hours  dextrose 5%. 1000 milliLiter(s) (50 mL/Hr) IV Continuous <Continuous>  dextrose 50% Injectable 12.5 Gram(s) IV Push once  dextrose 50% Injectable 25 Gram(s) IV Push once  dextrose 50% Injectable 25 Gram(s) IV Push once  insulin lispro (HumaLOG) corrective regimen sliding scale   SubCutaneous Before meals and at bedtime  oseltamivir 75 milliGRAM(s) Oral two times a day  potassium chloride   Powder 40 milliEquivalent(s) Oral once  sodium chloride 0.9%. 1000 milliLiter(s) (75 mL/Hr) IV Continuous <Continuous>  vancomycin  IVPB 1250 milliGRAM(s) IV Intermittent every 12 hours    MEDICATIONS  (PRN):  acetaminophen   Tablet 650 milliGRAM(s) Oral every 6 hours PRN For Temp greater than 38 C (100.4 F)  dextrose Gel 1 Dose(s) Oral once PRN Blood Glucose LESS THAN 70 milliGRAM(s)/deciliter  glucagon  Injectable 1 milliGRAM(s) IntraMuscular once PRN Glucose LESS THAN 70 milligrams/deciliter  guaiFENesin    Syrup 100 milliGRAM(s) Oral every 6 hours PRN Cough      ALLERGIES:  Allergies    penicillins (Hives)    Intolerances        LABS:                        10.3   10.3  )-----------( 294      ( 2018 14:53 )             32.4     02-12    133<L>  |  95<L>  |  13  ----------------------------<  106<H>  3.4<L>   |  24  |  0.71    Ca    8.6      2018 07:23  Mg     1.8     -12    TPro  6.9  /  Alb  2.5<L>  /  TBili  0.9  /  DBili  x   /  AST  57<H>  /  ALT  88<H>  /  AlkPhos  130<H>  02-    LIVER FUNCTIONS - ( 2018 14:53 )  Alb: 2.5 g/dL / Pro: 6.9 g/dL / ALK PHOS: 130 U/L / ALT: 88 U/L / AST: 57 U/L / GGT: x           PT/INR - ( 2018 07:23 )   PT: 15.5 sec;   INR: 1.39          PTT - ( 2018 14:53 )  PTT:23.7 sec  Urinalysis Basic - ( 2018 16:07 )    Color: Yellow / Appearance: SL Cloudy / S.025 / pH: x  Gluc: x / Ketone: NEGATIVE  / Bili: Negative / Urobili: 1.0 E.U./dL   Blood: x / Protein: 30 mg/dL / Nitrite: POSITIVE   Leuk Esterase: NEGATIVE / RBC: < 5 /HPF / WBC < 5 /HPF   Sq Epi: x / Non Sq Epi: 0-5 /HPF / Bacteria: Present /HPF            RADIOLOGY & ADDITIONAL TESTS: Reviewed. INTERVAL HPI/OVERNIGHT EVENTS: Admitted overnight for sepsis likely 2/2 to influenza    SUBJECTIVE: Patient seen and examined at bedside this am, resting comfortably. Per nurse and patient no events overnight. Patient c/o fever/chills, chronic cough related to hx of Lung CA, and non-mucoid, non-bloody green diarrhea. Otherwise patient denies and SOB, chest pain, abdominal pain, nausea, vomiting constipation, increased urinary frequency, dysuria, hematuria and rashes.     OBJECTIVE:    VITAL SIGNS:  Vital Signs Last 24 Hrs  T(C): 37.1 (2018 10:36), Max: 39.1 (2018 01:15)  T(F): 98.7 (2018 10:36), Max: 102.3 (2018 01:15)  HR: 109 (2018 10:36) (59 - 129)  BP: 103/60 (2018 10:36) (103/60 - 151/79)  RR: 16 (2018 10:36) (14 - 24)  SpO2: 95% (2018 10:36) (94% - 98%)        11 @ 07:01  -  -12 @ 07:00  --------------------------------------------------------  IN: 450 mL / OUT: 1200 mL / NET: -750 mL      CAPILLARY BLOOD GLUCOSE      POCT Blood Glucose.: 107 mg/dL (2018 09:10)      PHYSICAL EXAM:    Constitutional: WDWN resting comfortably in bed; NAD  HEENT: NC/AT; anicteric and nonerythematous sclera, PERRL, EOMI; oral membranes moist, white coating covering the tongue and hard palate, no erythema, exudates or edema of the posterior oropharynx and tonsils  Neck: supple, nontender, no JVD  Cardiovascular: clear S1/S2; RRR; no M/R/G  Respiratory: pt on 4 L O2, wheezing and course rhonchi of the b/l upper lobes, mid and lower lobes CTA b/l  Gastrointestinal: normoactive BS x4, abdomen soft, NT/ND; no guarding or rigidity, no palpable masses   Extremities: WWP; no clubbing, cyanosis or edema  Vascular: 2+ radial, femoral, DP/PT pulses B/L  Dermatologic: skin normal color and turgor; no visible rashes  Neurological: Awake and alert     MEDICATIONS:  MEDICATIONS  (STANDING):  cefepime Injectable. 1000 milliGRAM(s) IV Push once  cefepime Injectable. 1000 milliGRAM(s) IV Push every 8 hours  dextrose 5%. 1000 milliLiter(s) (50 mL/Hr) IV Continuous <Continuous>  dextrose 50% Injectable 12.5 Gram(s) IV Push once  dextrose 50% Injectable 25 Gram(s) IV Push once  dextrose 50% Injectable 25 Gram(s) IV Push once  insulin lispro (HumaLOG) corrective regimen sliding scale   SubCutaneous Before meals and at bedtime  oseltamivir 75 milliGRAM(s) Oral two times a day  potassium chloride   Powder 40 milliEquivalent(s) Oral once  sodium chloride 0.9%. 1000 milliLiter(s) (75 mL/Hr) IV Continuous <Continuous>  vancomycin  IVPB 1250 milliGRAM(s) IV Intermittent every 12 hours    MEDICATIONS  (PRN):  acetaminophen   Tablet 650 milliGRAM(s) Oral every 6 hours PRN For Temp greater than 38 C (100.4 F)  dextrose Gel 1 Dose(s) Oral once PRN Blood Glucose LESS THAN 70 milliGRAM(s)/deciliter  glucagon  Injectable 1 milliGRAM(s) IntraMuscular once PRN Glucose LESS THAN 70 milligrams/deciliter  guaiFENesin    Syrup 100 milliGRAM(s) Oral every 6 hours PRN Cough      ALLERGIES:  Allergies    penicillins (Hives)    Intolerances        LABS:                        10.3   10.3  )-----------( 294      ( 2018 14:53 )             32.4     02-12    133<L>  |  95<L>  |  13  ----------------------------<  106<H>  3.4<L>   |  24  |  0.71    Ca    8.6      2018 07:23  Mg     1.8     02-12    TPro  6.9  /  Alb  2.5<L>  /  TBili  0.9  /  DBili  x   /  AST  57<H>  /  ALT  88<H>  /  AlkPhos  130<H>  02-11    LIVER FUNCTIONS - ( 2018 14:53 )  Alb: 2.5 g/dL / Pro: 6.9 g/dL / ALK PHOS: 130 U/L / ALT: 88 U/L / AST: 57 U/L / GGT: x           PT/INR - ( 2018 07:23 )   PT: 15.5 sec;   INR: 1.39          PTT - ( 2018 14:53 )  PTT:23.7 sec  Urinalysis Basic - ( 2018 16:07 )    Color: Yellow / Appearance: SL Cloudy / S.025 / pH: x  Gluc: x / Ketone: NEGATIVE  / Bili: Negative / Urobili: 1.0 E.U./dL   Blood: x / Protein: 30 mg/dL / Nitrite: POSITIVE   Leuk Esterase: NEGATIVE / RBC: < 5 /HPF / WBC < 5 /HPF   Sq Epi: x / Non Sq Epi: 0-5 /HPF / Bacteria: Present /HPF            RADIOLOGY & ADDITIONAL TESTS: Reviewed.

## 2018-02-13 LAB
ANION GAP SERPL CALC-SCNC: 16 MMOL/L — SIGNIFICANT CHANGE UP (ref 5–17)
ANISOCYTOSIS BLD QL: SLIGHT — SIGNIFICANT CHANGE UP
BUN SERPL-MCNC: 16 MG/DL — SIGNIFICANT CHANGE UP (ref 7–23)
CALCIUM SERPL-MCNC: 8 MG/DL — LOW (ref 8.4–10.5)
CHLORIDE SERPL-SCNC: 98 MMOL/L — SIGNIFICANT CHANGE UP (ref 96–108)
CO2 SERPL-SCNC: 22 MMOL/L — SIGNIFICANT CHANGE UP (ref 22–31)
CREAT SERPL-MCNC: 0.76 MG/DL — SIGNIFICANT CHANGE UP (ref 0.5–1.3)
GLUCOSE BLDC GLUCOMTR-MCNC: 102 MG/DL — HIGH (ref 70–99)
GLUCOSE BLDC GLUCOMTR-MCNC: 103 MG/DL — HIGH (ref 70–99)
GLUCOSE BLDC GLUCOMTR-MCNC: 107 MG/DL — HIGH (ref 70–99)
GLUCOSE BLDC GLUCOMTR-MCNC: 88 MG/DL — SIGNIFICANT CHANGE UP (ref 70–99)
GLUCOSE SERPL-MCNC: 95 MG/DL — SIGNIFICANT CHANGE UP (ref 70–99)
HCT VFR BLD CALC: 27 % — LOW (ref 34.5–45)
HGB BLD-MCNC: 8.6 G/DL — LOW (ref 11.5–15.5)
HYPOCHROMIA BLD QL: SLIGHT — SIGNIFICANT CHANGE UP
LG PLATELETS BLD QL AUTO: PRESENT — SIGNIFICANT CHANGE UP
LYMPHOCYTES # BLD AUTO: 11 % — LOW (ref 13–44)
MACROCYTES BLD QL: SLIGHT — SIGNIFICANT CHANGE UP
MAGNESIUM SERPL-MCNC: 2.1 MG/DL — SIGNIFICANT CHANGE UP (ref 1.6–2.6)
MANUAL SMEAR VERIFICATION: SIGNIFICANT CHANGE UP
MCHC RBC-ENTMCNC: 24.5 PG — LOW (ref 27–34)
MCHC RBC-ENTMCNC: 31.9 G/DL — LOW (ref 32–36)
MCV RBC AUTO: 76.9 FL — LOW (ref 80–100)
MICROCYTES BLD QL: SLIGHT — SIGNIFICANT CHANGE UP
MONOCYTES NFR BLD AUTO: 2 % — SIGNIFICANT CHANGE UP (ref 2–14)
NEUTROPHILS NFR BLD AUTO: 84 % — HIGH (ref 43–77)
NEUTS BAND # BLD: 3 % — SIGNIFICANT CHANGE UP
NEUTS HYPERSEG # BLD: PRESENT — SIGNIFICANT CHANGE UP
PHOSPHATE SERPL-MCNC: 3.2 MG/DL — SIGNIFICANT CHANGE UP (ref 2.5–4.5)
PLAT MORPH BLD: (no result)
PLATELET # BLD AUTO: 177 K/UL — SIGNIFICANT CHANGE UP (ref 150–400)
POLYCHROMASIA BLD QL SMEAR: SLIGHT — SIGNIFICANT CHANGE UP
POTASSIUM SERPL-MCNC: 3.1 MMOL/L — LOW (ref 3.5–5.3)
POTASSIUM SERPL-SCNC: 3.1 MMOL/L — LOW (ref 3.5–5.3)
RBC # BLD: 3.51 M/UL — LOW (ref 3.8–5.2)
RBC # FLD: 18.1 % — HIGH (ref 10.3–16.9)
RBC BLD AUTO: (no result)
SODIUM SERPL-SCNC: 136 MMOL/L — SIGNIFICANT CHANGE UP (ref 135–145)
WBC # BLD: 4.1 K/UL — SIGNIFICANT CHANGE UP (ref 3.8–10.5)
WBC # FLD AUTO: 4.1 K/UL — SIGNIFICANT CHANGE UP (ref 3.8–10.5)

## 2018-02-13 PROCEDURE — 99232 SBSQ HOSP IP/OBS MODERATE 35: CPT

## 2018-02-13 PROCEDURE — 99233 SBSQ HOSP IP/OBS HIGH 50: CPT

## 2018-02-13 RX ORDER — POTASSIUM CHLORIDE 20 MEQ
40 PACKET (EA) ORAL EVERY 4 HOURS
Qty: 0 | Refills: 0 | Status: COMPLETED | OUTPATIENT
Start: 2018-02-13 | End: 2018-02-13

## 2018-02-13 RX ORDER — POTASSIUM CHLORIDE 20 MEQ
40 PACKET (EA) ORAL EVERY 6 HOURS
Qty: 0 | Refills: 0 | Status: DISCONTINUED | OUTPATIENT
Start: 2018-02-13 | End: 2018-02-13

## 2018-02-13 RX ADMIN — PANTOPRAZOLE SODIUM 40 MILLIGRAM(S): 20 TABLET, DELAYED RELEASE ORAL at 06:43

## 2018-02-13 RX ADMIN — Medication 40 MILLIEQUIVALENT(S): at 13:53

## 2018-02-13 RX ADMIN — Medication 40 MILLIEQUIVALENT(S): at 17:41

## 2018-02-13 RX ADMIN — SODIUM CHLORIDE 75 MILLILITER(S): 9 INJECTION INTRAMUSCULAR; INTRAVENOUS; SUBCUTANEOUS at 02:46

## 2018-02-13 RX ADMIN — Medication 75 MILLIGRAM(S): at 09:36

## 2018-02-13 RX ADMIN — Medication 500000 UNIT(S): at 06:43

## 2018-02-13 RX ADMIN — Medication 500000 UNIT(S): at 17:41

## 2018-02-13 RX ADMIN — Medication 40 MILLIEQUIVALENT(S): at 21:47

## 2018-02-13 RX ADMIN — Medication 500000 UNIT(S): at 11:57

## 2018-02-13 RX ADMIN — SODIUM CHLORIDE 75 MILLILITER(S): 9 INJECTION INTRAMUSCULAR; INTRAVENOUS; SUBCUTANEOUS at 03:58

## 2018-02-13 RX ADMIN — Medication 75 MILLIGRAM(S): at 21:47

## 2018-02-13 NOTE — PROGRESS NOTE ADULT - SUBJECTIVE AND OBJECTIVE BOX
Interval Events:  She spiked a fever of 102.4 and was given Tylenol, now trending down to 98.1 Pt states she feels much better with less coughing and clear sputum. She reports having had 2 episodes of diarrhea and abdominal discomfort/pain overnight.    ROS: as per HPI   General: NAD, AAO x3  HEENT: No icterus,. Dry mucous membranes, oral thrush   Neck: No JVD noted. Supple, no meningismus  Cardio: S1, S2 noted, RRR. No murmurs, rubs or gallops  Resp: Wheezing in RML and RLL, vesicular sound in LINDA, LLL  Abdo: Soft, NT, bowel sounds present. No organomegaly  Extremities: No edema noted. Pulses present b/l  Neuro: AAO x3, grossly normal motor strength.  Lymphnodes: no lymphadenopathy identified.  Skin: Dry, no rashes    MEDICATIONS:  Pulmonary:  guaiFENesin  Syrup 100 milliGRAM(s) Oral every 6 hours PRN    Antimicrobials:  levoFLOXacin  Tablet 750 milliGRAM(s) Oral every 24 hours  nystatin    Suspension 993632 Unit(s) Oral four times a day  oseltamivir 75 milliGRAM(s) Oral two times a day    Anticoagulants:    Cardiac:    Endocrine:  dextrose 50% Injectable 12.5 Gram(s) IV Push once  dextrose 50% Injectable 25 Gram(s) IV Push once  dextrose 50% Injectable 25 Gram(s) IV Push once  dextrose Gel 1 Dose(s) Oral once PRN  glucagon  Injectable 1 milliGRAM(s) IntraMuscular once PRN  insulin lispro (HumaLOG) corrective regimen sliding scale   SubCutaneous Before meals and at bedtime    Allergies    penicillins (Hives)    Intolerances    Vital Signs Last 24 Hrs  T(C): 36.6 (2018 09:43), Max: 39.1 (2018 22:49)  T(F): 97.9 (2018 09:43), Max: 102.4 (2018 22:49)  HR: 106 (2018 09:43) (74 - 124)  BP: 124/74 (2018 09:43) (114/74 - 157/83)  BP(mean): --  RR: 22 (2018 09:43) (16 - 22)  SpO2: 97% (2018 09:43) (94% - 98%)     @ 07:01  -   @ 07:00  --------------------------------------------------------  IN: 1725 mL / OUT: 0 mL / NET: 1725 mL        LABS:      CBC Full  -  ( 2018 08:43 )  WBC Count : 4.1 K/uL  Hemoglobin : 8.6 g/dL  Hematocrit : 27.0 %  Platelet Count - Automated : 177 K/uL  Mean Cell Volume : 76.9 fL  Mean Cell Hemoglobin : 24.5 pg  Mean Cell Hemoglobin Concentration : 31.9 g/dL  Auto Neutrophil # : x  Auto Lymphocyte # : x  Auto Monocyte # : x  Auto Eosinophil # : x  Auto Basophil # : x  Auto Neutrophil % : x  Auto Lymphocyte % : x  Auto Monocyte % : x  Auto Eosinophil % : x  Auto Basophil % : x        136  |  98  |  16  ----------------------------<  95  3.1<L>   |  22  |  0.76    Ca    8.0<L>      2018 08:43  Phos  3.2       Mg     2.1         TPro  6.9  /  Alb  2.5<L>  /  TBili  0.9  /  DBili  x   /  AST  57<H>  /  ALT  88<H>  /  AlkPhos  130<H>      PT/INR - ( 2018 07:23 )   PT: 15.5 sec;   INR: 1.39          PTT - ( 2018 14:53 )  PTT:23.7 sec      Urinalysis Basic - ( 2018 16:07 )    Color: Yellow / Appearance: SL Cloudy / S.025 / pH: x  Gluc: x / Ketone: NEGATIVE  / Bili: Negative / Urobili: 1.0 E.U./dL   Blood: x / Protein: 30 mg/dL / Nitrite: POSITIVE   Leuk Esterase: NEGATIVE / RBC: < 5 /HPF / WBC < 5 /HPF   Sq Epi: x / Non Sq Epi: 0-5 /HPF / Bacteria: Present /HPF      RADIOLOGY & ADDITIONAL STUDIES (The following images were personally reviewed):  < from: Xray Chest 1 View AP/PA (18 @ 15:35) >  Heart size, mediastinal and hilar contours are unchanged.   Evidence of widening of the superior mediastinum with leftward   displacement of the tracheal air column to suggest lymphadenopathy.There   is masslike consolidation involving the right hilum which is silhouetted.   The possibility of perihilar mass with postobstructive atelectasis and/or   pneumonitis involving the right lower lung zone cannot be excluded.   Correlation should be made with thoracic CT examination dated 2018.   There is been interval improvement in right-sided pleural effusion.    IMPRESSION:  Improving right-sided pleural effusion.  No other significant interval findings as above.

## 2018-02-13 NOTE — PROGRESS NOTE ADULT - PROBLEM SELECTOR PLAN 2
#Lung cancer (stage IV) mets to brain and bone (s/p RT and gamma knife) and one round of chemo (2/6)   - next chemo scheduled in two weeks   - f/u bronchoscopy with Dr. Xiong in 2nd week of March

## 2018-02-13 NOTE — PROGRESS NOTE ADULT - PROBLEM SELECTOR PLAN 1
- Symptoms improved, cough is clearing and pt has more energy today  - RVP flu +, chest X ray improved compared to prior film (known RLL mass with sharp right CPA and less haziness in RML and RLL)   - hold antibiotics in the setting of her recent antibiotic treatment, ongoing diarrhea and Sx/X ray inconsistent with superimposed pneumonia in the setting of procalcitonin level of 1.32 (intermediate). While procalcitonin has a strong negative predictive value, the positive predictive value in the setting of influenza is unclear. Clinically, this patient does not seem to have a superimposed bacterial infection given her improvement with supportive therapy and tamiflu. She received 1 dose of levaquin overnight which is unlikely to contribute to improvement and has had no increased oxygen requirements or increased work of breathing. Given recent hospitalization and completed course of levaquin, if her clinical picture changes and she requires antibiotics we would have to escalate to cover HAP. Again, given that fever curve is not uptrending and pt clinically improving, would avoid abx at this time to prevent worsening of diarrhea.   - repeat chest X ray if symptoms worsen

## 2018-02-13 NOTE — PROGRESS NOTE ADULT - SUBJECTIVE AND OBJECTIVE BOX
INTERVAL HPI/OVERNIGHT EVENTS:    Patient was seen and examined at bedside this am, resting comfortably in bed. Overnight patient became spiked fever @102.4 and was tachycardic @124 bpm with procalcitonin elevated at 1.32 and was subsequently started on Levaquin for suspected bacterial cause. This morning pt c/o fever/chills, feeling "shaky", improving productive cough, nausea with one episode of nonbloody, bilious emesis and mucoid nonbloody diarrhea that has not improved. Otherwise patient denies: dizziness, weakness, HA, CP, palpitations, SOB, constipation, dysuria, LE edema.    ROS: as above    VITAL SIGNS:  T(F): 97.9 (18 @ 09:43)  HR: 106 (18 @ 09:43)  BP: 124/74 (18 @ 09:43)  RR: 22 (18 @ 09:43)  SpO2: 97% (18 @ 09:43)  Wt(kg): --    PHYSICAL EXAM:    Constitutional: WDWN, NAD  Eyes: sclera non-icteric and nonerythematous PERRL, EOMI  Mouth: oral membranes pink and moist with the exception of white plaques on the surface of the tongue and hard palate, no edema, erythema or exudates of the oropharynx and tonsils  Neck: supple, trachea midline, no masses, no JVD  Respiratory: diffuse wheezing and rhonchi of the right lobes with vesicular breath sounds on ausculation of the left lung with wheezing in the upper left lung field  Cardiovascular: RRR, normal S1S2, no M/R/G  Gastrointestinal: normoactive BS x 4, soft, NTND, no masses palpable,  Extremities: Warm, well perfused, pulses 2+ bilateral upper and lower extremities, no edema, no clubbing  Neurological: AAOx3  Skin: Normal temperature, warm, dry    MEDICATIONS  (STANDING):  dextrose 5%. 1000 milliLiter(s) (50 mL/Hr) IV Continuous <Continuous>  dextrose 50% Injectable 12.5 Gram(s) IV Push once  dextrose 50% Injectable 25 Gram(s) IV Push once  dextrose 50% Injectable 25 Gram(s) IV Push once  insulin lispro (HumaLOG) corrective regimen sliding scale   SubCutaneous Before meals and at bedtime  levoFLOXacin  Tablet 750 milliGRAM(s) Oral every 24 hours  nystatin    Suspension [id number] Unit(s) Oral four times a day  oseltamivir 75 milliGRAM(s) Oral two times a day  pantoprazole    Tablet 40 milliGRAM(s) Oral before breakfast  potassium chloride   Powder 40 milliEquivalent(s) Oral every 6 hours  sodium chloride 0.9%. 1000 milliLiter(s) (75 mL/Hr) IV Continuous <Continuous>    MEDICATIONS  (PRN):  acetaminophen   Tablet 650 milliGRAM(s) Oral every 6 hours PRN For Temp greater than 38 C (100.4 F)  dextrose Gel 1 Dose(s) Oral once PRN Blood Glucose LESS THAN 70 milliGRAM(s)/deciliter  glucagon  Injectable 1 milliGRAM(s) IntraMuscular once PRN Glucose LESS THAN 70 milligrams/deciliter  guaiFENesin    Syrup 100 milliGRAM(s) Oral every 6 hours PRN Cough      Allergies    penicillins (Hives)    Intolerances        LABS:                        8.6    4.1   )-----------( 177      ( 2018 08:43 )             27.0         136  |  98  |  16  ----------------------------<  95  3.1<L>   |  22  |  0.76    Ca    8.0<L>      2018 08:43  Phos  3.2       Mg     2.1         TPro  6.9  /  Alb  2.5<L>  /  TBili  0.9  /  DBili  x   /  AST  57<H>  /  ALT  88<H>  /  AlkPhos  130<H>      PT/INR - ( 2018 07:23 )   PT: 15.5 sec;   INR: 1.39          PTT - ( 2018 14:53 )  PTT:23.7 sec  Urinalysis Basic - ( 2018 16:07 )    Color: Yellow / Appearance: SL Cloudy / S.025 / pH: x  Gluc: x / Ketone: NEGATIVE  / Bili: Negative / Urobili: 1.0 E.U./dL   Blood: x / Protein: 30 mg/dL / Nitrite: POSITIVE   Leuk Esterase: NEGATIVE / RBC: < 5 /HPF / WBC < 5 /HPF   Sq Epi: x / Non Sq Epi: 0-5 /HPF / Bacteria: Present /HPF        RADIOLOGY & ADDITIONAL TESTS:

## 2018-02-13 NOTE — PROGRESS NOTE ADULT - PROBLEM SELECTOR PLAN 4
-Pt with stage IV lung CA with mets to bone and brain s/p radiation to mets on R 8th rib last week and gamma knife treatment to brain on Jan 8th.   -underwent 1st chemo on Tuesday and developed mild diarrhea afterwards  -Consult heme/onc for follow up with patient  -Patient taken decadron, marinol, oxycontin, and nystatin

## 2018-02-13 NOTE — PROGRESS NOTE ADULT - SUBJECTIVE AND OBJECTIVE BOX
Heme/Onc Progress Note (Dr. Noonan)         Interval History: Intermittent fevers overnight, patient started on levaquin. Otherwise patient admits to cough, no bleeding or bruising noted including brbpr, melena or hematuria. Patient denies chest pain, acute sob or bowel changes.     ROS is otherwise negative      Allergies    penicillins (Hives)    Intolerances        Medications:  MEDICATIONS  (STANDING):  dextrose 5%. 1000 milliLiter(s) (50 mL/Hr) IV Continuous <Continuous>  dextrose 50% Injectable 12.5 Gram(s) IV Push once  dextrose 50% Injectable 25 Gram(s) IV Push once  dextrose 50% Injectable 25 Gram(s) IV Push once  insulin lispro (HumaLOG) corrective regimen sliding scale   SubCutaneous Before meals and at bedtime  levoFLOXacin  Tablet 750 milliGRAM(s) Oral every 24 hours  nystatin    Suspension [id number] Unit(s) Oral four times a day  oseltamivir 75 milliGRAM(s) Oral two times a day  pantoprazole    Tablet 40 milliGRAM(s) Oral before breakfast  potassium chloride    Tablet ER 40 milliEquivalent(s) Oral every 4 hours  sodium chloride 0.9%. 1000 milliLiter(s) (75 mL/Hr) IV Continuous <Continuous>    MEDICATIONS  (PRN):  acetaminophen   Tablet 650 milliGRAM(s) Oral every 6 hours PRN For Temp greater than 38 C (100.4 F)  dextrose Gel 1 Dose(s) Oral once PRN Blood Glucose LESS THAN 70 milliGRAM(s)/deciliter  glucagon  Injectable 1 milliGRAM(s) IntraMuscular once PRN Glucose LESS THAN 70 milligrams/deciliter  guaiFENesin    Syrup 100 milliGRAM(s) Oral every 6 hours PRN Cough            PHYSICAL EXAM:    T(F): 97.9 (18 @ 09:43), Max: 102.4 (18 @ 22:49)  HR: 106 (18 @ 09:43) (74 - 124)  BP: 124/74 (18 @ 09:43) (114/74 - 157/83)  RR: 22 (18 @ 09:43) (16 - 22)  SpO2: 97% (18 @ 09:43) (94% - 98%)  Wt(kg): --    Daily     Daily     GEN: fatigued   HEENT: AT/NC, thick oral secretions  NECK: supple  CVS: +S1S2 tachy  LUNG: CTA B/L   ABD: +BS, NT, ND  EXT: no c/c/e  NEURO: aaox3, non focal         Labs:                          8.6    4.1   )-----------( 177      ( 2018 08:43 )             27.0     CBC Full  -  ( 2018 08:43 )  WBC Count : 4.1 K/uL  Hemoglobin : 8.6 g/dL  Hematocrit : 27.0 %  Platelet Count - Automated : 177 K/uL  Mean Cell Volume : 76.9 fL  Mean Cell Hemoglobin : 24.5 pg  Mean Cell Hemoglobin Concentration : 31.9 g/dL  Auto Neutrophil # : x  Auto Lymphocyte # : x  Auto Monocyte # : x  Auto Eosinophil # : x  Auto Basophil # : x  Auto Neutrophil % : x  Auto Lymphocyte % : x  Auto Monocyte % : x  Auto Eosinophil % : x  Auto Basophil % : x    PT/INR - ( 2018 07:23 )   PT: 15.5 sec;   INR: 1.39          PTT - ( 2018 14:53 )  PTT:23.7 sec        136  |  98  |  16  ----------------------------<  95  3.1<L>   |  22  |  0.76    Ca    8.0<L>      2018 08:43  Phos  3.2     02-13  Mg     2.1         TPro  6.9  /  Alb  2.5<L>  /  TBili  0.9  /  DBili  x   /  AST  57<H>  /  ALT  88<H>  /  AlkPhos  130<H>  02-11      Urinalysis Basic - ( 2018 16:07 )    Color: Yellow / Appearance: SL Cloudy / S.025 / pH: x  Gluc: x / Ketone: NEGATIVE  / Bili: Negative / Urobili: 1.0 E.U./dL   Blood: x / Protein: 30 mg/dL / Nitrite: POSITIVE   Leuk Esterase: NEGATIVE / RBC: < 5 /HPF / WBC < 5 /HPF   Sq Epi: x / Non Sq Epi: 0-5 /HPF / Bacteria: Present /HPF        Other Labs:    Cultures:    Pathology:    Imaging Studies:

## 2018-02-13 NOTE — PROGRESS NOTE ADULT - ASSESSMENT
#Influenza vs. pneumonia  - Symptoms improved   - RVP flu +, chest X ray improved compared to prior film (known RLL mass with sharp right CPA and less haziness in RML and RLL)   - hold antibiotics in the setting of her recent antibiotic treatment, ongoing diarrhea and Sx/X ray inconsistent with superimposed pneumonia in the setting of procalcitonin level of 1.32 having a weak positive predictive value and a strong negative predictive value  - trend vitals and labs   - repeat chest X ray if symptoms worsen     #Lung cancer (stage IV) mets to brain and bone (s/p RT and gamma knife) and one round of chemo (2/6)   - next chemo scheduled in two weeks   - f/u bronchoscopy with Dr. Xiong in 2nd week of March 61 y/o F with a PMHX of HTN, DM, and Stage IV lung CA w/ mets to brain (s/p gamma knife) and bone (RT) and recent admission for pneumonia (1/14~1/26; treated with levaquin) presents with 5 days of fever, chills, and cough associated with + flu on RVP

## 2018-02-13 NOTE — PROGRESS NOTE ADULT - ASSESSMENT
Patient is a 63 yo F with PMHx of HTN, DM, lung CA with mets to brain and bone s/p tx with gamma knife and radiation respectively presents c/o fever/chills, cough and diarrhea. Patient is admitted to 7 [facility] for sepsis workup 2/2 influenza and supportive management.

## 2018-02-13 NOTE — PROGRESS NOTE ADULT - PROBLEM SELECTOR PLAN 1
-Pt met 3/4 SIRS criteria on admission, likely 2/2 influenza, requiring increased oxygen requirement   -will continue with Tamiflu for total of 5 days (2/11-2/15), tolerating it well today  -attempt to wean O2 to home dose of 2 L  -patient was given once dose of Vanco and Aztreonam in the ED  -overnight pt febrile, tachycardic and procalcitonin elevated at 1.32 and patient given one dose of Levaquin for possible bacterial PNA  -per pulm consult on 2/13, unlikely bacterial PNA and d/c Levaquin  -one episode of emesis this am, patient no longer c/o nausea, consider Tigan for nausea if indicated considering previous hx of prolonged QTc on previous admission  -Supportive management for cough with guaifenesin  -per pulm continue trend vitals and labs, repeat CXR if symptoms worsen and f/u bronchoscopy on previous admission on 2nd week of March with Dr. Xiong

## 2018-02-13 NOTE — PROGRESS NOTE ADULT - ASSESSMENT
62 year old female with Stage IV lung adenocarcinoma, PDL1 40%, EGFR/ALK/ROS negative, HTN, DM, s/p XRT to ribs, brain, recent admission w/ post obstructive pna s/p bronchoscopy, tumor debulking, s/p 1st cycle of pembro/pemetrexed/carbo last week of Jan, presenting w/ weakness, flu-like symptoms sepsis, fall found to be flu positive.     #Stage IV lung adenocarcinoma - metastatic disease to brain and bones s/p XRT. S/p tumor debulking via interventional pulmonary, s/p 1st cycle of carbo/pemetrexed/pembrolizumab during last week of January, tolerated well mild diarrhea. Intent of systemic treatment palliative not curative with cytotoxic chemotherapy with the addition of immunotherapy based on the KEYNOTE 021 trial which showed improvement in PATIÑO and PFS in patients with PDL <50.  Patient currently with subsequent fatigue, malaise now with positive flu.     -tamiflu, levofloxacin,  IVF supportive care  -CXR stable compared to last admission   -patient due for 2nd cycle 2/27, will monitor counts and clinical symptoms prior to next anticipated dose      #Anemia/Coagulopathy - Hx of chronic inflammation given iron studies and ferritin level. No evidence of bilirubinemia, jaundice. No hemoptysis per patient or other signs of gross bleeding. Coagulopathy in setting of viral infection+ malnutrition w/low albumin. Counts also related to recent chemotherapy.      -Obtain cbc w/ differential, monitor ANC (given chemo recently)  -if intermittently febrile while neutropenic would recommend continuing antibiotic therapy (cover for other etiologies aside from flu)   -Trend Hg, transfuse for goal Hg>7  -coagulopathy likely nutritional, montior for bleeding   -dvt ppx       To be discussed with Dr. Noonan

## 2018-02-14 ENCOUNTER — APPOINTMENT (OUTPATIENT)
Age: 63
End: 2018-02-14

## 2018-02-14 DIAGNOSIS — R11.2 NAUSEA WITH VOMITING, UNSPECIFIED: ICD-10-CM

## 2018-02-14 DIAGNOSIS — C34.90 MALIGNANT NEOPLASM OF UNSPECIFIED PART OF UNSPECIFIED BRONCHUS OR LUNG: ICD-10-CM

## 2018-02-14 LAB
ANION GAP SERPL CALC-SCNC: 15 MMOL/L — SIGNIFICANT CHANGE UP (ref 5–17)
BASOPHILS NFR BLD AUTO: 0.2 % — SIGNIFICANT CHANGE UP (ref 0–2)
BUN SERPL-MCNC: 15 MG/DL — SIGNIFICANT CHANGE UP (ref 7–23)
CALCIUM SERPL-MCNC: 8.4 MG/DL — SIGNIFICANT CHANGE UP (ref 8.4–10.5)
CHLORIDE SERPL-SCNC: 98 MMOL/L — SIGNIFICANT CHANGE UP (ref 96–108)
CO2 SERPL-SCNC: 22 MMOL/L — SIGNIFICANT CHANGE UP (ref 22–31)
CREAT SERPL-MCNC: 0.65 MG/DL — SIGNIFICANT CHANGE UP (ref 0.5–1.3)
CULTURE RESULTS: NO GROWTH — SIGNIFICANT CHANGE UP
CULTURE RESULTS: SIGNIFICANT CHANGE UP
GLUCOSE BLDC GLUCOMTR-MCNC: 101 MG/DL — HIGH (ref 70–99)
GLUCOSE BLDC GLUCOMTR-MCNC: 118 MG/DL — HIGH (ref 70–99)
GLUCOSE BLDC GLUCOMTR-MCNC: 89 MG/DL — SIGNIFICANT CHANGE UP (ref 70–99)
GLUCOSE BLDC GLUCOMTR-MCNC: 96 MG/DL — SIGNIFICANT CHANGE UP (ref 70–99)
GLUCOSE SERPL-MCNC: 100 MG/DL — HIGH (ref 70–99)
HCT VFR BLD CALC: 26.5 % — LOW (ref 34.5–45)
HGB BLD-MCNC: 8.3 G/DL — LOW (ref 11.5–15.5)
LYMPHOCYTES # BLD AUTO: 8.4 % — LOW (ref 13–44)
MAGNESIUM SERPL-MCNC: 2.2 MG/DL — SIGNIFICANT CHANGE UP (ref 1.6–2.6)
MCHC RBC-ENTMCNC: 24.2 PG — LOW (ref 27–34)
MCHC RBC-ENTMCNC: 31.3 G/DL — LOW (ref 32–36)
MCV RBC AUTO: 77.3 FL — LOW (ref 80–100)
MONOCYTES NFR BLD AUTO: 5 % — SIGNIFICANT CHANGE UP (ref 2–14)
NEUTROPHILS NFR BLD AUTO: 86.4 % — HIGH (ref 43–77)
PLATELET # BLD AUTO: 165 K/UL — SIGNIFICANT CHANGE UP (ref 150–400)
POTASSIUM SERPL-MCNC: 3.6 MMOL/L — SIGNIFICANT CHANGE UP (ref 3.5–5.3)
POTASSIUM SERPL-SCNC: 3.6 MMOL/L — SIGNIFICANT CHANGE UP (ref 3.5–5.3)
RBC # BLD: 3.43 M/UL — LOW (ref 3.8–5.2)
RBC # FLD: 18 % — HIGH (ref 10.3–16.9)
SODIUM SERPL-SCNC: 135 MMOL/L — SIGNIFICANT CHANGE UP (ref 135–145)
SPECIMEN SOURCE: SIGNIFICANT CHANGE UP
SPECIMEN SOURCE: SIGNIFICANT CHANGE UP
WBC # BLD: 4.4 K/UL — SIGNIFICANT CHANGE UP (ref 3.8–10.5)
WBC # FLD AUTO: 4.4 K/UL — SIGNIFICANT CHANGE UP (ref 3.8–10.5)

## 2018-02-14 PROCEDURE — 99233 SBSQ HOSP IP/OBS HIGH 50: CPT

## 2018-02-14 PROCEDURE — 74018 RADEX ABDOMEN 1 VIEW: CPT | Mod: 26

## 2018-02-14 PROCEDURE — 99232 SBSQ HOSP IP/OBS MODERATE 35: CPT

## 2018-02-14 RX ORDER — SODIUM CHLORIDE 9 MG/ML
1000 INJECTION INTRAMUSCULAR; INTRAVENOUS; SUBCUTANEOUS
Qty: 0 | Refills: 0 | Status: DISCONTINUED | OUTPATIENT
Start: 2018-02-14 | End: 2018-02-15

## 2018-02-14 RX ORDER — POTASSIUM CHLORIDE 20 MEQ
10 PACKET (EA) ORAL
Qty: 0 | Refills: 0 | Status: COMPLETED | OUTPATIENT
Start: 2018-02-14 | End: 2018-02-14

## 2018-02-14 RX ORDER — SODIUM CHLORIDE 9 MG/ML
1000 INJECTION INTRAMUSCULAR; INTRAVENOUS; SUBCUTANEOUS ONCE
Qty: 0 | Refills: 0 | Status: COMPLETED | OUTPATIENT
Start: 2018-02-14 | End: 2018-02-14

## 2018-02-14 RX ORDER — POTASSIUM CHLORIDE 20 MEQ
40 PACKET (EA) ORAL ONCE
Qty: 0 | Refills: 0 | Status: COMPLETED | OUTPATIENT
Start: 2018-02-14 | End: 2018-02-14

## 2018-02-14 RX ORDER — ONDANSETRON 8 MG/1
2 TABLET, FILM COATED ORAL ONCE
Qty: 0 | Refills: 0 | Status: COMPLETED | OUTPATIENT
Start: 2018-02-14 | End: 2018-02-16

## 2018-02-14 RX ADMIN — PANTOPRAZOLE SODIUM 40 MILLIGRAM(S): 20 TABLET, DELAYED RELEASE ORAL at 07:04

## 2018-02-14 RX ADMIN — Medication 75 MILLIGRAM(S): at 09:45

## 2018-02-14 RX ADMIN — Medication 75 MILLIGRAM(S): at 22:28

## 2018-02-14 RX ADMIN — Medication 500000 UNIT(S): at 12:16

## 2018-02-14 RX ADMIN — Medication 100 MILLIEQUIVALENT(S): at 13:09

## 2018-02-14 RX ADMIN — SODIUM CHLORIDE 80 MILLILITER(S): 9 INJECTION INTRAMUSCULAR; INTRAVENOUS; SUBCUTANEOUS at 10:11

## 2018-02-14 RX ADMIN — Medication 500000 UNIT(S): at 22:28

## 2018-02-14 RX ADMIN — Medication 500000 UNIT(S): at 17:48

## 2018-02-14 RX ADMIN — Medication 500000 UNIT(S): at 07:04

## 2018-02-14 RX ADMIN — Medication 500000 UNIT(S): at 01:04

## 2018-02-14 RX ADMIN — Medication 100 MILLIEQUIVALENT(S): at 10:11

## 2018-02-14 RX ADMIN — Medication 100 MILLIEQUIVALENT(S): at 11:53

## 2018-02-14 RX ADMIN — SODIUM CHLORIDE 250 MILLILITER(S): 9 INJECTION INTRAMUSCULAR; INTRAVENOUS; SUBCUTANEOUS at 15:09

## 2018-02-14 NOTE — PROGRESS NOTE ADULT - PROBLEM SELECTOR PLAN 4
-Pt with stage IV lung CA with mets to bone and brain s/p radiation to mets on R 8th rib last week and gamma knife treatment to brain on Jan 8th.   -underwent 1st chemo on Tuesday and developed mild diarrhea afterwards  -per heme/onc continue to trend CBC w/ diff and ANC, if neutropenic and febrile recommend restarting abx treatment to cover etiologies other than flu  -Patient taken decadron, marinol, oxycontin, and nystatin

## 2018-02-14 NOTE — PROGRESS NOTE ADULT - PROBLEM SELECTOR PLAN 1
-Pt met 3/4 SIRS criteria on admission, likely 2/2 influenza, requiring increased oxygen requirement   -will continue with Tamiflu for total of 5 days (2/11-2/15), tolerating it well today  -attempt to wean O2 to home dose of 2 L  -patient was given once dose of Vanco and Aztreonam in the ED  -on 2/13 pt febrile, tachycardic and procalcitonin elevated at 1.32 and patient given one dose of Levaquin for possible bacterial PNA  -per pulm consult on 2/13, unlikely bacterial PNA and d/c Levaquin  -one episode of emesis 2/13, patient no longer c/o nausea, consider Tigan for nausea if indicated considering previous hx of prolonged QTc on previous admission  -Supportive management for cough with guaifenesin  -per pulm continue trend vitals and labs, repeat CXR if symptoms worsen and f/u bronchoscopy on previous admission on 2nd week of March with Dr. Xiong -Pt met 3/4 SIRS criteria on admission, likely 2/2 influenza, requiring increased oxygen requirement   -will continue with Tamiflu for total of 5 days (2/11-2/15), tolerating it well today  -attempt to wean O2 to home dose of 2 L  -patient was given once dose of Vanco and Aztreonam in the ED  -on 2/13 pt febrile, tachycardic and procalcitonin elevated at 1.32 and patient given one dose of Levaquin for possible bacterial PNA  -per pulm consult on 2/13, unlikely bacterial PNA and d/c Levaquin  -one episode of emesis 2/13, patient no longer c/o nausea, consider Tigan for nausea if indicated considering previous hx of prolonged QTc on previous admission  -Supportive management for cough with guaifenesin  -per pulm continue trend vitals and labs, repeat CXR if symptoms worsen and f/u bronchoscopy on previous admission on 2nd week of March with Dr. Xiong    #Diarrhea  -persistently watery diarrhea, could be 2/2 to chemo treatment or influenza. currently low concern for c. diff  -poor PO intake, getting NS80

## 2018-02-14 NOTE — PROGRESS NOTE ADULT - PROBLEM SELECTOR PLAN 1
- RVP flu +, chest X ray improved compared to prior film (known RLL mass with sharp right CPA and less haziness in RML and RLL)   - hold antibiotics in the setting of her recent antibiotic treatment, ongoing diarrhea + abdominal cramps and Sx/X ray inconsistent with superimposed pneumonia in the setting of procalcitonin level of 1.32 (intermediate). While procalcitonin has a strong negative predictive value, the positive predictive value in the setting of influenza is unclear. Clinically, this patient does not seem to have a superimposed bacterial infection given her improvement with supportive therapy and tamiflu. She received 1 dose of levaquin overnight which is unlikely to contribute to improvement and has had no increased oxygen requirements or increased work of breathing. Given recent hospitalization and completed course of levaquin, if her clinical picture changes and she requires antibiotics we would have to escalate to cover HAP. Again, given that fever curve is not uptrending and pt clinically improving, would avoid abx at this time to prevent worsening of diarrhea.   - repeat chest X ray if symptoms worsen.

## 2018-02-14 NOTE — PROGRESS NOTE ADULT - PROBLEM SELECTOR PLAN 2
#Lung cancer (stage IV) mets to brain and bone (s/p RT and gamma knife) and one round of chemo (2/6)   - next chemo scheduled in two weeks   - f/u bronchoscopy with Dr. Xiong in 2nd week of March.

## 2018-02-14 NOTE — PROGRESS NOTE ADULT - ASSESSMENT
63 y/o F with a PMHX of HTN, DM, and Stage IV lung CA w/ mets to brain (s/p gamma knife) and bone (RT) and recent admission for pneumonia (1/14~1/26; treated with levaquin) presents with 5 days of fever, chills, and cough associated with + flu on RVP.

## 2018-02-14 NOTE — PROGRESS NOTE ADULT - SUBJECTIVE AND OBJECTIVE BOX
INTERVAL HPI/OVERNIGHT EVENTS: FLY    Patient was seen and examined at bedside. As per nurse and patient, no o/n events, patient resting comfortably. Patient endorses fatigue, productive but improving cough, 2 episodes of mucoid, green diarrhea and slight epigastric pain that has since resolved. Patient denies: dizziness, weakness, HA, CP, palpitations, SOB, N/V/C, dysuria, LE edema.    ROS: as above    VITAL SIGNS:  T(F): 99 (02-14-18 @ 09:07)  HR: 102 (02-14-18 @ 09:07)  BP: 130/80 (02-14-18 @ 09:07)  RR: 20 (02-14-18 @ 09:07)  SpO2: 95% (02-14-18 @ 09:07)  Wt(kg): --    PHYSICAL EXAM:    Constitutional: WDWN, NAD  Eyes: non-icteric and non erythematous sclera, PERRL, EOMI  Mouth: white/yellow plaque on the tongue with minimal improvement at the distal aspect of the tongue  Neck: supple, trachea midline, no masses, no JVD  Respiratory: course rhonchi and wheezing through the right lung fields, slight wheezing on ausculation of the left upper lobes with the left lower fields CTA  Cardiovascular: RRR, normal S1S2, no M/R/G  Gastrointestinal: normoactive BS x 4, soft, NTND, no masses palpable  Extremities: Warm, well perfused, pulses 2+ and equal bilateral upper and lower extremities, no edema, no clubbing  Neurological: AAOx3  Skin: Normal temperature, warm, dry    MEDICATIONS  (STANDING):  dextrose 5%. 1000 milliLiter(s) (50 mL/Hr) IV Continuous <Continuous>  dextrose 50% Injectable 12.5 Gram(s) IV Push once  dextrose 50% Injectable 25 Gram(s) IV Push once  dextrose 50% Injectable 25 Gram(s) IV Push once  insulin lispro (HumaLOG) corrective regimen sliding scale   SubCutaneous Before meals and at bedtime  nystatin    Suspension 611717 Unit(s) Oral four times a day  oseltamivir 75 milliGRAM(s) Oral two times a day  pantoprazole    Tablet 40 milliGRAM(s) Oral before breakfast  sodium chloride 0.9%. 1000 milliLiter(s) (80 mL/Hr) IV Continuous <Continuous>    MEDICATIONS  (PRN):  acetaminophen   Tablet 650 milliGRAM(s) Oral every 6 hours PRN For Temp greater than 38 C (100.4 F)  dextrose Gel 1 Dose(s) Oral once PRN Blood Glucose LESS THAN 70 milliGRAM(s)/deciliter  glucagon  Injectable 1 milliGRAM(s) IntraMuscular once PRN Glucose LESS THAN 70 milligrams/deciliter  guaiFENesin    Syrup 100 milliGRAM(s) Oral every 6 hours PRN Cough      Allergies    penicillins (Hives)    Intolerances        LABS:                        8.3    4.4   )-----------( 165      ( 14 Feb 2018 06:29 )             26.5     02-14    135  |  98  |  15  ----------------------------<  100<H>  3.6   |  22  |  0.65    Ca    8.4      14 Feb 2018 06:27  Phos  3.2     02-13  Mg     2.2     02-14            RADIOLOGY & ADDITIONAL TESTS:

## 2018-02-14 NOTE — PROGRESS NOTE ADULT - SUBJECTIVE AND OBJECTIVE BOX
Interval Events:  Pt's symptoms of cough and sputum have gotten better. She endorses diffuse mid-chest discomfort described as "something sitting on," non reproducible and still has abdominal cramps with 2 episodes of diarrhea and vomiting.     MEDICATIONS:  Pulmonary:  guaiFENesin Syrup 100 milliGRAM(s) Oral every 6 hours PRN    Antimicrobials:  nystatin    Suspension [id number] Unit(s) Oral four times a day  oseltamivir 75 milliGRAM(s) Oral two times a day    Anticoagulants:    Cardiac:    Endocrine:  dextrose 50% Injectable 12.5 Gram(s) IV Push once  dextrose 50% Injectable 25 Gram(s) IV Push once  dextrose 50% Injectable 25 Gram(s) IV Push once  dextrose Gel 1 Dose(s) Oral once PRN  glucagon  Injectable 1 milliGRAM(s) IntraMuscular once PRN  insulin lispro (HumaLOG) corrective regimen sliding scale   SubCutaneous Before meals and at bedtime    Allergies    penicillins (Hives)    Intolerances        Vital Signs Last 24 Hrs  T(C): 37.2 (14 Feb 2018 09:07), Max: 37.4 (13 Feb 2018 15:38)  T(F): 99 (14 Feb 2018 09:07), Max: 99.4 (13 Feb 2018 15:38)  HR: 102 (14 Feb 2018 09:07) (102 - 108)  BP: 130/80 (14 Feb 2018 09:07) (130/80 - 145/76)  BP(mean): --  RR: 20 (14 Feb 2018 09:07) (16 - 22)  SpO2: 95% (14 Feb 2018 09:07) (95% - 98%)        LABS:      CBC Full  -  ( 14 Feb 2018 06:29 )  WBC Count : 4.4 K/uL  Hemoglobin : 8.3 g/dL  Hematocrit : 26.5 %  Platelet Count - Automated : 165 K/uL  Mean Cell Volume : 77.3 fL  Mean Cell Hemoglobin : 24.2 pg  Mean Cell Hemoglobin Concentration : 31.3 g/dL  Auto Neutrophil # : x  Auto Lymphocyte # : x  Auto Monocyte # : x  Auto Eosinophil # : x  Auto Basophil # : x  Auto Neutrophil % : 86.4 %  Auto Lymphocyte % : 8.4 %  Auto Monocyte % : 5.0 %  Auto Eosinophil % : x  Auto Basophil % : 0.2 %    02-14    135  |  98  |  15  ----------------------------<  100<H>  3.6   |  22  |  0.65    Ca    8.4      14 Feb 2018 06:27  Phos  3.2     02-13  Mg     2.2     02-14

## 2018-02-15 DIAGNOSIS — Z51.5 ENCOUNTER FOR PALLIATIVE CARE: ICD-10-CM

## 2018-02-15 DIAGNOSIS — J68.0 BRONCHITIS AND PNEUMONITIS DUE TO CHEMICALS, GASES, FUMES AND VAPORS: ICD-10-CM

## 2018-02-15 DIAGNOSIS — Z71.89 OTHER SPECIFIED COUNSELING: ICD-10-CM

## 2018-02-15 DIAGNOSIS — Z66 DO NOT RESUSCITATE: ICD-10-CM

## 2018-02-15 LAB
ALBUMIN SERPL ELPH-MCNC: 2.3 G/DL — LOW (ref 3.3–5)
ALP SERPL-CCNC: 132 U/L — HIGH (ref 40–120)
ALT FLD-CCNC: 68 U/L — HIGH (ref 10–45)
ANION GAP SERPL CALC-SCNC: 16 MMOL/L — SIGNIFICANT CHANGE UP (ref 5–17)
ANION GAP SERPL CALC-SCNC: 20 MMOL/L — HIGH (ref 5–17)
APPEARANCE UR: CLEAR — SIGNIFICANT CHANGE UP
APTT BLD: 28.1 SEC — SIGNIFICANT CHANGE UP (ref 27.5–37.4)
AST SERPL-CCNC: 35 U/L — SIGNIFICANT CHANGE UP (ref 10–40)
BASOPHILS NFR BLD AUTO: 0.3 % — SIGNIFICANT CHANGE UP (ref 0–2)
BILIRUB SERPL-MCNC: 1 MG/DL — SIGNIFICANT CHANGE UP (ref 0.2–1.2)
BILIRUB UR-MCNC: NEGATIVE — SIGNIFICANT CHANGE UP
BLD GP AB SCN SERPL QL: POSITIVE — SIGNIFICANT CHANGE UP
BUN SERPL-MCNC: 11 MG/DL — SIGNIFICANT CHANGE UP (ref 7–23)
BUN SERPL-MCNC: 8 MG/DL — SIGNIFICANT CHANGE UP (ref 7–23)
CALCIUM SERPL-MCNC: 8.7 MG/DL — SIGNIFICANT CHANGE UP (ref 8.4–10.5)
CALCIUM SERPL-MCNC: 9 MG/DL — SIGNIFICANT CHANGE UP (ref 8.4–10.5)
CHLORIDE SERPL-SCNC: 95 MMOL/L — LOW (ref 96–108)
CHLORIDE SERPL-SCNC: 96 MMOL/L — SIGNIFICANT CHANGE UP (ref 96–108)
CO2 SERPL-SCNC: 18 MMOL/L — LOW (ref 22–31)
CO2 SERPL-SCNC: 22 MMOL/L — SIGNIFICANT CHANGE UP (ref 22–31)
COLOR SPEC: YELLOW — SIGNIFICANT CHANGE UP
CREAT SERPL-MCNC: 0.57 MG/DL — SIGNIFICANT CHANGE UP (ref 0.5–1.3)
CREAT SERPL-MCNC: 0.59 MG/DL — SIGNIFICANT CHANGE UP (ref 0.5–1.3)
DIFF PNL FLD: (no result)
EOSINOPHIL NFR BLD AUTO: 0.3 % — SIGNIFICANT CHANGE UP (ref 0–6)
FLUAV H1 2009 PAND RNA SPEC QL NAA+PROBE: DETECTED
GLUCOSE BLDC GLUCOMTR-MCNC: 100 MG/DL — HIGH (ref 70–99)
GLUCOSE BLDC GLUCOMTR-MCNC: 103 MG/DL — HIGH (ref 70–99)
GLUCOSE BLDC GLUCOMTR-MCNC: 109 MG/DL — HIGH (ref 70–99)
GLUCOSE BLDC GLUCOMTR-MCNC: 89 MG/DL — SIGNIFICANT CHANGE UP (ref 70–99)
GLUCOSE BLDC GLUCOMTR-MCNC: 91 MG/DL — SIGNIFICANT CHANGE UP (ref 70–99)
GLUCOSE SERPL-MCNC: 103 MG/DL — HIGH (ref 70–99)
GLUCOSE SERPL-MCNC: 103 MG/DL — HIGH (ref 70–99)
GLUCOSE UR QL: NEGATIVE — SIGNIFICANT CHANGE UP
HCT VFR BLD CALC: 26.5 % — LOW (ref 34.5–45)
HCT VFR BLD CALC: 30.8 % — LOW (ref 34.5–45)
HGB BLD-MCNC: 10 G/DL — LOW (ref 11.5–15.5)
HGB BLD-MCNC: 8.5 G/DL — LOW (ref 11.5–15.5)
INR BLD: 1.34 — HIGH (ref 0.88–1.16)
KETONES UR-MCNC: (no result) MG/DL
LACTATE SERPL-SCNC: 1.8 MMOL/L — SIGNIFICANT CHANGE UP (ref 0.5–2)
LEUKOCYTE ESTERASE UR-ACNC: NEGATIVE — SIGNIFICANT CHANGE UP
LYMPHOCYTES # BLD AUTO: 13.8 % — SIGNIFICANT CHANGE UP (ref 13–44)
MAGNESIUM SERPL-MCNC: 1.7 MG/DL — SIGNIFICANT CHANGE UP (ref 1.6–2.6)
MAGNESIUM SERPL-MCNC: 1.9 MG/DL — SIGNIFICANT CHANGE UP (ref 1.6–2.6)
MCHC RBC-ENTMCNC: 24.4 PG — LOW (ref 27–34)
MCHC RBC-ENTMCNC: 24.8 PG — LOW (ref 27–34)
MCHC RBC-ENTMCNC: 32.1 G/DL — SIGNIFICANT CHANGE UP (ref 32–36)
MCHC RBC-ENTMCNC: 32.5 G/DL — SIGNIFICANT CHANGE UP (ref 32–36)
MCV RBC AUTO: 76.1 FL — LOW (ref 80–100)
MCV RBC AUTO: 76.4 FL — LOW (ref 80–100)
MONOCYTES NFR BLD AUTO: 8.2 % — SIGNIFICANT CHANGE UP (ref 2–14)
NEUTROPHILS NFR BLD AUTO: 77.4 % — HIGH (ref 43–77)
NITRITE UR-MCNC: NEGATIVE — SIGNIFICANT CHANGE UP
PH UR: 6 — SIGNIFICANT CHANGE UP (ref 5–8)
PHOSPHATE SERPL-MCNC: 2.6 MG/DL — SIGNIFICANT CHANGE UP (ref 2.5–4.5)
PLATELET # BLD AUTO: 151 K/UL — SIGNIFICANT CHANGE UP (ref 150–400)
PLATELET # BLD AUTO: 176 K/UL — SIGNIFICANT CHANGE UP (ref 150–400)
POTASSIUM SERPL-MCNC: 3.1 MMOL/L — LOW (ref 3.5–5.3)
POTASSIUM SERPL-MCNC: 3.3 MMOL/L — LOW (ref 3.5–5.3)
POTASSIUM SERPL-SCNC: 3.1 MMOL/L — LOW (ref 3.5–5.3)
POTASSIUM SERPL-SCNC: 3.3 MMOL/L — LOW (ref 3.5–5.3)
PROT SERPL-MCNC: 7.1 G/DL — SIGNIFICANT CHANGE UP (ref 6–8.3)
PROT UR-MCNC: NEGATIVE MG/DL — SIGNIFICANT CHANGE UP
PROTHROM AB SERPL-ACNC: 15 SEC — HIGH (ref 9.8–12.7)
RAPID RVP RESULT: DETECTED
RBC # BLD: 3.48 M/UL — LOW (ref 3.8–5.2)
RBC # BLD: 4.03 M/UL — SIGNIFICANT CHANGE UP (ref 3.8–5.2)
RBC # FLD: 18 % — HIGH (ref 10.3–16.9)
RBC # FLD: 18 % — HIGH (ref 10.3–16.9)
RH IG SCN BLD-IMP: NEGATIVE — SIGNIFICANT CHANGE UP
SODIUM SERPL-SCNC: 133 MMOL/L — LOW (ref 135–145)
SODIUM SERPL-SCNC: 134 MMOL/L — LOW (ref 135–145)
SP GR SPEC: 1.01 — SIGNIFICANT CHANGE UP (ref 1–1.03)
UROBILINOGEN FLD QL: 0.2 E.U./DL — SIGNIFICANT CHANGE UP
WBC # BLD: 3.3 K/UL — LOW (ref 3.8–10.5)
WBC # BLD: 3.8 K/UL — SIGNIFICANT CHANGE UP (ref 3.8–10.5)
WBC # FLD AUTO: 3.3 K/UL — LOW (ref 3.8–10.5)
WBC # FLD AUTO: 3.8 K/UL — SIGNIFICANT CHANGE UP (ref 3.8–10.5)

## 2018-02-15 PROCEDURE — 99232 SBSQ HOSP IP/OBS MODERATE 35: CPT

## 2018-02-15 PROCEDURE — 95951: CPT | Mod: 26

## 2018-02-15 PROCEDURE — 99223 1ST HOSP IP/OBS HIGH 75: CPT | Mod: GC

## 2018-02-15 PROCEDURE — 71045 X-RAY EXAM CHEST 1 VIEW: CPT | Mod: 26

## 2018-02-15 PROCEDURE — 99233 SBSQ HOSP IP/OBS HIGH 50: CPT | Mod: GC

## 2018-02-15 PROCEDURE — 86077 PHYS BLOOD BANK SERV XMATCH: CPT

## 2018-02-15 RX ORDER — AZTREONAM 2 G
VIAL (EA) INJECTION
Qty: 0 | Refills: 0 | Status: DISCONTINUED | OUTPATIENT
Start: 2018-02-15 | End: 2018-02-15

## 2018-02-15 RX ORDER — METRONIDAZOLE 500 MG
500 TABLET ORAL EVERY 8 HOURS
Qty: 0 | Refills: 0 | Status: DISCONTINUED | OUTPATIENT
Start: 2018-02-15 | End: 2018-02-20

## 2018-02-15 RX ORDER — ENOXAPARIN SODIUM 100 MG/ML
40 INJECTION SUBCUTANEOUS DAILY
Qty: 0 | Refills: 0 | Status: DISCONTINUED | OUTPATIENT
Start: 2018-02-15 | End: 2018-02-20

## 2018-02-15 RX ORDER — CEFEPIME 1 G/1
1000 INJECTION, POWDER, FOR SOLUTION INTRAMUSCULAR; INTRAVENOUS ONCE
Qty: 0 | Refills: 0 | Status: DISCONTINUED | OUTPATIENT
Start: 2018-02-15 | End: 2018-02-15

## 2018-02-15 RX ORDER — SODIUM CHLORIDE 9 MG/ML
1000 INJECTION INTRAMUSCULAR; INTRAVENOUS; SUBCUTANEOUS
Qty: 0 | Refills: 0 | Status: DISCONTINUED | OUTPATIENT
Start: 2018-02-15 | End: 2018-02-20

## 2018-02-15 RX ORDER — CEFEPIME 1 G/1
INJECTION, POWDER, FOR SOLUTION INTRAMUSCULAR; INTRAVENOUS
Qty: 0 | Refills: 0 | Status: DISCONTINUED | OUTPATIENT
Start: 2018-02-15 | End: 2018-02-15

## 2018-02-15 RX ORDER — SODIUM CHLORIDE 9 MG/ML
1000 INJECTION INTRAMUSCULAR; INTRAVENOUS; SUBCUTANEOUS ONCE
Qty: 0 | Refills: 0 | Status: COMPLETED | OUTPATIENT
Start: 2018-02-15 | End: 2018-02-15

## 2018-02-15 RX ORDER — POTASSIUM CHLORIDE 20 MEQ
10 PACKET (EA) ORAL
Qty: 0 | Refills: 0 | Status: COMPLETED | OUTPATIENT
Start: 2018-02-15 | End: 2018-02-15

## 2018-02-15 RX ORDER — LEVETIRACETAM 250 MG/1
1000 TABLET, FILM COATED ORAL ONCE
Qty: 0 | Refills: 0 | Status: COMPLETED | OUTPATIENT
Start: 2018-02-15 | End: 2018-02-15

## 2018-02-15 RX ORDER — CEFEPIME 1 G/1
1000 INJECTION, POWDER, FOR SOLUTION INTRAMUSCULAR; INTRAVENOUS ONCE
Qty: 0 | Refills: 0 | Status: COMPLETED | OUTPATIENT
Start: 2018-02-15 | End: 2018-02-15

## 2018-02-15 RX ORDER — CEFEPIME 1 G/1
1000 INJECTION, POWDER, FOR SOLUTION INTRAMUSCULAR; INTRAVENOUS EVERY 8 HOURS
Qty: 0 | Refills: 0 | Status: DISCONTINUED | OUTPATIENT
Start: 2018-02-15 | End: 2018-02-15

## 2018-02-15 RX ORDER — POTASSIUM CHLORIDE 20 MEQ
40 PACKET (EA) ORAL EVERY 4 HOURS
Qty: 0 | Refills: 0 | Status: DISCONTINUED | OUTPATIENT
Start: 2018-02-15 | End: 2018-02-15

## 2018-02-15 RX ORDER — CEFEPIME 1 G/1
1000 INJECTION, POWDER, FOR SOLUTION INTRAMUSCULAR; INTRAVENOUS EVERY 8 HOURS
Qty: 0 | Refills: 0 | Status: DISCONTINUED | OUTPATIENT
Start: 2018-02-15 | End: 2018-02-16

## 2018-02-15 RX ORDER — MAGNESIUM SULFATE 500 MG/ML
2 VIAL (ML) INJECTION ONCE
Qty: 0 | Refills: 0 | Status: COMPLETED | OUTPATIENT
Start: 2018-02-15 | End: 2018-02-15

## 2018-02-15 RX ORDER — CEFEPIME 1 G/1
INJECTION, POWDER, FOR SOLUTION INTRAMUSCULAR; INTRAVENOUS
Qty: 0 | Refills: 0 | Status: DISCONTINUED | OUTPATIENT
Start: 2018-02-15 | End: 2018-02-16

## 2018-02-15 RX ADMIN — PANTOPRAZOLE SODIUM 40 MILLIGRAM(S): 20 TABLET, DELAYED RELEASE ORAL at 06:37

## 2018-02-15 RX ADMIN — CEFEPIME 1000 MILLIGRAM(S): 1 INJECTION, POWDER, FOR SOLUTION INTRAMUSCULAR; INTRAVENOUS at 16:39

## 2018-02-15 RX ADMIN — Medication 75 MILLIGRAM(S): at 22:50

## 2018-02-15 RX ADMIN — Medication 650 MILLIGRAM(S): at 10:05

## 2018-02-15 RX ADMIN — Medication 100 MILLIEQUIVALENT(S): at 16:39

## 2018-02-15 RX ADMIN — CEFEPIME 1000 MILLIGRAM(S): 1 INJECTION, POWDER, FOR SOLUTION INTRAMUSCULAR; INTRAVENOUS at 22:47

## 2018-02-15 RX ADMIN — SODIUM CHLORIDE 120 MILLILITER(S): 9 INJECTION INTRAMUSCULAR; INTRAVENOUS; SUBCUTANEOUS at 23:07

## 2018-02-15 RX ADMIN — LEVETIRACETAM 400 MILLIGRAM(S): 250 TABLET, FILM COATED ORAL at 10:15

## 2018-02-15 RX ADMIN — Medication 100 MILLIEQUIVALENT(S): at 22:54

## 2018-02-15 RX ADMIN — Medication 500 MILLIGRAM(S): at 16:40

## 2018-02-15 RX ADMIN — Medication 100 MILLIEQUIVALENT(S): at 14:21

## 2018-02-15 RX ADMIN — Medication 500 MILLIGRAM(S): at 22:49

## 2018-02-15 RX ADMIN — Medication 75 MILLIGRAM(S): at 12:55

## 2018-02-15 RX ADMIN — ENOXAPARIN SODIUM 40 MILLIGRAM(S): 100 INJECTION SUBCUTANEOUS at 14:21

## 2018-02-15 RX ADMIN — Medication 100 MILLIEQUIVALENT(S): at 22:00

## 2018-02-15 RX ADMIN — Medication 100 MILLIEQUIVALENT(S): at 12:55

## 2018-02-15 RX ADMIN — SODIUM CHLORIDE 2000 MILLILITER(S): 9 INJECTION INTRAMUSCULAR; INTRAVENOUS; SUBCUTANEOUS at 09:50

## 2018-02-15 RX ADMIN — SODIUM CHLORIDE 3603.6 MILLILITER(S): 9 INJECTION INTRAMUSCULAR; INTRAVENOUS; SUBCUTANEOUS at 10:30

## 2018-02-15 RX ADMIN — Medication 50 GRAM(S): at 10:15

## 2018-02-15 RX ADMIN — Medication 100 MILLIEQUIVALENT(S): at 18:23

## 2018-02-15 RX ADMIN — SODIUM CHLORIDE 120 MILLILITER(S): 9 INJECTION INTRAMUSCULAR; INTRAVENOUS; SUBCUTANEOUS at 12:55

## 2018-02-15 RX ADMIN — Medication 500000 UNIT(S): at 06:37

## 2018-02-15 RX ADMIN — Medication 500000 UNIT(S): at 12:55

## 2018-02-15 NOTE — PROVIDER CONTACT NOTE (CHANGE IN STATUS NOTIFICATION) - ASSESSMENT
Patient being assessed this morning by MD Mary after I reported to MD that the patient was complaining of chest pain. MD Mary ran to hallway asking for help. Patient found extremely diaphoretic and HR in the 180s, rapid response initiated. Patient transferred to bed and had episode of extremely loose diarrhea. Vitals at that time rectal temp 101.0  /81 O2 sat 96% on 4L NC. Blood sugar 109. See rapid response team sheet. Tylenol, keppra and magnesium given to patient. x2 NS bolus 1000ml given to patient. Sepsis flowsheet initiated. CXR, blood cultures, and UA sent. Patient placed on contact precautions for r/o C diff initiated and order placed for specimen collection.

## 2018-02-15 NOTE — PROGRESS NOTE ADULT - PROBLEM SELECTOR PLAN 1
- Pt had a rapid response called on her this morning with rectal temperature of 101.4 and HR 90s~100s   - X-ray was STAT ordered and showed worsened infiltration in RUL   - In the setting of her immunocompromised state, an episode of vomiting yesterday and RUL infiltration shown in X-ray, antibiotics to cover anaerobe organisms should be considered - Pt had a rapid response called on her this morning with rectal temperature of 101.4 and HR 90s~100s   - X-ray was STAT ordered and showed worsened infiltration in RUL   - In the setting of her immunocompromised state, an episode of vomiting yesterday and RUL infiltration shown in X-ray, antibiotics to cover anaerobe organisms should be considered. Pt with allergy to PCN, would speak with ID re: abx recommendations as aztreonam + clinda would cover gram neg + anaerobes, but high incidence of c diff with clindamycin and pt being ruled out for c. diff.

## 2018-02-15 NOTE — PROGRESS NOTE ADULT - PROBLEM SELECTOR PLAN 2
-likely 2/2 orthostatic hypotension vs sepsis vs TEM from sepsis  -CTH was negative for hemorrhage or fracture  -patient normally uses walker at home, fall occurred will walking on her own  -consult PT for reevaluation once respiratory status has improved -patient had a fall this AM when getting out of bed. Patient fell on her behind, no head trauma, no LOC  -likely 2/2 orthostatic hypotension vs sepsis vs TEM from sepsis  -CTH was negative for hemorrhage or fracture  -patient normally uses walker at home, fall occurred will walking on her own  -consult PT for reevaluation once respiratory status has improved

## 2018-02-15 NOTE — PROGRESS NOTE ADULT - ASSESSMENT
Patient is a 61 yo F with PMHx of HTN, DM, lung CA with mets to brain and bone s/p tx with gamma knife and radiation respectively presents c/o fever/chills, cough and diarrhea. Patient is admitted to 7 WO for sepsis workup 2/2 influenza and supportive management. Patient had with rapid on 2/15 fever of 101.4, tachy to 180s, and AMS.

## 2018-02-15 NOTE — PROVIDER CONTACT NOTE (OTHER) - ASSESSMENT
Patient found sitting on the floor, urine and feces found under the patient. lz=399/72 JZ=702, RR=20, SPO2=98%
Stable BP, awake responsive

## 2018-02-15 NOTE — CONSULT NOTE ADULT - PROBLEM SELECTOR RECOMMENDATION 3
Pt has living will that states her wishes regarding mos tof end-of-life situations. She wishes to be DNR/DNI w/ no feeding tube ever. Discussed with patient's daughter that a MOLST form should be completed while pt is in hospital to ensure that it stays with patient at all times and her wishes are carried out according to the MOLST form. MOLST form provided to daughter. She will discuss with patient and her brother prior to completing the form tomorrow. We will hold off on any further discussion on hospice referral as more time is needed to observe if patient can recover from her current acute illness and return for her cancer treatment. Support provided to patient and family. Patient to have access to supportive services during rest of hospital stay as the patient/family deemed necessary ie. Massage therapy, Music therapy, Patient and family supportive services

## 2018-02-15 NOTE — CONSULT NOTE ADULT - PROBLEM SELECTOR RECOMMENDATION 2
Support provided to patient and family. Patient to have access to supportive services during rest of hospital stay as the patient/family deemed necessary ie. Massage therapy, Music therapy, Patient and family supportive services Pateint admitted with sepsis due to influenza.  Presently on treatment.  Decision regarding continued treatment hinges on improvement of clinical status.  This am with "rapid response" due to tachycardia has responded to fluids.  Having EEG to r/O seizure activity.  Further palliative recommentations once observed another 24 hours.

## 2018-02-15 NOTE — PROGRESS NOTE ADULT - PROBLEM SELECTOR PLAN 2
- RVP flu +, chest X ray improved compared to prior film (known RLL mass with sharp right CPA and less haziness in RML and RLL)

## 2018-02-15 NOTE — PROGRESS NOTE ADULT - SUBJECTIVE AND OBJECTIVE BOX
Interval Events:  Patient had an episode of diarrhea and rapid called on her this morning (as per the resident, patient was talking in bed and became diaphoretic, stared in space, and slumped in her seat). At the time, Pt had a rectal temperature of 101.4 and HR of 90-100s, s/p 2LNS, 1 g of keppra, oral tylenol, and 2g of magnesium. Stool culture and blood culture sent to identify the source of new fever.    In the afternoon, she states she is feeling better and has appetite. No diarrhea since this morning. Still has cough with occasional white sputum and ongoing chest tightness. Also endorses mild headache. Denies any n/v, abdominal cramps or urinary symptoms.    ROS: as per HPI    Physical exam:  General: Eating soup in bed, NAD, AAO x3  HEENT: No icterus,. Dry mucous membranes, oral thrush   Neck: No JVD noted. Supple, no meningismus  Cardio: S1, S2 noted, RRR. No murmurs, rubs or gallops  Resp: Mild crackles b/l  Abdo: Soft, NT, bowel sounds present. No organomegaly  Extremities: No edema noted. Pulses present b/l  Neuro: AAO x3  Lymphnodes: no lymphadenopathy identified.  Skin: Dry, no rashes    MEDICATIONS:  Pulmonary:  guaiFENesin    Syrup 100 milliGRAM(s) Oral every 6 hours PRN    Antimicrobials:  aztreonam  IVPB      metroNIDAZOLE    Tablet 500 milliGRAM(s) Oral every 8 hours  nystatin    Suspension [id number] Unit(s) Oral four times a day  oseltamivir 75 milliGRAM(s) Oral two times a day    Anticoagulants:  enoxaparin Injectable 40 milliGRAM(s) SubCutaneous daily    Cardiac:    Endocrine:  dextrose 50% Injectable 12.5 Gram(s) IV Push once  dextrose 50% Injectable 25 Gram(s) IV Push once  dextrose 50% Injectable 25 Gram(s) IV Push once  dextrose Gel 1 Dose(s) Oral once PRN  glucagon  Injectable 1 milliGRAM(s) IntraMuscular once PRN  insulin lispro (HumaLOG) corrective regimen sliding scale   SubCutaneous Before meals and at bedtime    Allergies    penicillins (Hives)    Intolerances    Vital Signs Last 24 Hrs  T(C): 37.2 (15 Feb 2018 13:04), Max: 37.2 (2018 16:33)  T(F): 99 (15 Feb 2018 13:04), Max: 99 (15 Feb 2018 13:04)  HR: 107 (15 Feb 2018 13:04) (91 - 118)  BP: 120/79 (15 Feb 2018 13:04) (114/76 - 150/80)  BP(mean): --  RR: 16 (15 Feb 2018 13:04) (16 - 22)  SpO2: 98% (15 Feb 2018 13:04) (95% - 98%)     @ 07:01  -  02-15 @ 07:00  --------------------------------------------------------  IN: 1500 mL / OUT: 0 mL / NET: 1500 mL    02-15 @ 07:01  -  02-15 @ 15:36  --------------------------------------------------------  IN: 2000 mL / OUT: 1400 mL / NET: 600 mL      LABS:      CBC Full  -  ( 15 Feb 2018 09:53 )  WBC Count : 3.8 K/uL  Hemoglobin : 10.0 g/dL  Hematocrit : 30.8 %  Platelet Count - Automated : 176 K/uL  Mean Cell Volume : 76.4 fL  Mean Cell Hemoglobin : 24.8 pg  Mean Cell Hemoglobin Concentration : 32.5 g/dL  Auto Neutrophil # : x  Auto Lymphocyte # : x  Auto Monocyte # : x  Auto Eosinophil # : x  Auto Basophil # : x  Auto Neutrophil % : 77.4 %  Auto Lymphocyte % : 13.8 %  Auto Monocyte % : 8.2 %  Auto Eosinophil % : 0.3 %  Auto Basophil % : 0.3 %    02-15    133<L>  |  95<L>  |  8   ----------------------------<  103<H>  3.3<L>   |  18<L>  |  0.59    Ca    9.0      15 Feb 2018 09:53  Phos  2.6     -15  Mg     1.9     02-15    TPro  7.1  /  Alb  2.3<L>  /  TBili  1.0  /  DBili  x   /  AST  35  /  ALT  68<H>  /  AlkPhos  132<H>  02-15    PT/INR - ( 15 Feb 2018 09:53 )   PT: 15.0 sec;   INR: 1.34          PTT - ( 15 Feb 2018 09:53 )  PTT:28.1 sec      Urinalysis Basic - ( 15 Feb 2018 11:06 )    Color: Yellow / Appearance: Clear / S.010 / pH: x  Gluc: x / Ketone: Trace mg/dL  / Bili: Negative / Urobili: 0.2 E.U./dL   Blood: x / Protein: NEGATIVE mg/dL / Nitrite: NEGATIVE   Leuk Esterase: NEGATIVE / RBC: < 5 /HPF / WBC < 5 /HPF   Sq Epi: x / Non Sq Epi: 0-5 /HPF / Bacteria: Present /HPF

## 2018-02-15 NOTE — CONSULT NOTE ADULT - ASSESSMENT
63 yo F with PMHx of HTN, DM, lung CA with mets to brain and bone s/p tx with gamma knife and radiation w/ multiple hospitalizations in past month due to recurrent sepsis 2/2 to PNA and flu, discussion with family to clarify GOC and advance directives due to advanced and incurable lung cancer.
61 y/o F with a PMHX of HTN, DM, and Stage IV lung CA w/ mets to brain (s/p gamma knife) and bone (RT) and recent admission for pneumonia (1/14~1/26; treated with levaquin) presents with 5 days of fever, chills, and cough. On her latest prior admission, the pt went for bronch with findings of obstructed RUL, patent R middle Lobe, and obstructive mass in RLL debulked with lazer and balloon dilation. She underwent her first lung chemo this past Tuesday (scheduled every 3 weeks). Since then, she started to have a fever, cough, minor diarrhea and myalgia. Upon admission, her temperature was 101.1, RR 23 and  meeting 3/4 sepsis criteria. The pt denies any dizziness, HA, n/v/c, abdominal pain, LE edema or urinary changes. The patient and daughter do endorse poor PO intake due to lack of apatite since her chemotherapy.     #Sepsis 2/2 influenza vs. pneumonia; T 99.9~102.3, HR 94~98, RR 14~20, WBC 10.3 (in the setting of immuno-compromised state)  - RVP flu +, chest X ray improved compared to prior film (known RLL mass with sharp right CPA and less haziness in RML and RLL)   - given one dose of aztreonam, vanc, tamiflu at ED; hold antibiotics in the setting of her recent antibiotic treatment, ongoing diarrhea and symptoms/X ray inconsistent with superimposed pneumonia   - trend vitals and labs   - repeat chest X ray if her symptoms worsen     #Lung cancer (stage IV) mets to brain and bone (s/p RT and gamma knife) and one round of chemo (2/6)   - next chemo scheduled in two weeks   - f/u bronchoscopy with Dr. Xiong in 2nd week of March
62 year old female with Stage IV lung adenocarcinoma, PDL1 40%, EGFR/ALK/ROS negative, HTN, DM, s/p XRT to ribs, brain, recent admission w/ post obstructive pna s/p bronchoscopy, tumor debulking, s/p 1st cycle of pembro/pemetrexed/carbo last week of Jan, presenting w/ weakness, flu-like symptoms sepsis, fall found to be flu positive.     #Stage IV lung adenocarcinoma - metastatic disease to brain and bones s/p XRT. S/p tumor debulking via interventional pulmonary, s/p 1st cycle of carbo/pemetrexed/pembrolizumab during last week of January, tolerated well mild diarrhea. Intent of systemic treatment palliative not curative with cytotoxic chemotherapy with the addition of immunotherapy based on the KEYNOTE 021 trial which showed improvement in PATIÑO and PFS in patients with PDL <50.  Patient currently with subsequent fatigue, malaise now with positive flu.     -tamiflu, IVF supportive care  -CXR stable compared to last admission   -patient due for 2nd cycle 2/27, will monitor counts and clinical symptoms prior to next anticipated dose      #Anemia/Coagulopathy - Hx of chronic inflammation given iron studies and ferritin level. No evidence of bilirubinemia, jaundice. No hemoptysis per patient or other signs of gross bleeding. Coagulopathy in setting of viral infection+ malnutrition albumin 2.5.    -Trend Hg, transfuse for goal Hg>7  -coagulopathy likely nutritional, montior for bleeding   -dvt ppx       To be discussed with Dr. Noonan

## 2018-02-15 NOTE — PHYSICAL THERAPY INITIAL EVALUATION ADULT - IMPAIRMENTS FOUND, PT EVAL
muscle strength/poor safety awareness/aerobic capacity/endurance/gait, locomotion, and balance/posture

## 2018-02-15 NOTE — PROGRESS NOTE ADULT - PROBLEM SELECTOR PLAN 1
-Pt met 3/4 SIRS criteria on admission, likely 2/2 influenza  -today with AMS, tachycardia, T 101.4, hypotension,   -will continue with Tamiflu for total of 5 days (2/11-2/15), tolerating it well today  -attempt to wean O2 to home dose of 2 L  -patient was given once dose of Vanco and Aztreonam in the ED  -on 2/13 pt febrile, tachycardic and procalcitonin elevated at 1.32 and patient given one dose of Levaquin for possible bacterial PNA  -per pulm consult on 2/13, unlikely bacterial PNA and d/c Levaquin  -one episode of emesis 2/13, patient no longer c/o nausea, consider Tigan for nausea if indicated considering previous hx of prolonged QTc on previous admission  -Supportive management for cough with guaifenesin  -per pulm continue trend vitals and labs, repeat CXR if symptoms worsen and f/u bronchoscopy on previous admission on 2nd week of March with Dr. Xiong    #Diarrhea  -persistently watery diarrhea, could be 2/2 to chemo treatment or influenza. currently low concern for c. diff  -poor PO intake, getting NS80 -Pt met 3/4 SIRS criteria on admission, likely 2/2 influenza  -today with AMS, tachycardia, T 101.4, hypotension, lactate 1.8  -received 2L NS, f/u blood cx, c diff, stool cx, stool o and p  -last day of Tamiflu day 5/5, RVP still positive for influenza, can come off droplet tmrw  -Patient with vomiting yesterday and concern for aspiration PNA. CXR looks overloaded on R side with possible pna, patient is penicillin allergic will start aztreonam and flagyl pending ID approval  -Supportive management for cough with guaifenesin  -f/u bronchoscopy on previous admission on 2nd week of March with Dr. Xiong  -patient with possible absence seizure leading to aspiration, given keppra 1g and EEG to monitor for seizures.  #Diarrhea  -ruling out c.diff  -persistently watery diarrhea, could be 2/2 to chemo treatment or influenza.  -poor PO intake, getting

## 2018-02-15 NOTE — PHYSICAL THERAPY INITIAL EVALUATION ADULT - GENERAL OBSERVATIONS, REHAB EVAL
Pt rcvd semi supine, +droplet, +4L NC, +L IV, +bedalarm. IV locked by RN. Pt with decreased motivation, endorsing headache, productive cough, supra sternal chest discomfort (RN Bri present and aware), and episode of loose stool on arrival. Tolerated ~5 min on RA with SpO2 92% with bed mob and transfer. Left OOB to recliner, SpO2 94%, HR 93 on 4LNC, all needs in reach, +call bell, all lines in tact, RN Bri aware.

## 2018-02-15 NOTE — CONSULT NOTE ADULT - SUBJECTIVE AND OBJECTIVE BOX
DORON NUGENT          MRN-[id number]            (mm/dd/yyyy)    HPI:    61 y/o F with a PMHX of HTN, DM, and Stage IV lung CA w/ mets to brain and bone (Dec 2nd) who underwent RT to bony mets on 8th rib, and had gamma knife tx to brain () and has completed 1 cycle of carbo/pemetrexed + pembrolizumab () presented to [facility] with sepsis due to Flu and PNA. Pt had an episode this morning of foaming at the mouth, HR of 180 and momentary confusion. She received IV fluids with adequate resolution of the episode.  Palliative is consulted to clarify goals of care and advance directives because of her advanced disease and patient's clearly thought out plan for advance directives.     Pt had recent hospitalization in mid-January for obstructive mass in RLL debulked with laser, and airway was balloon dilation. She ultimately spent 10 days in the hospital and was discharged home afterwards. Currently, pt reports no complaints of pain, denies any nausea, vomiting or abd pain. She has no appetite and has had little PO intake. She denies any diarrhea, no headaches, no constipation, her sleep is also disturbed.     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  is alcoholic per daughter and "cousin" is mentally challenged     ROS:    Unable to attain due to:                      Dyspnea (Marlon 0-10): 0                       N/V (Y/N): NO                           Secretions (Y/N) :  NO           Agitation(Y/N): NO  Pain (Y/N):     NO    General:  weakness, tired  HEENT:    dry mouth  Neck:  Denied  CVS:  Denied  Resp:  Denied  GI:  no abd pain, no nausea or vomiting  :  Denied  Musc:  Denied  Neuro:  intermittent confusion  Psych:  Denied  Skin:  no rashes  Lymph:  Denied    Allergies    penicillins (Hives)    Intolerances        Opiate Naive (Y/N):   -iStop reviewed (Y/N): (Ref#:           )    Medications:      MEDICATIONS  (STANDING):  cefepime Injectable.      cefepime Injectable. 1000 milliGRAM(s) IV Push every 8 hours  dextrose 5%. 1000 milliLiter(s) (50 mL/Hr) IV Continuous <Continuous>  dextrose 50% Injectable 12.5 Gram(s) IV Push once  dextrose 50% Injectable 25 Gram(s) IV Push once  dextrose 50% Injectable 25 Gram(s) IV Push once  enoxaparin Injectable 40 milliGRAM(s) SubCutaneous daily  insulin lispro (HumaLOG) corrective regimen sliding scale   SubCutaneous Before meals and at bedtime  metroNIDAZOLE    Tablet 500 milliGRAM(s) Oral every 8 hours  nystatin    Suspension 676209 Unit(s) Oral four times a day  ondansetron Injectable 2 milliGRAM(s) IV Push once  oseltamivir 75 milliGRAM(s) Oral two times a day  pantoprazole    Tablet 40 milliGRAM(s) Oral before breakfast  potassium chloride  10 mEq/100 mL IVPB 10 milliEquivalent(s) IV Intermittent every 1 hour  sodium chloride 0.9%. 1000 milliLiter(s) (120 mL/Hr) IV Continuous <Continuous>    MEDICATIONS  (PRN):  acetaminophen   Tablet 650 milliGRAM(s) Oral every 6 hours PRN For Temp greater than 38 C (100.4 F)  dextrose Gel 1 Dose(s) Oral once PRN Blood Glucose LESS THAN 70 milliGRAM(s)/deciliter  glucagon  Injectable 1 milliGRAM(s) IntraMuscular once PRN Glucose LESS THAN 70 milligrams/deciliter  guaiFENesin    Syrup 100 milliGRAM(s) Oral every 6 hours PRN Cough      Labs:    CBC:                        10.0   3.8   )-----------( 176      ( 15 Feb 2018 09:53 )             30.8     CMP:    02-15    133<L>  |  95<L>  |  8   ----------------------------<  103<H>  3.3<L>   |  18<L>  |  0.59    Ca    9.0      15 Feb 2018 09:53  Phos  2.6     -15  Mg     1.9     -15    TPro  7.1  /  Alb  2.3<L>  /  TBili  1.0  /  DBili  x   /  AST  35  /  ALT  68<H>  /  AlkPhos  132<H>  02-15    PT/INR - ( 15 Feb 2018 09:53 )   PT: 15.0 sec;   INR: 1.34          PTT - ( 15 Feb 2018 09:53 )  PTT:28.1 sec  Urinalysis Basic - ( 15 Feb 2018 11:06 )    Color: Yellow / Appearance: Clear / S.010 / pH: x  Gluc: x / Ketone: Trace mg/dL  / Bili: Negative / Urobili: 0.2 E.U./dL   Blood: x / Protein: NEGATIVE mg/dL / Nitrite: NEGATIVE   Leuk Esterase: NEGATIVE / RBC: < 5 /HPF / WBC < 5 /HPF   Sq Epi: x / Non Sq Epi: 0-5 /HPF / Bacteria: Present /HPF        Imaging:  Reviewed    PEx:  T(C): 37.2 (02-15-18 @ 13:04), Max: 37.2 (02-15-18 @ 13:04)  HR: 107 (02-15-18 @ 13:04) (91 - 118)  BP: 120/79 (02-15-18 @ 13:04) (114/76 - 150/80)  RR: 16 (02-15-18 @ 13:04) (16 - 20)  SpO2: 98% (02-15-18 @ 13:04) (95% - 98%)  Wt(kg): --  Daily     Daily   CAPILLARY BLOOD GLUCOSE      POCT Blood Glucose.: 103 mg/dL (15 Feb 2018 21:38)    I&O's Summary    2018 07:  -  15 Feb 2018 07:00  --------------------------------------------------------  IN: 1500 mL / OUT: 0 mL / NET: 1500 mL    15 Feb 2018 07:  -  15 Feb 2018 21:54  --------------------------------------------------------  IN: 2960 mL / OUT: 2000 mL / NET: 960 mL        General: Alert, A&O x 3, drowsy,   HEENT:  MM dry, anicteric sclera, PEERL  Neck:  no JVD, no LAD  CVS: S1S2 RRR no murmurs  Resp: decreased BS b/l bases  GI:  Abd soft, NT ND, no masses  :  no suprapubic tenderness  Musc:   no abnormalities  Neuro: grossly intact  Psych:   calm  Skin: no skin rashes  Lymph: no palpable lymphadenopathy  Preadmit Karnofsky:  60%           Current Karnofsky:    30  %  Cachexia (Y/N): N  BMI: 29.5    Advanced Directives:     DNR/DNI     MOLST     Decision maker: self, has capacity  Legal surrogate: Naomi Yeh (daughter) [phone]    GOALS OF CARE DISCUSSION       Palliative care info/counseling provided	           Documentation of GOC: DNR/DNI	          REFERRALS	        Unit SW/Case Mgmt       Patient/Family Support       Massage Therapy       Music Therapy DORON NUGENT          MRN-[id number]            (mm/dd/yyyy)    HPI:    63 y/o F with a PMHX of HTN, DM, and Stage IV lung CA w/ mets to brain and bone (Dec 2nd) who underwent RT to bony mets on 8th rib, and had gamma knife tx to brain () and has completed 1 cycle of carbo/pemetrexed + pembrolizumab () presented to [facility] with sepsis due to Flu and PNA. Pt had an episode this morning of foaming at the mouth, HR of 180 and momentary confusion. She received IV fluids with adequate resolution of the episode.  Palliative is consulted to clarify goals of care and advance directives because of her advanced disease and patient's clearly thought out plan for advance directives.     Pt had recent hospitalization in mid-January for obstructive mass in RLL debulked with laser, and airway was balloon dilation. She ultimately spent 10 days in the hospital and was discharged home afterwards. Currently, pt reports no complaints of pain, denies any nausea, vomiting or abd pain. She has no appetite and has had little PO intake. She denies any diarrhea, no headaches, no constipation, her sleep is also disturbed.     Patient was sleepy but somewhat responsive during visit.  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  is alcoholic per daughter and "cousin" is mentally challenged  Daughter is from a previous marriage and is a nurse,    ROS:    limited secondary to patient's somnolence                      Dyspnea (Marlon 0-10): 0                       N/V (Y/N): NO                           Secretions (Y/N) :  NO           Agitation(Y/N): NO  Pain (Y/N):     NO    General:  weakness, tired  HEENT:    dry mouth  Neck:  Denied  CVS:  Denied  Resp:  productive cough  GI:  no abd pain, no nausea or vomiting  :  Denied  Musc:  Denied  Neuro:  intermittent confusion  Psych:  Denied  Skin:  no rashes  Lymph:  Denied    Allergies    penicillins (Hives)    Intolerances        Opiate Naive (Y/N):  yes      Medications:      MEDICATIONS  (STANDING):  cefepime Injectable.      cefepime Injectable. 1000 milliGRAM(s) IV Push every 8 hours  dextrose 5%. 1000 milliLiter(s) (50 mL/Hr) IV Continuous <Continuous>  dextrose 50% Injectable 12.5 Gram(s) IV Push once  dextrose 50% Injectable 25 Gram(s) IV Push once  dextrose 50% Injectable 25 Gram(s) IV Push once  enoxaparin Injectable 40 milliGRAM(s) SubCutaneous daily  insulin lispro (HumaLOG) corrective regimen sliding scale   SubCutaneous Before meals and at bedtime  metroNIDAZOLE    Tablet 500 milliGRAM(s) Oral every 8 hours  nystatin    Suspension 493875 Unit(s) Oral four times a day  ondansetron Injectable 2 milliGRAM(s) IV Push once  oseltamivir 75 milliGRAM(s) Oral two times a day  pantoprazole    Tablet 40 milliGRAM(s) Oral before breakfast  potassium chloride  10 mEq/100 mL IVPB 10 milliEquivalent(s) IV Intermittent every 1 hour  sodium chloride 0.9%. 1000 milliLiter(s) (120 mL/Hr) IV Continuous <Continuous>    MEDICATIONS  (PRN):  acetaminophen   Tablet 650 milliGRAM(s) Oral every 6 hours PRN For Temp greater than 38 C (100.4 F)  dextrose Gel 1 Dose(s) Oral once PRN Blood Glucose LESS THAN 70 milliGRAM(s)/deciliter  glucagon  Injectable 1 milliGRAM(s) IntraMuscular once PRN Glucose LESS THAN 70 milligrams/deciliter  guaiFENesin    Syrup 100 milliGRAM(s) Oral every 6 hours PRN Cough      Labs:    CBC:                        10.0   3.8   )-----------( 176      ( 15 Feb 2018 09:53 )             30.8     CMP:    02-15    133<L>  |  95<L>  |  8   ----------------------------<  103<H>  3.3<L>   |  18<L>  |  0.59    Ca    9.0      15 Feb 2018 09:53  Phos  2.6     02-15  Mg     1.9     -15    TPro  7.1  /  Alb  2.3<L>  /  TBili  1.0  /  DBili  x   /  AST  35  /  ALT  68<H>  /  AlkPhos  132<H>  02-15    PT/INR - ( 15 Feb 2018 09:53 )   PT: 15.0 sec;   INR: 1.34          PTT - ( 15 Feb 2018 09:53 )  PTT:28.1 sec  Urinalysis Basic - ( 15 Feb 2018 11:06 )    Color: Yellow / Appearance: Clear / S.010 / pH: x  Gluc: x / Ketone: Trace mg/dL  / Bili: Negative / Urobili: 0.2 E.U./dL   Blood: x / Protein: NEGATIVE mg/dL / Nitrite: NEGATIVE   Leuk Esterase: NEGATIVE / RBC: < 5 /HPF / WBC < 5 /HPF   Sq Epi: x / Non Sq Epi: 0-5 /HPF / Bacteria: Present /HPF    Rapid Respiratory Viral Panel (02.15.18 @ 10:40)    Rapid RVP Result: Detected: The [employer] RVP Rapid uses polymerase chain reaction (PCR) and melt  curve analysis to screen for adenovirus; coronavirus HKU1, NL63, 229E,  OC43; human metapneumovirus (hMPV); human enterovirus/rhinovirus  (Entero/RV); influenza A; influenza A/H1;influenza A/H3; influenza  A/H1-2009; influenza B; parainfluenza viruses 1, 2, 3, 4; respiratory  syncytial virus; Bordetella pertussis; Mycoplasma pneumoniae; and  Chlamydophila pneumoniae.    Influenza AH3 (RapRVP): Detected: TYPE:(C=Critical, N=Notification, A=Abnormal) C  TESTS: _RVP  DATE/TIME CALLED: _02/15/18 11:52  CALLED TO: Genevieve RN  READ BACK (2 Patient Identifiers)(Y/N): _Y  READ BACK VALUES (Y/N): _Y  CALLED BY: Servando            Imaging:  Reviewed  < from: Xray Chest 1 View- PORTABLE-Urgent (02.15.18 @ 10:28) >    EXAM:  XR CHEST PORTABLE URGENT 1V                          PROCEDURE DATE:  02/15/2018                     INTERPRETATION:  XR CHEST PORTABLE URGENT 1V dated 2/15/2018 10:28 AM    CLINICAL INFORMATION: Female, 62 years old.  Fever.    PRIOR STUDIES: 2018.    FINDINGS: The heart size remains within normal limits. Again noted is   silhouetting the right hilum by a perihilar mass unchanged line for   differences in imaging technique. There is slight exacerbation pulmonary   venous congestionwhich may be asymmetric, right greater than left with a   new right-sided pleural effusion. Can noted is leftward displacement of   the tracheal air column with increased right paratracheal soft tissue and   lymphadenopathy as well as a superior mediastinal mass possibly a thyroid   goiter cannot be excluded.    IMPRESSION:  New onset of mild pulmonary venous congestion right greater than left   with a new right-sided pleural effusion. No other significant interval   changes. Persistent perihilarright-sided mass.    < end of copied text >  < from: Xray Chest 1 View- PORTABLE-Urgent (02.15.18 @ 10:28) >    EXAM:  XR CHEST PORTABLE URGENT 1V                          PROCEDURE DATE:  02/15/2018                   PEx:  T(C): 37.2 (02-15-18 @ 13:04), Max: 37.2 (02-15-18 @ 13:04)  HR: 107 (02-15-18 @ 13:04) (91 - 118)  BP: 120/79 (02-15-18 @ 13:04) (114/76 - 150/80)  RR: 16 (02-15-18 @ 13:04) (16 - 20)  SpO2: 98% (02-15-18 @ 13:04) (95% - 98%)  Wt(kg): --  Daily     Daily   CAPILLARY BLOOD GLUCOSE      POCT Blood Glucose.: 103 mg/dL (15 Feb 2018 21:38)    I&O's Summary      -  15 Feb 2018 07:00  --------------------------------------------------------  IN: 1500 mL / OUT: 0 mL / NET: 1500 mL    15 Feb 2018 07:  -  15 Feb 2018 21:54  --------------------------------------------------------  IN: 2960 mL / OUT: 2000 mL / NET: 960 mL        General: Alert, A&O x 3, drowsy,   HEENT:  MM dry, anicteric sclera, PEERL  Neck:  no JVD, no LAD  CVS: S1S2 RRR no murmurs  Resp: decreased BS b/l bases  GI:  Abd soft, NT ND, no masses  :  no suprapubic tenderness  Musc:   no abnormalities  Neuro: grossly intact  Psych:   calm  Skin: no skin rashes  Lymph: no palpable lymphadenopathy  Preadmit Karnofsky:  60%           Current Karnofsky:    30  %  Cachexia (Y/N): N  BMI: 29.5    Advanced Directives:     DNR/DNI     MOLST pending    Decision maker: self, has capacity  Legal surrogate: Naomi Yeh (daughter) [phone]    GOALS OF CARE DISCUSSION       Palliative care info/counseling provided	           Documentation of GOC: DNR/DNI	        unclear today what overall goals are.  Naomi indicates patient wishes to be in a facility.  Unclear what her thoughts are about hospice  REFERRALS	        Unit SW/Case Mgmt       Patient/Family Support       Massage Therapy       Music Therapy

## 2018-02-15 NOTE — PROGRESS NOTE ADULT - SUBJECTIVE AND OBJECTIVE BOX
INTERVAL HPI/OVERNIGHT EVENTS: Patient had a fall off of the bed after soiling the bed.    Patient complaining of diarrhea and cough. Patient had a rapid called on her this morning, patient was talking in bed and became diaphoretic, stared in space, and slumped in her seat. Her HR was in the 180s. Patient was moved to a bed. Patient's rectal temperature was 101.4.  Patient denies: fever, chills, dizziness, weakness, HA, CP, palpitations, SOB, cough, N/V/D/C, dysuria, changes in bowel movements, LE edema.    ROS: as above    VITAL SIGNS:  T(F): 97.8 (02-15-18 @ 06:25)  HR: 96 (02-15-18 @ 08:50)  BP: 114/76 (02-15-18 @ 08:50)  RR: 18 (02-15-18 @ 08:50)  SpO2: 95% (02-15-18 @ 08:50)  Wt(kg): --    PHYSICAL EXAM:    Constitutional: WDWN, NAD  Eyes: PERRL, EOMI, sclera non-icteric  Neck: supple, trachea midline, no masses, no JVD  Respiratory: CTA b/l, good air entry b/l, no wheezing, no rhonchi, no rales, without accessory muscle use and no intercostal retractions  Cardiovascular: RRR, normal S1S2, no M/R/G  Gastrointestinal: soft, NTND, no masses palpable, BS normal  Extremities: Warm, well perfused, pulses equal bilateral upper and lower extremities, no edema, no clubbing  Neurological: AAOx3, CN Grossly intact  Skin: Normal temperature, warm, dry    MEDICATIONS  (STANDING):  dextrose 5%. 1000 milliLiter(s) (50 mL/Hr) IV Continuous <Continuous>  dextrose 50% Injectable 12.5 Gram(s) IV Push once  dextrose 50% Injectable 25 Gram(s) IV Push once  dextrose 50% Injectable 25 Gram(s) IV Push once  enoxaparin Injectable 40 milliGRAM(s) SubCutaneous daily  insulin lispro (HumaLOG) corrective regimen sliding scale   SubCutaneous Before meals and at bedtime  nystatin    Suspension 358769 Unit(s) Oral four times a day  ondansetron Injectable 2 milliGRAM(s) IV Push once  oseltamivir 75 milliGRAM(s) Oral two times a day  pantoprazole    Tablet 40 milliGRAM(s) Oral before breakfast  potassium chloride  10 mEq/100 mL IVPB 10 milliEquivalent(s) IV Intermittent every 1 hour  sodium chloride 0.9% Bolus 1000 milliLiter(s) IV Bolus once  sodium chloride 0.9%. 1000 milliLiter(s) (80 mL/Hr) IV Continuous <Continuous>    MEDICATIONS  (PRN):  acetaminophen   Tablet 650 milliGRAM(s) Oral every 6 hours PRN For Temp greater than 38 C (100.4 F)  dextrose Gel 1 Dose(s) Oral once PRN Blood Glucose LESS THAN 70 milliGRAM(s)/deciliter  glucagon  Injectable 1 milliGRAM(s) IntraMuscular once PRN Glucose LESS THAN 70 milligrams/deciliter  guaiFENesin    Syrup 100 milliGRAM(s) Oral every 6 hours PRN Cough      Allergies    penicillins (Hives)    Intolerances        LABS:                        10.0   3.8   )-----------( 176      ( 15 Feb 2018 09:53 )             30.8     02-15    133<L>  |  95<L>  |  8   ----------------------------<  103<H>  3.3<L>   |  18<L>  |  0.59    Ca    9.0      15 Feb 2018 09:53  Phos  2.6     02-15  Mg     1.9     02-15    TPro  7.1  /  Alb  2.3<L>  /  TBili  1.0  /  DBili  x   /  AST  35  /  ALT  68<H>  /  AlkPhos  132<H>  02-15    PT/INR - ( 15 Feb 2018 09:53 )   PT: 15.0 sec;   INR: 1.34          PTT - ( 15 Feb 2018 09:53 )  PTT:28.1 sec      RADIOLOGY & ADDITIONAL TESTS: INTERVAL HPI/OVERNIGHT EVENTS: Patient had a fall off of the bed after soiling the bed.    Patient complaining of diarrhea and cough. Patient had a rapid called on her this morning, patient was talking in bed and became diaphoretic, stared in space, and slumped in her seat. Her HR was in the 180s. Patient was moved to a bed. Patient's rectal temperature was 101.4. BP was in the 90-100s, patient received 2LNS, 1 g of keppra, oral tylenol, and 2g of magnesium. Lactate was 1.8, CXR was obtained. EKG showing SVT at 180 with known RBBB. Blood cx, basic labs, C.diff, stool cultures, and stool O and P was sent. Patient's heart rate went down to 100s,     ROS: as above    VITAL SIGNS:  T(F): 97.8 (02-15-18 @ 06:25)  HR: 96 (02-15-18 @ 08:50)  BP: 114/76 (02-15-18 @ 08:50)  RR: 18 (02-15-18 @ 08:50)  SpO2: 95% (02-15-18 @ 08:50)  Wt(kg): --    PHYSICAL EXAM:    Constitutional: Diaphoretic  Eyes: PERRL, EOMI, sclera non-icteric  Mouth: thrush evident on tongue  Neck: supple, trachea midline, no masses, no JVD  Respiratory: diffusely rhonchorous  Cardiovascular: RRR, normal S1S2, no M/R/G  Gastrointestinal: soft, obese, NTND, no masses palpable, BS normal  Extremities: Warm, well perfused, pulses equal bilateral upper and lower extremities, no edema, no clubbing, slightly cool  Neurological: AAOx3, CN Grossly intact  Skin: Normal temperature, warm, dry    MEDICATIONS  (STANDING):  dextrose 5%. 1000 milliLiter(s) (50 mL/Hr) IV Continuous <Continuous>  dextrose 50% Injectable 12.5 Gram(s) IV Push once  dextrose 50% Injectable 25 Gram(s) IV Push once  dextrose 50% Injectable 25 Gram(s) IV Push once  enoxaparin Injectable 40 milliGRAM(s) SubCutaneous daily  insulin lispro (HumaLOG) corrective regimen sliding scale   SubCutaneous Before meals and at bedtime  nystatin    Suspension [id number] Unit(s) Oral four times a day  ondansetron Injectable 2 milliGRAM(s) IV Push once  oseltamivir 75 milliGRAM(s) Oral two times a day  pantoprazole    Tablet 40 milliGRAM(s) Oral before breakfast  potassium chloride  10 mEq/100 mL IVPB 10 milliEquivalent(s) IV Intermittent every 1 hour  sodium chloride 0.9% Bolus 1000 milliLiter(s) IV Bolus once  sodium chloride 0.9%. 1000 milliLiter(s) (80 mL/Hr) IV Continuous <Continuous>    MEDICATIONS  (PRN):  acetaminophen   Tablet 650 milliGRAM(s) Oral every 6 hours PRN For Temp greater than 38 C (100.4 F)  dextrose Gel 1 Dose(s) Oral once PRN Blood Glucose LESS THAN 70 milliGRAM(s)/deciliter  glucagon  Injectable 1 milliGRAM(s) IntraMuscular once PRN Glucose LESS THAN 70 milligrams/deciliter  guaiFENesin    Syrup 100 milliGRAM(s) Oral every 6 hours PRN Cough      Allergies    penicillins (Hives)    Intolerances        LABS:                        10.0   3.8   )-----------( 176      ( 15 Feb 2018 09:53 )             30.8     02-15    133<L>  |  95<L>  |  8   ----------------------------<  103<H>  3.3<L>   |  18<L>  |  0.59    Ca    9.0      15 Feb 2018 09:53  Phos  2.6     02-15  Mg     1.9     02-15    TPro  7.1  /  Alb  2.3<L>  /  TBili  1.0  /  DBili  x   /  AST  35  /  ALT  68<H>  /  AlkPhos  132<H>  02-15    PT/INR - ( 15 Feb 2018 09:53 )   PT: 15.0 sec;   INR: 1.34          PTT - ( 15 Feb 2018 09:53 )  PTT:28.1 sec      RADIOLOGY & ADDITIONAL TESTS:

## 2018-02-15 NOTE — PHYSICAL THERAPY INITIAL EVALUATION ADULT - CRITERIA FOR SKILLED THERAPEUTIC INTERVENTIONS
rehab potential/predicted duration of therapy intervention/risk reduction/prevention/therapy frequency/anticipated discharge recommendation/functional limitations in following categories/impairments found

## 2018-02-15 NOTE — PROGRESS NOTE ADULT - SUBJECTIVE AND OBJECTIVE BOX
Heme/Onc Progress Note (Dr. Noonan)       Interval History: Patient with rapid response in early am, patient with persistent diarrhea, dehydration, found to be dazed with some foaming at the mouth. Patient found to be febrile at that time, in addition to other signs of sepsis. Fluids, keppra, tylenol, mg given. Pan cxs, c.diff ordered. CXR worsened from admission.      Allergies    penicillins (Hives)    Intolerances        Medications:  MEDICATIONS  (STANDING):  cefepime Injectable. 1000 milliGRAM(s) IV Push once  cefepime Injectable.      cefepime Injectable. 1000 milliGRAM(s) IV Push every 8 hours  dextrose 5%. 1000 milliLiter(s) (50 mL/Hr) IV Continuous <Continuous>  dextrose 50% Injectable 12.5 Gram(s) IV Push once  dextrose 50% Injectable 25 Gram(s) IV Push once  dextrose 50% Injectable 25 Gram(s) IV Push once  enoxaparin Injectable 40 milliGRAM(s) SubCutaneous daily  insulin lispro (HumaLOG) corrective regimen sliding scale   SubCutaneous Before meals and at bedtime  metroNIDAZOLE    Tablet 500 milliGRAM(s) Oral every 8 hours  nystatin    Suspension 541939 Unit(s) Oral four times a day  ondansetron Injectable 2 milliGRAM(s) IV Push once  oseltamivir 75 milliGRAM(s) Oral two times a day  pantoprazole    Tablet 40 milliGRAM(s) Oral before breakfast  potassium chloride  10 mEq/100 mL IVPB 10 milliEquivalent(s) IV Intermittent every 1 hour  sodium chloride 0.9%. 1000 milliLiter(s) (120 mL/Hr) IV Continuous <Continuous>    MEDICATIONS  (PRN):  acetaminophen   Tablet 650 milliGRAM(s) Oral every 6 hours PRN For Temp greater than 38 C (100.4 F)  dextrose Gel 1 Dose(s) Oral once PRN Blood Glucose LESS THAN 70 milliGRAM(s)/deciliter  glucagon  Injectable 1 milliGRAM(s) IntraMuscular once PRN Glucose LESS THAN 70 milligrams/deciliter  guaiFENesin    Syrup 100 milliGRAM(s) Oral every 6 hours PRN Cough    enoxaparin Injectable 40 milliGRAM(s) SubCutaneous daily          PHYSICAL EXAM:    T(F): 99 (02-15-18 @ 13:04), Max: 99 (02-15-18 @ 13:04)  HR: 107 (02-15-18 @ 13:04) (91 - 118)  BP: 120/79 (02-15-18 @ 13:04) (114/76 - 150/80)  RR: 16 (02-15-18 @ 13:04) (16 - 20)  SpO2: 98% (02-15-18 @ 13:04) (95% - 98%)  Wt(kg): --    Daily     Daily     GEN: fatigued   HEENT: AT/NC, thick oral secretions  NECK: supple  CVS: +S1S2 tachy  LUNG: CTA B/L   ABD: +BS, NT, ND  EXT: no c/c/e  NEURO: aaox3, non focal     Labs:                          10.0   3.8   )-----------( 176      ( 15 Feb 2018 09:53 )             30.8     CBC Full  -  ( 15 Feb 2018 09:53 )  WBC Count : 3.8 K/uL  Hemoglobin : 10.0 g/dL  Hematocrit : 30.8 %  Platelet Count - Automated : 176 K/uL  Mean Cell Volume : 76.4 fL  Mean Cell Hemoglobin : 24.8 pg  Mean Cell Hemoglobin Concentration : 32.5 g/dL  Auto Neutrophil # : x  Auto Lymphocyte # : x  Auto Monocyte # : x  Auto Eosinophil # : x  Auto Basophil # : x  Auto Neutrophil % : 77.4 %  Auto Lymphocyte % : 13.8 %  Auto Monocyte % : 8.2 %  Auto Eosinophil % : 0.3 %  Auto Basophil % : 0.3 %    PT/INR - ( 15 Feb 2018 09:53 )   PT: 15.0 sec;   INR: 1.34          PTT - ( 15 Feb 2018 09:53 )  PTT:28.1 sec    02-15    133<L>  |  95<L>  |  8   ----------------------------<  103<H>  3.3<L>   |  18<L>  |  0.59    Ca    9.0      15 Feb 2018 09:53  Phos  2.6     02-15  Mg     1.9     02-15    TPro  7.1  /  Alb  2.3<L>  /  TBili  1.0  /  DBili  x   /  AST  35  /  ALT  68<H>  /  AlkPhos  132<H>  02-15      Urinalysis Basic - ( 15 Feb 2018 11:06 )    Color: Yellow / Appearance: Clear / S.010 / pH: x  Gluc: x / Ketone: Trace mg/dL  / Bili: Negative / Urobili: 0.2 E.U./dL   Blood: x / Protein: NEGATIVE mg/dL / Nitrite: NEGATIVE   Leuk Esterase: NEGATIVE / RBC: < 5 /HPF / WBC < 5 /HPF   Sq Epi: x / Non Sq Epi: 0-5 /HPF / Bacteria: Present /HPF

## 2018-02-15 NOTE — PROVIDER CONTACT NOTE (CHANGE IN STATUS NOTIFICATION) - ACTION/TREATMENT ORDERED:
Patient at this time continues to remain stable. VSS. Patient afebrile. Fluids in progress and awaiting final blood culture results to return.

## 2018-02-15 NOTE — PHYSICAL THERAPY INITIAL EVALUATION ADULT - ADDITIONAL COMMENTS
Pt lives in apartment without stairs with  and brother. Pt uses rolling walker at baseline for transfers and amb. States she currently has HHA 3 days/4 hours who assist with laundry, showering (use of showerchair). Reports amb tolerance ~2 blocks at baseline, does not leave apt indpt. Family members acquire groceries. Pt reports h/o falls "moving too fast" to get to bathroom without RW nearby (fall last wk PTA, fall this admission at [facility]).

## 2018-02-15 NOTE — PROVIDER CONTACT NOTE (CRITICAL VALUE NOTIFICATION) - ASSESSMENT
Patient's RVP sent after rapid response in order to find out source of potential sepsis. Patient came back with positive influenza AH3. Patient had already been on droplet due to previously known positive influenza. Patient on tamiflu. Will continue to monitor, no further intervention at this time.

## 2018-02-15 NOTE — CONSULT NOTE ADULT - PROBLEM SELECTOR RECOMMENDATION 9
Stage IV lung cancer w/ mets to brain and bone, s/p RT to brain lesions and R 8th rib lesion. s/p 1 cycle of chemotherapy, given worsening clinical status and active infection, further treatment is on hold. Medical oncology will evaluate for improvement in clinical status overall and improvement in performance status prior to further treatment

## 2018-02-15 NOTE — CONSULT NOTE ADULT - PROBLEM SELECTOR RECOMMENDATION 4
DNR/DNI Pt has living will that states her wishes regarding most of end-of-life situations. She wishes to be DNR/DNI w/ no feeding tube ever. Discussed with patient's daughter that a MOLST form should be completed while pt is in hospital to ensure that it stays with patient at all times and her wishes are carried out according to the MOLST form. MOLST form provided to daughter. She will discuss with patient and her brother prior to completing the form tomorrow. We will hold off on any further discussion on hospice referral as more time is needed to observe if patient can recover from her current acute illness and return for her cancer treatment.

## 2018-02-15 NOTE — PROGRESS NOTE ADULT - ASSESSMENT
62 year old female with Stage IV lung adenocarcinoma, PDL1 40%, EGFR/ALK/ROS negative, HTN, DM, s/p XRT to ribs, brain, recent admission w/ post obstructive pna s/p bronchoscopy, tumor debulking, s/p 1st cycle of pembro/pemetrexed/carbo last week of Jan, presenting w/ weakness, flu-like symptoms sepsis, fall found to be flu positive.     #Stage IV lung adenocarcinoma - metastatic disease to brain and bones s/p XRT. S/p tumor debulking via interventional pulmonary, s/p 1st cycle of carbo/pemetrexed/pembrolizumab during last week of January, tolerated well mild diarrhea. Intent of systemic treatment palliative not curative with cytotoxic chemotherapy with the addition of immunotherapy based on the KEYNOTE 021 trial which showed improvement in PATIÑO and PFS in patients with PDL <50.  Updated data from the trial c/w 19 month performance free survival in combination arm vs. 8.9months in standard. 18 month overall survival rate in combinatoin arm 70% vs. 56 in standard. Patient currently with subsequent fatigue, malaise now with positive flu.     -tamiflu, cultures, IVF, supportive care  -restarted on antibiotics concerned for possible aspiration, worsening CXR, remains borderline neutropenic  -patient due for 2nd cycle 2/27, will monitor counts and clinical symptoms prior to next anticipated dose      #Anemia/Coagulopathy - Hx of chronic inflammation given iron studies and ferritin level. No evidence of bilirubinemia, jaundice. No hemoptysis per patient or other signs of gross bleeding. Coagulopathy in setting of viral infection+ malnutrition w/low albumin. Counts also related to recent chemotherapy.      -Obtain cbc w/ differential, monitor ANC (given chemo recently)  -Trend Hg, transfuse for goal Hg>7  -coagulopathy likely nutritional, infectious/viral, montior for bleeding   -dvt ppx       To be discussed with Dr. Noonan 62 year old female with Stage IV lung adenocarcinoma, PDL1 40%, EGFR/ALK/ROS negative, HTN, DM, s/p XRT to ribs, brain, recent admission w/ post obstructive pna s/p bronchoscopy, tumor debulking, s/p 1st cycle of pembro/pemetrexed/carbo last week of Jan, presenting w/ weakness, flu-like symptoms sepsis, fall found to be flu positive.     #Stage IV lung adenocarcinoma - metastatic disease to brain and bones s/p XRT. S/p tumor debulking via interventional pulmonary, s/p 1st cycle of carbo/pemetrexed/pembrolizumab during last week of January, tolerated well mild diarrhea. Intent of systemic treatment palliative not curative with cytotoxic chemotherapy with the addition of immunotherapy based on the KEYNOTE 021 trial which showed improvement in PATIÑO and PFS in patients with PDL <50.  Updated data from the trial c/w 19 month performance free survival in combination arm vs. 8.9months in standard. 18 month overall survival rate in combinatoin arm 70% vs. 56 in standard. Patient currently with subsequent fatigue, malaise now with positive flu.     -tamiflu, cultures, IVF, supportive care  -restarted on antibiotics concerned for possible aspiration, worsening CXR, remains borderline neutropenic  -patient due for 2nd cycle 2/27, will monitor counts and clinical symptoms prior to next anticipated dose  -concern for seizures, hx of brain mets s/p XRT on keppra, consider video eeg/neuro    #Anemia/Coagulopathy - Hx of chronic inflammation given iron studies and ferritin level. No evidence of bilirubinemia, jaundice. No hemoptysis per patient or other signs of gross bleeding. Coagulopathy in setting of viral infection+ malnutrition w/low albumin. Counts also related to recent chemotherapy.      -Obtain cbc w/ differential, monitor ANC (given chemo recently)  -Trend Hg, transfuse for goal Hg>7  -coagulopathy likely nutritional, infectious/viral, montior for bleeding   -dvt ppx       To be discussed with Dr. Noonan

## 2018-02-15 NOTE — PROGRESS NOTE ADULT - PROBLEM SELECTOR PLAN 3
#Lung cancer (stage IV) mets to brain and bone (s/p RT and gamma knife) and one round of chemo (2/6)   - goals of care explained to the family and palliative team consulted   - f/u bronchoscopy with Dr. Xiong in 2nd week of March.

## 2018-02-16 DIAGNOSIS — A41.9 SEPSIS, UNSPECIFIED ORGANISM: ICD-10-CM

## 2018-02-16 DIAGNOSIS — D63.8 ANEMIA IN OTHER CHRONIC DISEASES CLASSIFIED ELSEWHERE: ICD-10-CM

## 2018-02-16 DIAGNOSIS — J15.6 PNEUMONIA DUE TO OTHER GRAM-NEGATIVE BACTERIA: ICD-10-CM

## 2018-02-16 DIAGNOSIS — R19.7 DIARRHEA, UNSPECIFIED: ICD-10-CM

## 2018-02-16 DIAGNOSIS — G92 TOXIC ENCEPHALOPATHY: ICD-10-CM

## 2018-02-16 LAB
ANION GAP SERPL CALC-SCNC: 14 MMOL/L — SIGNIFICANT CHANGE UP (ref 5–17)
BUN SERPL-MCNC: 6 MG/DL — LOW (ref 7–23)
CALCIUM SERPL-MCNC: 8.2 MG/DL — LOW (ref 8.4–10.5)
CHLORIDE SERPL-SCNC: 99 MMOL/L — SIGNIFICANT CHANGE UP (ref 96–108)
CO2 SERPL-SCNC: 21 MMOL/L — LOW (ref 22–31)
CREAT SERPL-MCNC: 0.59 MG/DL — SIGNIFICANT CHANGE UP (ref 0.5–1.3)
GLUCOSE BLDC GLUCOMTR-MCNC: 127 MG/DL — HIGH (ref 70–99)
GLUCOSE BLDC GLUCOMTR-MCNC: 128 MG/DL — HIGH (ref 70–99)
GLUCOSE BLDC GLUCOMTR-MCNC: 91 MG/DL — SIGNIFICANT CHANGE UP (ref 70–99)
GLUCOSE BLDC GLUCOMTR-MCNC: 95 MG/DL — SIGNIFICANT CHANGE UP (ref 70–99)
GLUCOSE SERPL-MCNC: 101 MG/DL — HIGH (ref 70–99)
HCT VFR BLD CALC: 23.2 % — LOW (ref 34.5–45)
HGB BLD-MCNC: 7.5 G/DL — LOW (ref 11.5–15.5)
LYMPHOCYTES # BLD AUTO: 10 % — LOW (ref 13–44)
MAGNESIUM SERPL-MCNC: 1.8 MG/DL — SIGNIFICANT CHANGE UP (ref 1.6–2.6)
MCHC RBC-ENTMCNC: 24.4 PG — LOW (ref 27–34)
MCHC RBC-ENTMCNC: 32.3 G/DL — SIGNIFICANT CHANGE UP (ref 32–36)
MCV RBC AUTO: 75.6 FL — LOW (ref 80–100)
MONOCYTES NFR BLD AUTO: 5 % — SIGNIFICANT CHANGE UP (ref 2–14)
NEUTROPHILS NFR BLD AUTO: 84 % — HIGH (ref 43–77)
PLATELET # BLD AUTO: 119 K/UL — LOW (ref 150–400)
POTASSIUM SERPL-MCNC: 3 MMOL/L — LOW (ref 3.5–5.3)
POTASSIUM SERPL-SCNC: 3 MMOL/L — LOW (ref 3.5–5.3)
RBC # BLD: 3.07 M/UL — LOW (ref 3.8–5.2)
RBC # FLD: 17.9 % — HIGH (ref 10.3–16.9)
SODIUM SERPL-SCNC: 134 MMOL/L — LOW (ref 135–145)
WBC # BLD: 3.8 K/UL — SIGNIFICANT CHANGE UP (ref 3.8–10.5)
WBC # FLD AUTO: 3.8 K/UL — SIGNIFICANT CHANGE UP (ref 3.8–10.5)

## 2018-02-16 PROCEDURE — 99233 SBSQ HOSP IP/OBS HIGH 50: CPT | Mod: GC

## 2018-02-16 PROCEDURE — 99232 SBSQ HOSP IP/OBS MODERATE 35: CPT

## 2018-02-16 PROCEDURE — 71045 X-RAY EXAM CHEST 1 VIEW: CPT | Mod: 26

## 2018-02-16 PROCEDURE — 95951: CPT | Mod: 26,52

## 2018-02-16 PROCEDURE — 99233 SBSQ HOSP IP/OBS HIGH 50: CPT

## 2018-02-16 RX ORDER — POTASSIUM CHLORIDE 20 MEQ
10 PACKET (EA) ORAL
Qty: 0 | Refills: 0 | Status: DISCONTINUED | OUTPATIENT
Start: 2018-02-16 | End: 2018-02-16

## 2018-02-16 RX ORDER — MAGNESIUM SULFATE 500 MG/ML
2 VIAL (ML) INJECTION ONCE
Qty: 0 | Refills: 0 | Status: COMPLETED | OUTPATIENT
Start: 2018-02-16 | End: 2018-02-16

## 2018-02-16 RX ORDER — CEFEPIME 1 G/1
1000 INJECTION, POWDER, FOR SOLUTION INTRAMUSCULAR; INTRAVENOUS EVERY 8 HOURS
Qty: 0 | Refills: 0 | Status: DISCONTINUED | OUTPATIENT
Start: 2018-02-16 | End: 2018-02-18

## 2018-02-16 RX ORDER — LANOLIN ALCOHOL/MO/W.PET/CERES
5 CREAM (GRAM) TOPICAL AT BEDTIME
Qty: 0 | Refills: 0 | Status: DISCONTINUED | OUTPATIENT
Start: 2018-02-16 | End: 2018-02-20

## 2018-02-16 RX ORDER — POTASSIUM CHLORIDE 20 MEQ
20 PACKET (EA) ORAL
Qty: 0 | Refills: 0 | Status: COMPLETED | OUTPATIENT
Start: 2018-02-16 | End: 2018-02-16

## 2018-02-16 RX ADMIN — Medication 500 MILLIGRAM(S): at 06:51

## 2018-02-16 RX ADMIN — Medication 500000 UNIT(S): at 06:51

## 2018-02-16 RX ADMIN — Medication 50 GRAM(S): at 19:19

## 2018-02-16 RX ADMIN — Medication 50 MILLIEQUIVALENT(S): at 14:02

## 2018-02-16 RX ADMIN — Medication 500000 UNIT(S): at 11:19

## 2018-02-16 RX ADMIN — ONDANSETRON 2 MILLIGRAM(S): 8 TABLET, FILM COATED ORAL at 09:33

## 2018-02-16 RX ADMIN — PANTOPRAZOLE SODIUM 40 MILLIGRAM(S): 20 TABLET, DELAYED RELEASE ORAL at 06:51

## 2018-02-16 RX ADMIN — CEFEPIME 100 MILLIGRAM(S): 1 INJECTION, POWDER, FOR SOLUTION INTRAMUSCULAR; INTRAVENOUS at 22:49

## 2018-02-16 RX ADMIN — Medication 5 MILLIGRAM(S): at 22:49

## 2018-02-16 RX ADMIN — Medication 500 MILLIGRAM(S): at 22:49

## 2018-02-16 RX ADMIN — CEFEPIME 1000 MILLIGRAM(S): 1 INJECTION, POWDER, FOR SOLUTION INTRAMUSCULAR; INTRAVENOUS at 06:52

## 2018-02-16 RX ADMIN — Medication 650 MILLIGRAM(S): at 19:19

## 2018-02-16 RX ADMIN — CEFEPIME 100 MILLIGRAM(S): 1 INJECTION, POWDER, FOR SOLUTION INTRAMUSCULAR; INTRAVENOUS at 14:01

## 2018-02-16 RX ADMIN — Medication 50 MILLIEQUIVALENT(S): at 09:33

## 2018-02-16 RX ADMIN — Medication 500 MILLIGRAM(S): at 14:01

## 2018-02-16 RX ADMIN — Medication 50 MILLIEQUIVALENT(S): at 11:19

## 2018-02-16 RX ADMIN — SODIUM CHLORIDE 120 MILLILITER(S): 9 INJECTION INTRAMUSCULAR; INTRAVENOUS; SUBCUTANEOUS at 07:18

## 2018-02-16 RX ADMIN — ENOXAPARIN SODIUM 40 MILLIGRAM(S): 100 INJECTION SUBCUTANEOUS at 11:18

## 2018-02-16 RX ADMIN — Medication 500000 UNIT(S): at 23:48

## 2018-02-16 RX ADMIN — Medication 500000 UNIT(S): at 18:47

## 2018-02-16 RX ADMIN — Medication 500000 UNIT(S): at 00:39

## 2018-02-16 NOTE — PROGRESS NOTE ADULT - ASSESSMENT
62 year old female with Stage IV lung adenocarcinoma, PDL1 40%, EGFR/ALK/ROS negative, HTN, DM, s/p XRT to ribs, brain, recent admission w/ post obstructive pna s/p bronchoscopy, tumor debulking, s/p 1st cycle of pembro/pemetrexed/carbo last week of Jan, presenting w/ weakness, flu-like symptoms sepsis, fall found to be flu positive.     #Stage IV lung adenocarcinoma - metastatic disease to brain and bones s/p XRT. S/p tumor debulking via interventional pulmonary, s/p 1st cycle of carbo/pemetrexed/pembrolizumab during last week of January, tolerated well mild diarrhea. Intent of systemic treatment palliative not curative with cytotoxic chemotherapy with the addition of immunotherapy based on the KEYNOTE 021 trial which showed improvement in PATIÑO and PFS in patients with PDL <50.  Updated data from the trial c/w 19 month performance free survival in combination arm vs. 8.9months in standard. 18 month overall survival rate in combinatoin arm 70% vs. 56 in standard. Patient currently with subsequent fatigue, malaise now with positive flu.     -tamiflu, cultures, IVF, cefepime added for aspiration pna/pneumonitis, following CXR   -patient due for 2nd cycle 2/27, will monitor counts and clinical symptoms prior to next anticipated dose  -no seizures on EEG, hx of brain mets s/p XRT     #Anemia/Coagulopathy - Hx of chronic inflammation given iron studies and ferritin level. No evidence of bilirubinemia, jaundice. No hemoptysis per patient or other signs of gross bleeding. Coagulopathy in setting of viral infection+ malnutrition w/low albumin. Counts also related to recent chemotherapy.      -drop in Hg/plt could be dilutional s/p sepsis protocol, trend cbc   -Obtain cbc w/ differential, to calculate ANC, currently on ABX  -Trend Hg, transfuse for goal Hg>7  -coagulopathy likely nutritional, infectious/viral, montior for bleeding   -dvt ppx lovenox       To be discussed with Dr. Noonan 62 year old female with Stage IV lung adenocarcinoma, PDL1 40%, EGFR/ALK/ROS negative, HTN, DM, s/p XRT to ribs, brain, recent admission w/ post obstructive pna s/p bronchoscopy, tumor debulking, s/p 1st cycle of pembro/pemetrexed/carbo last week of Jan, presenting w/ weakness, flu-like symptoms sepsis, fall found to be flu positive.     #Stage IV lung adenocarcinoma - metastatic disease to brain and bones s/p XRT. S/p tumor debulking via interventional pulmonary, s/p 1st cycle of carbo/pemetrexed/pembrolizumab during last week of January, tolerated well mild diarrhea. Intent of systemic treatment palliative not curative with cytotoxic chemotherapy with the addition of immunotherapy based on the KEYNOTE 021 trial which showed improvement in PATIÑO and PFS in patients with PDL <50.  Updated data from the trial c/w 19 month performance free survival in combination arm vs. 8.9months in standard. 18 month overall survival rate in combinatoin arm 70% vs. 56 in standard. Patient currently with subsequent fatigue, malaise now with positive flu.     -tamiflu, cultures, IVF, cefepime added for aspiration pna/pneumonitis, following CXR, flagyl given enteritis    -patient due for 2nd cycle 2/27, will monitor counts and clinical symptoms prior to next anticipated dose  -no seizures on EEG, hx of brain mets s/p XRT     #Anemia/Coagulopathy - Hx of chronic inflammation given iron studies and ferritin level. No evidence of bilirubinemia, jaundice. No hemoptysis per patient or other signs of gross bleeding. Coagulopathy in setting of viral infection+ malnutrition w/low albumin. Counts also related to recent chemotherapy.      -drop in Hg/plt could be dilutional s/p sepsis protocol, trend cbc   -Obtain cbc w/ differential, to calculate ANC, currently on ABX  -Trend Hg, transfuse for goal Hg>7  -coagulopathy likely nutritional, infectious/viral, montior for bleeding   -dvt ppx lovenox       To be discussed with Dr. Noonan

## 2018-02-16 NOTE — PROGRESS NOTE ADULT - PROBLEM SELECTOR PLAN 1
- Pt spiked a fever of 101.4 yesterday morning and had a rapid response called on her   - In the setting of her immunocompromised state, recurrent episodes of vomiting yesterday and RUL infiltration shown in X-ray, antibiotics to cover anaerobe organisms should be considered. Pt with allergy to PCN, would speak with ID re: abx recommendations as aztreonam + clinda would cover gram neg + anaerobes, but high incidence of c diff with clindamycin and pt being ruled out for c. diff.   -started on cefepime - Pt spiked a fever of 101.4 yesterday morning and had a rapid response called on her   - CXR read as increased congestion, given resolution of fever and dramatic improvement from yesterday, likely aspiration and chemical pneumonitis  -started on cefepime per primary team

## 2018-02-16 NOTE — PROGRESS NOTE ADULT - SUBJECTIVE AND OBJECTIVE BOX
Interval Events:  Pt is feeling tired today. Had an episode of vomiting (green and yellow) and still with cough and white sputum. Denies having any headache, nausea, diarrhea or chest discomfort.     MEDICATIONS:  Pulmonary:  guaiFENesin    Syrup 100 milliGRAM(s) Oral every 6 hours PRN    Antimicrobials:  cefepime  IVPB 1000 milliGRAM(s) IV Intermittent every 8 hours  metroNIDAZOLE    Tablet 500 milliGRAM(s) Oral every 8 hours  nystatin    Suspension 103993 Unit(s) Oral four times a day    Anticoagulants:  enoxaparin Injectable 40 milliGRAM(s) SubCutaneous daily    Cardiac:    Endocrine:  dextrose 50% Injectable 12.5 Gram(s) IV Push once  dextrose 50% Injectable 25 Gram(s) IV Push once  dextrose 50% Injectable 25 Gram(s) IV Push once  dextrose Gel 1 Dose(s) Oral once PRN  glucagon  Injectable 1 milliGRAM(s) IntraMuscular once PRN  insulin lispro (HumaLOG) corrective regimen sliding scale   SubCutaneous Before meals and at bedtime    Allergies    penicillins (Hives)    Intolerances        Vital Signs Last 24 Hrs  T(C): 37.3 (2018 13:38), Max: 37.7 (15 Feb 2018 20:36)  T(F): 99.2 (2018 13:38), Max: 99.8 (15 Feb 2018 20:36)  HR: 111 (2018 13:38) (107 - 111)  BP: 127/79 (2018 13:38) (127/79 - 147/81)  BP(mean): --  RR: 16 (2018 13:38) (15 - 16)  SpO2: 97% (2018 13:38) (94% - 97%)    02-15 @ 07:  -   @ 07:00  --------------------------------------------------------  IN: 4400 mL / OUT: 2000 mL / NET: 2400 mL     @ 07:01  16 @ 14:02  --------------------------------------------------------  IN: 840 mL / OUT: 0 mL / NET: 840 mL          LABS:      CBC Full  -  ( 2018 06:09 )  WBC Count : 3.8 K/uL  Hemoglobin : 7.5 g/dL  Hematocrit : 23.2 %  Platelet Count - Automated : 119 K/uL  Mean Cell Volume : 75.6 fL  Mean Cell Hemoglobin : 24.4 pg  Mean Cell Hemoglobin Concentration : 32.3 g/dL  Auto Neutrophil # : x  Auto Lymphocyte # : x  Auto Monocyte # : x  Auto Eosinophil # : x  Auto Basophil # : x  Auto Neutrophil % : 84.0 %  Auto Lymphocyte % : 10.0 %  Auto Monocyte % : 5.0 %  Auto Eosinophil % : x  Auto Basophil % : x        134<L>  |  99  |  6<L>  ----------------------------<  101<H>  3.0<L>   |  21<L>  |  0.59    Ca    8.2<L>      2018 06:09  Phos  2.6     02-15  Mg     1.8         TPro  7.1  /  Alb  2.3<L>  /  TBili  1.0  /  DBili  x   /  AST  35  /  ALT  68<H>  /  AlkPhos  132<H>  02-15    PT/INR - ( 15 Feb 2018 09:53 )   PT: 15.0 sec;   INR: 1.34          PTT - ( 15 Feb 2018 09:53 )  PTT:28.1 sec      Urinalysis Basic - ( 15 Feb 2018 11:06 )    Color: Yellow / Appearance: Clear / S.010 / pH: x  Gluc: x / Ketone: Trace mg/dL  / Bili: Negative / Urobili: 0.2 E.U./dL   Blood: x / Protein: NEGATIVE mg/dL / Nitrite: NEGATIVE   Leuk Esterase: NEGATIVE / RBC: < 5 /HPF / WBC < 5 /HPF   Sq Epi: x / Non Sq Epi: 0-5 /HPF / Bacteria: Present /HPF    RADIOLOGY & ADDITIONAL STUDIES (The following images were personally reviewed):  < from: Xray Chest 1 View- PORTABLE-Urgent (02.15.18 @ 10:28) >  New onset of mild pulmonary venous congestion right greater than left   with a new right-sided pleural effusion. No other significant interval   changes. Persistent perihilarright-sided mass. Interval Events:  Pt is feeling tired and cold today. Had an episode of vomiting (green and yellow) and still with cough and white sputum. Denies having any headache, nausea, diarrhea or chest discomfort.     General: Resting in bed with eyes closed, NAD, AAO x3  HEENT: No icterus,. Dry mucous membranes with oral thrush  Neck: No JVD noted. Supple, no meningismus  Cardio: S1, S2 noted, RRR. No murmurs, rubs or gallops  Resp: Wheezing diffusely on the right side. Crackles lower lobes bilaterally. No adventitious sounds  Abdo: Soft, NT, bowel sounds present. No organomegaly  Extremities: No edema noted. Pulses present b/l  Neuro: AAO x3, grossly normal motor strength.  Lymphnodes: no lymphadenopathy identified.  Skin: Dry, no rashes    MEDICATIONS:  Pulmonary:  guaiFENesin    Syrup 100 milliGRAM(s) Oral every 6 hours PRN    Antimicrobials:  cefepime  IVPB 1000 milliGRAM(s) IV Intermittent every 8 hours  metroNIDAZOLE    Tablet 500 milliGRAM(s) Oral every 8 hours  nystatin    Suspension 498961 Unit(s) Oral four times a day    Anticoagulants:  enoxaparin Injectable 40 milliGRAM(s) SubCutaneous daily    Cardiac:    Endocrine:  dextrose 50% Injectable 12.5 Gram(s) IV Push once  dextrose 50% Injectable 25 Gram(s) IV Push once  dextrose 50% Injectable 25 Gram(s) IV Push once  dextrose Gel 1 Dose(s) Oral once PRN  glucagon  Injectable 1 milliGRAM(s) IntraMuscular once PRN  insulin lispro (HumaLOG) corrective regimen sliding scale   SubCutaneous Before meals and at bedtime    Allergies    penicillins (Hives)    Intolerances        Vital Signs Last 24 Hrs  T(C): 37.3 (2018 13:38), Max: 37.7 (15 Feb 2018 20:36)  T(F): 99.2 (2018 13:38), Max: 99.8 (15 Feb 2018 20:36)  HR: 111 (2018 13:38) (107 - 111)  BP: 127/79 (2018 13:38) (127/79 - 147/81)  BP(mean): --  RR: 16 (2018 13:38) (15 - 16)  SpO2: 97% (2018 13:38) (94% - 97%)    02-15 @ 07:  -   @ 07:00  --------------------------------------------------------  IN: 4400 mL / OUT: 2000 mL / NET: 2400 mL     @ 07:01   @ 14:02  --------------------------------------------------------  IN: 840 mL / OUT: 0 mL / NET: 840 mL          LABS:      CBC Full  -  ( 2018 06:09 )  WBC Count : 3.8 K/uL  Hemoglobin : 7.5 g/dL  Hematocrit : 23.2 %  Platelet Count - Automated : 119 K/uL  Mean Cell Volume : 75.6 fL  Mean Cell Hemoglobin : 24.4 pg  Mean Cell Hemoglobin Concentration : 32.3 g/dL  Auto Neutrophil # : x  Auto Lymphocyte # : x  Auto Monocyte # : x  Auto Eosinophil # : x  Auto Basophil # : x  Auto Neutrophil % : 84.0 %  Auto Lymphocyte % : 10.0 %  Auto Monocyte % : 5.0 %  Auto Eosinophil % : x  Auto Basophil % : x        134<L>  |  99  |  6<L>  ----------------------------<  101<H>  3.0<L>   |  21<L>  |  0.59    Ca    8.2<L>      2018 06:09  Phos  2.6     02-15  Mg     1.8         TPro  7.1  /  Alb  2.3<L>  /  TBili  1.0  /  DBili  x   /  AST  35  /  ALT  68<H>  /  AlkPhos  132<H>  02-15    PT/INR - ( 15 Feb 2018 09:53 )   PT: 15.0 sec;   INR: 1.34          PTT - ( 15 Feb 2018 09:53 )  PTT:28.1 sec      Urinalysis Basic - ( 15 Feb 2018 11:06 )    Color: Yellow / Appearance: Clear / S.010 / pH: x  Gluc: x / Ketone: Trace mg/dL  / Bili: Negative / Urobili: 0.2 E.U./dL   Blood: x / Protein: NEGATIVE mg/dL / Nitrite: NEGATIVE   Leuk Esterase: NEGATIVE / RBC: < 5 /HPF / WBC < 5 /HPF   Sq Epi: x / Non Sq Epi: 0-5 /HPF / Bacteria: Present /HPF    RADIOLOGY & ADDITIONAL STUDIES (The following images were personally reviewed):  < from: Xray Chest 1 View- PORTABLE-Urgent (02.15.18 @ 10:28) >  New onset of mild pulmonary venous congestion right greater than left   with a new right-sided pleural effusion. No other significant interval   changes. Persistent perihilarright-sided mass.

## 2018-02-16 NOTE — PROGRESS NOTE ADULT - ASSESSMENT
61 yo F with PMHx of HTN, DM, lung CA with mets to brain and bone s/p tx with gamma knife and radiation w/ multiple hospitalizations in past month due to recurrent sepsis 2/2 to PNA and flu, discussion with family to clarify GOC and advance directives due to advanced and incurable lung cancer.

## 2018-02-16 NOTE — PROGRESS NOTE ADULT - PROBLEM SELECTOR PLAN 3
Support provided to patient and family. Patient to have access to supportive services during rest of hospital stay as the patient/family deemed necessary ie. Massage therapy, Music therapy, Patient and family supportive services.

## 2018-02-16 NOTE — PROGRESS NOTE ADULT - PROBLEM SELECTOR PLAN 2
Treating for presumably Gram negative vs aspiration PNA.   Worsening infiltrate, likely super imposed PNA.   * On Cefepime.   * Try sputum cx.

## 2018-02-16 NOTE — PROGRESS NOTE ADULT - PROBLEM SELECTOR PLAN 4
Pt has living will that states her wishes regarding most of end-of-life situations. She wishes to be DNR/DNI w/ no feeding tube ever. At today's meeting with patient, clarified that if she clinically improves she is likely not a candidate for rehab. Further re-addressed with patient that her illness and cancer is not advanced enough to warrant inpatient hospice placement at this time. Pt was educated on the face that home hospice would only be an option in the event that patient's cancer was progressing and she was no longer deemed a candidate for further chemotherapy by medical oncology. Pt and her daughter will have family meeting to assign responsibilities of pt's care to various family members. If patient requires help at home, urged family to see private home care.    MOLST form still to be completed. Discussed with patient's daughter that a MOLST form should be completed while pt is in hospital to ensure that it stays with patient at all times and her wishes are carried out according to the MOLST form. Daughter confirms that they will complete form prior to discharge. Pt has living will that states her wishes regarding most of end-of-life situations. She wishes to be DNR/DNI w/ no feeding tube ever. At today's meeting with patient, clarified that if she clinically significantly improves she is likely not a candidate for rehab. Further re-addressed with patient that her illness and cancer is not advanced enough to warrant inpatient hospice placement at this time. Pt was educated on the face that home hospice would only be an option in the event that patient's cancer was progressing and she was no longer deemed a candidate for further chemotherapy by medical oncology. Pt and her daughter will have family meeting to assign responsibilities of pt's care to various family members. If patient requires help at home, urged family to see private home care.    MOLST form still to be completed. Discussed with patient's daughter that a MOLST form should be completed while pt is in hospital to ensure that it stays with patient at all times and her wishes are carried out according to the MOLST form. Daughter confirms that they will complete form prior to discharge.

## 2018-02-16 NOTE — PROGRESS NOTE ADULT - PROBLEM SELECTOR PLAN 3
Lung cancer (stage IV) mets to brain and bone (s/p RT and gamma knife) and one round of chemo (2/6)   - goals of care explained to the family and seen by palliative team   - f/u bronchoscopy with Dr. Xiong in 2nd week of March.

## 2018-02-16 NOTE — PROGRESS NOTE ADULT - SUBJECTIVE AND OBJECTIVE BOX
DORON NUGENT          MRN-[id number]            (mm/dd/yyyy)    HPI:    61 y/o F with a PMHX of HTN, DM, and Stage IV lung CA w/ mets to brain and bone (Dec 2nd) who underwent RT to bony mets on 8th rib, and had gamma knife tx to brain () and has completed 1 cycle of carbo/pemetrexed + pembrolizumab () presented to [facility] with sepsis due to Flu and PNA. Pt had an episode this morning of foaming at the mouth, HR of 180 and momentary confusion. She received IV fluids with adequate resolution of the episode.  Palliative is consulted to clarify goals of care and advance directives because of her advanced disease and patient's clearly thought out plan for advance directives.     Pt had recent hospitalization in mid-January for obstructive mass in RLL debulked with laser, and airway was balloon dilation. She ultimately spent 10 days in the hospital and was discharged home afterwards.       SUBJECTIVE: Pt feels better today, more alert, awake and conversive. She reports nausea and episode of NBNB vomitus last night. She has had no further episodes of disorientation. Her EEG was negative for seizure activity. She denies any nausea currently, has an appetite and wants to have lunch. She denies any focal pain, no diarrhea. She is currently on IV abx.       ROS:  DYSPNEA: YES MARLON 4  NAUS/VOM: NO	  SECRETIONS: NO	  AGITATION: NO  Pain (Y/N):  NO     -Provocation/Palliation:  -Quality/Quantity:  -Radiating:  -Severity:  -Timing/Frequency:  -Impact on ADLs:    OTHER REVIEW OF SYSTEMS: + cough, fatigue, weakness    PEx:  T(C): 37.3 (18 @ 13:38), Max: 37.7 (02-15-18 @ 20:36)  HR: 111 (18 @ 13:38) (107 - 111)  BP: 127/79 (18 @ 13:38) (127/79 - 147/81)  RR: 16 (18 @ 13:38) (15 - 16)  SpO2: 97% (18 @ 13:38) (94% - 97%)      General: Alert, A&O x 3, pleasant,   HEENT:  MM dry, anicteric sclera, PERRL  Neck:  no JVD, no LAD  CVS: S1S2 RRR no murmurs  Resp: decreased BS b/l bases  GI:  Abd soft, NT ND, no masses  :  no suprapubic tenderness  Musc:   no abnormalities  Neuro: grossly intact  Psych:   calm  Skin: no skin rashes  Lymph: no palpable lymphadenopathy  Preadmit Karnofsky:  60%           Current Karnofsky:    30  %  Cachexia (Y/N): N  BMI: 29.5	     ALLERGIES: penicillins (Hives)      OPIATE NAÏVE (Y/N): NO    MEDICATIONS  (STANDING):  cefepime  IVPB 1000 milliGRAM(s) IV Intermittent every 8 hours  dextrose 5%. 1000 milliLiter(s) (50 mL/Hr) IV Continuous <Continuous>  dextrose 50% Injectable 12.5 Gram(s) IV Push once  dextrose 50% Injectable 25 Gram(s) IV Push once  dextrose 50% Injectable 25 Gram(s) IV Push once  enoxaparin Injectable 40 milliGRAM(s) SubCutaneous daily  insulin lispro (HumaLOG) corrective regimen sliding scale   SubCutaneous Before meals and at bedtime  magnesium sulfate  IVPB 2 Gram(s) IV Intermittent once  metroNIDAZOLE    Tablet 500 milliGRAM(s) Oral every 8 hours  nystatin    Suspension 291470 Unit(s) Oral four times a day  pantoprazole    Tablet 40 milliGRAM(s) Oral before breakfast  sodium chloride 0.9%. 1000 milliLiter(s) (120 mL/Hr) IV Continuous <Continuous>    MEDICATIONS  (PRN):  acetaminophen   Tablet 650 milliGRAM(s) Oral every 6 hours PRN For Temp greater than 38 C (100.4 F)  dextrose Gel 1 Dose(s) Oral once PRN Blood Glucose LESS THAN 70 milliGRAM(s)/deciliter  glucagon  Injectable 1 milliGRAM(s) IntraMuscular once PRN Glucose LESS THAN 70 milligrams/deciliter  guaiFENesin    Syrup 100 milliGRAM(s) Oral every 6 hours PRN Cough      LABS:                           7.5    3.8   )-----------( 119      ( 2018 06:09 )             23.2         CBC Full  -  ( 2018 06:09 )  WBC Count : 3.8 K/uL  Hemoglobin : 7.5 g/dL  Hematocrit : 23.2 %  Platelet Count - Automated : 119 K/uL  Mean Cell Volume : 75.6 fL  Mean Cell Hemoglobin : 24.4 pg  Mean Cell Hemoglobin Concentration : 32.3 g/dL  Auto Neutrophil % : 84.0 %  Auto Lymphocyte % : 10.0 %  Auto Monocyte % : 5.0 %        134<L>  |  99  |  6<L>  ----------------------------<  101<H>  3.0<L>   |  21<L>  |  0.59    Ca    8.2<L>      2018 06:09  Phos  2.6     02-15  Mg     1.8         TPro  7.1  /  Alb  2.3<L>  /  TBili  1.0  /  DBili  x   /  AST  35  /  ALT  68<H>  /  AlkPhos  132<H>  02-15        PT/INR - ( 15 Feb 2018 09:53 )   PT: 15.0 sec;   INR: 1.34          PTT - ( 15 Feb 2018 09:53 )  PTT:28.1 sec          IMAGING: REVIEWED    ADVANCED DIRECTIVES:     DNR     DNI    MOLST    Decision maker: self, has capacity  Legal surrogate: Naomi Yeh (daughter) [phone]    PSYCHOSOCIAL-SPIRITUAL ASSESSMENT:       Reviewed       Care plan adjusted as above	    GOALS OF CARE DISCUSSION       Palliative care info/counseling provided	           Documentation of GOC: DNR/DNI  	      REFERRALS	        Palliative Med        Unit SW/Case Mgmt       Patient/Family Support       PT/OT                  DORON NUGENT          MRN-[id number]            (mm/dd/yyyy)    HPI:    61 y/o F with a PMHX of HTN, DM, and Stage IV lung CA w/ mets to brain and bone (Dec 2nd) who underwent RT to bony mets on 8th rib, and had gamma knife tx to brain () and has completed 1 cycle of carbo/pemetrexed + pembrolizumab () presented to [facility] with sepsis due to Flu and PNA. Pt had an episode this morning of foaming at the mouth, HR of 180 and momentary confusion. She received IV fluids with adequate resolution of the episode.  Palliative is consulted to clarify goals of care and advance directives because of her advanced disease and patient's clearly thought out plan for advance directives.     Pt had recent hospitalization in mid-January for obstructive mass in RLL debulked with laser, and airway was balloon dilation. She ultimately spent 10 days in the hospital and was discharged home afterwards.       Subjective: Pt feels better today, more alert, awake and conversive. She reports nausea and episode of NBNB vomitus last night. She has had no further episodes of disorientation. Her EEG was negative for seizure activity. She denies any nausea currently, has an appetite and wants to have lunch. She denies any focal pain, no diarrhea. She is currently on IV abx.     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  is alcoholic per daughter and "cousin" is mentally challenged  Daughter is from a previous marriage and is a nurse,    ROS:    limited secondary to patient's somnolence                      Dyspnea (Marlon 0-10): 0                       N/V (Y/N): NO                           Secretions (Y/N) :  NO           Agitation(Y/N): NO  Pain (Y/N):     NO    General:  weakness, tired  HEENT:    dry mouth  Neck:  Denied  CVS:  Denied  Resp:  productive cough  GI:  no abd pain, no nausea or vomiting  :  Denied  Musc:  Denied  Neuro:  intermittent confusion  Psych:  Denied  Skin:  no rashes  Lymph:  Denied    Allergies    penicillins (Hives)    Intolerances        Opiate Naive (Y/N):  yes      Medications:      MEDICATIONS  (STANDING):  cefepime Injectable.      cefepime Injectable. 1000 milliGRAM(s) IV Push every 8 hours  dextrose 5%. 1000 milliLiter(s) (50 mL/Hr) IV Continuous <Continuous>  dextrose 50% Injectable 12.5 Gram(s) IV Push once  dextrose 50% Injectable 25 Gram(s) IV Push once  dextrose 50% Injectable 25 Gram(s) IV Push once  enoxaparin Injectable 40 milliGRAM(s) SubCutaneous daily  insulin lispro (HumaLOG) corrective regimen sliding scale   SubCutaneous Before meals and at bedtime  metroNIDAZOLE    Tablet 500 milliGRAM(s) Oral every 8 hours  nystatin    Suspension 064615 Unit(s) Oral four times a day  ondansetron Injectable 2 milliGRAM(s) IV Push once  oseltamivir 75 milliGRAM(s) Oral two times a day  pantoprazole    Tablet 40 milliGRAM(s) Oral before breakfast  potassium chloride  10 mEq/100 mL IVPB 10 milliEquivalent(s) IV Intermittent every 1 hour  sodium chloride 0.9%. 1000 milliLiter(s) (120 mL/Hr) IV Continuous <Continuous>    MEDICATIONS  (PRN):  acetaminophen   Tablet 650 milliGRAM(s) Oral every 6 hours PRN For Temp greater than 38 C (100.4 F)  dextrose Gel 1 Dose(s) Oral once PRN Blood Glucose LESS THAN 70 milliGRAM(s)/deciliter  glucagon  Injectable 1 milliGRAM(s) IntraMuscular once PRN Glucose LESS THAN 70 milligrams/deciliter  guaiFENesin    Syrup 100 milliGRAM(s) Oral every 6 hours PRN Cough      Labs:    CBC:                        10.0   3.8   )-----------( 176      ( 15 Feb 2018 09:53 )             30.8     CMP:    02-15    133<L>  |  95<L>  |  8   ----------------------------<  103<H>  3.3<L>   |  18<L>  |  0.59    Ca    9.0      15 Feb 2018 09:53  Phos  2.6     02-15  Mg     1.9     02-15    TPro  7.1  /  Alb  2.3<L>  /  TBili  1.0  /  DBili  x   /  AST  35  /  ALT  68<H>  /  AlkPhos  132<H>  15    PT/INR - ( 15 Feb 2018 09:53 )   PT: 15.0 sec;   INR: 1.34          PTT - ( 15 Feb 2018 09:53 )  PTT:28.1 sec  Urinalysis Basic - ( 15 Feb 2018 11:06 )    Color: Yellow / Appearance: Clear / S.010 / pH: x  Gluc: x / Ketone: Trace mg/dL  / Bili: Negative / Urobili: 0.2 E.U./dL   Blood: x / Protein: NEGATIVE mg/dL / Nitrite: NEGATIVE   Leuk Esterase: NEGATIVE / RBC: < 5 /HPF / WBC < 5 /HPF   Sq Epi: x / Non Sq Epi: 0-5 /HPF / Bacteria: Present /HPF    Rapid Respiratory Viral Panel (02.15.18 @ 10:40)    Rapid RVP Result: Detected: The [employer] RVP Rapid uses polymerase chain reaction (PCR) and melt  curve analysis to screen for adenovirus; coronavirus HKU1, NL63, 229E,  OC43; human metapneumovirus (hMPV); human enterovirus/rhinovirus  (Entero/RV); influenza A; influenza A/H1;influenza A/H3; influenza  A/H1-2009; influenza B; parainfluenza viruses 1, 2, 3, 4; respiratory  syncytial virus; Bordetella pertussis; Mycoplasma pneumoniae; and  Chlamydophila pneumoniae.    Influenza AH3 (RapRVP): Detected: TYPE:(C=Critical, N=Notification, A=Abnormal) C  TESTS: _RVP  DATE/TIME CALLED: _02/15/18 11:52  CALLED TO: Genevieve RN  READ BACK (2 Patient Identifiers)(Y/N): _Y  READ BACK VALUES (Y/N): _Y  CALLED BY: Servando            Imaging:  Reviewed  < from: Xray Chest 1 View- PORTABLE-Urgent (02.15.18 @ 10:28) >    EXAM:  XR CHEST PORTABLE URGENT 1V                          PROCEDURE DATE:  02/15/2018                     INTERPRETATION:  XR CHEST PORTABLE URGENT 1V dated 2/15/2018 10:28 AM    CLINICAL INFORMATION: Female, 62 years old.  Fever.    PRIOR STUDIES: 2018.    FINDINGS: The heart size remains within normal limits. Again noted is   silhouetting the right hilum by a perihilar mass unchanged line for   differences in imaging technique. There is slight exacerbation pulmonary   venous congestionwhich may be asymmetric, right greater than left with a   new right-sided pleural effusion. Can noted is leftward displacement of   the tracheal air column with increased right paratracheal soft tissue and   lymphadenopathy as well as a superior mediastinal mass possibly a thyroid   goiter cannot be excluded.    IMPRESSION:  New onset of mild pulmonary venous congestion right greater than left   with a new right-sided pleural effusion. No other significant interval   changes. Persistent perihilarright-sided mass.    < end of copied text >  < from: Xray Chest 1 View- PORTABLE-Urgent (02.15.18 @ 10:28) >    EXAM:  XR CHEST PORTABLE URGENT 1V                          PROCEDURE DATE:  02/15/2018                   PEx:  T(C): 37.2 (02-15-18 @ 13:04), Max: 37.2 (02-15-18 @ 13:04)  HR: 107 (02-15-18 @ 13:04) (91 - 118)  BP: 120/79 (02-15-18 @ 13:04) (114/76 - 150/80)  RR: 16 (02-15-18 @ 13:04) (16 - 20)  SpO2: 98% (02-15-18 @ 13:04) (95% - 98%)  Wt(kg): --  Daily     Daily   CAPILLARY BLOOD GLUCOSE      POCT Blood Glucose.: 103 mg/dL (15 Feb 2018 21:38)    I&O's Summary    2018 07:01  -  15 Feb 2018 07:00  --------------------------------------------------------  IN: 1500 mL / OUT: 0 mL / NET: 1500 mL    15 Feb 2018 07:  -  15 Feb 2018 21:54  --------------------------------------------------------  IN: 2960 mL / OUT: 2000 mL / NET: 960 mL        General: Alert, A&O x 3, drowsy,   HEENT:  MM dry, anicteric sclera, PEERL  Neck:  no JVD, no LAD  CVS: S1S2 RRR no murmurs  Resp: decreased BS b/l bases  GI:  Abd soft, NT ND, no masses  :  no suprapubic tenderness  Musc:   no abnormalities  Neuro: grossly intact  Psych:   calm  Skin: no skin rashes  Lymph: no palpable lymphadenopathy  Preadmit Karnofsky:  60%           Current Karnofsky:    30  %  Cachexia (Y/N): N  BMI: 29.5    Advanced Directives:     DNR/DNI     MOLST pending    Decision maker: self, has capacity  Legal surrogate: Naomi Yeh (daughter) [phone]    GOALS OF CARE DISCUSSION       Palliative care info/counseling provided	           Documentation of GOC: DNR/DNI	        unclear today what overall goals are.  Naomi indicates patient wishes to be in a facility.  Unclear what her thoughts are about hospice  REFERRALS	        Unit SW/Case Mgmt       Patient/Family Support       Massage Therapy       Music Therapy DORON NUGENT          MRN-[id number]            (1955)    HPI:    61 y/o F with a PMHX of HTN, DM, and Stage IV lung CA w/ mets to brain and bone (Dec 2nd) who underwent RT to bony mets on 8th rib, and had gamma knife tx to brain () and has completed 1 cycle of carbo/pemetrexed + pembrolizumab () presented to [facility] with sepsis due to Flu and PNA. Pt had an episode this morning of foaming at the mouth, HR of 180 and momentary confusion. She received IV fluids with adequate resolution of the episode.  Palliative is consulted to clarify goals of care and advance directives because of her advanced disease and patient's clearly thought out plan for advance directives.     Pt had recent hospitalization in mid-January for obstructive mass in RLL debulked with laser, and airway was balloon dilation. She ultimately spent 10 days in the hospital and was discharged home afterwards.       SUBJECTIVE: Pt feels better today, more alert, awake and conversive. She reports nausea and episode of NBNB vomitus last night. She has had no further episodes of disorientation. Her EEG was negative for seizure activity. She denies any nausea currently, has an appetite and wants to have lunch. She denies any focal pain, no diarrhea. She is currently on IV abx.       ROS:  DYSPNEA: YES CALLI 4  NAUS/VOM: NO	  SECRETIONS: NO	  AGITATION: NO  Pain (Y/N):  NO     -Provocation/Palliation:  -Quality/Quantity:  -Radiating:  -Severity:  -Timing/Frequency:  -Impact on ADLs:    OTHER REVIEW OF SYSTEMS: + cough, fatigue, weakness    PEx:  T(C): 37.3 (18 @ 13:38), Max: 37.7 (02-15-18 @ 20:36)  HR: 111 (18 @ 13:38) (107 - 111)  BP: 127/79 (18 @ 13:38) (127/79 - 147/81)  RR: 16 (18 @ 13:38) (15 - 16)  SpO2: 97% (18 @ 13:38) (94% - 97%)      General: Alert, A&O x 3, pleasant,   HEENT:  MM dry, anicteric sclera, PERRL  Neck:  no JVD, no LAD  CVS: S1S2 RRR no murmurs  Resp: decreased BS b/l bases  GI:  Abd soft, NT ND, no masses  :  no suprapubic tenderness  Musc:   no abnormalities  Neuro: grossly intact  Psych:   calm  Skin: no skin rashes  Lymph: no palpable lymphadenopathy  Preadmit Karnofsky:  60%           Current Karnofsky:    30  %  Cachexia (Y/N): N  BMI: 29.5	     ALLERGIES: penicillins (Hives)      OPIATE NAÏVE (Y/N): NO    MEDICATIONS  (STANDING):  cefepime  IVPB 1000 milliGRAM(s) IV Intermittent every 8 hours  dextrose 5%. 1000 milliLiter(s) (50 mL/Hr) IV Continuous <Continuous>  dextrose 50% Injectable 12.5 Gram(s) IV Push once  dextrose 50% Injectable 25 Gram(s) IV Push once  dextrose 50% Injectable 25 Gram(s) IV Push once  enoxaparin Injectable 40 milliGRAM(s) SubCutaneous daily  insulin lispro (HumaLOG) corrective regimen sliding scale   SubCutaneous Before meals and at bedtime  magnesium sulfate  IVPB 2 Gram(s) IV Intermittent once  metroNIDAZOLE    Tablet 500 milliGRAM(s) Oral every 8 hours  nystatin    Suspension [id number] Unit(s) Oral four times a day  pantoprazole    Tablet 40 milliGRAM(s) Oral before breakfast  sodium chloride 0.9%. 1000 milliLiter(s) (120 mL/Hr) IV Continuous <Continuous>    MEDICATIONS  (PRN):  acetaminophen   Tablet 650 milliGRAM(s) Oral every 6 hours PRN For Temp greater than 38 C (100.4 F)  dextrose Gel 1 Dose(s) Oral once PRN Blood Glucose LESS THAN 70 milliGRAM(s)/deciliter  glucagon  Injectable 1 milliGRAM(s) IntraMuscular once PRN Glucose LESS THAN 70 milligrams/deciliter  guaiFENesin    Syrup 100 milliGRAM(s) Oral every 6 hours PRN Cough      LABS:                           7.5    3.8   )-----------( 119      ( 2018 06:09 )             23.2         CBC Full  -  ( 2018 06:09 )  WBC Count : 3.8 K/uL  Hemoglobin : 7.5 g/dL  Hematocrit : 23.2 %  Platelet Count - Automated : 119 K/uL  Mean Cell Volume : 75.6 fL  Mean Cell Hemoglobin : 24.4 pg  Mean Cell Hemoglobin Concentration : 32.3 g/dL  Auto Neutrophil % : 84.0 %  Auto Lymphocyte % : 10.0 %  Auto Monocyte % : 5.0 %        134<L>  |  99  |  6<L>  ----------------------------<  101<H>  3.0<L>   |  21<L>  |  0.59    Ca    8.2<L>      2018 06:09  Phos  2.6     02-15  Mg     1.8         TPro  7.1  /  Alb  2.3<L>  /  TBili  1.0  /  DBili  x   /  AST  35  /  ALT  68<H>  /  AlkPhos  132<H>  02-15        PT/INR - ( 15 Feb 2018 09:53 )   PT: 15.0 sec;   INR: 1.34          PTT - ( 15 Feb 2018 09:53 )  PTT:28.1 sec          IMAGING: REVIEWED  none new    ADVANCED DIRECTIVES:     DNR     DNI    MOLST- pending    Decision maker: self, has capacity  Legal surrogate: Naomi Yeh (daughter) [phone]    PSYCHOSOCIAL-SPIRITUAL ASSESSMENT:       Reviewed       Care plan adjusted as above	    GOALS OF CARE DISCUSSION       Palliative care info/counseling provided	           Documentation of GOC: DNR/DNI, no feeding tubes.  Patient still willing to continue treatment if and when offered  	      REFERRALS	        Palliative Med        Unit SW/Case Mgmt       Patient/Family Support       PT/OT

## 2018-02-16 NOTE — DIETITIAN INITIAL EVALUATION ADULT. - ENERGY NEEDS
IBW used as pt is currently greater than 120% ideal body weight.   Increased needs secondary to hypermetabolic state and recent weight loss concerning for severe malnutrition

## 2018-02-16 NOTE — CHART NOTE - NSCHARTNOTEFT_GEN_A_CORE
Infectious Diseases Anti-infective Approval Note    Medication:  cefepime  Dose:  1 g  Route:  IV  Frequency:  q8h  Duration: 5 d  Comment:  Approved on 2/15    *THIS IS NOT AN INFECTIOUS DISEASES CONSULTATION*

## 2018-02-16 NOTE — PROGRESS NOTE ADULT - SUBJECTIVE AND OBJECTIVE BOX
Heme/Onc Progress Note (Dr. Noonan)    Interval History: No acute events overnight. No seizures seen on EEG. Patient noted to have some vomiting in early AM, non  bloody. Patient still feels fatigued however no acute fevers or chills, bleeding or bruising including brbpr, melena or hematuria.     ROS is otherwise negative.      Allergies    penicillins (Hives)    Intolerances        Medications:  MEDICATIONS  (STANDING):  cefepime  IVPB 1000 milliGRAM(s) IV Intermittent every 8 hours  dextrose 5%. 1000 milliLiter(s) (50 mL/Hr) IV Continuous <Continuous>  dextrose 50% Injectable 12.5 Gram(s) IV Push once  dextrose 50% Injectable 25 Gram(s) IV Push once  dextrose 50% Injectable 25 Gram(s) IV Push once  enoxaparin Injectable 40 milliGRAM(s) SubCutaneous daily  insulin lispro (HumaLOG) corrective regimen sliding scale   SubCutaneous Before meals and at bedtime  magnesium sulfate  IVPB 2 Gram(s) IV Intermittent once  metroNIDAZOLE    Tablet 500 milliGRAM(s) Oral every 8 hours  nystatin    Suspension 828242 Unit(s) Oral four times a day  pantoprazole    Tablet 40 milliGRAM(s) Oral before breakfast  sodium chloride 0.9%. 1000 milliLiter(s) (120 mL/Hr) IV Continuous <Continuous>    MEDICATIONS  (PRN):  acetaminophen   Tablet 650 milliGRAM(s) Oral every 6 hours PRN For Temp greater than 38 C (100.4 F)  dextrose Gel 1 Dose(s) Oral once PRN Blood Glucose LESS THAN 70 milliGRAM(s)/deciliter  glucagon  Injectable 1 milliGRAM(s) IntraMuscular once PRN Glucose LESS THAN 70 milligrams/deciliter  guaiFENesin    Syrup 100 milliGRAM(s) Oral every 6 hours PRN Cough    enoxaparin Injectable 40 milliGRAM(s) SubCutaneous daily          PHYSICAL EXAM:    T(F): 99.2 (18 @ 13:38), Max: 99.8 (02-15-18 @ 20:36)  HR: 111 (18 @ 13:38) (107 - 111)    GEN: fatigued   HEENT: AT/NC, thick oral secretions  NECK: supple  CVS: +S1S2 tachy  LUNG: CTA B/L   ABD: +BS, NT, ND  EXT: no c/c/e  NEURO: aaox3, non focal       Labs:                          7.5    3.8   )-----------( 119      ( 2018 06:09 )             23.2     CBC Full  -  ( 2018 06:09 )  WBC Count : 3.8 K/uL  Hemoglobin : 7.5 g/dL  Hematocrit : 23.2 %  Platelet Count - Automated : 119 K/uL  Mean Cell Volume : 75.6 fL  Mean Cell Hemoglobin : 24.4 pg  Mean Cell Hemoglobin Concentration : 32.3 g/dL  Auto Neutrophil # : x  Auto Lymphocyte # : x  Auto Monocyte # : x  Auto Eosinophil # : x  Auto Basophil # : x  Auto Neutrophil % : 84.0 %  Auto Lymphocyte % : 10.0 %  Auto Monocyte % : 5.0 %  Auto Eosinophil % : x  Auto Basophil % : x    PT/INR - ( 15 Feb 2018 09:53 )   PT: 15.0 sec;   INR: 1.34          PTT - ( 15 Feb 2018 09:53 )  PTT:28.1 sec    02    134<L>  |  99  |  6<L>  ----------------------------<  101<H>  3.0<L>   |  21<L>  |  0.59    Ca    8.2<L>      2018 06:09  Phos  2.6     02-15  Mg     1.8     16    TPro  7.1  /  Alb  2.3<L>  /  TBili  1.0  /  DBili  x   /  AST  35  /  ALT  68<H>  /  AlkPhos  132<H>  02-15      Urinalysis Basic - ( 15 Feb 2018 11:06 )    Color: Yellow / Appearance: Clear / S.010 / pH: x  Gluc: x / Ketone: Trace mg/dL  / Bili: Negative / Urobili: 0.2 E.U./dL   Blood: x / Protein: NEGATIVE mg/dL / Nitrite: NEGATIVE   Leuk Esterase: NEGATIVE / RBC: < 5 /HPF / WBC < 5 /HPF   Sq Epi: x / Non Sq Epi: 0-5 /HPF / Bacteria: Present /HPF      IMPRESSION:  Perihilar mass probably unchanged allowing for differences in imaging   technique.  Widening of the superior mediastinum with leftward draping of the   tracheal air column unchanged.

## 2018-02-16 NOTE — PROGRESS NOTE ADULT - PROBLEM SELECTOR PLAN 2
Patient admitted with sepsis due to influenza.  Presently on treatment w/ improvement in clinical status today. Will continue with IV Abx for concurrent pneumonia.  EEG negative for seizure activity.

## 2018-02-16 NOTE — PROGRESS NOTE ADULT - PROBLEM SELECTOR PLAN 4
-Pt with stage IV lung CA with mets to bone and brain s/p radiation to mets on R 8th rib last week and gamma knife treatment to brain on Jan 8th.   -underwent 1st chemo on Tuesday and developed mild diarrhea afterwards  -per heme/onc continue to trend CBC w/ diff and ANC, if neutropenic and febrile recommend restarting abx treatment to cover etiologies other than flu  -patient scheduled for next round of chemotherapy on 2/27  -Patient taken decadron, marinol, oxycontin, and nystatin -Pt with stage IV lung CA with mets to bone and brain s/p radiation to mets on R 8th rib last week and gamma knife treatment to brain on Jan 8th.   -underwent 1st chemo on Tuesday and developed mild diarrhea afterwards  -per heme/onc continue to trend CBC w/ diff and ANC, if neutropenic and febrile recommend restarting abx treatment to cover etiologies other than flu  -patient scheduled for next round of chemotherapy on 2/27  -Patient taken decadron, marinol, oxycontin, and nystatin  -palliative following, she is DNR/DNI no pressors

## 2018-02-16 NOTE — PROGRESS NOTE ADULT - ASSESSMENT
Patient is a 63 yo F with PMHx of HTN, DM, lung CA with mets to brain and bone s/p tx with gamma knife and radiation respectively presents c/o fever/chills, cough and diarrhea. Patient is admitted to 7 WO for sepsis workup 2/2 influenza and supportive management. Patient had with rapid on 2/15 fever of 101.4, tachy to 180s, and AMS.

## 2018-02-16 NOTE — PROGRESS NOTE ADULT - PROBLEM SELECTOR PLAN 3
-positive RVP, plan outline as above  -RVP still positive for Influenza A on 2/15 -positive RVP, plan outline as above  -RVP still positive for Influenza A on 2/15, can be off droplet since patient completed 5 day course of tamiflu

## 2018-02-16 NOTE — PROGRESS NOTE ADULT - SUBJECTIVE AND OBJECTIVE BOX
CC: Bang GORMAN, CP.   Emesis has improved.   Diarrhea is now resolved.   Yesterday's event noted: Rapid response with Severe sepsis, sinus tach, encephalopathy and worsening lung infiltrate.   Rest of ROS negative.     Vital Signs Last 24 Hrs  T(C): 36.8 (16 Feb 2018 05:48), Max: 37.7 (15 Feb 2018 20:36)  T(F): 98.3 (16 Feb 2018 05:48), Max: 99.8 (15 Feb 2018 20:36)  HR: 108 (16 Feb 2018 05:48) (107 - 108)  BP: 147/81 (16 Feb 2018 05:48) (135/75 - 147/81)  BP(mean): --  RR: 15 (16 Feb 2018 05:48) (15 - 15)  SpO2: 94% (16 Feb 2018 05:48) (94% - 94%)    PHYSICAL EXAMINATION  * General: Not in acute distress. Awake and alert. Lying comfortably in bed.  * Head: Normocephalic, atraumatic.  * HEENT: ears no discharge, eyes PERRLA, nose no discharge, throat no exudates, normal tonsils.  * Neck: no JVD, supple.  * Lungs: Mild crackles on RLL.   * Cardio: Regular rate and rhythm, no murmurs, no rubs, no gallops. Good peripheral pulses.  * Abdomen: Soft, non-tender, non-distended, tympanic to percussion, no rebound, no guarding, no rigidity. Bowel sounds present. No suprapubic or CVA tenderness.  * : Deferred.  * Extremities: Acyanotic, no edema.  * Skin: Warm and dry.  * Neuro: Has vEEG on head. Alert and oriented x 3. No focal deficits. Motor strength is 5/5 throughout. Sensation intact. Cranial nerves II-XII grossly intact.                           7.5    3.8   )-----------( 119      ( 16 Feb 2018 06:09 )             23.2   02-16    134<L>  |  99  |  6<L>  ----------------------------<  101<H>  3.0<L>   |  21<L>  |  0.59    Ca    8.2<L>      16 Feb 2018 06:09  Phos  2.6     02-15  Mg     1.8     02-16    TPro  7.1  /  Alb  2.3<L>  /  TBili  1.0  /  DBili  x   /  AST  35  /  ALT  68<H>  /  AlkPhos  132<H>  02-15    MEDICATIONS  (STANDING):  cefepime  IVPB 1000 milliGRAM(s) IV Intermittent every 8 hours  dextrose 5%. 1000 milliLiter(s) (50 mL/Hr) IV Continuous <Continuous>  dextrose 50% Injectable 12.5 Gram(s) IV Push once  dextrose 50% Injectable 25 Gram(s) IV Push once  dextrose 50% Injectable 25 Gram(s) IV Push once  enoxaparin Injectable 40 milliGRAM(s) SubCutaneous daily  insulin lispro (HumaLOG) corrective regimen sliding scale   SubCutaneous Before meals and at bedtime  magnesium sulfate  IVPB 2 Gram(s) IV Intermittent once  metroNIDAZOLE    Tablet 500 milliGRAM(s) Oral every 8 hours  nystatin    Suspension 771162 Unit(s) Oral four times a day  pantoprazole    Tablet 40 milliGRAM(s) Oral before breakfast  potassium chloride  20 mEq/100 mL IVPB 20 milliEquivalent(s) IV Intermittent every 2 hours  sodium chloride 0.9%. 1000 milliLiter(s) (120 mL/Hr) IV Continuous <Continuous>    MEDICATIONS  (PRN):  acetaminophen   Tablet 650 milliGRAM(s) Oral every 6 hours PRN For Temp greater than 38 C (100.4 F)  dextrose Gel 1 Dose(s) Oral once PRN Blood Glucose LESS THAN 70 milliGRAM(s)/deciliter  glucagon  Injectable 1 milliGRAM(s) IntraMuscular once PRN Glucose LESS THAN 70 milligrams/deciliter  guaiFENesin    Syrup 100 milliGRAM(s) Oral every 6 hours PRN Cough

## 2018-02-16 NOTE — PROGRESS NOTE ADULT - PROBLEM SELECTOR PLAN 5
Resolved yesterday. DDx: chemotx related vs influenza perse, vs Tamiflu side effect.   Very low suspicion for infectious colitis and/or c. diff.   Diarrhea resolved w/o treatment.   * Can DC flagyl.  * No need for isolation precautions. Resolved yesterday. DDx: chemotx related vs influenza perse, vs Tamiflu side effect.   Very low suspicion for infectious colitis and/or c. diff.   Diarrhea resolved w/o treatment.   * No need for isolation precautions.

## 2018-02-16 NOTE — DIETITIAN INITIAL EVALUATION ADULT. - NUTRITION INTERVENTION
Feeding Assistance/Meals and Snack/Medical Food Supplements/Collaboration and Referral of Nutrition Care

## 2018-02-16 NOTE — PROGRESS NOTE ADULT - SUBJECTIVE AND OBJECTIVE BOX
INTERVAL HPI/OVERNIGHT EVENTS: none    Patient was seen and examined at bedside this am. As per nurse and patient, no o/n events, patient resting comfortably. Patient complains of headache, nausea with one episode of bilious, non-bloody emesis, cough and non-pleuritic, non-radiating chest tightness since yesterday that she relates to not being able to clear mucus from her lungs. She denies any recent episodes of diarrhea or bowel movements since yesterday morning. Otherwise patient denies: fever, chills, dizziness, weakness, CP, palpitations, SOB, D/C, dysuria, hematuria, LE edema.    ROS: as above    VITAL SIGNS:  T(F): 98.3 (18 @ 05:48)  HR: 108 (18 @ 05:48)  BP: 147/81 (18 @ 05:48)  RR: 15 (18 @ 05:48)  SpO2: 94% (18 @ 05:48)  Wt(kg): --    PHYSICAL EXAM:    Constitutional: WDWN, NAD  Eyes: anicteric and nonerythematous sclera, PERRL, EOMI  Mouth- white plaques on the tongue   Neck: supple, trachea midline, no masses, no JVD  Respiratory: mild rhonchi to ausculation of the b/l lung fields   Cardiovascular: chest wall nontender to palpation, RRR, normal S1S2, no M/R/G  Gastrointestinal: normoactive BS x4 ,soft, NTND, no masses palpable  Extremities: Warm, well perfused, pulses 2+ and equal bilateral upper and lower extremities, no edema, no clubbing, no tenderness to palpation of the b/l LE  Neurological: awake, AAOx3  Skin: Normal temperature, warm, dry    MEDICATIONS  (STANDING):  cefepime Injectable.      cefepime Injectable. 1000 milliGRAM(s) IV Push every 8 hours  dextrose 5%. 1000 milliLiter(s) (50 mL/Hr) IV Continuous <Continuous>  dextrose 50% Injectable 12.5 Gram(s) IV Push once  dextrose 50% Injectable 25 Gram(s) IV Push once  dextrose 50% Injectable 25 Gram(s) IV Push once  enoxaparin Injectable 40 milliGRAM(s) SubCutaneous daily  insulin lispro (HumaLOG) corrective regimen sliding scale   SubCutaneous Before meals and at bedtime  magnesium sulfate  IVPB 2 Gram(s) IV Intermittent once  metroNIDAZOLE    Tablet 500 milliGRAM(s) Oral every 8 hours  nystatin    Suspension [id number] Unit(s) Oral four times a day  pantoprazole    Tablet 40 milliGRAM(s) Oral before breakfast  potassium chloride  20 mEq/100 mL IVPB 20 milliEquivalent(s) IV Intermittent every 2 hours  sodium chloride 0.9%. 1000 milliLiter(s) (120 mL/Hr) IV Continuous <Continuous>    MEDICATIONS  (PRN):  acetaminophen   Tablet 650 milliGRAM(s) Oral every 6 hours PRN For Temp greater than 38 C (100.4 F)  dextrose Gel 1 Dose(s) Oral once PRN Blood Glucose LESS THAN 70 milliGRAM(s)/deciliter  glucagon  Injectable 1 milliGRAM(s) IntraMuscular once PRN Glucose LESS THAN 70 milligrams/deciliter  guaiFENesin    Syrup 100 milliGRAM(s) Oral every 6 hours PRN Cough      Allergies    penicillins (Hives)    Intolerances        LABS:                        7.5    3.8   )-----------( 119      ( 2018 06:09 )             23.2     02-16    134<L>  |  99  |  6<L>  ----------------------------<  101<H>  3.0<L>   |  21<L>  |  0.59    Ca    8.2<L>      2018 06:09  Phos  2.6     02-15  Mg     1.8     -16    TPro  7.1  /  Alb  2.3<L>  /  TBili  1.0  /  DBili  x   /  AST  35  /  ALT  68<H>  /  AlkPhos  132<H>  02-15    PT/INR - ( 15 Feb 2018 09:53 )   PT: 15.0 sec;   INR: 1.34          PTT - ( 15 Feb 2018 09:53 )  PTT:28.1 sec  Urinalysis Basic - ( 15 Feb 2018 11:06 )    Color: Yellow / Appearance: Clear / S.010 / pH: x  Gluc: x / Ketone: Trace mg/dL  / Bili: Negative / Urobili: 0.2 E.U./dL   Blood: x / Protein: NEGATIVE mg/dL / Nitrite: NEGATIVE   Leuk Esterase: NEGATIVE / RBC: < 5 /HPF / WBC < 5 /HPF   Sq Epi: x / Non Sq Epi: 0-5 /HPF / Bacteria: Present /HPF        RADIOLOGY & ADDITIONAL TESTS:

## 2018-02-16 NOTE — PROGRESS NOTE ADULT - PROBLEM SELECTOR PLAN 1
Improved. HD stable. Eivolemic.   DDx include evolving sepsis from Influenza vs superimposed PNA.   Now on Cefepime.  Completed course of Tamiflu.

## 2018-02-16 NOTE — PROGRESS NOTE ADULT - PROBLEM SELECTOR PLAN 5
-Hx of HTN controlled with amlodipine at home  -normotensive at this time with SBP in 130s-150s, holding HTN medication in the setting of sepsis  -once clinically improved restart BP meds as tolerated   -EKG showed QTC of 519 on this admission, hx of prolonged Qtc in the past, patient is asymptomatic but avoid Zofran for nausea considering hx   -give Tigan q6 PRN for nausea if needed

## 2018-02-16 NOTE — PROGRESS NOTE ADULT - PROBLEM SELECTOR PLAN 1
Stage IV lung cancer w/ mets to brain and bone, s/p RT to brain lesions and R 8th rib lesion. s/p 1 cycle of chemotherapy, given worsening clinical status and active infection, further treatment is on hold. Medical oncology will evaluate for improvement in clinical status overall and improvement in performance status prior to further treatment as outpt. Pt is clinically improving

## 2018-02-16 NOTE — PROGRESS NOTE ADULT - ASSESSMENT
63yo F, PMH of HTN, DM, lung CA with mets to brain and bone s/p Rtx. Admitted for sepsis 2/2 influenza virus, complicated by severe sepsis 2/2 evolving PNA.

## 2018-02-16 NOTE — PROGRESS NOTE ADULT - PROBLEM SELECTOR PLAN 1
-Pt met 3/4 SIRS criteria on admission, likely 2/2 influenza  -2/15 with AMS, tachycardia, T 101.4, hypotension, lactate 1.8  -received 2L NS, f/u blood cx, c diff, stool cx, stool o and p  -last day of Tamiflu day 5/5, RVP still positive for influenza, can come off droplet tmrw  -Patient with vomiting yesterday and concern for aspiration PNA. CXR looks overloaded on R side with possible pna, patient is penicillin allergic pt started on cefepime and metronidazole, ID approved  -Supportive management for cough with guaifenesin  -f/u bronchoscopy on previous admission on 2nd week of March with Dr. Xiong  -patient with possible absence seizure leading to aspiration, given keppra 1g and EEG to monitor for seizures.  #Diarrhea  -ruling out c.diff  -persistently watery diarrhea throughout this admission, could be 2/2 to chemo treatment or influenza  -no BM since 2/15 in the am, f/u with C.diff, O&P, and stool culture with next BM   -poor PO intake, getting  -Pt met 3/4 SIRS criteria on admission, likely 2/2 influenza  -2/15 with AMS, tachycardia, T 101.4, hypotension, lactate 1.8  -received 2L NS, f/u blood cx  -patient has not yet had bowel movement so c diff, stool cx, stool o and p not collected  -last day of Tamiflu day 5/5 on 2/15, RVP still positive for influenza, can come off droplet tmrw  -Patient with vomiting 2/14 and concern for aspiration PNA. CXR looks overloaded on R side with possible pna vs chemical pneumonitis, patient is penicillin allergic pt started on cefepime and metronidazole on 2/15  -Supportive management for cough with guaifenesin  -f/u bronchoscopy on previous admission on 2nd week of March with Dr. Xiong  -patient with possible absence seizure leading to aspiration, given keppra 1g and EEG to monitor for seizures. EEG showing no seizures, no need to continue keppra     #Diarrhea  -ruling out c.diff  -persistently watery diarrhea throughout this admission, could be 2/2 to chemo treatment or influenza  -no BM since 2/15 in the am, f/u with C.diff, O&P, and stool culture with next BM   -poor PO intake, getting

## 2018-02-16 NOTE — DIETITIAN INITIAL EVALUATION ADULT. - OTHER INFO
Pt with past medical history significant for stage IV lung CA with mets to brain and bone.  Pt presents with fever, chills and cough x1d; positive influenza.  Of note from previous admission, pt with ~15kg weight loss since Pt with past medical history significant for stage IV lung CA with mets to brain and bone.  Pt presents with fever, chills and cough x1d; positive influenza.  Of note from previous admission, pt with ~15kg weight loss since Dec 2017 secondary to decreased PO intake and hypermetabolic state concerning for severe malnutrition.  Minimal interview able to be completed with pt.  Pt with poor fair appetite/intake at present.  Pt with no recent complaints of GI distress; pain is being managed.  Skin: jacob 18.

## 2018-02-16 NOTE — CHART NOTE - NSCHARTNOTEFT_GEN_A_CORE
Upon Nutritional Assessment by the Registered Dietitian your patient was determined to meet criteria / has evidence of the following diagnosis/diagnoses:          [ ]  Mild Protein Calorie Malnutrition        [ ]  Moderate Protein Calorie Malnutrition        [ x] Severe Protein Calorie Malnutrition        [ ] Unspecified Protein Calorie Malnutrition        [ ] Underweight / BMI <19        [ ] Morbid Obesity / BMI > 40    ~15% weight loss x2.5 months secondary to decreased PO intake and hypermetabolic state     Findings as based on:  •  Comprehensive nutrition assessment and consultation    Treatment:    The following diet has been recommended:  1. Consider liberalizing diet to Regular   2. Recommend adding Ensure Enlive TID (1050kcal, 60g pro)  3. Optimize nutrition and hydration status within the goals of care  4. Appreciate continued assistance and encouragement with meals     PROVIDER Section:     By signing this assessment you are acknowledging and agree with the diagnosis/diagnoses assigned by the Registered Dietitian    Comments:

## 2018-02-17 LAB
ANION GAP SERPL CALC-SCNC: 13 MMOL/L — SIGNIFICANT CHANGE UP (ref 5–17)
BASOPHILS NFR BLD AUTO: 0.5 % — SIGNIFICANT CHANGE UP (ref 0–2)
BUN SERPL-MCNC: 5 MG/DL — LOW (ref 7–23)
CALCIUM SERPL-MCNC: 7.8 MG/DL — LOW (ref 8.4–10.5)
CHLORIDE SERPL-SCNC: 100 MMOL/L — SIGNIFICANT CHANGE UP (ref 96–108)
CO2 SERPL-SCNC: 23 MMOL/L — SIGNIFICANT CHANGE UP (ref 22–31)
CREAT SERPL-MCNC: 0.63 MG/DL — SIGNIFICANT CHANGE UP (ref 0.5–1.3)
CULTURE RESULTS: SIGNIFICANT CHANGE UP
EOSINOPHIL NFR BLD AUTO: 0.2 % — SIGNIFICANT CHANGE UP (ref 0–6)
GLUCOSE BLDC GLUCOMTR-MCNC: 103 MG/DL — HIGH (ref 70–99)
GLUCOSE BLDC GLUCOMTR-MCNC: 106 MG/DL — HIGH (ref 70–99)
GLUCOSE BLDC GLUCOMTR-MCNC: 107 MG/DL — HIGH (ref 70–99)
GLUCOSE BLDC GLUCOMTR-MCNC: 114 MG/DL — HIGH (ref 70–99)
GLUCOSE SERPL-MCNC: 108 MG/DL — HIGH (ref 70–99)
HCT VFR BLD CALC: 25.5 % — LOW (ref 34.5–45)
HGB BLD-MCNC: 8.1 G/DL — LOW (ref 11.5–15.5)
LYMPHOCYTES # BLD AUTO: 9.2 % — LOW (ref 13–44)
MAGNESIUM SERPL-MCNC: 1.6 MG/DL — SIGNIFICANT CHANGE UP (ref 1.6–2.6)
MCHC RBC-ENTMCNC: 24.5 PG — LOW (ref 27–34)
MCHC RBC-ENTMCNC: 31.8 G/DL — LOW (ref 32–36)
MCV RBC AUTO: 77.3 FL — LOW (ref 80–100)
MONOCYTES NFR BLD AUTO: 6.5 % — SIGNIFICANT CHANGE UP (ref 2–14)
NEUTROPHILS NFR BLD AUTO: 83.6 % — HIGH (ref 43–77)
PLATELET # BLD AUTO: 124 K/UL — LOW (ref 150–400)
POTASSIUM SERPL-MCNC: 3 MMOL/L — LOW (ref 3.5–5.3)
POTASSIUM SERPL-SCNC: 3 MMOL/L — LOW (ref 3.5–5.3)
RBC # BLD: 3.3 M/UL — LOW (ref 3.8–5.2)
RBC # FLD: 18 % — HIGH (ref 10.3–16.9)
SODIUM SERPL-SCNC: 136 MMOL/L — SIGNIFICANT CHANGE UP (ref 135–145)
SPECIMEN SOURCE: SIGNIFICANT CHANGE UP
WBC # BLD: 4.4 K/UL — SIGNIFICANT CHANGE UP (ref 3.8–10.5)
WBC # FLD AUTO: 4.4 K/UL — SIGNIFICANT CHANGE UP (ref 3.8–10.5)

## 2018-02-17 PROCEDURE — 71045 X-RAY EXAM CHEST 1 VIEW: CPT | Mod: 26

## 2018-02-17 PROCEDURE — 99233 SBSQ HOSP IP/OBS HIGH 50: CPT

## 2018-02-17 RX ORDER — MAGNESIUM SULFATE 500 MG/ML
4 VIAL (ML) INJECTION ONCE
Qty: 0 | Refills: 0 | Status: COMPLETED | OUTPATIENT
Start: 2018-02-17 | End: 2018-02-17

## 2018-02-17 RX ORDER — POTASSIUM CHLORIDE 20 MEQ
10 PACKET (EA) ORAL
Qty: 0 | Refills: 0 | Status: COMPLETED | OUTPATIENT
Start: 2018-02-17 | End: 2018-02-17

## 2018-02-17 RX ORDER — ZALEPLON 10 MG
5 CAPSULE ORAL ONCE
Qty: 0 | Refills: 0 | Status: DISCONTINUED | OUTPATIENT
Start: 2018-02-17 | End: 2018-02-20

## 2018-02-17 RX ORDER — ZALEPLON 10 MG
5 CAPSULE ORAL ONCE
Qty: 0 | Refills: 0 | Status: DISCONTINUED | OUTPATIENT
Start: 2018-02-17 | End: 2018-02-17

## 2018-02-17 RX ORDER — POTASSIUM CHLORIDE 20 MEQ
20 PACKET (EA) ORAL
Qty: 0 | Refills: 0 | Status: COMPLETED | OUTPATIENT
Start: 2018-02-17 | End: 2018-02-17

## 2018-02-17 RX ADMIN — Medication 5 MILLIGRAM(S): at 04:05

## 2018-02-17 RX ADMIN — SODIUM CHLORIDE 120 MILLILITER(S): 9 INJECTION INTRAMUSCULAR; INTRAVENOUS; SUBCUTANEOUS at 15:05

## 2018-02-17 RX ADMIN — Medication 100 MILLIEQUIVALENT(S): at 12:33

## 2018-02-17 RX ADMIN — Medication 100 GRAM(S): at 15:05

## 2018-02-17 RX ADMIN — Medication 100 MILLIEQUIVALENT(S): at 22:26

## 2018-02-17 RX ADMIN — CEFEPIME 100 MILLIGRAM(S): 1 INJECTION, POWDER, FOR SOLUTION INTRAMUSCULAR; INTRAVENOUS at 22:35

## 2018-02-17 RX ADMIN — Medication 500 MILLIGRAM(S): at 07:11

## 2018-02-17 RX ADMIN — Medication 100 MILLIEQUIVALENT(S): at 08:58

## 2018-02-17 RX ADMIN — Medication 5 MILLIGRAM(S): at 22:39

## 2018-02-17 RX ADMIN — Medication 100 MILLIEQUIVALENT(S): at 21:26

## 2018-02-17 RX ADMIN — Medication 500000 UNIT(S): at 07:11

## 2018-02-17 RX ADMIN — CEFEPIME 100 MILLIGRAM(S): 1 INJECTION, POWDER, FOR SOLUTION INTRAMUSCULAR; INTRAVENOUS at 07:11

## 2018-02-17 RX ADMIN — Medication 100 MILLIEQUIVALENT(S): at 09:52

## 2018-02-17 RX ADMIN — CEFEPIME 100 MILLIGRAM(S): 1 INJECTION, POWDER, FOR SOLUTION INTRAMUSCULAR; INTRAVENOUS at 14:38

## 2018-02-17 RX ADMIN — Medication 100 MILLIGRAM(S): at 04:05

## 2018-02-17 RX ADMIN — Medication 100 MILLIEQUIVALENT(S): at 19:47

## 2018-02-17 RX ADMIN — Medication 500000 UNIT(S): at 22:38

## 2018-02-17 RX ADMIN — Medication 500 MILLIGRAM(S): at 22:35

## 2018-02-17 RX ADMIN — PANTOPRAZOLE SODIUM 40 MILLIGRAM(S): 20 TABLET, DELAYED RELEASE ORAL at 07:11

## 2018-02-17 RX ADMIN — ENOXAPARIN SODIUM 40 MILLIGRAM(S): 100 INJECTION SUBCUTANEOUS at 11:55

## 2018-02-17 RX ADMIN — Medication 500000 UNIT(S): at 17:47

## 2018-02-17 RX ADMIN — Medication 500 MILLIGRAM(S): at 13:35

## 2018-02-17 RX ADMIN — Medication 100 MILLIGRAM(S): at 22:35

## 2018-02-17 RX ADMIN — Medication 500000 UNIT(S): at 11:55

## 2018-02-17 NOTE — PROGRESS NOTE ADULT - PROBLEM SELECTOR PLAN 2
Treating for presumably Gram negative vs aspiration PNA.   Worsening infiltrate, likely superimposed PNA.   * On Cefepime, as above.  * CXR on Sunday AM.   * Needs IS.  * OOB to chair.

## 2018-02-17 NOTE — PROGRESS NOTE ADULT - SUBJECTIVE AND OBJECTIVE BOX
CC: Bang SOB, CP.   Emesis has improved but still present  No diarrhea in >48-72h.   Rest of ROS negative.     Vital Signs Last 24 Hrs  T(C): 37.1 (17 Feb 2018 12:04), Max: 37.1 (17 Feb 2018 12:04)  T(F): 98.7 (17 Feb 2018 12:04), Max: 98.7 (17 Feb 2018 12:04)  HR: 106 (17 Feb 2018 12:04) (99 - 110)  BP: 131/76 (17 Feb 2018 12:04) (115/57 - 131/76)  BP(mean): --  RR: 16 (17 Feb 2018 12:04) (16 - 16)  SpO2: 98% (17 Feb 2018 12:04) (97% - 98%)    PHYSICAL EXAMINATION  * General: Not in acute distress. Awake and alert. Lying comfortably in bed.  * Head: Normocephalic, atraumatic.  * HEENT: ears no discharge, eyes PERRLA, nose no discharge, throat no exudates, normal tonsils.  * Neck: no JVD, supple.  * Lungs: Mild crackles on RLL.   * Cardio: Regular rate and rhythm, no murmurs, no rubs, no gallops. Good peripheral pulses.  * Abdomen: Soft, non-tender, non-distended, tympanic to percussion, no rebound, no guarding, no rigidity. Bowel sounds present. No suprapubic or CVA tenderness.  * : Deferred.  * Extremities: Acyanotic, no edema.  * Skin: Warm and dry.  * Neuro: Has vEEG on head. Alert and oriented x 3. No focal deficits. Motor strength is 5/5 throughout. Sensation intact. Cranial nerves II-XII grossly intact.                           8.1    4.4   )-----------( 124      ( 17 Feb 2018 06:40 )             25.5   02-17    136  |  100  |  5<L>  ----------------------------<  108<H>  3.0<L>   |  23  |  0.63    Ca    7.8<L>      17 Feb 2018 06:40  Mg     1.6     02-17    MEDICATIONS  (STANDING):  cefepime  IVPB 1000 milliGRAM(s) IV Intermittent every 8 hours  dextrose 5%. 1000 milliLiter(s) (50 mL/Hr) IV Continuous <Continuous>  dextrose 50% Injectable 12.5 Gram(s) IV Push once  dextrose 50% Injectable 25 Gram(s) IV Push once  dextrose 50% Injectable 25 Gram(s) IV Push once  enoxaparin Injectable 40 milliGRAM(s) SubCutaneous daily  insulin lispro (HumaLOG) corrective regimen sliding scale   SubCutaneous Before meals and at bedtime  magnesium sulfate  IVPB 4 Gram(s) IV Intermittent once  melatonin 5 milliGRAM(s) Oral at bedtime  metroNIDAZOLE    Tablet 500 milliGRAM(s) Oral every 8 hours  nystatin    Suspension 133539 Unit(s) Oral four times a day  pantoprazole    Tablet 40 milliGRAM(s) Oral before breakfast  sodium chloride 0.9%. 1000 milliLiter(s) (120 mL/Hr) IV Continuous <Continuous>    MEDICATIONS  (PRN):  acetaminophen   Tablet 650 milliGRAM(s) Oral every 6 hours PRN For Temp greater than 38 C (100.4 F)  dextrose Gel 1 Dose(s) Oral once PRN Blood Glucose LESS THAN 70 milliGRAM(s)/deciliter  glucagon  Injectable 1 milliGRAM(s) IntraMuscular once PRN Glucose LESS THAN 70 milligrams/deciliter  guaiFENesin    Syrup 100 milliGRAM(s) Oral every 6 hours PRN Cough

## 2018-02-17 NOTE — PROGRESS NOTE ADULT - PROBLEM SELECTOR PLAN 5
Resolved yesterday. DDx: chemotx related vs influenza perse, vs Tamiflu side effect.   Very low suspicion for infectious colitis and/or c. diff.   Diarrhea resolved w/o treatment.   * No need for isolation precautions.

## 2018-02-17 NOTE — PROGRESS NOTE ADULT - PROBLEM SELECTOR PLAN 1
Improved. HD stable. Euvolemic.   DDx include evolving sepsis from Influenza vs superimposed PNA.   Now on Cefepime IV and Flagyl.   Completed course of Tamiflu.  * Will c/w IV abx today and planning to de-escalate to PO tomorrow.

## 2018-02-18 LAB
ANION GAP SERPL CALC-SCNC: 13 MMOL/L — SIGNIFICANT CHANGE UP (ref 5–17)
BASOPHILS NFR BLD AUTO: 0.2 % — SIGNIFICANT CHANGE UP (ref 0–2)
BLD GP AB SCN SERPL QL: POSITIVE — SIGNIFICANT CHANGE UP
BUN SERPL-MCNC: 4 MG/DL — LOW (ref 7–23)
CALCIUM SERPL-MCNC: 8 MG/DL — LOW (ref 8.4–10.5)
CHLORIDE SERPL-SCNC: 98 MMOL/L — SIGNIFICANT CHANGE UP (ref 96–108)
CO2 SERPL-SCNC: 23 MMOL/L — SIGNIFICANT CHANGE UP (ref 22–31)
CREAT SERPL-MCNC: 0.6 MG/DL — SIGNIFICANT CHANGE UP (ref 0.5–1.3)
EOSINOPHIL NFR BLD AUTO: 0.2 % — SIGNIFICANT CHANGE UP (ref 0–6)
GLUCOSE BLDC GLUCOMTR-MCNC: 120 MG/DL — HIGH (ref 70–99)
GLUCOSE BLDC GLUCOMTR-MCNC: 124 MG/DL — HIGH (ref 70–99)
GLUCOSE BLDC GLUCOMTR-MCNC: 187 MG/DL — HIGH (ref 70–99)
GLUCOSE BLDC GLUCOMTR-MCNC: 190 MG/DL — HIGH (ref 70–99)
GLUCOSE SERPL-MCNC: 126 MG/DL — HIGH (ref 70–99)
HCT VFR BLD CALC: 23.2 % — LOW (ref 34.5–45)
HGB BLD-MCNC: 7.3 G/DL — LOW (ref 11.5–15.5)
LYMPHOCYTES # BLD AUTO: 11.1 % — LOW (ref 13–44)
MAGNESIUM SERPL-MCNC: 1.9 MG/DL — SIGNIFICANT CHANGE UP (ref 1.6–2.6)
MCHC RBC-ENTMCNC: 24.4 PG — LOW (ref 27–34)
MCHC RBC-ENTMCNC: 31.5 G/DL — LOW (ref 32–36)
MCV RBC AUTO: 77.6 FL — LOW (ref 80–100)
MONOCYTES NFR BLD AUTO: 9.7 % — SIGNIFICANT CHANGE UP (ref 2–14)
NEUTROPHILS NFR BLD AUTO: 78.8 % — HIGH (ref 43–77)
PHOSPHATE SERPL-MCNC: 2.2 MG/DL — LOW (ref 2.5–4.5)
PLATELET # BLD AUTO: 106 K/UL — LOW (ref 150–400)
POTASSIUM SERPL-MCNC: 2.9 MMOL/L — CRITICAL LOW (ref 3.5–5.3)
POTASSIUM SERPL-SCNC: 2.9 MMOL/L — CRITICAL LOW (ref 3.5–5.3)
RBC # BLD: 2.99 M/UL — LOW (ref 3.8–5.2)
RBC # FLD: 18.1 % — HIGH (ref 10.3–16.9)
RH IG SCN BLD-IMP: NEGATIVE — SIGNIFICANT CHANGE UP
SODIUM SERPL-SCNC: 134 MMOL/L — LOW (ref 135–145)
WBC # BLD: 4.2 K/UL — SIGNIFICANT CHANGE UP (ref 3.8–10.5)
WBC # FLD AUTO: 4.2 K/UL — SIGNIFICANT CHANGE UP (ref 3.8–10.5)

## 2018-02-18 PROCEDURE — 71045 X-RAY EXAM CHEST 1 VIEW: CPT | Mod: 26

## 2018-02-18 PROCEDURE — 99233 SBSQ HOSP IP/OBS HIGH 50: CPT

## 2018-02-18 PROCEDURE — 71250 CT THORAX DX C-: CPT | Mod: 26

## 2018-02-18 RX ORDER — POTASSIUM CHLORIDE 20 MEQ
40 PACKET (EA) ORAL EVERY 4 HOURS
Qty: 0 | Refills: 0 | Status: DISCONTINUED | OUTPATIENT
Start: 2018-02-18 | End: 2018-02-18

## 2018-02-18 RX ORDER — MAGNESIUM SULFATE 500 MG/ML
2 VIAL (ML) INJECTION ONCE
Qty: 0 | Refills: 0 | Status: COMPLETED | OUTPATIENT
Start: 2018-02-18 | End: 2018-02-18

## 2018-02-18 RX ORDER — ZALEPLON 10 MG
5 CAPSULE ORAL ONCE
Qty: 0 | Refills: 0 | Status: DISCONTINUED | OUTPATIENT
Start: 2018-02-18 | End: 2018-02-18

## 2018-02-18 RX ORDER — POTASSIUM CHLORIDE 20 MEQ
40 PACKET (EA) ORAL EVERY 4 HOURS
Qty: 0 | Refills: 0 | Status: COMPLETED | OUTPATIENT
Start: 2018-02-18 | End: 2018-02-18

## 2018-02-18 RX ADMIN — PANTOPRAZOLE SODIUM 40 MILLIGRAM(S): 20 TABLET, DELAYED RELEASE ORAL at 06:07

## 2018-02-18 RX ADMIN — Medication 500 MILLIGRAM(S): at 06:06

## 2018-02-18 RX ADMIN — Medication 500 MILLIGRAM(S): at 14:48

## 2018-02-18 RX ADMIN — Medication 2: at 22:30

## 2018-02-18 RX ADMIN — SODIUM CHLORIDE 120 MILLILITER(S): 9 INJECTION INTRAMUSCULAR; INTRAVENOUS; SUBCUTANEOUS at 14:57

## 2018-02-18 RX ADMIN — ENOXAPARIN SODIUM 40 MILLIGRAM(S): 100 INJECTION SUBCUTANEOUS at 12:42

## 2018-02-18 RX ADMIN — Medication 650 MILLIGRAM(S): at 06:21

## 2018-02-18 RX ADMIN — CEFEPIME 100 MILLIGRAM(S): 1 INJECTION, POWDER, FOR SOLUTION INTRAMUSCULAR; INTRAVENOUS at 06:06

## 2018-02-18 RX ADMIN — Medication 650 MILLIGRAM(S): at 17:48

## 2018-02-18 RX ADMIN — Medication 40 MILLIEQUIVALENT(S): at 20:01

## 2018-02-18 RX ADMIN — Medication 50 GRAM(S): at 11:16

## 2018-02-18 RX ADMIN — Medication 500 MILLIGRAM(S): at 22:30

## 2018-02-18 RX ADMIN — Medication 500000 UNIT(S): at 19:03

## 2018-02-18 RX ADMIN — Medication 500000 UNIT(S): at 06:07

## 2018-02-18 RX ADMIN — Medication 500000 UNIT(S): at 12:42

## 2018-02-18 RX ADMIN — Medication 2: at 17:47

## 2018-02-18 RX ADMIN — Medication 5 MILLIGRAM(S): at 00:43

## 2018-02-18 RX ADMIN — Medication 40 MILLIEQUIVALENT(S): at 16:04

## 2018-02-18 RX ADMIN — Medication 40 MILLIEQUIVALENT(S): at 12:43

## 2018-02-18 NOTE — PROGRESS NOTE ADULT - SUBJECTIVE AND OBJECTIVE BOX
INTERVAL HPI/OVERNIGHT EVENTS: Patient had 1 episode of emesis     Patient was seen and examined at bedside. As per nurse and patient, no o/n events, patient resting comfortably. Patient had headache overnight and 1 bout of vomiting. Patient denies: fever, chills, dizziness, weakness, HA, CP, palpitations, SOB, cough, N/V/D/C, dysuria, changes in bowel movements, LE edema.    ROS: as above    VITAL SIGNS:  T(F): 98.9 (02-18-18 @ 17:42)  HR: 114 (02-18-18 @ 17:42)  BP: 122/81 (02-18-18 @ 17:42)  RR: 16 (02-18-18 @ 17:42)  SpO2: 96% (02-18-18 @ 17:42)  Wt(kg): --    PHYSICAL EXAM:    Constitutional: WDWN, NAD  Eyes: anicteric and nonerythematous sclera, PERRL, EOMI  Mouth- white plaques on the tongue   Neck: supple, trachea midline, no masses, no JVD  Respiratory: mild rhonchi to ausculation of the b/l lung fields   Cardiovascular: chest wall nontender to palpation, RRR, normal S1S2, no M/R/G  Gastrointestinal: normoactive BS x4 ,soft, NTND, no masses palpable  Extremities: Warm, well perfused, pulses 2+ and equal bilateral upper and lower extremities, no edema, no clubbing, no tenderness to palpation of the b/l LE  Neurological: awake, AAOx3  Skin: Normal temperature, warm, dry    MEDICATIONS  (STANDING):  dextrose 5%. 1000 milliLiter(s) (50 mL/Hr) IV Continuous <Continuous>  dextrose 50% Injectable 12.5 Gram(s) IV Push once  dextrose 50% Injectable 25 Gram(s) IV Push once  dextrose 50% Injectable 25 Gram(s) IV Push once  enoxaparin Injectable 40 milliGRAM(s) SubCutaneous daily  insulin lispro (HumaLOG) corrective regimen sliding scale   SubCutaneous Before meals and at bedtime  levoFLOXacin  Tablet 750 milliGRAM(s) Oral every 24 hours  melatonin 5 milliGRAM(s) Oral at bedtime  metroNIDAZOLE    Tablet 500 milliGRAM(s) Oral every 8 hours  nystatin    Suspension 459883 Unit(s) Oral four times a day  pantoprazole    Tablet 40 milliGRAM(s) Oral before breakfast  potassium chloride   Powder 40 milliEquivalent(s) Oral every 4 hours  sodium chloride 0.9%. 1000 milliLiter(s) (120 mL/Hr) IV Continuous <Continuous>    MEDICATIONS  (PRN):  acetaminophen   Tablet 650 milliGRAM(s) Oral every 6 hours PRN For Temp greater than 38 C (100.4 F)  dextrose Gel 1 Dose(s) Oral once PRN Blood Glucose LESS THAN 70 milliGRAM(s)/deciliter  glucagon  Injectable 1 milliGRAM(s) IntraMuscular once PRN Glucose LESS THAN 70 milligrams/deciliter  guaiFENesin    Syrup 100 milliGRAM(s) Oral every 6 hours PRN Cough  zaleplon 5 milliGRAM(s) Oral once PRN Insomnia      Allergies    penicillins (Hives)    Intolerances        LABS:                        7.3    4.2   )-----------( 106      ( 18 Feb 2018 06:52 )             23.2     02-18    134<L>  |  98  |  4<L>  ----------------------------<  126<H>  2.9<LL>   |  23  |  0.60    Ca    8.0<L>      18 Feb 2018 06:50  Phos  2.2     02-18  Mg     1.9     02-18            RADIOLOGY & ADDITIONAL TESTS:

## 2018-02-18 NOTE — PROGRESS NOTE ADULT - PROBLEM SELECTOR PLAN 3
-positive RVP, plan outline as above  -RVP still positive for Influenza A on 2/15, can be off droplet since patient completed 5 day course of tamiflu

## 2018-02-18 NOTE — PROGRESS NOTE ADULT - PROBLEM SELECTOR PLAN 2
-patient had a fall on 2/14 when getting out of bed. Patient fell on her behind, no head trauma, no LOC  -likely 2/2 deconditioning vs orthostatic hypotension vs sepsis vs TEM from sepsis  -CTH was negative for hemorrhage or fracture  -patient normally uses walker at home, fall occurred will walking on her own  -consult PT for reevaluation once respiratory status has improved

## 2018-02-18 NOTE — PROGRESS NOTE ADULT - SUBJECTIVE AND OBJECTIVE BOX
CC: Bang SOB, CP.   Emesis has improved but still present  No BM in 4 days. No more fevers.   Rest of ROS negative.     Vital Signs Last 24 Hrs  T(C): 36.9 (18 Feb 2018 11:10), Max: 37.3 (17 Feb 2018 16:03)  T(F): 98.5 (18 Feb 2018 11:10), Max: 99.1 (17 Feb 2018 16:03)  HR: 102 (18 Feb 2018 11:10) (102 - 118)  BP: 125/62 (18 Feb 2018 11:10) (122/67 - 131/74)  BP(mean): --  RR: 16 (18 Feb 2018 11:10) (15 - 19)  SpO2: 96% (18 Feb 2018 11:10) (96% - 98%)    PHYSICAL EXAMINATION  * General: Not in acute distress. Awake and alert. Lying comfortably in bed.  * Head: Normocephalic, atraumatic.  * HEENT: ears no discharge, eyes PERRLA, nose no discharge, throat no exudates, normal tonsils.  * Neck: no JVD, supple.  * Lungs: Mild crackles on RLL improved   * Cardio: Regular rate and rhythm, no murmurs, no rubs, no gallops. Good peripheral pulses.  * Abdomen: Soft, non-tender, non-distended, tympanic to percussion, no rebound, no guarding, no rigidity. Bowel sounds present. No suprapubic or CVA tenderness.  * : Deferred.  * Extremities: Acyanotic, no edema.  * Skin: Warm and dry.  * Neuro: Alert and oriented x 3. No focal deficits. Motor strength is 5/5 throughout. Sensation intact. Cranial nerves II-XII grossly intact.                           7.3    4.2   )-----------( 106      ( 18 Feb 2018 06:52 )             23.2   02-18    134<L>  |  98  |  4<L>  ----------------------------<  126<H>  2.9<LL>   |  23  |  0.60    Ca    8.0<L>      18 Feb 2018 06:50  Phos  2.2     02-18  Mg     1.9     02-18    MEDICATIONS  (STANDING):  dextrose 5%. 1000 milliLiter(s) (50 mL/Hr) IV Continuous <Continuous>  dextrose 50% Injectable 12.5 Gram(s) IV Push once  dextrose 50% Injectable 25 Gram(s) IV Push once  dextrose 50% Injectable 25 Gram(s) IV Push once  enoxaparin Injectable 40 milliGRAM(s) SubCutaneous daily  insulin lispro (HumaLOG) corrective regimen sliding scale   SubCutaneous Before meals and at bedtime  levoFLOXacin  Tablet 750 milliGRAM(s) Oral every 24 hours  melatonin 5 milliGRAM(s) Oral at bedtime  metroNIDAZOLE    Tablet 500 milliGRAM(s) Oral every 8 hours  nystatin    Suspension 143593 Unit(s) Oral four times a day  pantoprazole    Tablet 40 milliGRAM(s) Oral before breakfast  potassium chloride   Powder 40 milliEquivalent(s) Oral every 4 hours  sodium chloride 0.9%. 1000 milliLiter(s) (120 mL/Hr) IV Continuous <Continuous>    MEDICATIONS  (PRN):  acetaminophen   Tablet 650 milliGRAM(s) Oral every 6 hours PRN For Temp greater than 38 C (100.4 F)  dextrose Gel 1 Dose(s) Oral once PRN Blood Glucose LESS THAN 70 milliGRAM(s)/deciliter  glucagon  Injectable 1 milliGRAM(s) IntraMuscular once PRN Glucose LESS THAN 70 milligrams/deciliter  guaiFENesin    Syrup 100 milliGRAM(s) Oral every 6 hours PRN Cough  zaleplon 5 milliGRAM(s) Oral once PRN Insomnia

## 2018-02-18 NOTE — PROGRESS NOTE ADULT - ASSESSMENT
61yo F, PMH of HTN, DM, lung CA with mets to brain and bone s/p Rtx. Admitted for sepsis 2/2 influenza virus, complicated by severe sepsis 2/2 evolving PNA.

## 2018-02-18 NOTE — PROGRESS NOTE ADULT - PROBLEM SELECTOR PLAN 2
Treating for presumably Gram negative vs aspiration PNA.   Worsening infiltrate, likely superimposed PNA. However clinically improved.   * Will get CT chest non contrast today.   * Will DC cefepime IV and c/w Levaqui 750mg po daily and Flagyl PO.   * Needs IS.  * OOB to chair.

## 2018-02-18 NOTE — PROGRESS NOTE ADULT - PROBLEM SELECTOR PLAN 1
Improved. HD stable. Euvolemic.   DDx include evolving sepsis from Influenza vs superimposed PNA.   Now on Cefepime IV and Flagyl.   Completed course of Tamiflu.  * Will transition to PO abx.

## 2018-02-18 NOTE — PROGRESS NOTE ADULT - PROBLEM SELECTOR PLAN 4
-Pt with stage IV lung CA with mets to bone and brain s/p radiation to mets on R 8th rib last week and gamma knife treatment to brain on Jan 8th.   -underwent 1st chemo on Tuesday and developed mild diarrhea afterwards  -per heme/onc continue to trend CBC w/ diff and ANC, if neutropenic and febrile recommend restarting abx treatment to cover etiologies other than flu  -patient scheduled for next round of chemotherapy on 2/27  -Patient taken decadron, marinol, oxycontin, and nystatin  -palliative following, she is DNR/DNI no pressors

## 2018-02-18 NOTE — PROGRESS NOTE ADULT - PROBLEM SELECTOR PLAN 1
-Pt met 3/4 SIRS criteria on admission, likely 2/2 influenza  -2/15 with AMS, tachycardia, T 101.4, hypotension, lactate 1.8  -received 2L NS, f/u blood cx  -patient has not yet had bowel movement so c diff, stool cx, stool o and p not collected  -last day of Tamiflu day 5/5 on 2/15, RVP still positive for influenza, off droplet  -Patient with vomiting 2/14 and concern for aspiration PNA. CXR looks overloaded on R side with possible pna vs chemical pneumonitis, patient is penicillin allergic pt started on cefepime and metronidazole on 2/15. Cefepime changed to levoquin 750mg on 2/18  -CXR today looks like there is a development of consolidation/infiltrate in R lower lung. F/u CT read.  -Supportive management for cough with guaifenesin  -f/u bronchoscopy on previous admission on 2nd week of March with Dr. Xiong  -patient with possible absence seizure leading to aspiration, given keppra 1g and EEG to monitor for seizures. EEG showing no seizures, no need to continue keppra     #Diarrhea  -ruling out c.diff  -persistently watery diarrhea throughout this admission, could be 2/2 to chemo treatment or influenza  -no BM since 2/15 in the am, f/u with C.diff, O&P, and stool culture with next BM   -poor PO intake, getting

## 2018-02-19 LAB
ANION GAP SERPL CALC-SCNC: 12 MMOL/L — SIGNIFICANT CHANGE UP (ref 5–17)
BUN SERPL-MCNC: 3 MG/DL — LOW (ref 7–23)
CALCIUM SERPL-MCNC: 7.8 MG/DL — LOW (ref 8.4–10.5)
CHLORIDE SERPL-SCNC: 98 MMOL/L — SIGNIFICANT CHANGE UP (ref 96–108)
CO2 SERPL-SCNC: 25 MMOL/L — SIGNIFICANT CHANGE UP (ref 22–31)
CREAT SERPL-MCNC: 0.56 MG/DL — SIGNIFICANT CHANGE UP (ref 0.5–1.3)
GLUCOSE BLDC GLUCOMTR-MCNC: 111 MG/DL — HIGH (ref 70–99)
GLUCOSE BLDC GLUCOMTR-MCNC: 115 MG/DL — HIGH (ref 70–99)
GLUCOSE BLDC GLUCOMTR-MCNC: 119 MG/DL — HIGH (ref 70–99)
GLUCOSE BLDC GLUCOMTR-MCNC: 161 MG/DL — HIGH (ref 70–99)
GLUCOSE SERPL-MCNC: 135 MG/DL — HIGH (ref 70–99)
HCT VFR BLD CALC: 23.2 % — LOW (ref 34.5–45)
HGB BLD-MCNC: 7.4 G/DL — LOW (ref 11.5–15.5)
MAGNESIUM SERPL-MCNC: 1.7 MG/DL — SIGNIFICANT CHANGE UP (ref 1.6–2.6)
MCHC RBC-ENTMCNC: 24.3 PG — LOW (ref 27–34)
MCHC RBC-ENTMCNC: 31.9 G/DL — LOW (ref 32–36)
MCV RBC AUTO: 76.3 FL — LOW (ref 80–100)
PHOSPHATE SERPL-MCNC: 1.6 MG/DL — LOW (ref 2.5–4.5)
PLATELET # BLD AUTO: 109 K/UL — LOW (ref 150–400)
POTASSIUM SERPL-MCNC: 3.1 MMOL/L — LOW (ref 3.5–5.3)
POTASSIUM SERPL-SCNC: 3.1 MMOL/L — LOW (ref 3.5–5.3)
RBC # BLD: 3.04 M/UL — LOW (ref 3.8–5.2)
RBC # FLD: 18.6 % — HIGH (ref 10.3–16.9)
SODIUM SERPL-SCNC: 135 MMOL/L — SIGNIFICANT CHANGE UP (ref 135–145)
WBC # BLD: 3.8 K/UL — SIGNIFICANT CHANGE UP (ref 3.8–10.5)
WBC # FLD AUTO: 3.8 K/UL — SIGNIFICANT CHANGE UP (ref 3.8–10.5)

## 2018-02-19 PROCEDURE — 99233 SBSQ HOSP IP/OBS HIGH 50: CPT

## 2018-02-19 RX ORDER — POTASSIUM PHOSPHATE, MONOBASIC POTASSIUM PHOSPHATE, DIBASIC 236; 224 MG/ML; MG/ML
30 INJECTION, SOLUTION INTRAVENOUS ONCE
Qty: 0 | Refills: 0 | Status: COMPLETED | OUTPATIENT
Start: 2018-02-19 | End: 2018-02-19

## 2018-02-19 RX ORDER — MAGNESIUM SULFATE 500 MG/ML
2 VIAL (ML) INJECTION ONCE
Qty: 0 | Refills: 0 | Status: COMPLETED | OUTPATIENT
Start: 2018-02-19 | End: 2018-02-19

## 2018-02-19 RX ORDER — POTASSIUM CHLORIDE 20 MEQ
10 PACKET (EA) ORAL
Qty: 0 | Refills: 0 | Status: COMPLETED | OUTPATIENT
Start: 2018-02-19 | End: 2018-02-19

## 2018-02-19 RX ADMIN — Medication 500000 UNIT(S): at 17:38

## 2018-02-19 RX ADMIN — Medication 5 MILLIGRAM(S): at 22:00

## 2018-02-19 RX ADMIN — Medication 500000 UNIT(S): at 23:47

## 2018-02-19 RX ADMIN — Medication 500000 UNIT(S): at 12:46

## 2018-02-19 RX ADMIN — Medication 500 MILLIGRAM(S): at 14:34

## 2018-02-19 RX ADMIN — Medication 100 MILLIEQUIVALENT(S): at 11:22

## 2018-02-19 RX ADMIN — Medication 2: at 12:46

## 2018-02-19 RX ADMIN — POTASSIUM PHOSPHATE, MONOBASIC POTASSIUM PHOSPHATE, DIBASIC 85 MILLIMOLE(S): 236; 224 INJECTION, SOLUTION INTRAVENOUS at 14:34

## 2018-02-19 RX ADMIN — Medication 100 MILLIGRAM(S): at 16:13

## 2018-02-19 RX ADMIN — ENOXAPARIN SODIUM 40 MILLIGRAM(S): 100 INJECTION SUBCUTANEOUS at 12:46

## 2018-02-19 RX ADMIN — Medication 500000 UNIT(S): at 00:04

## 2018-02-19 RX ADMIN — Medication 500000 UNIT(S): at 06:01

## 2018-02-19 RX ADMIN — Medication 100 MILLIEQUIVALENT(S): at 10:06

## 2018-02-19 RX ADMIN — Medication 100 MILLIGRAM(S): at 23:47

## 2018-02-19 RX ADMIN — Medication 50 GRAM(S): at 12:46

## 2018-02-19 RX ADMIN — SODIUM CHLORIDE 120 MILLILITER(S): 9 INJECTION INTRAMUSCULAR; INTRAVENOUS; SUBCUTANEOUS at 08:43

## 2018-02-19 RX ADMIN — PANTOPRAZOLE SODIUM 40 MILLIGRAM(S): 20 TABLET, DELAYED RELEASE ORAL at 06:01

## 2018-02-19 RX ADMIN — Medication 500 MILLIGRAM(S): at 22:00

## 2018-02-19 RX ADMIN — Medication 100 MILLIEQUIVALENT(S): at 08:41

## 2018-02-19 RX ADMIN — Medication 500 MILLIGRAM(S): at 06:01

## 2018-02-19 NOTE — PROGRESS NOTE ADULT - PROBLEM SELECTOR PLAN 1
Improved. HD stable. Euvolemic.   DDx include evolving sepsis from Influenza vs superimposed PNA.   Completed course of Tamiflu.  * Currently on Levofloxacin and Flagyl.

## 2018-02-19 NOTE — PROGRESS NOTE ADULT - PROBLEM SELECTOR PLAN 2
Treating for presumably Gram negative vs aspiration PNA.   Clinically improved.   CT chest performed. Seems pretty similar to previous one in January of 2018, however with larger right sided pleural effusion.  * c/w Levaquin 750mg po daily and Flagyl PO.   * Needs IS.  * OOB to chair.

## 2018-02-19 NOTE — PROGRESS NOTE ADULT - SUBJECTIVE AND OBJECTIVE BOX
CC: NO more fevers No SOB.   Tolerates diet.   Rest of ROS negative.     Vital Signs Last 24 Hrs  T(C): 37.1 (19 Feb 2018 10:55), Max: 37.2 (18 Feb 2018 17:42)  T(F): 98.8 (19 Feb 2018 10:55), Max: 98.9 (18 Feb 2018 17:42)  HR: 107 (19 Feb 2018 10:55) (76 - 114)  BP: 109/79 (19 Feb 2018 10:55) (109/79 - 128/71)  BP(mean): --  RR: 16 (19 Feb 2018 10:55) (15 - 17)  SpO2: 95% (19 Feb 2018 10:55) (95% - 98%)    PHYSICAL EXAMINATION  * General: Not in acute distress. Awake and alert. Lying comfortably in bed.  * Head: Normocephalic, atraumatic.  * HEENT: ears no discharge, eyes PERRLA, nose no discharge, throat no exudates, normal tonsils.  * Neck: no JVD, supple.  * Lungs: Clear to auscultation, no rales, no wheezes.  * Cardio: Regular rate and rhythm, no murmurs, no rubs, no gallops. Good peripheral pulses.  * Abdomen: Soft, non-tender, non-distended, tympanic to percussion, no rebound, no guarding, no rigidity. Bowel sounds present. No suprapubic or CVA tenderness.  * : Deferred.  * Extremities: Acyanotic, no edema.  * Skin: Warm and dry.  * Neuro: Alert and oriented x 3. No focal deficits. Motor strength is 5/5 throughout. Sensation intact. Cranial nerves II-XII grossly intact.                           7.4    3.8   )-----------( 109      ( 19 Feb 2018 07:24 )             23.2     02-19    135  |  98  |  3<L>  ----------------------------<  135<H>  3.1<L>   |  25  |  0.56    Ca    7.8<L>      19 Feb 2018 07:24  Phos  1.6     02-19  Mg     1.7     02-19      MEDICATIONS  (STANDING):  dextrose 5%. 1000 milliLiter(s) (50 mL/Hr) IV Continuous <Continuous>  dextrose 50% Injectable 12.5 Gram(s) IV Push once  dextrose 50% Injectable 25 Gram(s) IV Push once  dextrose 50% Injectable 25 Gram(s) IV Push once  enoxaparin Injectable 40 milliGRAM(s) SubCutaneous daily  insulin lispro (HumaLOG) corrective regimen sliding scale   SubCutaneous Before meals and at bedtime  levoFLOXacin  Tablet 750 milliGRAM(s) Oral every 24 hours  melatonin 5 milliGRAM(s) Oral at bedtime  metroNIDAZOLE    Tablet 500 milliGRAM(s) Oral every 8 hours  nystatin    Suspension 121936 Unit(s) Oral four times a day  pantoprazole    Tablet 40 milliGRAM(s) Oral before breakfast  sodium chloride 0.9%. 1000 milliLiter(s) (120 mL/Hr) IV Continuous <Continuous>    MEDICATIONS  (PRN):  acetaminophen   Tablet 650 milliGRAM(s) Oral every 6 hours PRN For Temp greater than 38 C (100.4 F)  dextrose Gel 1 Dose(s) Oral once PRN Blood Glucose LESS THAN 70 milliGRAM(s)/deciliter  glucagon  Injectable 1 milliGRAM(s) IntraMuscular once PRN Glucose LESS THAN 70 milligrams/deciliter  guaiFENesin    Syrup 100 milliGRAM(s) Oral every 6 hours PRN Cough  zaleplon 5 milliGRAM(s) Oral once PRN Insomnia

## 2018-02-20 ENCOUNTER — TRANSCRIPTION ENCOUNTER (OUTPATIENT)
Age: 63
End: 2018-02-20

## 2018-02-20 VITALS
OXYGEN SATURATION: 95 % | SYSTOLIC BLOOD PRESSURE: 124 MMHG | HEART RATE: 110 BPM | TEMPERATURE: 98 F | RESPIRATION RATE: 16 BRPM | DIASTOLIC BLOOD PRESSURE: 78 MMHG

## 2018-02-20 LAB
ANION GAP SERPL CALC-SCNC: 13 MMOL/L — SIGNIFICANT CHANGE UP (ref 5–17)
BASOPHILS NFR BLD AUTO: 0 % — SIGNIFICANT CHANGE UP (ref 0–2)
BUN SERPL-MCNC: 3 MG/DL — LOW (ref 7–23)
CALCIUM SERPL-MCNC: 8 MG/DL — LOW (ref 8.4–10.5)
CHLORIDE SERPL-SCNC: 96 MMOL/L — SIGNIFICANT CHANGE UP (ref 96–108)
CO2 SERPL-SCNC: 23 MMOL/L — SIGNIFICANT CHANGE UP (ref 22–31)
CREAT SERPL-MCNC: 0.6 MG/DL — SIGNIFICANT CHANGE UP (ref 0.5–1.3)
CULTURE RESULTS: SIGNIFICANT CHANGE UP
EOSINOPHIL NFR BLD AUTO: 0.3 % — SIGNIFICANT CHANGE UP (ref 0–6)
GLUCOSE BLDC GLUCOMTR-MCNC: 115 MG/DL — HIGH (ref 70–99)
GLUCOSE BLDC GLUCOMTR-MCNC: 122 MG/DL — HIGH (ref 70–99)
GLUCOSE SERPL-MCNC: 125 MG/DL — HIGH (ref 70–99)
HCT VFR BLD CALC: 21.9 % — LOW (ref 34.5–45)
HGB BLD-MCNC: 7.1 G/DL — LOW (ref 11.5–15.5)
LYMPHOCYTES # BLD AUTO: 15.3 % — SIGNIFICANT CHANGE UP (ref 13–44)
MAGNESIUM SERPL-MCNC: 1.8 MG/DL — SIGNIFICANT CHANGE UP (ref 1.6–2.6)
MCHC RBC-ENTMCNC: 24.7 PG — LOW (ref 27–34)
MCHC RBC-ENTMCNC: 32.4 G/DL — SIGNIFICANT CHANGE UP (ref 32–36)
MCV RBC AUTO: 76.3 FL — LOW (ref 80–100)
MONOCYTES NFR BLD AUTO: 9.1 % — SIGNIFICANT CHANGE UP (ref 2–14)
NEUTROPHILS NFR BLD AUTO: 75.3 % — SIGNIFICANT CHANGE UP (ref 43–77)
PHOSPHATE SERPL-MCNC: 2.6 MG/DL — SIGNIFICANT CHANGE UP (ref 2.5–4.5)
PLATELET # BLD AUTO: 115 K/UL — LOW (ref 150–400)
POTASSIUM SERPL-MCNC: 3 MMOL/L — LOW (ref 3.5–5.3)
POTASSIUM SERPL-SCNC: 3 MMOL/L — LOW (ref 3.5–5.3)
RBC # BLD: 2.87 M/UL — LOW (ref 3.8–5.2)
RBC # FLD: 19.2 % — HIGH (ref 10.3–16.9)
SODIUM SERPL-SCNC: 132 MMOL/L — LOW (ref 135–145)
SPECIMEN SOURCE: SIGNIFICANT CHANGE UP
WBC # BLD: 3.1 K/UL — LOW (ref 3.8–10.5)
WBC # FLD AUTO: 3.1 K/UL — LOW (ref 3.8–10.5)

## 2018-02-20 PROCEDURE — 85027 COMPLETE CBC AUTOMATED: CPT

## 2018-02-20 PROCEDURE — 84145 PROCALCITONIN (PCT): CPT

## 2018-02-20 PROCEDURE — 87633 RESP VIRUS 12-25 TARGETS: CPT

## 2018-02-20 PROCEDURE — 85610 PROTHROMBIN TIME: CPT

## 2018-02-20 PROCEDURE — 86850 RBC ANTIBODY SCREEN: CPT

## 2018-02-20 PROCEDURE — 86901 BLOOD TYPING SEROLOGIC RH(D): CPT

## 2018-02-20 PROCEDURE — 85730 THROMBOPLASTIN TIME PARTIAL: CPT

## 2018-02-20 PROCEDURE — 97530 THERAPEUTIC ACTIVITIES: CPT

## 2018-02-20 PROCEDURE — 36415 COLL VENOUS BLD VENIPUNCTURE: CPT

## 2018-02-20 PROCEDURE — 87040 BLOOD CULTURE FOR BACTERIA: CPT

## 2018-02-20 PROCEDURE — 81001 URINALYSIS AUTO W/SCOPE: CPT

## 2018-02-20 PROCEDURE — 96374 THER/PROPH/DIAG INJ IV PUSH: CPT

## 2018-02-20 PROCEDURE — 80053 COMPREHEN METABOLIC PANEL: CPT

## 2018-02-20 PROCEDURE — 86880 COOMBS TEST DIRECT: CPT

## 2018-02-20 PROCEDURE — 95951: CPT

## 2018-02-20 PROCEDURE — 97116 GAIT TRAINING THERAPY: CPT

## 2018-02-20 PROCEDURE — 97161 PT EVAL LOW COMPLEX 20 MIN: CPT

## 2018-02-20 PROCEDURE — 85025 COMPLETE CBC W/AUTO DIFF WBC: CPT

## 2018-02-20 PROCEDURE — 74018 RADEX ABDOMEN 1 VIEW: CPT

## 2018-02-20 PROCEDURE — 99239 HOSP IP/OBS DSCHRG MGMT >30: CPT

## 2018-02-20 PROCEDURE — 82962 GLUCOSE BLOOD TEST: CPT

## 2018-02-20 PROCEDURE — 84100 ASSAY OF PHOSPHORUS: CPT

## 2018-02-20 PROCEDURE — 70450 CT HEAD/BRAIN W/O DYE: CPT

## 2018-02-20 PROCEDURE — 93005 ELECTROCARDIOGRAM TRACING: CPT

## 2018-02-20 PROCEDURE — 87581 M.PNEUMON DNA AMP PROBE: CPT

## 2018-02-20 PROCEDURE — 83690 ASSAY OF LIPASE: CPT

## 2018-02-20 PROCEDURE — 87486 CHLMYD PNEUM DNA AMP PROBE: CPT

## 2018-02-20 PROCEDURE — 99232 SBSQ HOSP IP/OBS MODERATE 35: CPT

## 2018-02-20 PROCEDURE — 99285 EMERGENCY DEPT VISIT HI MDM: CPT | Mod: 25

## 2018-02-20 PROCEDURE — 71250 CT THORAX DX C-: CPT

## 2018-02-20 PROCEDURE — 86870 RBC ANTIBODY IDENTIFICATION: CPT

## 2018-02-20 PROCEDURE — 71045 X-RAY EXAM CHEST 1 VIEW: CPT

## 2018-02-20 PROCEDURE — 86900 BLOOD TYPING SEROLOGIC ABO: CPT

## 2018-02-20 PROCEDURE — 87086 URINE CULTURE/COLONY COUNT: CPT

## 2018-02-20 PROCEDURE — 99233 SBSQ HOSP IP/OBS HIGH 50: CPT | Mod: GC

## 2018-02-20 PROCEDURE — 80048 BASIC METABOLIC PNL TOTAL CA: CPT

## 2018-02-20 PROCEDURE — 83605 ASSAY OF LACTIC ACID: CPT

## 2018-02-20 PROCEDURE — 87798 DETECT AGENT NOS DNA AMP: CPT

## 2018-02-20 PROCEDURE — 82803 BLOOD GASES ANY COMBINATION: CPT

## 2018-02-20 PROCEDURE — 83735 ASSAY OF MAGNESIUM: CPT

## 2018-02-20 PROCEDURE — 96375 TX/PRO/DX INJ NEW DRUG ADDON: CPT

## 2018-02-20 RX ORDER — POTASSIUM CHLORIDE 20 MEQ
10 PACKET (EA) ORAL
Qty: 0 | Refills: 0 | Status: COMPLETED | OUTPATIENT
Start: 2018-02-20 | End: 2018-02-20

## 2018-02-20 RX ORDER — METRONIDAZOLE 500 MG
1 TABLET ORAL
Qty: 6 | Refills: 0 | OUTPATIENT
Start: 2018-02-20 | End: 2018-02-21

## 2018-02-20 RX ORDER — CIPROFLOXACIN LACTATE 400MG/40ML
1 VIAL (ML) INTRAVENOUS
Qty: 1 | Refills: 0 | OUTPATIENT
Start: 2018-02-20 | End: 2018-02-20

## 2018-02-20 RX ORDER — POTASSIUM CHLORIDE 20 MEQ
40 PACKET (EA) ORAL EVERY 4 HOURS
Qty: 0 | Refills: 0 | Status: DISCONTINUED | OUTPATIENT
Start: 2018-02-20 | End: 2018-02-20

## 2018-02-20 RX ORDER — MAGNESIUM SULFATE 500 MG/ML
1 VIAL (ML) INJECTION ONCE
Qty: 0 | Refills: 0 | Status: COMPLETED | OUTPATIENT
Start: 2018-02-20 | End: 2018-02-20

## 2018-02-20 RX ORDER — METRONIDAZOLE 500 MG
1 TABLET ORAL
Qty: 4 | Refills: 0 | OUTPATIENT
Start: 2018-02-20 | End: 2018-02-21

## 2018-02-20 RX ORDER — POTASSIUM CHLORIDE 20 MEQ
40 PACKET (EA) ORAL EVERY 4 HOURS
Qty: 0 | Refills: 0 | Status: COMPLETED | OUTPATIENT
Start: 2018-02-20 | End: 2018-02-20

## 2018-02-20 RX ORDER — NYSTATIN 500MM UNIT
5 POWDER (EA) MISCELLANEOUS
Qty: 0 | Refills: 0 | COMMUNITY
Start: 2018-02-20

## 2018-02-20 RX ADMIN — Medication 500000 UNIT(S): at 07:06

## 2018-02-20 RX ADMIN — Medication 100 MILLIEQUIVALENT(S): at 16:18

## 2018-02-20 RX ADMIN — PANTOPRAZOLE SODIUM 40 MILLIGRAM(S): 20 TABLET, DELAYED RELEASE ORAL at 07:06

## 2018-02-20 RX ADMIN — Medication 100 GRAM(S): at 12:49

## 2018-02-20 RX ADMIN — SODIUM CHLORIDE 120 MILLILITER(S): 9 INJECTION INTRAMUSCULAR; INTRAVENOUS; SUBCUTANEOUS at 14:20

## 2018-02-20 RX ADMIN — Medication 500000 UNIT(S): at 12:55

## 2018-02-20 RX ADMIN — Medication 100 MILLIEQUIVALENT(S): at 14:14

## 2018-02-20 RX ADMIN — Medication 500 MILLIGRAM(S): at 07:06

## 2018-02-20 RX ADMIN — Medication 100 MILLIGRAM(S): at 07:12

## 2018-02-20 RX ADMIN — Medication 40 MILLIEQUIVALENT(S): at 16:29

## 2018-02-20 RX ADMIN — Medication 500 MILLIGRAM(S): at 14:22

## 2018-02-20 RX ADMIN — SODIUM CHLORIDE 120 MILLILITER(S): 9 INJECTION INTRAMUSCULAR; INTRAVENOUS; SUBCUTANEOUS at 03:13

## 2018-02-20 RX ADMIN — ENOXAPARIN SODIUM 40 MILLIGRAM(S): 100 INJECTION SUBCUTANEOUS at 12:55

## 2018-02-20 RX ADMIN — Medication 40 MILLIEQUIVALENT(S): at 12:57

## 2018-02-20 NOTE — PROGRESS NOTE ADULT - SUBJECTIVE AND OBJECTIVE BOX
DORON NUGENT          MRN-[id number]            (1955)    HPI:    61 y/o F with a PMHX of HTN, DM, and Stage IV lung CA w/ mets to brain and bone (Dec 2nd) who underwent RT to bony mets on 8th rib, and had gamma knife tx to brain () and has completed 1 cycle of carbo/pemetrexed + pembrolizumab () presented to [facility] with sepsis due to Flu and PNA. Pt had an episode this morning of foaming at the mouth, HR of 180 and momentary confusion. She received IV fluids with adequate resolution of the episode.  Palliative is consulted to clarify goals of care and advance directives because of her advanced disease and patient's clearly thought out plan for advance directives.     Pt had recent hospitalization in mid-January for obstructive mass in RLL debulked with laser, and airway was balloon dilation. She ultimately spent 10 days in the hospital and was discharged home afterwards.       SUBJECTIVE: No events over the weekend. Pt tolerated being weaned from oxygen down to 2L. She still has generalized weakness and occasionally productive cough. No further fevers, transitioned to PO abx.     ROS: generalized weakness, cough, fatigue    DYSPNEA: YES CALLI 3  NAUS/VOM: NO	  SECRETIONS: NO	  AGITATION: NO  Pain (Y/N):  NO         Vital Signs Last 24 Hrs  T(C): 36.9 (2018 12:53), Max: 37.2 (2018 17:30)  T(F): 98.5 (2018 12:53), Max: 99 (2018 17:30)  HR: 110 (2018 12:53) (110 - 116)  BP: 124/78 (2018 12:53) (123/75 - 143/78)  RR: 16 (2018 12:53) (16 - 16)  SpO2: 95% (2018 12:53) (94% - 95%)      General: Alert, A&O x 3, pleasant, awake, conversive  HEENT:  MM dry, anicteric sclera, PERRL  Neck:  no JVD, no LAD  CVS: S1S2 RRR no murmurs  Resp: CTA b/l anteriorly, decreased breath sound b/l bases, L>R  GI:  Abd soft, NT ND, no masses  :  no suprapubic tenderness  Musc:   no abnormalities, decreased strength x4 ext  Neuro: grossly intact  Psych:   calm  Skin: no skin rashes  Lymph: no palpable lymphadenopathy  Preadmit Karnofsky:  60%           Current Karnofsky:    30  %  Cachexia (Y/N): N  BMI: 29.5	     ALLERGIES: penicillins (Hives)      OPIATE NAÏVE (Y/N): NO    MEDICATIONS  (STANDING):  dextrose 5%. 1000 milliLiter(s) (50 mL/Hr) IV Continuous <Continuous>  dextrose 50% Injectable 12.5 Gram(s) IV Push once  dextrose 50% Injectable 25 Gram(s) IV Push once  dextrose 50% Injectable 25 Gram(s) IV Push once  enoxaparin Injectable 40 milliGRAM(s) SubCutaneous daily  insulin lispro (HumaLOG) corrective regimen sliding scale   SubCutaneous Before meals and at bedtime  levoFLOXacin  Tablet 750 milliGRAM(s) Oral every 24 hours  melatonin 5 milliGRAM(s) Oral at bedtime  metroNIDAZOLE    Tablet 500 milliGRAM(s) Oral every 8 hours  nystatin    Suspension 487340 Unit(s) Oral four times a day  pantoprazole    Tablet 40 milliGRAM(s) Oral before breakfast  potassium chloride   Powder 40 milliEquivalent(s) Oral every 4 hours  potassium chloride  10 mEq/100 mL IVPB 10 milliEquivalent(s) IV Intermittent every 1 hour  sodium chloride 0.9%. 1000 milliLiter(s) (120 mL/Hr) IV Continuous <Continuous>    MEDICATIONS  (PRN):  acetaminophen   Tablet 650 milliGRAM(s) Oral every 6 hours PRN For Temp greater than 38 C (100.4 F)  dextrose Gel 1 Dose(s) Oral once PRN Blood Glucose LESS THAN 70 milliGRAM(s)/deciliter  glucagon  Injectable 1 milliGRAM(s) IntraMuscular once PRN Glucose LESS THAN 70 milligrams/deciliter  guaiFENesin    Syrup 100 milliGRAM(s) Oral every 6 hours PRN Cough  zaleplon 5 milliGRAM(s) Oral once PRN Insomnia      LABS:                           7.1    3.1   )-----------( 115      ( 2018 08:11 )             21.9         CBC Full  -  ( 2018 08:11 )  WBC Count : 3.1 K/uL  Hemoglobin : 7.1 g/dL  Hematocrit : 21.9 %  Platelet Count - Automated : 115 K/uL  Mean Cell Volume : 76.3 fL  Mean Cell Hemoglobin : 24.7 pg  Mean Cell Hemoglobin Concentration : 32.4 g/dL  Auto Neutrophil % : 75.3 %  Auto Lymphocyte % : 15.3 %  Auto Monocyte % : 9.1 %  Auto Eosinophil % : 0.3 %  Auto Basophil % : 0.0 %        02-    132<L>  |  96  |  3<L>  ----------------------------<  125<H>  3.0<L>   |  23  |  0.60    Ca    8.0<L>      2018 08:11  Phos  2.6     -  Mg     1.8         IMAGING: REVIEWED  none new    ADVANCED DIRECTIVES:     DNR     DNI      Decision maker: self, has capacity  Legal surrogate: Naomi Yeh (daughter) [phone]    PSYCHOSOCIAL-SPIRITUAL ASSESSMENT:       Reviewed       Care plan adjusted as above	    GOALS OF CARE DISCUSSION       Palliative care info/counseling provided	           Documentation of GOC: DNR/DNI, no feeding tubes.  Patient still willing to continue treatment if and when offered  	      REFERRALS	        Palliative Med        Unit SW/Case Mgmt       PT/OT DORON NUGENT          MRN-[id number]            (1955)    HPI:    63 y/o F with a PMHX of HTN, DM, and Stage IV lung CA w/ mets to brain and bone (Dec 2nd) who underwent RT to bony mets on 8th rib, and had gamma knife tx to brain () and has completed 1 cycle of carbo/pemetrexed + pembrolizumab () presented to [facility] with sepsis due to Flu and PNA. Pt had an episode this morning of foaming at the mouth, HR of 180 and momentary confusion. She received IV fluids with adequate resolution of the episode.  Palliative is consulted to clarify goals of care and advance directives because of her advanced disease and patient's clearly thought out plan for advance directives.     Pt had recent hospitalization in mid-January for obstructive mass in RLL debulked with laser, and airway was balloon dilation. She ultimately spent 10 days in the hospital and was discharged home afterwards.       SUBJECTIVE: No events over the weekend. Pt tolerated being weaned from oxygen down to 2L. She still has generalized weakness and occasionally productive cough, white frothy material, without color. No further fevers, transitioned to PO abx.  Occasional nausea.  Ate cream of wheat for breakfast without difficulty    ROS: generalized weakness, cough, fatigue    DYSPNEA: YES CALLI 3  NAUS/VOM: NO	  SECRETIONS: NO	  AGITATION: NO  Pain (Y/N):  NO         Vital Signs Last 24 Hrs  T(C): 36.9 (2018 12:53), Max: 37.2 (2018 17:30)  T(F): 98.5 (2018 12:53), Max: 99 (2018 17:30)  HR: 110 (2018 12:53) (110 - 116)  BP: 124/78 (2018 12:53) (123/75 - 143/78)  RR: 16 (2018 12:53) (16 - 16)  SpO2: 95% (2018 12:53) (94% - 95%)      General: Alert, A&O x 3, pleasant, awake, conversive, observed getting dressed during which she appeared week and SOB  HEENT:  MM dry, anicteric sclera, PERRL  Neck:  no JVD, no LAD  CVS: S1S2 RRR no murmurs  Resp: CTA b/l anteriorly, decreased breath sound b/l bases, L>R  GI:  Abd soft, NT ND, no masses  :  no suprapubic tenderness  Musc:   no abnormalities, decreased strength x4 ext  Neuro: grossly intact  Psych:   calm  Skin: no skin rashes  Lymph: no palpable lymphadenopathy  Preadmit Karnofsky:  60%           Current Karnofsky:    30  %  Cachexia (Y/N): N  BMI: 29.5	     ALLERGIES: penicillins (Hives)      OPIATE NAÏVE (Y/N): NO    MEDICATIONS  (STANDING):  dextrose 5%. 1000 milliLiter(s) (50 mL/Hr) IV Continuous <Continuous>  dextrose 50% Injectable 12.5 Gram(s) IV Push once  dextrose 50% Injectable 25 Gram(s) IV Push once  dextrose 50% Injectable 25 Gram(s) IV Push once  enoxaparin Injectable 40 milliGRAM(s) SubCutaneous daily  insulin lispro (HumaLOG) corrective regimen sliding scale   SubCutaneous Before meals and at bedtime  levoFLOXacin  Tablet 750 milliGRAM(s) Oral every 24 hours  melatonin 5 milliGRAM(s) Oral at bedtime  metroNIDAZOLE    Tablet 500 milliGRAM(s) Oral every 8 hours  nystatin    Suspension 573112 Unit(s) Oral four times a day  pantoprazole    Tablet 40 milliGRAM(s) Oral before breakfast  potassium chloride   Powder 40 milliEquivalent(s) Oral every 4 hours  potassium chloride  10 mEq/100 mL IVPB 10 milliEquivalent(s) IV Intermittent every 1 hour  sodium chloride 0.9%. 1000 milliLiter(s) (120 mL/Hr) IV Continuous <Continuous>    MEDICATIONS  (PRN):  acetaminophen   Tablet 650 milliGRAM(s) Oral every 6 hours PRN For Temp greater than 38 C (100.4 F)  dextrose Gel 1 Dose(s) Oral once PRN Blood Glucose LESS THAN 70 milliGRAM(s)/deciliter  glucagon  Injectable 1 milliGRAM(s) IntraMuscular once PRN Glucose LESS THAN 70 milligrams/deciliter  guaiFENesin    Syrup 100 milliGRAM(s) Oral every 6 hours PRN Cough  zaleplon 5 milliGRAM(s) Oral once PRN Insomnia      LABS:                           7.1    3.1   )-----------( 115      ( 2018 08:11 )             21.9         CBC Full  -  ( 2018 08:11 )  WBC Count : 3.1 K/uL  Hemoglobin : 7.1 g/dL  Hematocrit : 21.9 %  Platelet Count - Automated : 115 K/uL  Mean Cell Volume : 76.3 fL  Mean Cell Hemoglobin : 24.7 pg  Mean Cell Hemoglobin Concentration : 32.4 g/dL  Auto Neutrophil % : 75.3 %  Auto Lymphocyte % : 15.3 %  Auto Monocyte % : 9.1 %  Auto Eosinophil % : 0.3 %  Auto Basophil % : 0.0 %            132<L>  |  96  |  3<L>  ----------------------------<  125<H>  3.0<L>   |  23  |  0.60    Ca    8.0<L>      2018 08:11  Phos  2.6     -  Mg     1.8         IMAGING: REVIEWED  none new    ADVANCED DIRECTIVES:     DNR     DNI      Decision maker: self, has capacity  Legal surrogate: Naomi Yeh (daughter) [phone]    PSYCHOSOCIAL-SPIRITUAL ASSESSMENT:       Reviewed       Care plan adjusted as above	    GOALS OF CARE DISCUSSION       Palliative care info/counseling provided	           Documentation of GOC: DNR/DNI, no feeding tubes.  Patient still willing to continue treatment if and when offered  	      REFERRALS	        Palliative Med        Unit SW/Case Mgmt       PT/OT

## 2018-02-20 NOTE — DISCHARGE NOTE ADULT - CARE PLAN
Principal Discharge DX:	Influenza  Goal:	prevent future influenza infection  Assessment and plan of treatment:	During your stay you completed 5 days of treatment with Tamiflu. At this time you are no longer considered infectious with Influenza. Since you are receiving chemotherapy it is important to prevent future infections with proper hand washing and getting the flu vaccine each flu season.  Secondary Diagnosis:	Acute diarrhea  Goal:	prevent further diarrhea  Assessment and plan of treatment:	Your diarrhea was likely secondary to your chemotherapy treatment. During episodes of diarrhea try BRAT diets (bananas, rice, applesauce and toast) to control symptoms. If diarrhea is worsened or bloody follow up with your oncologist or primary care doctor for further evaluation.  Secondary Diagnosis:	Lung cancer  Goal:	continue with chemotherapy  Assessment and plan of treatment:	At this time your next chemotherapy session is scheduled for 2/27. Follow up with your oncologist as directed or sooner if needed.  Secondary Diagnosis:	Hypertension  Goal:	control blood pressure  Assessment and plan of treatment:	Continue your home medication of Amlodipine for control of your blood pressure. Continue following up with your primary care doctor for further management of your blood pressure. For blood pressure that is too high or too low please see your doctor or go to the emergency room as necessary.  Secondary Diagnosis:	Diabetes  Goal:	control blood sugar  Assessment and plan of treatment:	Your Hgb a1c this admission was 6.3%. Continue to control your diabetes with diet and exercise. Follow up with your primary care doctor for further evaluation of blood sugar levels.  Secondary Diagnosis:	Anemia, chronic disease  Goal:	monitor blood counts  Assessment and plan of treatment:	During your admission your blood counts showed that you are anemia. Follow up with your oncologist and primary doctor for monitoring your CBC. If you start experiencing large amounts of bleeding such as frequent dark or red stools, vaginal bleeding, vomiting blood follow up with your PCP sooner or go to the emergency room for evaluation.  Secondary Diagnosis:	Bacteriuria  Goal:	prevent UTI  Assessment and plan of treatment:	On this admission your urine analysis showed some bacteria in your urine. If you develop symptoms of a UTI such as increased frequency of urination, pain with urination, and blood in the urine follow up with your primary doctor for further evaluation and management. Principal Discharge DX:	Influenza  Goal:	prevent future influenza infection  Assessment and plan of treatment:	During your stay you completed 5 days of treatment with Tamiflu. At this time you are no longer considered infectious with Influenza. Since you are receiving chemotherapy it is important to prevent future infections with proper hand washing and getting the flu vaccine each flu season.  Secondary Diagnosis:	Acute diarrhea  Goal:	prevent further diarrhea  Assessment and plan of treatment:	Your diarrhea was likely secondary to your chemotherapy treatment. During episodes of diarrhea try BRAT diets (bananas, rice, applesauce and toast) to control symptoms. Make sure to stay hydrated during periods of diarrhea to prevent dehydration. If diarrhea is worsened or bloody follow up with your oncologist or primary care doctor for further evaluation.  Secondary Diagnosis:	Lung cancer  Goal:	continue with chemotherapy  Assessment and plan of treatment:	Continue taking decadron, marinol, oxycontin, and nystatin as directed. At this time your next chemotherapy session is scheduled for 2/27. Follow up with Dr. Xiong in second week of March for f/u bronchoscopy. Follow up with your oncologist as directed or sooner if you start experiencing any signs of infection such as fever/chills, non-healing wounds, and urinary tract infections.  Secondary Diagnosis:	Hypertension  Goal:	control blood pressure  Assessment and plan of treatment:	Continue your home medication of Amlodipine for control of your blood pressure. Continue following up with your primary care doctor for further management of your blood pressure. For blood pressure that is too high or too low please see your doctor or go to the emergency room as necessary.  Secondary Diagnosis:	Diabetes  Goal:	control blood sugar  Assessment and plan of treatment:	Your Hgb a1c this admission was 6.3%. Continue to control your diabetes with diet and exercise. Follow up with your primary care doctor for further evaluation of blood sugar levels.  Secondary Diagnosis:	Anemia, chronic disease  Goal:	monitor blood counts  Assessment and plan of treatment:	During your admission your blood counts showed that you are anemia. Follow up with your oncologist and primary doctor for monitoring your CBC. If you start experiencing large amounts of bleeding such as frequent dark or red stools, vaginal bleeding, or vomiting blood follow up with your PCP sooner or go to the emergency room for evaluation.  Secondary Diagnosis:	Gram-negative pneumonia  Goal:	control infection and prevent worsening infection  Assessment and plan of treatment:	During your admission you developed pneumonia. Continue taking Levaquin and Metronidazole for management. Make sure to finish the entire course of antibiotics to ensure resolution of the pneumonia. Follow up with your primary care for further management. Follow up sooner if you experience worsening difficulty breathing, productive cough, shortness of breath and fevers. Principal Discharge DX:	Influenza  Goal:	prevent future influenza infection  Assessment and plan of treatment:	During your stay you completed 5 days of treatment with Tamiflu. At this time you are no longer considered infectious with Influenza. Since you are receiving chemotherapy it is important to prevent future infections with proper hand washing and getting the flu vaccine each flu season. You were also diagnosed with aspiration pneumonia and treated with antibiotics. Please continue taking the antibiotics for 1 more day and then stop. Please follow up with Dr. Xiong on 3/5.  Secondary Diagnosis:	Acute diarrhea  Goal:	prevent further diarrhea  Assessment and plan of treatment:	Your diarrhea was likely secondary to your chemotherapy treatment. During episodes of diarrhea try BRAT diets (bananas, rice, applesauce and toast) to control symptoms. Make sure to stay hydrated during periods of diarrhea to prevent dehydration. If diarrhea is worsened or bloody follow up with your oncologist or primary care doctor for further evaluation. You were also having low potassium. Please follow up with Dr. Noonan regarding long term daily repletion.  Secondary Diagnosis:	Lung cancer  Goal:	continue with chemotherapy  Assessment and plan of treatment:	Continue taking decadron, marinol, oxycontin, and nystatin as directed. At this time your next chemotherapy session is scheduled for 2/27. Follow up with Dr. Xiong in second week of March for f/u bronchoscopy. Follow up with your oncologist as directed or sooner if you start experiencing any signs of infection such as fever/chills, non-healing wounds, and urinary tract infections.  Secondary Diagnosis:	Hypertension  Goal:	control blood pressure  Assessment and plan of treatment:	Continue your home medication of Amlodipine for control of your blood pressure. Continue following up with your primary care doctor for further management of your blood pressure. For blood pressure that is too high or too low please see your doctor or go to the emergency room as necessary.  Secondary Diagnosis:	Diabetes  Goal:	control blood sugar  Assessment and plan of treatment:	Your Hgb a1c this admission was 6.3%. Continue to control your diabetes with diet and exercise. Follow up with your primary care doctor for further evaluation of blood sugar levels.  Secondary Diagnosis:	Anemia, chronic disease  Goal:	monitor blood counts  Assessment and plan of treatment:	During your admission your blood counts showed that you are anemia. Follow up with your oncologist and primary doctor for monitoring your CBC. If you start experiencing large amounts of bleeding such as frequent dark or red stools, vaginal bleeding, or vomiting blood follow up with your PCP sooner or go to the emergency room for evaluation.  Secondary Diagnosis:	Gram-negative pneumonia  Goal:	control infection and prevent worsening infection  Assessment and plan of treatment:	During your admission you developed pneumonia. Continue taking Levaquin and Metronidazole for management. Make sure to finish the entire course of antibiotics to ensure resolution of the pneumonia. Follow up with your primary care for further management. Follow up sooner if you experience worsening difficulty breathing, productive cough, shortness of breath and fevers.

## 2018-02-20 NOTE — PROGRESS NOTE ADULT - NSHPATTENDINGPLANDISCUSS_GEN_ALL_CORE
Housestaff
Housestaff
Medical Oncology, 7 [facility] Team
Medical Oncology, 7 [facility] Team
Housestaff

## 2018-02-20 NOTE — PROGRESS NOTE ADULT - PROBLEM SELECTOR PLAN 1
Stage IV lung cancer w/ mets to brain and bone, s/p RT to brain lesions and R 8th rib lesion. s/p 1 cycle of chemotherapy, given worsening clinical status and active infection, further treatment is on hold. Pt to be discharged today to home w/ home care, evaluated by medical oncology next wednesday for further chemo. Stage IV lung cancer w/ mets to brain and bone, s/p RT to brain lesions and R 8th rib lesion. s/p 1 cycle of chemotherapy, given worsening clinical status and active infection, further treatment is on hold. Pt to be discharged today to home w/ home care, to be evaluated by medical oncology next wednesday for further chemo. Stage IV lung cancer w/ mets to brain and bone, s/p RT to brain lesions and R 8th rib lesion. s/p 1 cycle of chemotherapy, given worsening clinical status and active infection, further treatment is on hold. Due for her enxt cycle of chemo 2/27, however her overall poor performance status will likely mean delay of that cycle until she regains significant overall strength and functioning capacity. Pt to be discharged today to home w/ home care, to be evaluated by medical oncology next wednesday for further chemo. Stage IV lung cancer w/ mets to brain and bone, s/p RT to brain lesions and R 8th rib lesion. s/p 1 cycle of chemotherapy, given worsening clinical status and active infection, further treatment is on hold. Due for her next cycle of chemo 2/27, however her overall poor performance status will likely mean delay of that cycle until she regains significant overall strength and functioning capacity. Pt to be discharged today to home w/ home care, to be evaluated by medical oncology next wednesday for further chemo.  ECOG at this point appears to be 3.

## 2018-02-20 NOTE — PROGRESS NOTE ADULT - PROBLEM SELECTOR PLAN 1
-The patient has a pneumonia on the CT scan but she has clinically improved.  No complaints currently.  The patient is on levofloxacin and should finish a course.  -

## 2018-02-20 NOTE — DISCHARGE NOTE ADULT - PLAN OF CARE
prevent future influenza infection During your stay you completed 5 days of treatment with Tamiflu. At this time you are no longer considered infectious with Influenza. Since you are receiving chemotherapy it is important to prevent future infections with proper hand washing and getting the flu vaccine each flu season. prevent further diarrhea Your diarrhea was likely secondary to your chemotherapy treatment. During episodes of diarrhea try BRAT diets (bananas, rice, applesauce and toast) to control symptoms. If diarrhea is worsened or bloody follow up with your oncologist or primary care doctor for further evaluation. continue with chemotherapy At this time your next chemotherapy session is scheduled for 2/27. Follow up with your oncologist as directed or sooner if needed. control blood pressure Continue your home medication of Amlodipine for control of your blood pressure. Continue following up with your primary care doctor for further management of your blood pressure. For blood pressure that is too high or too low please see your doctor or go to the emergency room as necessary. control blood sugar Your Hgb a1c this admission was 6.3%. Continue to control your diabetes with diet and exercise. Follow up with your primary care doctor for further evaluation of blood sugar levels. monitor blood counts During your admission your blood counts showed that you are anemia. Follow up with your oncologist and primary doctor for monitoring your CBC. If you start experiencing large amounts of bleeding such as frequent dark or red stools, vaginal bleeding, vomiting blood follow up with your PCP sooner or go to the emergency room for evaluation. prevent UTI On this admission your urine analysis showed some bacteria in your urine. If you develop symptoms of a UTI such as increased frequency of urination, pain with urination, and blood in the urine follow up with your primary doctor for further evaluation and management. Your diarrhea was likely secondary to your chemotherapy treatment. During episodes of diarrhea try BRAT diets (bananas, rice, applesauce and toast) to control symptoms. Make sure to stay hydrated during periods of diarrhea to prevent dehydration. If diarrhea is worsened or bloody follow up with your oncologist or primary care doctor for further evaluation. Continue taking decadron, marinol, oxycontin, and nystatin as directed. At this time your next chemotherapy session is scheduled for 2/27. Follow up with Dr. Xiong in second week of March for f/u bronchoscopy. Follow up with your oncologist as directed or sooner if you start experiencing any signs of infection such as fever/chills, non-healing wounds, and urinary tract infections. During your admission your blood counts showed that you are anemia. Follow up with your oncologist and primary doctor for monitoring your CBC. If you start experiencing large amounts of bleeding such as frequent dark or red stools, vaginal bleeding, or vomiting blood follow up with your PCP sooner or go to the emergency room for evaluation. control infection and prevent worsening infection During your admission you developed pneumonia. Continue taking Levaquin and Metronidazole for management. Make sure to finish the entire course of antibiotics to ensure resolution of the pneumonia. Follow up with your primary care for further management. Follow up sooner if you experience worsening difficulty breathing, productive cough, shortness of breath and fevers. During your stay you completed 5 days of treatment with Tamiflu. At this time you are no longer considered infectious with Influenza. Since you are receiving chemotherapy it is important to prevent future infections with proper hand washing and getting the flu vaccine each flu season. You were also diagnosed with aspiration pneumonia and treated with antibiotics. Please continue taking the antibiotics for 1 more day and then stop. Please follow up with Dr. Xiong on 3/5. Your diarrhea was likely secondary to your chemotherapy treatment. During episodes of diarrhea try BRAT diets (bananas, rice, applesauce and toast) to control symptoms. Make sure to stay hydrated during periods of diarrhea to prevent dehydration. If diarrhea is worsened or bloody follow up with your oncologist or primary care doctor for further evaluation. You were also having low potassium. Please follow up with Dr. Noonan regarding long term daily repletion.

## 2018-02-20 NOTE — PROGRESS NOTE ADULT - PROBLEM SELECTOR PLAN 8
-improved since last admission but currently downtrending (10.3 on 2/11 to 8.6 2/13)  -anemia workup performed in the past showing likely mixed anemia with iron deficiency and AOCD, no further workup needed at this time  -keep active T&S  -continue trend CBC  -Hgb goal >7
Seems stable. Monitor.
Seems stable. Monitor.
-improved since last admission but currently downtrending (10.3 on 2/11 to 7.5 2/16)  -anemia workup performed in the past showing likely mixed anemia with iron deficiency and AOCD, no further workup needed at this time  -keep active T&S  -continue trend CBC  -Hgb goal >7
Seems stable. Monitor.
-improved since last admission but currently downtrending (10.3 on 2/11 to 7.5 2/16)  -anemia workup performed in the past showing likely mixed anemia with iron deficiency and AOCD, no further workup needed at this time  -keep active T&S  -continue trend CBC  -Hgb goal >7
-improved since last admission but currently downtrending (10.3 on 2/11 to 7.5 2/16)  -anemia workup performed in the past showing likely mixed anemia with iron deficiency and AOCD, no further workup needed at this time  -keep active T&S  -continue trend CBC  -Hgb goal >7
-improved since last admission but currently downtrending (10.3 on 2/11 to 8.3 2/14)  -anemia workup performed in the past showing likely mixed anemia with iron deficiency and AOCD, no further workup needed at this time  -keep active T&S  -continue trend CBC  -Hgb goal >7
-improved since last admission with Hgb 10.3  -anemia workup performed in the past showing likely mixed anemia with iron deficiency and AOCD, no further workup needed at this time  -keep active T&S  -continue trend CBC  -Hgb goal >7
Seems stable. Monitor.
Seems stable. Monitor.
-improved since last admission but currently downtrending (10.3 on 2/11 to 8.3 2/14)  -anemia workup performed in the past showing likely mixed anemia with iron deficiency and AOCD, no further workup needed at this time  -keep active T&S  -continue trend CBC  -Hgb goal >7

## 2018-02-20 NOTE — DISCHARGE NOTE ADULT - PATIENT PORTAL LINK FT
You can access the [facility] Portal, offered by [facility], by registering with the following website: [email or web address]

## 2018-02-20 NOTE — PROGRESS NOTE ADULT - ATTENDING COMMENTS
Would favor chemical pneumonitis in the setting of vomiting however would cover with anaerobic bacterial coverage given her recurrent fevers and recent chemotherapy treatment.
Patient is medically ready and optimized for discharge, pending Pulm final recs.   CT chest shows worsening right pleural effusion.
Patient has appt with [facility], Dr. Xiong on 3/5 and will make appt with Dr. Shabazz.  Medically ready for discharge home.
Patient was seen and examined by me at bedside. I agree with resident's note, subjective, objective physical exam, assessment and plan with following modifications/additions.     Clinically improved. However, still spiking fevers and diarrhea.   Resp exam. No distress. crackles bilaterally. No wheezes.   Will c/w Tamiflu w/o abx for now.   Pulm and onc recs appreciated.
Patient was seen and examined by me at bedside. I agree with resident's note, subjective, objective physical exam, assessment and plan with following modifications/additions.     1) Sepsis 2/2 influenza virus  2) Nausea, vomiting and diarrhea  3) Dehydration.  4) Lung cancer  5) Essential HTN    Plan: Patient is clinically improved on Tamiflu. No fevers in >24h. However, now with nausea and diarrhea. Likely related to influenza per se and also Tamiflu side effect. C/w gentle hydration. Pulm recs appreciated. Needs PT eval.
dx  transient alteration in mental status -resolved. suspect delerium from fever, however in setting of brain metastases and staring episode. check eeg.   fever - etiology unclear. pt w/o new pulmonary sxs. RVP unchanged. ua is bland. will check c diff/stool cxs.   leukopenia - ANC ~2900. suspect due to cancer/infection  diarrhea - check c diff. stool cxs.   tachycardia (SVT) - improved. triggered by fever  lung cancer - f/u onc and pulm as outpt  influenza A - tamiflu  SIADH - start nacl tabs  dispo - DNR/DNI . candidate for hospice--palliative consult
Patient was seen and examined by me at bedside. I agree with resident's note, subjective, objective physical exam, assessment and plan with following modifications/additions.    Sepsis 2/2 influenza virus in the setting of lung cancer with recent obstructive PNA.  Will c/w supportive therapy.   Pulm and heme/onc recs appreciated -No abx for now.

## 2018-02-20 NOTE — PROGRESS NOTE ADULT - PROBLEM SELECTOR PLAN 1
-Pt met 3/4 SIRS criteria on admission, likely 2/2 influenza  -2/15 with AMS, tachycardia, T 101.4, hypotension, lactate 1.8  -received 2L NS, f/u blood cx  -patient has not yet had bowel movement, now with formed BMs  -last day of Tamiflu day 5/5 on 2/15, RVP still positive for influenza, off droplet  -Patient with vomiting 2/14 and concern for aspiration PNA. CXR looks overloaded on R side with possible pna vs chemical pneumonitis, patient is penicillin allergic pt started on cefepime and metronidazole on 2/15. Cefepime changed to levoquin 750mg on 2/18  -CXR looks like there is a development of consolidation/infiltrate in R lower lung. Possible aspiration PNA  -Supportive management for cough with guaifenesin  -f/u bronchoscopy on previous admission on 2nd week of March with Dr. Xiong  -patient with possible absence seizure leading to aspiration, given keppra 1g and EEG to monitor for seizures. EEG showing no seizures, no need to continue keppra     #Diarrhea  -resolved  -had persistently watery diarrhea throughout this admission, could be 2/2 to chemo treatment or influenza  -no BM since 2/15 in the am, f/u with C.diff, O&P, and stool culture with next BM   -poor PO intake, getting   -encourage po fluid intake

## 2018-02-20 NOTE — PROGRESS NOTE ADULT - PROBLEM SELECTOR PROBLEM 4
Lung cancer
Influenza
Advance directive discussed with patient
Advance directive discussed with patient
Influenza
Influenza
Lung cancer
Influenza
Influenza

## 2018-02-20 NOTE — PROGRESS NOTE ADULT - PROBLEM SELECTOR PLAN 2
Patient admitted with sepsis due to influenza.  Presently on treatment w/ improvement in clinical status today. Will continue with IV Abx for concurrent pneumonia.  EEG negative for seizure activity. Patient admitted with sepsis due to influenza.  Presently on treatment w/ gradual improvement in resp status, she is still weak. on PO abx for her PNA. Patient admitted with sepsis due to influenza.  Presently on treatment w/ gradual improvement in resp status, she is still weak extremely . on PO abx for her PNA.

## 2018-02-20 NOTE — PROGRESS NOTE ADULT - PROBLEM SELECTOR PLAN 4
Pt has living will that states her wishes regarding most of end-of-life situations. She wishes to be DNR/DNI w/ no feeding tube ever. At today's meeting with patient, clarified that if she clinically significantly improves she is likely not a candidate for rehab. Further re-addressed with patient that her illness and cancer is not advanced enough to warrant inpatient hospice placement at this time. Pt was educated on the face that home hospice would only be an option in the event that patient's cancer was progressing and she was no longer deemed a candidate for further chemotherapy by medical oncology. Pt and her daughter will have family meeting to assign responsibilities of pt's care to various family members. If patient requires help at home, urged family to see private home care.    MOLST form still to be completed. Discussed with patient's daughter that a MOLST form should be completed while pt is in hospital to ensure that it stays with patient at all times and her wishes are carried out according to the MOLST form. Daughter confirms that they will complete form prior to discharge. Pt has living will that states her wishes regarding most of end-of-life situations. She wishes to be DNR/DNI w/ no feeding tube ever. At today's meeting with patient, patient noted to have significant generalized weakness and a poor performance status at discharge. Her Karnofsky is 30%. She has been setup for home care as well as private home care via her daughter. Pt will need to make significant gains in her strength prior to seeing oncologist for further treatment of her lung cancer. She is at high risk for repeat hospitalization due to her overall deconditioned state. Pt and daughter understand this.

## 2018-02-20 NOTE — DISCHARGE NOTE ADULT - SECONDARY DIAGNOSIS.
Acute diarrhea Lung cancer Hypertension Diabetes Anemia, chronic disease Bacteriuria Gram-negative pneumonia

## 2018-02-20 NOTE — PROGRESS NOTE ADULT - PROBLEM SELECTOR PROBLEM 1
Severe sepsis
Chemical pneumonitis
Chemical pneumonitis
Gram-negative pneumonia
Influenza
Lung cancer
Lung cancer
Sepsis
Severe sepsis
Severe sepsis
Influenza
Sepsis
Sepsis
Severe sepsis
Severe sepsis
Sepsis

## 2018-02-20 NOTE — PROGRESS NOTE ADULT - PROBLEM SELECTOR PLAN 3
-positive RVP, plan outline as above s/p tamiflu x5 days  -RVP still positive for Influenza A on 2/15, can be off droplet since patient completed 5 day course of tamiflu

## 2018-02-20 NOTE — CHART NOTE - NSCHARTNOTEFT_GEN_A_CORE
Admitting Diagnosis:   63yo F, PMH of HTN, DM, lung CA with mets to brain and bone s/p Rtx. Admitted for sepsis 2/2 influenza virus, complicated by severe sepsis 2/2 evolving PNA.       PAST MEDICAL & SURGICAL HISTORY:  Bone metastasis  Brain cancer  Lung cancer  Hypertension  Diabetes  History of radiation therapy  Status post gamma knife treatment      Current Nutrition Order: Consistent Carb with evening snack + Ensure Clear TID    PO Intake: Good (%) [   ]  Fair (50-75%) [ X  ] Poor (<25%) [   ] ~50% of meals  + 2-3 Ensure Clear/day     GI Issues: No n/v/d/c     Pain: Pain controlled     Skin Integrity: WDL     Labs:   02-20    132<L>  |  96  |  3<L>  ----------------------------<  125<H>  3.0<L>   |  23  |  0.60    Ca    8.0<L>      20 Feb 2018 08:11  Phos  2.6     02-20  Mg     1.8     02-20      CAPILLARY BLOOD GLUCOSE      POCT Blood Glucose.: 122 mg/dL (20 Feb 2018 12:20)  POCT Blood Glucose.: 115 mg/dL (20 Feb 2018 08:22)  POCT Blood Glucose.: 115 mg/dL (19 Feb 2018 21:08)  POCT Blood Glucose.: 111 mg/dL (19 Feb 2018 17:34)      Medications:  MEDICATIONS  (STANDING):  dextrose 5%. 1000 milliLiter(s) (50 mL/Hr) IV Continuous <Continuous>  dextrose 50% Injectable 12.5 Gram(s) IV Push once  dextrose 50% Injectable 25 Gram(s) IV Push once  dextrose 50% Injectable 25 Gram(s) IV Push once  enoxaparin Injectable 40 milliGRAM(s) SubCutaneous daily  insulin lispro (HumaLOG) corrective regimen sliding scale   SubCutaneous Before meals and at bedtime  levoFLOXacin  Tablet 750 milliGRAM(s) Oral every 24 hours  melatonin 5 milliGRAM(s) Oral at bedtime  metroNIDAZOLE    Tablet 500 milliGRAM(s) Oral every 8 hours  nystatin    Suspension 291884 Unit(s) Oral four times a day  pantoprazole    Tablet 40 milliGRAM(s) Oral before breakfast  potassium chloride   Powder 40 milliEquivalent(s) Oral every 4 hours  potassium chloride  10 mEq/100 mL IVPB 10 milliEquivalent(s) IV Intermittent every 1 hour  sodium chloride 0.9%. 1000 milliLiter(s) (120 mL/Hr) IV Continuous <Continuous>    MEDICATIONS  (PRN):  acetaminophen   Tablet 650 milliGRAM(s) Oral every 6 hours PRN For Temp greater than 38 C (100.4 F)  dextrose Gel 1 Dose(s) Oral once PRN Blood Glucose LESS THAN 70 milliGRAM(s)/deciliter  glucagon  Injectable 1 milliGRAM(s) IntraMuscular once PRN Glucose LESS THAN 70 milligrams/deciliter  guaiFENesin    Syrup 100 milliGRAM(s) Oral every 6 hours PRN Cough  zaleplon 5 milliGRAM(s) Oral once PRN Insomnia      Weight: 80.5 kg       Weight Change: No new wt to assess     Estimated Energy Needs (30-35 calories/kg): Increased needs secondary to hypermetabolic state and recent weight loss concerning for severe malnutrition  · Weight  (lbs)	125.2 lb  · Weight (kg)	56.8 kg  · From (30 suni/kg)	1704   · To (35 calories/kg)	1988        Estimated Protein Needs (1.4-1.6 g/kg):  · Weight  (lbs)	125.2   · Weight (kg)	56.8 kg  · From (1.4 g/kg)	79.52   · To (1.6 g/kg)	90.88      Estimated Fluid Needs (30-35 ml/kg):  · Weight  (lbs)	125.2   · Weight (kg)	56.8   · From (30 ml/kg)	1704   · To (35 ml/kg)	1988       Subjective: Pt tolerating diet with fair (~50%) intake + Ensure Clear 2-3 cans/day. No n/v/d/c. Pt with wt loss PTA. Encouraged adequate intake of suni/protein.     Previous Nutrition Diagnosis:  Increased nutrient needs (specify); kcal/pro r/t increased demand for nutrients AEB hypermetabolic state and concern for malnutrition      Active [ X  ]  Resolved [   ]    If resolved, new PES:     Goal:  1.) Pt will meet 75% of needs PO     Recommendations:  1.) Rec. liberalizing diet to regular; fingersticks have been controlled and rec. continuing Ensure Clear TID     Education: Educated on meeting suni/protein needs; encouraged intake of ONS     Risk Level: High [ X  ] Moderate [   ] Low [   ]

## 2018-02-20 NOTE — PROGRESS NOTE ADULT - SUBJECTIVE AND OBJECTIVE BOX
INTERVAL HPI/OVERNIGHT EVENTS: ANGELIKA    Patient was seen and examined at bedside. Patient denied nausea, vomiting, stated she had a solid bowel movement. Patient denies: fever, chills, dizziness, weakness, HA, CP, palpitations, SOB, cough, N/V/D/C, dysuria, changes in bowel movements, LE edema.    ROS: as above    VITAL SIGNS:  T(F): 98.9 (02-20-18 @ 05:47)  HR: 111 (02-20-18 @ 05:47)  BP: 139/78 (02-20-18 @ 05:47)  RR: 16 (02-20-18 @ 05:47)  SpO2: 94% (02-20-18 @ 05:47)  Wt(kg): --    PHYSICAL EXAM:    Constitutional: WDWN, NAD  Eyes: anicteric and nonerythematous sclera, PERRL, EOMI  Mouth- white plaques on the tongue, improved from before  Neck: supple, trachea midline, no masses, no JVD  Respiratory: mild rhonchi to ausculation of the b/l lung fields   Cardiovascular: chest wall nontender to palpation, RRR, normal S1S2, no M/R/G  Gastrointestinal: normoactive BS x4 ,soft, NTND, no masses palpable  Extremities: Warm, well perfused, pulses 2+ and equal bilateral upper and lower extremities, no edema, no clubbing, no tenderness to palpation of the b/l LE  Neurological: awake, AAOx3  Skin: Normal temperature, warm, dry    MEDICATIONS  (STANDING):  dextrose 5%. 1000 milliLiter(s) (50 mL/Hr) IV Continuous <Continuous>  dextrose 50% Injectable 12.5 Gram(s) IV Push once  dextrose 50% Injectable 25 Gram(s) IV Push once  dextrose 50% Injectable 25 Gram(s) IV Push once  enoxaparin Injectable 40 milliGRAM(s) SubCutaneous daily  insulin lispro (HumaLOG) corrective regimen sliding scale   SubCutaneous Before meals and at bedtime  levoFLOXacin  Tablet 750 milliGRAM(s) Oral every 24 hours  melatonin 5 milliGRAM(s) Oral at bedtime  metroNIDAZOLE    Tablet 500 milliGRAM(s) Oral every 8 hours  nystatin    Suspension 200611 Unit(s) Oral four times a day  pantoprazole    Tablet 40 milliGRAM(s) Oral before breakfast  sodium chloride 0.9%. 1000 milliLiter(s) (120 mL/Hr) IV Continuous <Continuous>    MEDICATIONS  (PRN):  acetaminophen   Tablet 650 milliGRAM(s) Oral every 6 hours PRN For Temp greater than 38 C (100.4 F)  dextrose Gel 1 Dose(s) Oral once PRN Blood Glucose LESS THAN 70 milliGRAM(s)/deciliter  glucagon  Injectable 1 milliGRAM(s) IntraMuscular once PRN Glucose LESS THAN 70 milligrams/deciliter  guaiFENesin    Syrup 100 milliGRAM(s) Oral every 6 hours PRN Cough  zaleplon 5 milliGRAM(s) Oral once PRN Insomnia      Allergies    penicillins (Hives)    Intolerances        LABS:                        7.1    3.1   )-----------( 115      ( 20 Feb 2018 08:11 )             21.9     02-19    135  |  98  |  3<L>  ----------------------------<  135<H>  3.1<L>   |  25  |  0.56    Ca    7.8<L>      19 Feb 2018 07:24  Phos  1.6     02-19  Mg     1.7     02-19            RADIOLOGY & ADDITIONAL TESTS:

## 2018-02-20 NOTE — PROGRESS NOTE ADULT - PROBLEM SELECTOR PLAN 2
Gram negative vs aspiration PNA.   Clinically improved.   CT chest performed. Seems pretty similar to previous one in January of 2018, however with larger right sided pleural effusion.  * c/w Levaquin 750mg po daily and Flagyl PO --Total course of 7 days.

## 2018-02-20 NOTE — PROGRESS NOTE ADULT - PROBLEM SELECTOR PROBLEM 9
Bacteriuria
Diabetes
Diabetes
Bacteriuria
Diabetes
Bacteriuria
Diabetes
Diabetes
Bacteriuria

## 2018-02-20 NOTE — PROGRESS NOTE ADULT - PROBLEM SELECTOR PROBLEM 5
Hypertension
Acute diarrhea
DNR (do not resuscitate)
DNR (do not resuscitate)
Hypertension
Acute diarrhea
Acute diarrhea

## 2018-02-20 NOTE — PROGRESS NOTE ADULT - ASSESSMENT
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

## 2018-02-20 NOTE — PROGRESS NOTE ADULT - PROBLEM SELECTOR PROBLEM 2
Gram-negative pneumonia
Fall
Gram-negative pneumonia
Gram-negative pneumonia
Influenza
Lung cancer
Fall
Lung cancer
Fall
Gram-negative pneumonia
Gram-negative pneumonia
Fall

## 2018-02-20 NOTE — DISCHARGE NOTE ADULT - MEDICATION SUMMARY - MEDICATIONS TO TAKE
I will START or STAY ON the medications listed below when I get home from the hospital:    dexamethasone 4 mg oral tablet  -- 1 tab(s) by mouth 2 times a day  -- Indication: For Lung cancer    metroNIDAZOLE 500 mg oral tablet  -- 1 tab(s) by mouth every 8 hours  -- Indication: For SEPSIS    nystatin 100,000 units/mL oral suspension  -- 5 milliliter(s) by mouth 4 times a day  -- Indication: For oral thrush    amLODIPine 10 mg oral tablet  -- 1 tab(s) by mouth once a day  -- Indication: For Hypertension    polyethylene glycol 3350 oral powder for reconstitution  -- 17 gram(s) by mouth once a day (at bedtime)  -- Indication: For Constipation    senna oral tablet  -- 2 tab(s) by mouth once a day (at bedtime)  -- Indication: For Constipation    levoFLOXacin 750 mg oral tablet  -- 1 tab(s) by mouth every 24 hours  -- Indication: For Pneumonia

## 2018-02-20 NOTE — PROGRESS NOTE ADULT - PROBLEM SELECTOR PROBLEM 8
Anemia
Hypertension
Hypertension
Anemia
Hypertension
Anemia
Hypertension
Hypertension
Anemia

## 2018-02-20 NOTE — PROGRESS NOTE ADULT - PROBLEM SELECTOR PLAN 10
-s/p 2.5 L NS, now on 75cc/hr NS  -replete K >4, Mg > 2  -carb consistent diet    DVTs- SCDs, has prior hx of pulmonary hemorrhage, ppx was deferred on last admission, will currently hold AC until confirmed with her pulmonologist and then consider starting Lovenox    DNR/DNI
On Lovenox.     DNR/DNI
On Lovenox.     DNR/DNI
-pt started on 1 L @ 120 cc per hour due to tachycardia possibly 2/2 poor PO intake, continue f/u vitals   -replete K >4, Mg > 2  -carb consistent diet    DVTs- SCDs, pt has hx of pulmonary hemorrhage, ppx was help on last admission, patient started on AC on 2/14 with Lovenox 4 mg QD okay per pulm    DNR/DNI
On Lovenox.     DNR/DNI
-pt started on 1 L @ 120 cc per hour due to tachycardia possibly 2/2 poor PO intake, continue f/u vitals   -replete K >4, Mg > 2  -carb consistent diet    DVTs- SCDs, pt has hx of pulmonary hemorrhage, ppx was help on last admission, patient started on AC on 2/14 with Lovenox 4 mg QD okay per pulm    DNR/DNI
-pt started on 1 L @ 120 cc per hour due to tachycardia possibly 2/2 poor PO intake, continue f/u vitals   -replete K >4, Mg > 2  -carb consistent diet    DVTs- SCDs, pt has hx of pulmonary hemorrhage, ppx was help on last admission, patient started on AC on 2/14 with Lovenox 40 mg QD okay per pulm    DNR/DNI
-pt started on 1000 mL @ 80 cc per hour due to tachycardia possibly 2/2 poor PO intake, f/u vitals   -replete K >4, Mg > 2  -carb consistent diet    DVTs- SCDs, pt has hx of pulmonary hemorrhage, ppx was help on last admission, patient started on AC on 2/14 with Lovenox 4 mg QD okay per pulm    DNR/DNI
-s/p 2.5 L NS, now on 75cc/hr NS  -replete K >4, Mg > 2  -carb consistent diet    DVTs- SCDs, has prior hx of pulmonary hemorrhage, ppx was deferred on last admission, will currently hold AC until confirmed with her pulmonologist and then consider starting Lovenox    DNR/DNI
On Lovenox.     DNR/DNI
On Lovenox.     DNR/DNI
-pt started on 1000 mL @ 80 cc per hour due to tachycardia possibly 2/2 poor PO intake, f/u vitals   -replete K >4, Mg > 2  -carb consistent diet    DVTs- SCDs, pt has hx of pulmonary hemorrhage, ppx was help on last admission, patient started on AC on 2/14 with Lovenox 4 mg QD okay per pulm    DNR/DNI

## 2018-02-20 NOTE — PROGRESS NOTE ADULT - PROBLEM SELECTOR PROBLEM 6
Lung cancer
Lung cancer
Diabetes
Lung cancer
Diabetes
Lung cancer
Lung cancer
Diabetes
Diabetes

## 2018-02-20 NOTE — DISCHARGE NOTE ADULT - CARE PROVIDER_API CALL
Stiven Xiong), Internal Medicine; Pulmonary Disease  [address] [place] [id number]  Phone: [phone]  Fax: [phone]    Loren Shabazz (NP; RN), NP in Primary Care AdultGerontology  [address] [place] [address]  Phone: [phone]  Fax: [phone]

## 2018-02-20 NOTE — PROGRESS NOTE ADULT - SUBJECTIVE AND OBJECTIVE BOX
Interventional Pulmonary Fellow      Interval Events:  Patient seen and examined at bedside.  The patient feels well and wants to go home.  Not currently short of breath.          Vital Signs Last 24 Hrs  T(C): 36.9 (20 Feb 2018 12:53), Max: 37.2 (19 Feb 2018 17:30)  T(F): 98.5 (20 Feb 2018 12:53), Max: 99 (19 Feb 2018 17:30)  HR: 110 (20 Feb 2018 12:53) (110 - 116)  BP: 124/78 (20 Feb 2018 12:53) (123/75 - 143/78)  BP(mean): --  RR: 16 (20 Feb 2018 12:53) (16 - 16)  SpO2: 95% (20 Feb 2018 12:53) (94% - 95%)    02-19 @ 07:01  -  02-20 @ 07:00  --------------------------------------------------------  IN: 1440 mL / OUT: 0 mL / NET: 1440 mL          PHYSICAL EXAM:    General: Well developed; well nourished; in no acute distress  Neck: Supple; non tender; no masses  Respiratory: Decreased breath sounds right base  Cardiovascular: tachycardic but regular  Gastrointestinal: Soft non-tender non-distended; Normal bowel sounds  Extremities: Normal range of motion, No clubbing, cyanosis or edema  Neurological: Alert and oriented x3  Skin: Warm and dry. No obvious rash    LABS:      CBC Full  -  ( 20 Feb 2018 08:11 )  WBC Count : 3.1 K/uL  Hemoglobin : 7.1 g/dL  Hematocrit : 21.9 %  Platelet Count - Automated : 115 K/uL  Mean Cell Volume : 76.3 fL  Mean Cell Hemoglobin : 24.7 pg  Mean Cell Hemoglobin Concentration : 32.4 g/dL  Auto Neutrophil # : x  Auto Lymphocyte # : x  Auto Monocyte # : x  Auto Eosinophil # : x  Auto Basophil # : x  Auto Neutrophil % : 75.3 %  Auto Lymphocyte % : 15.3 %  Auto Monocyte % : 9.1 %  Auto Eosinophil % : 0.3 %  Auto Basophil % : 0.0 %    02-20    132<L>  |  96  |  3<L>  ----------------------------<  125<H>  3.0<L>   |  23  |  0.60    Ca    8.0<L>      20 Feb 2018 08:11  Phos  2.6     02-20  Mg     1.8     02-20                        RADIOLOGY & ADDITIONAL STUDIES (The following images were personally reviewed):  CT Chest: Significant interval progression of metastatic thoracic inlet and   mediastinal adenopathy with interval development of malignant moderate   right pleural effusion; slight interval decrease in large right hilar   mass obstructing right lower lobe and right middle lobe bronchi and   partially encasing the right pulmonary artery; slight interval decrease   in additional mass within the superior segment of the right lower lobe;   no interval change in lymphangitic carcinomatosis extending to the right   upper lobe

## 2018-02-20 NOTE — PROGRESS NOTE ADULT - PROBLEM SELECTOR PROBLEM 3
Toxic metabolic encephalopathy
Influenza
Lung cancer
Palliative care by specialist
Palliative care by specialist
Toxic metabolic encephalopathy
Toxic metabolic encephalopathy
Influenza
Influenza
Toxic metabolic encephalopathy
Toxic metabolic encephalopathy
Influenza

## 2018-02-20 NOTE — PROGRESS NOTE ADULT - PROBLEM SELECTOR PLAN 6
* patient scheduled for next round of chemotherapy on 2/27  * On decadron, marinol, oxycontin, and nystatin
* patient scheduled for next round of chemotherapy on 2/27  * On decadron, marinol, oxycontin, and nystatin
-HgbA1c 6.3 on admission, controlled with diet at home   -c/w mod ISS
* patient scheduled for next round of chemotherapy on 2/27  * On decadron, marinol, oxycontin, and nystatin
* patient scheduled for next round of chemotherapy on 2/27  * On decadron, marinol, oxycontin, and nystatin
-HgbA1c 6.3 on admission, controlled with diet at home   -c/w mod ISS
Outpatient follow up.
-HgbA1c 6.3 on admission, controlled with diet at home   -c/w mod ISS
-HgbA1c 6.3 on admission, controlled with diet at home   -c/w mod ISS

## 2018-02-20 NOTE — PROGRESS NOTE ADULT - SUBJECTIVE AND OBJECTIVE BOX
CC: NO more fevers No SOB.   Tolerates diet.   Rest of ROS negative.     Vital Signs Last 24 Hrs  T(C): 36.9 (20 Feb 2018 12:53), Max: 37.2 (19 Feb 2018 17:30)  T(F): 98.5 (20 Feb 2018 12:53), Max: 99 (19 Feb 2018 17:30)  HR: 110 (20 Feb 2018 12:53) (110 - 116)  BP: 124/78 (20 Feb 2018 12:53) (123/75 - 143/78)  BP(mean): --  RR: 16 (20 Feb 2018 12:53) (16 - 16)  SpO2: 95% (20 Feb 2018 12:53) (94% - 95%)    PHYSICAL EXAMINATION  * General: Not in acute distress. Awake and alert. Lying comfortably in bed.  * Head: Normocephalic, atraumatic.  * HEENT: ears no discharge, eyes PERRLA, nose no discharge, throat no exudates, normal tonsils.  * Neck: no JVD, supple.  * Lungs: Clear to auscultation, no rales, no wheezes.  * Cardio: Regular rate and rhythm, no murmurs, no rubs, no gallops. Good peripheral pulses.  * Abdomen: Soft, non-tender, non-distended, tympanic to percussion, no rebound, no guarding, no rigidity. Bowel sounds present. No suprapubic or CVA tenderness.  * : Deferred.  * Extremities: Acyanotic, no edema.  * Skin: Warm and dry.  * Neuro: Alert and oriented x 3. No focal deficits. Motor strength is 5/5 throughout. Sensation intact. Cranial nerves II-XII grossly intact.                           7.1    3.1   )-----------( 115      ( 20 Feb 2018 08:11 )             21.9   02-20    132<L>  |  96  |  3<L>  ----------------------------<  125<H>  3.0<L>   |  23  |  0.60    Ca    8.0<L>      20 Feb 2018 08:11  Phos  2.6     02-20  Mg     1.8     02-20              MEDICATIONS  (STANDING):  dextrose 5%. 1000 milliLiter(s) (50 mL/Hr) IV Continuous <Continuous>  dextrose 50% Injectable 12.5 Gram(s) IV Push once  dextrose 50% Injectable 25 Gram(s) IV Push once  dextrose 50% Injectable 25 Gram(s) IV Push once  enoxaparin Injectable 40 milliGRAM(s) SubCutaneous daily  insulin lispro (HumaLOG) corrective regimen sliding scale   SubCutaneous Before meals and at bedtime  levoFLOXacin  Tablet 750 milliGRAM(s) Oral every 24 hours  melatonin 5 milliGRAM(s) Oral at bedtime  metroNIDAZOLE    Tablet 500 milliGRAM(s) Oral every 8 hours  nystatin    Suspension 284564 Unit(s) Oral four times a day  pantoprazole    Tablet 40 milliGRAM(s) Oral before breakfast  potassium chloride   Powder 40 milliEquivalent(s) Oral every 4 hours  potassium chloride  10 mEq/100 mL IVPB 10 milliEquivalent(s) IV Intermittent every 1 hour  sodium chloride 0.9%. 1000 milliLiter(s) (120 mL/Hr) IV Continuous <Continuous>    MEDICATIONS  (PRN):  acetaminophen   Tablet 650 milliGRAM(s) Oral every 6 hours PRN For Temp greater than 38 C (100.4 F)  dextrose Gel 1 Dose(s) Oral once PRN Blood Glucose LESS THAN 70 milliGRAM(s)/deciliter  glucagon  Injectable 1 milliGRAM(s) IntraMuscular once PRN Glucose LESS THAN 70 milligrams/deciliter  guaiFENesin    Syrup 100 milliGRAM(s) Oral every 6 hours PRN Cough  zaleplon 5 milliGRAM(s) Oral once PRN Insomnia

## 2018-02-20 NOTE — DISCHARGE NOTE ADULT - HOSPITAL COURSE
Pt is a 63 yo F with PMHx of DM, HTN, stage IV Lung CA with mets to brain and right 8th rib s/p gamma knife tx and radiation respectively, who presented to the ED complaining of 1 day of fever/chills, worsening productive cough and 4 days of nonbloody, mucoid green diarrhea that started after chemotherapy. Patient also fell while at home, hit her head with LOC <5 minutes. Pt was admitted for sepsis workup in the setting of positive RVP for Influenza A. CT head showed no evidence of fracture or bleed. Pt was started on supportive management for Influenza. Patient's stay was complicated when, on 2/15, rapid was called on the patient after she became diaphoretic, lethargic, staring into space and slumped over in her seat with some foaming at the mouth. Rectal temp was 101.4, HR 90s-100s, patient received 2 L NS, 1 g Keppra, oral Tylenol and 2 grams magnesium and patient was placed on VEEG for evaluation of possible seizure. CXR showed new onset of mild pulmonary venous congestion right greater than left with a new right-sided pleural effusion, No other significant interval changes, and persistent perihilar right-sided mass. Pt was diagnosed with chemical pneumonitis vs aspiration pneumonia 2/2 an episode of vomiting the day prior. Patient was started on Flagyl and Cefepime. Patient was switched from Cefepime to Levaquin on 2/18. VEEG showed no signs of seizure activity, Keppra was discontinued. Patient is asymptomatic this am, reports she had a solid bowel movement. Vitals are otherwise stable, labs within normal limits for the patient. At this time patient is stable and ready for discharge with outpatient follow up. Pt is a 61 yo F with PMHx of DM, HTN, stage IV Lung CA with mets to brain and right 8th rib s/p gamma knife tx and radiation respectively, who presented to the ED complaining of 1 day of fever/chills, worsening productive cough and 4 days of nonbloody, mucoid green diarrhea that started after chemotherapy. Patient also fell while at home, hit her head with LOC <5 minutes. Pt was admitted for sepsis workup in the setting of positive RVP for Influenza A. CT head showed no evidence of fracture or bleed. Pt was started on supportive management for Influenza. Patient's stay was complicated when, on 2/15, rapid was called on the patient after she became diaphoretic, lethargic, staring into space and slumped over in her seat with some foaming at the mouth. Rectal temp was 101.4, HR 90s-100s, patient received 2 L NS, 1 g Keppra, oral Tylenol and 2 grams magnesium and patient was placed on VEEG for evaluation of possible seizure. CXR showed new onset of mild pulmonary venous congestion right greater than left with a new right-sided pleural effusion, No other significant interval changes, and persistent perihilar right-sided mass. Pt was diagnosed with chemical pneumonitis vs aspiration pneumonia 2/2 an episode of vomiting the day prior. Patient was started on Flagyl and Cefepime. Patient was switched from Cefepime to Levaquin on 2/18 for a 7 day treatment course. VEEG showed no signs of seizure activity, Keppra was discontinued. Patient is asymptomatic this am, reports she had a solid bowel movement. CT chest non-contrast showed Significant interval progression of metastatic thoracic inlet and mediastinal adenopathy with interval development of malignant moderate right pleural effusion; slight interval decrease in large right hilar mass obstructing right lower lobe and right middle lobe bronchi and partially encasing the right pulmonary artery; slight interval decrease in additional mass within the superior segment of the right lower lobe; no interval change in lymphangitic carcinomatosis extending to the right upper lobe  Vitals are otherwise stable, labs within normal limits for the patient. At this time patient is stable and ready for discharge with outpatient follow up.

## 2018-02-20 NOTE — PROGRESS NOTE ADULT - PROBLEM SELECTOR PLAN 9
-UA positive for nitrates, bacteria and granular casts, no leuk esterase, no WBC  -patient remains asymptomatic this am  -no need for abx at this time, continue to monitor pt status
SURESH
SURESH
-UA positive for nitrates, bacteria and granular casts, no leuk esterase, no WBC  -patient remains asymptomatic this am  -no need for abx at this time, continue to monitor pt status
SURESH
-UA positive for nitrates, bacteria and granular casts, no leuk esterase, no WBC  -patient asymptomatic this am  -no need for abx at this time, continue to monitor pt status
-UA positive for nitrates, bacteria and granular casts, no leuk esterase, no WBC  -patient remains asymptomatic this am  -no need for abx at this time, continue to monitor pt status
SURESH
SURESH
-UA positive for nitrates, bacteria and granular casts, no leuk esterase, no WBC  -patient remains asymptomatic this am  -no need for abx at this time, continue to monitor pt status

## 2018-02-20 NOTE — PROGRESS NOTE ADULT - PROBLEM SELECTOR PLAN 3
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 Xiong on March 5th.  -Increased pleural effusion found on CT, on ultrasound the fluid is small to moderate in size however the patient is asymptomatic.  Will follow up the effusion in IP clinic.

## 2018-02-20 NOTE — PROGRESS NOTE ADULT - PROBLEM SELECTOR PROBLEM 7
Anemia, chronic disease
Anemia, chronic disease
Hyponatremia
Anemia, chronic disease
Hyponatremia

## 2018-02-20 NOTE — PROGRESS NOTE ADULT - PROBLEM SELECTOR PLAN 7
-Na 133 on 2/12, likely 2/2 poor po intake/baseline for pt (Na 130-135 on last admission), Na 136 on 2/13  -continue to trend BMP
Stable
Stable
-Na 133 on 2/12, likely 2/2 poor po intake/baseline for pt (Na 130-135 on last admission), Na 134 on 2/16  -continue to trend BMP
Stable
-Na 133 on 2/12, likely 2/2 poor po intake/baseline for pt (Na 130-135 on last admission)  -continue to trend BMP
-Na 133 on 2/12, likely 2/2 poor po intake/baseline for pt (Na 130-135 on last admission), Na 134 on 2/16  -continue to trend BMP
-Na 133 on 2/12, likely 2/2 poor po intake/baseline for pt (Na 130-135 on last admission), Na 134 on 2/16  -continue to trend BMP
-Na 133 on 2/12, likely 2/2 poor po intake/baseline for pt (Na 130-135 on last admission), Na 136 on 2/13  -continue to trend BMP
Stable
Stable
-Na 133 on 2/12, likely 2/2 poor po intake/baseline for pt (Na 130-135 on last admission), Na 136 on 2/13  -continue to trend BMP

## 2018-02-25 DIAGNOSIS — C79.51 SECONDARY MALIGNANT NEOPLASM OF BONE: ICD-10-CM
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
## 2018-02-25 DIAGNOSIS — J11.1 INFLUENZA DUE TO UNIDENTIFIED INFLUENZA VIRUS WITH OTHER RESPIRATORY MANIFESTATIONS: ICD-10-CM
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
## 2018-02-25 DIAGNOSIS — I45.81 LONG QT SYNDROME: ICD-10-CM

## 2018-02-25 DIAGNOSIS — Z80.51 FAMILY HISTORY OF MALIGNANT NEOPLASM OF KIDNEY: ICD-10-CM

## 2018-02-25 DIAGNOSIS — C34.90 MALIGNANT NEOPLASM OF UNSPECIFIED PART OF UNSPECIFIED BRONCHUS OR LUNG: ICD-10-CM

## 2018-02-25 DIAGNOSIS — E87.1 HYPO-OSMOLALITY AND HYPONATREMIA: ICD-10-CM

## 2018-02-25 DIAGNOSIS — Z51.5 ENCOUNTER FOR PALLIATIVE CARE: ICD-10-CM

## 2018-02-25 DIAGNOSIS — Z80.52 FAMILY HISTORY OF MALIGNANT NEOPLASM OF BLADDER: ICD-10-CM

## 2018-02-25 DIAGNOSIS — Z66 DO NOT RESUSCITATE: ICD-10-CM

## 2018-02-25 DIAGNOSIS — R50.9 FEVER, UNSPECIFIED: ICD-10-CM

## 2018-02-25 DIAGNOSIS — C79.31 SECONDARY MALIGNANT NEOPLASM OF BRAIN: ICD-10-CM

## 2018-02-25 DIAGNOSIS — Z87.891 PERSONAL HISTORY OF NICOTINE DEPENDENCE: ICD-10-CM

## 2018-02-25 DIAGNOSIS — R11.2 NAUSEA WITH VOMITING, UNSPECIFIED: ICD-10-CM

## 2018-02-25 DIAGNOSIS — J15.9 UNSPECIFIED BACTERIAL PNEUMONIA: ICD-10-CM

## 2018-02-25 DIAGNOSIS — A41.89 OTHER SPECIFIED SEPSIS: ICD-10-CM

## 2018-02-25 DIAGNOSIS — E86.0 DEHYDRATION: ICD-10-CM

## 2018-02-25 DIAGNOSIS — Z92.21 PERSONAL HISTORY OF ANTINEOPLASTIC CHEMOTHERAPY: ICD-10-CM

## 2018-02-25 DIAGNOSIS — R65.20 SEVERE SEPSIS WITHOUT SEPTIC SHOCK: ICD-10-CM
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
## 2018-02-25 DIAGNOSIS — D63.8 ANEMIA IN OTHER CHRONIC DISEASES CLASSIFIED ELSEWHERE: ICD-10-CM

## 2018-02-25 DIAGNOSIS — R19.7 DIARRHEA, UNSPECIFIED: ICD-10-CM

## 2018-02-25 DIAGNOSIS — I10 ESSENTIAL (PRIMARY) HYPERTENSION: ICD-10-CM

## 2018-02-25 DIAGNOSIS — G92 TOXIC ENCEPHALOPATHY: ICD-10-CM

## 2018-02-25 DIAGNOSIS — E43 UNSPECIFIED SEVERE PROTEIN-CALORIE MALNUTRITION: ICD-10-CM

## 2018-02-25 DIAGNOSIS — F41.9 ANXIETY DISORDER, UNSPECIFIED: ICD-10-CM

## 2018-02-25 DIAGNOSIS — E11.9 TYPE 2 DIABETES MELLITUS WITHOUT COMPLICATIONS: ICD-10-CM

## 2018-03-01 RX ORDER — PEMBROLIZUMAB 25 MG/ML
200 INJECTION, SOLUTION INTRAVENOUS ONCE
Qty: 0 | Refills: 0 | Status: DISCONTINUED | OUTPATIENT
Start: 2018-03-02 | End: 2018-03-17

## 2018-03-01 RX ORDER — DEXAMETHASONE 0.5 MG/5ML
20 ELIXIR ORAL ONCE
Qty: 0 | Refills: 0 | Status: DISCONTINUED | OUTPATIENT
Start: 2018-03-02 | End: 2018-03-17

## 2018-03-01 RX ORDER — PEMETREXED 500 MG/20ML
935 INJECTION, SOLUTION, CONCENTRATE INTRAVENOUS ONCE
Qty: 0 | Refills: 0 | Status: DISCONTINUED | OUTPATIENT
Start: 2018-03-02 | End: 2018-03-17

## 2018-03-01 RX ORDER — CARBOPLATIN 50 MG
585 VIAL (EA) INTRAVENOUS ONCE
Qty: 0 | Refills: 0 | Status: DISCONTINUED | OUTPATIENT
Start: 2018-03-02 | End: 2018-03-17

## 2018-03-01 RX ORDER — ONDANSETRON 8 MG/1
16 TABLET, FILM COATED ORAL ONCE
Qty: 0 | Refills: 0 | Status: DISCONTINUED | OUTPATIENT
Start: 2018-03-02 | End: 2018-03-17

## 2018-03-01 RX ORDER — FOSAPREPITANT DIMEGLUMINE 150 MG/5ML
150 INJECTION, POWDER, LYOPHILIZED, FOR SOLUTION INTRAVENOUS ONCE
Qty: 0 | Refills: 0 | Status: DISCONTINUED | OUTPATIENT
Start: 2018-03-02 | End: 2018-03-17

## 2018-03-02 ENCOUNTER — OUTPATIENT (OUTPATIENT)
Dept: OUTPATIENT SERVICES | Facility: HOSPITAL | Age: 63
LOS: 1 days | End: 2018-03-02

## 2018-03-02 ENCOUNTER — APPOINTMENT (OUTPATIENT)
Dept: INFUSION THERAPY | Facility: HOSPITAL | Age: 63
End: 2018-03-02

## 2018-03-02 ENCOUNTER — EMERGENCY (EMERGENCY)
Facility: HOSPITAL | Age: 63
LOS: 1 days | Discharge: HOME | End: 2018-03-02
Attending: EMERGENCY MEDICINE | Admitting: EMERGENCY MEDICINE
Payer: COMMERCIAL

## 2018-03-02 ENCOUNTER — OUTPATIENT (OUTPATIENT)
Dept: OUTPATIENT SERVICES | Facility: HOSPITAL | Age: 63
LOS: 1 days | End: 2018-03-02
Payer: COMMERCIAL

## 2018-03-02 VITALS
RESPIRATION RATE: 18 BRPM | OXYGEN SATURATION: 99 % | DIASTOLIC BLOOD PRESSURE: 88 MMHG | HEART RATE: 98 BPM | TEMPERATURE: 99 F | SYSTOLIC BLOOD PRESSURE: 142 MMHG

## 2018-03-02 VITALS
TEMPERATURE: 98 F | SYSTOLIC BLOOD PRESSURE: 144 MMHG | HEART RATE: 99 BPM | DIASTOLIC BLOOD PRESSURE: 65 MMHG | RESPIRATION RATE: 18 BRPM | OXYGEN SATURATION: 98 %

## 2018-03-02 DIAGNOSIS — R07.81 PLEURODYNIA: ICD-10-CM

## 2018-03-02 DIAGNOSIS — Z92.3 PERSONAL HISTORY OF IRRADIATION: Chronic | ICD-10-CM

## 2018-03-02 DIAGNOSIS — Z88.0 ALLERGY STATUS TO PENICILLIN: ICD-10-CM

## 2018-03-02 DIAGNOSIS — Z87.891 PERSONAL HISTORY OF NICOTINE DEPENDENCE: ICD-10-CM

## 2018-03-02 DIAGNOSIS — Z79.2 LONG TERM (CURRENT) USE OF ANTIBIOTICS: ICD-10-CM

## 2018-03-02 DIAGNOSIS — E11.9 TYPE 2 DIABETES MELLITUS WITHOUT COMPLICATIONS: ICD-10-CM

## 2018-03-02 DIAGNOSIS — C34.90 MALIGNANT NEOPLASM OF UNSPECIFIED PART OF UNSPECIFIED BRONCHUS OR LUNG: ICD-10-CM

## 2018-03-02 DIAGNOSIS — I10 ESSENTIAL (PRIMARY) HYPERTENSION: ICD-10-CM

## 2018-03-02 DIAGNOSIS — Z79.899 OTHER LONG TERM (CURRENT) DRUG THERAPY: ICD-10-CM

## 2018-03-02 LAB
ALBUMIN SERPL ELPH-MCNC: 2.7 G/DL — LOW (ref 3.3–5)
ALP SERPL-CCNC: 177 U/L — HIGH (ref 40–120)
ALT FLD-CCNC: 155 U/L — HIGH (ref 10–45)
ANION GAP SERPL CALC-SCNC: 15 MMOL/L — SIGNIFICANT CHANGE UP (ref 5–17)
ANISOCYTOSIS BLD QL: SLIGHT — SIGNIFICANT CHANGE UP
APTT BLD: 26.1 SEC — LOW (ref 27.5–37.4)
AST SERPL-CCNC: 61 U/L — HIGH (ref 10–40)
BILIRUB SERPL-MCNC: 0.4 MG/DL — SIGNIFICANT CHANGE UP (ref 0.2–1.2)
BUN SERPL-MCNC: 20 MG/DL — SIGNIFICANT CHANGE UP (ref 7–23)
CALCIUM SERPL-MCNC: 9.5 MG/DL — SIGNIFICANT CHANGE UP (ref 8.4–10.5)
CHLORIDE SERPL-SCNC: 93 MMOL/L — LOW (ref 96–108)
CK MB CFR SERPL CALC: 1.3 NG/ML — SIGNIFICANT CHANGE UP (ref 0–6.7)
CK MB CFR SERPL CALC: 1.3 NG/ML — SIGNIFICANT CHANGE UP (ref 0–6.7)
CK SERPL-CCNC: 20 U/L — LOW (ref 25–170)
CK SERPL-CCNC: 21 U/L — LOW (ref 25–170)
CO2 SERPL-SCNC: 27 MMOL/L — SIGNIFICANT CHANGE UP (ref 22–31)
CREAT SERPL-MCNC: 0.63 MG/DL — SIGNIFICANT CHANGE UP (ref 0.5–1.3)
GLUCOSE BLDC GLUCOMTR-MCNC: 229 MG/DL — HIGH (ref 70–99)
GLUCOSE SERPL-MCNC: 228 MG/DL — HIGH (ref 70–99)
HCT VFR BLD CALC: 27.9 % — LOW (ref 34.5–45)
HGB BLD-MCNC: 8.8 G/DL — LOW (ref 11.5–15.5)
HYPOCHROMIA BLD QL: SIGNIFICANT CHANGE UP
INR BLD: 1.29 — HIGH (ref 0.88–1.16)
LG PLATELETS BLD QL AUTO: PRESENT — SIGNIFICANT CHANGE UP
LYMPHOCYTES # BLD AUTO: 4 % — LOW (ref 13–44)
MANUAL SMEAR VERIFICATION: SIGNIFICANT CHANGE UP
MCHC RBC-ENTMCNC: 25.2 PG — LOW (ref 27–34)
MCHC RBC-ENTMCNC: 31.5 G/DL — LOW (ref 32–36)
MCV RBC AUTO: 79.9 FL — LOW (ref 80–100)
METAMYELOCYTES # FLD: 2 % — HIGH
MONOCYTES NFR BLD AUTO: 6 % — SIGNIFICANT CHANGE UP (ref 2–14)
MYELOCYTES NFR BLD: 1 % — HIGH
NEUTROPHILS NFR BLD AUTO: 78 % — HIGH (ref 43–77)
NEUTS BAND # BLD: 9 % — SIGNIFICANT CHANGE UP
OVALOCYTES BLD QL SMEAR: SLIGHT — SIGNIFICANT CHANGE UP
PLAT MORPH BLD: (no result)
PLATELET # BLD AUTO: 554 K/UL — HIGH (ref 150–400)
POLYCHROMASIA BLD QL SMEAR: SLIGHT — SIGNIFICANT CHANGE UP
POTASSIUM SERPL-MCNC: 4 MMOL/L — SIGNIFICANT CHANGE UP (ref 3.5–5.3)
POTASSIUM SERPL-SCNC: 4 MMOL/L — SIGNIFICANT CHANGE UP (ref 3.5–5.3)
PROT SERPL-MCNC: 7.4 G/DL — SIGNIFICANT CHANGE UP (ref 6–8.3)
PROTHROM AB SERPL-ACNC: 14.4 SEC — HIGH (ref 9.8–12.7)
RBC # BLD: 3.49 M/UL — LOW (ref 3.8–5.2)
RBC # FLD: 22.3 % — HIGH (ref 10.3–16.9)
RBC BLD AUTO: (no result)
SODIUM SERPL-SCNC: 135 MMOL/L — SIGNIFICANT CHANGE UP (ref 135–145)
TROPONIN T SERPL-MCNC: <0.01 NG/ML — SIGNIFICANT CHANGE UP (ref 0–0.01)
TROPONIN T SERPL-MCNC: <0.01 NG/ML — SIGNIFICANT CHANGE UP (ref 0–0.01)
WBC # BLD: 18.8 K/UL — HIGH (ref 3.8–10.5)
WBC # FLD AUTO: 18.8 K/UL — HIGH (ref 3.8–10.5)

## 2018-03-02 PROCEDURE — 96361 HYDRATE IV INFUSION ADD-ON: CPT

## 2018-03-02 PROCEDURE — 84484 ASSAY OF TROPONIN QUANT: CPT

## 2018-03-02 PROCEDURE — 93005 ELECTROCARDIOGRAM TRACING: CPT

## 2018-03-02 PROCEDURE — 99285 EMERGENCY DEPT VISIT HI MDM: CPT | Mod: 25

## 2018-03-02 PROCEDURE — 96413 CHEMO IV INFUSION 1 HR: CPT

## 2018-03-02 PROCEDURE — 85025 COMPLETE CBC W/AUTO DIFF WBC: CPT

## 2018-03-02 PROCEDURE — 85730 THROMBOPLASTIN TIME PARTIAL: CPT

## 2018-03-02 PROCEDURE — 80053 COMPREHEN METABOLIC PANEL: CPT

## 2018-03-02 PROCEDURE — 82550 ASSAY OF CK (CPK): CPT

## 2018-03-02 PROCEDURE — 71045 X-RAY EXAM CHEST 1 VIEW: CPT | Mod: 26

## 2018-03-02 PROCEDURE — 71045 X-RAY EXAM CHEST 1 VIEW: CPT

## 2018-03-02 PROCEDURE — 96367 TX/PROPH/DG ADDL SEQ IV INF: CPT

## 2018-03-02 PROCEDURE — 82962 GLUCOSE BLOOD TEST: CPT

## 2018-03-02 PROCEDURE — 82553 CREATINE MB FRACTION: CPT

## 2018-03-02 PROCEDURE — 96417 CHEMO IV INFUS EACH ADDL SEQ: CPT

## 2018-03-02 PROCEDURE — 93010 ELECTROCARDIOGRAM REPORT: CPT

## 2018-03-02 PROCEDURE — 96411 CHEMO IV PUSH ADDL DRUG: CPT

## 2018-03-02 PROCEDURE — 85610 PROTHROMBIN TIME: CPT

## 2018-03-02 PROCEDURE — 99283 EMERGENCY DEPT VISIT LOW MDM: CPT | Mod: 25

## 2018-03-02 PROCEDURE — 96375 TX/PRO/DX INJ NEW DRUG ADDON: CPT

## 2018-03-02 PROCEDURE — 36415 COLL VENOUS BLD VENIPUNCTURE: CPT

## 2018-03-02 NOTE — ED PROVIDER NOTE - DIAGNOSTIC INTERPRETATION
ER Physician: June Ree  CHEST XRAY INTERPRETATION: r lung mass, heart shadow normal, bony structures intact

## 2018-03-02 NOTE — ED ADULT NURSE NOTE - OBJECTIVE STATEMENT
Patient presents to the ED from upstairs, patient was getting ready for chest port placement when she has some indigestion on the right side, reports that it was a tightness lasting 10 mins where she didn't feel well. Denies any shortness of breath. Hx of lung cancer. Patient presents to the ED from upstairs, patient was getting ready for chest port placement when she has some indigestion on the right side, reports that it was a tightness lasting 10 mins where she didn't feel well. Denies any shortness of breath. Hx of lung cancer. Patient is on home oxygen 2lnc. Currently patient denies any symptoms.

## 2018-03-02 NOTE — ED ADULT TRIAGE NOTE - CHIEF COMPLAINT QUOTE
Brought in by radiology staff, s/p patient having chest pain while waiting for chest port placement.

## 2018-03-02 NOTE — ED PROVIDER NOTE - MEDICAL DECISION MAKING DETAILS
Impression: brief episode of r sided cp and urge to belch, with resolution of sx's on arrival to ED. Afebrile. HDS. Ekg non-ischemic. Trop neg x 2. CXR + for r sided lung mass. + leukocytosis to 18 with metamyelocytes. Anemia is improved from baseline. + mild elev of lft's. Case d/w Dr. Noonan; pt may have recently received neupogen/ neulasta which is c/w metamyleocytes on smear. Pt has been asymptomatic since being in ed, hds. No cp, sob, leg pain, swelling... Do not suspect acs or thromboembolism. Will dc and pt to f/u in IR now for placement of chest port for chemo today.

## 2018-03-02 NOTE — ED PROVIDER NOTE - CARE PLAN
Principal Discharge DX:	Chest pain, unspecified type  Secondary Diagnosis:	Malignant neoplasm of lung, unspecified laterality, unspecified part of lung

## 2018-03-02 NOTE — ED PROVIDER NOTE - PHYSICAL EXAMINATION
VITAL SIGNS: I have reviewed nursing notes and confirm.  CONSTITUTIONAL: Well-developed; well-nourished; in no acute distress.   SKIN:  warm and dry, no acute rash.   HEAD:  normocephalic, atraumatic.  EYES: PERRL, EOM intact; conjunctiva and sclera clear.  ENT: No nasal discharge; airway clear.   NECK: Supple; non tender.  CARD: S1, S2 normal; no murmurs, gallops, or rubs. Regular rate and rhythm.   RESP:  Clear to auscultation b/l, no wheezes, rales or rhonchi.  ABD: Normal bowel sounds; soft; non-distended; non-tender; no guarding/ rebound.  EXT: Normal ROM. No clubbing, cyanosis or edema. No calf tenderness/ cords. 2+ pulses to b/l ue/le.  NEURO: Alert, oriented, grossly unremarkable  PSYCH: Cooperative, mood and affect appropriate.

## 2018-03-02 NOTE — ED PROVIDER NOTE - OBJECTIVE STATEMENT
Pt is a 61yo f, h/o dm, lung ca, scheduled for chest port today for chemo, last tx 3 wks ago, brought to ED from IR for onset of cp while in waiting room. described as indigestion, to r side of chest, continuous x 15 min and resolved spontaneously, felt as if she wanted to belch. Non-exertional/ pleuritic. No associated sob, palp, dizziness, sweating, nausea, vomiting, syncope... Pt recalls all events. No leg pain, swelling.

## 2018-03-06 VITALS
OXYGEN SATURATION: 99 % | SYSTOLIC BLOOD PRESSURE: 114 MMHG | TEMPERATURE: 98 F | RESPIRATION RATE: 16 BRPM | DIASTOLIC BLOOD PRESSURE: 77 MMHG | HEART RATE: 76 BPM

## 2018-03-06 DIAGNOSIS — R11.2 NAUSEA WITH VOMITING, UNSPECIFIED: ICD-10-CM

## 2018-03-06 DIAGNOSIS — E86.0 DEHYDRATION: ICD-10-CM

## 2018-03-06 LAB
ALBUMIN SERPL ELPH-MCNC: 3 G/DL — LOW (ref 3.3–5)
ALP SERPL-CCNC: 213 U/L — HIGH (ref 40–120)
ALT FLD-CCNC: 155 U/L — HIGH (ref 10–45)
ANION GAP SERPL CALC-SCNC: 18 MMOL/L — HIGH (ref 5–17)
ANISOCYTOSIS BLD QL: SLIGHT — SIGNIFICANT CHANGE UP
AST SERPL-CCNC: 49 U/L — HIGH (ref 10–40)
BASE EXCESS BLDV CALC-SCNC: 4 MMOL/L — SIGNIFICANT CHANGE UP
BILIRUB SERPL-MCNC: 0.7 MG/DL — SIGNIFICANT CHANGE UP (ref 0.2–1.2)
BUN SERPL-MCNC: 26 MG/DL — HIGH (ref 7–23)
CA-I SERPL-SCNC: 1.09 MMOL/L — LOW (ref 1.12–1.3)
CALCIUM SERPL-MCNC: 9.8 MG/DL — SIGNIFICANT CHANGE UP (ref 8.4–10.5)
CHLORIDE SERPL-SCNC: 95 MMOL/L — LOW (ref 96–108)
CO2 SERPL-SCNC: 24 MMOL/L — SIGNIFICANT CHANGE UP (ref 22–31)
CREAT SERPL-MCNC: 0.75 MG/DL — SIGNIFICANT CHANGE UP (ref 0.5–1.3)
EXTRA BLUE TOP TUBE: SIGNIFICANT CHANGE UP
GAS PNL BLDV: 133 MMOL/L — LOW (ref 138–146)
GAS PNL BLDV: SIGNIFICANT CHANGE UP
GAS PNL BLDV: SIGNIFICANT CHANGE UP
GLUCOSE SERPL-MCNC: 227 MG/DL — HIGH (ref 70–99)
HCO3 BLDV-SCNC: 23 MMOL/L — SIGNIFICANT CHANGE UP (ref 20–27)
HCT VFR BLD CALC: 30.1 % — LOW (ref 34.5–45)
HGB BLD-MCNC: 9.3 G/DL — LOW (ref 11.5–15.5)
HYPOCHROMIA BLD QL: SLIGHT — SIGNIFICANT CHANGE UP
LACTATE SERPL-SCNC: 3.1 MMOL/L — HIGH (ref 0.5–2)
LG PLATELETS BLD QL AUTO: PRESENT — SIGNIFICANT CHANGE UP
LYMPHOCYTES # BLD AUTO: 3 % — LOW (ref 13–44)
MCHC RBC-ENTMCNC: 24.2 PG — LOW (ref 27–34)
MCHC RBC-ENTMCNC: 30.9 G/DL — LOW (ref 32–36)
MCV RBC AUTO: 78.2 FL — LOW (ref 80–100)
MONOCYTES NFR BLD AUTO: 2 % — SIGNIFICANT CHANGE UP (ref 2–14)
NEUTROPHILS NFR BLD AUTO: 91 % — HIGH (ref 43–77)
NEUTS BAND # BLD: 4 % — SIGNIFICANT CHANGE UP
PCO2 BLDV: 19 MMHG — LOW (ref 41–51)
PH BLDV: 7.69 — CRITICAL HIGH (ref 7.32–7.43)
PLAT MORPH BLD: (no result)
PLATELET # BLD AUTO: 640 K/UL — HIGH (ref 150–400)
PO2 BLDV: 73 MMHG — SIGNIFICANT CHANGE UP
POLYCHROMASIA BLD QL SMEAR: SLIGHT — SIGNIFICANT CHANGE UP
POTASSIUM BLDV-SCNC: 4.1 MMOL/L — SIGNIFICANT CHANGE UP (ref 3.5–4.9)
POTASSIUM SERPL-MCNC: 4.1 MMOL/L — SIGNIFICANT CHANGE UP (ref 3.5–5.3)
POTASSIUM SERPL-SCNC: 4.1 MMOL/L — SIGNIFICANT CHANGE UP (ref 3.5–5.3)
PROT SERPL-MCNC: 8 G/DL — SIGNIFICANT CHANGE UP (ref 6–8.3)
RAPID RVP RESULT: SIGNIFICANT CHANGE UP
RBC # BLD: 3.85 M/UL — SIGNIFICANT CHANGE UP (ref 3.8–5.2)
RBC # FLD: 21.5 % — HIGH (ref 10.3–16.9)
RBC BLD AUTO: (no result)
SAO2 % BLDV: 97 % — SIGNIFICANT CHANGE UP
SODIUM SERPL-SCNC: 137 MMOL/L — SIGNIFICANT CHANGE UP (ref 135–145)
WBC # BLD: 22.6 K/UL — HIGH (ref 3.8–10.5)
WBC # FLD AUTO: 22.6 K/UL — HIGH (ref 3.8–10.5)

## 2018-03-06 PROCEDURE — 71045 X-RAY EXAM CHEST 1 VIEW: CPT | Mod: 26

## 2018-03-06 PROCEDURE — 74177 CT ABD & PELVIS W/CONTRAST: CPT | Mod: 26

## 2018-03-06 PROCEDURE — 93010 ELECTROCARDIOGRAM REPORT: CPT

## 2018-03-06 PROCEDURE — 99285 EMERGENCY DEPT VISIT HI MDM: CPT | Mod: 25

## 2018-03-06 PROCEDURE — 70450 CT HEAD/BRAIN W/O DYE: CPT | Mod: 26

## 2018-03-06 PROCEDURE — 71260 CT THORAX DX C+: CPT | Mod: 26

## 2018-03-06 RX ORDER — MORPHINE SULFATE 50 MG/1
4 CAPSULE, EXTENDED RELEASE ORAL ONCE
Qty: 0 | Refills: 0 | Status: DISCONTINUED | OUTPATIENT
Start: 2018-03-06 | End: 2018-03-06

## 2018-03-06 RX ORDER — VANCOMYCIN HCL 1 G
1250 VIAL (EA) INTRAVENOUS ONCE
Qty: 0 | Refills: 0 | Status: COMPLETED | OUTPATIENT
Start: 2018-03-06 | End: 2018-03-06

## 2018-03-06 RX ORDER — SODIUM CHLORIDE 9 MG/ML
1000 INJECTION INTRAMUSCULAR; INTRAVENOUS; SUBCUTANEOUS
Qty: 0 | Refills: 0 | Status: DISCONTINUED | OUTPATIENT
Start: 2018-03-06 | End: 2018-03-07

## 2018-03-06 RX ORDER — ONDANSETRON 8 MG/1
4 TABLET, FILM COATED ORAL ONCE
Qty: 0 | Refills: 0 | Status: COMPLETED | OUTPATIENT
Start: 2018-03-06 | End: 2018-03-06

## 2018-03-06 RX ORDER — SODIUM CHLORIDE 9 MG/ML
1000 INJECTION INTRAMUSCULAR; INTRAVENOUS; SUBCUTANEOUS ONCE
Qty: 0 | Refills: 0 | Status: COMPLETED | OUTPATIENT
Start: 2018-03-06 | End: 2018-03-06

## 2018-03-06 RX ORDER — AZTREONAM 2 G
1000 VIAL (EA) INJECTION ONCE
Qty: 0 | Refills: 0 | Status: COMPLETED | OUTPATIENT
Start: 2018-03-06 | End: 2018-03-06

## 2018-03-06 RX ADMIN — Medication 166.67 MILLIGRAM(S): at 23:55

## 2018-03-06 RX ADMIN — SODIUM CHLORIDE 1000 MILLILITER(S): 9 INJECTION INTRAMUSCULAR; INTRAVENOUS; SUBCUTANEOUS at 22:34

## 2018-03-06 RX ADMIN — MORPHINE SULFATE 4 MILLIGRAM(S): 50 CAPSULE, EXTENDED RELEASE ORAL at 22:34

## 2018-03-06 RX ADMIN — SODIUM CHLORIDE 1000 MILLILITER(S): 9 INJECTION INTRAMUSCULAR; INTRAVENOUS; SUBCUTANEOUS at 22:02

## 2018-03-06 RX ADMIN — SODIUM CHLORIDE 150 MILLILITER(S): 9 INJECTION INTRAMUSCULAR; INTRAVENOUS; SUBCUTANEOUS at 23:55

## 2018-03-06 RX ADMIN — ONDANSETRON 4 MILLIGRAM(S): 8 TABLET, FILM COATED ORAL at 22:03

## 2018-03-06 RX ADMIN — MORPHINE SULFATE 4 MILLIGRAM(S): 50 CAPSULE, EXTENDED RELEASE ORAL at 22:03

## 2018-03-06 RX ADMIN — Medication 50 MILLIGRAM(S): at 22:03

## 2018-03-06 NOTE — CONSULT NOTE ADULT - ASSESSMENT
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 Noonan, brain and bone mets s/p gamma knife 1/9/18, currently receiving chemo and radiation, on 2L home O2, DNR/I, recent [facility] course for influenza and discharged on 2/23, baseline AOx3 p/w confusion and abd pain reported by daughter after most recent 3/2 chemo session. 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 Noonan, brain and bone mets s/p gamma knife 1/9/18, currently receiving chemo and radiation, on 2L home O2, DNR/I, recent [facility] course for influenza and discharged on 2/23, baseline AOx3 p/w confusion and abd pain reported by daughter after most recent 3/2 chemo session. Admit to [facility].

## 2018-03-06 NOTE — ED PROVIDER NOTE - OBJECTIVE STATEMENT
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 Noonan) w/brain mets s/p gamma knife and rib mets s/p palliative chemo currently receiving chemo and radiation was brought in by daughter with concern for confusion and altered mental status. Per daughter, pt recently underwent chemotherapy (2nd session) on Friday 3/2 and on Sunday her daughter started noticing that she was intermittently confused. She is normally AAox3 at baseline. She lives at home with her  and a private HHA. She is DNR/DNI. Daughter is HCP.  Of note, pt was recently hospitalized at [facility] for influenza and discharged on 2/23. Pt has productive cough at baseline and is on 2L home O2. She denies chills, nausea, vomiting, diarrhea, hematemesis, dizziness. She reports intermittent subjective fevers and intermittent headache. 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 Noonan) w/brain mets- on decadron-- s/p gamma knife and rib mets s/p palliative chemo currently receiving chemo and radiation was brought in by daughter with concern for confusion and altered mental status. Per daughter, pt recently underwent chemotherapy (2nd session) on Friday 3/2 and on Sunday her daughter started noticing that she was intermittently confused. She is normally AAox3 at baseline. She lives at home with her  and a private HHA. She is DNR/DNI. Daughter is HCP.  Of note, pt was recently hospitalized at [facility] for influenza and discharged on 2/23. Pt has productive cough at baseline and is on 2L home O2. She denies chills, nausea, vomiting, diarrhea, hematemesis, dizziness. She reports intermittent subjective fevers and intermittent headache.

## 2018-03-06 NOTE — ED PROVIDER NOTE - PROGRESS NOTE DETAILS
Given 1L NS fluid. CT head, chest, abd, pelvis ordered. WBC elevated. Lactate 3.1. Given broad spec abx with vanc and aztreonam (pcn allergy).     Placed an 18 gauge u/s guided PIV in RUE. Well tolerated. minimal blood loss.

## 2018-03-06 NOTE — ED ADULT NURSE REASSESSMENT NOTE - NS ED NURSE REASSESS COMMENT FT1
Rec'd pt calm, a+o x 3 with dtr at bedside. Pt in stable condition. Reports feeling weak and lethargic. Has SOB states this is chronic, that she uses 2 L O2 continuously. Pt dtr states that pt is forgetting simple tasks such as cleaning herself after going to the bathroom. Pt with hx lung ca mets to brain and rib, currently receiving chemo therapy. Was scheduled for port placement tomorrow but rescheduled due to pending snow storm. HL 22G placed Lt arm. Pt is difficult access. MD made aware. Pt labs sent. Pending further eval and dispo. Close monitoring continues.

## 2018-03-06 NOTE — CONSULT NOTE ADULT - PROBLEM SELECTOR RECOMMENDATION 2
Daughter reports mental status is better but still not completely baseline. AO1-2 on exam, appropriate on conversation.  - may be 2/w dehydration vs infection Daughter reports mental status is better but still not completely baseline. AO1-2 on exam, appropriate on conversation.  - may be 2/w dehydration vs infection  - c/w IV NS maintenance, s/p 2L IV NS bolus Daughter reports mental status is better but still not completely baseline. AO1-2 on exam, appropriate on conversation.  - may be 2/w dehydration vs infection vs brain mets  - c/w IV NS maintenance, s/p 2L IV NS bolus

## 2018-03-06 NOTE — CONSULT NOTE ADULT - SUBJECTIVE AND OBJECTIVE BOX
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 Noonan, brain and bone mets s/p gamma knife, currently receiving chemo and radiation, on 2L home O2, DNR/I, recent [facility] course for influenza and discharged on 2/23, baseline AOx3 p/w confusion reported by daughter after most recent 3/2 chemo session.      Allergies    penicillins (Hives)    Intolerances    	    MEDICATIONS:    vancomycin  IVPB 1250 milliGRAM(s) IV Intermittent once            sodium chloride 0.9%. 1000 milliLiter(s) IV Continuous <Continuous>      PAST MEDICAL & SURGICAL HISTORY:  Brain metastases  Bone metastasis  Lung cancer  Hypertension  Diabetes  History of radiation therapy  Status post gamma knife treatment      FAMILY HISTORY:  Family history of transitional cell carcinoma of bladder (Sibling)  Family history of renal cell carcinoma (Sibling)      SOCIAL HISTORY:    [ ] Non-smoker  [ ] Smoker  [ ] Alcohol    REVIEW OF SYSTEMS:  See HPI, otherwise complete 10 point review of systems negative    PHYSICAL EXAM:  T(C): 36.9 (03-06-18 @ 22:42), Max: 37.1 (03-06-18 @ 19:55)  HR: 103 (03-06-18 @ 22:42) (76 - 116)  BP: 130/74 (03-06-18 @ 22:42) (114/77 - 130/74)  RR: 18 (03-06-18 @ 22:42) (16 - 18)  SpO2: 98% (03-06-18 @ 22:42) (96% - 99%)  Wt(kg): --  I&O's Summary      Appearance: No Acute Distress	  HEENT:  Normal oral mucosa, PERRL, EOMI	  Cardiovascular: Normal S1 S2, No JVD, No murmurs/rubs/gallops  Respiratory: Lungs clear to auscultation bilaterally  Gastrointestinal:  Soft, Non-tender, + BS	  Skin: No rashes, No ecchymoses, No cyanosis	  Neurologic: Non-focal  Extremities: No clubbing, cyanosis or edema  Vascular: Peripheral pulses palpable 2+ bilaterally  Psychiatry: A & O x 3, Mood & affect appropriate    LABS:	 	    CBC Full  -  ( 06 Mar 2018 20:14 )  WBC Count : 22.6 K/uL  Hemoglobin : 9.3 g/dL  Hematocrit : 30.1 %  Platelet Count - Automated : 640 K/uL  Mean Cell Volume : 78.2 fL  Mean Cell Hemoglobin : 24.2 pg  Mean Cell Hemoglobin Concentration : 30.9 g/dL  Auto Neutrophil # : x  Auto Lymphocyte # : x  Auto Monocyte # : x  Auto Eosinophil # : x  Auto Basophil # : x  Auto Neutrophil % : 91.0 %  Auto Lymphocyte % : 3.0 %  Auto Monocyte % : 2.0 %  Auto Eosinophil % : x  Auto Basophil % : x    03-06    137  |  95<L>  |  26<H>  ----------------------------<  227<H>  4.1   |  24  |  0.75    Ca    9.8      06 Mar 2018 20:14    TPro  8.0  /  Alb  3.0<L>  /  TBili  0.7  /  DBili  x   /  AST  49<H>  /  ALT  155<H>  /  AlkPhos  213<H>  03-06      proBNP:   Lipid Profile:   HgA1c:   TSH:       CARDIAC MARKERS:            TELEMETRY: 	    ECG:  	  RADIOLOGY:  OTHER: 	    PREVIOUS DIAGNOSTIC TESTING:    [ ] Echocardiogram:  [ ] Catheterization:  [ ] Stress Test: HISTORY OF PRESENT ILLNESS:  61 yo F PMH DM, HTN, 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 Noonan, brain and bone mets s/p gamma knife 1/9/18, currently receiving chemo and radiation, on 2L home O2, DNR/I, recent [facility] course for influenza and discharged on 2/23, baseline AOx3 p/w confusion reported by daughter after most recent 3/2 chemo session. Hx per pt and daughter HCP at bedside. After 3/2 chemo experienced severe abd pain, no V/N/diarrhea, continued taking po though less. Daughter who is a nurse noted pt to be dehydrated and more confused calling family members by incorrect names. Also worsened cough with increased qwhite sputum over baseline chronic cough. +increased breathing fast per daughter. No HA/CP/N/V/diarrhea/dysuria/new leg pain or swelling. No sick contacts, family wears mask at home. Has HHA throughout day. Recently completed decadron course.    In ED:  Vital Signs Last 24 Hrs  T(C): 36.9 (06 Mar 2018 22:42), Max: 37.1 (06 Mar 2018 19:55)  T(F): 98.4 (06 Mar 2018 22:42), Max: 98.7 (06 Mar 2018 19:55)  HR: 103 (06 Mar 2018 22:42) (76 - 116)  BP: 130/74 (06 Mar 2018 22:42) (114/77 - 130/74)  BP(mean): --  RR: 18 (06 Mar 2018 22:42) (16 - 18)  SpO2: 98% (06 Mar 2018 22:42) (96% - 99%)    Received: vanc/aztreonam, morphine, zophran, 2L IV NS. Ordered CT CAP.      Allergies    penicillins (Hives)    	    MEDICATIONS:    vancomycin  IVPB 1250 milliGRAM(s) IV Intermittent once  sodium chloride 0.9%. 1000 milliLiter(s) IV Continuous <Continuous>  norvasc 10 qd      PAST MEDICAL & SURGICAL HISTORY:  Brain metastases  Bone metastasis  Lung cancer  Hypertension  Diabetes  History of radiation therapy  Status post gamma knife treatment      FAMILY HISTORY:  Family history of transitional cell carcinoma of bladder (Sibling)  Family history of renal cell carcinoma (Sibling)      SOCIAL HISTORY:    20 pack yr smoker, quit 20 years ago, no etoh or illicit drugs    REVIEW OF SYSTEMS:  See HPI, otherwise complete 10 point review of systems negative    PHYSICAL EXAM:  T(C): 36.9 (03-06-18 @ 22:42), Max: 37.1 (03-06-18 @ 19:55)  HR: 103 (03-06-18 @ 22:42) (76 - 116)  BP: 130/74 (03-06-18 @ 22:42) (114/77 - 130/74)  RR: 18 (03-06-18 @ 22:42) (16 - 18)  SpO2: 98% (03-06-18 @ 22:42) (96% - 99%)  Wt(kg): --  I&O's Summary      Appearance: No Acute Distress, non toxic, unlabored breathing, AOx1-2 appropriate in conversation  HEENT:  dry oral mucosa, PERRL, EOMI	  Cardiovascular: Normal S1 S2, No JVD, No murmurs/rubs/gallops  Respiratory: Lungs clear to auscultation bilaterally with decreased sounds at R base  Gastrointestinal:  Soft, Non-tender, + BS	  Skin: No rashes, No ecchymoses, No cyanosis	  Neurologic: Non-focal  Extremities: No clubbing, cyanosis or edema  Vascular: Peripheral pulses palpable 2+ bilaterally  Psychiatry: flat affect    LABS:	 	    CBC Full  -  ( 06 Mar 2018 20:14 )  WBC Count : 22.6 K/uL  Hemoglobin : 9.3 g/dL  Hematocrit : 30.1 %  Platelet Count - Automated : 640 K/uL  Mean Cell Volume : 78.2 fL  Mean Cell Hemoglobin : 24.2 pg  Mean Cell Hemoglobin Concentration : 30.9 g/dL      03-06    137  |  95<L>  |  26<H>  ----------------------------<  227<H>  4.1   |  24  |  0.75    Ca    9.8      06 Mar 2018 20:14    TPro  8.0  /  Alb  3.0<L>  /  TBili  0.7  /  DBili  x   /  AST  49<H>  /  ALT  155<H>  /  AlkPhos  213<H>  03-06      CXR: unchanged from prior RML opacity HISTORY OF PRESENT ILLNESS:  63 yo F PMH DM, HTN, 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 Noonan, brain and bone mets s/p gamma knife 1/9/18, currently receiving chemo and radiation, on 2L home O2, DNR/I, recent [facility] course for influenza and discharged on 2/23, baseline AOx3 p/w confusion and abd pain reported by daughter after most recent 3/2 chemo session. Hx per pt and daughter HCP at bedside. After 3/2 chemo experienced severe abd pain, no V/N/diarrhea, continued taking po though less. Daughter who is a nurse noted pt to be dehydrated and more confused calling family members by incorrect names. Also worsened cough with increased qwhite sputum over baseline chronic cough. +increased breathing fast per daughter. No HA/CP/N/V/diarrhea/dysuria/new leg pain or swelling. No sick contacts, family wears mask at home. Has HHA throughout day. Recently completed decadron course.    In ED:  Vital Signs Last 24 Hrs  T(C): 36.9 (06 Mar 2018 22:42), Max: 37.1 (06 Mar 2018 19:55)  T(F): 98.4 (06 Mar 2018 22:42), Max: 98.7 (06 Mar 2018 19:55)  HR: 103 (06 Mar 2018 22:42) (76 - 116)  BP: 130/74 (06 Mar 2018 22:42) (114/77 - 130/74)  BP(mean): --  RR: 18 (06 Mar 2018 22:42) (16 - 18)  SpO2: 98% (06 Mar 2018 22:42) (96% - 99%)    Received: vanc/aztreonam, morphine, zophran, 2L IV NS. Ordered CT CAP.      Allergies    penicillins (Hives)    	    MEDICATIONS:    vancomycin  IVPB 1250 milliGRAM(s) IV Intermittent once  sodium chloride 0.9%. 1000 milliLiter(s) IV Continuous <Continuous>  norvasc 10 qd      PAST MEDICAL & SURGICAL HISTORY:  Brain metastases  Bone metastasis  Lung cancer  Hypertension  Diabetes  History of radiation therapy  Status post gamma knife treatment      FAMILY HISTORY:  Family history of transitional cell carcinoma of bladder (Sibling)  Family history of renal cell carcinoma (Sibling)      SOCIAL HISTORY:    20 pack yr smoker, quit 20 years ago, no etoh or illicit drugs    REVIEW OF SYSTEMS:  See HPI, otherwise complete 10 point review of systems negative    PHYSICAL EXAM:  T(C): 36.9 (03-06-18 @ 22:42), Max: 37.1 (03-06-18 @ 19:55)  HR: 103 (03-06-18 @ 22:42) (76 - 116)  BP: 130/74 (03-06-18 @ 22:42) (114/77 - 130/74)  RR: 18 (03-06-18 @ 22:42) (16 - 18)  SpO2: 98% (03-06-18 @ 22:42) (96% - 99%)  Wt(kg): --  I&O's Summary      Appearance: No Acute Distress, non toxic, unlabored breathing, AOx1-2 appropriate in conversation  HEENT:  dry oral mucosa, PERRL, EOMI	  Cardiovascular: Normal S1 S2, No JVD, No murmurs/rubs/gallops  Respiratory: Lungs clear to auscultation bilaterally with decreased sounds at R base  Gastrointestinal:  Soft, Non-tender, + BS	  Skin: No rashes, No ecchymoses, No cyanosis	  Neurologic: Non-focal  Extremities: No clubbing, cyanosis or edema  Vascular: Peripheral pulses palpable 2+ bilaterally  Psychiatry: flat affect    LABS:	 	    CBC Full  -  ( 06 Mar 2018 20:14 )  WBC Count : 22.6 K/uL  Hemoglobin : 9.3 g/dL  Hematocrit : 30.1 %  Platelet Count - Automated : 640 K/uL  Mean Cell Volume : 78.2 fL  Mean Cell Hemoglobin : 24.2 pg  Mean Cell Hemoglobin Concentration : 30.9 g/dL      03-06    137  |  95<L>  |  26<H>  ----------------------------<  227<H>  4.1   |  24  |  0.75    Ca    9.8      06 Mar 2018 20:14    TPro  8.0  /  Alb  3.0<L>  /  TBili  0.7  /  DBili  x   /  AST  49<H>  /  ALT  155<H>  /  AlkPhos  213<H>  03-06      CXR: unchanged from prior RML opacity    ECG: sinus tach, IRBBB, no acute ischemic STT changes, unchanged from prior ECG

## 2018-03-06 NOTE — ED PROVIDER NOTE - ATTENDING CONTRIBUTION TO CARE
61 yo F with met lung cancer to brain with on decadron with confusion AMS  noted increased WBC ct lactate acid - recent ED visit 4 days ago- still with cough fever no chills mild upper abd pain- ? sepsis  ? pneumonia abd focus-- with CT chest abd pelvis and head -possible 7L  pan cx and iv antibiotics and fluid boluses given  dw dr chun hospitalist  rec  ICU eval due to VS abnormalities before pt to come to floor

## 2018-03-06 NOTE — CONSULT NOTE ADULT - PROBLEM SELECTOR RECOMMENDATION 9
On arrival pt with tachycardia, tachypnea, in severe abd pain now much improved after morphine. May be 2/2 recent chemo. Abd soft now, no recent constiption or diarrhea, doubt obstruction.  - fu CT CAP  - c/w morphine pain control prn  - WBC to 22 from 18 at recent presentatiom in setting of recent decadron use and stress, possible infection c/w abx now per primary team On arrival pt with tachycardia, tachypnea, resp alkalosis on ABG in severe abd pain now much improved after morphine. May be 2/2 recent chemo. Abd soft now, no recent constipation or diarrhea, doubt obstruction or colitis.  - fu CT CAP  - c/w morphine pain control prn  - WBC to 22 from 18 at recent presentation in setting of recent decadron use since 1/9/18 gamma knife procedure and stress  - c/w abx now per primary team though doubt infection given unchanged CXR, no fevers, no diarrhea. Check UA

## 2018-03-06 NOTE — CONSULT NOTE ADULT - ATTENDING COMMENTS
history as per dr. Tellez. metastatic lung adenoCa to brain bone, abd pain which was cramping, of unclear etiology. resolved with morphine. DNR DNI. no indication for tele/icu at this time.

## 2018-03-06 NOTE — ED ADULT NURSE NOTE - OBJECTIVE STATEMENT
Pt w/ PMH of DM, HTN and lung CA w/ metastases to ribs and brain presents to ED today BIBA w/ daughter c/o lethargy and weakness.  Pt is A&Ox3 on exam, but weak and lethargic.  Pt on 2LNC 24 hours per day.  Pt was recently d/c'd from [facility] w/ septic flu, and had a previous case of septic PNA prior to that admission.  Pt's daughter reports hypokalemia on most recent blood work and hyperglycemia en route by FS from EMS.  Pt denies CP, hemoptysis, N/V/D or edema.  Pt is pending eval by LIP and lab work.

## 2018-03-06 NOTE — ED ADULT NURSE REASSESSMENT NOTE - NS ED NURSE REASSESS COMMENT FT1
Ultrasound guided HL placed to Rt Upper Arm by GARRICK Butler and MD Lemus. Patent.  Pt medicated as ordered for pain and nausea. Reports feeling better after. Antibiotics infusing as ordered. Pt went to CT scan and Xray will reassess on return. Close monitoring continues.

## 2018-03-06 NOTE — ED PROVIDER NOTE - MEDICAL DECISION MAKING DETAILS
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 Noonan) w/brain mets s/p gamma knife and rib mets s/p palliative was brought in by daughter with concern for confusion and altered mental status. 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 Noonan) w/brain mets s/p gamma knife and rib mets s/p palliative was brought in by daughter with concern for confusion and altered mental status. SIRS vs sepsis will CT chest abd pelvis -cover with antibiotics

## 2018-03-06 NOTE — ED ADULT TRIAGE NOTE - CHIEF COMPLAINT QUOTE
Patient brought in by EMS for lethargy and AMS.  Pt has hx of lung ca w mets to brain & ribs.  Patient A+OX3, appears lethargic.  As per daughter, patient has been getting weaker.  Last chemo friday.  2 weeks ago, admitted for flu.

## 2018-03-07 ENCOUNTER — APPOINTMENT (OUTPATIENT)
Age: 63
End: 2018-03-07

## 2018-03-07 ENCOUNTER — INPATIENT (INPATIENT)
Facility: HOSPITAL | Age: 63
LOS: 2 days | Discharge: HOME | DRG: 542 | End: 2018-03-10
Attending: STUDENT IN AN ORGANIZED HEALTH CARE EDUCATION/TRAINING PROGRAM | Admitting: STUDENT IN AN ORGANIZED HEALTH CARE EDUCATION/TRAINING PROGRAM
Payer: COMMERCIAL

## 2018-03-07 DIAGNOSIS — Z92.3 PERSONAL HISTORY OF IRRADIATION: Chronic | ICD-10-CM

## 2018-03-07 DIAGNOSIS — G47.00 INSOMNIA, UNSPECIFIED: ICD-10-CM

## 2018-03-07 DIAGNOSIS — G92 TOXIC ENCEPHALOPATHY: ICD-10-CM

## 2018-03-07 DIAGNOSIS — G89.3 NEOPLASM RELATED PAIN (ACUTE) (CHRONIC): ICD-10-CM

## 2018-03-07 DIAGNOSIS — I10 ESSENTIAL (PRIMARY) HYPERTENSION: ICD-10-CM

## 2018-03-07 DIAGNOSIS — Z66 DO NOT RESUSCITATE: ICD-10-CM

## 2018-03-07 DIAGNOSIS — B37.0 CANDIDAL STOMATITIS: ICD-10-CM

## 2018-03-07 DIAGNOSIS — C34.90 MALIGNANT NEOPLASM OF UNSPECIFIED PART OF UNSPECIFIED BRONCHUS OR LUNG: ICD-10-CM

## 2018-03-07 DIAGNOSIS — E11.9 TYPE 2 DIABETES MELLITUS WITHOUT COMPLICATIONS: ICD-10-CM

## 2018-03-07 DIAGNOSIS — A41.9 SEPSIS, UNSPECIFIED ORGANISM: ICD-10-CM

## 2018-03-07 DIAGNOSIS — R63.8 OTHER SYMPTOMS AND SIGNS CONCERNING FOOD AND FLUID INTAKE: ICD-10-CM

## 2018-03-07 DIAGNOSIS — C79.51 SECONDARY MALIGNANT NEOPLASM OF BONE: ICD-10-CM

## 2018-03-07 DIAGNOSIS — R06.03 ACUTE RESPIRATORY DISTRESS: ICD-10-CM

## 2018-03-07 DIAGNOSIS — J18.9 PNEUMONIA, UNSPECIFIED ORGANISM: ICD-10-CM

## 2018-03-07 DIAGNOSIS — R10.9 UNSPECIFIED ABDOMINAL PAIN: ICD-10-CM

## 2018-03-07 DIAGNOSIS — Z51.5 ENCOUNTER FOR PALLIATIVE CARE: ICD-10-CM

## 2018-03-07 DIAGNOSIS — R41.0 DISORIENTATION, UNSPECIFIED: ICD-10-CM

## 2018-03-07 DIAGNOSIS — Z29.9 ENCOUNTER FOR PROPHYLACTIC MEASURES, UNSPECIFIED: ICD-10-CM

## 2018-03-07 LAB
ALBUMIN SERPL ELPH-MCNC: 2.7 G/DL — LOW (ref 3.3–5)
ALP SERPL-CCNC: 217 U/L — HIGH (ref 40–120)
ALT FLD-CCNC: 230 U/L — HIGH (ref 10–45)
ANION GAP SERPL CALC-SCNC: 16 MMOL/L — SIGNIFICANT CHANGE UP (ref 5–17)
APPEARANCE UR: CLEAR — SIGNIFICANT CHANGE UP
APTT BLD: 25.2 SEC — LOW (ref 27.5–37.4)
AST SERPL-CCNC: 114 U/L — HIGH (ref 10–40)
BACTERIA # UR AUTO: PRESENT /HPF
BASOPHILS NFR BLD AUTO: 0 % — SIGNIFICANT CHANGE UP (ref 0–2)
BILIRUB SERPL-MCNC: 0.7 MG/DL — SIGNIFICANT CHANGE UP (ref 0.2–1.2)
BILIRUB UR-MCNC: NEGATIVE — SIGNIFICANT CHANGE UP
BLD GP AB SCN SERPL QL: POSITIVE — SIGNIFICANT CHANGE UP
BUN SERPL-MCNC: 28 MG/DL — HIGH (ref 7–23)
CALCIUM SERPL-MCNC: 9.1 MG/DL — SIGNIFICANT CHANGE UP (ref 8.4–10.5)
CHLORIDE SERPL-SCNC: 96 MMOL/L — SIGNIFICANT CHANGE UP (ref 96–108)
CO2 SERPL-SCNC: 23 MMOL/L — SIGNIFICANT CHANGE UP (ref 22–31)
COLOR SPEC: YELLOW — SIGNIFICANT CHANGE UP
COMMENT - URINE: SIGNIFICANT CHANGE UP
CREAT SERPL-MCNC: 0.78 MG/DL — SIGNIFICANT CHANGE UP (ref 0.5–1.3)
DIFF PNL FLD: (no result)
EOSINOPHIL NFR BLD AUTO: 0 % — SIGNIFICANT CHANGE UP (ref 0–6)
EPI CELLS # UR: (no result) /HPF (ref 0–5)
GLUCOSE BLDC GLUCOMTR-MCNC: 147 MG/DL — HIGH (ref 70–99)
GLUCOSE BLDC GLUCOMTR-MCNC: 227 MG/DL — HIGH (ref 70–99)
GLUCOSE BLDC GLUCOMTR-MCNC: 272 MG/DL — HIGH (ref 70–99)
GLUCOSE BLDC GLUCOMTR-MCNC: 289 MG/DL — HIGH (ref 70–99)
GLUCOSE SERPL-MCNC: 274 MG/DL — HIGH (ref 70–99)
GLUCOSE UR QL: NEGATIVE — SIGNIFICANT CHANGE UP
HCT VFR BLD CALC: 24.9 % — LOW (ref 34.5–45)
HGB BLD-MCNC: 7.7 G/DL — LOW (ref 11.5–15.5)
INR BLD: 1.36 — HIGH (ref 0.88–1.16)
KETONES UR-MCNC: NEGATIVE — SIGNIFICANT CHANGE UP
LACTATE SERPL-SCNC: 1.3 MMOL/L — SIGNIFICANT CHANGE UP (ref 0.5–2)
LEUKOCYTE ESTERASE UR-ACNC: NEGATIVE — SIGNIFICANT CHANGE UP
LYMPHOCYTES # BLD AUTO: 3.5 % — LOW (ref 13–44)
MAGNESIUM SERPL-MCNC: 2 MG/DL — SIGNIFICANT CHANGE UP (ref 1.6–2.6)
MCHC RBC-ENTMCNC: 24.8 PG — LOW (ref 27–34)
MCHC RBC-ENTMCNC: 30.9 G/DL — LOW (ref 32–36)
MCV RBC AUTO: 80.3 FL — SIGNIFICANT CHANGE UP (ref 80–100)
MONOCYTES NFR BLD AUTO: 1 % — LOW (ref 2–14)
NEUTROPHILS NFR BLD AUTO: 95.5 % — HIGH (ref 43–77)
NITRITE UR-MCNC: NEGATIVE — SIGNIFICANT CHANGE UP
PH UR: 5 — SIGNIFICANT CHANGE UP (ref 5–8)
PHOSPHATE SERPL-MCNC: 3.1 MG/DL — SIGNIFICANT CHANGE UP (ref 2.5–4.5)
PLATELET # BLD AUTO: 462 K/UL — HIGH (ref 150–400)
POTASSIUM SERPL-MCNC: 3.8 MMOL/L — SIGNIFICANT CHANGE UP (ref 3.5–5.3)
POTASSIUM SERPL-SCNC: 3.8 MMOL/L — SIGNIFICANT CHANGE UP (ref 3.5–5.3)
PROT SERPL-MCNC: 6.9 G/DL — SIGNIFICANT CHANGE UP (ref 6–8.3)
PROT UR-MCNC: (no result) MG/DL
PROTHROM AB SERPL-ACNC: 15.2 SEC — HIGH (ref 9.8–12.7)
RBC # BLD: 3.1 M/UL — LOW (ref 3.8–5.2)
RBC # FLD: 21.8 % — HIGH (ref 10.3–16.9)
RBC CASTS # UR COMP ASSIST: < 5 /HPF — SIGNIFICANT CHANGE UP
RH IG SCN BLD-IMP: NEGATIVE — SIGNIFICANT CHANGE UP
SODIUM SERPL-SCNC: 135 MMOL/L — SIGNIFICANT CHANGE UP (ref 135–145)
SP GR SPEC: 1.01 — SIGNIFICANT CHANGE UP (ref 1–1.03)
UROBILINOGEN FLD QL: 0.2 E.U./DL — SIGNIFICANT CHANGE UP
WBC # BLD: 16.2 K/UL — HIGH (ref 3.8–10.5)
WBC # FLD AUTO: 16.2 K/UL — HIGH (ref 3.8–10.5)
WBC UR QL: < 5 /HPF — SIGNIFICANT CHANGE UP

## 2018-03-07 PROCEDURE — 99223 1ST HOSP IP/OBS HIGH 75: CPT

## 2018-03-07 PROCEDURE — 86077 PHYS BLOOD BANK SERV XMATCH: CPT

## 2018-03-07 PROCEDURE — 99223 1ST HOSP IP/OBS HIGH 75: CPT | Mod: GC

## 2018-03-07 PROCEDURE — 99232 SBSQ HOSP IP/OBS MODERATE 35: CPT | Mod: GC

## 2018-03-07 RX ORDER — FOLIC ACID 0.8 MG
1 TABLET ORAL DAILY
Qty: 0 | Refills: 0 | Status: DISCONTINUED | OUTPATIENT
Start: 2018-03-07 | End: 2018-03-10

## 2018-03-07 RX ORDER — DEXAMETHASONE 0.5 MG/5ML
4 ELIXIR ORAL EVERY 6 HOURS
Qty: 0 | Refills: 0 | Status: DISCONTINUED | OUTPATIENT
Start: 2018-03-07 | End: 2018-03-10

## 2018-03-07 RX ORDER — NYSTATIN 500MM UNIT
500000 POWDER (EA) MISCELLANEOUS
Qty: 0 | Refills: 0 | Status: DISCONTINUED | OUTPATIENT
Start: 2018-03-07 | End: 2018-03-10

## 2018-03-07 RX ORDER — OXYCODONE HYDROCHLORIDE 5 MG/1
5 TABLET ORAL EVERY 4 HOURS
Qty: 0 | Refills: 0 | Status: DISCONTINUED | OUTPATIENT
Start: 2018-03-07 | End: 2018-03-10

## 2018-03-07 RX ORDER — DEXTROSE 50 % IN WATER 50 %
25 SYRINGE (ML) INTRAVENOUS ONCE
Qty: 0 | Refills: 0 | Status: DISCONTINUED | OUTPATIENT
Start: 2018-03-07 | End: 2018-03-10

## 2018-03-07 RX ORDER — AZTREONAM 2 G
2000 VIAL (EA) INJECTION EVERY 8 HOURS
Qty: 0 | Refills: 0 | Status: DISCONTINUED | OUTPATIENT
Start: 2018-03-07 | End: 2018-03-07

## 2018-03-07 RX ORDER — LIDOCAINE 4 G/100G
1 CREAM TOPICAL DAILY
Qty: 0 | Refills: 0 | Status: DISCONTINUED | OUTPATIENT
Start: 2018-03-07 | End: 2018-03-10

## 2018-03-07 RX ORDER — GLUCAGON INJECTION, SOLUTION 0.5 MG/.1ML
1 INJECTION, SOLUTION SUBCUTANEOUS ONCE
Qty: 0 | Refills: 0 | Status: DISCONTINUED | OUTPATIENT
Start: 2018-03-07 | End: 2018-03-10

## 2018-03-07 RX ORDER — DIPHENHYDRAMINE HCL 50 MG
1 CAPSULE ORAL
Qty: 0 | Refills: 0 | COMMUNITY

## 2018-03-07 RX ORDER — SODIUM CHLORIDE 9 MG/ML
1000 INJECTION INTRAMUSCULAR; INTRAVENOUS; SUBCUTANEOUS
Qty: 0 | Refills: 0 | Status: DISCONTINUED | OUTPATIENT
Start: 2018-03-07 | End: 2018-03-10

## 2018-03-07 RX ORDER — POTASSIUM CHLORIDE 20 MEQ
20 PACKET (EA) ORAL ONCE
Qty: 0 | Refills: 0 | Status: COMPLETED | OUTPATIENT
Start: 2018-03-07 | End: 2018-03-07

## 2018-03-07 RX ORDER — DEXTROSE 50 % IN WATER 50 %
1 SYRINGE (ML) INTRAVENOUS ONCE
Qty: 0 | Refills: 0 | Status: DISCONTINUED | OUTPATIENT
Start: 2018-03-07 | End: 2018-03-10

## 2018-03-07 RX ORDER — VANCOMYCIN HCL 1 G
1250 VIAL (EA) INTRAVENOUS EVERY 12 HOURS
Qty: 0 | Refills: 0 | Status: DISCONTINUED | OUTPATIENT
Start: 2018-03-07 | End: 2018-03-08

## 2018-03-07 RX ORDER — DEXAMETHASONE 0.5 MG/5ML
4 ELIXIR ORAL
Qty: 0 | Refills: 0 | Status: DISCONTINUED | OUTPATIENT
Start: 2018-03-07 | End: 2018-03-07

## 2018-03-07 RX ORDER — ENOXAPARIN SODIUM 100 MG/ML
40 INJECTION SUBCUTANEOUS DAILY
Qty: 0 | Refills: 0 | Status: DISCONTINUED | OUTPATIENT
Start: 2018-03-07 | End: 2018-03-10

## 2018-03-07 RX ORDER — SODIUM CHLORIDE 9 MG/ML
1000 INJECTION, SOLUTION INTRAVENOUS
Qty: 0 | Refills: 0 | Status: DISCONTINUED | OUTPATIENT
Start: 2018-03-07 | End: 2018-03-10

## 2018-03-07 RX ORDER — PAMIDRONATE DISODIUM 9 MG/ML
90 INJECTION, SOLUTION INTRAVENOUS ONCE
Qty: 0 | Refills: 0 | Status: COMPLETED | OUTPATIENT
Start: 2018-03-07 | End: 2018-03-08

## 2018-03-07 RX ORDER — CEFEPIME 1 G/1
2000 INJECTION, POWDER, FOR SOLUTION INTRAMUSCULAR; INTRAVENOUS EVERY 8 HOURS
Qty: 0 | Refills: 0 | Status: DISCONTINUED | OUTPATIENT
Start: 2018-03-07 | End: 2018-03-08

## 2018-03-07 RX ORDER — MORPHINE SULFATE 50 MG/1
2 CAPSULE, EXTENDED RELEASE ORAL EVERY 4 HOURS
Qty: 0 | Refills: 0 | Status: DISCONTINUED | OUTPATIENT
Start: 2018-03-07 | End: 2018-03-10

## 2018-03-07 RX ORDER — DEXTROSE 50 % IN WATER 50 %
12.5 SYRINGE (ML) INTRAVENOUS ONCE
Qty: 0 | Refills: 0 | Status: DISCONTINUED | OUTPATIENT
Start: 2018-03-07 | End: 2018-03-10

## 2018-03-07 RX ORDER — DIPHENHYDRAMINE HCL 50 MG
25 CAPSULE ORAL AT BEDTIME
Qty: 0 | Refills: 0 | Status: DISCONTINUED | OUTPATIENT
Start: 2018-03-07 | End: 2018-03-10

## 2018-03-07 RX ORDER — INSULIN LISPRO 100/ML
VIAL (ML) SUBCUTANEOUS
Qty: 0 | Refills: 0 | Status: DISCONTINUED | OUTPATIENT
Start: 2018-03-07 | End: 2018-03-10

## 2018-03-07 RX ADMIN — Medication 4: at 17:44

## 2018-03-07 RX ADMIN — ENOXAPARIN SODIUM 40 MILLIGRAM(S): 100 INJECTION SUBCUTANEOUS at 12:00

## 2018-03-07 RX ADMIN — Medication 4 MILLIGRAM(S): at 06:19

## 2018-03-07 RX ADMIN — Medication 20 MILLIEQUIVALENT(S): at 12:00

## 2018-03-07 RX ADMIN — Medication 100 MILLIGRAM(S): at 06:19

## 2018-03-07 RX ADMIN — Medication 500000 UNIT(S): at 22:38

## 2018-03-07 RX ADMIN — Medication 500000 UNIT(S): at 12:00

## 2018-03-07 RX ADMIN — Medication 166.67 MILLIGRAM(S): at 11:12

## 2018-03-07 RX ADMIN — SODIUM CHLORIDE 100 MILLILITER(S): 9 INJECTION INTRAMUSCULAR; INTRAVENOUS; SUBCUTANEOUS at 09:03

## 2018-03-07 RX ADMIN — Medication 500000 UNIT(S): at 17:43

## 2018-03-07 RX ADMIN — Medication 500000 UNIT(S): at 06:19

## 2018-03-07 RX ADMIN — CEFEPIME 100 MILLIGRAM(S): 1 INJECTION, POWDER, FOR SOLUTION INTRAMUSCULAR; INTRAVENOUS at 17:20

## 2018-03-07 RX ADMIN — Medication 25 MILLIGRAM(S): at 22:37

## 2018-03-07 RX ADMIN — Medication 1 MILLIGRAM(S): at 12:00

## 2018-03-07 RX ADMIN — Medication 6: at 12:00

## 2018-03-07 RX ADMIN — Medication 4 MILLIGRAM(S): at 22:37

## 2018-03-07 NOTE — CONSULT NOTE ADULT - PROBLEM SELECTOR RECOMMENDATION 3
Reported issue in the past has been exacerbated by new pain.  Recommend Benadryl 25mg PO/IV qHS standing  Recommend Zaleplon 5mg PO qHS PRN if no response to benadryl.

## 2018-03-07 NOTE — H&P ADULT - PROBLEM SELECTOR PLAN 2
- daughter reports patient with increasing confusion over past few days and patient also says she feels slightly more confused as well - although currently improving and AOx3   - no focal deficits on exam  - CT head non contrast without any acute changes   - patient with known history of brain mets, s/p gamma knife radiation in Jan 2018 - consider repeat imaging after discussion with Dr Noonan  - c/w decadron 4mg bid

## 2018-03-07 NOTE — H&P ADULT - PROBLEM SELECTOR PLAN 6
- on home norvasc 10mg qd - will hold in setting of sepsis- restart as clinically improves - history of thrush, was on nystatin at home - will continue here

## 2018-03-07 NOTE — H&P ADULT - PROBLEM SELECTOR PLAN 4
- history of NSCLC diagnosed in Dec 2017 with brain and bone mets (right 8th rib, right iliac crest and L4) - s/p gamma knife radiation in Jan 2018 and currently s/p 2 rounds chemo (most recently 3/2) and few rounds of chemo   - as per patient, wishes to continue current treatment and is not amenable to hospice at this time   - will follow up with Dr Noonan in AM - history of NSCLC diagnosed in Dec 2017 with brain and bone mets (right 8th rib, right iliac crest and L4) - s/p gamma knife radiation in Jan 2018 and currently s/p 2 rounds chemo (most recently 3/2) and few rounds of chemo   - as per patient, wishes to continue current treatment and is not amenable to hospice at this time   - will follow up with Dr Noonan in AM  - palliative consult in AM

## 2018-03-07 NOTE — CONSULT NOTE ADULT - PROBLEM SELECTOR RECOMMENDATION 5
Support provided to patient and family. Patient to have access to supportive services during rest of hospital stay as the patient/family deemed necessary ie. Chaplaincy, Massage therapy, Music therapy, Patient and family supportive services, Palliative SW, etc.    PSSA Pending

## 2018-03-07 NOTE — CONSULT NOTE ADULT - ASSESSMENT
62 year old F with recently diagnosed Stage IV Lung adenocarcinoma (PDL-1 - 40%) on Carbo/Alimta/Keytruda s/p XRT to ribs, brain admitted for abdominal pain and mental status changes. She was found to have a RUL PNA being treated w/ Vanco/Aztreonam. Pt is currently hemodynamically stable.    #Stage IV Lung Adenocarcinoma  s/p XRT to ribs and brain. She is currently on cycle 2 of Carbo/Alimta/Keytruda which she received on 3/2. CT imaging shows progressive disease with new lesion in right illiac crest and L4 spine. 62 year old F with recently diagnosed Stage IV Lung adenocarcinoma (PDL-1 - 40%) on Carbo/Alimta/Keytruda s/p XRT to ribs, brain admitted for abdominal pain and mental status changes. She was found to have a RUL PNA being treated w/ Vanco/Aztreonam. Pt is currently hemodynamically stable.    #Stage IV Lung Adenocarcinoma  s/p XRT to ribs and brain. She is currently on cycle 2 of Carbo/Alimta/Keytruda which she received on 3/2. CT imaging shows progressive disease with new lesion in right illiac crest and L4 spine.      -c/w IV antibiotics for RUL PNA  -Given adequate pain control on IV morphine and PO oxycodone as needed, we will hold off on consultation with Rad Onc for palliative XRT to right hip and L4 lesion  -Lung masses and adrenal gland mass unchanged, new disease noted in right hip and L4. Will have discussion as outpatient about potentially switching to second line therapy w/ checkpoint inhibitor such as Nivolumab or Atezolizumab  or systemic therapy with Ramicurumab + Docetaxel given progression of disease.    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 Bilirubin within normal limits. Monitor LFT's daily   -If LFT's continue to rise, will start her on steroids.     Case discussed w/ Dr. Noonan

## 2018-03-07 NOTE — PROGRESS NOTE ADULT - SUBJECTIVE AND OBJECTIVE BOX
Patient is a 62y old  Female who presents with a chief complaint of Confusion; Abdominal Pain (07 Mar 2018 03:08)      INTERVAL HPI/OVERNIGHT EVENTS:    Pt. seen and examined by me earlier today  Pt.'s family at bedside  Pt. feels better; abdominal pain resolving, cough improved  Per daughter, Pt.'s mental status much better, just about back to baseline  No F/C, SOB, CP    Review of Systems: 12 point review of systems otherwise negative    MEDICATIONS  (STANDING):  cefepime  IVPB 2000 milliGRAM(s) IV Intermittent every 8 hours  dexamethasone     Tablet 4 milliGRAM(s) Oral every 6 hours  dextrose 5%. 1000 milliLiter(s) (50 mL/Hr) IV Continuous <Continuous>  dextrose 50% Injectable 12.5 Gram(s) IV Push once  dextrose 50% Injectable 25 Gram(s) IV Push once  dextrose 50% Injectable 25 Gram(s) IV Push once  diphenhydrAMINE   Capsule 25 milliGRAM(s) Oral at bedtime  enoxaparin Injectable 40 milliGRAM(s) SubCutaneous daily  folic acid 1 milliGRAM(s) Oral daily  insulin lispro (HumaLOG) corrective regimen sliding scale   SubCutaneous Before meals and at bedtime  lidocaine   Patch 1 Patch Transdermal daily  nystatin    Suspension [id number] Unit(s) Oral four times a day  pamidronate IVPB 90 milliGRAM(s) IV Intermittent once  sodium chloride 0.9%. 1000 milliLiter(s) (100 mL/Hr) IV Continuous <Continuous>  vancomycin  IVPB 1250 milliGRAM(s) IV Intermittent every 12 hours    MEDICATIONS  (PRN):  dextrose Gel 1 Dose(s) Oral once PRN Blood Glucose LESS THAN 70 milliGRAM(s)/deciliter  glucagon  Injectable 1 milliGRAM(s) IntraMuscular once PRN Glucose LESS THAN 70 milligrams/deciliter  morphine  - Injectable 2 milliGRAM(s) IV Push every 4 hours PRN Moderate Pain (4 - 6)  oxyCODONE    IR 5 milliGRAM(s) Oral every 4 hours PRN Severe Pain (7 - 10)      Allergies    penicillins (Hives)    Intolerances          Vital Signs Last 24 Hrs  T(C): 37.2 (07 Mar 2018 16:13), Max: 37.4 (07 Mar 2018 06:03)  T(F): 99 (07 Mar 2018 16:13), Max: 99.4 (07 Mar 2018 06:03)  HR: 93 (07 Mar 2018 16:13) (76 - 116)  BP: 125/80 (07 Mar 2018 16:13) (114/77 - 130/74)  BP(mean): --  RR: 18 (07 Mar 2018 16:13) (16 - 18)  SpO2: 98% (07 Mar 2018 16:13) (94% - 99%)  CAPILLARY BLOOD GLUCOSE      POCT Blood Glucose.: 227 mg/dL (07 Mar 2018 17:30)  POCT Blood Glucose.: 289 mg/dL (07 Mar 2018 11:15)  POCT Blood Glucose.: 272 mg/dL (07 Mar 2018 09:46)  POCT Blood Glucose.: 233 mg/dL (06 Mar 2018 21:27)        Physical Exam:    Daily Height in cm: 162.56 (07 Mar 2018 16:13)    Daily Weight in k (07 Mar 2018 16:13)  General:  non-toxic and comfortable-appearing in NAD  HEENT: dry MM  CV:  no JVD  Lungs:  normal WOB on NC  Skin:  WWP  Neuro:  AAOx3  rest of exam per housestaff    LABS:                        7.7    16.2  )-----------( 462      ( 07 Mar 2018 06:06 )             24.9     03-07    135  |  96  |  28<H>  ----------------------------<  274<H>  3.8   |  23  |  0.78    Ca    9.1      07 Mar 2018 06:06  Phos  3.1     03-07  Mg     2.0     03-07    TPro  6.9  /  Alb  2.7<L>  /  TBili  0.7  /  DBili  x   /  AST  114<H>  /  ALT  230<H>  /  AlkPhos  217<H>  03-07    PT/INR - ( 07 Mar 2018 06:06 )   PT: 15.2 sec;   INR: 1.36          PTT - ( 07 Mar 2018 06:06 )  PTT:25.2 sec  Urinalysis Basic - ( 07 Mar 2018 00:33 )    Color: Yellow / Appearance: Clear / S.010 / pH: x  Gluc: x / Ketone: NEGATIVE  / Bili: Negative / Urobili: 0.2 E.U./dL   Blood: x / Protein: Trace mg/dL / Nitrite: NEGATIVE   Leuk Esterase: NEGATIVE / RBC: < 5 /HPF / WBC < 5 /HPF   Sq Epi: x / Non Sq Epi: 5-10 /HPF / Bacteria: Present /HPF          RADIOLOGY & ADDITIONAL TESTS:    ---------------------------------------------------------------------------  I personally reviewed: [  ]EKG   [  ]CXR    [  ] CT    [  ]Other  ---------------------------------------------------------------------------  PLEASE CHECK WHEN PRESENT:     [  ]Heart Failure     [  ] Acute     [  ] Acute on Chronic     [  ] Chronic  -------------------------------------------------------------------     [  ]Diastolic [HFpEF]     [  ]Systolic [HFrEF]     [  ]Combined [HFpEF & HFrEF]     [  ]Other:  -------------------------------------------------------------------  [  ]FLAQUITA     [  ]ATN     [  ]Reneal Medullary Necrosis     [  ]Renal Cortical Necrosis     [  ]Other Pathological Lesions:    [  ]CKD 1  [  ]CKD 2  [  ]CKD 3  [  ]CKD 4  [  ]CKD 5  [  ]Other  -------------------------------------------------------------------  [  ]Other/Unspecified:    --------------------------------------------------------------------    Abdominal Nutritional Status  [  ]Malnutrition: See Nutrition Note  [  ]Cachexia  [  ]Other:   [  ]Supplement Ordered:  [  ]Morbid Obesity (BMI >=40]

## 2018-03-07 NOTE — H&P ADULT - PROBLEM SELECTOR PLAN 8
DVT ppx - Lovenox 40mg qd  GI ppx - PPI while on decadron - Alc 6.3 in Jan 2018 ; started  ISS in setting of decadron use

## 2018-03-07 NOTE — CONSULT NOTE ADULT - SUBJECTIVE AND OBJECTIVE BOX
Hematology Oncology Consult Note (Dr. Noonan )  Discussed with Dr. Noonan and recommendations reviewed with the primary team.    The patient was seen and examined    61 y/o F with a PMHX of HTN, DM, and Stage IV lung CA w/ mets to brain and bone (Dec 2nd) who underwent RT to bony mets on 8th rib, and had gamma knife tx to brain () who presents from home with abdominal pain and increasing confusion. Daughter at bedside reports that after 2nd cycle on Friday, patient had severe abdominal pain but self resolved and was able to tolerate some PO. However since then has had increasing confusion, not oriented to place or time intermittently. Thought this may be due to dehydration. Today patient experienced severe 10/10 epigastric non radiating abdominal pain that caused patient to yell out in pain that lasted for few hours. Resolved by the time she came to ED. Also with increased cough with whitish sputum. Daughter notes that overall breathing has improved since bronchial stent placed in january, but today was noted to be slightly more tachypneic. Has been complaint with meds and still taking decadron. Denies fever, chills, n/v, headache, changes in vision, sore throat, dysphagia, chest pain, dysuria, changes in urinary habits, leg swelling, or paresthesias.     In the ED, Tmax 98.7, -130/74-77, HR , RR 16-18, O2 98% RA. Received vancomycin 1250mg, aztreonam 1g, morphine 4mg IV, zofran 4mg, and 2L NS. ICU consulted for tachycardia and tachypnea, deemed not appropriate candidate at this time.    Pt had head CT which was negative for brain mets. CT imaging shows new right lesion in the right illac crest as well as new L4 lesion. She notes pain in both areas however states is adequately controlled with pain medication that is being given. No fevers or chills. Mental status improved. LFT's trending up, bilirubin within normal limits.     ROS is otherwise negative.    PAST MEDICAL & SURGICAL HISTORY:  Brain metastases  Bone metastasis  Lung cancer  Hypertension  Diabetes  History of radiation therapy  Status post gamma knife treatment      Medications:  enoxaparin Injectable 40 milliGRAM(s) SubCutaneous daily      FAMILY HISTORY:  Family history of transitional cell carcinoma of bladder (Sibling)  Family history of renal cell carcinoma (Sibling)      PHYSICAL EXAM:    T(F): 99 (18 @ 16:13), Max: 99.4 (18 @ 06:03)  HR: 93 (18 @ 16:13) (76 - 116)  BP: 125/80 (18 @ 16:13) (114/77 - 130/74)  RR: 18 (18 @ 16:13) (16 - 18)  SpO2: 98% (18 @ 16:13) (94% - 99%)  Wt(kg): --    Daily Height in cm: 162.56 (07 Mar 2018 16:13)    Daily Weight in k (07 Mar 2018 16:13)    Gen: well developed, well nourished, comfortable, On oxygen   HEENT: normocephalic/atraumatic, no conjunctival pallor, no scleral icterus, dry mucous membranes   Neck: supple, no masses, no JVD  Cardiovascular: RR, nl S1S2, no murmurs/rubs/gallops  Respiratory: Crackles appreciated at right base, no wheezing/rales/rhonchi   Gastrointestinal: BS+, soft, NT/ND, no masses, no splenomegaly, no hepatomegaly, no evidence for ascites  Extremities: no clubbing/cyanosis, no edema, no calf tenderness  Vascular:  DP/PT 2+ b/l  Neurological: CN 2-12 grossly intact, no focal deficits  Skin: no rash on visible skin    Labs:                          7.7    16.2  )-----------( 462      ( 07 Mar 2018 06:06 )             24.9     CBC Full  -  ( 07 Mar 2018 06:06 )  WBC Count : 16.2 K/uL  Hemoglobin : 7.7 g/dL  Hematocrit : 24.9 %  Platelet Count - Automated : 462 K/uL  Mean Cell Volume : 80.3 fL  Mean Cell Hemoglobin : 24.8 pg  Mean Cell Hemoglobin Concentration : 30.9 g/dL  Auto Neutrophil # : x  Auto Lymphocyte # : x  Auto Monocyte # : x  Auto Eosinophil # : x  Auto Basophil # : x  Auto Neutrophil % : 95.5 %  Auto Lymphocyte % : 3.5 %  Auto Monocyte % : 1.0 %  Auto Eosinophil % : 0.0 %  Auto Basophil % : 0.0 %    PT/INR - ( 07 Mar 2018 06:06 )   PT: 15.2 sec;   INR: 1.36          PTT - ( 07 Mar 2018 06:06 )  PTT:25.2 sec        135  |  96  |  28<H>  ----------------------------<  274<H>  3.8   |  23  |  0.78    Ca    9.1      07 Mar 2018 06:06  Phos  3.1       Mg     2.0         TPro  6.9  /  Alb  2.7<L>  /  TBili  0.7  /  DBili  x   /  AST  114<H>  /  ALT  230<H>  /  AlkPhos  217<H>        Urinalysis Basic - ( 07 Mar 2018 00:33 )    Color: Yellow / Appearance: Clear / S.010 / pH: x  Gluc: x / Ketone: NEGATIVE  / Bili: Negative / Urobili: 0.2 E.U./dL   Blood: x / Protein: Trace mg/dL / Nitrite: NEGATIVE   Leuk Esterase: NEGATIVE / RBC: < 5 /HPF / WBC < 5 /HPF   Sq Epi: x / Non Sq Epi: 5-10 /HPF / Bacteria: Present /HPF        Other Labs:    Cultures:    Pathology:    Imaging Studies:

## 2018-03-07 NOTE — H&P ADULT - NSHPLABSRESULTS_GEN_ALL_CORE
LABS:                         9.3    22.6  )-----------( 640      ( 06 Mar 2018 20:14 )             30.1         137  |  95<L>  |  26<H>  ----------------------------<  227<H>  4.1   |  24  |  0.75    Ca    9.8      06 Mar 2018 20:14    TPro  8.0  /  Alb  3.0<L>  /  TBili  0.7  /  DBili  x   /  AST  49<H>  /  ALT  155<H>  /  AlkPhos  213<H>        Urinalysis Basic - ( 07 Mar 2018 00:33 )    Color: Yellow / Appearance: Clear / S.010 / pH: x  Gluc: x / Ketone: NEGATIVE  / Bili: Negative / Urobili: 0.2 E.U./dL   Blood: x / Protein: Trace mg/dL / Nitrite: NEGATIVE   Leuk Esterase: NEGATIVE / RBC: < 5 /HPF / WBC < 5 /HPF   Sq Epi: x / Non Sq Epi: 5-10 /HPF / Bacteria: Present /HPF            Lactate, Blood: 1.3 mmoL/L ( @ 00:20)  Lactate, Blood: 3.1 mmoL/L ( @ 21:51)      RADIOLOGY, EKG & ADDITIONAL TESTS:   CT head: < from: CT Head No Cont (18 @ 23:47) >    No hydrocephalus, midline shift, acute intracranial hemorrhage or   demarcated territorial infarct.    < end of copied text >    CT Chest: < from: CT Chest w/ IV Cont (18 @ 23:47) >    1. No definite change in size of a large enhancing, necrotic right   paratracheal mass given differences in technique. No definite change in a   heterogeneously enhancing right hilar mass given differences in   technique. Again noted is a pleural/subpleural mass in the posterior   right lung base. These findings are consistent with known lung cancer.  2. Right suprahilar mass, right superior hilar mass, and satellite   nodules within the mediastinum most consistent with metastatic disease.  3. Interval resolution of right lower lobe consolidation and right   pleural effusion.  4. Small area of reticular opacification in the right upper lobe may   represent a residual inflammatory or infectious process.    < end of copied text >    CT Abdomen/Pelvis: < from: CT Abdomen and Pelvis w/ IV Cont (18 @ 23:47) >    New lytic lesions in the right iliac wing and vertebral body L4   consistent with metastatic disease. Transitional vertebra labeled L6 for   the purposes of this study.    Right lung masses consistent with known lung malignancy.    A 1.6 cm nodule in the left adrenal gland, unchanged since 2017.    < end of copied text >

## 2018-03-07 NOTE — H&P ADULT - NSHPREVIEWOFSYSTEMS_GEN_ALL_CORE
REVIEW OF SYSTEMS:    CONSTITUTIONAL: {+) weakness, no fevers or chills  EYES/ENT: No visual changes;  No vertigo or throat pain   NECK: No pain or stiffness  RESPIRATORY: {+) cough, no wheezing, no hemoptysis; No shortness of breath  CARDIOVASCULAR: No chest pain or palpitations  GASTROINTESTINAL: (+)abdominal and epigastric pain. No nausea, vomiting, or hematemesis; No diarrhea or constipation. No melena or hematochezia. Last BM was today.  GENITOURINARY: No dysuria, frequency or hematuria  NEUROLOGICAL: No numbness or weakness  SKIN: No itching, burning, rashes, or lesions   All other review of systems is negative unless indicated above.

## 2018-03-07 NOTE — CONSULT NOTE ADULT - PROBLEM SELECTOR RECOMMENDATION 9
Patient had radiation to rib met and currently that pain is resolved, but new pain related to L4 bone metastasis remains uncontrolled. Currently no Dexa 4mg PO BID.  Recommend trial of pamidronate 90mg IV x1  Recommend increasing Dexa to 4mg to [id number] standing  Recommend Lidoderm patch to lumbar region  Recommend Oxycodone 5mg PO q4h PRN Moderate Pain  Continue MSIR 2 mg IV q4h PRN Severe Pain  Recommend assessment by RT for possible radiation to new bone mets.

## 2018-03-07 NOTE — H&P ADULT - HISTORY OF PRESENT ILLNESS
63 yo F with recent diagnosis of NSCL adenocarcinoma with brain and bone mets (s/p gamma knife 1/19/18 and currently receiving chemo and radiation), on home O2 (2L), HTN, DM2 (not on insulin) presents from home with abdominal pain and increasing confusion. Daughter at bedside reports that after chemo session on Friday patient had severe abdominal pain but self resolved and was able to tolerate some PO. However since then has had increasing confusion, not oriented to place or time intermittently. Thought this may be due to dehydration. Today patient experienced severe 10/10 epigastric non radiating abdominal pain that caused patient to yell out in pain that lasted for few hours. Resolved by the time she came to ED. Also with increased cough with whitish sputum. Daughter notes that overall breathing has improved since bronchial stent placed in january, but today was noted to be slightly more tachypneic. Has been complaint with meds and still taking decadron. Denies fever, chills, n/v, headache, changes in vision, sore throat, dysphagia, chest pain, dysuria, changes in urinary habits, leg swelling, or paresthesias.     In the ED, Tmax 98.7, -130/74-77, HR , RR 16-18, O2 98% RA. Received vancomycin 1250mg, aztreonam 1g, morphine 4mg IV, zofran 4mg, and 2L NS. 61 yo F with recent diagnosis of NSCL adenocarcinoma with brain and bone mets (s/p gamma knife 1/19/18 and currently receiving chemo and radiation), on home O2 (2L), HTN, DM2 (not on insulin) presents from home with abdominal pain and increasing confusion. Daughter at bedside reports that after chemo session on Friday patient had severe abdominal pain but self resolved and was able to tolerate some PO. However since then has had increasing confusion, not oriented to place or time intermittently. Thought this may be due to dehydration. Today patient experienced severe 10/10 epigastric non radiating abdominal pain that caused patient to yell out in pain that lasted for few hours. Resolved by the time she came to ED. Also with increased cough with whitish sputum. Daughter notes that overall breathing has improved since bronchial stent placed in january, but today was noted to be slightly more tachypneic. Has been complaint with meds and still taking decadron. Denies fever, chills, n/v, headache, changes in vision, sore throat, dysphagia, chest pain, dysuria, changes in urinary habits, leg swelling, or paresthesias.     In the ED, Tmax 98.7, -130/74-77, HR , RR 16-18, O2 98% RA. Received vancomycin 1250mg, aztreonam 1g, morphine 4mg IV, zofran 4mg, and 2L NS. ICU consulted for tachycardia and tachypnea, deemed not appropriate candidate at this time.

## 2018-03-07 NOTE — CONSULT NOTE ADULT - SUBJECTIVE AND OBJECTIVE BOX
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 Daughter at bedside reports that after chemo session on Friday patient had severe abdominal pain but self resolved and was able to tolerate some PO. However since then has had increasing confusion, not oriented to place or time intermittently. Thought this may be due to dehydration. Today patient experienced severe 10/10 epigastric non radiating abdominal pain that caused patient to yell out in pain that lasted for few hours. Resolved by the time she came to ED. Also with increased cough with whitish sputum. Daughter notes that overall breathing has improved since bronchial stent placed in january, but today was noted to be slightly more tachypneic. Has been complaint with meds and still taking decadron. Denies fever, chills, n/v, headache, changes in vision, sore throat, dysphagia, chest pain, dysuria, changes in urinary habits, leg swelling, or paresthesias.     In the ED, Tmax 98.7, -130/74-77, HR , RR 16-18, O2 98% RA. Received vancomycin 1250mg, aztreonam 1g, morphine 4mg IV, zofran 4mg, and 2L NS. ICU consulted for tachycardia and tachypnea, deemed not appropriate candidate at this time. (07 Mar 2018 03:08)    PAST MEDICAL & SURGICAL HISTORY:  Brain metastases s/p Gamma knife  Bone metastasis Rib, Hip, and Lumbar  NSC Lung cancer  Hypertension  Diabetes  History of radiation therapy to rib  Status post gamma knife treatment to brain    FAMILY HISTORY:  Family history of transitional cell carcinoma of bladder (Sibling)  Family history of renal cell carcinoma (Sibling)    SOCIAL HISTORY: Retired from [place] job, lives with  and "cousin" who she calls a "brother, former smoker, no EtOH use, limited ability to take care of herself at this time at home.  is alcoholic per daughter and "cousin" is mentally challenged.  Daughter is from a previous marriage and is a nurse.    ROS:                      Dyspnea (Marlon 0-10): 1                       N/V (Y/N): No                             Secretions (Y/N) : No                Agitation(Y/N):  No  Pain (Y/N): Pain, Lower abdomen  -Provocation/Palliation: Not related to physical activity or intake. Pain medication helps with the pain.  -Quality/Quantity: Acute malignant somatic musculoskeletal bone pain currently uncontrolled.  -Radiating: Lower abdomen bilaterally  -Severity: Severe  -Timing/Frequency: Incidental/Occasional  -Impact on ADLs: Prevents and wakes from sleep.     General:  Denied  HEENT:    Denied  Neck:  Denied  CVS:  Denied  Resp:  Denied  GI:  Denied, last BM yesterday  :  Denied  Musc:  Weakness  Neuro:  Denied  Psych:  Insomnia  Skin:  Denied  Lymph:  Denied    Allergies: penicillins (Hives)    Opiate Naive (Y/N): No  -iStop reviewed (Y/N): Yes. Found Rx for Dronabinol, Oxycodone 5, OxyContin 20, Ativan 0.5, and Percocet on iStop review. (Ref#: [id number] )    Medications:      MEDICATIONS  (STANDING):  aztreonam  IVPB 2000 milliGRAM(s) IV Intermittent every 8 hours  aztreonam  IVPB 2000 milliGRAM(s) IV Intermittent every 8 hours  dexamethasone     Tablet 4 milliGRAM(s) Oral two times a day  dextrose 5%. 1000 milliLiter(s) (50 mL/Hr) IV Continuous <Continuous>  dextrose 50% Injectable 12.5 Gram(s) IV Push once  dextrose 50% Injectable 25 Gram(s) IV Push once  dextrose 50% Injectable 25 Gram(s) IV Push once  enoxaparin Injectable 40 milliGRAM(s) SubCutaneous daily  folic acid 1 milliGRAM(s) Oral daily  insulin lispro (HumaLOG) corrective regimen sliding scale   SubCutaneous Before meals and at bedtime  nystatin    Suspension 139078 Unit(s) Oral four times a day  potassium chloride    Tablet ER 20 milliEquivalent(s) Oral once  sodium chloride 0.9%. 1000 milliLiter(s) (100 mL/Hr) IV Continuous <Continuous>  vancomycin  IVPB 1250 milliGRAM(s) IV Intermittent every 12 hours    MEDICATIONS  (PRN):  dextrose Gel 1 Dose(s) Oral once PRN Blood Glucose LESS THAN 70 milliGRAM(s)/deciliter  glucagon  Injectable 1 milliGRAM(s) IntraMuscular once PRN Glucose LESS THAN 70 milligrams/deciliter  morphine  - Injectable 2 milliGRAM(s) IV Push every 4 hours PRN Moderate Pain (4 - 6)    Labs:    CBC:                        7.7    16.2  )-----------( 462      ( 07 Mar 2018 06:06 )             24.9   CMP:      135  |  96  |  28<H>  ----------------------------<  274<H>  3.8   |  23  |  0.78  Ca    9.1      07 Mar 2018 06:06  Phos  3.1       Mg     2.0         TPro  6.9  /  Alb  2.7<L>  /  TBili  0.7  /  DBili  x   /  AST  114<H>  /  ALT  230<H>  /  AlkPhos  217<H>    PT/INR - ( 07 Mar 2018 06:06 )   PT: 15.2 sec;   INR: 1.36     PTT - ( 07 Mar 2018 06:06 )  PTT:25.2 sec    Urinalysis Basic - ( 07 Mar 2018 00:33 )  Color: Yellow / Appearance: Clear / S.010 / pH: x  Gluc: x / Ketone: NEGATIVE  / Bili: Negative / Urobili: 0.2 E.U./dL   Blood: x / Protein: Trace mg/dL / Nitrite: NEGATIVE   Leuk Esterase: NEGATIVE / RBC: < 5 /HPF / WBC < 5 /HPF   Sq Epi: x / Non Sq Epi: 5-10 /HPF / Bacteria: Present /HPF    POCT Blood Glucose.: 289 mg/dL (18 @ 11:15)    Direct Yamileth Poly: Negative (18 @ 07:52)    Antibody Identification (18 @ 07:31)    Antibody ID 1_1: Anti-C    Antibody ID 1_2: Anti-D    Antibody ID 1_3: Anti-E    Lactate, Blood: 1.3 mmoL/L (18 @ 00:20)    Culture - Blood (18 @ 23:00)    Specimen Source: .Blood Blood    Culture Results:   No growth at 12 hours    Culture - Blood (18 @ 23:00)    Specimen Source: .Blood Blood    Culture Results:   No growth at 12 hours    Blood Gas Profile - Venous (18 @ 21:53)    pH, Venous: 7.69: Critical values reported and read back to Christian WALTERS RN 18 21:59    pCO2, Venous: 19 mmHg    pO2, Venous: 73 mmHg    HCO3, Venous: 23 mmol/L    Base Excess, Venous: 4.0 mmol/L    Oxygen Saturation, Venous: 97 %    Blood Gas Source Venous: BLDA    Troponin T, Serum: <0.01 ng/mL (18 @ 12:06)  Troponin T, Serum: <0.01 ng/mL (18 @ 08:25)    CKMB Units: 1.3 ng/mL (18 @ 12:06)  CKMB Units: 1.3 ng/mL (18 @ 08:25)    Imaging:  Reviewed    EXAM:  XR CHEST PORTABLE ROUTINE 1V                        PROCEDURE DATE:  2018    INTERPRETATION:  XR CHEST PORTABLE ROUTINE 1V dated 3/2/2018 8:23 AM  CLINICAL INFORMATION: Female, 62 years old.  Chest Pain.  PRIOR STUDIES: 2018, and CT examination dated 2018.  Sign FINDINGS: The heart size is within normal limits. There is   prominence of the soft tissues in the thoracic inlet region as well as a   masslike area of consolidation involving the infrahilar area. There is   been interval resolution pulmonary venous congestion. There may be some   mild leftward bowing of the tracheal air column. No pneumothorax.  IMPRESSION:  Interval resolution pulmonary venous congestive changes.  Multiple additional findings as above. Please see thoracic CT examination   dated  .    EXAM:  CT BRAIN                        PROCEDURE DATE:  2018    INTERPRETATION:  CT OF THE HEAD WITHOUT ONTRAST  INDICATION:  confusion  TECHNIQUE: An axial noncontrast CT scan of the head was performed.   Sagittal andcoronal reformatted images were also obtained.  CONTRAST:  None  COMPARISON:  2018  FINDINGS:  Mild generalized cerebral volume loss. There is no hydrocephalus. The   basal cisterns are patent. There is no focal mass effect or midline   shift. No areas of vasogenic edema appreciated. There are no extra-axial   collections or intraparenchymal hemorrhage.  Gray-white matter differentiation is intact. There is no evidence of   acute transcortical territorial infarction.  The globes and retrobulbar fat ae intact.  The mastoids and paranasal sinuses are well aerated. The calvaria is   intact.  IMPRESSION:  No hydrocephalus, midline shift, acute intracranial hemorrhage or   demarcated territorial infarct.    EXAM:  CT ABDOMEN AND PELVIS IC                        PROCEDURE DATE:  2018    Quantity of Contrast in Vial in ml: 100 Contrast Used: Optiray 350  Quantity of Contrast Wasted in ml: 0  INTERPRETATION:  CT of the ABDOMEN and PELVISwith intravenous contrast   dated 3/6/2018 11:47 PM  INDICATION: Abdominal pain.  TECHNIQUE: CT of the abdomen and pelvis with intravenous contrast. No   oral contrast was administered. Axial, sagittal, and coronal images were   obtained and reviewed.  COMPARISON: CT abdomen and pelvis 2017  FINDINGS:  Lower chest: Chest findings are better described in the report of the CT   chest scan performed earlier today. There is a large enhancing partially   imaged right hilar mass. There is a 3.2 x 3.5 cm subpleural and pleural   enhancing mass in the posterior right lung base. The base of the heart is   unremarkable.  Liver: Smooth in contour. There is a 1 cm round hypodensity in hepatic   segment 6, most likely a cyst. Portal and hepatic veins are patent.  Biliary system: Gallbladder is normal in size. No calcified gallstones.   No biliary ductal dilatation.  Pancreas: Unremarkable.  Spleen: Unremarkable.  Adrenal glands: There is a 1.6 cm nodule in the left adrenal gland,   unchanged since 2017.  Kidneys: Symmetric parenchymal enhancement. There are numerous   subcentimeter parenchymal round hypodensities in the right and left   kidneys, too small to characterize. There is a small cyst in the lower   pole ofthe right kidney. No hydronephrosis. No renal or ureteral stone.   Urinary Bladder: Unremarkable.  Reproductive organs: The patient is status post hysterectomy..   Bowel/Peritoneum: Normal caliber without evidence of obstruction. No   appreciablewall thickening. Normal appendix. No ascites or extraluminal   gas.   Lymph nodes: No lymphadenopathy.  Aorta/IVC: Normal caliber.  Abdominal wall: No inguinal hernia.  Bones/Soft tissues: There is a new expansile, 2.7 x 2.3 cm lytic lesion   in the right iliac wing. There are a transitional vertebra, labeled L6   for the purposes of this study. There is a new 1.4 cm round lytic lesion   in the right lateral aspect of vertebral body L4. There is no change in   mild anterolisthesis of L5 on L6.  IMPRESSION:   New lytic lesions in the right iliac wing and vertebral body L4   consistent with metastatic disease. Transitional vertebra labeled L6 for   the purposes of this study.  Right lung masses consistent with known lung malignancy.  A 1.6 cm nodule in the left adrenal gland, unchanged since 2017.    EXAM:  CT CHEST IC                        PROCEDURE DATE:  2018    Quantity of Contrast in Vial in ml: 100 Contrast Used: Optiray 350  Quantity of Contrast Wasted in ml: 0   INTERPRETATION:  CT of the CHEST with intravenous contrastdated 3/6/2018   11:47 PM  INDICATION: Lung cancer.  TECHNIQUE: CT of the chest was performed using intravenous contrast.   Axial, sagittal and coronal images were produced and reviewed. Axial MIP   images of the chest were also obtained.  PRIOR STUDIES: CT chest 2018  FINDINGS:   There is a partially imaged enhancing right suprahilar mass. There is an   enhancing 0.8 cm short axis left suprahilar lymph node. There is no   significant change in size of a 6.9 x 4.3 x 8.3 cm heterogeneous,   necrotic mass in the right paratracheal region, given differences in   technique. Satellite nodules surrounding this mass in the mediastinum.   There is an enlarged heterogeneously enhancing subcarinal lymph node.   There is no axillary adenopathy.  The heart is normal in size.  No pericardial effusion is seen.  The great   vessels are unremarkable.   Evaluation of the pulmonary parenchyma demonstrates a small area of   reticular opacification, decreased in size, in the posterior segment of   the right upper lobe. Mild atelectatic changes are noted in the bibasilar   lungs. The previously noted right lower lobe consolidation is no longer   present. There is a 2.3 cm heterogeneously enhancing right superior hilar   mass. There has been no definite change in a 5.9 x 4.4 x 4.2 cm   heterogeneously enhancing right hilar mass given differences in   technique. Again noted is a 3.2 x 3.7 cm pleural/subpleural mass in the   posterior right lung base. There has been interval resolution of right   pleural effusion.  Findings within the abdomen are best described in the report of the CT   abdomen pelvis performed today .  Evaluation of the osseous structures demonstrates mild degenerative   changes of the spine.  IMPRESSION:  1. No definite change in size of a large enhancing, necrotic right   paratracheal mass given differences in technique. No definite change in a   heterogeneously enhancing right hilar mass given differences in   technique. Again noted is a pleural/subpleural mass in the posterior   right lung base. These findings are consistent with known lung cancer.  2. Right suprahilar mass, right superior hilar mass, and satellite   nodules within the mediastinum most consistent with metastatic disease.  3. Interval resolution of right lower lobe consolidation and right   pleural effusion.  4. Small area of reticular opacification in the right upper lobe may   represent a residual inflammatory or infectious process.    ECG  Ventricular Rate 99 BPM  Atrial Rate 99 BPM  P-R Interval 118 ms  QRS Duration 108 ms  Q-T Interval 378 ms  QTC Calculation(Bezet) 485 ms  P Axis 78 degrees  R Axis 59 degrees  T Axis 56 degrees  Diagnosis Line Sinus rhythm with marked sinus arrhythmia  Possible Left atrial enlargement  Incomplete right bundle branch block  ST & T wave abnormality, consider anterior ischemia  Prolonged QT  Abnormal ECG    PEx:  T(C): 37.4 (18 @ 06:03), Max: 37.4 (18 @ 06:03)  HR: 101 (18 @ 06:03) (76 - 116)  BP: 122/69 (18 @ 06:03) (114/77 - 130/74)  RR: 17 (18 @ 06:03) (16 - 18)  SpO2: 94% (18 @ 06:03) (94% - 99%)  Wt(kg): 77    General: AAOx3 Elderly woman in moderate distress due to pain  HEENT: PERRL EOMI Dry lips and mucosas  Neck: Supple No masses  CVS: Tachycardic Irregularly irregular  Resp: Unlabored  GI: Soft ND TTP B/L lower quadrants   :  Normal  Musc: No C/C/E    Neuro: Following commands.   Psych: Concerned  Skin: Xerosis  Lymph: Normal  Preadmit Karnofsky: 40 %           Current Karnofsky: 30%  Cachexia (Y/N): No    Advanced Directives:     DNR/DNI    Decision maker: The patient is able to participate in complex medical decision making conversations.   Legal surrogate: Naomi Yeh (daughter) [phone] and Fco Casey () [phone]    GOALS OF CARE DISCUSSION       Palliative care info/counseling provided	           Family meeting at bedside          See previous Palliative Medicine Note       Documentation of GOC: DNR DNI          REFERRALS	        Palliative Med        Unit SW/Case Mgmt       Patient/Family Support

## 2018-03-07 NOTE — H&P ADULT - PROBLEM SELECTOR PLAN 9
FEN - regular diet, replete lytes, NS at 100cc/hr  Status - DNR/DNI  Dispo - admit to [facility] DVT ppx - Lovenox 40mg qd  GI ppx - PPI while on decadron

## 2018-03-07 NOTE — H&P ADULT - NSHPPHYSICALEXAM_GEN_ALL_CORE
VITAL SIGNS:  T(C): 36.9 (03-06-18 @ 22:42), Max: 37.1 (03-06-18 @ 19:55)  T(F): 98.4 (03-06-18 @ 22:42), Max: 98.7 (03-06-18 @ 19:55)  HR: 103 (03-06-18 @ 22:42) (76 - 116)  BP: 130/74 (03-06-18 @ 22:42) (114/77 - 130/74)  BP(mean): --  RR: 18 (03-06-18 @ 22:42) (16 - 18)  SpO2: 98% (03-06-18 @ 22:42) (96% - 99%)  Wt(kg): --    PHYSICAL EXAM:    Constitutional: sleepy appearing pleasant female; resting comfortably in bed; NAD  Eyes: PERRL, EOMI, clear conjunctiva  ENT: no nasal discharge; uvula midline, dry mucous membranes; (+) thrush   Neck: supple; no JVD or thyromegaly  Respiratory: decreased BS throughout right side; no wheezing   Cardiac: +S1/S2; tachycardiac, regular, no murmurs  Gastrointestinal: soft, NT/ND; no rebound or guarding; +BSx4  Back: no CVA tenderness; point tenderness in low lumber spine around area of L4  Extremities: WWP, no clubbing or cyanosis; no peripheral edema  Musculoskeletal: NROM x4; no joint swelling, tenderness or erythema  Vascular: 2+ radial, femoral, DP/PT pulses B/L  Dermatologic: skin warm, dry and intact; no rashes, wounds, or scars  Neurologic: AAOx3; CNII-XII grossly intact; no focal deficits

## 2018-03-07 NOTE — H&P ADULT - ATTENDING COMMENTS
patient seen and examined; reports sxs occuring in setting of recent chemo     reviewed vs, labs, available radiological reports/ studies, ekg     agree w/ PE findings as above ; abdomen is nontender   1. Sepsis: cause unclear, on IVFs, started on vancomycin and aztreonam for possible RUL PNA seen on CT chest; agree w/ holding BP medication for now  2. toxic metabolic encephalopathy: improving  3. abdominal pain: resolving, pain control prn   4. lung cancer w/brain and bone mets: s/p recent chemo session; follow up w/ oncology team

## 2018-03-07 NOTE — H&P ADULT - PROBLEM SELECTOR PLAN 7
- Alc 6.3 in Jan 2018 - no need for FS at this time - on home norvasc 10mg qd - will hold in setting of sepsis- restart as clinically improves

## 2018-03-07 NOTE — H&P ADULT - PROBLEM SELECTOR PLAN 1
- patient with sepsis criteria - WBC 22.6 (PMNs 91), LA 3.1 (repeat 1.3), and tachycardia up to 116 - suspect possible PNA in setting of increased cough with whitish sputum  - patient with recent admission in Feb with RVP and right sided PNA - finished course of levaquin and flagyl - CT shows improvement of RLL PNA but now with possible RUL PNA  - will continue with vancomycin 1250mg q12hr and aztreonam 2mg q8hr (has anaphylaxis reaction to PCN)   - f/u sputum culture and blood culture

## 2018-03-08 DIAGNOSIS — D64.9 ANEMIA, UNSPECIFIED: ICD-10-CM

## 2018-03-08 LAB
ANION GAP SERPL CALC-SCNC: 12 MMOL/L — SIGNIFICANT CHANGE UP (ref 5–17)
BUN SERPL-MCNC: 18 MG/DL — SIGNIFICANT CHANGE UP (ref 7–23)
CALCIUM SERPL-MCNC: 9.1 MG/DL — SIGNIFICANT CHANGE UP (ref 8.4–10.5)
CHLORIDE SERPL-SCNC: 96 MMOL/L — SIGNIFICANT CHANGE UP (ref 96–108)
CO2 SERPL-SCNC: 25 MMOL/L — SIGNIFICANT CHANGE UP (ref 22–31)
CREAT SERPL-MCNC: 0.57 MG/DL — SIGNIFICANT CHANGE UP (ref 0.5–1.3)
GLUCOSE BLDC GLUCOMTR-MCNC: 181 MG/DL — HIGH (ref 70–99)
GLUCOSE BLDC GLUCOMTR-MCNC: 193 MG/DL — HIGH (ref 70–99)
GLUCOSE BLDC GLUCOMTR-MCNC: 226 MG/DL — HIGH (ref 70–99)
GLUCOSE BLDC GLUCOMTR-MCNC: 331 MG/DL — HIGH (ref 70–99)
GLUCOSE SERPL-MCNC: 221 MG/DL — HIGH (ref 70–99)
HBA1C BLD-MCNC: 6.8 % — HIGH (ref 4–5.6)
HCT VFR BLD CALC: 22.8 % — LOW (ref 34.5–45)
HCT VFR BLD CALC: 23.8 % — LOW (ref 34.5–45)
HGB BLD-MCNC: 7 G/DL — CRITICAL LOW (ref 11.5–15.5)
HGB BLD-MCNC: 7.3 G/DL — LOW (ref 11.5–15.5)
LIDOCAIN IGE QN: 31 U/L — SIGNIFICANT CHANGE UP (ref 7–60)
MAGNESIUM SERPL-MCNC: 1.8 MG/DL — SIGNIFICANT CHANGE UP (ref 1.6–2.6)
MCHC RBC-ENTMCNC: 24.6 PG — LOW (ref 27–34)
MCHC RBC-ENTMCNC: 24.6 PG — LOW (ref 27–34)
MCHC RBC-ENTMCNC: 30.7 G/DL — LOW (ref 32–36)
MCHC RBC-ENTMCNC: 30.7 G/DL — LOW (ref 32–36)
MCV RBC AUTO: 80.1 FL — SIGNIFICANT CHANGE UP (ref 80–100)
MCV RBC AUTO: 80.3 FL — SIGNIFICANT CHANGE UP (ref 80–100)
PLATELET # BLD AUTO: 355 K/UL — SIGNIFICANT CHANGE UP (ref 150–400)
PLATELET # BLD AUTO: 364 K/UL — SIGNIFICANT CHANGE UP (ref 150–400)
POTASSIUM SERPL-MCNC: 3.9 MMOL/L — SIGNIFICANT CHANGE UP (ref 3.5–5.3)
POTASSIUM SERPL-SCNC: 3.9 MMOL/L — SIGNIFICANT CHANGE UP (ref 3.5–5.3)
RBC # BLD: 2.84 M/UL — LOW (ref 3.8–5.2)
RBC # BLD: 2.97 M/UL — LOW (ref 3.8–5.2)
RBC # FLD: 21.1 % — HIGH (ref 10.3–16.9)
RBC # FLD: 21.1 % — HIGH (ref 10.3–16.9)
SODIUM SERPL-SCNC: 133 MMOL/L — LOW (ref 135–145)
VANCOMYCIN TROUGH SERPL-MCNC: 26 UG/ML — CRITICAL HIGH (ref 10–20)
WBC # BLD: 8 K/UL — SIGNIFICANT CHANGE UP (ref 3.8–10.5)
WBC # BLD: 9 K/UL — SIGNIFICANT CHANGE UP (ref 3.8–10.5)
WBC # FLD AUTO: 8 K/UL — SIGNIFICANT CHANGE UP (ref 3.8–10.5)
WBC # FLD AUTO: 9 K/UL — SIGNIFICANT CHANGE UP (ref 3.8–10.5)

## 2018-03-08 PROCEDURE — 99232 SBSQ HOSP IP/OBS MODERATE 35: CPT

## 2018-03-08 PROCEDURE — 99233 SBSQ HOSP IP/OBS HIGH 50: CPT | Mod: GC

## 2018-03-08 PROCEDURE — 99233 SBSQ HOSP IP/OBS HIGH 50: CPT

## 2018-03-08 RX ORDER — PANTOPRAZOLE SODIUM 20 MG/1
40 TABLET, DELAYED RELEASE ORAL
Qty: 0 | Refills: 0 | Status: DISCONTINUED | OUTPATIENT
Start: 2018-03-08 | End: 2018-03-10

## 2018-03-08 RX ORDER — ZALEPLON 10 MG
5 CAPSULE ORAL AT BEDTIME
Qty: 0 | Refills: 0 | Status: DISCONTINUED | OUTPATIENT
Start: 2018-03-08 | End: 2018-03-10

## 2018-03-08 RX ORDER — POLYETHYLENE GLYCOL 3350 17 G/17G
17 POWDER, FOR SOLUTION ORAL DAILY
Qty: 0 | Refills: 0 | Status: DISCONTINUED | OUTPATIENT
Start: 2018-03-08 | End: 2018-03-10

## 2018-03-08 RX ORDER — DOCUSATE SODIUM 100 MG
100 CAPSULE ORAL
Qty: 0 | Refills: 0 | Status: DISCONTINUED | OUTPATIENT
Start: 2018-03-08 | End: 2018-03-10

## 2018-03-08 RX ADMIN — Medication 166.67 MILLIGRAM(S): at 01:41

## 2018-03-08 RX ADMIN — PAMIDRONATE DISODIUM 65 MILLIGRAM(S): 9 INJECTION, SOLUTION INTRAVENOUS at 06:26

## 2018-03-08 RX ADMIN — Medication 1 MILLIGRAM(S): at 11:37

## 2018-03-08 RX ADMIN — Medication 4 MILLIGRAM(S): at 06:26

## 2018-03-08 RX ADMIN — Medication 2: at 17:55

## 2018-03-08 RX ADMIN — Medication 100 MILLIGRAM(S): at 18:29

## 2018-03-08 RX ADMIN — CEFEPIME 100 MILLIGRAM(S): 1 INJECTION, POWDER, FOR SOLUTION INTRAMUSCULAR; INTRAVENOUS at 02:15

## 2018-03-08 RX ADMIN — Medication 500000 UNIT(S): at 11:37

## 2018-03-08 RX ADMIN — Medication 4: at 09:40

## 2018-03-08 RX ADMIN — ENOXAPARIN SODIUM 40 MILLIGRAM(S): 100 INJECTION SUBCUTANEOUS at 11:36

## 2018-03-08 RX ADMIN — Medication 8: at 13:11

## 2018-03-08 RX ADMIN — CEFEPIME 100 MILLIGRAM(S): 1 INJECTION, POWDER, FOR SOLUTION INTRAMUSCULAR; INTRAVENOUS at 11:32

## 2018-03-08 RX ADMIN — Medication 500000 UNIT(S): at 18:29

## 2018-03-08 RX ADMIN — Medication 4 MILLIGRAM(S): at 18:29

## 2018-03-08 RX ADMIN — Medication 2: at 22:38

## 2018-03-08 RX ADMIN — POLYETHYLENE GLYCOL 3350 17 GRAM(S): 17 POWDER, FOR SOLUTION ORAL at 13:11

## 2018-03-08 RX ADMIN — Medication 500000 UNIT(S): at 06:26

## 2018-03-08 NOTE — PROGRESS NOTE ADULT - SUBJECTIVE AND OBJECTIVE BOX
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 Daughter at bedside reports that after chemo session on Friday patient had severe abdominal pain but self resolved and was able to tolerate some PO. However since then has had increasing confusion, not oriented to place or time intermittently. Thought this may be due to dehydration. Today patient experienced severe 10/10 epigastric non radiating abdominal pain that caused patient to yell out in pain that lasted for few hours. Resolved by the time she came to ED. Also with increased cough with whitish sputum. Daughter notes that overall breathing has improved since bronchial stent placed in january, but today was noted to be slightly more tachypneic. Has been complaint with meds and still taking decadron. Denies fever, chills, n/v, headache, changes in vision, sore throat, dysphagia, chest pain, dysuria, changes in urinary habits, leg swelling, or paresthesias.     In the ED, Tmax 98.7, -130/74-77, HR , RR 16-18, O2 98% RA. Received vancomycin 1250mg, aztreonam 1g, morphine 4mg IV, zofran 4mg, and 2L NS. ICU consulted for tachycardia and tachypnea, deemed not appropriate candidate at this time. (07 Mar 2018 03:08)    SUBJECTIVE: Saw and evaluated Mrs Nugent at bedside. Present at bedside is the patient's daughter, sister, and friend. We discussed her symptoms since yesterday and she states her pain has improved, appetite remains good, insomnia was still an issue 2/2 constant nighttime interruptions, and denied any N/V or BM. She has not used any opioids in the last 24h, but has had Dexa 4mg PO q6 regimen increased. The patient and daughter are looking forward to having the new metastatic lesions treated with RT. They state that last time she spent 3 weeks suffering with her prior rib lesion before getting RT that ultimately took care of her pain. They state that since she is not due to receive chemo for up to 3 more weeks then they want to have the RT in the meantime. The daughter is also concerned about the patient's uncontrolled glucose from the steroids. This was another reason for the pursuit of radiation in order to be able to wean down the dexamethasone regimen. They requested nutrition/dietician educational session in order to manage this high glucose going forward.     ROS:  DYSPNEA: No	  NAUS/VOM: No	  SECRETIONS: No	  AGITATION: Y / N  Pain (Y/N):  No  -Provocation/Palliation:  -Quality/Quantity:  -Radiating:  -Severity:  -Timing/Frequency:  -Impact on ADLs:    OTHER REVIEW OF SYSTEMS: Insomnia    PEx:  T(C): 37.1 (03-08-18 @ 09:07), Max: 37.2 (03-07-18 @ 16:13)  HR: 94 (03-08-18 @ 09:07) (90 - 96)  BP: 132/68 (03-08-18 @ 09:07) (125/80 - 132/68)  RR: 18 (03-08-18 @ 09:07) (18 - 18)  SpO2: 97% (03-08-18 @ 09:07) (97% - 99%)  Wt(kg): 77    General: AAOx3 Elderly woman in NAD. Stoic  HEENT: PERRL EOMI Dry lips and mucosas  Neck: Supple No masses  CVS: RRR S1S2  Resp: Unlabored  GI: Soft ND NT BS+   :  Normal  Musc: No C/C/E    Neuro: Following commands. No focal deficits  Psych: Stoic  Skin: Xerosis  Lymph: Normal     	   ALLERGIES: penicillins (Hives)    OPIATE NAÏVE (Y/N): No    MEDICATIONS: REVIEWED  MEDICATIONS  (STANDING):  dexamethasone     Tablet 4 milliGRAM(s) Oral every 6 hours  dextrose 5%. 1000 milliLiter(s) (50 mL/Hr) IV Continuous <Continuous>  dextrose 50% Injectable 12.5 Gram(s) IV Push once  dextrose 50% Injectable 25 Gram(s) IV Push once  dextrose 50% Injectable 25 Gram(s) IV Push once  diphenhydrAMINE   Capsule 25 milliGRAM(s) Oral at bedtime  docusate sodium 100 milliGRAM(s) Oral two times a day  enoxaparin Injectable 40 milliGRAM(s) SubCutaneous daily  folic acid 1 milliGRAM(s) Oral daily  insulin lispro (HumaLOG) corrective regimen sliding scale   SubCutaneous Before meals and at bedtime  lidocaine   Patch 1 Patch Transdermal daily  nystatin    Suspension 925297 Unit(s) Oral four times a day  pantoprazole    Tablet 40 milliGRAM(s) Oral before breakfast  polyethylene glycol 3350 17 Gram(s) Oral daily  sodium chloride 0.9%. 1000 milliLiter(s) (100 mL/Hr) IV Continuous <Continuous>    MEDICATIONS  (PRN):  dextrose Gel 1 Dose(s) Oral once PRN Blood Glucose LESS THAN 70 milliGRAM(s)/deciliter  glucagon  Injectable 1 milliGRAM(s) IntraMuscular once PRN Glucose LESS THAN 70 milligrams/deciliter  morphine  - Injectable 2 milliGRAM(s) IV Push every 4 hours PRN Moderate Pain (4 - 6)  oxyCODONE    IR 5 milliGRAM(s) Oral every 4 hours PRN Severe Pain (7 - 10)    LABS: REVIEWED                        7.3    8.0   )-----------( 355      ( 08 Mar 2018 15:09 )             23.8   03-08    133<L>  |  96  |  18  ----------------------------<  221<H>  3.9   |  25  |  0.57    Ca    9.1      08 Mar 2018 06:20  Phos  3.1     03-07  Mg     1.8     03-08    TPro  6.9  /  Alb  2.7<L>  /  TBili  0.7  /  DBili  x   /  AST  114<H>  /  ALT  230<H>  /  AlkPhos  217<H>  03-07    Lipase, Serum: 31 U/L (03.08.18 @ 06:20)    Vancomycin Level, Trough: 26ug/mL (03.08.18 @ 06:20)    Direct Yamileth Profile (03.07.18 @ 07:52)    Direct Yamileth Poly: Negative    Hemoglobin A1C, Whole Blood: 6.8 % (03.08.18 @ 06:18)    Antibody Identification (03.07.18 @ 07:31)    Antibody ID 1_1: Anti-C    Antibody ID 1_2: Anti-D    Antibody ID 1_3: Anti-E    Lactate, Blood: 1.3 mmoL/L (03.07.18 @ 00:20)  Lactate, Blood: 3.1 mmoL/L (03.06.18 @ 21:51)    IMAGING: REVIEWED    Advanced Directives:     DNR/DNI    Decision maker: The patient is able to participate in complex medical decision making conversations.   Legal surrogate: Naomi Yeh (daughter) [phone] and Fco Casey () [phone]    GOALS OF CARE DISCUSSION       Palliative care info/counseling provided	           Family meeting at bedside          See previous Palliative Medicine Note       Documentation of GOC: DNR DNI          REFERRALS	        Unit SW/Case Mgmt       Radiation oncology       Nutritionist/Dietician

## 2018-03-08 NOTE — PROGRESS NOTE ADULT - PROBLEM SELECTOR PLAN 2
[facility] regarding plan of care. The patient and daughter would like to continue cancer targeted treatments as well as treatments of her symptoms.  Management as per Eda.

## 2018-03-08 NOTE — PROGRESS NOTE ADULT - PROBLEM SELECTOR PLAN 1
Recent CT showed disease progression, with metastatic involvement of the iliac and L4. Lung cancer (stage IV) mets to brain and bone (s/p RT and gamma knife) and currently on chemo.   She is admitted this time with AMS that has resolved, from toxic metabolic Vs from brain mets, steroid related. She was also dehydrated and appera malnourished since last time.  Patient has no significant fever and her WBC has improved to normal(? steroid related, related to dehydration too. Her chest CT ,though showed disease priogression ,it appear patent and the previous infiltrate/consolidation resolving. Doubt that she has an infection in the lung this time. We don't think she needs antibiotics or intervention pulmonary intervention at this time. You may check procalcitonin. If she spikes fever and WBC increases plan septic work up.     - Recent CT showed disease progression, with metastatic involvement of the iliac and L4. Lung cancer (stage IV) mets to brain and bone (s/p RT and gamma knife) and currently on chemo.   She is admitted this time with AMS that has resolved, from toxic metabolic Vs from brain mets, steroid related. She was also dehydrated and appera malnourished since last time.  Patient has no significant fever and her WBC has improved to normal(? steroid related, related to dehydration too. Her chest CT ,though showed disease priogression ,it appear patent and the previous infiltrate/consolidation resolving. Doubt that she has an infection in the lung this time. We don't think she needs antibiotics or intervention pulmonary intervention at this time. You may check procalcitonin. If she spikes fever and WBC increases plan septic work up.   -Follow up with Dr. Xiong as an outpatient. Recent CT showed disease progression, with metastatic involvement of the iliac and L4. Lung cancer (stage IV) mets to brain and bone (s/p RT and gamma knife) and currently on chemo/Immunotherapy.   She is admitted this time with AMS that has resolved, from toxic metabolic Vs from brain mets, steroid related. She was also dehydrated and appear malnourished since last time.  Patient has no significant fever and her WBC has improved to normal(? steroid related, related to dehydration too. Her chest CT ,though showed disease progression ,it appear patent and the previous infiltrate/consolidation resolving. Doubt that she has an infection in the lung this time. We don't think she needs antibiotics or intervention pulmonary intervention at this time. You may check procalcitonin. If she spikes fever and WBC increases,  plan further septic work up.   Further Immunotherapy/chemo trial per Oncology.  -Follow up with Dr. Xiong as an outpatient.

## 2018-03-08 NOTE — PROGRESS NOTE ADULT - PROBLEM SELECTOR PLAN 3
- patient presenting with severe cramping abdominal pain, now resolved - unclear etiology   - CT imaging without any focal findings in abdomen although does show evidence of new lytic lesions of right iliac crest and L4 veretebral body - may be etiology of pain   - improved with morphine 4mg IV x1 - can give additional pain as needed Pt w/ baseline Hgb 7-8, likely related to chronic disease. No signs/sx of active bleeding at this time and vital signs remain stable.   -- continue to monitor  -- trend h/h  -- active T&S   -- f/u H/O recs; will send B12, folate, iron panel, retic count

## 2018-03-08 NOTE — PROGRESS NOTE ADULT - PROBLEM SELECTOR PLAN 1
Pt met SIRS criteria on admission based on WBC 22.6 (PMNs 91) + tachycardia up to  with some concern for possible PNA in setting of increased cough with whitish sputum and some changes on chest scan.   - patient with recent admission in Feb with RVP and right sided PNA - finished course of levaquin and flagyl - CT shows improvement of RLL PNA but with some concern on admission of RUL PNA  - Pt treated initially with vancomycin 1250mg q12hr and aztreonam 2mg q8hr (has anaphylaxis reaction to PCN) for PNA with improvement  - pulm consulted,   - f/u sputum culture and blood culture Pt met SIRS criteria on admission based on WBC 22.6 (PMNs 91) + tachycardia up to  with some concern for possible PNA in setting of increased cough with whitish sputum and some changes on chest scan.   - patient with recent admission in Feb with RVP and right sided PNA - finished course of levaquin and flagyl - CT shows improvement of RLL PNA but with some concern on admission of RUL PNA  - Pt treated initially with vancomycin 1250mg q12hr and aztreonam 2mg q8hr (has anaphylaxis reaction to PCN) for PNA with improvement  - pulm consulted, as Ms. Bhatti is a patient of Dr. Grossman. Upon review of imaging, pulm do not feel that this is pneumonia (WBC may be related to steroids or underlying cancer) and no need for further abx. Will d/c abx and monitor. If she spikes fever and WBC increases, will do full sepsis w/u.  - f/u sputum culture and blood culture

## 2018-03-08 NOTE — PROGRESS NOTE ADULT - PROBLEM SELECTOR PLAN 10
FEN - regular diet, replete lytes, NS at 100cc/hr  Status - DNR/DNI  Dispo - admit to [facility] F: NS @ 100cc/hr maintenance, d/c when tolerating full PO diet  E: monitor lytes & replete  N: regular diet as tolerated  Status - DNR/DNI  Dispo - [facility] pending clinical improvement

## 2018-03-08 NOTE — PROGRESS NOTE ADULT - PROBLEM SELECTOR PLAN 1
Patient on increased Dexamethasone schedule to 4mg PO q6h. The patient and family is requesting an evaluation by radiation oncology for treatment of those lesions found yesterday on her ct scans. Patient has not required any opioids in the last 24h. Received Lidoderm patch and Pamidronate dose.  Continue current regimen and bowel regimen if anticipate would need opioid escalation.  Consider for discharge: Oxycodone IR 5mg PO q4h PRN breakthrough pain.

## 2018-03-08 NOTE — PROGRESS NOTE ADULT - ASSESSMENT
62 year old woman with Stage IV lung adenocarcinoma (mets to brain (s/p gamma knife) and bone (RT) and recent admission for pneumonia admitted for AMS, weakness, dehydration and CT showed disease progression.

## 2018-03-08 NOTE — PROGRESS NOTE ADULT - PROBLEM SELECTOR PLAN 5
Support provided to patient and family. Patient to have access to supportive services during rest of hospital stay as the patient/family deemed necessary ie. Chaplaincy, Massage therapy, Music therapy, Patient and family supportive services, Palliative SW, etc.    As discussed during the palliative IDT meeting and as identified during the patients PSSA screening the patient would benefit from patient and family support, nutritionist, and dietician.

## 2018-03-08 NOTE — PROGRESS NOTE ADULT - PROBLEM SELECTOR PLAN 4
- history of NSCLC diagnosed in Dec 2017 with brain and bone mets (right 8th rib, right iliac crest and L4) - s/p gamma knife radiation in Jan 2018 and currently s/p 2 rounds chemo (most recently 3/2) and few rounds of chemo   - as per patient, wishes to continue current treatment and is not amenable to hospice at this time   - will follow up with Dr Noonan in AM  - palliative consult in AM - patient presenting with severe cramping abdominal pain, now resolved - unclear etiology   - CT imaging without any focal findings in abdomen although does show evidence of new lytic lesions of right iliac crest and L4 veretebral body - may be etiology of pain   - has resolved but if pain returns can consider additional morphine as pain responded to this

## 2018-03-08 NOTE — PROGRESS NOTE ADULT - PROBLEM SELECTOR PLAN 2
- daughter reports patient with increasing confusion over past few days and patient also says she feels slightly more confused as well - although currently improving and AOx3   - no focal deficits on exam  - CT head non contrast without any acute changes   - patient with known history of brain mets, s/p gamma knife radiation in Jan 2018 - consider repeat imaging after discussion with Dr Noonan  - c/w decadron 4mg bid - daughter reports patient with increasing confusion over past few days and patient also says she feels slightly more confused as well - although currently improving and AOx3   - no focal deficits on exam  - CT head non contrast without any acute changes   - patient with known history of brain mets, s/p gamma knife radiation in Jan 2018 - awaiting recs from Heme/Onc regarding further imaging as inpatient (Dr. Noonan)  - c/w decadron 4mg bid

## 2018-03-08 NOTE — PROGRESS NOTE ADULT - PROBLEM SELECTOR PLAN 5
see above - history of NSCLC diagnosed in Dec 2017 with brain and bone mets (right 8th rib, right iliac crest and L4) - s/p gamma knife radiation in Jan 2018 and currently s/p 2 rounds chemo (most recently 3/2) and few rounds of chemo   - as per patient, wishes to continue current treatment and is not amenable to hospice at this time   - f/u continued H/O recs (Dr. Noonan)  - f/u palliative recs

## 2018-03-08 NOTE — PROGRESS NOTE ADULT - PROBLEM SELECTOR PLAN 8
- Alc 6.3 in Jan 2018 ; started  ISS in setting of decadron use - A1c currently 6.8  - ISS in setting of decadron use

## 2018-03-08 NOTE — PROGRESS NOTE ADULT - SUBJECTIVE AND OBJECTIVE BOX
OVERNIGHT EVENTS: No overnight events reported by night team.    SUBJECTIVE / INTERVAL HPI: Patient seen and examined at bedside. Pt seems to have a somewhat depressed affect, however she states she is feeling better than when she came into the hospital. States that her breathing feels more comfortable and she no longer feels short of breath. Cough is still present productive of white-flora sputum however improved from presentation. No longer having abdominal pain that she had prior to admission. No chest pain, nausea, vomiting, diarrhea. No dizziness/lightheadedness/fatigue however unable to assess orthostasis as she has not ambulated yet. Has not had BM in 2 days however has not been eating much. Does not take chronic opioids at home. Will try bowel regimen today. Has not been OOB however wants to work w/ PT. ROS otherwise negative.    VITAL SIGNS:  Vital Signs Last 24 Hrs  T(C): 37.1 (08 Mar 2018 09:07), Max: 37.2 (07 Mar 2018 16:13)  T(F): 98.7 (08 Mar 2018 09:07), Max: 99 (07 Mar 2018 16:13)  HR: 94 (08 Mar 2018 09:07) (90 - 96)  BP: 132/68 (08 Mar 2018 09:07) (125/80 - 132/68)  RR: 18 (08 Mar 2018 09:07) (18 - 18)  SpO2: 97% (08 Mar 2018 09:07) (97% - 99%)    PHYSICAL EXAM:  General: WDWN, depressed affect, minimal responses in conversation, no acute distress  HEENT: NC/AT; pupils reactive; NC O2 in place; MMM  Cardiovascular: +S1/S2; RRR; no M/R/G  Respiratory: breathing appears comfortable on NC O2; no retractions or paradoxical breathing; lungs clear to auscultation however there are trace expiratory wheezes in the L upper lung fields  Gastrointestinal: soft, NT/ND; +BS; no ecchymoses  Extremities: WWP; no edema  Vascular: 2+ radial pulses b/l  Neurological: AAOx3; no gross focal deficits    MEDICATIONS:  MEDICATIONS  (STANDING):  dexamethasone     Tablet 4 milliGRAM(s) Oral every 6 hours  dextrose 5%. 1000 milliLiter(s) (50 mL/Hr) IV Continuous <Continuous>  dextrose 50% Injectable 12.5 Gram(s) IV Push once  dextrose 50% Injectable 25 Gram(s) IV Push once  dextrose 50% Injectable 25 Gram(s) IV Push once  diphenhydrAMINE   Capsule 25 milliGRAM(s) Oral at bedtime  docusate sodium 100 milliGRAM(s) Oral two times a day  enoxaparin Injectable 40 milliGRAM(s) SubCutaneous daily  folic acid 1 milliGRAM(s) Oral daily  insulin lispro (HumaLOG) corrective regimen sliding scale   SubCutaneous Before meals and at bedtime  lidocaine   Patch 1 Patch Transdermal daily  nystatin    Suspension 977806 Unit(s) Oral four times a day  pantoprazole    Tablet 40 milliGRAM(s) Oral before breakfast  polyethylene glycol 3350 17 Gram(s) Oral daily  sodium chloride 0.9%. 1000 milliLiter(s) (100 mL/Hr) IV Continuous <Continuous>    MEDICATIONS  (PRN):  dextrose Gel 1 Dose(s) Oral once PRN Blood Glucose LESS THAN 70 milliGRAM(s)/deciliter  glucagon  Injectable 1 milliGRAM(s) IntraMuscular once PRN Glucose LESS THAN 70 milligrams/deciliter  morphine  - Injectable 2 milliGRAM(s) IV Push every 4 hours PRN Moderate Pain (4 - 6)  oxyCODONE    IR 5 milliGRAM(s) Oral every 4 hours PRN Severe Pain (7 - 10)      ALLERGIES:  Allergies    penicillins (Hives)    Intolerances        LABS:                        7.0    9.0   )-----------( 364      ( 08 Mar 2018 06:18 )             22.8     03-08    133<L>  |  96  |  18  ----------------------------<  221<H>  3.9   |  25  |  0.57    Ca    9.1      08 Mar 2018 06:20  Phos  3.1     03-07  Mg     1.8     03-08    TPro  6.9  /  Alb  2.7<L>  /  TBili  0.7  /  DBili  x   /  AST  114<H>  /  ALT  230<H>  /  AlkPhos  217<H>  03-07    PT/INR - ( 07 Mar 2018 06:06 )   PT: 15.2 sec;   INR: 1.36          PTT - ( 07 Mar 2018 06:06 )  PTT:25.2 sec  Urinalysis Basic - ( 07 Mar 2018 00:33 )    Color: Yellow / Appearance: Clear / S.010 / pH: x  Gluc: x / Ketone: NEGATIVE  / Bili: Negative / Urobili: 0.2 E.U./dL   Blood: x / Protein: Trace mg/dL / Nitrite: NEGATIVE   Leuk Esterase: NEGATIVE / RBC: < 5 /HPF / WBC < 5 /HPF   Sq Epi: x / Non Sq Epi: 5-10 /HPF / Bacteria: Present /HPF      CAPILLARY BLOOD GLUCOSE      POCT Blood Glucose.: 226 mg/dL (08 Mar 2018 08:47)      RADIOLOGY & ADDITIONAL TESTS: Reviewed.

## 2018-03-08 NOTE — PROGRESS NOTE ADULT - ASSESSMENT
62F PMH recently diagnosed NSCL adenocarcinoma with brain and bone mets (s/p gamma knife 1/19/18 and currently receiving chemo and radiation), on home O2 (2L), HTN, DM2 (not on insulin) presents from home with abdominal pain and increasing confusion; found to have new lytic lesions in right iliac crest and L4 with questionable PNA.

## 2018-03-08 NOTE — PROGRESS NOTE ADULT - SUBJECTIVE AND OBJECTIVE BOX
62 year old woman with Stage IV lung adenocarcinoma (mets to brain (s/p gamma knife) and bone (RT), now on chemo and recent admission for pneumonia admitted for AMS, weakness, dehydration and CT showed disease progression.    Interval Events:  Patient seen and examined at bedside. She denies SOB, chest pain or palpitation. She has cough with whitish sputum production. No change in sputum color and amount. She has no fever or chills. Feels weak and has lost weight. No urinary or bowel symptoms.    REVIEW OF SYSTEMS:  Constitutional: No fever. She has weight loss or fatigue  Respiratory: As per subjective  Cardiovascular: No chest pain, palpitations, dizziness or leg swelling  Gastrointestinal: No abdominal or epigastric pain. No nausea, vomiting or hematemesis; No diarrhea or constipation. No melena or hematochezia.  Neurological: No headaches, memory loss, loss of strength, numbness or tremors  Skin: No itching, burning, rashes or lesions     MEDICATIONS:  Pulmonary:    Antimicrobials:  cefepime  IVPB 2000 milliGRAM(s) IV Intermittent every 8 hours  nystatin    Suspension 307643 Unit(s) Oral four times a day    Anticoagulants:  enoxaparin Injectable 40 milliGRAM(s) SubCutaneous daily    Allergies    penicillins (Hives)    Intolerances    Vital Signs Last 24 Hrs  T(C): 37.1 (08 Mar 2018 09:07), Max: 37.2 (07 Mar 2018 16:13)  T(F): 98.7 (08 Mar 2018 09:07), Max: 99 (07 Mar 2018 16:13)  HR: 94 (08 Mar 2018 09:07) (90 - 108)  BP: 132/68 (08 Mar 2018 09:07) (120/79 - 132/68)  BP(mean): --  RR: 18 (08 Mar 2018 09:07) (18 - 18)  SpO2: 97% (08 Mar 2018 09:07) (97% - 99%)    -07 @ 07:01  -  08 @ 07:00  --------------------------------------------------------  IN: 800 mL / OUT: 0 mL / NET: 800 mL      PHYSICAL EXAM:    General: Well developed; well nourished; in no acute distress  Eyes: PERRL, EOM intact; conjunctiva and sclera clear  ENMT: No nasal discharge; airway clear  Neck: Supple; non tender; no masses  Respiratory: Decreased breath sounds in right basal field, but no wheezing, rhonchi or rales  Cardiovascular: Regular rate and rhythm. S1 and S2 Normal; No murmurs, gallops or rubs  Gastrointestinal: Soft non-tender non-distended; Normal bowel sounds  Extremities: Normal range of motion, No clubbing, cyanosis or edema  Neurological: Alert and oriented x3  Skin: Warm and dry. No obvious rash    LABS:      CBC Full  -  ( 08 Mar 2018 06:18 )  WBC Count : 9.0 K/uL  Hemoglobin : 7.0 g/dL  Hematocrit : 22.8 %  Platelet Count - Automated : 364 K/uL  Mean Cell Volume : 80.3 fL  Mean Cell Hemoglobin : 24.6 pg  Mean Cell Hemoglobin Concentration : 30.7 g/dL      03-08    133<L>  |  96  |  18  ----------------------------<  221<H>  3.9   |  25  |  0.57    Ca    9.1      08 Mar 2018 06:20  Phos  3.1     03-07  Mg     1.8     -08    TPro  6.9  /  Alb  2.7<L>  /  TBili  0.7  /  DBili  x   /  AST  114<H>  /  ALT  230<H>  /  AlkPhos  217<H>  03-07    PT/INR - ( 07 Mar 2018 06:06 )   PT: 15.2 sec;   INR: 1.36          PTT - ( 07 Mar 2018 06:06 )  PTT:25.2 sec      Urinalysis Basic - ( 07 Mar 2018 00:33 )    Color: Yellow / Appearance: Clear / S.010 / pH: x  Gluc: x / Ketone: NEGATIVE  / Bili: Negative / Urobili: 0.2 E.U./dL   Blood: x / Protein: Trace mg/dL / Nitrite: NEGATIVE   Leuk Esterase: NEGATIVE / RBC: < 5 /HPF / WBC < 5 /HPF   Sq Epi: x / Non Sq Epi: 5-10 /HPF / Bacteria: Present /HPF      RADIOLOGY & ADDITIONAL STUDIES (The following images were personally reviewed):< from: CT Chest w/ IV Cont (03.06.18 @ 23:47) >  IMPRESSION:    1. No definite change in size of a large enhancing, necrotic right   paratracheal mass given differences in technique. No definite change in a   heterogeneously enhancing right hilar mass given differences in   technique. Again noted is a pleural/subpleural mass in the posterior   right lung base. These findings are consistent with known lung cancer.  2. Right suprahilar mass, right superior hilar mass, and satellite   nodules within the mediastinum most consistent with metastatic disease.  3. Interval resolution of right lower lobe consolidation and right   pleural effusion.  4. Small area of reticular opacification in the right upper lobe may   represent a residual inflammatory or infectious process.      < end of copied text >

## 2018-03-08 NOTE — PROGRESS NOTE ADULT - SUBJECTIVE AND OBJECTIVE BOX
Heme/Onc Progress Note (Dr. Noonan)       Interval History: Patient fatigued this morning. Patient states she has fatigue with poor appetite at this time. Pain is controlled at this time. Patient denies bleeding or bruising including brbpr, melena or hematuria. Patient denies acute chest pain, breathing is stable on 2L O2, no fevers or chills.     ROS is otherwise negative.      Allergies    penicillins (Hives)    Intolerances        Medications:  MEDICATIONS  (STANDING):  cefepime  IVPB 2000 milliGRAM(s) IV Intermittent every 8 hours  dexamethasone     Tablet 4 milliGRAM(s) Oral every 6 hours  dextrose 5%. 1000 milliLiter(s) (50 mL/Hr) IV Continuous <Continuous>  dextrose 50% Injectable 12.5 Gram(s) IV Push once  dextrose 50% Injectable 25 Gram(s) IV Push once  dextrose 50% Injectable 25 Gram(s) IV Push once  diphenhydrAMINE   Capsule 25 milliGRAM(s) Oral at bedtime  docusate sodium 100 milliGRAM(s) Oral two times a day  enoxaparin Injectable 40 milliGRAM(s) SubCutaneous daily  folic acid 1 milliGRAM(s) Oral daily  insulin lispro (HumaLOG) corrective regimen sliding scale   SubCutaneous Before meals and at bedtime  lidocaine   Patch 1 Patch Transdermal daily  nystatin    Suspension 753608 Unit(s) Oral four times a day  pantoprazole    Tablet 40 milliGRAM(s) Oral before breakfast  polyethylene glycol 3350 17 Gram(s) Oral daily  sodium chloride 0.9%. 1000 milliLiter(s) (100 mL/Hr) IV Continuous <Continuous>    MEDICATIONS  (PRN):  dextrose Gel 1 Dose(s) Oral once PRN Blood Glucose LESS THAN 70 milliGRAM(s)/deciliter  glucagon  Injectable 1 milliGRAM(s) IntraMuscular once PRN Glucose LESS THAN 70 milligrams/deciliter  morphine  - Injectable 2 milliGRAM(s) IV Push every 4 hours PRN Moderate Pain (4 - 6)  oxyCODONE    IR 5 milliGRAM(s) Oral every 4 hours PRN Severe Pain (7 - 10)    enoxaparin Injectable 40 milliGRAM(s) SubCutaneous daily          PHYSICAL EXAM:    T(F): 98.7 (18 @ 09:07), Max: 99 (18 @ 16:13)  HR: 94 (18 @ 09:07) (90 - 108)  BP: 132/68 (18 @ 09:07) (120/79 - 132/68)  RR: 18 (18 @ 09:07) (18 - 18)  SpO2: 97% (18 @ 09:07) (97% - 99%)  Wt(kg): --    Daily Height in cm: 162.56 (07 Mar 2018 16:13)    Daily Weight in k (07 Mar 2018 16:13)    Constitutional: sleepy appearing pleasant female; resting comfortably in bed; NAD  	Eyes: PERRL, EOMI, clear conjunctiva  	ENT: no nasal discharge; uvula midline, dry mucous membranes; (+) thrush   	Neck: supple; no JVD or thyromegaly  	Respiratory: decreased BS throughout right side; no wheezing   	Cardiac: +S1/S2; tachycardiac, regular, no murmurs  	Gastrointestinal: soft, NT/ND; no rebound or guarding; +BSx4  	Back: no CVA tenderness; point tenderness in low lumber spine around area of L4  	Extremities: WWP, no clubbing or cyanosis; no peripheral edema  	Musculoskeletal: NROM x4; no joint swelling, tenderness or erythema  	Vascular: 2+ radial, femoral, DP/PT pulses B/L  	Dermatologic: skin warm, dry and intact; no rashes, wounds, or scars  Neurologic: AAOx3; CNII-XII grossly intact; no focal deficits    Labs:                          7.0    9.0   )-----------( 364      ( 08 Mar 2018 06:18 )             22.8     CBC Full  -  ( 08 Mar 2018 06:18 )  WBC Count : 9.0 K/uL  Hemoglobin : 7.0 g/dL  Hematocrit : 22.8 %  Platelet Count - Automated : 364 K/uL  Mean Cell Volume : 80.3 fL  Mean Cell Hemoglobin : 24.6 pg  Mean Cell Hemoglobin Concentration : 30.7 g/dL  Auto Neutrophil # : x  Auto Lymphocyte # : x  Auto Monocyte # : x  Auto Eosinophil # : x  Auto Basophil # : x  Auto Neutrophil % : x  Auto Lymphocyte % : x  Auto Monocyte % : x  Auto Eosinophil % : x  Auto Basophil % : x    PT/INR - ( 07 Mar 2018 06:06 )   PT: 15.2 sec;   INR: 1.36          PTT - ( 07 Mar 2018 06:06 )  PTT:25.2 sec    03-08    133<L>  |  96  |  18  ----------------------------<  221<H>  3.9   |  25  |  0.57    Ca    9.1      08 Mar 2018 06:20  Phos  3.1       Mg     1.8         TPro  6.9  /  Alb  2.7<L>  /  TBili  0.7  /  DBili  x   /  AST  114<H>  /  ALT  230<H>  /  AlkPhos  217<H>        Urinalysis Basic - ( 07 Mar 2018 00:33 )    Color: Yellow / Appearance: Clear / S.010 / pH: x  Gluc: x / Ketone: NEGATIVE  / Bili: Negative / Urobili: 0.2 E.U./dL   Blood: x / Protein: Trace mg/dL / Nitrite: NEGATIVE   Leuk Esterase: NEGATIVE / RBC: < 5 /HPF / WBC < 5 /HPF   Sq Epi: x / Non Sq Epi: 5-10 /HPF / Bacteria: Present /HPF    Labs, Radiology, Cardiology, and Other Results:              RADIOLOGY, EKG & ADDITIONAL TESTS:   CT head: < from: CT Head No Cont (18 @ 23:47) >   No hydrocephalus, midline shift, acute intracranial hemorrhage or   demarcated territorial infarct.   < end of copied text >   CT Chest: < from: CT Chest w/ IV Cont (18 @ 23:47) >   1. No definite change in size of a large enhancing, necrotic right   paratracheal mass given differences in technique. No definite change in a   heterogeneously enhancing right hilar mass given differences in   technique. Again noted is a pleural/subpleural mass in the posterior   right lung base. These findings are consistent with known lung cancer.  2. Right suprahilar mass, right superior hilar mass, and satellite   nodules within the mediastinum most consistent with metastatic disease.  3. Interval resolution of right lower lobe consolidation and right   pleural effusion.  4. Small area of reticular opacification in the right upper lobe may   represent a residual inflammatory or infectious process.   < end of copied text >   CT Abdomen/Pelvis: < from: CT Abdomen and Pelvis w/ IV Cont (18 @ 23:47) >   New lytic lesions in the right iliac wing and vertebral body L4   consistent with metastatic disease. Transitional vertebra labeled L6 for   the purposes of this study.   Right lung masses consistent with known lung malignancy.   A 1.6 cm nodule in the left adrenal gland, unchanged since 2017.   < end of copied text >

## 2018-03-08 NOTE — PROGRESS NOTE ADULT - ASSESSMENT
62 year old F with recently diagnosed Stage IV Lung adenocarcinoma (PDL-1 - 40%) on Carbo/Alimta/Keytruda s/p XRT to ribs, brain admitted for abdominal pain and mental status changes. She was found to have a RUL PNA being treated w/ Vanco/Aztreonam. Pt is currently hemodynamically stable.    #Stage IV Lung Adenocarcinoma  s/p XRT to ribs and brain. She is currently on cycle 2 of Carbo/Alimta/Keytruda which she received on 3/2. CT imaging shows progressive disease with new lesion in right illiac crest and L4 spine.      -c/w IV antibiotics for RUL PNA  -Given adequate pain control on IV morphine and PO oxycodone as needed, we will hold off on consultation with Rad Onc for palliative XRT to right hip and L4 lesion  -Lung masses and adrenal gland mass unchanged, new disease noted in right hip and L4. Will have discussion as outpatient about potentially switching to second line therapy w/ checkpoint inhibitor such as Nivolumab or Atezolizumab  or systemic therapy with Ramicurumab + Docetaxel given progression of disease.    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 Bilirubin within normal limits. Monitor LFT's daily   -If LFT's continue to rise, will start her on steroid regimen per immunotherapy toxicity recommendations.     Case discussed w/ Dr. Noonan 62 year old F with recently diagnosed Stage IV Lung adenocarcinoma (PDL-1 - 40%) on Carbo/Alimta/Keytruda s/p XRT to ribs, brain admitted for abdominal pain and mental status changes. She was found to have a RUL PNA being treated w/ Vanco/Aztreonam. Pt is currently hemodynamically stable.    #Stage IV Lung Adenocarcinoma  s/p XRT to ribs and brain. She is currently on cycle 2 of Carbo/Alimta/Keytruda which she received on 3/2. CT imaging shows progressive disease with new lesion in right illiac crest and L4 spine.      -c/w IV antibiotics for RUL PNA  -Given adequate pain control on IV morphine and PO oxycodone as needed, we will hold off on consultation with Rad Onc for palliative XRT to right hip and L4 lesion  -Lung masses and adrenal gland mass unchanged, new disease noted in right hip and L4. Will have discussion as outpatient about potentially switching to second line therapy w/ checkpoint inhibitor such as Nivolumab or Atezolizumab  or systemic therapy with Ramicurumab + Docetaxel given progression of disease.    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 Bilirubin within normal limits. Monitor LFT's daily   -If LFT's continue to rise, will start her on steroid regimen per immunotherapy toxicity recommendations.     #Normocytic anemia   History of anemia of chronic disease, related to infection, in addition anemia could be 2/2 cytotoxic chemo as well as nutritional     -Reticulocyte count  -B12, folate, iron panel  -consider FOBT    Case discussed w/ Dr. Noonan 62 year old F with recently diagnosed Stage IV Lung adenocarcinoma (PDL-1 - 40%) on Carbo/Alimta/Keytruda s/p XRT to ribs, brain admitted for abdominal pain and mental status changes. She was found to have a RUL PNA being treated w/ Vanco/Aztreonam. Pt is currently hemodynamically stable.    #Stage IV Lung Adenocarcinoma  s/p XRT to ribs and brain. She is currently on cycle 2 of Carbo/Alimta/Keytruda which she received on 3/2. CT imaging shows progressive disease with new lesion in right illiac crest and L4 spine.      -c/w IV antibiotics for RUL PNA  -Given adequate pain control on IV morphine and PO oxycodone as needed, we will hold off on consultation with Rad Onc for palliative XRT to right hip and L4 lesion  -Lung masses and adrenal gland mass unchanged, new disease noted in right hip and L4. Will have discussion as outpatient about potentially switching to second line therapy w/ checkpoint inhibitor such as Nivolumab or Atezolizumab  or systemic therapy with Ramicurumab + Docetaxel given progression of disease.  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 Bilirubin within normal limits. Monitor LFT's daily   -If LFT's continue to rise, will start her on steroid regimen per immunotherapy toxicity recommendations.     #Normocytic anemia   History of anemia of chronic disease, related to infection, in addition anemia could be 2/2 cytotoxic chemo as well as nutritional     -Reticulocyte count  -B12, folate, iron panel  -consider FOBT    Case discussed w/ Dr. Noonan

## 2018-03-08 NOTE — PROGRESS NOTE ADULT - PROBLEM SELECTOR PLAN 9
DVT ppx - Lovenox 40mg qd  GI ppx - PPI while on decadron DVT ppx - Lovenox 40mg qd as pt with active malignancy significant risk for clot and no suspicion for active bleeding at this time given VSS and no exam findings/signs or sx c/w active bleed  GI ppx - PPI while on Decadron

## 2018-03-08 NOTE — PROGRESS NOTE ADULT - ASSESSMENT
62 year old female with stage 4 NSCL adenocarcinoma and POD related pain syndrome currently uncontrolled. 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 Consult for pain management. Patient due to continue cancer targeted treatments.

## 2018-03-09 ENCOUNTER — TRANSCRIPTION ENCOUNTER (OUTPATIENT)
Age: 63
End: 2018-03-09

## 2018-03-09 DIAGNOSIS — E87.1 HYPO-OSMOLALITY AND HYPONATREMIA: ICD-10-CM

## 2018-03-09 DIAGNOSIS — R73.9 HYPERGLYCEMIA, UNSPECIFIED: ICD-10-CM

## 2018-03-09 DIAGNOSIS — R65.10 SYSTEMIC INFLAMMATORY RESPONSE SYNDROME (SIRS) OF NON-INFECTIOUS ORIGIN WITHOUT ACUTE ORGAN DYSFUNCTION: ICD-10-CM

## 2018-03-09 DIAGNOSIS — D63.0 ANEMIA IN NEOPLASTIC DISEASE: ICD-10-CM

## 2018-03-09 DIAGNOSIS — R10.30 LOWER ABDOMINAL PAIN, UNSPECIFIED: ICD-10-CM

## 2018-03-09 PROBLEM — C79.31 SECONDARY MALIGNANT NEOPLASM OF BRAIN: Chronic | Status: ACTIVE | Noted: 2018-03-06

## 2018-03-09 LAB
ALBUMIN SERPL ELPH-MCNC: 2.7 G/DL — LOW (ref 3.3–5)
ALP SERPL-CCNC: 178 U/L — HIGH (ref 40–120)
ALT FLD-CCNC: 131 U/L — HIGH (ref 10–45)
ANION GAP SERPL CALC-SCNC: 15 MMOL/L — SIGNIFICANT CHANGE UP (ref 5–17)
AST SERPL-CCNC: 31 U/L — SIGNIFICANT CHANGE UP (ref 10–40)
BILIRUB SERPL-MCNC: 0.6 MG/DL — SIGNIFICANT CHANGE UP (ref 0.2–1.2)
BUN SERPL-MCNC: 15 MG/DL — SIGNIFICANT CHANGE UP (ref 7–23)
CALCIUM SERPL-MCNC: 8.6 MG/DL — SIGNIFICANT CHANGE UP (ref 8.4–10.5)
CHLORIDE SERPL-SCNC: 90 MMOL/L — LOW (ref 96–108)
CO2 SERPL-SCNC: 23 MMOL/L — SIGNIFICANT CHANGE UP (ref 22–31)
CREAT SERPL-MCNC: 0.54 MG/DL — SIGNIFICANT CHANGE UP (ref 0.5–1.3)
FERRITIN SERPL-MCNC: 6250 NG/ML — HIGH (ref 15–150)
GLUCOSE BLDC GLUCOMTR-MCNC: 140 MG/DL — HIGH (ref 70–99)
GLUCOSE BLDC GLUCOMTR-MCNC: 207 MG/DL — HIGH (ref 70–99)
GLUCOSE BLDC GLUCOMTR-MCNC: 210 MG/DL — HIGH (ref 70–99)
GLUCOSE BLDC GLUCOMTR-MCNC: 270 MG/DL — HIGH (ref 70–99)
GLUCOSE SERPL-MCNC: 293 MG/DL — HIGH (ref 70–99)
HCT VFR BLD CALC: 23.4 % — LOW (ref 34.5–45)
HGB BLD-MCNC: 7.2 G/DL — LOW (ref 11.5–15.5)
IRON SATN MFR SERPL: 51 % — HIGH (ref 14–50)
IRON SATN MFR SERPL: 55 UG/DL — SIGNIFICANT CHANGE UP (ref 30–160)
MAGNESIUM SERPL-MCNC: 1.7 MG/DL — SIGNIFICANT CHANGE UP (ref 1.6–2.6)
MCHC RBC-ENTMCNC: 24.3 PG — LOW (ref 27–34)
MCHC RBC-ENTMCNC: 30.8 G/DL — LOW (ref 32–36)
MCV RBC AUTO: 79.1 FL — LOW (ref 80–100)
OSMOLALITY SERPL: 284 MOSM/KG — SIGNIFICANT CHANGE UP (ref 280–301)
PLATELET # BLD AUTO: 329 K/UL — SIGNIFICANT CHANGE UP (ref 150–400)
POTASSIUM SERPL-MCNC: 3.5 MMOL/L — SIGNIFICANT CHANGE UP (ref 3.5–5.3)
POTASSIUM SERPL-SCNC: 3.5 MMOL/L — SIGNIFICANT CHANGE UP (ref 3.5–5.3)
PROT SERPL-MCNC: 6.9 G/DL — SIGNIFICANT CHANGE UP (ref 6–8.3)
RBC # BLD: 2.96 M/UL — LOW (ref 3.8–5.2)
RBC # FLD: 20.8 % — HIGH (ref 10.3–16.9)
SODIUM SERPL-SCNC: 128 MMOL/L — LOW (ref 135–145)
TIBC SERPL-MCNC: 108 UG/DL — LOW (ref 220–430)
TRANSFERRIN SERPL-MCNC: 86 MG/DL — LOW (ref 200–360)
UIBC SERPL-MCNC: 53 UG/DL — LOW (ref 110–370)
WBC # BLD: 9.6 K/UL — SIGNIFICANT CHANGE UP (ref 3.8–10.5)
WBC # FLD AUTO: 9.6 K/UL — SIGNIFICANT CHANGE UP (ref 3.8–10.5)

## 2018-03-09 PROCEDURE — 99233 SBSQ HOSP IP/OBS HIGH 50: CPT

## 2018-03-09 RX ORDER — NYSTATIN 500MM UNIT
4 POWDER (EA) MISCELLANEOUS
Qty: 250 | Refills: 0 | OUTPATIENT
Start: 2018-03-09 | End: 2018-03-22

## 2018-03-09 RX ORDER — DEXAMETHASONE 0.5 MG/5ML
1 ELIXIR ORAL
Qty: 0 | Refills: 0 | COMMUNITY
Start: 2018-03-09

## 2018-03-09 RX ORDER — DOCUSATE SODIUM 100 MG
1 CAPSULE ORAL
Qty: 0 | Refills: 0 | COMMUNITY
Start: 2018-03-09

## 2018-03-09 RX ORDER — MAGNESIUM SULFATE 500 MG/ML
1 VIAL (ML) INJECTION ONCE
Qty: 0 | Refills: 0 | Status: COMPLETED | OUTPATIENT
Start: 2018-03-09 | End: 2018-03-09

## 2018-03-09 RX ADMIN — SODIUM CHLORIDE 100 MILLILITER(S): 9 INJECTION INTRAMUSCULAR; INTRAVENOUS; SUBCUTANEOUS at 14:08

## 2018-03-09 RX ADMIN — Medication 100 MILLIGRAM(S): at 18:28

## 2018-03-09 RX ADMIN — Medication 500000 UNIT(S): at 06:37

## 2018-03-09 RX ADMIN — Medication 25 MILLIGRAM(S): at 00:59

## 2018-03-09 RX ADMIN — LIDOCAINE 1 PATCH: 4 CREAM TOPICAL at 23:13

## 2018-03-09 RX ADMIN — PANTOPRAZOLE SODIUM 40 MILLIGRAM(S): 20 TABLET, DELAYED RELEASE ORAL at 06:37

## 2018-03-09 RX ADMIN — Medication 100 GRAM(S): at 13:56

## 2018-03-09 RX ADMIN — Medication 500000 UNIT(S): at 18:28

## 2018-03-09 RX ADMIN — Medication 4 MILLIGRAM(S): at 13:56

## 2018-03-09 RX ADMIN — Medication 25 MILLIGRAM(S): at 23:12

## 2018-03-09 RX ADMIN — Medication 4 MILLIGRAM(S): at 23:12

## 2018-03-09 RX ADMIN — Medication 6: at 13:56

## 2018-03-09 RX ADMIN — Medication 1 TABLET(S): at 13:58

## 2018-03-09 RX ADMIN — Medication 1 MILLIGRAM(S): at 13:57

## 2018-03-09 RX ADMIN — Medication 4 MILLIGRAM(S): at 18:28

## 2018-03-09 RX ADMIN — Medication 4 MILLIGRAM(S): at 06:37

## 2018-03-09 RX ADMIN — Medication 500000 UNIT(S): at 00:59

## 2018-03-09 RX ADMIN — POLYETHYLENE GLYCOL 3350 17 GRAM(S): 17 POWDER, FOR SOLUTION ORAL at 13:57

## 2018-03-09 RX ADMIN — ENOXAPARIN SODIUM 40 MILLIGRAM(S): 100 INJECTION SUBCUTANEOUS at 13:57

## 2018-03-09 RX ADMIN — SODIUM CHLORIDE 100 MILLILITER(S): 9 INJECTION INTRAMUSCULAR; INTRAVENOUS; SUBCUTANEOUS at 23:12

## 2018-03-09 RX ADMIN — Medication 100 MILLIGRAM(S): at 06:37

## 2018-03-09 RX ADMIN — Medication 4 MILLIGRAM(S): at 00:59

## 2018-03-09 RX ADMIN — Medication 4: at 18:28

## 2018-03-09 RX ADMIN — Medication 500000 UNIT(S): at 13:57

## 2018-03-09 RX ADMIN — LIDOCAINE 1 PATCH: 4 CREAM TOPICAL at 13:58

## 2018-03-09 RX ADMIN — Medication 500000 UNIT(S): at 23:12

## 2018-03-09 NOTE — PROGRESS NOTE ADULT - PROBLEM SELECTOR PLAN 10
F: NS @ 100cc/hr maintenance, d/c when tolerating full PO diet  E: monitor lytes & replete  N: regular diet as tolerated  Status - DNR/DNI  Dispo - RMF pending clinical improvement

## 2018-03-09 NOTE — DISCHARGE NOTE ADULT - PATIENT PORTAL LINK FT
You can access the [facility] Patient Portal, offered by [facility], by registering with the following website: [email or web address]

## 2018-03-09 NOTE — PROGRESS NOTE ADULT - PROBLEM SELECTOR PLAN 4
- patient presenting with severe cramping abdominal pain, now resolved - unclear etiology   - CT imaging without any focal findings in abdomen although does show evidence of new lytic lesions of right iliac crest and L4 veretebral body - may be etiology of pain   - has resolved but if pain returns can consider additional morphine as pain responded to this  - no palliative radiation at this time as patient's pain has resolved, per H/O

## 2018-03-09 NOTE — DISCHARGE NOTE ADULT - PLAN OF CARE
Resolution of symptoms with outpatient follow up You came to the hospital due to confusion and met criteria for sepsis. You were treated with antibiotics to cover pneumonia as well as IV fluids and your confusion has resolved. If your symptoms recur, please return to the emergency department. Please also follow up with your oncologist for further treatment of your metastatic lesions to your brain. You met sepsis criteria on admission and there was a questionable pneumonia in your right lung. You were treated with antibiotics and improved. Your pulmonologist (Dr. Xiong) evaluated you during your hospitalization and felt given your improvement further antibiotics are not needed. Should you experience any recurrence of your symptoms, please seek medical care. Please follow up with Dr. Noonan (oncology) next week in clinic for further treatment of your cancer. Please continue taking the medications we have indicated on this discharge summary until that time when he may make further changes. Please continue taking the medications indicated above and follow up with your primary care provider. You were found to have thrush (oral fungal infection) during your admission. We have prescribed you nystatin solution which you should rinse your mouth with as prescribed to treat and prevent further infection.

## 2018-03-09 NOTE — DIETITIAN INITIAL EVALUATION ADULT. - PROBLEM SELECTOR PLAN 3
- patient presenting with severe cramping abdominal pain, now resolved - unclear etiology   - CT imaging without any focal findings in abdomen although does show evidence of new lytic lesions of right iliac crest and L4 veretebral body - may be etiology of pain   - improved with morphine 4mg IV x1 - can give additional pain as needed

## 2018-03-09 NOTE — PROGRESS NOTE ADULT - SUBJECTIVE AND OBJECTIVE BOX
Heme/Onc Progress Note (Dr. Noonan)     Interval History: No acute events overnight. Patient admits to improved PO intake and energy level. patient able to work with PT today and still has post chemo fatigue. Patient otherwise admits to improved pain control, denies any acute chest pain, shortness of breath, bowel changes, fevers or chills. Patient denied any bleeding or bruising including brbpr, melena or hematuria.     ROS is otherwise negative.      Allergies    penicillins (Hives)    Intolerances        Medications:  MEDICATIONS  (STANDING):  dexamethasone     Tablet 4 milliGRAM(s) Oral every 6 hours  dextrose 5%. 1000 milliLiter(s) (50 mL/Hr) IV Continuous <Continuous>  dextrose 50% Injectable 12.5 Gram(s) IV Push once  dextrose 50% Injectable 25 Gram(s) IV Push once  dextrose 50% Injectable 25 Gram(s) IV Push once  diphenhydrAMINE   Capsule 25 milliGRAM(s) Oral at bedtime  docusate sodium 100 milliGRAM(s) Oral two times a day  enoxaparin Injectable 40 milliGRAM(s) SubCutaneous daily  folic acid 1 milliGRAM(s) Oral daily  insulin lispro (HumaLOG) corrective regimen sliding scale   SubCutaneous Before meals and at bedtime  lidocaine   Patch 1 Patch Transdermal daily  multivitamin 1 Tablet(s) Oral daily  nystatin    Suspension 066687 Unit(s) Oral four times a day  pantoprazole    Tablet 40 milliGRAM(s) Oral before breakfast  polyethylene glycol 3350 17 Gram(s) Oral daily  sodium chloride 0.9%. 1000 milliLiter(s) (100 mL/Hr) IV Continuous <Continuous>    MEDICATIONS  (PRN):  dextrose Gel 1 Dose(s) Oral once PRN Blood Glucose LESS THAN 70 milliGRAM(s)/deciliter  glucagon  Injectable 1 milliGRAM(s) IntraMuscular once PRN Glucose LESS THAN 70 milligrams/deciliter  morphine  - Injectable 2 milliGRAM(s) IV Push every 4 hours PRN Moderate Pain (4 - 6)  oxyCODONE    IR 5 milliGRAM(s) Oral every 4 hours PRN Severe Pain (7 - 10)  zaleplon 5 milliGRAM(s) Oral at bedtime PRN Insomnia    enoxaparin Injectable 40 milliGRAM(s) SubCutaneous daily          PHYSICAL EXAM:    T(F): 99.3 (18 @ 09:11), Max: 99.3 (18 @ 09:11)  HR: 96 (18 @ 09:11) (58 - 96)  BP: 146/84 (18 @ 09:11) (126/77 - 146/84)  RR: 18 (18 @ 09:11) (18 - 18)  SpO2: 97% (18 @ 06:05) (96% - 98%)  Wt(kg): --    Daily     Daily Weight in k.4 (09 Mar 2018 11:53)    Constitutional: appearing pleasant female; resting comfortably in bed; NAD  	Eyes: PERRL, EOMI, clear conjunctiva  	ENT: no nasal discharge; uvula midline, dry mucous membranes; (+) thrush   	Neck: supple; no JVD or thyromegaly  	Respiratory: decreased BS throughout right side; no wheezing   	Cardiac: +S1/S2; tachycardiac, regular, no murmurs  	Gastrointestinal: soft, NT/ND; no rebound or guarding; +BSx4  	Back: no CVA tenderness; point tenderness in low lumber spine around area of L4  	Extremities: WWP, no clubbing or cyanosis; no peripheral edema  	Musculoskeletal: NROM x4; no joint swelling, tenderness or erythema  	Vascular: 2+ radial, femoral, DP/PT pulses B/L  	Dermatologic: skin warm, dry and intact; no rashes, wounds, or scars  Neurologic: AAOx3; CNII-XII grossly intact; no focal deficits        Labs:                          7.2    9.6   )-----------( 329      ( 09 Mar 2018 12:09 )             23.4     CBC Full  -  ( 09 Mar 2018 12:09 )  WBC Count : 9.6 K/uL  Hemoglobin : 7.2 g/dL  Hematocrit : 23.4 %  Platelet Count - Automated : 329 K/uL  Mean Cell Volume : 79.1 fL  Mean Cell Hemoglobin : 24.3 pg  Mean Cell Hemoglobin Concentration : 30.8 g/dL  Auto Neutrophil # : x  Auto Lymphocyte # : x  Auto Monocyte # : x  Auto Eosinophil # : x  Auto Basophil # : x  Auto Neutrophil % : x  Auto Lymphocyte % : x  Auto Monocyte % : x  Auto Eosinophil % : x  Auto Basophil % : x            128<L>  |  90<L>  |  15  ----------------------------<  293<H>  3.5   |  23  |  0.54    Ca    8.6      09 Mar 2018 12:09  Mg     1.7         TPro  6.9  /  Alb  2.7<L>  /  TBili  0.6  /  DBili  x   /  AST  31  /  ALT  131<H>  /  AlkPhos  178<H>

## 2018-03-09 NOTE — DISCHARGE NOTE ADULT - MEDICATION SUMMARY - MEDICATIONS TO CHANGE
I will SWITCH the dose or number of times a day I take the medications listed below when I get home from the hospital:    dexamethasone 4 mg oral tablet  -- 1 tab(s) by mouth 2 times a day

## 2018-03-09 NOTE — PROGRESS NOTE ADULT - PROBLEM SELECTOR PLAN 9
DVT ppx - Lovenox 40mg qd as pt with active malignancy significant risk for clot and no suspicion for active bleeding at this time given VSS and no exam findings/signs or sx c/w active bleed  GI ppx - PPI while on Decadron

## 2018-03-09 NOTE — DISCHARGE NOTE ADULT - CARE PROVIDER_API CALL
Afshan Noonan), Internal Medicine; Medical Oncology  [address] [place] [address]  Phone: [phone]  Fax: [phone]

## 2018-03-09 NOTE — PHYSICAL THERAPY INITIAL EVALUATION ADULT - ADDITIONAL COMMENTS
Patient lives with spouse in an apartment, no steps to enter. Pt has HHA 4 hours x 4 days/week. Prior to admission, patient ambulated independently with rolling walker

## 2018-03-09 NOTE — PHYSICAL THERAPY INITIAL EVALUATION ADULT - GENERAL OBSERVATIONS, REHAB EVAL
Pt received supine in bed with +IV, +NC~3L, NAD. family present, Pt left OOB sitting in the chair, call bell in reach, family present, RN present. Pt has no complaint this afternoon.

## 2018-03-09 NOTE — DIETITIAN INITIAL EVALUATION ADULT. - ENERGY NEEDS
Height 64"; #; #; 148%IBW  BMI 29.1  Ideal body weight used for calculations as pt >120% of IBW. Needs adjusted for age, hypermetabolic state, weight loss

## 2018-03-09 NOTE — PROGRESS NOTE ADULT - PROBLEM SELECTOR PLAN 1
likely d/t recent chemotx and subsequent poor P.O. intake; mental status has since returned to baseline w/ supportive care; cont. frequent re-orientation

## 2018-03-09 NOTE — DIETITIAN INITIAL EVALUATION ADULT. - PROBLEM SELECTOR PLAN 4
- history of NSCLC diagnosed in Dec 2017 with brain and bone mets (right 8th rib, right iliac crest and L4) - s/p gamma knife radiation in Jan 2018 and currently s/p 2 rounds chemo (most recently 3/2) and few rounds of chemo   - as per patient, wishes to continue current treatment and is not amenable to hospice at this time   - will follow up with Dr Noonan in AM  - palliative consult in AM

## 2018-03-09 NOTE — PROGRESS NOTE ADULT - PROBLEM SELECTOR PLAN 8
chronic, likely SIADH, d/t lung cancer and cancer-related pain; cont. cancer mgmt per Heme-Onc, analgesic regimen per Palliative; monitor BMP, may need to water restrict if #s worsening

## 2018-03-09 NOTE — PROGRESS NOTE ADULT - SUBJECTIVE AND OBJECTIVE BOX
Patient is a 62y old  Female who presents with a chief complaint of Confusion; Abdominal Pain (09 Mar 2018 15:11)      INTERVAL HPI/OVERNIGHT EVENTS:    Pt. seen and examined at 10:45AM  Pt. feels well; reports increased energy and appetite  Pain well-controlled w/ current regimen  Denies F/C, CP, SOB, N/V, abdominal pain; coughing less    Review of Systems: 12 point review of systems otherwise negative    MEDICATIONS  (STANDING):  dexamethasone     Tablet 4 milliGRAM(s) Oral every 6 hours  dextrose 5%. 1000 milliLiter(s) (50 mL/Hr) IV Continuous <Continuous>  dextrose 50% Injectable 12.5 Gram(s) IV Push once  dextrose 50% Injectable 25 Gram(s) IV Push once  dextrose 50% Injectable 25 Gram(s) IV Push once  diphenhydrAMINE   Capsule 25 milliGRAM(s) Oral at bedtime  docusate sodium 100 milliGRAM(s) Oral two times a day  enoxaparin Injectable 40 milliGRAM(s) SubCutaneous daily  folic acid 1 milliGRAM(s) Oral daily  insulin lispro (HumaLOG) corrective regimen sliding scale   SubCutaneous Before meals and at bedtime  lidocaine   Patch 1 Patch Transdermal daily  multivitamin 1 Tablet(s) Oral daily  nystatin    Suspension 729136 Unit(s) Oral four times a day  pantoprazole    Tablet 40 milliGRAM(s) Oral before breakfast  polyethylene glycol 3350 17 Gram(s) Oral daily  sodium chloride 0.9%. 1000 milliLiter(s) (100 mL/Hr) IV Continuous <Continuous>    MEDICATIONS  (PRN):  dextrose Gel 1 Dose(s) Oral once PRN Blood Glucose LESS THAN 70 milliGRAM(s)/deciliter  glucagon  Injectable 1 milliGRAM(s) IntraMuscular once PRN Glucose LESS THAN 70 milligrams/deciliter  morphine  - Injectable 2 milliGRAM(s) IV Push every 4 hours PRN Moderate Pain (4 - 6)  oxyCODONE    IR 5 milliGRAM(s) Oral every 4 hours PRN Severe Pain (7 - 10)  zaleplon 5 milliGRAM(s) Oral at bedtime PRN Insomnia      Allergies    penicillins (Hives)    Intolerances          Vital Signs Last 24 Hrs  T(C): 37.4 (09 Mar 2018 09:11), Max: 37.4 (09 Mar 2018 09:11)  T(F): 99.3 (09 Mar 2018 09:11), Max: 99.3 (09 Mar 2018 09:11)  HR: 96 (09 Mar 2018 09:11) (58 - 96)  BP: 146/84 (09 Mar 2018 09:11) (126/77 - 146/84)  BP(mean): --  RR: 18 (09 Mar 2018 09:11) (18 - 18)  SpO2: 97% (09 Mar 2018 06:05) (96% - 98%)  CAPILLARY BLOOD GLUCOSE      POCT Blood Glucose.: 270 mg/dL (09 Mar 2018 12:42)  POCT Blood Glucose.: 210 mg/dL (09 Mar 2018 08:33)  POCT Blood Glucose.: 181 mg/dL (08 Mar 2018 21:39)  POCT Blood Glucose.: 193 mg/dL (08 Mar 2018 17:34)        Physical Exam:  (at 10:45AM)  Daily     Daily Weight in k.4 (09 Mar 2018 11:53)  General:  non-toxic and comfortable-appearing in NAD  HEENT:  MMM  CV:  RRR, no JVD  Lungs:  CTA B/L; normal WOB on NC  Abdomen:  soft NT ND  Extremities:  no edema B/L LE  Skin:  WWP  Neuro:  AAOx3, non-focal    LABS:                        7.2    9.6   )-----------( 329      ( 09 Mar 2018 12:09 )             23.4     03    128<L>  |  90<L>  |  15  ----------------------------<  293<H>  3.5   |  23  |  0.54    Ca    8.6      09 Mar 2018 12:09  Mg     1.7         TPro  6.9  /  Alb  2.7<L>  /  TBili  0.6  /  DBili  x   /  AST  31  /  ALT  131<H>  /  AlkPhos  178<H>              RADIOLOGY & ADDITIONAL TESTS:    ---------------------------------------------------------------------------  I personally reviewed: [  ]EKG   [  ]CXR    [  ] CT    [  ]Other  ---------------------------------------------------------------------------  PLEASE CHECK WHEN PRESENT:     [  ]Heart Failure     [  ] Acute     [  ] Acute on Chronic     [  ] Chronic  -------------------------------------------------------------------     [  ]Diastolic [HFpEF]     [  ]Systolic [HFrEF]     [  ]Combined [HFpEF & HFrEF]     [  ]Other:  -------------------------------------------------------------------  [  ]FLAQUITA     [  ]ATN     [  ]Reneal Medullary Necrosis     [  ]Renal Cortical Necrosis     [  ]Other Pathological Lesions:    [  ]CKD 1  [  ]CKD 2  [  ]CKD 3  [  ]CKD 4  [  ]CKD 5  [  ]Other  -------------------------------------------------------------------  [  ]Other/Unspecified:    --------------------------------------------------------------------    Abdominal Nutritional Status  [  ]Malnutrition: See Nutrition Note  [  ]Cachexia  [  ]Other:   [  ]Supplement Ordered:  [  ]Morbid Obesity (BMI >=40]

## 2018-03-09 NOTE — PROGRESS NOTE ADULT - PROBLEM SELECTOR PLAN 5
- history of NSCLC diagnosed in Dec 2017 with brain and bone mets (right 8th rib, right iliac crest and L4) - s/p gamma knife radiation in Jan 2018 and currently s/p 2 rounds chemo (most recently 3/2) and few rounds of chemo   - as per patient, wishes to continue current treatment and is not amenable to hospice at this time   - f/u continued H/O recs (Dr. Noonan)  - f/u palliative recs

## 2018-03-09 NOTE — DISCHARGE NOTE ADULT - MEDICATION SUMMARY - MEDICATIONS TO TAKE
I will START or STAY ON the medications listed below when I get home from the hospital:    dexamethasone 4 mg oral tablet  -- 1 tab(s) by mouth every 6 hours  -- Indication: For Brain metastases    Benadryl 25 mg oral capsule  -- 1 tab(s) by mouth once a day (at bedtime), As Needed  -- Indication: For Insomnia    nystatin 100,000 units/mL oral suspension  -- 4 milliliter(s) by mouth 4 times a day (swish and spit)  -- Indication: For Thrust    amLODIPine 10 mg oral tablet  -- 1 tab(s) by mouth once a day  -- Indication: For Hypertension    docusate sodium 100 mg oral capsule  -- 1 cap(s) by mouth 2 times a day  -- Indication: For Constipation    Multiple Vitamins oral tablet  -- 1 tab(s) by mouth once a day  -- Indication: For Nutrition    folic acid 1 mg oral tablet  -- 1 tab(s) by mouth once a day  -- Indication: For Nutrition I will START or STAY ON the medications listed below when I get home from the hospital:    dexamethasone 4 mg oral tablet  -- 1 tab(s) by mouth every 6 hours  -- Indication: For Brain metastases    glipiZIDE 2.5 mg oral tablet, extended release  -- 1 tab(s) by mouth once a day   -- Avoid prolonged or excessive exposure to direct and/or artificial sunlight while taking this medication.  Do not drink alcoholic beverages when taking this medication.  It is very important that you take or use this exactly as directed.  Do not skip doses or discontinue unless directed by your doctor.  Swallow whole.  Do not crush.    -- Indication: For Hyperglycemia    Benadryl 25 mg oral capsule  -- 1 tab(s) by mouth once a day (at bedtime), As Needed  -- Indication: For Insomnia    nystatin 100,000 units/mL oral suspension  -- 4 milliliter(s) by mouth 4 times a day (swish and spit)  -- Indication: For Thrust    amLODIPine 10 mg oral tablet  -- 1 tab(s) by mouth once a day  -- Indication: For Hypertension    docusate sodium 100 mg oral capsule  -- 1 cap(s) by mouth 2 times a day  -- Indication: For Constipation    Multiple Vitamins oral tablet  -- 1 tab(s) by mouth once a day  -- Indication: For Nutrition    folic acid 1 mg oral tablet  -- 1 tab(s) by mouth once a day  -- Indication: For Nutrition

## 2018-03-09 NOTE — PROGRESS NOTE ADULT - PROBLEM SELECTOR PLAN 1
Pt met SIRS criteria on admission based on WBC 22.6 (PMNs 91) + tachycardia up to  with some concern for possible PNA in setting of increased cough with whitish sputum and some changes on chest scan.   - patient with recent admission in Feb with RVP and right sided PNA - finished course of Levaquin and flagyl - CT shows improvement of RLL PNA but with some concern on admission of RUL PNA  - Pt treated initially with vancomycin 1250mg q12hr and aztreonam 2mg q8hr (has anaphylaxis reaction to PCN) for PNA with improvement  - pulm consulted, as Ms. Bhatti is a patient of Dr. Grossman. Upon review of imaging, pulm do not feel that this is pneumonia (WBC may be related to steroids or underlying cancer) and no need for further abx. Continue to hold abx. If she spikes fever and WBC increases, will do full sepsis w/u.  - f/u sputum culture and blood culture, negative to date

## 2018-03-09 NOTE — DISCHARGE NOTE ADULT - INSTRUCTIONS
Please resume a diet low in sugar and carbohydrates while you are on steroids, as your sugars have been high while in the hospital. Please continue taking a multivitamin daily.

## 2018-03-09 NOTE — PROGRESS NOTE ADULT - PROBLEM SELECTOR PLAN 3
Pt w/ baseline Hgb 7-8, likely related to chronic disease. No signs/sx of active bleeding at this time and vital signs remain stable.   -- continue to monitor  -- trend h/h  -- active T&S   -- f/u B12, folate, iron panel, retic count

## 2018-03-09 NOTE — PROGRESS NOTE ADULT - PROBLEM SELECTOR PLAN 2
- daughter reports patient with increasing confusion over past few days and patient also says she feels slightly more confused as well - although currently improving and AOx3   - no focal deficits on exam  - CT head non contrast without any acute changes   - patient with known history of brain mets, s/p gamma knife radiation in Jan 2018 - per Heme/Onc (Dr. Noonan), no further imaging needed as inpatient  - c/w decadron 4mg bid

## 2018-03-09 NOTE — PROGRESS NOTE ADULT - ASSESSMENT
62 year old F with recently diagnosed Stage IV Lung adenocarcinoma (PDL-1 - 40%) on Carbo/Alimta/Keytruda s/p XRT to ribs, brain admitted for abdominal pain and mental status changes. She was found to have a RUL PNA being treated w/ Vanco/Aztreonam. Pt is currently hemodynamically stable.    #Stage IV Lung Adenocarcinoma  s/p XRT to ribs and brain. She is currently on cycle 2 of Carbo/Alimta/Keytruda which she received on 3/2. CT imaging shows progressive disease with new lesion in right illiac crest and L4 spine.      -off IV antibiotics at this time  -Given adequate pain control on IV morphine and PO oxycodone as needed, we will hold off on consultation with Rad Onc for palliative XRT to right hip and L4 lesion  -Lung masses and adrenal gland mass unchanged, new disease noted in right hip and L4. Will have discussion as outpatient about potentially switching to second line therapy w/ checkpoint inhibitor such as Nivolumab or Atezolizumab  or systemic therapy with Ramicurumab + Docetaxel given progression of disease.  -pain control regimen per palliative     #Increased AST/ALT - resolving     -May be secondary to hepatotoxicity/autoimmune hepatitis from Keytruda. Bilirubin within normal limits. Monitor LFT's daily   -If LFT's continue to rise, will start her on steroid regimen per immunotherapy toxicity recommendations.     #Normocytic anemia   History of anemia of chronic disease, related to infection, in addition anemia could be 2/2 cytotoxic chemo as well as nutritional     -iron panel c/w anemia of chronic disease  -Reticulocyte count  -B12, folate  -consider FOBT    Case discussed w/ Dr. Noonan   Patient should follow up at [facility] within 1 week

## 2018-03-09 NOTE — DISCHARGE NOTE ADULT - CARE PLAN
Principal Discharge DX:	Toxic metabolic encephalopathy  Goal:	Resolution of symptoms with outpatient follow up  Assessment and plan of treatment:	You came to the hospital due to confusion and met criteria for sepsis. You were treated with antibiotics to cover pneumonia as well as IV fluids and your confusion has resolved. If your symptoms recur, please return to the emergency department. Please also follow up with your oncologist for further treatment of your metastatic lesions to your brain.  Secondary Diagnosis:	Sepsis due to pneumonia  Assessment and plan of treatment:	You met sepsis criteria on admission and there was a questionable pneumonia in your right lung. You were treated with antibiotics and improved. Your pulmonologist (Dr. Xiong) evaluated you during your hospitalization and felt given your improvement further antibiotics are not needed. Should you experience any recurrence of your symptoms, please seek medical care.  Secondary Diagnosis:	Lung cancer metastatic to brain  Assessment and plan of treatment:	Please follow up with Dr. Noonan (oncology) next week in clinic for further treatment of your cancer. Please continue taking the medications we have indicated on this discharge summary until that time when he may make further changes.  Secondary Diagnosis:	Hypertension  Assessment and plan of treatment:	Please continue taking the medications indicated above and follow up with your primary care provider.  Secondary Diagnosis:	Thrush  Assessment and plan of treatment:	You were found to have thrush (oral fungal infection) during your admission. We have prescribed you nystatin solution which you should rinse your mouth with as prescribed to treat and prevent further infection.

## 2018-03-09 NOTE — PROGRESS NOTE ADULT - SUBJECTIVE AND OBJECTIVE BOX
OVERNIGHT EVENTS:    SUBJECTIVE / INTERVAL HPI: Patient seen and examined at bedside.     VITAL SIGNS:  Vital Signs Last 24 Hrs  T(C): 37.4 (09 Mar 2018 09:11), Max: 37.4 (09 Mar 2018 09:11)  T(F): 99.3 (09 Mar 2018 09:11), Max: 99.3 (09 Mar 2018 09:11)  HR: 96 (09 Mar 2018 09:11) (58 - 96)  BP: 146/84 (09 Mar 2018 09:11) (126/77 - 146/84)  RR: 18 (09 Mar 2018 09:11) (18 - 18)  SpO2: 97% (09 Mar 2018 06:05) (96% - 98%)    PHYSICAL EXAM:  General: WDWN  HEENT: NC/AT; PERRL, anicteric sclera; MMM  Neck: supple  Cardiovascular: +S1/S2; RRR; no M/R/G on auscultation  Respiratory: CTA B/L; no W/R/R  Gastrointestinal: soft, NT/ND; +BSx4  Extremities: WWP; no edema, clubbing or cyanosis  Vascular: 2+ radial, DP/PT pulses B/L  Neurological: AAOx3; no focal deficits    MEDICATIONS:  MEDICATIONS  (STANDING):  dexamethasone     Tablet 4 milliGRAM(s) Oral every 6 hours  dextrose 5%. 1000 milliLiter(s) (50 mL/Hr) IV Continuous <Continuous>  dextrose 50% Injectable 12.5 Gram(s) IV Push once  dextrose 50% Injectable 25 Gram(s) IV Push once  dextrose 50% Injectable 25 Gram(s) IV Push once  diphenhydrAMINE   Capsule 25 milliGRAM(s) Oral at bedtime  docusate sodium 100 milliGRAM(s) Oral two times a day  enoxaparin Injectable 40 milliGRAM(s) SubCutaneous daily  folic acid 1 milliGRAM(s) Oral daily  insulin lispro (HumaLOG) corrective regimen sliding scale   SubCutaneous Before meals and at bedtime  lidocaine   Patch 1 Patch Transdermal daily  magnesium sulfate  IVPB 1 Gram(s) IV Intermittent once  multivitamin 1 Tablet(s) Oral daily  nystatin    Suspension 693718 Unit(s) Oral four times a day  pantoprazole    Tablet 40 milliGRAM(s) Oral before breakfast  polyethylene glycol 3350 17 Gram(s) Oral daily  sodium chloride 0.9%. 1000 milliLiter(s) (100 mL/Hr) IV Continuous <Continuous>    MEDICATIONS  (PRN):  dextrose Gel 1 Dose(s) Oral once PRN Blood Glucose LESS THAN 70 milliGRAM(s)/deciliter  glucagon  Injectable 1 milliGRAM(s) IntraMuscular once PRN Glucose LESS THAN 70 milligrams/deciliter  morphine  - Injectable 2 milliGRAM(s) IV Push every 4 hours PRN Moderate Pain (4 - 6)  oxyCODONE    IR 5 milliGRAM(s) Oral every 4 hours PRN Severe Pain (7 - 10)  zaleplon 5 milliGRAM(s) Oral at bedtime PRN Insomnia      ALLERGIES:  Allergies    penicillins (Hives)    Intolerances        LABS:                        7.2    9.6   )-----------( 329      ( 09 Mar 2018 12:09 )             23.4     03-09    128<L>  |  90<L>  |  15  ----------------------------<  293<H>  3.5   |  23  |  0.54    Ca    8.6      09 Mar 2018 12:09  Mg     1.7     03-09    TPro  6.9  /  Alb  2.7<L>  /  TBili  0.6  /  DBili  x   /  AST  31  /  ALT  131<H>  /  AlkPhos  178<H>  03-09        CAPILLARY BLOOD GLUCOSE      POCT Blood Glucose.: 270 mg/dL (09 Mar 2018 12:42)      RADIOLOGY & ADDITIONAL TESTS:   EKG:   CXR:   CT:   MRI:    ASSESSMENT:    PLAN: OVERNIGHT EVENTS: No acute events.    SUBJECTIVE / INTERVAL HPI: Patient seen and examined at bedside. Pt reports that her breathing is more comfortable and she is coughing less. Her appetite is better and she is sitting up eating breakfast. She gives minimal responses to questions, often closing eyes, and seems uninterested in discussions however seems to interact appropriately with family members at bedside.     VITAL SIGNS:  Vital Signs Last 24 Hrs  T(C): 37.4 (09 Mar 2018 09:11), Max: 37.4 (09 Mar 2018 09:11)  T(F): 99.3 (09 Mar 2018 09:11), Max: 99.3 (09 Mar 2018 09:11)  HR: 96 (09 Mar 2018 09:11) (58 - 96)  BP: 146/84 (09 Mar 2018 09:11) (126/77 - 146/84)  RR: 18 (09 Mar 2018 09:11) (18 - 18)  SpO2: 97% (09 Mar 2018 06:05) (96% - 98%)    PHYSICAL EXAM:  General: WDWN, eyes closed but responds appropriately to questions, NAD  HEENT: NC/AT; PERRL, dry MM  Cardiovascular: +S1/S2; RRR; no M/R/G  Respiratory: breathing appears comfortable; no accessory muscle use or retractions; CTA B/L; no W/R/R  Gastrointestinal: soft, NT/ND; +BSx4  Extremities: WWP  Vascular: 2+ peripheral pulses b/l  Neurological: alert, aware, responsive. oriented x3. no gross focal deficits    MEDICATIONS:  MEDICATIONS  (STANDING):  dexamethasone     Tablet 4 milliGRAM(s) Oral every 6 hours  dextrose 5%. 1000 milliLiter(s) (50 mL/Hr) IV Continuous <Continuous>  dextrose 50% Injectable 12.5 Gram(s) IV Push once  dextrose 50% Injectable 25 Gram(s) IV Push once  dextrose 50% Injectable 25 Gram(s) IV Push once  diphenhydrAMINE   Capsule 25 milliGRAM(s) Oral at bedtime  docusate sodium 100 milliGRAM(s) Oral two times a day  enoxaparin Injectable 40 milliGRAM(s) SubCutaneous daily  folic acid 1 milliGRAM(s) Oral daily  insulin lispro (HumaLOG) corrective regimen sliding scale   SubCutaneous Before meals and at bedtime  lidocaine   Patch 1 Patch Transdermal daily  magnesium sulfate  IVPB 1 Gram(s) IV Intermittent once  multivitamin 1 Tablet(s) Oral daily  nystatin    Suspension [id number] Unit(s) Oral four times a day  pantoprazole    Tablet 40 milliGRAM(s) Oral before breakfast  polyethylene glycol 3350 17 Gram(s) Oral daily  sodium chloride 0.9%. 1000 milliLiter(s) (100 mL/Hr) IV Continuous <Continuous>    MEDICATIONS  (PRN):  dextrose Gel 1 Dose(s) Oral once PRN Blood Glucose LESS THAN 70 milliGRAM(s)/deciliter  glucagon  Injectable 1 milliGRAM(s) IntraMuscular once PRN Glucose LESS THAN 70 milligrams/deciliter  morphine  - Injectable 2 milliGRAM(s) IV Push every 4 hours PRN Moderate Pain (4 - 6)  oxyCODONE    IR 5 milliGRAM(s) Oral every 4 hours PRN Severe Pain (7 - 10)  zaleplon 5 milliGRAM(s) Oral at bedtime PRN Insomnia      ALLERGIES:  Allergies    penicillins (Hives)    Intolerances        LABS:                        7.2    9.6   )-----------( 329      ( 09 Mar 2018 12:09 )             23.4     03-09    128<L>  |  90<L>  |  15  ----------------------------<  293<H>  3.5   |  23  |  0.54    Ca    8.6      09 Mar 2018 12:09  Mg     1.7     03-09    TPro  6.9  /  Alb  2.7<L>  /  TBili  0.6  /  DBili  x   /  AST  31  /  ALT  131<H>  /  AlkPhos  178<H>  03-09        CAPILLARY BLOOD GLUCOSE      POCT Blood Glucose.: 270 mg/dL (09 Mar 2018 12:42)      RADIOLOGY & ADDITIONAL TESTS:   EKG:   CXR:   CT:   MRI:    ASSESSMENT:    PLAN:

## 2018-03-09 NOTE — PHYSICAL THERAPY INITIAL EVALUATION ADULT - PERTINENT HX OF CURRENT PROBLEM, REHAB EVAL
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

## 2018-03-09 NOTE — DISCHARGE NOTE ADULT - HOSPITAL COURSE
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 Pt was treated with IV antibiotics and fluid resuscitation and showed marked improvement and return to baseline mental status. Pulm was consulted (Dr. Xiong) and reviewed imaging, did not feel that the presentation was related to pneumonia and recommended d/c abx so abx were d/c'd. Heme/Onc was consulted and recommend continued outpatient f/u with Dr. Noonan for further treatment and monitoring of labs. Of note, patient also found to have oral thrush during hospitalization and was treated with nystatin oral rinse. PT evaluated patient for rehab recs, recommend home with home PT services for conditioning. Pt remained HD stable, with good mentation, and was ready for d/c home with home PT.

## 2018-03-09 NOTE — DIETITIAN INITIAL EVALUATION ADULT. - OTHER INFO
61 yo/female with recent diagnosis of NSCL adenocarcinoma w/brain and bone mets, HTN, DM, admitted with abdominal pain and confusion, new lytic lesions in R. iliac crest. Pt seen in room, awake, alert. Per Palliative MD, pt wanting nutrition ed to help control elevated BG levels 2/2 steroid use. Pt was not very responsive to initial questions. When asked what types of foods she has been eating prior to admission, stated oatmeal and "whatever they give me", and would not elaborate. Endorsed 50% intake at breakfast this AM. No N/V/C/D reported at this time. Last BM 3/7. NKFA. No difficulty chewing or swallowing. Skin intact, jacob 18. Pt not amenable to Glucerna Shakes, and endorsed to patient that Ensure Clear (which she prefers, is going to raise her BG levels, but pt does not care). Noted w/~22# weight loss since January per previous records. NPFE not completed at this time d/t pt refusal. Consistent Carbohydrate Diet handout left at bedside for pt and daughter to review. Palliative following patient, unclear at this time plan for radiation therapy, but will continue chemo post rehab per MD.

## 2018-03-09 NOTE — PROGRESS NOTE ADULT - PROBLEM SELECTOR PROBLEM 10
Nutrition, metabolism, and development symptoms
Hyperglycemia
Nutrition, metabolism, and development symptoms

## 2018-03-10 VITALS
TEMPERATURE: 100 F | DIASTOLIC BLOOD PRESSURE: 70 MMHG | RESPIRATION RATE: 18 BRPM | SYSTOLIC BLOOD PRESSURE: 103 MMHG | HEART RATE: 104 BPM | OXYGEN SATURATION: 97 %

## 2018-03-10 DIAGNOSIS — E11.9 TYPE 2 DIABETES MELLITUS WITHOUT COMPLICATIONS: ICD-10-CM

## 2018-03-10 LAB
CULTURE RESULTS: SIGNIFICANT CHANGE UP
FOLATE SERPL-MCNC: 7.5 NG/ML — SIGNIFICANT CHANGE UP (ref 4.8–24.2)
GLUCOSE BLDC GLUCOMTR-MCNC: 176 MG/DL — HIGH (ref 70–99)
GLUCOSE BLDC GLUCOMTR-MCNC: 197 MG/DL — HIGH (ref 70–99)
SPECIMEN SOURCE: SIGNIFICANT CHANGE UP
VIT B12 SERPL-MCNC: >2000 PG/ML — HIGH (ref 232–1245)

## 2018-03-10 PROCEDURE — 85610 PROTHROMBIN TIME: CPT

## 2018-03-10 PROCEDURE — 80053 COMPREHEN METABOLIC PANEL: CPT

## 2018-03-10 PROCEDURE — 85027 COMPLETE CBC AUTOMATED: CPT

## 2018-03-10 PROCEDURE — 81001 URINALYSIS AUTO W/SCOPE: CPT

## 2018-03-10 PROCEDURE — 83690 ASSAY OF LIPASE: CPT

## 2018-03-10 PROCEDURE — 82728 ASSAY OF FERRITIN: CPT

## 2018-03-10 PROCEDURE — 99285 EMERGENCY DEPT VISIT HI MDM: CPT | Mod: 25

## 2018-03-10 PROCEDURE — 83036 HEMOGLOBIN GLYCOSYLATED A1C: CPT

## 2018-03-10 PROCEDURE — 82803 BLOOD GASES ANY COMBINATION: CPT

## 2018-03-10 PROCEDURE — 82746 ASSAY OF FOLIC ACID SERUM: CPT

## 2018-03-10 PROCEDURE — 80048 BASIC METABOLIC PNL TOTAL CA: CPT

## 2018-03-10 PROCEDURE — 87486 CHLMYD PNEUM DNA AMP PROBE: CPT

## 2018-03-10 PROCEDURE — 83550 IRON BINDING TEST: CPT

## 2018-03-10 PROCEDURE — 84100 ASSAY OF PHOSPHORUS: CPT

## 2018-03-10 PROCEDURE — 84132 ASSAY OF SERUM POTASSIUM: CPT

## 2018-03-10 PROCEDURE — 82607 VITAMIN B-12: CPT

## 2018-03-10 PROCEDURE — 86850 RBC ANTIBODY SCREEN: CPT

## 2018-03-10 PROCEDURE — 84295 ASSAY OF SERUM SODIUM: CPT

## 2018-03-10 PROCEDURE — 85730 THROMBOPLASTIN TIME PARTIAL: CPT

## 2018-03-10 PROCEDURE — 96374 THER/PROPH/DIAG INJ IV PUSH: CPT | Mod: XU

## 2018-03-10 PROCEDURE — 71260 CT THORAX DX C+: CPT

## 2018-03-10 PROCEDURE — 80202 ASSAY OF VANCOMYCIN: CPT

## 2018-03-10 PROCEDURE — 87040 BLOOD CULTURE FOR BACTERIA: CPT

## 2018-03-10 PROCEDURE — 85025 COMPLETE CBC W/AUTO DIFF WBC: CPT

## 2018-03-10 PROCEDURE — 87633 RESP VIRUS 12-25 TARGETS: CPT

## 2018-03-10 PROCEDURE — 99239 HOSP IP/OBS DSCHRG MGMT >30: CPT

## 2018-03-10 PROCEDURE — 86900 BLOOD TYPING SEROLOGIC ABO: CPT

## 2018-03-10 PROCEDURE — 84466 ASSAY OF TRANSFERRIN: CPT

## 2018-03-10 PROCEDURE — 86901 BLOOD TYPING SEROLOGIC RH(D): CPT

## 2018-03-10 PROCEDURE — 82330 ASSAY OF CALCIUM: CPT

## 2018-03-10 PROCEDURE — 97162 PT EVAL MOD COMPLEX 30 MIN: CPT

## 2018-03-10 PROCEDURE — 83930 ASSAY OF BLOOD OSMOLALITY: CPT

## 2018-03-10 PROCEDURE — 83735 ASSAY OF MAGNESIUM: CPT

## 2018-03-10 PROCEDURE — 70450 CT HEAD/BRAIN W/O DYE: CPT

## 2018-03-10 PROCEDURE — 96375 TX/PRO/DX INJ NEW DRUG ADDON: CPT

## 2018-03-10 PROCEDURE — 71045 X-RAY EXAM CHEST 1 VIEW: CPT

## 2018-03-10 PROCEDURE — 82962 GLUCOSE BLOOD TEST: CPT

## 2018-03-10 PROCEDURE — 86880 COOMBS TEST DIRECT: CPT

## 2018-03-10 PROCEDURE — 86870 RBC ANTIBODY IDENTIFICATION: CPT

## 2018-03-10 PROCEDURE — 36415 COLL VENOUS BLD VENIPUNCTURE: CPT

## 2018-03-10 PROCEDURE — 74177 CT ABD & PELVIS W/CONTRAST: CPT

## 2018-03-10 PROCEDURE — 93005 ELECTROCARDIOGRAM TRACING: CPT

## 2018-03-10 PROCEDURE — 87581 M.PNEUMON DNA AMP PROBE: CPT

## 2018-03-10 PROCEDURE — 87798 DETECT AGENT NOS DNA AMP: CPT

## 2018-03-10 PROCEDURE — 83605 ASSAY OF LACTIC ACID: CPT

## 2018-03-10 PROCEDURE — 86904 BLOOD TYPING PATIENT SERUM: CPT

## 2018-03-10 RX ADMIN — PANTOPRAZOLE SODIUM 40 MILLIGRAM(S): 20 TABLET, DELAYED RELEASE ORAL at 06:46

## 2018-03-10 RX ADMIN — Medication 500000 UNIT(S): at 12:56

## 2018-03-10 RX ADMIN — Medication 2: at 12:57

## 2018-03-10 RX ADMIN — LIDOCAINE 1 PATCH: 4 CREAM TOPICAL at 13:34

## 2018-03-10 RX ADMIN — Medication 1 TABLET(S): at 12:56

## 2018-03-10 RX ADMIN — Medication 1 MILLIGRAM(S): at 12:56

## 2018-03-10 RX ADMIN — Medication 500000 UNIT(S): at 06:46

## 2018-03-10 RX ADMIN — Medication 2: at 09:36

## 2018-03-10 RX ADMIN — Medication 4 MILLIGRAM(S): at 12:56

## 2018-03-10 RX ADMIN — Medication 4 MILLIGRAM(S): at 06:46

## 2018-03-10 RX ADMIN — SODIUM CHLORIDE 100 MILLILITER(S): 9 INJECTION INTRAMUSCULAR; INTRAVENOUS; SUBCUTANEOUS at 06:46

## 2018-03-10 RX ADMIN — Medication 100 MILLIGRAM(S): at 06:46

## 2018-03-10 RX ADMIN — ENOXAPARIN SODIUM 40 MILLIGRAM(S): 100 INJECTION SUBCUTANEOUS at 12:56

## 2018-03-10 NOTE — PROGRESS NOTE ADULT - SUBJECTIVE AND OBJECTIVE BOX
Patient is a 62y old  Female who presents with a chief complaint of Confusion; Abdominal Pain (09 Mar 2018 15:11)      INTERVAL HPI/OVERNIGHT EVENTS:    Pt. seen and examined at 12:30PM  Pt.'s family at bedside  Pt. feels well, wants to go home  Denies F/C, SOB, N/V, abdominal pain    Review of Systems: 12 point review of systems otherwise negative    MEDICATIONS  (STANDING):  dexamethasone     Tablet 4 milliGRAM(s) Oral every 6 hours  dextrose 5%. 1000 milliLiter(s) (50 mL/Hr) IV Continuous <Continuous>  dextrose 50% Injectable 12.5 Gram(s) IV Push once  dextrose 50% Injectable 25 Gram(s) IV Push once  dextrose 50% Injectable 25 Gram(s) IV Push once  diphenhydrAMINE   Capsule 25 milliGRAM(s) Oral at bedtime  docusate sodium 100 milliGRAM(s) Oral two times a day  enoxaparin Injectable 40 milliGRAM(s) SubCutaneous daily  folic acid 1 milliGRAM(s) Oral daily  insulin lispro (HumaLOG) corrective regimen sliding scale   SubCutaneous Before meals and at bedtime  lidocaine   Patch 1 Patch Transdermal daily  multivitamin 1 Tablet(s) Oral daily  nystatin    Suspension [id number] Unit(s) Oral four times a day  pantoprazole    Tablet 40 milliGRAM(s) Oral before breakfast  polyethylene glycol 3350 17 Gram(s) Oral daily  sodium chloride 0.9%. 1000 milliLiter(s) (100 mL/Hr) IV Continuous <Continuous>    MEDICATIONS  (PRN):  dextrose Gel 1 Dose(s) Oral once PRN Blood Glucose LESS THAN 70 milliGRAM(s)/deciliter  glucagon  Injectable 1 milliGRAM(s) IntraMuscular once PRN Glucose LESS THAN 70 milligrams/deciliter  morphine  - Injectable 2 milliGRAM(s) IV Push every 4 hours PRN Moderate Pain (4 - 6)  oxyCODONE    IR 5 milliGRAM(s) Oral every 4 hours PRN Severe Pain (7 - 10)  zaleplon 5 milliGRAM(s) Oral at bedtime PRN Insomnia      Allergies    penicillins (Hives)    Intolerances          Vital Signs Last 24 Hrs  T(C): 37.6 (10 Mar 2018 08:56), Max: 37.6 (10 Mar 2018 08:56)  T(F): 99.6 (10 Mar 2018 08:56), Max: 99.6 (10 Mar 2018 08:56)  HR: 104 (10 Mar 2018 08:56) (95 - 104)  BP: 103/70 (10 Mar 2018 08:56) (103/70 - 142/81)  BP(mean): --  RR: 18 (10 Mar 2018 08:56) (17 - 18)  SpO2: 97% (10 Mar 2018 08:56) (97% - 98%)  CAPILLARY BLOOD GLUCOSE      POCT Blood Glucose.: 197 mg/dL (10 Mar 2018 12:21)  POCT Blood Glucose.: 176 mg/dL (10 Mar 2018 08:30)  POCT Blood Glucose.: 140 mg/dL (09 Mar 2018 22:11)  POCT Blood Glucose.: 207 mg/dL (09 Mar 2018 17:35)        Physical Exam:  (at 12:30PM)  Daily     Daily   General:  comfortable and well-appearing in NAD  HEENT:  MMM  Skin:  WWP  Neuro:  AAOx3, non-focal, mental status at baseline    LABS:                        7.2    9.6   )-----------( 329      ( 09 Mar 2018 12:09 )             23.4     03-09    128<L>  |  90<L>  |  15  ----------------------------<  293<H>  3.5   |  23  |  0.54    Ca    8.6      09 Mar 2018 12:09  Mg     1.7     03-09    TPro  6.9  /  Alb  2.7<L>  /  TBili  0.6  /  DBili  x   /  AST  31  /  ALT  131<H>  /  AlkPhos  178<H>  03-09            RADIOLOGY & ADDITIONAL TESTS:    ---------------------------------------------------------------------------  I personally reviewed: [  ]EKG   [  ]CXR    [  ] CT    [  ]Other  ---------------------------------------------------------------------------  PLEASE CHECK WHEN PRESENT:     [  ]Heart Failure     [  ] Acute     [  ] Acute on Chronic     [  ] Chronic  -------------------------------------------------------------------     [  ]Diastolic [HFpEF]     [  ]Systolic [HFrEF]     [  ]Combined [HFpEF & HFrEF]     [  ]Other:  -------------------------------------------------------------------  [  ]FLAQUITA     [  ]ATN     [  ]Reneal Medullary Necrosis     [  ]Renal Cortical Necrosis     [  ]Other Pathological Lesions:    [  ]CKD 1  [  ]CKD 2  [  ]CKD 3  [  ]CKD 4  [  ]CKD 5  [  ]Other  -------------------------------------------------------------------  [  ]Other/Unspecified:    --------------------------------------------------------------------    Abdominal Nutritional Status  [  ]Malnutrition: See Nutrition Note  [  ]Cachexia  [  ]Other:   [  ]Supplement Ordered:  [  ]Morbid Obesity (BMI >=40]

## 2018-03-10 NOTE — PROGRESS NOTE ADULT - PROBLEM SELECTOR PROBLEM 2
Lower abdominal pain
Toxic metabolic encephalopathy
Lung cancer metastatic to bone
Toxic metabolic encephalopathy
Lower abdominal pain
Toxic metabolic encephalopathy

## 2018-03-10 NOTE — PROGRESS NOTE ADULT - PROBLEM SELECTOR PLAN 6
cont. analgesic regimen per Palliative Care recs; may benefit from palliative RT as outpatient
- history of thrush, was on nystatin at home - will continue here
cont. analgesic regimen per Palliative Care recs; may benefit from palliative RT as outpatient
cont. mgmt per Palliative Care recs
- history of thrush, was on nystatin at home - will continue here

## 2018-03-10 NOTE — PROGRESS NOTE ADULT - PROBLEM SELECTOR PROBLEM 1
Sepsis due to pneumonia
Toxic metabolic encephalopathy
Lung cancer
Malignant bone pain
Sepsis, unspecified organism
Toxic metabolic encephalopathy
Sepsis, unspecified organism

## 2018-03-10 NOTE — PROGRESS NOTE ADULT - PROBLEM SELECTOR PLAN 7
can restart Norvasc; also d/t steroids and pain; cont. pain control as above
- on home norvasc 10mg qd - will hold in setting of sepsis- restart as clinically improves
POA, likely d/t recent chemotx; no e/o acute bacterial infection, f/u cultures off abx; appreciate Pulm recs, no e/o PNA
- on home Norvasc 10mg qd - will hold in setting of sepsis- restart as clinically improves

## 2018-03-10 NOTE — PROGRESS NOTE ADULT - PROBLEM SELECTOR PLAN 4
c/b hyperglycemia d/t steroids; HbA1c increasing, now > 6.5%, w/ uncontrolled fingersticks; will write rx for low-dose glipizide (per Pt. and family, Pt. has had side effects from metformin in the past) and glucometer; Pt. has outpatient PMD f/u appt. on 3/15; Pt. and family educated re: signs/sx of hypoglycemia

## 2018-03-10 NOTE — PROGRESS NOTE ADULT - PROVIDER SPECIALTY LIST ADULT
Heme/Onc
Palliative Care
Pulmonology
Heme/Onc
Internal Medicine
Internal Medicine
Hospitalist

## 2018-03-10 NOTE — PROGRESS NOTE ADULT - ATTENDING COMMENTS
Dispo: d/c home today w/ HPT, family support  home O2  has outpatient Heme-Onc f/u and PMD f/u  DNR/DNI
Dispo: pending clinical progress  DNR/DNI
Dispo: d/c home today w/ HPT, family support  has outpatient Heme-Onc f/u  DNR/DNI
Pt. seen and examined by me at 9AM; I have read Dr. Gonzalez's note, I agree w/ her findings and plan of care as documented; f/u Heme-Onc, Palliative, and Pulm recs

## 2018-03-10 NOTE — PROGRESS NOTE ADULT - PROBLEM SELECTOR PROBLEM 9
Anemia complicating neoplastic disease
Need for prophylactic measure
Anemia complicating neoplastic disease
Need for prophylactic measure

## 2018-03-10 NOTE — PROGRESS NOTE ADULT - PROBLEM SELECTOR PLAN 5
POA, likely d/t recent chemotx; no e/o acute bacterial infection, f/u cultures off abx; appreciate Pulm recs, no e/o PNA

## 2018-03-13 ENCOUNTER — OUTPATIENT (OUTPATIENT)
Dept: OUTPATIENT SERVICES | Facility: HOSPITAL | Age: 63
LOS: 1 days | End: 2018-03-13
Payer: COMMERCIAL

## 2018-03-13 ENCOUNTER — APPOINTMENT (OUTPATIENT)
Dept: INTERVENTIONAL RADIOLOGY/VASCULAR | Facility: HOSPITAL | Age: 63
End: 2018-03-13
Payer: COMMERCIAL

## 2018-03-13 DIAGNOSIS — E87.3 ALKALOSIS: ICD-10-CM

## 2018-03-13 DIAGNOSIS — Z92.3 PERSONAL HISTORY OF IRRADIATION: ICD-10-CM

## 2018-03-13 DIAGNOSIS — T38.0X5A ADVERSE EFFECT OF GLUCOCORTICOIDS AND SYNTHETIC ANALOGUES, INITIAL ENCOUNTER: ICD-10-CM

## 2018-03-13 DIAGNOSIS — Z66 DO NOT RESUSCITATE: ICD-10-CM

## 2018-03-13 DIAGNOSIS — B37.0 CANDIDAL STOMATITIS: ICD-10-CM

## 2018-03-13 DIAGNOSIS — T45.1X5A ADVERSE EFFECT OF ANTINEOPLASTIC AND IMMUNOSUPPRESSIVE DRUGS, INITIAL ENCOUNTER: ICD-10-CM

## 2018-03-13 DIAGNOSIS — Z92.3 PERSONAL HISTORY OF IRRADIATION: Chronic | ICD-10-CM

## 2018-03-13 DIAGNOSIS — G92 TOXIC ENCEPHALOPATHY: ICD-10-CM

## 2018-03-13 DIAGNOSIS — E11.65 TYPE 2 DIABETES MELLITUS WITH HYPERGLYCEMIA: ICD-10-CM

## 2018-03-13 DIAGNOSIS — I10 ESSENTIAL (PRIMARY) HYPERTENSION: ICD-10-CM

## 2018-03-13 DIAGNOSIS — C79.31 SECONDARY MALIGNANT NEOPLASM OF BRAIN: ICD-10-CM

## 2018-03-13 DIAGNOSIS — Z99.81 DEPENDENCE ON SUPPLEMENTAL OXYGEN: ICD-10-CM

## 2018-03-13 DIAGNOSIS — D64.9 ANEMIA, UNSPECIFIED: ICD-10-CM

## 2018-03-13 DIAGNOSIS — C79.52 SECONDARY MALIGNANT NEOPLASM OF BONE MARROW: ICD-10-CM

## 2018-03-13 DIAGNOSIS — G89.3 NEOPLASM RELATED PAIN (ACUTE) (CHRONIC): ICD-10-CM

## 2018-03-13 DIAGNOSIS — R65.11 SYSTEMIC INFLAMMATORY RESPONSE SYNDROME (SIRS) OF NON-INFECTIOUS ORIGIN WITH ACUTE ORGAN DYSFUNCTION: ICD-10-CM

## 2018-03-13 DIAGNOSIS — C34.90 MALIGNANT NEOPLASM OF UNSPECIFIED PART OF UNSPECIFIED BRONCHUS OR LUNG: ICD-10-CM

## 2018-03-13 PROCEDURE — 36561 INSERT TUNNELED CV CATH: CPT

## 2018-03-13 PROCEDURE — C1788: CPT

## 2018-03-13 PROCEDURE — 77001 FLUOROGUIDE FOR VEIN DEVICE: CPT

## 2018-03-13 PROCEDURE — 99153 MOD SED SAME PHYS/QHP EA: CPT

## 2018-03-13 PROCEDURE — 99152 MOD SED SAME PHYS/QHP 5/>YRS: CPT

## 2018-03-13 PROCEDURE — 36561 INSERT TUNNELED CV CATH: CPT | Mod: RT

## 2018-03-13 PROCEDURE — 76937 US GUIDE VASCULAR ACCESS: CPT | Mod: 26

## 2018-03-13 PROCEDURE — 77001 FLUOROGUIDE FOR VEIN DEVICE: CPT | Mod: 26

## 2018-03-13 PROCEDURE — 76937 US GUIDE VASCULAR ACCESS: CPT

## 2018-03-13 PROCEDURE — C1769: CPT

## 2018-03-23 ENCOUNTER — INPATIENT (INPATIENT)
Facility: HOSPITAL | Age: 63
LOS: 11 days | Discharge: NURSING, REHAB OR LONG-TERM CARE | DRG: 314 | End: 2018-04-04
Attending: STUDENT IN AN ORGANIZED HEALTH CARE EDUCATION/TRAINING PROGRAM | Admitting: STUDENT IN AN ORGANIZED HEALTH CARE EDUCATION/TRAINING PROGRAM
Payer: COMMERCIAL

## 2018-03-23 VITALS
RESPIRATION RATE: 16 BRPM | SYSTOLIC BLOOD PRESSURE: 119 MMHG | OXYGEN SATURATION: 99 % | HEART RATE: 125 BPM | DIASTOLIC BLOOD PRESSURE: 73 MMHG | WEIGHT: 169.09 LBS | TEMPERATURE: 98 F

## 2018-03-23 DIAGNOSIS — C34.90 MALIGNANT NEOPLASM OF UNSPECIFIED PART OF UNSPECIFIED BRONCHUS OR LUNG: ICD-10-CM

## 2018-03-23 DIAGNOSIS — E11.9 TYPE 2 DIABETES MELLITUS WITHOUT COMPLICATIONS: ICD-10-CM

## 2018-03-23 DIAGNOSIS — Z29.9 ENCOUNTER FOR PROPHYLACTIC MEASURES, UNSPECIFIED: ICD-10-CM

## 2018-03-23 DIAGNOSIS — I10 ESSENTIAL (PRIMARY) HYPERTENSION: ICD-10-CM

## 2018-03-23 DIAGNOSIS — A41.9 SEPSIS, UNSPECIFIED ORGANISM: ICD-10-CM

## 2018-03-23 DIAGNOSIS — Z92.3 PERSONAL HISTORY OF IRRADIATION: Chronic | ICD-10-CM

## 2018-03-23 DIAGNOSIS — R63.8 OTHER SYMPTOMS AND SIGNS CONCERNING FOOD AND FLUID INTAKE: ICD-10-CM

## 2018-03-23 LAB
ALBUMIN SERPL ELPH-MCNC: 2.7 G/DL — LOW (ref 3.3–5)
ALP SERPL-CCNC: 168 U/L — HIGH (ref 40–120)
ALT FLD-CCNC: 82 U/L — HIGH (ref 10–45)
ANION GAP SERPL CALC-SCNC: 16 MMOL/L — SIGNIFICANT CHANGE UP (ref 5–17)
APPEARANCE UR: (no result)
APTT BLD: 29.7 SEC — SIGNIFICANT CHANGE UP (ref 27.5–37.4)
AST SERPL-CCNC: 16 U/L — SIGNIFICANT CHANGE UP (ref 10–40)
BILIRUB SERPL-MCNC: 0.4 MG/DL — SIGNIFICANT CHANGE UP (ref 0.2–1.2)
BILIRUB UR-MCNC: NEGATIVE — SIGNIFICANT CHANGE UP
BUN SERPL-MCNC: 17 MG/DL — SIGNIFICANT CHANGE UP (ref 7–23)
CALCIUM SERPL-MCNC: 8.8 MG/DL — SIGNIFICANT CHANGE UP (ref 8.4–10.5)
CHLORIDE SERPL-SCNC: 99 MMOL/L — SIGNIFICANT CHANGE UP (ref 96–108)
CO2 SERPL-SCNC: 28 MMOL/L — SIGNIFICANT CHANGE UP (ref 22–31)
COLOR SPEC: YELLOW — SIGNIFICANT CHANGE UP
CREAT SERPL-MCNC: 0.49 MG/DL — LOW (ref 0.5–1.3)
DIFF PNL FLD: NEGATIVE — SIGNIFICANT CHANGE UP
GLUCOSE SERPL-MCNC: 205 MG/DL — HIGH (ref 70–99)
GLUCOSE UR QL: >=1000
HCT VFR BLD CALC: 24.2 % — LOW (ref 34.5–45)
HGB BLD-MCNC: 7.3 G/DL — LOW (ref 11.5–15.5)
INR BLD: 1.42 — HIGH (ref 0.88–1.16)
KETONES UR-MCNC: NEGATIVE — SIGNIFICANT CHANGE UP
LACTATE SERPL-SCNC: 1.1 MMOL/L — SIGNIFICANT CHANGE UP (ref 0.5–2)
LACTATE SERPL-SCNC: 2.5 MMOL/L — HIGH (ref 0.5–2)
LEUKOCYTE ESTERASE UR-ACNC: NEGATIVE — SIGNIFICANT CHANGE UP
LYMPHOCYTES # BLD AUTO: 8 % — LOW (ref 13–44)
MCHC RBC-ENTMCNC: 24.8 PG — LOW (ref 27–34)
MCHC RBC-ENTMCNC: 30.2 G/DL — LOW (ref 32–36)
MCV RBC AUTO: 82.3 FL — SIGNIFICANT CHANGE UP (ref 80–100)
MONOCYTES NFR BLD AUTO: 5 % — SIGNIFICANT CHANGE UP (ref 2–14)
NEUTROPHILS NFR BLD AUTO: 55 % — SIGNIFICANT CHANGE UP (ref 43–77)
NITRITE UR-MCNC: NEGATIVE — SIGNIFICANT CHANGE UP
PH UR: 6 — SIGNIFICANT CHANGE UP (ref 5–8)
PLATELET # BLD AUTO: 444 K/UL — HIGH (ref 150–400)
POTASSIUM SERPL-MCNC: 3.2 MMOL/L — LOW (ref 3.5–5.3)
POTASSIUM SERPL-SCNC: 3.2 MMOL/L — LOW (ref 3.5–5.3)
PROT SERPL-MCNC: 7.3 G/DL — SIGNIFICANT CHANGE UP (ref 6–8.3)
PROT UR-MCNC: (no result) MG/DL
PROTHROM AB SERPL-ACNC: 15.9 SEC — HIGH (ref 9.8–12.7)
RBC # BLD: 2.94 M/UL — LOW (ref 3.8–5.2)
RBC # FLD: 23.4 % — HIGH (ref 10.3–16.9)
SODIUM SERPL-SCNC: 143 MMOL/L — SIGNIFICANT CHANGE UP (ref 135–145)
SP GR SPEC: 1.01 — SIGNIFICANT CHANGE UP (ref 1–1.03)
UROBILINOGEN FLD QL: 0.2 E.U./DL — SIGNIFICANT CHANGE UP
WBC # BLD: 14.4 K/UL — HIGH (ref 3.8–10.5)
WBC # FLD AUTO: 14.4 K/UL — HIGH (ref 3.8–10.5)

## 2018-03-23 PROCEDURE — 99223 1ST HOSP IP/OBS HIGH 75: CPT | Mod: GC

## 2018-03-23 PROCEDURE — 71045 X-RAY EXAM CHEST 1 VIEW: CPT | Mod: 26

## 2018-03-23 PROCEDURE — 71275 CT ANGIOGRAPHY CHEST: CPT | Mod: 26

## 2018-03-23 PROCEDURE — 99291 CRITICAL CARE FIRST HOUR: CPT

## 2018-03-23 PROCEDURE — 70450 CT HEAD/BRAIN W/O DYE: CPT | Mod: 26

## 2018-03-23 RX ORDER — PIPERACILLIN AND TAZOBACTAM 4; .5 G/20ML; G/20ML
4.5 INJECTION, POWDER, LYOPHILIZED, FOR SOLUTION INTRAVENOUS EVERY 8 HOURS
Qty: 0 | Refills: 0 | Status: DISCONTINUED | OUTPATIENT
Start: 2018-03-23 | End: 2018-03-23

## 2018-03-23 RX ORDER — DEXTROSE 50 % IN WATER 50 %
1 SYRINGE (ML) INTRAVENOUS ONCE
Qty: 0 | Refills: 0 | Status: DISCONTINUED | OUTPATIENT
Start: 2018-03-23 | End: 2018-04-04

## 2018-03-23 RX ORDER — GLUCAGON INJECTION, SOLUTION 0.5 MG/.1ML
1 INJECTION, SOLUTION SUBCUTANEOUS ONCE
Qty: 0 | Refills: 0 | Status: DISCONTINUED | OUTPATIENT
Start: 2018-03-23 | End: 2018-04-04

## 2018-03-23 RX ORDER — MEROPENEM 1 G/30ML
1000 INJECTION INTRAVENOUS EVERY 8 HOURS
Qty: 0 | Refills: 0 | Status: DISCONTINUED | OUTPATIENT
Start: 2018-03-23 | End: 2018-03-23

## 2018-03-23 RX ORDER — TRAMADOL HYDROCHLORIDE 50 MG/1
50 TABLET ORAL EVERY 8 HOURS
Qty: 0 | Refills: 0 | Status: DISCONTINUED | OUTPATIENT
Start: 2018-03-23 | End: 2018-03-24

## 2018-03-23 RX ORDER — ONDANSETRON 8 MG/1
4 TABLET, FILM COATED ORAL ONCE
Qty: 0 | Refills: 0 | Status: COMPLETED | OUTPATIENT
Start: 2018-03-23 | End: 2018-03-23

## 2018-03-23 RX ORDER — CEFEPIME 1 G/1
1000 INJECTION, POWDER, FOR SOLUTION INTRAMUSCULAR; INTRAVENOUS EVERY 12 HOURS
Qty: 0 | Refills: 0 | Status: DISCONTINUED | OUTPATIENT
Start: 2018-03-24 | End: 2018-03-25

## 2018-03-23 RX ORDER — CEFEPIME 1 G/1
2000 INJECTION, POWDER, FOR SOLUTION INTRAMUSCULAR; INTRAVENOUS ONCE
Qty: 0 | Refills: 0 | Status: COMPLETED | OUTPATIENT
Start: 2018-03-23 | End: 2018-03-23

## 2018-03-23 RX ORDER — IBUPROFEN 200 MG
600 TABLET ORAL ONCE
Qty: 0 | Refills: 0 | Status: COMPLETED | OUTPATIENT
Start: 2018-03-23 | End: 2018-03-23

## 2018-03-23 RX ORDER — POTASSIUM CHLORIDE 20 MEQ
40 PACKET (EA) ORAL ONCE
Qty: 0 | Refills: 0 | Status: COMPLETED | OUTPATIENT
Start: 2018-03-23 | End: 2018-03-23

## 2018-03-23 RX ORDER — INSULIN LISPRO 100/ML
VIAL (ML) SUBCUTANEOUS
Qty: 0 | Refills: 0 | Status: DISCONTINUED | OUTPATIENT
Start: 2018-03-23 | End: 2018-03-26

## 2018-03-23 RX ORDER — ACETAMINOPHEN 500 MG
975 TABLET ORAL ONCE
Qty: 0 | Refills: 0 | Status: COMPLETED | OUTPATIENT
Start: 2018-03-23 | End: 2018-03-23

## 2018-03-23 RX ORDER — DEXTROSE 50 % IN WATER 50 %
25 SYRINGE (ML) INTRAVENOUS ONCE
Qty: 0 | Refills: 0 | Status: DISCONTINUED | OUTPATIENT
Start: 2018-03-23 | End: 2018-04-04

## 2018-03-23 RX ORDER — CEFEPIME 1 G/1
1000 INJECTION, POWDER, FOR SOLUTION INTRAMUSCULAR; INTRAVENOUS EVERY 12 HOURS
Qty: 0 | Refills: 0 | Status: DISCONTINUED | OUTPATIENT
Start: 2018-03-23 | End: 2018-03-25

## 2018-03-23 RX ORDER — DEXAMETHASONE 0.5 MG/5ML
4 ELIXIR ORAL EVERY 12 HOURS
Qty: 0 | Refills: 0 | Status: DISCONTINUED | OUTPATIENT
Start: 2018-03-23 | End: 2018-03-27

## 2018-03-23 RX ORDER — MORPHINE SULFATE 50 MG/1
4 CAPSULE, EXTENDED RELEASE ORAL ONCE
Qty: 0 | Refills: 0 | Status: DISCONTINUED | OUTPATIENT
Start: 2018-03-23 | End: 2018-03-23

## 2018-03-23 RX ORDER — ENOXAPARIN SODIUM 100 MG/ML
40 INJECTION SUBCUTANEOUS EVERY 24 HOURS
Qty: 0 | Refills: 0 | Status: DISCONTINUED | OUTPATIENT
Start: 2018-03-23 | End: 2018-03-25

## 2018-03-23 RX ORDER — ACETAMINOPHEN 500 MG
650 TABLET ORAL EVERY 6 HOURS
Qty: 0 | Refills: 0 | Status: DISCONTINUED | OUTPATIENT
Start: 2018-03-23 | End: 2018-04-04

## 2018-03-23 RX ORDER — FLUCONAZOLE 150 MG/1
100 TABLET ORAL DAILY
Qty: 0 | Refills: 0 | Status: DISCONTINUED | OUTPATIENT
Start: 2018-03-23 | End: 2018-03-28

## 2018-03-23 RX ORDER — SODIUM CHLORIDE 9 MG/ML
1000 INJECTION, SOLUTION INTRAVENOUS
Qty: 0 | Refills: 0 | Status: DISCONTINUED | OUTPATIENT
Start: 2018-03-23 | End: 2018-04-04

## 2018-03-23 RX ORDER — SODIUM CHLORIDE 9 MG/ML
1000 INJECTION INTRAMUSCULAR; INTRAVENOUS; SUBCUTANEOUS ONCE
Qty: 0 | Refills: 0 | Status: COMPLETED | OUTPATIENT
Start: 2018-03-23 | End: 2018-03-23

## 2018-03-23 RX ORDER — DEXTROSE 50 % IN WATER 50 %
12.5 SYRINGE (ML) INTRAVENOUS ONCE
Qty: 0 | Refills: 0 | Status: DISCONTINUED | OUTPATIENT
Start: 2018-03-23 | End: 2018-04-04

## 2018-03-23 RX ORDER — INSULIN LISPRO 100/ML
VIAL (ML) SUBCUTANEOUS AT BEDTIME
Qty: 0 | Refills: 0 | Status: DISCONTINUED | OUTPATIENT
Start: 2018-03-23 | End: 2018-03-26

## 2018-03-23 RX ORDER — VANCOMYCIN HCL 1 G
1250 VIAL (EA) INTRAVENOUS EVERY 12 HOURS
Qty: 0 | Refills: 0 | Status: DISCONTINUED | OUTPATIENT
Start: 2018-03-23 | End: 2018-03-24

## 2018-03-23 RX ORDER — VANCOMYCIN HCL 1 G
VIAL (EA) INTRAVENOUS
Qty: 0 | Refills: 0 | Status: DISCONTINUED | OUTPATIENT
Start: 2018-03-23 | End: 2018-03-23

## 2018-03-23 RX ORDER — VANCOMYCIN HCL 1 G
1000 VIAL (EA) INTRAVENOUS ONCE
Qty: 0 | Refills: 0 | Status: COMPLETED | OUTPATIENT
Start: 2018-03-23 | End: 2018-03-23

## 2018-03-23 RX ORDER — FOLIC ACID 0.8 MG
1 TABLET ORAL DAILY
Qty: 0 | Refills: 0 | Status: DISCONTINUED | OUTPATIENT
Start: 2018-03-23 | End: 2018-04-04

## 2018-03-23 RX ORDER — DEXAMETHASONE 0.5 MG/5ML
4 ELIXIR ORAL EVERY 6 HOURS
Qty: 0 | Refills: 0 | Status: DISCONTINUED | OUTPATIENT
Start: 2018-03-23 | End: 2018-03-23

## 2018-03-23 RX ADMIN — SODIUM CHLORIDE 2000 MILLILITER(S): 9 INJECTION INTRAMUSCULAR; INTRAVENOUS; SUBCUTANEOUS at 16:05

## 2018-03-23 RX ADMIN — SODIUM CHLORIDE 2000 MILLILITER(S): 9 INJECTION INTRAMUSCULAR; INTRAVENOUS; SUBCUTANEOUS at 15:14

## 2018-03-23 RX ADMIN — Medication 975 MILLIGRAM(S): at 22:19

## 2018-03-23 RX ADMIN — MORPHINE SULFATE 4 MILLIGRAM(S): 50 CAPSULE, EXTENDED RELEASE ORAL at 15:30

## 2018-03-23 RX ADMIN — Medication 975 MILLIGRAM(S): at 15:14

## 2018-03-23 RX ADMIN — Medication 40 MILLIEQUIVALENT(S): at 19:00

## 2018-03-23 RX ADMIN — Medication 600 MILLIGRAM(S): at 22:19

## 2018-03-23 RX ADMIN — CEFEPIME 100 MILLIGRAM(S): 1 INJECTION, POWDER, FOR SOLUTION INTRAMUSCULAR; INTRAVENOUS at 15:13

## 2018-03-23 RX ADMIN — Medication 250 MILLIGRAM(S): at 16:10

## 2018-03-23 RX ADMIN — MORPHINE SULFATE 4 MILLIGRAM(S): 50 CAPSULE, EXTENDED RELEASE ORAL at 20:48

## 2018-03-23 RX ADMIN — MORPHINE SULFATE 4 MILLIGRAM(S): 50 CAPSULE, EXTENDED RELEASE ORAL at 15:11

## 2018-03-23 RX ADMIN — Medication 600 MILLIGRAM(S): at 22:20

## 2018-03-23 RX ADMIN — MORPHINE SULFATE 4 MILLIGRAM(S): 50 CAPSULE, EXTENDED RELEASE ORAL at 19:27

## 2018-03-23 NOTE — CONSULT NOTE ADULT - SUBJECTIVE AND OBJECTIVE BOX
Hematology Oncology Consult Note (Dr. Noonan)     63 y/o F with a PMHX of HTN, DM, and Stage IV lung CA w/ mets to brain and bone (Dec 2nd) who underwent RT to bony mets on 8th rib, and had gamma knife tx to brain () s/p 2 cycles of systemic chemoimmunotherapy for metastatic disease complicated by post obstructive pneumonia, +influenza, confusion. Patients last chemo was on 3/2/18, infusion was well tolerated. Of note patient has been progressively more fatigued, including waxing/waning mentation which worsened after port placement two weeks prior. Patients family states she is in her bed >90% of the day and is unable to walk with walker any more, requires wheelchair for leaving the house, and bedside commode. Family has also noticed increasing size lump on the R neck region. During interview patient was fatigued however answered specific questions appropriately. Patient denied any acute bleeding or bruising including hemoptysis, hematuria, melena, or brbpr. Patient denied acute shortness of breath, chest pain, or bowel changes. In ED patient met criteria for sepsis, etiology may be catheter related.     ROS is otherwise negative.    PAST MEDICAL & SURGICAL HISTORY:  Brain metastases  Bone metastasis  Lung cancer  Hypertension  Diabetes  History of radiation therapy  Status post gamma knife treatment      Allergies: penicillins      Medications: MEDICATIONS  (STANDING):    MEDICATIONS  (PRN):    Social History: lives with , has aide at home to help with ADLs, requires wheelchair, currently denies toxic habits    FAMILY HISTORY:  Family history of transitional cell carcinoma of bladder (Sibling)  Family history of renal cell carcinoma (Sibling)      PHYSICAL EXAM:    T(F): 100.1 (18 @ 16:12), Max: 100.9 (18 @ 14:22)  HR: 102 (18 @ 16:12) (102 - 125)  BP: 129/76 (18 @ 19:29) (112/77 - 135/84)  RR: 17 (18 @ 16:12) (16 - 17)  SpO2: 100% (18 @ 16:12) (97% - 100%)  Wt(kg): --    Daily     Daily     GEN: lethargic, awakens to voice  HEENT: AT/NC, EOMI, no oral lesions   NECK: large R neck fixed adenopathy, non tender   CVS: S1S2 RRR   LUNG: Decreased inspiratory effort b/l  ABD: +BS, NT, ND   EXT: no c/c/e  NEURO: AAOx3, waxing/waning          Labs:                          7.3    14.4  )-----------( 444      ( 23 Mar 2018 14:42 )             24.2     CBC Full  -  ( 23 Mar 2018 14:42 )  WBC Count : 14.4 K/uL  Hemoglobin : 7.3 g/dL  Hematocrit : 24.2 %  Platelet Count - Automated : 444 K/uL  Mean Cell Volume : 82.3 fL  Mean Cell Hemoglobin : 24.8 pg  Mean Cell Hemoglobin Concentration : 30.2 g/dL  Auto Neutrophil # : x  Auto Lymphocyte # : x  Auto Monocyte # : x  Auto Eosinophil # : x  Auto Basophil # : x  Auto Neutrophil % : 55.0 %  Auto Lymphocyte % : 8.0 %  Auto Monocyte % : 5.0 %  Auto Eosinophil % : x  Auto Basophil % : x    PT/INR - ( 23 Mar 2018 14:42 )   PT: 15.9 sec;   INR: 1.42          PTT - ( 23 Mar 2018 14:42 )  PTT:29.7 sec        143  |  99  |  17  ----------------------------<  205<H>  3.2<L>   |  28  |  0.49<L>    Ca    8.8      23 Mar 2018 14:42    TPro  7.3  /  Alb  2.7<L>  /  TBili  0.4  /  DBili  x   /  AST  16  /  ALT  82<H>  /  AlkPhos  168<H>        Urinalysis Basic - ( 23 Mar 2018 15:04 )    Color: Yellow / Appearance: Cloudy / S.010 / pH: x  Gluc: x / Ketone: NEGATIVE  / Bili: Negative / Urobili: 0.2 E.U./dL   Blood: x / Protein: Trace mg/dL / Nitrite: NEGATIVE   Leuk Esterase: NEGATIVE / RBC: < 5 /HPF / WBC < 5 /HPF   Sq Epi: x / Non Sq Epi: 0-5 /HPF / Bacteria: Present /HPF      CT Head  =   IMPRESSION:     1. No acute intracranial abnormality. Specifically, no acute intracranial   hemorrhage, mass effect or recent transcortical or territorial infarction.    2. Mild small vessel ischemic disease.      CT Head Chest =  IMPRESSION:   No central pulmonary embolus up to the proximal segmental level.   Evaluation of the distal segmental and subsegmental pulmonary arteries to   the right lower and middle lobes is limited by the extensive right hilar   lymphadenopathy.      The right cervical lymphadenopathy and the right paratracheal   lymphadenopathy have slightly increased in the interval. Right hilar and   right lower lobe necrotic masses again seen.    Small pericardial effusion, mildly increased since the prior study.    Small right pleural effusion, new since the prior study.

## 2018-03-23 NOTE — H&P ADULT - NSHPLABSRESULTS_GEN_ALL_CORE
.  LABS:                         7.3    14.4  )-----------( 444      ( 23 Mar 2018 14:42 )             24.2         143  |  99  |  17  ----------------------------<  205<H>  3.2<L>   |  28  |  0.49<L>    Ca    8.8      23 Mar 2018 14:42    TPro  7.3  /  Alb  2.7<L>  /  TBili  0.4  /  DBili  x   /  AST  16  /  ALT  82<H>  /  AlkPhos  168<H>      PT/INR - ( 23 Mar 2018 14:42 )   PT: 15.9 sec;   INR: 1.42          PTT - ( 23 Mar 2018 14:42 )  PTT:29.7 sec  Urinalysis Basic - ( 23 Mar 2018 15:04 )    Color: Yellow / Appearance: Cloudy / S.010 / pH: x  Gluc: x / Ketone: NEGATIVE  / Bili: Negative / Urobili: 0.2 E.U./dL   Blood: x / Protein: Trace mg/dL / Nitrite: NEGATIVE   Leuk Esterase: NEGATIVE / RBC: < 5 /HPF / WBC < 5 /HPF   Sq Epi: x / Non Sq Epi: 0-5 /HPF / Bacteria: Present /HPF    Lactate, Blood: 1.1 mmoL/L ( @ 19:31)  Lactate, Blood: 2.5 mmoL/L ( @ 14:41)    RADIOLOGY, EKG & ADDITIONAL TESTS: Reviewed.  CTA:  No central pulmonary embolus up to the proximal segmental level.   Evaluation of the distal segmental and subsegmental pulmonary arteries to   the right lower and middle lobes is limited by the extensive right hilar   lymphadenopathy.    The right cervical lymphadenopathy and the right paratracheal   lymphadenopathy have slightly increased in the interval. Right hilar and   right lower lobe necrotic masses again seen.  Small pericardial effusion, mildly increased since the prior study.  Small right pleural effusion, new since the prior study  CTH:  1. No acute intracranial abnormality. Specifically, no acute intracranial   hemorrhage, mass effect or recent transcortical or territorial infarction.  2. Mild small vessel ischemic disease.

## 2018-03-23 NOTE — ED PROVIDER NOTE - PHYSICAL EXAMINATION
Consitutional: well developed F uncomfortable in stretcher though A&Ox3 speaking clearly in complete sentences, + mild distress  Head: NC/AT  Eyes: PERRL, EOMI, clear conjunctiva  ENT: no nasal discharge; no oropharyngeal erythema or exudates; mild oral thrush present  Neck: supple; no meningismus, no JVD or thyromegaly, right anterior cervical LAP  Respiratory: CTA B/L, right upper chest port site with mild erythema without fluctuance or purulence, mildly tender on palpation   Cardiac: +S1/S2; RRR; no M/R/G  Gastrointestinal: soft, NT/ND; no rebound or guarding; +BS  Back:  no CVAT B/L  Extremities: WWP, no clubbing or cyanosis; no peripheral edema, symmetric 2+ radial and dp pulses BL  Musculoskeletal: NROM x4; no joint swelling, tenderness or erythema  Neurologic: AAOx3; CNII-XII grossly intact; no focal deficits, sensation intact throughout   Psychiatric: appropriate mood and affect, anxious and uncomfortable during interview

## 2018-03-23 NOTE — ED ADULT TRIAGE NOTE - CHIEF COMPLAINT QUOTE
comes in for general weakness and right shoulder pain ; has stage 4 lung CA with mets and got a port placed last week on right side and now has lump right neck ; port has never been used ; Saran MCDUFFIE

## 2018-03-23 NOTE — H&P ADULT - PROBLEM SELECTOR PLAN 3
- Fluconazole 100 mg daily x 7 days #Stage IV Lung Adenocarcinoma   Patient with mets to brain and bone, PDL1 40% on Carboplatin, Pemetrexed, Pembrolizumab s/p 2 cycles, last 3/2/18, treatment course complicated by multiple hospitalizations including post obstructive pneumonia, collapsed lob cb +influenza s/p debulking and chemical pneumonitis. Both clinical cervical exam and CT imaging concerning for extensive R cervical lymphadenopathy, imaging also consistent with increasing R mediastinal and paratracheal lymphadenopathy. R hilar and R lower lobe necrotic masses persist, no significant change in size. In addition, patient with recent port placement (2 weeks prev), documented fever, leukocytosis w/bandemia, concerning for possibly catheter related sepsis.   [facility].  [facility] would need to clinically stabilize prior to next treatment (may need to consider alternative regimen)  - trend CMP/ AlP  - dexamethasone 4mg BID for brain metastasis, CT head negative for acute pathology  - f/u hematoclogy/oncology recs

## 2018-03-23 NOTE — H&P ADULT - PROBLEM SELECTOR PLAN 7
Currently s/p 2 L IVF, euvolemic and well hydrated on exam  encourage PO intake and Hydrations, carb consistent diet    Dispo: PT consult Currently s/p 2 L IVF, euvolemic and well hydrated on exam  encourage PO intake and Hydrations  Diet: carb consistent     Dispo: PT consult DVT ppx: lovenox   pain control with tylenol and prn tramadol  Ulcer Ppx: PPI  OOB   glycemic monitoring  Bowel regimen

## 2018-03-23 NOTE — H&P ADULT - NSHPPHYSICALEXAM_GEN_ALL_CORE
.  VITAL SIGNS:  T(C): 36.7 (03-23-18 @ 22:20), Max: 38.3 (03-23-18 @ 14:22)  T(F): 98.1 (03-23-18 @ 22:20), Max: 100.9 (03-23-18 @ 14:22)  HR: 102 (03-23-18 @ 22:20) (102 - 125)  RR: 18 (03-23-18 @ 22:20) (16 - 18)  SpO2: 99% (03-23-18 @ 22:20) (97% - 100%)      PHYSICAL EXAM:    Constitutional: ill appearing, WD, anxious   Head: NC/AT  Eyes: PERRL, EOMI, clear conjunctiva  ENT: no nasal discharge; no oropharyngeal erythema or exudates; mild oral thrush present  Neck: supple; no meningismus, no JVD or thyromegaly, right anterior cervical LAP  Respiratory: CTA B/L, right upper chest port site with mild erythema without fluctuance or purulence, mildly tender on palpation   Cardiac: +S1/S2; RRR; no M/R/G  Gastrointestinal: soft, NT/ND; no rebound or guarding; +BS  Back:  no CVAT B/L  Extremities: WWP, no clubbing or cyanosis; no peripheral edema, symmetric 2+ radial and dp pulses BL  Musculoskeletal: NROM x4; no joint swelling, tenderness or erythema  Neurologic: AAOx3; CNII-XII grossly intact; no focal deficits, sensation intact throughout   Psychiatric: appropriate mood and affect, anxious and uncomfortable during interview

## 2018-03-23 NOTE — H&P ADULT - ATTENDING COMMENTS
patient seen and examined    reviewed vs, labs, available radiological reports/ studies, ekg     agree w/ PE findings as above     1. sepsis/ port infection: on cefepime and vancomycin, IR for port removal; follow up ctxs  2. lung cancer: plance as above , follow up onc recs, on dexamethasone for brain mets  3. started on fluconazole for thrush  4. hold BP medications in setting of sepsis

## 2018-03-23 NOTE — ED PROVIDER NOTE - OBJECTIVE STATEMENT
61 y/o F w/advanced Lung ca w/mets to spine, hip, brain on maintenance chemo, recent cyber knife radiation to brain mets on decadron to reduce swelling/HAs, no chronic pain medication, p/w fever/chills, waxing and waning confusion over past few days w/R upper chest/shoulder pain and swelling where a port was placed weeks ago. No lower back pain or abd pain. Denies n/v/c/d or urinary sx. Has intermittent HA's as per baseline, no HA at present. No visual sx. Per family at bedside, confusion at home has been episodic. No confusion at time of interview.

## 2018-03-23 NOTE — H&P ADULT - NSHPREVIEWOFSYSTEMS_GEN_ALL_CORE
CONSTITUTIONAL: + weakness, + fever and chills   EYES/ENT: No visual changes;  No vertigo or throat pain   NECK: No pain or stiffness, cervical LAP worsening+  RESPIRATORY: chronic cough at baseline + , No wheezing, No hemoptysis; occasional shortness of breath at baseline +  CARDIOVASCULAR: No chest pain or palpitations  GASTROINTESTINAL: No abdominal or epigastric pain. No nausea, vomiting, or hematemesis; No diarrhea or constipation. No melena or hematochezia.  GENITOURINARY: No dysuria, frequency or hematuria  NEUROLOGICAL: No numbness or weakness  SKIN: No itching, burning, rashes, or lesions, pt has Right upper chest pain at port insertion site radiating to right shoulder   All other review of systems is negative unless indicated above.

## 2018-03-23 NOTE — ED PROVIDER NOTE - MEDICAL DECISION MAKING DETAILS
+ tenderness/erythema over R upper chest port site with fever/leukocytosis, likely line sepsis, started on vanc/zosyn. No UTI or PNA. No other infectious source. Mentating well at baseline, no meningismus, no active HA. Shoulder/chest pain controlled with IV morphine. Admitting for IV abx, pain control, palliative planning. Spoke with Dr. Noonan, pt's oncologist, who will be following as consult service. Admitting to medicine regional floor as pt VSS w/normalized lactate.

## 2018-03-23 NOTE — H&P ADULT - PROBLEM SELECTOR PLAN 4
bp WNL  - will hold of antihypertensive meds in the setting of acute sepsis - Fluconazole 100 mg daily x 7 days

## 2018-03-23 NOTE — H&P ADULT - PROBLEM SELECTOR PLAN 6
DVT ppx: lovenox   pain control with tylenol and prn tramadol  Ulcer Ppx: PPI  glycemic monitoring DVT ppx: lovenox   pain control with tylenol and prn tramadol  Ulcer Ppx: PPI  OOB   glycemic monitoring  Bowel regimen pt on dexamethasone  - LSS

## 2018-03-23 NOTE — H&P ADULT - HISTORY OF PRESENT ILLNESS
63 yo AA F presenting to [facility] ED for   with a PMHX of HTN, DM, and Stage IV lung CA w/ mets to brain and bone (Dec 2nd) s/p RTx to bony mets on 8th rib, and had gamma knife Rtx to brain (Jan 9th) s/p 2 cycles of systemic chemoimmunotherapy for metastatic disease complicated by post obstructive pneumonia, +influenza and confusion. Patients last chemo was on 3/2/18, nd recent admission for   Of note patient has been progressively more fatigued, including waxing/waning mentation which worsened after port placement two weeks prior. Patients family states she is in her bed >90% of the day and is unable to walk with walker any more, requires wheelchair for leaving the house, and bedside commode. Family has also noticed increasing size lump on the R neck region. During interview patient was fatigued however answered specific questions appropriately. Patient denied any acute bleeding or bruising including hemoptysis, hematuria, melena, or brbpr. Patient denied acute shortness of breath, chest pain, or bowel changes. In ED patient met criteria for sepsis, etiology may be catheter related. 61 yo AA F presenting to [facility] ED for fever/chills, waxing and waning confusion over past few days associated with right chest and shoulder pain and swelling around the port insertion site, port placed on 3/13 and never used.  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 Patients last chemo was on 3/2/18, and recent admission for toxic metabolic encephalopathy in the setting of pneumonia and discharged home on 3/9, since discharge pt has been progressively more fatigued, including waxing/waning mentation which worsened after port placement two weeks prior.  in the ED pt found to be septic with fever, tachycardia and elevated WBC count/bandemia and slightly elevated lactate at 2.5, was Alert and oriented, mental status intact at the time of interview, denied HD or neck stiffness however says that she has been experiencing HA on and off but it is at baseline, denies SOB/CP/Palpitation/cough/Abdominal Pain/diarrhea/Urinary symptoms/ melena or hematochezia. pt is getting admitted for ? line related sepsis. Had CXR and CTA unremarkable for acute findings.   s/p 2 L IVF, one dose of cefepime and vancomycin.

## 2018-03-23 NOTE — ED ADULT NURSE REASSESSMENT NOTE - NS ED NURSE REASSESS COMMENT FT1
Pt reports no pain at this time, stating "I don't need morphine now. When my pain is back, then give to me." Pt is receiving 2nd NS bolus and will repeat lactate. Plan of care has been explained to pt and pt verbalized understanding. Will continue monitoring.

## 2018-03-23 NOTE — ED ADULT NURSE NOTE - CHPI ED SYMPTOMS NEG
no numbness/no tingling/no nausea/no decreased eating/drinking/no vomiting/no fever/no dizziness/no chills

## 2018-03-23 NOTE — H&P ADULT - PROBLEM SELECTOR PLAN 1
most likely in the setting of newly placed port infection  - will broadly cover with Vanc and cefepime(pt has penicillin allergy)  - f/u BC and tailor Abx accordingly  - monitor WBC count and fever  - tylenol PRN for fever, however cautious given LFT abnormalities    - IR consult for removal of central line and catheter tip culture

## 2018-03-23 NOTE — H&P ADULT - PROBLEM SELECTOR PLAN 5
pt on dexamethasone  - LSS bp WNL  - will hold of antihypertensive meds in the setting of acute sepsis

## 2018-03-23 NOTE — ED PROVIDER NOTE - CRITICAL CARE PROVIDED
consultation with other physicians/interpretation of diagnostic studies/documentation/additional history taking/direct patient care (not related to procedure)

## 2018-03-23 NOTE — H&P ADULT - PROBLEM SELECTOR PLAN 2
#Stage IV Lung Adenocarcinoma   Patient with mets to brain and bone, PDL1 40% on Carboplatin, Pemetrexed, Pembrolizumab s/p 2 cycles, last 3/2/18, treatment course complicated by multiple hospitalizations including post obstructive pneumonia, collapsed lob cb +influenza s/p debulking and chemical pneumonitis. Both clinical cervical exam and CT imaging concerning for extensive R cervical lymphadenopathy, imaging also consistent with increasing R mediastinal and paratracheal lymphadenopathy. R hilar and R lower lobe necrotic masses persist, no significant change in size. In addition, patient with recent port placement (2 weeks prev), documented fever, leukocytosis w/bandemia, concerning for possibly catheter related sepsis.   pt seen by [facility] ED.  [facility] would need to clinically stabilize prior to next treatment (may need to consider alternative regimen)  - trend CMP/ AlP  - dexamethasone 4mg BID for brain metastasis, CT head negative for acute pathology  - f/u hematoclogy/oncology recs

## 2018-03-23 NOTE — H&P ADULT - ASSESSMENT
A/p    61 yo AAF with presenting to ED for fever/chills, increased lethargy and port site pain and erythema, PMHX of HTN, DM, and Stage IV lung CA w/ mets to brain and bone s/p RTX and gamma knife Rtx to brain and 2 cycles of systemic chemoimmunotherapy for metastatic disease, met sepsis criteria in the ED, presumably line related sepsis.

## 2018-03-23 NOTE — CONSULT NOTE ADULT - ASSESSMENT
61 y/o F with a PMHX of HTN, DM, and Stage IV lung CA w/ mets to brain and bone (Dec 2nd) who underwent RT to bony mets on 8th rib, and had gamma knife tx to brain (Jan 9th) s/p 2 cycles of systemic chemoimmunotherapy for metastatic disease presenting with increased lethargy and line sepsis.    #Stage IV Lung Adenocarcinoma   Patient with mets to brain and bone, PDL1 40% on Carboplatin, Pemetrexed, Pembrolizumab s/p 2 cycles, last 3/2/18, treatment course complicated by multiple hospitalizations including post obstructive pneumonia, collapsed lobe, +influenza, chemical pneumonitis. Both clinical cervical exam and CT imaging concerning for extensive R cervical lymphadenopathy, imaging also consistent with increasing R mediastinal and paratracheal lymphadenopathy. R hilar and R lower lobe necrotic masses persist, no significant change in size. In addition, patient with recent port placement (2 weeks prev), documented fever, leukocytosis w/bandemia, concerning for possibly catheter related sepsis.     -Both clinical exam and imaging concerning for progressive disease, due to complicated patient only able to complete 2 cycles of therapy, normally would assess response after 4 cycles, in addition could be component of pseudo-progression with immune therapy   -Closely monitor R cervical neck adenopathy, new from last outpatient visit on 2/28/18, less likely hematoma, CT image negative for abscess, likely progression  -continue with IV antibiotics, IR consult to consider Port/source exchange, would culture line   -follow up blood cxs   -clinically worse compared to baseline, at this time ecog atleast 3, likely related to acute infection, would need to clinically stabilize prior to next treatment (may need to consider alternative regimen)  -elevated AST 82, TSH (outpatient low 0.24) may be related to AE of pembro, would trend LFTs, consider repeat TSH, elevated alk phos c/w bone disease  -dexamethasone 4mg BID for brain metastasis, CT head negative for acute pathology  -Nystatin swish swallow for hx of thrush   -pain control  -bowel regimen  -PT/OT    Normocytic Anemia   Likely multifactorial in nature, given exposure to chemo, poor nutrition, currently patient is not acutely bleeding or bruising. Iron panel from outpatient consistent with anemia of chronic inflammation, elevated ferritin may also be related to transfusion dependence. B12/Folate supplemented based on labs from 3/9/18, required supplementation while getting Pemetrexed.     -Monitor for clinical signs of bleeding, consider occult blood  -obtain reticulocyte count, LDH  -folic acid/B12 supplementation given treatment with pemetrexed       Coagulopathy   Patient chronically with elevated INR and PT, likely related to infection/sepsis, factor 7 shortest half life and decreases in sepsis, patient also with poor nutritional status albumin 2.7 so likely has decreased vit K dependent clotting factors.     -could consider mixing study   -monitor for bleeding  -would hold off on product replacement (vit K) as patient is immobile with solid tumor thus increased hypercoag risk      DVT ppx - lovenox ppx    Discussed with Dr. Noonan 61 y/o F with a PMHX of HTN, DM, and Stage IV lung CA w/ mets to brain and bone (Dec 2nd) who underwent RT to bony mets on 8th rib, and had gamma knife tx to brain (Jan 9th) s/p 2 cycles of systemic chemoimmunotherapy for metastatic disease presenting with increased lethargy and line sepsis.    #Stage IV Lung Adenocarcinoma   Patient with mets to brain and bone, PDL1 40% on Carboplatin, Pemetrexed, Pembrolizumab s/p 2 cycles, last 3/2/18, treatment course complicated by multiple hospitalizations including post obstructive pneumonia, collapsed lobe, +influenza, chemical pneumonitis. Both clinical cervical exam and CT imaging concerning for extensive R cervical lymphadenopathy, imaging also consistent with increasing R mediastinal and paratracheal lymphadenopathy. R hilar and R lower lobe necrotic masses persist, no significant change in size. In addition, patient with recent port placement (2 weeks prev), documented fever, leukocytosis w/bandemia, concerning for possibly catheter related sepsis.     -Both clinical exam and imaging concerning for progressive disease, due to complications patient only able to complete 2 cycles of therapy, normally would assess response after 4 cycles, less likely however may consider a component of pseudo-progression 2/2 immunotherapy   -Closely monitor R cervical neck adenopathy, this finding is new compared to last outpatient visit on 2/28/18, less likely hematoma, CT image negative for abscess, likely progression  -continue with IV antibiotics, IR consult to consider Port/source exchange, would culture line   -follow up blood cxs   -clinically worse compared to baseline, at this time ecog atleast 3, likely related to acute infection, would need to clinically stabilize prior to next treatment (may need to consider alternative regimen)  -elevated AST 82, TSH (outpatient low 0.24) may be related to AE of pembro, would trend LFTs, consider repeat TSH, elevated alk phos c/w bone disease  -dexamethasone 4mg BID for brain metastasis, CT head negative for acute pathology  -Nystatin swish swallow for hx of thrush   -pain control  -bowel regimen  -PT/OT    Normocytic Anemia   Likely multifactorial in nature, given exposure to chemo, poor nutrition, currently patient is not acutely bleeding or bruising. Iron panel from outpatient consistent with anemia of chronic inflammation, elevated ferritin may also be related to transfusion dependence. B12/Folate supplemented based on labs from 3/9/18, required supplementation while getting Pemetrexed.     -Monitor for clinical signs of bleeding, consider occult blood  -obtain reticulocyte count, LDH  -folic acid/B12 supplementation given treatment with pemetrexed       Coagulopathy   Patient chronically with elevated INR and PT, likely related to infection/sepsis, factor 7 shortest half life and decreases in sepsis, patient also with poor nutritional status albumin 2.7 so likely has decreased vit K dependent clotting factors.     -could consider mixing study   -monitor for bleeding  -would hold off on product replacement (vit K) as patient is immobile with solid tumor thus increased hypercoag risk      DVT ppx - lovenox ppx    Discussed with Dr. Noonan

## 2018-03-23 NOTE — ED ADULT NURSE NOTE - OBJECTIVE STATEMENT
Pt's daughter reports that pt has had malaise, cough, headache, and altered mental status for a "couple days." pt's daughter reports that pt has been "forgetting easily," and "needs direction for basic activities." Pt's daughter states that pt had a port placed in the right chest on 3/13, and starting a few days ago swelling of the neck developed with pain to the right side of neck, shoulder and right arm. Pt's daughter reports that pt has had malaise, cough, headache, and altered mental status for a "couple days." pt's daughter reports that pt has been "forgetting easily," and "needs direction for basic activities." Pt's daughter states that pt had a port placed in the right chest on 3/13, and starting a few days ago swelling of the neck developed with pain to the right side of neck, shoulder and right arm. Pt's daughter also reports pt has been feeling weak for the past couple days, denies chest pain, SOB, or any other symptoms.

## 2018-03-23 NOTE — H&P ADULT - PROBLEM SELECTOR PLAN 8
Currently s/p 2 L IVF, euvolemic and well hydrated on exam  encourage PO intake and Hydrations  Diet: carb consistent     Dispo: PT consult

## 2018-03-24 DIAGNOSIS — T80.212S: ICD-10-CM

## 2018-03-24 DIAGNOSIS — C34.90 MALIGNANT NEOPLASM OF UNSPECIFIED PART OF UNSPECIFIED BRONCHUS OR LUNG: ICD-10-CM

## 2018-03-24 DIAGNOSIS — Z45.2 ENCOUNTER FOR ADJUSTMENT AND MANAGEMENT OF VASCULAR ACCESS DEVICE: ICD-10-CM

## 2018-03-24 DIAGNOSIS — B37.0 CANDIDAL STOMATITIS: ICD-10-CM

## 2018-03-24 LAB
ALBUMIN SERPL ELPH-MCNC: 2.4 G/DL — LOW (ref 3.3–5)
ALP SERPL-CCNC: 136 U/L — HIGH (ref 40–120)
ALT FLD-CCNC: 55 U/L — HIGH (ref 10–45)
ANION GAP SERPL CALC-SCNC: 14 MMOL/L — SIGNIFICANT CHANGE UP (ref 5–17)
AST SERPL-CCNC: 11 U/L — SIGNIFICANT CHANGE UP (ref 10–40)
BILIRUB SERPL-MCNC: 0.3 MG/DL — SIGNIFICANT CHANGE UP (ref 0.2–1.2)
BLD GP AB SCN SERPL QL: POSITIVE — SIGNIFICANT CHANGE UP
BUN SERPL-MCNC: 15 MG/DL — SIGNIFICANT CHANGE UP (ref 7–23)
CALCIUM SERPL-MCNC: 7.7 MG/DL — LOW (ref 8.4–10.5)
CHLORIDE SERPL-SCNC: 99 MMOL/L — SIGNIFICANT CHANGE UP (ref 96–108)
CO2 SERPL-SCNC: 25 MMOL/L — SIGNIFICANT CHANGE UP (ref 22–31)
CREAT SERPL-MCNC: 0.5 MG/DL — SIGNIFICANT CHANGE UP (ref 0.5–1.3)
GLUCOSE BLDC GLUCOMTR-MCNC: 189 MG/DL — HIGH (ref 70–99)
GLUCOSE BLDC GLUCOMTR-MCNC: 192 MG/DL — HIGH (ref 70–99)
GLUCOSE BLDC GLUCOMTR-MCNC: 226 MG/DL — HIGH (ref 70–99)
GLUCOSE BLDC GLUCOMTR-MCNC: 228 MG/DL — HIGH (ref 70–99)
GLUCOSE SERPL-MCNC: 275 MG/DL — HIGH (ref 70–99)
HCT VFR BLD CALC: 19.8 % — CRITICAL LOW (ref 34.5–45)
HCT VFR BLD CALC: 25.8 % — LOW (ref 34.5–45)
HGB BLD-MCNC: 5.9 G/DL — CRITICAL LOW (ref 11.5–15.5)
HGB BLD-MCNC: 8 G/DL — LOW (ref 11.5–15.5)
MAGNESIUM SERPL-MCNC: 1.7 MG/DL — SIGNIFICANT CHANGE UP (ref 1.6–2.6)
MCHC RBC-ENTMCNC: 24.7 PG — LOW (ref 27–34)
MCHC RBC-ENTMCNC: 26.1 PG — LOW (ref 27–34)
MCHC RBC-ENTMCNC: 29.8 G/DL — LOW (ref 32–36)
MCHC RBC-ENTMCNC: 31 G/DL — LOW (ref 32–36)
MCV RBC AUTO: 82.8 FL — SIGNIFICANT CHANGE UP (ref 80–100)
MCV RBC AUTO: 84 FL — SIGNIFICANT CHANGE UP (ref 80–100)
PHOSPHATE SERPL-MCNC: 1.5 MG/DL — LOW (ref 2.5–4.5)
PLATELET # BLD AUTO: 356 K/UL — SIGNIFICANT CHANGE UP (ref 150–400)
PLATELET # BLD AUTO: 357 K/UL — SIGNIFICANT CHANGE UP (ref 150–400)
POTASSIUM SERPL-MCNC: 3.8 MMOL/L — SIGNIFICANT CHANGE UP (ref 3.5–5.3)
POTASSIUM SERPL-SCNC: 3.8 MMOL/L — SIGNIFICANT CHANGE UP (ref 3.5–5.3)
PROT SERPL-MCNC: 6.1 G/DL — SIGNIFICANT CHANGE UP (ref 6–8.3)
RBC # BLD: 2.39 M/UL — LOW (ref 3.8–5.2)
RBC # BLD: 3.07 M/UL — LOW (ref 3.8–5.2)
RBC # FLD: 21.7 % — HIGH (ref 10.3–16.9)
RBC # FLD: 23.6 % — HIGH (ref 10.3–16.9)
RH IG SCN BLD-IMP: NEGATIVE — SIGNIFICANT CHANGE UP
SODIUM SERPL-SCNC: 138 MMOL/L — SIGNIFICANT CHANGE UP (ref 135–145)
WBC # BLD: 12.8 K/UL — HIGH (ref 3.8–10.5)
WBC # BLD: 13.6 K/UL — HIGH (ref 3.8–10.5)
WBC # FLD AUTO: 12.8 K/UL — HIGH (ref 3.8–10.5)
WBC # FLD AUTO: 13.6 K/UL — HIGH (ref 3.8–10.5)

## 2018-03-24 PROCEDURE — 99232 SBSQ HOSP IP/OBS MODERATE 35: CPT

## 2018-03-24 RX ORDER — PHYTONADIONE (VIT K1) 5 MG
2.5 TABLET ORAL ONCE
Qty: 0 | Refills: 0 | Status: DISCONTINUED | OUTPATIENT
Start: 2018-03-24 | End: 2018-03-24

## 2018-03-24 RX ORDER — ACETAMINOPHEN 500 MG
650 TABLET ORAL EVERY 6 HOURS
Qty: 0 | Refills: 0 | Status: DISCONTINUED | OUTPATIENT
Start: 2018-03-24 | End: 2018-03-26

## 2018-03-24 RX ORDER — TRAMADOL HYDROCHLORIDE 50 MG/1
50 TABLET ORAL EVERY 8 HOURS
Qty: 0 | Refills: 0 | Status: DISCONTINUED | OUTPATIENT
Start: 2018-03-24 | End: 2018-03-27

## 2018-03-24 RX ADMIN — Medication 4 MILLIGRAM(S): at 18:01

## 2018-03-24 RX ADMIN — ENOXAPARIN SODIUM 40 MILLIGRAM(S): 100 INJECTION SUBCUTANEOUS at 12:24

## 2018-03-24 RX ADMIN — TRAMADOL HYDROCHLORIDE 50 MILLIGRAM(S): 50 TABLET ORAL at 01:18

## 2018-03-24 RX ADMIN — Medication 2: at 18:01

## 2018-03-24 RX ADMIN — TRAMADOL HYDROCHLORIDE 50 MILLIGRAM(S): 50 TABLET ORAL at 00:15

## 2018-03-24 RX ADMIN — CEFEPIME 100 MILLIGRAM(S): 1 INJECTION, POWDER, FOR SOLUTION INTRAMUSCULAR; INTRAVENOUS at 09:21

## 2018-03-24 RX ADMIN — TRAMADOL HYDROCHLORIDE 50 MILLIGRAM(S): 50 TABLET ORAL at 19:12

## 2018-03-24 RX ADMIN — Medication 1 MILLIGRAM(S): at 12:24

## 2018-03-24 RX ADMIN — Medication 166.67 MILLIGRAM(S): at 02:32

## 2018-03-24 RX ADMIN — Medication 1: at 09:21

## 2018-03-24 RX ADMIN — FLUCONAZOLE 100 MILLIGRAM(S): 150 TABLET ORAL at 12:24

## 2018-03-24 RX ADMIN — Medication 2: at 12:24

## 2018-03-24 RX ADMIN — Medication 4 MILLIGRAM(S): at 07:35

## 2018-03-24 RX ADMIN — Medication 166.67 MILLIGRAM(S): at 18:01

## 2018-03-24 RX ADMIN — CEFEPIME 100 MILLIGRAM(S): 1 INJECTION, POWDER, FOR SOLUTION INTRAMUSCULAR; INTRAVENOUS at 22:54

## 2018-03-24 NOTE — PROGRESS NOTE ADULT - SUBJECTIVE AND OBJECTIVE BOX
Patient is a 62y old  Female who presents with a chief complaint of fever/Generalized weakness/ and pain at port site (23 Mar 2018 22:25)      INTERVAL HPI/OVERNIGHT EVENTS:    Review of Systems: 12 point review of systems otherwise negative  ( - )fevers/chills  ( - ) dyspnea  ( - ) cough  ( - ) chest pain  ( - ) palpatations  ( - ) dizziness/lightheadedness  ( - ) nausea/vomiting  ( - ) abd pain  ( - ) diarrhea  ( - ) melena  ( - ) hematochezia  ( - ) dysuria  ( - ) hematuria  ( - ) leg swelling  ( -) calf tenderness  ( - ) motor weakness  ( - ) extremity numbness  ( - ) back pain  ( + ) tolerating POs  ( + ) BM    MEDICATIONS  (STANDING):  cefepime  IVPB 1000 milliGRAM(s) IV Intermittent every 12 hours  cefepime  IVPB 1000 milliGRAM(s) IV Intermittent every 12 hours  dexamethasone     Tablet 4 milliGRAM(s) Oral every 12 hours  dextrose 5%. 1000 milliLiter(s) (50 mL/Hr) IV Continuous <Continuous>  dextrose 50% Injectable 12.5 Gram(s) IV Push once  dextrose 50% Injectable 25 Gram(s) IV Push once  dextrose 50% Injectable 25 Gram(s) IV Push once  enoxaparin Injectable 40 milliGRAM(s) SubCutaneous every 24 hours  fluconAZOLE   Tablet 100 milliGRAM(s) Oral daily  folic acid 1 milliGRAM(s) Oral daily  insulin lispro (HumaLOG) corrective regimen sliding scale   SubCutaneous three times a day before meals  insulin lispro (HumaLOG) corrective regimen sliding scale   SubCutaneous at bedtime  vancomycin  IVPB 1250 milliGRAM(s) IV Intermittent every 12 hours    MEDICATIONS  (PRN):  acetaminophen   Tablet 650 milliGRAM(s) Oral every 6 hours PRN For Temp greater than 38 C (100.4 F)  acetaminophen   Tablet. 650 milliGRAM(s) Oral every 6 hours PRN Moderate Pain (4 - 6)  dextrose Gel 1 Dose(s) Oral once PRN Blood Glucose LESS THAN 70 milliGRAM(s)/deciliter  glucagon  Injectable 1 milliGRAM(s) IntraMuscular once PRN Glucose LESS THAN 70 milligrams/deciliter  traMADol 50 milliGRAM(s) Oral every 8 hours PRN Severe Pain (7 - 10)      Allergies    penicillins (Hives)    Intolerances          Vital Signs Last 24 Hrs  T(C): 36.6 (24 Mar 2018 09:00), Max: 36.8 (24 Mar 2018 05:25)  T(F): 97.9 (24 Mar 2018 09:00), Max: 98.2 (24 Mar 2018 05:25)  HR: 86 (24 Mar 2018 09:) (86 - 110)  BP: 131/81 (24 Mar 2018 09:00) (123/71 - 140/88)  BP(mean): --  RR: 16 (24 Mar 2018 09:) (16 - 18)  SpO2: 98% (24 Mar 2018 09:) (98% - 100%)  CAPILLARY BLOOD GLUCOSE      POCT Blood Glucose.: 228 mg/dL (24 Mar 2018 12:03)  POCT Blood Glucose.: 192 mg/dL (24 Mar 2018 08:15)       @ 07: @ 07:00  --------------------------------------------------------  IN: 1050 mL / OUT: 650 mL / NET: 400 mL     @ 07: @ 16:53  --------------------------------------------------------  IN: 350 mL / OUT: 0 mL / NET: 350 mL        Physical Exam:    Daily Height in cm: 165.1 (24 Mar 2018 00:10)    Daily Weight in k.1 (24 Mar 2018 00:10)  General:  Well appearing, NAD, not cachetic  HEENT:  Nonicteric, PERRLA  CV:  RRR, no murmur, no JVD  Lungs:  CTA B/L, no wheezes, rales, rhonchi  Abdomen:  Soft, non-tender, no distended, positive BS, no hepatosplenomegaly  Extremities:  2+ pulses, no c/c, no edema. Lt hand swelling  Skin:  Warm and dry, no rashes  :  No valdez  Neuro:  AAOx3, non-focal, CN II-XII grossly intact  No Restraints    LABS:                        5.9    12.8  )-----------( 357      ( 24 Mar 2018 02:34 )             19.8     03-24    138  |  99  |  15  ----------------------------<  275<H>  3.8   |  25  |  0.50    Ca    7.7<L>      24 Mar 2018 02:34  Phos  1.5       Mg     1.7         TPro  6.1  /  Alb  2.4<L>  /  TBili  0.3  /  DBili  x   /  AST  11  /  ALT  55<H>  /  AlkPhos  136<H>  24    PT/INR - ( 23 Mar 2018 14:42 )   PT: 15.9 sec;   INR: 1.42          PTT - ( 23 Mar 2018 14:42 )  PTT:29.7 sec  Urinalysis Basic - ( 23 Mar 2018 15:04 )    Color: Yellow / Appearance: Cloudy / S.010 / pH: x  Gluc: x / Ketone: NEGATIVE  / Bili: Negative / Urobili: 0.2 E.U./dL   Blood: x / Protein: Trace mg/dL / Nitrite: NEGATIVE   Leuk Esterase: NEGATIVE / RBC: < 5 /HPF / WBC < 5 /HPF   Sq Epi: x / Non Sq Epi: 0-5 /HPF / Bacteria: Present /HPF          RADIOLOGY & ADDITIONAL TESTS:    ---------------------------------------------------------------------------  I personally reviewed: [  ]EKG   [  ]CXR    [  ] CT    [  ]Other  ---------------------------------------------------------------------------  PLEASE CHECK WHEN PRESENT:     [  ]Heart Failure     [  ] Acute     [  ] Acute on Chronic     [  ] Chronic  -------------------------------------------------------------------     [  ]Diastolic [HFpEF]     [  ]Systolic [HFrEF]     [  ]Combined [HFpEF & HFrEF]     [  ]Other:  -------------------------------------------------------------------  [  ]FLAQUITA     [  ]ATN     [  ]Reneal Medullary Necrosis     [  ]Renal Cortical Necrosis     [  ]Other Pathological Lesions:    [  ]CKD 1  [  ]CKD 2  [  ]CKD 3  [  ]CKD 4  [  ]CKD 5  [  ]Other  -------------------------------------------------------------------  [  ]Other/Unspecified:    --------------------------------------------------------------------    Abdominal Nutritional Status  [  ]Malnutrition: See Nutrition Note  [  ]Cachexia  [  ]Other:   [  ]Supplement Ordered:  [  ]Morbid Obesity (BMI >=40]

## 2018-03-24 NOTE — PROGRESS NOTE ADULT - ASSESSMENT
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    #Stage IV Lung Adenocarcinoma   Patient with mets to brain and bone, PDL1 40% on Carboplatin, Pemetrexed, Pembrolizumab s/p 2 cycles, last 3/2/18, treatment course complicated by multiple hospitalizations including post obstructive pneumonia, collapsed lobe, +influenza, chemical pneumonitis. Both clinical cervical exam and CT imaging concerning for extensive R cervical lymphadenopathy, imaging also consistent with increasing R mediastinal and paratracheal lymphadenopathy. R hilar and R lower lobe necrotic masses persist, no significant change in size. In addition, patient with recent port placement (2 weeks prev), documented fever, leukocytosis w/bandemia, concerning for possibly catheter related sepsis- awaiting removal of line    Pt also had stereotactic radiosurgery for her brain met. she has had no follow up MRI head since december.    -Both clinical exam and imaging concerning for progressive disease, due to complications patient only able to complete 2 cycles of therapy, normally would assess response after 4 cycles, less likely however may consider a component of pseudo-progression 2/2 immunotherapy   -Closely monitor R cervical neck adenopathy, this finding is new compared to last outpatient visit on 2/28/18, less likely hematoma, CT image negative for abscess, likely progression  -continue with IV antibiotics, IR consult to consider Port/source exchange, would culture line   -follow up blood cxs   -clinically worse compared to baseline, at this time ecog atleast 3, likely related to acute infection, would need to clinically stabilize prior to next treatment (may need to consider alternative regimen). will discuss with Dr Noonan.  -elevated AST 82, TSH (outpatient low 0.24) may be related to AE of pembro, would trend LFTs, consider repeat TSH, elevated alk phos c/w bone disease  -dexamethasone 4mg BID for brain metastasis, CT head negative for acute pathology  -Nystatin swish swallow for hx of thrush   -pain control  -bowel regimen  -PT/OT    Normocytic Anemia   Likely multifactorial in nature, given exposure to chemo, poor nutrition, currently patient is not acutely bleeding or bruising. Iron panel from outpatient consistent with anemia of chronic inflammation, elevated ferritin may also be related to transfusion dependence. B12/Folate supplemented based on labs from 3/9/18, required supplementation while getting Pemetrexed.     -Monitor for clinical signs of bleeding, consider occult blood  -obtain reticulocyte count, LDH  -folic acid/B12 supplementation given treatment with pemetrexed       Coagulopathy   Patient chronically with elevated INR and PT, likely related to infection/sepsis, factor 7 shortest half life and decreases in sepsis, patient also with poor nutritional status albumin 2.7 so likely has decreased vit K dependent clotting factors.     -monitor for bleeding  -would hold off on product replacement (vit K) as patient is immobile with solid tumor thus increased hypercoag risk      DVT ppx - lovenox ppx    Discussed with Dr. Noonan

## 2018-03-24 NOTE — PROGRESS NOTE ADULT - SUBJECTIVE AND OBJECTIVE BOX
Hematology Oncology Consult Note (Dr. Noonan)     63 y/o F with a PMHX of HTN, DM, and Stage IV lung CA w/ mets to brain and bone (Dec 2nd) who underwent RT to bony mets on 8th rib, and had gamma knife tx to brain () s/p 2 cycles of systemic chemoimmunotherapy for metastatic disease complicated by post obstructive pneumonia, +influenza, confusion. Patients last chemo was on 3/2/18, infusion was well tolerated. Of note patient has been progressively more fatigued, including waxing/waning mentation which worsened after port placement two weeks prior. Patients family states she is in her bed >90% of the day and is unable to walk with walker any more, requires wheelchair for leaving the house, and bedside commode. Family has also noticed increasing size lump on the R neck region. During interview patient was fatigued however answered specific questions appropriately. Patient denied any acute bleeding or bruising including hemoptysis, hematuria, melena, or brbpr. Patient denied acute shortness of breath, chest pain, or bowel changes. In ED patient met criteria for sepsis, etiology may be catheter related.     Interval hx:    no complaints, more alert and awake since admission, more conversive. continues to have fatigue. no appetite. Pt Hgb 5.9 this am, received 1 unit of pRBC.     ROS is otherwise negative.    PHYSICAL EXAM:    ICU Vital Signs Last 24 Hrs  T(C): 36.6 (24 Mar 2018 09:00), Max: 36.8 (24 Mar 2018 05:25)  T(F): 97.9 (24 Mar 2018 09:00), Max: 98.2 (24 Mar 2018 05:25)  HR: 86 (24 Mar 2018 09:00) (86 - 110)  BP: 131/81 (24 Mar 2018 09:00) (123/71 - 140/88)  RR: 16 (24 Mar 2018 09:00) (16 - 18)  SpO2: 98% (24 Mar 2018 09:00) (98% - 100%)      GEN: awake, alert, A&Ox3, conversive, pleasant  HEENT: AT/NC, EOMI, no oral lesions   NECK: large R neck fixed adenopathy, non tender   CVS: S1S2 RRR   LUNG: Decreased inspiratory effort b/l  ABD: +BS, NT, ND   EXT: no c/c/e  NEURO: AAOx3    Labs:                          7.3    14.4  )-----------( 444      ( 23 Mar 2018 14:42 )             24.2     CBC Full  -  ( 23 Mar 2018 14:42 )  WBC Count : 14.4 K/uL  Hemoglobin : 7.3 g/dL  Hematocrit : 24.2 %  Platelet Count - Automated : 444 K/uL  Mean Cell Volume : 82.3 fL  Mean Cell Hemoglobin : 24.8 pg  Mean Cell Hemoglobin Concentration : 30.2 g/dL  Auto Neutrophil % : 55.0 %  Auto Lymphocyte % : 8.0 %  Auto Monocyte % : 5.0 %    PT/INR - ( 23 Mar 2018 14:42 )   PT: 15.9 sec;   INR: 1.42          PTT - ( 23 Mar 2018 14:42 )  PTT:29.7 sec        143  |  99  |  17  ----------------------------<  205<H>  3.2<L>   |  28  |  0.49<L>    Ca    8.8      23 Mar 2018 14:42    TPro  7.3  /  Alb  2.7<L>  /  TBili  0.4  /  DBili  x   /  AST  16  /  ALT  82<H>  /  AlkPhos  168<H>        Urinalysis Basic - ( 23 Mar 2018 15:04 )    Color: Yellow / Appearance: Cloudy / S.010 / pH: x  Gluc: x / Ketone: NEGATIVE  / Bili: Negative / Urobili: 0.2 E.U./dL   Blood: x / Protein: Trace mg/dL / Nitrite: NEGATIVE   Leuk Esterase: NEGATIVE / RBC: < 5 /HPF / WBC < 5 /HPF   Sq Epi: x / Non Sq Epi: 0-5 /HPF / Bacteria: Present /HPF      CT Head  =   IMPRESSION:     1. No acute intracranial abnormality. Specifically, no acute intracranial   hemorrhage, mass effect or recent transcortical or territorial infarction.    2. Mild small vessel ischemic disease.      CT Head Chest =  IMPRESSION:   No central pulmonary embolus up to the proximal segmental level.   Evaluation of the distal segmental and subsegmental pulmonary arteries to   the right lower and middle lobes is limited by the extensive right hilar   lymphadenopathy.      The right cervical lymphadenopathy and the right paratracheal   lymphadenopathy have slightly increased in the interval. Right hilar and   right lower lobe necrotic masses again seen.    Small pericardial effusion, mildly increased since the prior study.    Small right pleural effusion, new since the prior study.

## 2018-03-24 NOTE — PROGRESS NOTE ADULT - ASSESSMENT
61 yo AAF with presenting to ED for fever/chills, increased lethargy and port site pain and erythema, PMHX of HTN, DM, and Stage IV lung CA w/ mets to brain and bone s/p RTX and gamma knife Rtx to brain and 2 cycles of systemic chemoimmunotherapy for metastatic disease, met sepsis criteria in the ED, presumably line related sepsis.    1. line sepsis  presented with fever chills/lethargy. and Tm 100.9. WBC 14 now 12. currently afebrile.  as per family, patient looks much better today, more awake and alert.  c/w  cefepime/vanco/fluconazole  ID reccs appreciated.    2. lung cancer stage IV adenocarcinoma w brain mets, bone mets.  f/u hem/onc reccs    3. HTN - stable. holding BP meds    4. DVT ppx lovenox 40mg d

## 2018-03-25 DIAGNOSIS — R60.0 LOCALIZED EDEMA: ICD-10-CM

## 2018-03-25 LAB
ALBUMIN SERPL ELPH-MCNC: 2.6 G/DL — LOW (ref 3.3–5)
ALP SERPL-CCNC: 148 U/L — HIGH (ref 40–120)
ALT FLD-CCNC: 45 U/L — SIGNIFICANT CHANGE UP (ref 10–45)
ANION GAP SERPL CALC-SCNC: 16 MMOL/L — SIGNIFICANT CHANGE UP (ref 5–17)
AST SERPL-CCNC: 10 U/L — SIGNIFICANT CHANGE UP (ref 10–40)
BILIRUB SERPL-MCNC: 0.5 MG/DL — SIGNIFICANT CHANGE UP (ref 0.2–1.2)
BUN SERPL-MCNC: 11 MG/DL — SIGNIFICANT CHANGE UP (ref 7–23)
CALCIUM SERPL-MCNC: 7.9 MG/DL — LOW (ref 8.4–10.5)
CHLORIDE SERPL-SCNC: 95 MMOL/L — LOW (ref 96–108)
CO2 SERPL-SCNC: 26 MMOL/L — SIGNIFICANT CHANGE UP (ref 22–31)
CREAT SERPL-MCNC: 0.43 MG/DL — LOW (ref 0.5–1.3)
CULTURE RESULTS: SIGNIFICANT CHANGE UP
GLUCOSE BLDC GLUCOMTR-MCNC: 189 MG/DL — HIGH (ref 70–99)
GLUCOSE BLDC GLUCOMTR-MCNC: 232 MG/DL — HIGH (ref 70–99)
GLUCOSE BLDC GLUCOMTR-MCNC: 282 MG/DL — HIGH (ref 70–99)
GLUCOSE BLDC GLUCOMTR-MCNC: 298 MG/DL — HIGH (ref 70–99)
GLUCOSE SERPL-MCNC: 206 MG/DL — HIGH (ref 70–99)
HCT VFR BLD CALC: 27.2 % — LOW (ref 34.5–45)
HGB BLD-MCNC: 8.4 G/DL — LOW (ref 11.5–15.5)
LYMPHOCYTES # BLD AUTO: 2 % — LOW (ref 13–44)
MAGNESIUM SERPL-MCNC: 1.5 MG/DL — LOW (ref 1.6–2.6)
MCHC RBC-ENTMCNC: 25.8 PG — LOW (ref 27–34)
MCHC RBC-ENTMCNC: 30.9 G/DL — LOW (ref 32–36)
MCV RBC AUTO: 83.4 FL — SIGNIFICANT CHANGE UP (ref 80–100)
MONOCYTES NFR BLD AUTO: 7 % — SIGNIFICANT CHANGE UP (ref 2–14)
NEUTROPHILS NFR BLD AUTO: 60 % — SIGNIFICANT CHANGE UP (ref 43–77)
PLATELET # BLD AUTO: 395 K/UL — SIGNIFICANT CHANGE UP (ref 150–400)
POTASSIUM SERPL-MCNC: 3.6 MMOL/L — SIGNIFICANT CHANGE UP (ref 3.5–5.3)
POTASSIUM SERPL-SCNC: 3.6 MMOL/L — SIGNIFICANT CHANGE UP (ref 3.5–5.3)
PROT SERPL-MCNC: 6.8 G/DL — SIGNIFICANT CHANGE UP (ref 6–8.3)
RBC # BLD: 3.16 M/UL — LOW (ref 3.8–5.2)
RBC # BLD: 3.26 M/UL — LOW (ref 3.8–5.2)
RBC # FLD: 21.9 % — HIGH (ref 10.3–16.9)
RETICS/RBC NFR: 1.8 % — SIGNIFICANT CHANGE UP (ref 0.5–2.5)
SODIUM SERPL-SCNC: 137 MMOL/L — SIGNIFICANT CHANGE UP (ref 135–145)
SPECIMEN SOURCE: SIGNIFICANT CHANGE UP
T4 FREE SERPL-MCNC: 0.87 NG/DL — SIGNIFICANT CHANGE UP (ref 0.7–1.48)
TSH SERPL-MCNC: 0.13 UIU/ML — LOW (ref 0.35–4.94)
VANCOMYCIN TROUGH SERPL-MCNC: 13 UG/ML — SIGNIFICANT CHANGE UP (ref 10–20)
WBC # BLD: 16.8 K/UL — HIGH (ref 3.8–10.5)
WBC # FLD AUTO: 16.8 K/UL — HIGH (ref 3.8–10.5)

## 2018-03-25 PROCEDURE — 93970 EXTREMITY STUDY: CPT | Mod: 26

## 2018-03-25 PROCEDURE — 99254 IP/OBS CNSLTJ NEW/EST MOD 60: CPT

## 2018-03-25 PROCEDURE — 99232 SBSQ HOSP IP/OBS MODERATE 35: CPT

## 2018-03-25 RX ORDER — CEFEPIME 1 G/1
2000 INJECTION, POWDER, FOR SOLUTION INTRAMUSCULAR; INTRAVENOUS EVERY 8 HOURS
Qty: 0 | Refills: 0 | Status: DISCONTINUED | OUTPATIENT
Start: 2018-03-25 | End: 2018-03-28

## 2018-03-25 RX ORDER — VANCOMYCIN HCL 1 G
1500 VIAL (EA) INTRAVENOUS EVERY 12 HOURS
Qty: 0 | Refills: 0 | Status: DISCONTINUED | OUTPATIENT
Start: 2018-03-25 | End: 2018-03-26

## 2018-03-25 RX ORDER — POLYETHYLENE GLYCOL 3350 17 G/17G
17 POWDER, FOR SOLUTION ORAL DAILY
Qty: 0 | Refills: 0 | Status: DISCONTINUED | OUTPATIENT
Start: 2018-03-25 | End: 2018-04-04

## 2018-03-25 RX ORDER — VANCOMYCIN HCL 1 G
1250 VIAL (EA) INTRAVENOUS EVERY 12 HOURS
Qty: 0 | Refills: 0 | Status: DISCONTINUED | OUTPATIENT
Start: 2018-03-25 | End: 2018-03-25

## 2018-03-25 RX ORDER — POTASSIUM CHLORIDE 20 MEQ
20 PACKET (EA) ORAL
Qty: 0 | Refills: 0 | Status: COMPLETED | OUTPATIENT
Start: 2018-03-25 | End: 2018-03-25

## 2018-03-25 RX ORDER — SODIUM CHLORIDE 9 MG/ML
500 INJECTION INTRAMUSCULAR; INTRAVENOUS; SUBCUTANEOUS ONCE
Qty: 0 | Refills: 0 | Status: COMPLETED | OUTPATIENT
Start: 2018-03-25 | End: 2018-03-25

## 2018-03-25 RX ORDER — MAGNESIUM SULFATE 500 MG/ML
4 VIAL (ML) INJECTION ONCE
Qty: 0 | Refills: 0 | Status: COMPLETED | OUTPATIENT
Start: 2018-03-25 | End: 2018-03-25

## 2018-03-25 RX ORDER — CEFEPIME 1 G/1
2000 INJECTION, POWDER, FOR SOLUTION INTRAMUSCULAR; INTRAVENOUS EVERY 8 HOURS
Qty: 0 | Refills: 0 | Status: DISCONTINUED | OUTPATIENT
Start: 2018-03-25 | End: 2018-03-25

## 2018-03-25 RX ORDER — VANCOMYCIN HCL 1 G
1500 VIAL (EA) INTRAVENOUS EVERY 12 HOURS
Qty: 0 | Refills: 0 | Status: DISCONTINUED | OUTPATIENT
Start: 2018-03-25 | End: 2018-03-25

## 2018-03-25 RX ORDER — OXYCODONE HYDROCHLORIDE 5 MG/1
5 TABLET ORAL EVERY 4 HOURS
Qty: 0 | Refills: 0 | Status: DISCONTINUED | OUTPATIENT
Start: 2018-03-25 | End: 2018-03-26

## 2018-03-25 RX ORDER — INSULIN GLARGINE 100 [IU]/ML
4 INJECTION, SOLUTION SUBCUTANEOUS AT BEDTIME
Qty: 0 | Refills: 0 | Status: DISCONTINUED | OUTPATIENT
Start: 2018-03-25 | End: 2018-03-26

## 2018-03-25 RX ADMIN — Medication 100 GRAM(S): at 18:11

## 2018-03-25 RX ADMIN — Medication 20 MILLIEQUIVALENT(S): at 13:30

## 2018-03-25 RX ADMIN — ENOXAPARIN SODIUM 40 MILLIGRAM(S): 100 INJECTION SUBCUTANEOUS at 13:31

## 2018-03-25 RX ADMIN — Medication 1: at 09:08

## 2018-03-25 RX ADMIN — Medication 3: at 18:11

## 2018-03-25 RX ADMIN — CEFEPIME 2000 MILLIGRAM(S): 1 INJECTION, POWDER, FOR SOLUTION INTRAMUSCULAR; INTRAVENOUS at 21:48

## 2018-03-25 RX ADMIN — Medication 300 MILLIGRAM(S): at 09:08

## 2018-03-25 RX ADMIN — CEFEPIME 100 MILLIGRAM(S): 1 INJECTION, POWDER, FOR SOLUTION INTRAMUSCULAR; INTRAVENOUS at 13:30

## 2018-03-25 RX ADMIN — Medication 20 MILLIEQUIVALENT(S): at 09:08

## 2018-03-25 RX ADMIN — OXYCODONE HYDROCHLORIDE 5 MILLIGRAM(S): 5 TABLET ORAL at 19:15

## 2018-03-25 RX ADMIN — Medication 1 MILLIGRAM(S): at 13:30

## 2018-03-25 RX ADMIN — Medication 3: at 13:45

## 2018-03-25 RX ADMIN — Medication 4 MILLIGRAM(S): at 18:11

## 2018-03-25 RX ADMIN — OXYCODONE HYDROCHLORIDE 5 MILLIGRAM(S): 5 TABLET ORAL at 14:36

## 2018-03-25 RX ADMIN — SODIUM CHLORIDE 1000 MILLILITER(S): 9 INJECTION INTRAMUSCULAR; INTRAVENOUS; SUBCUTANEOUS at 13:31

## 2018-03-25 RX ADMIN — FLUCONAZOLE 100 MILLIGRAM(S): 150 TABLET ORAL at 13:30

## 2018-03-25 RX ADMIN — OXYCODONE HYDROCHLORIDE 5 MILLIGRAM(S): 5 TABLET ORAL at 14:06

## 2018-03-25 RX ADMIN — Medication 4 MILLIGRAM(S): at 05:39

## 2018-03-25 RX ADMIN — OXYCODONE HYDROCHLORIDE 5 MILLIGRAM(S): 5 TABLET ORAL at 18:36

## 2018-03-25 RX ADMIN — Medication 300 MILLIGRAM(S): at 21:48

## 2018-03-25 RX ADMIN — INSULIN GLARGINE 4 UNIT(S): 100 INJECTION, SOLUTION SUBCUTANEOUS at 21:48

## 2018-03-25 NOTE — CONSULT NOTE ADULT - ASSESSMENT
IMPRESSION:  Immunocompromised patient with leukocytosis, fevers, lethargy and swelling at port site.  Most likely source is line sepsis.  Blood culture are negative to date.  She does no have any urinary symptoms and her UA is negative.  I suspect that the yeast in the urine is a colonizer.  This patient extensively colonized with yeast as evidenced by the thrush in her mouth.  Likely causes of line sepsis include gram positive and gram negative bacteria although yeast is a possibility      Recommend:  1.  Can continue vancomycin for now, agree with the increased dose as the level was slightly low  2.  Increase cefepime to 2 grams IV q8hrs (pseudomonal dosing)  3.  Continue fluconazole 100 mg PO daily for the treatment of thrush.  Would not treat yeast in urine at this time as it is likely a colonizer  4.  Agree with removing catheter and sending tip for culture  5. If she has new fevers, blood cultures should be resent     Will follow

## 2018-03-25 NOTE — CONSULT NOTE ADULT - SUBJECTIVE AND OBJECTIVE BOX
HPI:  63 yo AA F presenting to [facility] ED for fever/chills, waxing and waning confusion over past few days associated with right chest and shoulder pain and swelling around the port insertion site, port placed on 3/13 and never used.  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 Patients last chemo was on 3/2/18, and recent admission for toxic metabolic encephalopathy in the setting of pneumonia and discharged home on 3/9, since discharge pt has been progressively more fatigued, including waxing/waning mentation which worsened after port placement two weeks prior.  in the ED pt found to be septic with fever, tachycardia and elevated WBC count/bandemia and slightly elevated lactate at 2.5, was Alert and oriented, mental status intact at the time of interview, denied HD or neck stiffness however says that she has been experiencing HA on and off but it is at baseline, denies SOB/CP/Palpitation/cough/Abdominal Pain/diarrhea/Urinary symptoms/ melena or hematochezia. pt is admitted for ? line related sepsis. Had CXR and CTA unremarkable for acute findings.     Patient was admitted to .  Started on vanco and cefepime as empiric treatment of bacteremia and line sepsis.  Also found to have a yeast in urine culture.  Patient notes pain in her right shoulder.  She does not have any SOB.  She has no abdominal pain, dysuria or discomfort when urinating.  + mild cough that is occasionally productive.      Daughter who is a nurse states that she had the port placed on 3/13.  She notes that 2 days later the left side of the neck had some swelling.  She was told to place ice on it and the swelling resolved.  2 days prior to arrival the swelling returned and she tried the ice again but it did not help she brought her mother to ER.  She notes that she has been more lethargic and had low grade fevers at home     PAST MEDICAL & SURGICAL HISTORY:  Brain metastases  Bone metastasis  Lung cancer  Hypertension  Diabetes  History of radiation therapy  Status post gamma knife treatment        REVIEW OF SYSTEMS:    General:	 + weakness; + fevers, no chills  Skin/Breast: no rash  Respiratory and Thorax: no SOB, + cough  Cardiovascular:	No chest pain  Gastrointestinal:	 no nausea, vomiting , diarrhea  Genitourinary:	no dysuria, no difficulty urinating, no hematuria  Musculoskeletal:	no weakness, right shoulder pain   Neurological:	no focal weakness/numbness  Endocrine:	no polyuria, no polydipsia      ANTIBIOTICS:  MEDICATIONS  (STANDING):  cefepime Injectable. 2000 milliGRAM(s) IV Push every 8 hours  dexamethasone     Tablet 4 milliGRAM(s) Oral every 12 hours  dextrose 5%. 1000 milliLiter(s) (50 mL/Hr) IV Continuous <Continuous>  dextrose 50% Injectable 12.5 Gram(s) IV Push once  dextrose 50% Injectable 25 Gram(s) IV Push once  dextrose 50% Injectable 25 Gram(s) IV Push once  fluconAZOLE   Tablet 100 milliGRAM(s) Oral daily  folic acid 1 milliGRAM(s) Oral daily  insulin glargine Injectable (LANTUS) 4 Unit(s) SubCutaneous at bedtime  insulin lispro (HumaLOG) corrective regimen sliding scale   SubCutaneous three times a day before meals  insulin lispro (HumaLOG) corrective regimen sliding scale   SubCutaneous at bedtime  magnesium sulfate  IVPB 4 Gram(s) IV Intermittent once  vancomycin  IVPB 1250 milliGRAM(s) IV Intermittent every 12 hours    MEDICATIONS  (PRN):  acetaminophen   Tablet 650 milliGRAM(s) Oral every 6 hours PRN For Temp greater than 38 C (100.4 F)  acetaminophen   Tablet. 650 milliGRAM(s) Oral every 6 hours PRN Moderate Pain (4 - 6)  dextrose Gel 1 Dose(s) Oral once PRN Blood Glucose LESS THAN 70 milliGRAM(s)/deciliter  glucagon  Injectable 1 milliGRAM(s) IntraMuscular once PRN Glucose LESS THAN 70 milligrams/deciliter  oxyCODONE    IR 5 milliGRAM(s) Oral every 4 hours PRN Severe Pain (7 - 10)  polyethylene glycol 3350 17 Gram(s) Oral daily PRN Constipation  traMADol 50 milliGRAM(s) Oral every 8 hours PRN Severe Pain (7 - 10)      Allergies    penicillins (Hives)    Intolerances        SOCIAL HISTORY:    FAMILY HISTORY:  Family history of transitional cell carcinoma of bladder (Sibling)  Family history of renal cell carcinoma (Sibling)      Vital Signs Last 24 Hrs  T(C): 37.1 (25 Mar 2018 11:39), Max: 37.2 (24 Mar 2018 22:00)  T(F): 98.7 (25 Mar 2018 11:39), Max: 99 (24 Mar 2018 22:00)  HR: 107 (25 Mar 2018 11:39) (100 - 112)  BP: 130/83 (25 Mar 2018 11:39) (130/83 - 139/85)  BP(mean): --  RR: 15 (25 Mar 2018 11:39) (15 - 16)  SpO2: 99% (25 Mar 2018 11:39) (97% - 100%)    PHYSICAL EXAM:  Constitutional: ill appearing, lethargic  Eyes: no icterus  Ear/Nose/Throat: no sinus tenderness on percussion; + thrush 	  Neck: right paratracheal swelling, non-tender  Respiratory: CTA marck but poor inspiratory effort; port on right side of chest; non-tender and no erythema  Cardiovascular: S1S2 RRR, no murmurs  Gastrointestinal:soft, (+) BS, no HSM  Extremities:no edema  Vascular: DP Pulse:	right normal; left normal            LABS:                        8.4    16.8  )-----------( 395      ( 25 Mar 2018 06:07 )             27.2     03-25    137  |  95<L>  |  11  ----------------------------<  206<H>  3.6   |  26  |  0.43<L>    Ca    7.9<L>      25 Mar 2018 06:07  Phos  1.5     03-24  Mg     1.5     03-25    TPro  6.8  /  Alb  2.6<L>  /  TBili  0.5  /  DBili  x   /  AST  10  /  ALT  45  /  AlkPhos  148<H>  03-25          MICROBIOLOGY:  Culture - Urine (03.23.18 @ 16:18)    Specimen Source: .Urine Clean Catch (Midstream)    Culture Results:   >100,000 CFU/ml Yeast  Pending further identification at [facility]  Antibiotic susceptibility testing is performed at request only by calling  24/7 at [phone]    Culture - Blood (03.23.18 @ 15:45)    Specimen Source: .Blood Blood-Peripheral    Culture Results:   No growth at 2 days.        RADIOLOGY & ADDITIONAL STUDIES:  < from: CT Angio Chest PE Protocol w/ IV Cont (03.23.18 @ 18:31) >  here is again extensive, partially visualized right cervical   lymphadenopathy, mediastinal lymphadenopathy and right hilar   lymphadenopathy. The right lower cervical lymphadenopathy measures 4.7 x   3.4 cm, previously 4.1 x 2.3 cm. The largest mediastinal lisandro   conglomerate measures approximately 8.3 x 4.8 x 7.8 cm, and on the prior   study 8.3 x 4.1 x 6.5 cm  in the right paratracheal region. There is   again an approximately 5.8 x 5.0 cm right hilar mass, which measured 5.9   x 4.4 cm on the prior study. There is a 3.4 x 3.0 cm wedge-shaped   necrotic pleural-based mass in the posterior right lower lobe, which   previously measured 3.6 x 3.2 cm.. The hilar mass encases and narrows   subsegmental pulmonary artery branches to the right lower and middle   lobes. There is also narrowing of the right lower and middle lobe   bronchi, and focal occlusion of the proximal right lower lobe segmental   and subsegmental bronchi.. There is a persistent 2.4 x 2.3 cm groundglass   opacity in the posterior right upper lobe, which previously measured 2.4   x 2.4 cm.. There is a small right pleural effusion, new since the prior   study. There are bibasilar atelectatic changes. The trachea is deviated   to the left and compressed by the right paratracheal lymphadenopathy.    Limited evaluation of the upper abdomen demonstrates no significant   change in the 4.4 mm pancreatic duct dilatation, the etiology of which is   not seen on this examination. 2 cm left adrenal mass again noted.   Unchanged 1 cm cyst in the right lobe of liver. There may be sludge in   the gallbladder.     Evaluation of the osseous structures demonstrates degenerative changes of   the spine, worst at the lower cervical level.    IMPRESSION:   No central pulmonary embolus up to the proximal segmental level.   Evaluation of the distal segmental and subsegmental pulmonary arteries to   the right lower and middle lobes is limited by the extensive right hilar   lymphadenopathy.      The right cervical lymphadenopathy and the right paratracheal   lymphadenopathy have slightly increased in the interval.Right hilar and   right lower lobe necrotic masses again seen.    Small pericardial effusion, mildly increased since the prior study.    Small right pleural effusion, new since the prior study.

## 2018-03-25 NOTE — PROGRESS NOTE ADULT - PROBLEM SELECTOR PLAN 8
DVT ppx: lovenox  pain control with tylenol and prn tramadol  Ulcer Ppx: PPI  OOB   glycemic monitoring  Bowel regimen

## 2018-03-25 NOTE — PROGRESS NOTE ADULT - SUBJECTIVE AND OBJECTIVE BOX
Overnight Events: ANGELIKA  Subjective/ROS: Patient reports feeling "okay" this morning. She endorses rhinorrhea and occasional tightness over her R shoulder. Denies headache, dizziness, lightheadedness, chest pain, shortness of breath, palpitations, abdominal pain, nausea, vomiting, diarrhea, fevers, and chills.     VITAL SIGNS:  Vital Signs Last 24 Hrs  T(C): 36.9 (25 Mar 2018 05:26), Max: 37.2 (24 Mar 2018 22:00)  T(F): 98.5 (25 Mar 2018 05:26), Max: 99 (24 Mar 2018 22:00)  HR: 112 (25 Mar 2018 05:) (100 - 112)  BP: 136/86 (25 Mar 2018 05:26) (136/86 - 139/85)  BP(mean): --  RR: 16 (25 Mar 2018 05:26) (16 - 16)  SpO2: 97% (25 Mar 2018 05:26) (97% - 100%)    PHYSICAL EXAM:    General: Well nourished middle aged woman resting comfortably in bed; NAD  HEENT: NC/AT; EOMI, PEERLA anicteric sclera; MMM, +thrush  Neck: supple, palpable R neck mass, non-tender to palpation  Cardiovascular: +S1/S2; tachycardic, regular rhythm, no r/m/g  Respiratory: CTA B/L; no W/R/R; R sided chest port without erythema, swelling, purulence, and tenderness   Gastrointestinal: soft, NT/ND; +BS  Extremities: WWP; RUE > LUE in size; RUE edema, sensation and pulses of RUE intact, non-tender to palpation  Vascular: 2+ radial, DP pulses B/L  Neurological: AAOx3; no focal deficits    MEDICATIONS:  MEDICATIONS  (STANDING):  cefepime  IVPB 1000 milliGRAM(s) IV Intermittent every 12 hours  cefepime  IVPB 1000 milliGRAM(s) IV Intermittent every 12 hours  dexamethasone     Tablet 4 milliGRAM(s) Oral every 12 hours  dextrose 5%. 1000 milliLiter(s) (50 mL/Hr) IV Continuous <Continuous>  dextrose 50% Injectable 12.5 Gram(s) IV Push once  dextrose 50% Injectable 25 Gram(s) IV Push once  dextrose 50% Injectable 25 Gram(s) IV Push once  enoxaparin Injectable 40 milliGRAM(s) SubCutaneous every 24 hours  fluconAZOLE   Tablet 100 milliGRAM(s) Oral daily  folic acid 1 milliGRAM(s) Oral daily  insulin lispro (HumaLOG) corrective regimen sliding scale   SubCutaneous three times a day before meals  insulin lispro (HumaLOG) corrective regimen sliding scale   SubCutaneous at bedtime  magnesium sulfate  IVPB 4 Gram(s) IV Intermittent once  potassium chloride    Tablet ER 20 milliEquivalent(s) Oral every 2 hours  vancomycin  IVPB 1500 milliGRAM(s) IV Intermittent every 12 hours    MEDICATIONS  (PRN):  acetaminophen   Tablet 650 milliGRAM(s) Oral every 6 hours PRN For Temp greater than 38 C (100.4 F)  acetaminophen   Tablet. 650 milliGRAM(s) Oral every 6 hours PRN Moderate Pain (4 - 6)  dextrose Gel 1 Dose(s) Oral once PRN Blood Glucose LESS THAN 70 milliGRAM(s)/deciliter  glucagon  Injectable 1 milliGRAM(s) IntraMuscular once PRN Glucose LESS THAN 70 milligrams/deciliter  traMADol 50 milliGRAM(s) Oral every 8 hours PRN Severe Pain (7 - 10)      ALLERGIES:  Allergies    penicillins (Hives)    Intolerances        LABS:                        8.4    16.8  )-----------( 395      ( 25 Mar 2018 06:07 )             27.2     03-25    137  |  95<L>  |  11  ----------------------------<  206<H>  3.6   |  26  |  0.43<L>    Ca    7.9<L>      25 Mar 2018 06:07  Phos  1.5     -24  Mg     1.5     -25    TPro  6.8  /  Alb  2.6<L>  /  TBili  0.5  /  DBili  x   /  AST  10  /  ALT  45  /  AlkPhos  148<H>  03-25    PT/INR - ( 23 Mar 2018 14:42 )   PT: 15.9 sec;   INR: 1.42          PTT - ( 23 Mar 2018 14:42 )  PTT:29.7 sec  Urinalysis Basic - ( 23 Mar 2018 15:04 )    Color: Yellow / Appearance: Cloudy / S.010 / pH: x  Gluc: x / Ketone: NEGATIVE  / Bili: Negative / Urobili: 0.2 E.U./dL   Blood: x / Protein: Trace mg/dL / Nitrite: NEGATIVE   Leuk Esterase: NEGATIVE / RBC: < 5 /HPF / WBC < 5 /HPF   Sq Epi: x / Non Sq Epi: 0-5 /HPF / Bacteria: Present /HPF      CAPILLARY BLOOD GLUCOSE      POCT Blood Glucose.: 189 mg/dL (25 Mar 2018 08:09)      RADIOLOGY & ADDITIONAL TESTS: Reviewed.

## 2018-03-25 NOTE — PROGRESS NOTE ADULT - PROBLEM SELECTOR PLAN 4
- Fluconazole 100 mg daily x 7 days    #Urine w/yeast; asymptomatic   -Awaiting susceptibility testing

## 2018-03-25 NOTE — PROGRESS NOTE ADULT - ATTENDING COMMENTS
1. ID  c/w vanco/cefepime  blood cultures negative to date    UCx positive for Yeast > 100,000  currently on po fluconazole 100mg daily for thrush  suspect yeast not being treated by fluconazole dose as patietnt is immunocompromised and may be resistant candida  -get ID consult today for reccomendations regarding abx regimen    -IR removal of port tomorrow. 1. ID - sepsis  -currently on vanco/cefepime  blood cultures negative to date  -UCx positive for Yeast > 100,000  currently on po fluconazole 100mg daily for thrush  suspect yeast not being treated by fluconazole dose as patietnt is immunocompromised and may be resistant candida    -get ID consult today for recommendations regarding abx regimen and escalating anti-fungal therapy  -IR removal of port tomorrow.

## 2018-03-25 NOTE — PROGRESS NOTE ADULT - PROBLEM SELECTOR PLAN 3
#Stage IV Lung Adenocarcinoma   Patient with mets to brain and bone, PDL1 40% on Carboplatin, Pemetrexed, Pembrolizumab s/p 2 cycles, last 3/2/18, treatment course complicated by multiple hospitalizations including post obstructive pneumonia, collapsed lob cb +influenza s/p debulking and chemical pneumonitis. Both clinical cervical exam and CT imaging concerning for extensive R cervical lymphadenopathy, imaging also consistent with increasing R mediastinal and paratracheal lymphadenopathy. R hilar and R lower lobe necrotic masses persist, no significant change in size. In addition, patient with recent port placement (2 weeks prev), documented fever, leukocytosis w/bandemia, concerning for possibly catheter related sepsis.   pt [facility] ED.  [facility] prior to next treatment (may need to consider alternative regimen)  - trend CMP/ AlP  - dexamethasone 4mg BID for brain metastasis, CT head negative for acute pathology  - f/u hematoclogy/oncology recs #Stage IV Lung Adenocarcinoma   Patient with mets to brain and bone, PDL1 40% on Carboplatin, Pemetrexed, Pembrolizumab s/p 2 cycles, last 3/2/18, treatment course complicated by multiple hospitalizations including post obstructive pneumonia, collapsed lob cb +influenza s/p debulking and chemical pneumonitis. Both clinical cervical exam and CT imaging concerning for extensive R cervical lymphadenopathy, imaging also consistent with increasing R mediastinal and paratracheal lymphadenopathy. R hilar and R lower lobe necrotic masses persist, no significant change in size. In addition, patient with recent port placement (2 weeks prev), documented fever, leukocytosis w/bandemia, concerning for possibly catheter related sepsis.   pt [facility].  [facility] to [facility] stabilize prior to next treatment (may need to consider alternative regimen)  -CXR from 3/23 c/f right paratracheal mass compressing the trachea which is narrowed to 8 mm in transverse diameter.  - trend CMP/ AlP  - dexamethasone 4mg BID for brain metastasis, CT head negative for acute pathology  - f/u hematoclogy/oncology recs

## 2018-03-25 NOTE — PROGRESS NOTE ADULT - ASSESSMENT
63 yo AAF with presenting to ED for fever/chills, increased lethargy and port site pain and erythema, PMHX of HTN, DM, and Stage IV lung CA w/ mets to brain and bone s/p RTX and gamma knife Rtx to brain and 2 cycles of systemic chemoimmunotherapy for metastatic disease, met sepsis criteria in the ED, presumably line related sepsis.

## 2018-03-25 NOTE — PROGRESS NOTE ADULT - PROBLEM SELECTOR PLAN 1
most likely in the setting of newly placed port infection  - will broadly cover with Vanc 1500mg BID, cefepime 1000mg q12h,(pt has penicillin allergy), and fluconazole 100mg q24h  - f/u BCx (ngtd) and tailor Abx accordingly  - monitor WBC count and fever  - tylenol PRN for fever, however cautious given LFT abnormalities    - IR consult for removal of central line and catheter tip culture Most likely in the setting of newly placed port infection  - will broadly cover with Vanc 1250mg BID, cefepime 2000mg q8h,(pt has penicillin allergy), and fluconazole 100mg q24h  -UCx with yeast, though patient without urinary complaints; likely colonized  - f/u BCx (ngtd) and tailor Abx accordingly  - monitor WBC count and fever  - tylenol PRN for fever, however cautious given LFT abnormalities    - IR consult for removal of central line and catheter tip culture  -ID consulted; appreciate recs

## 2018-03-26 DIAGNOSIS — R53.81 OTHER MALAISE: ICD-10-CM

## 2018-03-26 DIAGNOSIS — M25.519 PAIN IN UNSPECIFIED SHOULDER: ICD-10-CM

## 2018-03-26 DIAGNOSIS — T82.7XXA INFECTION AND INFLAMMATORY REACTION DUE TO OTHER CARDIAC AND VASCULAR DEVICES, IMPLANTS AND GRAFTS, INITIAL ENCOUNTER: ICD-10-CM

## 2018-03-26 DIAGNOSIS — R53.1 WEAKNESS: ICD-10-CM

## 2018-03-26 DIAGNOSIS — Z51.5 ENCOUNTER FOR PALLIATIVE CARE: ICD-10-CM

## 2018-03-26 LAB
ALBUMIN SERPL ELPH-MCNC: 2.1 G/DL — LOW (ref 3.3–5)
ALP SERPL-CCNC: 146 U/L — HIGH (ref 40–120)
ALT FLD-CCNC: 34 U/L — SIGNIFICANT CHANGE UP (ref 10–45)
ANION GAP SERPL CALC-SCNC: 15 MMOL/L — SIGNIFICANT CHANGE UP (ref 5–17)
ANISOCYTOSIS BLD QL: SIGNIFICANT CHANGE UP
AST SERPL-CCNC: 10 U/L — SIGNIFICANT CHANGE UP (ref 10–40)
BILIRUB SERPL-MCNC: 0.5 MG/DL — SIGNIFICANT CHANGE UP (ref 0.2–1.2)
BUN SERPL-MCNC: 8 MG/DL — SIGNIFICANT CHANGE UP (ref 7–23)
CALCIUM SERPL-MCNC: 7.9 MG/DL — LOW (ref 8.4–10.5)
CHLORIDE SERPL-SCNC: 91 MMOL/L — LOW (ref 96–108)
CO2 SERPL-SCNC: 26 MMOL/L — SIGNIFICANT CHANGE UP (ref 22–31)
CREAT SERPL-MCNC: 0.45 MG/DL — LOW (ref 0.5–1.3)
CULTURE RESULTS: SIGNIFICANT CHANGE UP
GLUCOSE BLDC GLUCOMTR-MCNC: 239 MG/DL — HIGH (ref 70–99)
GLUCOSE BLDC GLUCOMTR-MCNC: 283 MG/DL — HIGH (ref 70–99)
GLUCOSE BLDC GLUCOMTR-MCNC: 318 MG/DL — HIGH (ref 70–99)
GLUCOSE BLDC GLUCOMTR-MCNC: 332 MG/DL — HIGH (ref 70–99)
GLUCOSE SERPL-MCNC: 312 MG/DL — HIGH (ref 70–99)
HCT VFR BLD CALC: 28 % — LOW (ref 34.5–45)
HGB BLD-MCNC: 8.4 G/DL — LOW (ref 11.5–15.5)
HYPOCHROMIA BLD QL: SLIGHT — SIGNIFICANT CHANGE UP
LG PLATELETS BLD QL AUTO: PRESENT — SIGNIFICANT CHANGE UP
LYMPHOCYTES # BLD AUTO: 1 % — LOW (ref 13–44)
MACROCYTES BLD QL: SLIGHT — SIGNIFICANT CHANGE UP
MAGNESIUM SERPL-MCNC: 2 MG/DL — SIGNIFICANT CHANGE UP (ref 1.6–2.6)
MANUAL DIF COMMENT BLD-IMP: SIGNIFICANT CHANGE UP
MANUAL SMEAR VERIFICATION: SIGNIFICANT CHANGE UP
MCHC RBC-ENTMCNC: 25 PG — LOW (ref 27–34)
MCHC RBC-ENTMCNC: 30 G/DL — LOW (ref 32–36)
MCV RBC AUTO: 83.3 FL — SIGNIFICANT CHANGE UP (ref 80–100)
METAMYELOCYTES # FLD: 2 % — HIGH
MICROCYTES BLD QL: SLIGHT — SIGNIFICANT CHANGE UP
MONOCYTES NFR BLD AUTO: 5 % — SIGNIFICANT CHANGE UP (ref 2–14)
NEUTROPHILS NFR BLD AUTO: 85 % — HIGH (ref 43–77)
NEUTS BAND # BLD: 7 % — SIGNIFICANT CHANGE UP
NRBC # BLD: 1 /100 WBCS — HIGH
PLAT MORPH BLD: (no result)
PLATELET # BLD AUTO: 365 K/UL — SIGNIFICANT CHANGE UP (ref 150–400)
POLYCHROMASIA BLD QL SMEAR: SLIGHT — SIGNIFICANT CHANGE UP
POTASSIUM SERPL-MCNC: 3.9 MMOL/L — SIGNIFICANT CHANGE UP (ref 3.5–5.3)
POTASSIUM SERPL-SCNC: 3.9 MMOL/L — SIGNIFICANT CHANGE UP (ref 3.5–5.3)
PROT SERPL-MCNC: 6.6 G/DL — SIGNIFICANT CHANGE UP (ref 6–8.3)
RBC # BLD: 3.36 M/UL — LOW (ref 3.8–5.2)
RBC # BLD: 3.36 M/UL — LOW (ref 3.8–5.2)
RBC # FLD: 21.9 % — HIGH (ref 10.3–16.9)
RBC BLD AUTO: (no result)
RETICS/RBC NFR: 1.8 % — SIGNIFICANT CHANGE UP (ref 0.5–2.5)
SODIUM SERPL-SCNC: 132 MMOL/L — LOW (ref 135–145)
SPECIMEN SOURCE: SIGNIFICANT CHANGE UP
SPHEROCYTES BLD QL SMEAR: SLIGHT — SIGNIFICANT CHANGE UP
VANCOMYCIN TROUGH SERPL-MCNC: 22 UG/ML — HIGH (ref 10–20)
WBC # BLD: 19.2 K/UL — HIGH (ref 3.8–10.5)
WBC # FLD AUTO: 19.2 K/UL — HIGH (ref 3.8–10.5)

## 2018-03-26 PROCEDURE — 36590 REMOVAL TUNNELED CV CATH: CPT

## 2018-03-26 PROCEDURE — 99232 SBSQ HOSP IP/OBS MODERATE 35: CPT

## 2018-03-26 PROCEDURE — 99233 SBSQ HOSP IP/OBS HIGH 50: CPT | Mod: GC

## 2018-03-26 PROCEDURE — 93010 ELECTROCARDIOGRAM REPORT: CPT

## 2018-03-26 RX ORDER — INSULIN LISPRO 100/ML
VIAL (ML) SUBCUTANEOUS
Qty: 0 | Refills: 0 | Status: DISCONTINUED | OUTPATIENT
Start: 2018-03-26 | End: 2018-04-04

## 2018-03-26 RX ORDER — ENOXAPARIN SODIUM 100 MG/ML
40 INJECTION SUBCUTANEOUS EVERY 24 HOURS
Qty: 0 | Refills: 0 | Status: DISCONTINUED | OUTPATIENT
Start: 2018-03-26 | End: 2018-04-04

## 2018-03-26 RX ORDER — VANCOMYCIN HCL 1 G
1250 VIAL (EA) INTRAVENOUS EVERY 12 HOURS
Qty: 0 | Refills: 0 | Status: DISCONTINUED | OUTPATIENT
Start: 2018-03-26 | End: 2018-03-28

## 2018-03-26 RX ORDER — SODIUM CHLORIDE 9 MG/ML
1000 INJECTION INTRAMUSCULAR; INTRAVENOUS; SUBCUTANEOUS
Qty: 0 | Refills: 0 | Status: DISCONTINUED | OUTPATIENT
Start: 2018-03-26 | End: 2018-03-27

## 2018-03-26 RX ORDER — INSULIN GLARGINE 100 [IU]/ML
10 INJECTION, SOLUTION SUBCUTANEOUS AT BEDTIME
Qty: 0 | Refills: 0 | Status: DISCONTINUED | OUTPATIENT
Start: 2018-03-26 | End: 2018-03-27

## 2018-03-26 RX ORDER — OXYCODONE HYDROCHLORIDE 5 MG/1
5 TABLET ORAL EVERY 4 HOURS
Qty: 0 | Refills: 0 | Status: DISCONTINUED | OUTPATIENT
Start: 2018-03-26 | End: 2018-03-30

## 2018-03-26 RX ORDER — ACETAMINOPHEN 500 MG
650 TABLET ORAL EVERY 6 HOURS
Qty: 0 | Refills: 0 | Status: DISCONTINUED | OUTPATIENT
Start: 2018-03-26 | End: 2018-04-04

## 2018-03-26 RX ADMIN — CEFEPIME 2000 MILLIGRAM(S): 1 INJECTION, POWDER, FOR SOLUTION INTRAMUSCULAR; INTRAVENOUS at 14:36

## 2018-03-26 RX ADMIN — Medication 6: at 21:30

## 2018-03-26 RX ADMIN — Medication 300 MILLIGRAM(S): at 09:54

## 2018-03-26 RX ADMIN — TRAMADOL HYDROCHLORIDE 50 MILLIGRAM(S): 50 TABLET ORAL at 16:08

## 2018-03-26 RX ADMIN — Medication 4: at 11:44

## 2018-03-26 RX ADMIN — FLUCONAZOLE 100 MILLIGRAM(S): 150 TABLET ORAL at 11:45

## 2018-03-26 RX ADMIN — OXYCODONE HYDROCHLORIDE 5 MILLIGRAM(S): 5 TABLET ORAL at 06:47

## 2018-03-26 RX ADMIN — Medication 1 MILLIGRAM(S): at 11:45

## 2018-03-26 RX ADMIN — TRAMADOL HYDROCHLORIDE 50 MILLIGRAM(S): 50 TABLET ORAL at 17:04

## 2018-03-26 RX ADMIN — Medication 8: at 17:57

## 2018-03-26 RX ADMIN — Medication 4 MILLIGRAM(S): at 17:57

## 2018-03-26 RX ADMIN — Medication 4 MILLIGRAM(S): at 06:47

## 2018-03-26 RX ADMIN — INSULIN GLARGINE 10 UNIT(S): 100 INJECTION, SOLUTION SUBCUTANEOUS at 21:31

## 2018-03-26 RX ADMIN — ENOXAPARIN SODIUM 40 MILLIGRAM(S): 100 INJECTION SUBCUTANEOUS at 18:32

## 2018-03-26 RX ADMIN — CEFEPIME 2000 MILLIGRAM(S): 1 INJECTION, POWDER, FOR SOLUTION INTRAMUSCULAR; INTRAVENOUS at 21:30

## 2018-03-26 RX ADMIN — OXYCODONE HYDROCHLORIDE 5 MILLIGRAM(S): 5 TABLET ORAL at 08:27

## 2018-03-26 RX ADMIN — SODIUM CHLORIDE 75 MILLILITER(S): 9 INJECTION INTRAMUSCULAR; INTRAVENOUS; SUBCUTANEOUS at 16:09

## 2018-03-26 RX ADMIN — Medication 166.67 MILLIGRAM(S): at 22:27

## 2018-03-26 RX ADMIN — CEFEPIME 2000 MILLIGRAM(S): 1 INJECTION, POWDER, FOR SOLUTION INTRAMUSCULAR; INTRAVENOUS at 06:47

## 2018-03-26 NOTE — PROGRESS NOTE ADULT - SUBJECTIVE AND OBJECTIVE BOX
INTERVAL HPI/OVERNIGHT EVENTS:    Patient was seen and examined at bedside.  Complains of being very tired and unable to sleep.  No pain    CONSTITUTIONAL:  Negative fever or chills  + fatigue, poor appetite  EYES:  Negative  blurry vision or double vision  CARDIOVASCULAR:  Negative for chest pain or palpitations  RESPIRATORY:  Negative for cough, wheezing, or SOB   GASTROINTESTINAL:  Negative for nausea, vomiting, diarrhea, constipation, or abdominal pain  GENITOURINARY:  Negative frequency, urgency or dysuria  NEUROLOGIC:  No headache, confusion, dizziness, lightheadedness      ANTIBIOTICS/RELEVANT:    MEDICATIONS  (STANDING):  cefepime Injectable. 2000 milliGRAM(s) IV Push every 8 hours  dexamethasone     Tablet 4 milliGRAM(s) Oral every 12 hours  dextrose 5%. 1000 milliLiter(s) (50 mL/Hr) IV Continuous <Continuous>  dextrose 50% Injectable 12.5 Gram(s) IV Push once  dextrose 50% Injectable 25 Gram(s) IV Push once  dextrose 50% Injectable 25 Gram(s) IV Push once  fluconAZOLE   Tablet 100 milliGRAM(s) Oral daily  folic acid 1 milliGRAM(s) Oral daily  insulin glargine Injectable (LANTUS) 4 Unit(s) SubCutaneous at bedtime  insulin lispro (HumaLOG) corrective regimen sliding scale   SubCutaneous three times a day before meals  insulin lispro (HumaLOG) corrective regimen sliding scale   SubCutaneous at bedtime  vancomycin  IVPB 1500 milliGRAM(s) IV Intermittent every 12 hours    MEDICATIONS  (PRN):  acetaminophen   Tablet 650 milliGRAM(s) Oral every 6 hours PRN For Temp greater than 38 C (100.4 F)  acetaminophen   Tablet. 650 milliGRAM(s) Oral every 6 hours PRN Mild Pain (1 - 3)  dextrose Gel 1 Dose(s) Oral once PRN Blood Glucose LESS THAN 70 milliGRAM(s)/deciliter  glucagon  Injectable 1 milliGRAM(s) IntraMuscular once PRN Glucose LESS THAN 70 milligrams/deciliter  oxyCODONE    IR 5 milliGRAM(s) Oral every 4 hours PRN Breakthrough pain  polyethylene glycol 3350 17 Gram(s) Oral daily PRN Constipation  traMADol 50 milliGRAM(s) Oral every 8 hours PRN Severe Pain (7 - 10)        Vital Signs Last 24 Hrs  T(C): 37.1 (26 Mar 2018 11:31), Max: 37.7 (25 Mar 2018 22:09)  T(F): 98.8 (26 Mar 2018 11:31), Max: 99.9 (25 Mar 2018 22:09)  HR: 112 (26 Mar 2018 11:31) (102 - 112)  BP: 138/90 (26 Mar 2018 11:31) (126/78 - 141/80)  BP(mean): --  RR: 16 (26 Mar 2018 11:31) (15 - 16)  SpO2: 98% (26 Mar 2018 11:31) (97% - 98%)    PHYSICAL EXAM:  Constitutional: no distress  Eyes:  no icterus   Ear/Nose/Throat:  no sinus tenderness on percussion; + thrush 	  Neck:  supple; right sided non-tender paratracheal lymph node  Respiratory: clear to auscultation anteriorly, decreased at right base, chest wall clean   Cardiovascular: S1S2 RRR, no murmurs  Gastrointestinal:soft, (+) BS, no HSM  Extremities:no edema  Vascular: DP Pulse:	right normal; left normal      LABS:                        8.4    16.8  )-----------( 395      ( 25 Mar 2018 06:07 )             27.2     03-25    137  |  95<L>  |  11  ----------------------------<  206<H>  3.6   |  26  |  0.43<L>    Ca    7.9<L>      25 Mar 2018 06:07  Mg     1.5     03-25    TPro  6.8  /  Alb  2.6<L>  /  TBili  0.5  /  DBili  x   /  AST  10  /  ALT  45  /  AlkPhos  148<H>  03-25          MICROBIOLOGY:  Fungus Identification (03.25.18 @ 23:18)    Culture Results:   Candida glabrata isolated        RADIOLOGY & ADDITIONAL STUDIES:  Culture - Urine (03.23.18 @ 16:18)    Specimen Source: .Urine Clean Catch (Midstream)    Culture Results:   >100,000 CFU/ml Yeast  Pending further identification at [facility]  Antibiotic susceptibility testing is performed at request only by calling  24/7 at [phone]

## 2018-03-26 NOTE — PROGRESS NOTE ADULT - ASSESSMENT
IMPRESSION:  62 year old female with metastatic lung cancer here with fevers, leukocytosis s/p recent central line placement.  Central line removed this AM.  Currently afebrile and with no other complaints other than fatigue.     Recommend:  1.  Continue vancomycin and cefepime for now  2.  Continue fluconazole for thrush  3.  Will follow up cath tip culture and treat accordingly  4.  She has no urinary tract complaints and UA is negative; suspect joyce glabrata is a contaminant

## 2018-03-26 NOTE — PROGRESS NOTE ADULT - ASSESSMENT
61 y/o F with a PMHX of HTN, DM, and Stage IV lung CA w/ mets to brain and bone (Dec 2nd) who underwent RT to bony mets on 8th rib, and had gamma knife tx to brain (Jan 9th) s/p 2 cycles of systemic chemoimmunotherapy for metastatic disease presenting with increased lethargy and line sepsis.    #Stage IV Lung Adenocarcinoma   Patient with mets to brain and bone, PDL1 40% on Carboplatin, Pemetrexed, Pembrolizumab s/p 2 cycles, last 3/2/18, treatment course complicated by multiple hospitalizations including post obstructive pneumonia, collapsed lobe, +influenza, chemical pneumonitis. Both clinical cervical exam and CT imaging concerning for extensive R cervical lymphadenopathy, imaging also consistent with increasing R mediastinal and paratracheal lymphadenopathy. R hilar and R lower lobe necrotic masses persist, no significant change in size. In addition, patient with recent port placement (2 weeks prev), documented fever, leukocytosis w/bandemia, concerning for possibly catheter related sepsis- awaiting removal of line    Pt also had stereotactic radiosurgery for her brain met. She has had no follow up MRI head since december.    -Both clinical exam and imaging concerning for progressive disease, due to complications patient only able to complete 2 cycles of therapy, normally would assess response after 4 cycles, less likely however may consider a component of pseudo-progression 2/2 immunotherapy   -progressive b/l cervical adenopathy L>R, closely monitor, imaging negative hematoma, abscess  -continue with IV antibiotics, s/p port removal and culture  -clinically worse compared to baseline, at this time ecog atleast 3 patient in bed >+50% of time, would need to clinically stabilize prior to next treatment (may need to consider alternative regimen), will discuss with Dr Noonan.  -AST improved, TSH (low w/ normal Free T4) may be related to AE of pembro, elevated alk phos c/w bone disease  -dexamethasone 4mg BID for brain metastasis, CT head negative for acute pathology  -fluconazole for thrush  -follow up ID recs   -palliative care consult   -pain control  -bowel regimen  -PT/OT    Normocytic Anemia   Likely multifactorial in nature, given exposure to chemo, poor nutrition, currently patient is not acutely bleeding or bruising. Iron panel from outpatient consistent with anemia of chronic inflammation, elevated ferritin may also be related to transfusion dependence. Retic 1.8. B12/Folate supplemented based on labs from 3/9/18, required supplementation while getting Pemetrexed.     -Monitor for clinical signs of bleeding, consider occult blood  -obtain LDH  -folic acid/B12 supplementation given treatment with pemetrexed       Coagulopathy   Patient chronically with elevated INR and PT, likely related to infection/sepsis, factor 7 shortest half life and decreases in sepsis, patient also with poor nutritional status albumin 2.7 so likely has decreased vit K dependent clotting factors.     -monitor for bleeding  -would hold off on product replacement (vit K) as patient is immobile with solid tumor thus increased hypercoag risk      DVT ppx - lovenox ppx (held for IR procedure)     Discussed with Dr. Noonan

## 2018-03-26 NOTE — PROGRESS NOTE ADULT - PROBLEM SELECTOR PLAN 9
Currently s/p 2 L IVF, euvolemic and well hydrated on exam  encourage PO intake and Hydrations  Diet: carb consistent     Dispo: PT consult Currently s/p 2 L IVF, euvolemic and well hydrated on exam  encourage PO intake and Hydrations  Diet: NPO prior to IR removal of chemo-port    Dispo: PT consult

## 2018-03-26 NOTE — CONSULT NOTE ADULT - SUBJECTIVE AND OBJECTIVE BOX
DORON NUGENT          MRN-[id number]            (1955)    HPI:  61 yo AA F presenting to [facility] ED for fever/chills, waxing and waning confusion over past few days associated with right chest and shoulder pain and swelling around the port insertion site, port placed on 3/13 and never used.  with a PMHX of HTN, DM, and Stage IV lung CA w/ mets to brain and bone (Dec 2nd) s/p RTx to bony mets on 8th rib, and gamma knife Rtx to brain () s/p 2 cycles of systemic chemoimmunotherapy for metastatic disease complicated by post obstructive pneumonia, +influenza and confusion. Patients last chemo was on 3/2/18, and recent admission for toxic metabolic encephalopathy in the setting of pneumonia and discharged home on 3/9, since discharge pt has been progressively more fatigued, including waxing/waning mentation which worsened after port placement two weeks prior.  in the ED pt found to be septic with fever, tachycardia and elevated WBC count/bandemia and slightly elevated lactate at 2.5, was Alert and oriented, mental status intact at the time of interview, denied HD or neck stiffness however says that she has been experiencing HA on and off but it is at baseline, denies SOB/CP/Palpitation/cough/Abdominal Pain/diarrhea/Urinary symptoms/ melena or hematochezia. pt is getting admitted for ? line related sepsis. Had CXR and CTA unremarkable for acute findings.   s/p 2 L IVF, one dose of cefepime and vancomycin. (23 Mar 2018 22:25)    Subjective: Palliative care called to see for goals of care.  Pt and her family are known to the Palliative care team.  Today, patient says she "feels fine" however her  says she has been very tired and weak in the last week.  Her port was placed about 2 weeks ago and 2 days alter she developed a right side lymph note lower neck.  She applied ice to it without much relief.  Last Friday, she developed right shoulder and back pain and was brought to ED.  Per daughter, Naomi, last immunotherapy was about 3 weeks ago.  Pt was ambulating with a walker until 1 week ago when she became wheelchair bound.  Her  says she spends 95% of the day in bed.    PAST MEDICAL & SURGICAL HISTORY:  Brain metastases  Bone metastasis  Lung cancer  Hypertension  Diabetes  History of radiation therapy  Status post gamma knife treatment    FAMILY HISTORY:  Family history of transitional cell carcinoma of bladder (Sibling)  Family history of renal cell carcinoma (Sibling)    Reviewed and non-contributory    SOCIAL HISTORY: Pt lives at home with her , Fco.  Her daughter, Naomi, lives in [place] and is a nurse Health system.  Pt also has a son who lives in [place].  She has 6 grandchildren.  She is retired - used to work for [employer].  No home aides at this time.  Recently, pt's brother passed away from metastatic bladder cancer - he was seen by our Palliative care team and was transferred to hospice services.  Mu-ism: [employer].    ROS:           Dyspnea (Marlon 0-10): 0                       N/V (Y/N):  N                            Secretions (Y/N) :  N                Agitation(Y/N):  N  Pain (Y/N):  Y, right shoulder pain.  Pain is mild.  Comes and goes.  Described as achey.  Better with oxycodone and ice packs.    General:  Denied  HEENT:    Denied  Neck:  Denied  CVS:  Denied  Resp:  Denied  GI:  Denied  :  Denied  Musc:  Denied  Neuro:  Denied  Psych:  Denied  Skin:  Denied  Lymph:  Denied    Allergies    penicillins (Hives)    Intolerances    Opiate Naive (Y/N):   -iStop reviewed (Y/N): (Ref#:           )    Medications:      MEDICATIONS  (STANDING):  cefepime Injectable. 2000 milliGRAM(s) IV Push every 8 hours  dexamethasone     Tablet 4 milliGRAM(s) Oral every 12 hours  dextrose 5%. 1000 milliLiter(s) (50 mL/Hr) IV Continuous <Continuous>  dextrose 50% Injectable 12.5 Gram(s) IV Push once  dextrose 50% Injectable 25 Gram(s) IV Push once  dextrose 50% Injectable 25 Gram(s) IV Push once  fluconAZOLE   Tablet 100 milliGRAM(s) Oral daily  folic acid 1 milliGRAM(s) Oral daily  insulin glargine Injectable (LANTUS) 4 Unit(s) SubCutaneous at bedtime  insulin lispro (HumaLOG) corrective regimen sliding scale   SubCutaneous three times a day before meals  insulin lispro (HumaLOG) corrective regimen sliding scale   SubCutaneous at bedtime  vancomycin  IVPB 1500 milliGRAM(s) IV Intermittent every 12 hours    MEDICATIONS  (PRN):  acetaminophen   Tablet 650 milliGRAM(s) Oral every 6 hours PRN For Temp greater than 38 C (100.4 F)  acetaminophen   Tablet. 650 milliGRAM(s) Oral every 6 hours PRN Mild Pain (1 - 3)  dextrose Gel 1 Dose(s) Oral once PRN Blood Glucose LESS THAN 70 milliGRAM(s)/deciliter  glucagon  Injectable 1 milliGRAM(s) IntraMuscular once PRN Glucose LESS THAN 70 milligrams/deciliter  oxyCODONE    IR 5 milliGRAM(s) Oral every 4 hours PRN Breakthrough pain  polyethylene glycol 3350 17 Gram(s) Oral daily PRN Constipation  traMADol 50 milliGRAM(s) Oral every 8 hours PRN Severe Pain (7 - 10)    Labs:    CBC:                        8.4    19.2  )-----------( 365      ( 26 Mar 2018 12:55 )             28.0     CMP:        132<L>  |  91<L>  |  8   ----------------------------<  312<H>  3.9   |  26  |  0.45<L>    Ca    7.9<L>      26 Mar 2018 12:55  Mg     2.0         TPro  6.6  /  Alb  2.1<L>  /  TBili  0.5  /  DBili  x   /  AST  10  /  ALT  34  /  AlkPhos  146<H>      Urinalysis (18 @ 15:04)    pH Urine: 6.0    Blood, Urine: NEGATIVE    Glucose Qualitative, Urine: >=1000    Color: Yellow    Urine Appearance: Cloudy    Bilirubin: Negative    Ketone - Urine: NEGATIVE    Specific Gravity: 1.010    Protein, Urine: Trace mg/dL    Urobilinogen: 0.2 E.U./dL    Nitrite: NEGATIVE    Leukocyte Esterase Concentration: NEGATIVE    Culture - Urine (18 @ 16:18)    Specimen Source: .Urine Clean Catch (Midstream)    Culture Results:   >100,000 CFU/ml Yeast  Pending further identification at [facility]  Antibiotic susceptibility testing is performed at request only by calling   at [phone]    Culture - Blood (18 @ 15:45)    Specimen Source: .Blood Blood-Peripheral    Culture Results:   No growth at 2 days.    Imaging:    EXAM:  CT BRAIN                          PROCEDURE DATE:  2018      IMPRESSION:     1. No acute intracranial abnormality. Specifically, no acute intracranial   hemorrhage, mass effect or recent transcortical or territorial infarction.    2. Mild small vessel ischemic disease.      EXAM:  CT ANGIO CHEST PE PROTOCOL IC                          INTERPRETATION:  CTA (CT angiography) of the CHEST dated 3/23/2018 6:31 PM    IMPRESSION:   No central pulmonary embolus up to the proximal segmental level.   Evaluation of the distal segmental and subsegmental pulmonary arteries to   the right lower and middle lobes is limited by the extensive right hilar   lymphadenopathy.      The right cervical lymphadenopathy and the right paratracheal   lymphadenopathy have slightly increased in the interval. Right hilar and   right lower lobe necrotic masses again seen.    Small pericardial effusion, mildly increased since the prior study.    Small right pleural effusion, new since the prior study.    EXAM:  CT CHEST IC                          IMPRESSION:    1. No definite change in size of a large enhancing, necrotic right   paratracheal mass given differences in technique. No definite change in a   heterogeneously enhancing right hilar mass given differences in   technique. Again noted is a pleural/subpleural mass in the posterior   right lung base. These findings are consistent with known lung cancer.  2. Right suprahilar mass, right superior hilar mass, and satellite   nodules within the mediastinum most consistent with metastatic disease.  3. Interval resolution of right lower lobe consolidation and right   pleural effusion.  4. Small area of reticular opacification in the right upper lobe may   represent a residual inflammatory or infectious process.    EXAM:  CT ABDOMEN AND PELVIS IC                          IMPRESSION:   New lytic lesions in the right iliac wing and vertebral body L4   consistent with metastatic disease. Transitional vertebra labeled L6 for   the purposes of this study.    Right lung masses consistent with known lung malignancy.    A 1.6 cm nodule in the left adrenal gland, unchanged since 2017.    PEx:  T(C): 37.1 (18 @ 11:31), Max: 37.7 (18 @ 22:09)  HR: 112 (18 @ 11:31) (102 - 112)  BP: 138/90 (18 @ 11:31) (126/78 - 141/80)  RR: 16 (18 @ 11:31) (15 - 16)  SpO2: 98% (18 @ 11:31) (97% - 98%)  Wt(kg): 64.5 kg   Daily   CAPILLARY BLOOD GLUCOSE    POCT Blood Glucose.: 318 mg/dL (26 Mar 2018 11:35)    I&O's Summary    25 Mar 2018 07:01  -  26 Mar 2018 07:00  --------------------------------------------------------  IN: 1100 mL / OUT: 300 mL / NET: 800 mL    General: resting comfortably, in no acute distress  HEENT: NCAT. Moist mucosa. Thrush on tongue.  Neck: Large, immobile, non-tender cervical lymph node on right.   CVS: RRR, no murmur  Resp: CTAB, normal effort, no wheezes/rales/rhonchi  GI: soft, NT, ND, active BS  :  voiding freely   Musc: generalized weakness, bedbound  Neuro: no focal deficits  Skin: no rash or erythema, dry dressing over port site (s/p port removal 3/26)  Lymph: cervical lymphadenopathy as above  Preadmit Karnofsky:  30%           Current Karnofsky:   30%  Cachexia (Y/N): N  BMI: 23.7    Advanced Directives:     Full Code    Decision maker: Pt has capacity to make medical decisions on her own.  Legal surrogate: Daughter, Naomi Yeh, is health care proxy.  No documentation in chart [phone]    GOALS OF CARE DISCUSSION       Palliative care info/counseling provided	           Advanced Directives addressed please see Advance Care Planning Note	           See previous Palliative Medicine Note       Documentation of GOC: Dr. Krishnan and Palliative team spoke to patient, her , and daughter (on phone).  We are awaiting Oncology input.  However, given poor PPS, pt may benefit from hospice services	          REFERRALS	        Palliative Med        Unit SW/Case Mgmt              Speech/Swallow       Patient/Family Support       Massage Therapy       Music Therapy       Hospice       Nutrition       Ethics       PT/OT DORON NUGENT          MRN-[id number]            (1955)    HPI:  61 yo AA F presenting to [facility] ED for fever/chills, waxing and waning confusion over past few days associated with right chest and shoulder pain and swelling around the port insertion site, port placed on 3/13 and never used.  with a PMHX of HTN, DM, and Stage IV lung CA w/ mets to brain and bone (Dec 2nd) s/p RTx to bony mets on 8th rib, and gamma knife Rtx to brain () s/p 2 cycles of systemic chemoimmunotherapy for metastatic disease complicated by post obstructive pneumonia, +influenza and confusion. Patients last chemo was on 3/2/18, and recent admission for toxic metabolic encephalopathy in the setting of pneumonia and discharged home on 3/9, since discharge pt has been progressively more fatigued, including waxing/waning mentation which worsened after port placement two weeks prior.  in the ED pt found to be septic with fever, tachycardia and elevated WBC count/bandemia and slightly elevated lactate at 2.5, was Alert and oriented, mental status intact at the time of interview, denied HD or neck stiffness however says that she has been experiencing HA on and off but it is at baseline, denies SOB/CP/Palpitation/cough/Abdominal Pain/diarrhea/Urinary symptoms/ melena or hematochezia. pt is getting admitted for ? line related sepsis. Had CXR and CTA unremarkable for acute findings.   s/p 2 L IVF, one dose of cefepime and vancomycin. (23 Mar 2018 22:25)    Subjective: Palliative care called to see for goals of care.  Pt and her family are known to the Palliative care team.  Today, patient says she "feels fine" however her  says she has been very tired and weak in the last week.  Her port was placed about 2 weeks ago and 2 days after she developed a right side lymph note lower neck.  She applied ice to it without much relief.  Last Friday, she developed right shoulder and back pain and was brought to ED.  Per daughter, Namoi, last immunotherapy (Ketruda) and cytotoxic chemo regimen was about 3 weeks ago. It was her second course of treatment  Pt was ambulating with a walker until 1 week ago when she became wheelchair bound.  Her  says she spends 95% of the day in bed, cannot really sit at the side of the bed, and often has trouble holding her head up.    PAST MEDICAL & SURGICAL HISTORY:  Brain metastases  Bone metastasis  Lung cancer  Hypertension  Diabetes  History of radiation therapy  Status post gamma knife treatment    FAMILY HISTORY:  Family history of transitional cell carcinoma of bladder (Sibling)  Family history of renal cell carcinoma (Sibling)        SOCIAL HISTORY: Pt lives at home with her , Fco.  Her daughter, Naomi, lives in [place] and is a nurse [employer].  Pt also has a son who lives in [place].  She has 6 grandchildren.  She is retired - used to work for [employer].  No home aides at this time.  Recently, pt's brother passed away from metastatic bladder cancer - he was seen by our Palliative care team and was transferred to hospice services.  Pentecostalism: Christian.    ROS:           Dyspnea (Marlon 0-10): 0                       N/V (Y/N):  N                            Secretions (Y/N) :  N                Agitation(Y/N):  N  Pain (Y/N):  Y, right shoulder pain.  Pain is mild.  Comes and goes.  Described as achey.  Better with oxycodone and ice packs. ADLs not limited by pain, but by fatigue    General:  fatigue  HEENT:    Denied  Neck: weak- trouble holding it up.  Adenopathy, right worse than left  CVS:  Denied  Resp:  some non-productive cough  GI:  Dhadn't been eating last two weeks- did a bit better today  :  Denied  Musc:  weak, without pain or tenderness  Neuro:  daughter feels there are periods of confusion  Psych:  daughter feels patient is depressed  Skin:  Denied  Lymph:  adenopathy in neck- family attributes to infected port    Allergies    penicillins (Hives)    Intolerances    Opiate Naive (Y/N): no  -    Medications:      MEDICATIONS  (STANDING):  cefepime Injectable. 2000 milliGRAM(s) IV Push every 8 hours  dexamethasone     Tablet 4 milliGRAM(s) Oral every 12 hours  dextrose 5%. 1000 milliLiter(s) (50 mL/Hr) IV Continuous <Continuous>  dextrose 50% Injectable 12.5 Gram(s) IV Push once  dextrose 50% Injectable 25 Gram(s) IV Push once  dextrose 50% Injectable 25 Gram(s) IV Push once  fluconAZOLE   Tablet 100 milliGRAM(s) Oral daily  folic acid 1 milliGRAM(s) Oral daily  insulin glargine Injectable (LANTUS) 4 Unit(s) SubCutaneous at bedtime  insulin lispro (HumaLOG) corrective regimen sliding scale   SubCutaneous three times a day before meals  insulin lispro (HumaLOG) corrective regimen sliding scale   SubCutaneous at bedtime  vancomycin  IVPB 1500 milliGRAM(s) IV Intermittent every 12 hours    MEDICATIONS  (PRN):  acetaminophen   Tablet 650 milliGRAM(s) Oral every 6 hours PRN For Temp greater than 38 C (100.4 F)  acetaminophen   Tablet. 650 milliGRAM(s) Oral every 6 hours PRN Mild Pain (1 - 3)  dextrose Gel 1 Dose(s) Oral once PRN Blood Glucose LESS THAN 70 milliGRAM(s)/deciliter  glucagon  Injectable 1 milliGRAM(s) IntraMuscular once PRN Glucose LESS THAN 70 milligrams/deciliter  oxyCODONE    IR 5 milliGRAM(s) Oral every 4 hours PRN Breakthrough pain  polyethylene glycol 3350 17 Gram(s) Oral daily PRN Constipation  traMADol 50 milliGRAM(s) Oral every 8 hours PRN Severe Pain (7 - 10)    Labs:    CBC:                        8.4    19.2  )-----------( 365      ( 26 Mar 2018 12:55 )             28.0     CMP:        132<L>  |  91<L>  |  8   ----------------------------<  312<H>  3.9   |  26  |  0.45<L>    Ca    7.9<L>      26 Mar 2018 12:55  Mg     2.0         TPro  6.6  /  Alb  2.1<L>  /  TBili  0.5  /  DBili  x   /  AST  10  /  ALT  34  /  AlkPhos  146<H>      Urinalysis (18 @ 15:04)    pH Urine: 6.0    Blood, Urine: NEGATIVE    Glucose Qualitative, Urine: >=1000    Color: Yellow    Urine Appearance: Cloudy    Bilirubin: Negative    Ketone - Urine: NEGATIVE    Specific Gravity: 1.010    Protein, Urine: Trace mg/dL    Urobilinogen: 0.2 E.U./dL    Nitrite: NEGATIVE    Leukocyte Esterase Concentration: NEGATIVE    Culture - Urine (18 @ 16:18)    Specimen Source: .Urine Clean Catch (Midstream)    Culture Results:   >100,000 CFU/ml Yeast  Pending further identification at [facility]  Antibiotic susceptibility testing is performed at request only by calling   at [phone]    Culture - Blood (18 @ 15:45)    Specimen Source: .Blood Blood-Peripheral    Culture Results:   No growth at 2 days.    Imaging:    EXAM:  CT BRAIN                          PROCEDURE DATE:  2018      IMPRESSION:     1. No acute intracranial abnormality. Specifically, no acute intracranial   hemorrhage, mass effect or recent transcortical or territorial infarction.    2. Mild small vessel ischemic disease.      EXAM:  CT ANGIO CHEST PE PROTOCOL IC                          INTERPRETATION:  CTA (CT angiography) of the CHEST dated 3/23/2018 6:31 PM    IMPRESSION:   No central pulmonary embolus up to the proximal segmental level.   Evaluation of the distal segmental and subsegmental pulmonary arteries to   the right lower and middle lobes is limited by the extensive right hilar   lymphadenopathy.      The right cervical lymphadenopathy and the right paratracheal   lymphadenopathy have slightly increased in the interval. Right hilar and   right lower lobe necrotic masses again seen.    Small pericardial effusion, mildly increased since the prior study.    Small right pleural effusion, new since the prior study.    EXAM:  CT CHEST IC                          IMPRESSION:    1. No definite change in size of a large enhancing, necrotic right   paratracheal mass given differences in technique. No definite change in a   heterogeneously enhancing right hilar mass given differences in   technique. Again noted is a pleural/subpleural mass in the posterior   right lung base. These findings are consistent with known lung cancer.  2. Right suprahilar mass, right superior hilar mass, and satellite   nodules within the mediastinum most consistent with metastatic disease.  3. Interval resolution of right lower lobe consolidation and right   pleural effusion.  4. Small area of reticular opacification in the right upper lobe may   represent a residual inflammatory or infectious process.    EXAM:  CT ABDOMEN AND PELVIS IC                          IMPRESSION:   New lytic lesions in the right iliac wing and vertebral body L4   consistent with metastatic disease. Transitional vertebra labeled L6 for   the purposes of this study.    Right lung masses consistent with known lung malignancy.    A 1.6 cm nodule in the left adrenal gland, unchanged since 2017.    PEx:  T(C): 37.1 (18 @ 11:31), Max: 37.7 (18 @ 22:09)  HR: 112 (18 @ 11:31) (102 - 112)  BP: 138/90 (18 @ 11:31) (126/78 - 141/80)  RR: 16 (18 @ 11:31) (15 - 16)  SpO2: 98% (18 @ 11:31) (97% - 98%)  Wt(kg): 64.5 kg   Daily   CAPILLARY BLOOD GLUCOSE    POCT Blood Glucose.: 318 mg/dL (26 Mar 2018 11:35)    I&O's Summary    25 Mar 2018 07:01  -  26 Mar 2018 07:00  --------------------------------------------------------  IN: 1100 mL / OUT: 300 mL / NET: 800 mL    General: resting comfortably, in no acute distress, keeps eyes mostly closed throught interview  HEENT: NCAT. Moist mucosa. Thrush on tongue.  Neck: Large, immobile, non-tender cervical lymph node on right.   CVS: RRR, no murmur  Resp: CTAB, normal effort, no wheezes/rales/rhonchi  GI: soft, NT, ND, active BS  :  voiding freely   Musc: generalized weakness, bedbound, no muscle tenderness  Neuro: no focal deficits  Skin: no rash or erythema, dry dressing over port site (s/p port removal 3/26)  Lymph: cervical lymphadenopathy as above  Preadmit Karnofsky:  30%           Current Karnofsky:   30%  Cachexia (Y/N): N  BMI: 23.7    Advanced Directives:     Full Code    Decision maker: Pt seems to have capacity to make medical decisions on her own.  Legal surrogate: Daughter, Naomi Yeh, is health care proxy.  No documentation in chart [phone]    GOALS OF CARE DISCUSSION       Palliative care info/counseling provided	           Advanced Directives addressed please see Advance Care Planning Note	           See previous Palliative Medicine Note       Documentation of GOC: Dr. Krishnan and Palliative team spoke to patient, her , and daughter (on phone).  We are awaiting Oncology input.  However, given poor PPS, pt may benefit from hospice services	          REFERRALS	        Palliative Med        t       Chaplaint       Massage Therapy       Music Therapy       Hospice DORON NUGENT          MRN-[id number]            (1955)    HPI:  61 yo AA F presenting to [facility] ED for fever/chills, waxing and waning confusion over past few days associated with right chest and shoulder pain and swelling around the port insertion site, port placed on 3/13 and never used.  with a PMHX of HTN, DM, and Stage IV lung CA w/ mets to brain and bone (Dec 2nd) s/p RTx to bony mets on 8th rib, and gamma knife Rtx to brain () s/p 2 cycles of systemic chemoimmunotherapy for metastatic disease complicated by post obstructive pneumonia, +influenza and confusion. Patients last chemo was on 3/2/18, and recent admission for toxic metabolic encephalopathy in the setting of pneumonia and discharged home on 3/9, since discharge pt has been progressively more fatigued, including waxing/waning mentation which worsened after port placement two weeks prior.  in the ED pt found to be septic with fever, tachycardia and elevated WBC count/bandemia and slightly elevated lactate at 2.5, was Alert and oriented, mental status intact at the time of interview, denied HD or neck stiffness however says that she has been experiencing HA on and off but it is at baseline, denies SOB/CP/Palpitation/cough/Abdominal Pain/diarrhea/Urinary symptoms/ melena or hematochezia. pt is getting admitted for ? line related sepsis. Had CXR and CTA unremarkable for acute findings.   s/p 2 L IVF, one dose of cefepime and vancomycin. (23 Mar 2018 22:25)    Subjective: Palliative care called to see for goals of care.  Pt and her family are known to the Palliative care team.  Today, patient says she "feels fine" however her  says she has been very tired and weak in the last week.  Her port was placed about 2 weeks ago and 2 days after she developed a right side lymph note lower neck.  She applied ice to it without much relief.  Last Friday, she developed right shoulder and back pain and was brought to ED.  Per daughter, Naomi, last immunotherapy (Ketruda) and cytotoxic chemo regimen was about 3 weeks ago. It was her second course of treatment  Pt was ambulating with a walker until 1 week ago when she became wheelchair bound.  Her  says she spends 95% of the day in bed, cannot really sit at the side of the bed, and often has trouble holding her head up.    PAST MEDICAL & SURGICAL HISTORY:  Brain metastases  Bone metastasis  Lung cancer  Hypertension  Diabetes  History of radiation therapy  Status post gamma knife treatment    FAMILY HISTORY:  Family history of transitional cell carcinoma of bladder (Sibling)  Family history of renal cell carcinoma (Sibling)        SOCIAL HISTORY: Pt lives at home with her , Fco.  Her daughter, Naomi, lives in [place] and is a nurse [employer].  Pt also has a son who lives in [place].  She has 6 grandchildren.  She is retired - used to work for [employer].  No home aides at this time.  Recently, pt's brother passed away from metastatic bladder cancer - he was seen by our Palliative care team and was transferred to hospice services.  Yazidi: [employer].    ROS:           Dyspnea (Marlon 0-10): 0                       N/V (Y/N):  N                            Secretions (Y/N) :  N                Agitation(Y/N):  N  Pain (Y/N):  Y, right shoulder pain.  Pain is mild.  Comes and goes.  Described as achey.  Better with oxycodone and ice packs. ADLs not limited by pain, but by fatigue    General:  fatigue  HEENT:    Denied  Neck: weak- trouble holding it up.  Adenopathy, right worse than left  CVS:  Denied  Resp:  some non-productive cough  GI:  Dhadn't been eating last two weeks- did a bit better today  :  Denied  Musc:  weak, without pain or tenderness  Neuro:  daughter feels there are periods of confusion  Psych:  daughter feels patient is depressed  Skin:  Denied  Lymph:  adenopathy in neck- family attributes to infected port    Allergies    penicillins (Hives)    Intolerances    Opiate Naive (Y/N): no  -    Medications:      MEDICATIONS  (STANDING):  cefepime Injectable. 2000 milliGRAM(s) IV Push every 8 hours  dexamethasone     Tablet 4 milliGRAM(s) Oral every 12 hours  dextrose 5%. 1000 milliLiter(s) (50 mL/Hr) IV Continuous <Continuous>  dextrose 50% Injectable 12.5 Gram(s) IV Push once  dextrose 50% Injectable 25 Gram(s) IV Push once  dextrose 50% Injectable 25 Gram(s) IV Push once  fluconAZOLE   Tablet 100 milliGRAM(s) Oral daily  folic acid 1 milliGRAM(s) Oral daily  insulin glargine Injectable (LANTUS) 4 Unit(s) SubCutaneous at bedtime  insulin lispro (HumaLOG) corrective regimen sliding scale   SubCutaneous three times a day before meals  insulin lispro (HumaLOG) corrective regimen sliding scale   SubCutaneous at bedtime  vancomycin  IVPB 1500 milliGRAM(s) IV Intermittent every 12 hours    MEDICATIONS  (PRN):  acetaminophen   Tablet 650 milliGRAM(s) Oral every 6 hours PRN For Temp greater than 38 C (100.4 F)  acetaminophen   Tablet. 650 milliGRAM(s) Oral every 6 hours PRN Mild Pain (1 - 3)  dextrose Gel 1 Dose(s) Oral once PRN Blood Glucose LESS THAN 70 milliGRAM(s)/deciliter  glucagon  Injectable 1 milliGRAM(s) IntraMuscular once PRN Glucose LESS THAN 70 milligrams/deciliter  oxyCODONE    IR 5 milliGRAM(s) Oral every 4 hours PRN Breakthrough pain  polyethylene glycol 3350 17 Gram(s) Oral daily PRN Constipation  traMADol 50 milliGRAM(s) Oral every 8 hours PRN Severe Pain (7 - 10)    Labs:    CBC:                        8.4    19.2  )-----------( 365      ( 26 Mar 2018 12:55 )             28.0     CMP:        132<L>  |  91<L>  |  8   ----------------------------<  312<H>  3.9   |  26  |  0.45<L>    Ca    7.9<L>      26 Mar 2018 12:55  Mg     2.0         TPro  6.6  /  Alb  2.1<L>  /  TBili  0.5  /  DBili  x   /  AST  10  /  ALT  34  /  AlkPhos  146<H>      Urinalysis (18 @ 15:04)    pH Urine: 6.0    Blood, Urine: NEGATIVE    Glucose Qualitative, Urine: >=1000    Color: Yellow    Urine Appearance: Cloudy    Bilirubin: Negative    Ketone - Urine: NEGATIVE    Specific Gravity: 1.010    Protein, Urine: Trace mg/dL    Urobilinogen: 0.2 E.U./dL    Nitrite: NEGATIVE    Leukocyte Esterase Concentration: NEGATIVE    Culture - Urine (18 @ 16:18)    Specimen Source: .Urine Clean Catch (Midstream)    Culture Results:   >100,000 CFU/ml Yeast  Pending further identification at [facility]  Antibiotic susceptibility testing is performed at request only by calling   at [phone]    Culture - Blood (18 @ 15:45)    Specimen Source: .Blood Blood-Peripheral    Culture Results:   No growth at 2 days.    Imaging:    EXAM:  CT BRAIN                          PROCEDURE DATE:  2018      IMPRESSION:     1. No acute intracranial abnormality. Specifically, no acute intracranial   hemorrhage, mass effect or recent transcortical or territorial infarction.    2. Mild small vessel ischemic disease.      EXAM:  CT ANGIO CHEST PE PROTOCOL IC                          INTERPRETATION:  CTA (CT angiography) of the CHEST dated 3/23/2018 6:31 PM    IMPRESSION:   No central pulmonary embolus up to the proximal segmental level.   Evaluation of the distal segmental and subsegmental pulmonary arteries to   the right lower and middle lobes is limited by the extensive right hilar   lymphadenopathy.      The right cervical lymphadenopathy and the right paratracheal   lymphadenopathy have slightly increased in the interval. Right hilar and   right lower lobe necrotic masses again seen.    Small pericardial effusion, mildly increased since the prior study.    Small right pleural effusion, new since the prior study.    EXAM:  CT CHEST IC                          IMPRESSION:    1. No definite change in size of a large enhancing, necrotic right   paratracheal mass given differences in technique. No definite change in a   heterogeneously enhancing right hilar mass given differences in   technique. Again noted is a pleural/subpleural mass in the posterior   right lung base. These findings are consistent with known lung cancer.  2. Right suprahilar mass, right superior hilar mass, and satellite   nodules within the mediastinum most consistent with metastatic disease.  3. Interval resolution of right lower lobe consolidation and right   pleural effusion.  4. Small area of reticular opacification in the right upper lobe may   represent a residual inflammatory or infectious process.    EXAM:  CT ABDOMEN AND PELVIS IC                          IMPRESSION:   New lytic lesions in the right iliac wing and vertebral body L4   consistent with metastatic disease. Transitional vertebra labeled L6 for   the purposes of this study.    Right lung masses consistent with known lung malignancy.    A 1.6 cm nodule in the left adrenal gland, unchanged since 2017.    PEx:  T(C): 37.1 (18 @ 11:31), Max: 37.7 (18 @ 22:09)  HR: 112 (18 @ 11:31) (102 - 112)  BP: 138/90 (18 @ 11:31) (126/78 - 141/80)  RR: 16 (18 @ 11:31) (15 - 16)  SpO2: 98% (18 @ 11:31) (97% - 98%)  Wt(kg): 64.5 kg   Daily   CAPILLARY BLOOD GLUCOSE    POCT Blood Glucose.: 318 mg/dL (26 Mar 2018 11:35)    I&O's Summary    25 Mar 2018 07:01  -  26 Mar 2018 07:00  --------------------------------------------------------  IN: 1100 mL / OUT: 300 mL / NET: 800 mL    General: resting comfortably, in no acute distress, keeps eyes mostly closed throught interview  HEENT: NCAT. Moist mucosa. Thrush on tongue.  Neck: Large, immobile, non-tender cervical lymph node on right.   CVS: RRR, no murmur  Resp: CTAB, normal effort, no wheezes/rales/rhonchi  GI: soft, NT, ND, active BS  :  voiding freely   Musc: generalized weakness, bedbound, no muscle tenderness  Neuro: no focal deficits  Skin: no rash or erythema, dry dressing over port site (s/p port removal 3/26)  Lymph: cervical lymphadenopathy as above  Preadmit Karnofsky:  30%           Current Karnofsky:   30%  Cachexia (Y/N): N  BMI: 23.7    Advanced Directives:     Patient has been DNR last two admission  and 2018.  No MOLST.  Will confirm with daughter- patient not open to this discussion at this time    Decision maker: Pt seems to have capacity to make medical decisions on her own.  Legal surrogate: Daughter, Naomi Yeh, is health care proxy.  No documentation in chart [phone]    GOALS OF CARE DISCUSSION       Palliative care info/counseling provided	           Advanced Directives addressed please see Advance Care Planning Note	           See previous Palliative Medicine Note       Documentation of GOC: Dr. Krishnan and Palliative team spoke to patient, her , and daughter (on phone).  We are awaiting Oncology input.  However, given poor PPS, pt may benefit from hospice services	          REFERRALS	        Palliative Med        t       Chaplaint       Massage Therapy       Music Therapy       Hospice

## 2018-03-26 NOTE — PROGRESS NOTE ADULT - PROBLEM SELECTOR PLAN 8
DVT ppx: lovenox  pain control with tylenol and prn tramadol  Ulcer Ppx: PPI  OOB   glycemic monitoring  Bowel regimen DVT ppx: holding lovenox for IR removal of chemo-port  Ulcer Ppx: PPI  OOB   glycemic monitoring  Bowel regimen

## 2018-03-26 NOTE — PROGRESS NOTE ADULT - SUBJECTIVE AND OBJECTIVE BOX
Overnight Events: ANGELIKA  Subjective/ROS: Patient reports that she's tired of having people ask her questions. She appears withdrawn. Patient endorses R shoulder pain and an occasional cough. Denies headache, dizziness, lightheadedness, chest pain, shortness of breath, abdominal pain, N/V/D/C, fevers, and chills. Patient was notified of plan for IR removal of chemo-port and now isn't sure if she wishes to proceed with removal. Patient refused AM labs.     VITAL SIGNS:  Vital Signs Last 24 Hrs  T(C): 37.1 (26 Mar 2018 05:37), Max: 37.7 (25 Mar 2018 22:09)  T(F): 98.8 (26 Mar 2018 05:37), Max: 99.9 (25 Mar 2018 22:09)  HR: 102 (26 Mar 2018 05:37) (102 - 112)  BP: 137/82 (26 Mar 2018 05:37) (126/78 - 141/80)  BP(mean): --  RR: 16 (26 Mar 2018 05:37) (15 - 16)  SpO2: 97% (26 Mar 2018 05:37) (97% - 99%)    PHYSICAL EXAM:    General: Well nourished middle aged woman resting comfortably in bed; NAD  HEENT: NC/AT; EOMI, PEERLA anicteric sclera; MMM, +thrush  Neck: supple, palpable R neck mass, non-tender to palpation  Cardiovascular: +S1/S2; tachycardic, regular rhythm, no r/m/g  Respiratory: CTA B/L; no W/R/R; R sided chest port without erythema, swelling, purulence, and tenderness   Gastrointestinal: soft, NT/ND; +BS  Extremities: WWP; RUE > LUE in size; RUE edema, sensation and pulses of RUE intact, non-tender to palpation  Vascular: 2+ radial, DP pulses B/L  Neurological: AAOx3; no focal deficits    MEDICATIONS:  MEDICATIONS  (STANDING):  cefepime Injectable. 2000 milliGRAM(s) IV Push every 8 hours  dexamethasone     Tablet 4 milliGRAM(s) Oral every 12 hours  dextrose 5%. 1000 milliLiter(s) (50 mL/Hr) IV Continuous <Continuous>  dextrose 50% Injectable 12.5 Gram(s) IV Push once  dextrose 50% Injectable 25 Gram(s) IV Push once  dextrose 50% Injectable 25 Gram(s) IV Push once  fluconAZOLE   Tablet 100 milliGRAM(s) Oral daily  folic acid 1 milliGRAM(s) Oral daily  insulin glargine Injectable (LANTUS) 4 Unit(s) SubCutaneous at bedtime  insulin lispro (HumaLOG) corrective regimen sliding scale   SubCutaneous three times a day before meals  insulin lispro (HumaLOG) corrective regimen sliding scale   SubCutaneous at bedtime  vancomycin  IVPB 1500 milliGRAM(s) IV Intermittent every 12 hours    MEDICATIONS  (PRN):  acetaminophen   Tablet 650 milliGRAM(s) Oral every 6 hours PRN For Temp greater than 38 C (100.4 F)  acetaminophen   Tablet. 650 milliGRAM(s) Oral every 6 hours PRN Moderate Pain (4 - 6)  dextrose Gel 1 Dose(s) Oral once PRN Blood Glucose LESS THAN 70 milliGRAM(s)/deciliter  glucagon  Injectable 1 milliGRAM(s) IntraMuscular once PRN Glucose LESS THAN 70 milligrams/deciliter  oxyCODONE    IR 5 milliGRAM(s) Oral every 4 hours PRN Severe Pain (7 - 10)  polyethylene glycol 3350 17 Gram(s) Oral daily PRN Constipation  traMADol 50 milliGRAM(s) Oral every 8 hours PRN Severe Pain (7 - 10)      ALLERGIES:  Allergies    penicillins (Hives)    Intolerances        LABS:                        8.4    16.8  )-----------( 395      ( 25 Mar 2018 06:07 )             27.2     03-25    137  |  95<L>  |  11  ----------------------------<  206<H>  3.6   |  26  |  0.43<L>    Ca    7.9<L>      25 Mar 2018 06:07  Mg     1.5     03-25    TPro  6.8  /  Alb  2.6<L>  /  TBili  0.5  /  DBili  x   /  AST  10  /  ALT  45  /  AlkPhos  148<H>  03-25        CAPILLARY BLOOD GLUCOSE      POCT Blood Glucose.: 232 mg/dL (25 Mar 2018 21:11)      RADIOLOGY & ADDITIONAL TESTS: Reviewed.

## 2018-03-26 NOTE — PHYSICAL THERAPY INITIAL EVALUATION ADULT - GENERAL OBSERVATIONS, REHAB EVAL
Pt received supine in bed with +hep-lock, +NC~2L, NAD, family present. Pt left supine in bed, NAD, call bell in reach, family present, RN awares. Pt complains feeling tired.

## 2018-03-26 NOTE — PROGRESS NOTE ADULT - SUBJECTIVE AND OBJECTIVE BOX
Heme/Onc Progress Note (Dr. Noonan)     Interval History: Patient admits to fatigue and some R sided shoulder pain. Patient is s/p line removal and cultures. Patient has not left her bed at this time. Patient admits to coughing however denies acute bleeding including brbpr, melena, or hematuria. 	    ROS is otherwise negative.      Allergies    penicillins (Hives)    Intolerances        Medications:  MEDICATIONS  (STANDING):  cefepime Injectable. 2000 milliGRAM(s) IV Push every 8 hours  dexamethasone     Tablet 4 milliGRAM(s) Oral every 12 hours  dextrose 5%. 1000 milliLiter(s) (50 mL/Hr) IV Continuous <Continuous>  dextrose 50% Injectable 12.5 Gram(s) IV Push once  dextrose 50% Injectable 25 Gram(s) IV Push once  dextrose 50% Injectable 25 Gram(s) IV Push once  fluconAZOLE   Tablet 100 milliGRAM(s) Oral daily  folic acid 1 milliGRAM(s) Oral daily  insulin glargine Injectable (LANTUS) 4 Unit(s) SubCutaneous at bedtime  insulin lispro (HumaLOG) corrective regimen sliding scale   SubCutaneous three times a day before meals  insulin lispro (HumaLOG) corrective regimen sliding scale   SubCutaneous at bedtime  vancomycin  IVPB 1500 milliGRAM(s) IV Intermittent every 12 hours    MEDICATIONS  (PRN):  acetaminophen   Tablet 650 milliGRAM(s) Oral every 6 hours PRN For Temp greater than 38 C (100.4 F)  acetaminophen   Tablet. 650 milliGRAM(s) Oral every 6 hours PRN Mild Pain (1 - 3)  dextrose Gel 1 Dose(s) Oral once PRN Blood Glucose LESS THAN 70 milliGRAM(s)/deciliter  glucagon  Injectable 1 milliGRAM(s) IntraMuscular once PRN Glucose LESS THAN 70 milligrams/deciliter  oxyCODONE    IR 5 milliGRAM(s) Oral every 4 hours PRN Breakthrough pain  polyethylene glycol 3350 17 Gram(s) Oral daily PRN Constipation  traMADol 50 milliGRAM(s) Oral every 8 hours PRN Severe Pain (7 - 10)            PHYSICAL EXAM:    T(F): 98.8 (03-26-18 @ 11:31), Max: 99.9 (03-25-18 @ 22:09)  HR: 112 (03-26-18 @ 11:31) (102 - 112)  BP: 138/90 (03-26-18 @ 11:31) (126/78 - 141/80)  RR: 16 (03-26-18 @ 11:31) (15 - 16)  SpO2: 98% (03-26-18 @ 11:31) (97% - 98%)  Wt(kg): --    Daily     Daily     GEN: tired appearing, arousable to voice   HEENT: AT/NC, EOMI, no oral lesions   NECK: bilateral cervical adenopathy R>L, non tender   CVS: S1S2 RRR   LUNG: Decreased inspiratory effort b/l  ABD: +BS, NT, ND   EXT: no c/c/e  NEURO: AAOx3        Labs:                          8.4    19.2  )-----------( 365      ( 26 Mar 2018 12:55 )             28.0     CBC Full  -  ( 26 Mar 2018 12:55 )  WBC Count : 19.2 K/uL  Hemoglobin : 8.4 g/dL  Hematocrit : 28.0 %  Platelet Count - Automated : 365 K/uL  Mean Cell Volume : 83.3 fL  Mean Cell Hemoglobin : 25.0 pg  Mean Cell Hemoglobin Concentration : 30.0 g/dL  Auto Neutrophil # : x  Auto Lymphocyte # : x  Auto Monocyte # : x  Auto Eosinophil # : x  Auto Basophil # : x  Auto Neutrophil % : x  Auto Lymphocyte % : x  Auto Monocyte % : x  Auto Eosinophil % : x  Auto Basophil % : x        03-26    132<L>  |  91<L>  |  8   ----------------------------<  312<H>  3.9   |  26  |  0.45<L>    Ca    7.9<L>      26 Mar 2018 12:55  Mg     2.0     03-26    TPro  6.6  /  Alb  2.1<L>  /  TBili  0.5  /  DBili  x   /  AST  10  /  ALT  34  /  AlkPhos  146<H>  03-26

## 2018-03-26 NOTE — PROGRESS NOTE ADULT - PROBLEM SELECTOR PLAN 1
Most likely in the setting of newly placed port infection  - Empiric coverage with Vanc 1500mg BID, cefepime 2000mg q8h,(pt has penicillin allergy), and fluconazole 100mg q24h  -UCx with yeast, though patient without urinary complaints; likely colonized  - f/u BCx (ngtd) and tailor Abx accordingly  - monitor WBC count and fever; patient refused AM labs on 3/26  - tylenol PRN for fever    - IR consult for removal of central line and catheter tip culture  - ID consulted; appreciate recs Most likely in the setting of newly placed port infection  - Empiric coverage with vancomycin 1500mg BID, cefepime 2000mg q8h,(pt has penicillin allergy), and fluconazole 100mg q24h  -Vancomycin trough at 8PM  -UCx with yeast, though patient without urinary complaints; likely colonized  - f/u BCx (ngtd) and tailor Abx accordingly  - monitor WBC count and fever; patient refused AM labs on 3/26  - tylenol PRN for fever    - IR consult for removal of central line and catheter tip culture  - ID consulted; appreciate recs

## 2018-03-26 NOTE — CONSULT NOTE ADULT - PROBLEM SELECTOR RECOMMENDATION 9
Pain seems controlled on current regimen.  Continue oxycodone 5mg PRN, ice, or heat.  Monitor for risk of opioid induced constipation - continue Miralax PRN and consider adding Senna 2 tab QHS

## 2018-03-26 NOTE — PROGRESS NOTE ADULT - PROBLEM SELECTOR PLAN 3
#Stage IV Lung Adenocarcinoma   Patient with mets to brain and bone, PDL1 40% on Carboplatin, Pemetrexed, Pembrolizumab s/p 2 cycles, last 3/2/18, treatment course complicated by multiple hospitalizations including post obstructive pneumonia, collapsed lob cb +influenza s/p debulking and chemical pneumonitis. Both clinical cervical exam and CT imaging concerning for extensive R cervical lymphadenopathy, imaging also consistent with increasing R mediastinal and paratracheal lymphadenopathy. R hilar and R lower lobe necrotic masses persist, no significant change in size. In addition, patient with recent port placement (2 weeks prev), documented fever, leukocytosis w/bandemia, concerning for possibly catheter related sepsis.   [facility]  [facility] would need to clinically stabilize prior to next treatment (may need to consider alternative regimen)  -CXR from 3/23 c/f right paratracheal mass compressing the trachea which is narrowed to 8 mm in transverse diameter.  - trend CMP/ AlP  - dexamethasone 4mg BID for brain metastasis, CT head negative for acute pathology  - f/u hematoclogy/oncology recs #Stage IV Lung Adenocarcinoma   Patient with mets to brain and bone, PDL1 40% on Carboplatin, Pemetrexed, Pembrolizumab s/p 2 cycles, last 3/2/18, treatment course complicated by multiple hospitalizations including post obstructive pneumonia, collapsed lob cb +influenza s/p debulking and chemical pneumonitis. Both clinical cervical exam and CT imaging concerning for extensive R cervical lymphadenopathy, imaging also consistent with increasing R mediastinal and paratracheal lymphadenopathy. R hilar and R lower lobe necrotic masses persist, no significant change in size. In addition, patient with recent port placement (2 weeks prev), documented fever, leukocytosis w/bandemia, concerning for possibly catheter related sepsis. Pt seen by [facility] in the ED.  [facility] need to clinically stabilize prior to next treatment (may need to consider alternative regimen)  -CXR from 3/23 c/f right paratracheal mass compressing the trachea which is narrowed to 8 mm in transverse diameter.  - trend CMP/ ALP  - dexamethasone 4mg BID for brain metastasis, CT head negative for acute pathology  -Pain control with acetaminophen, tramadol, and oxycodone PRN  - f/u hematoclogy/oncology recs

## 2018-03-26 NOTE — CONSULT NOTE ADULT - PROBLEM SELECTOR RECOMMENDATION 2
Pt with increasing weakness over the last couple weeks.  May be secondary to line infection vs progression of disease vs side effect of immunotherapy.  Will continue to monitor closely Pt with increasing weakness over the last couple weeks.  May be secondary to line infection vs progression of disease vs side effect of immunotherapy.  Will continue to monitor closely Weakness is profound- doubt depression alone is to blame.  Could consider adding a stimulant- ie Ritalin but would like to discuss with daughter in person when she comes to patient bedside

## 2018-03-26 NOTE — PROGRESS NOTE ADULT - ATTENDING COMMENTS
DX  sepsis due to suspected line infection - cont vanc/cefepime. f/u blood cx and cath tip cx (removed port 3/26/18).  f/u ID recs.   right arm edema - no DVT seen on u/s  candidal colonization of urine - supportive care.  thrush - fluconazole  htn - bps near gaol off all anti-hypertensives  dm ii - ssi  anemia - s/p 2 u prbc. suspect anemia of chronic dz + hemodilution. no active bleeding. stool guaic (-)  depression- no si/hi.   stage iv lung cancer with brain metastases - f/u with onc as outpt  tracheal compression from lymphadenopathy - currently w/o sxs. will monitor

## 2018-03-26 NOTE — PHYSICAL THERAPY INITIAL EVALUATION ADULT - ADDITIONAL COMMENTS
Pt lives with spouse in an apartment, no stair. pt ambulated with rolling walker with assistance prior to admission. however, according to pt's spouse, pt got progressively weaken and unable to ambulate at home.

## 2018-03-26 NOTE — CONSULT NOTE ADULT - ASSESSMENT
61 y/o F with a PMHX of HTN, DM, and Stage IV lung CA w/ mets to brain and bone (Dec 2nd) who underwent RT to bony mets on 8th rib, and had gamma knife tx to brain (Jan 9th) s/p 2 cycles of systemic chemoimmunotherapy for metastatic disease presenting with increased lethargy and line sepsis.  Palliative care called to see for goals of care. 63 y/o F with a PMHX of HTN, DM, and Stage IV lung CA w/ mets to brain and bone (Dec 2nd) who underwent RT to bony mets on 8th rib, and had gamma knife tx to brain (Jan 9th) s/p 2 cycles of systemic chemoimmunotherapy and immunotherapy for metastatic disease presenting with increased lethargy and line sepsis.  Palliative care called to see for symptom management and discussion of goals of care.

## 2018-03-26 NOTE — PROGRESS NOTE ADULT - ASSESSMENT
63 yo AAF with presenting to ED for fever/chills, increased lethargy and port site pain and erythema, PMHX of HTN, DM, and Stage IV lung CA w/ mets to brain and bone s/p RTX and gamma knife Rtx to brain and 2 cycles of systemic chemoimmunotherapy for metastatic disease, meeting sepsis criteria in the ED, presumably line-sepsis.

## 2018-03-26 NOTE — PHYSICAL THERAPY INITIAL EVALUATION ADULT - PERTINENT HX OF CURRENT PROBLEM, REHAB EVAL
Pt is a 63 yo AA F presenting to [facility] ED for fever/chills, waxing and waning confusion over past few days associated with right chest and shoulder pain and swelling around the port insertion site, port placed on 3/13 and never used.

## 2018-03-26 NOTE — PROGRESS NOTE ADULT - PROBLEM SELECTOR PLAN 6
pt on dexamethasone for brain mets  - LSS Patient has a history of DM. She is on dexamethasone 4mg q12h for brain mets, which will make hyperglycemia more difficult to control.   -Lantus 4U at bedtime; adjust accordingly  -LSS  -Monitor fingersticks

## 2018-03-26 NOTE — CONSULT NOTE ADULT - PROBLEM SELECTOR RECOMMENDATION 4
Pt with stage 4 adenocarcinoma with mets to brain and bone s/p RT.  She is on Dexamethasone.  Per recent CT studies, there seems to be progression of disease evidenced by new cervical lymphadenopathy.  Oncology consult appreciated Pt with stage 4 adenocarcinoma with mets to brain and bone s/p RT.  She is on Dexamethasone.  Per recent CT studies, there seems to be progression of disease evidenced by new and enlarging cervical lymphadenopathy and mediastinal lymphadenopathy.  Oncology consult appreciated

## 2018-03-26 NOTE — PROGRESS NOTE ADULT - PROBLEM SELECTOR PLAN 7
Swollen edematous RUE; pulses intact, sensation intact;   -US doppler of RUE to r/o DVT Swollen edematous RUE; pulses intact, sensation intact;   -No thrombus seen within the right neck and upper arm to the level of   antecubital fossa. The remaining veins were not assessed secondary to   patient refusal to continue the examination.

## 2018-03-26 NOTE — CONSULT NOTE ADULT - PROBLEM SELECTOR RECOMMENDATION 7
Palliative care team met with pt and  today and spoke to daughter, Naomi, on the phone.  They all understand that patient is becoming weaker.  Oncology plans to see patient tomorrow to discuss treatment options.  If pt and family do not wish to pursue further chemo, she would be a good candidate for hospice services.  Support provided to patient and family.  Patient to have access to supportive services during rest of hospital stay as the patient/family deemed necessary ie. Chaplaincy, Massage therapy, Music therapy, Patient and family supportive services, Palliative SW, etc Palliative care team met with pt and  today and spoke to daughter, Naomi, on the phone.  They all understand that patient is becoming weaker.  Oncology plans to see patient tomorrow to discuss treatment options. patients performance status is very poor If pt and family do not wish to pursue further chemo or if it is not offered,, she would be a good candidate for hospice services.  Support provided to patient and family.  Patient to have access to supportive services during rest of hospital stay as the patient/family deemed necessary ie. Chaplaincy, Massage therapy, Music therapy, Patient and family supportive services, Palliative SW, etc

## 2018-03-26 NOTE — PROGRESS NOTE ADULT - ATTENDING COMMENTS
Patient was seen and discussed with Dr. Espinoza on 3/27.  Currently feels better and more lucid.  Most likely with port infection, currently on antibiotics but no growth to date from cultures.  Repeat CT scan shows mostly stable disease.  Would continue to monitor on current regimen.  Will ask PT to start working with patient as well.  Currently she is not a candidate for further therapy based on poor PS but might improve as her infection is treated and controlled.

## 2018-03-27 DIAGNOSIS — C79.31 SECONDARY MALIGNANT NEOPLASM OF BRAIN: ICD-10-CM

## 2018-03-27 DIAGNOSIS — E87.1 HYPO-OSMOLALITY AND HYPONATREMIA: ICD-10-CM

## 2018-03-27 LAB
ALP SERPL-CCNC: 135 U/L — HIGH (ref 40–120)
ANION GAP SERPL CALC-SCNC: 13 MMOL/L — SIGNIFICANT CHANGE UP (ref 5–17)
BASOPHILS NFR BLD AUTO: 0 % — SIGNIFICANT CHANGE UP (ref 0–2)
BLD GP AB SCN SERPL QL: POSITIVE — SIGNIFICANT CHANGE UP
BUN SERPL-MCNC: 12 MG/DL — SIGNIFICANT CHANGE UP (ref 7–23)
CALCIUM SERPL-MCNC: 7.8 MG/DL — LOW (ref 8.4–10.5)
CHLORIDE SERPL-SCNC: 96 MMOL/L — SIGNIFICANT CHANGE UP (ref 96–108)
CO2 SERPL-SCNC: 25 MMOL/L — SIGNIFICANT CHANGE UP (ref 22–31)
CREAT SERPL-MCNC: 0.5 MG/DL — SIGNIFICANT CHANGE UP (ref 0.5–1.3)
CULTURE RESULTS: SIGNIFICANT CHANGE UP
EOSINOPHIL NFR BLD AUTO: 0 % — SIGNIFICANT CHANGE UP (ref 0–6)
GLUCOSE BLDC GLUCOMTR-MCNC: 175 MG/DL — HIGH (ref 70–99)
GLUCOSE BLDC GLUCOMTR-MCNC: 234 MG/DL — HIGH (ref 70–99)
GLUCOSE BLDC GLUCOMTR-MCNC: 243 MG/DL — HIGH (ref 70–99)
GLUCOSE BLDC GLUCOMTR-MCNC: 308 MG/DL — HIGH (ref 70–99)
GLUCOSE SERPL-MCNC: 182 MG/DL — HIGH (ref 70–99)
HCT VFR BLD CALC: 25.2 % — LOW (ref 34.5–45)
HGB BLD-MCNC: 7.8 G/DL — LOW (ref 11.5–15.5)
LDH SERPL L TO P-CCNC: 302 U/L — HIGH (ref 50–242)
LYMPHOCYTES # BLD AUTO: 2 % — LOW (ref 13–44)
MAGNESIUM SERPL-MCNC: 2 MG/DL — SIGNIFICANT CHANGE UP (ref 1.6–2.6)
MCHC RBC-ENTMCNC: 26 PG — LOW (ref 27–34)
MCHC RBC-ENTMCNC: 31 G/DL — LOW (ref 32–36)
MCV RBC AUTO: 84 FL — SIGNIFICANT CHANGE UP (ref 80–100)
MONOCYTES NFR BLD AUTO: 5 % — SIGNIFICANT CHANGE UP (ref 2–14)
NEUTROPHILS NFR BLD AUTO: 65 % — HIGH (ref 43–77)
PLATELET # BLD AUTO: 350 K/UL — SIGNIFICANT CHANGE UP (ref 150–400)
POTASSIUM SERPL-MCNC: 3.8 MMOL/L — SIGNIFICANT CHANGE UP (ref 3.5–5.3)
POTASSIUM SERPL-SCNC: 3.8 MMOL/L — SIGNIFICANT CHANGE UP (ref 3.5–5.3)
RBC # BLD: 3 M/UL — LOW (ref 3.8–5.2)
RBC # FLD: 21.8 % — HIGH (ref 10.3–16.9)
RH IG SCN BLD-IMP: NEGATIVE — SIGNIFICANT CHANGE UP
SODIUM SERPL-SCNC: 134 MMOL/L — LOW (ref 135–145)
WBC # BLD: 22.2 K/UL — HIGH (ref 3.8–10.5)
WBC # FLD AUTO: 22.2 K/UL — HIGH (ref 3.8–10.5)

## 2018-03-27 PROCEDURE — 99233 SBSQ HOSP IP/OBS HIGH 50: CPT | Mod: GC

## 2018-03-27 PROCEDURE — 86077 PHYS BLOOD BANK SERV XMATCH: CPT

## 2018-03-27 PROCEDURE — 99232 SBSQ HOSP IP/OBS MODERATE 35: CPT

## 2018-03-27 RX ORDER — METHYLPHENIDATE HCL 5 MG
2.5 TABLET ORAL DAILY
Qty: 0 | Refills: 0 | Status: DISCONTINUED | OUTPATIENT
Start: 2018-03-27 | End: 2018-03-28

## 2018-03-27 RX ORDER — SODIUM CHLORIDE 9 MG/ML
1000 INJECTION INTRAMUSCULAR; INTRAVENOUS; SUBCUTANEOUS
Qty: 0 | Refills: 0 | Status: DISCONTINUED | OUTPATIENT
Start: 2018-03-27 | End: 2018-03-28

## 2018-03-27 RX ORDER — METHYLPHENIDATE HCL 5 MG
2.5 TABLET ORAL ONCE
Qty: 0 | Refills: 0 | Status: DISCONTINUED | OUTPATIENT
Start: 2018-03-27 | End: 2018-03-27

## 2018-03-27 RX ORDER — POTASSIUM CHLORIDE 20 MEQ
20 PACKET (EA) ORAL ONCE
Qty: 0 | Refills: 0 | Status: COMPLETED | OUTPATIENT
Start: 2018-03-27 | End: 2018-03-27

## 2018-03-27 RX ORDER — INSULIN GLARGINE 100 [IU]/ML
15 INJECTION, SOLUTION SUBCUTANEOUS AT BEDTIME
Qty: 0 | Refills: 0 | Status: DISCONTINUED | OUTPATIENT
Start: 2018-03-27 | End: 2018-03-28

## 2018-03-27 RX ORDER — DEXAMETHASONE 0.5 MG/5ML
4 ELIXIR ORAL EVERY 6 HOURS
Qty: 0 | Refills: 0 | Status: DISCONTINUED | OUTPATIENT
Start: 2018-03-27 | End: 2018-03-28

## 2018-03-27 RX ORDER — KETOROLAC TROMETHAMINE 30 MG/ML
15 SYRINGE (ML) INJECTION ONCE
Qty: 0 | Refills: 0 | Status: DISCONTINUED | OUTPATIENT
Start: 2018-03-27 | End: 2018-03-27

## 2018-03-27 RX ADMIN — Medication 4 MILLIGRAM(S): at 05:36

## 2018-03-27 RX ADMIN — Medication 2: at 08:45

## 2018-03-27 RX ADMIN — Medication 2.5 MILLIGRAM(S): at 13:14

## 2018-03-27 RX ADMIN — TRAMADOL HYDROCHLORIDE 50 MILLIGRAM(S): 50 TABLET ORAL at 02:06

## 2018-03-27 RX ADMIN — INSULIN GLARGINE 15 UNIT(S): 100 INJECTION, SOLUTION SUBCUTANEOUS at 23:09

## 2018-03-27 RX ADMIN — TRAMADOL HYDROCHLORIDE 50 MILLIGRAM(S): 50 TABLET ORAL at 03:44

## 2018-03-27 RX ADMIN — Medication 1 MILLIGRAM(S): at 11:25

## 2018-03-27 RX ADMIN — Medication 4 MILLIGRAM(S): at 11:29

## 2018-03-27 RX ADMIN — FLUCONAZOLE 100 MILLIGRAM(S): 150 TABLET ORAL at 11:25

## 2018-03-27 RX ADMIN — Medication 8: at 17:40

## 2018-03-27 RX ADMIN — Medication 15 MILLIGRAM(S): at 02:40

## 2018-03-27 RX ADMIN — Medication 4: at 13:15

## 2018-03-27 RX ADMIN — CEFEPIME 2000 MILLIGRAM(S): 1 INJECTION, POWDER, FOR SOLUTION INTRAMUSCULAR; INTRAVENOUS at 05:36

## 2018-03-27 RX ADMIN — CEFEPIME 2000 MILLIGRAM(S): 1 INJECTION, POWDER, FOR SOLUTION INTRAMUSCULAR; INTRAVENOUS at 13:14

## 2018-03-27 RX ADMIN — Medication 166.67 MILLIGRAM(S): at 10:06

## 2018-03-27 RX ADMIN — SODIUM CHLORIDE 70 MILLILITER(S): 9 INJECTION INTRAMUSCULAR; INTRAVENOUS; SUBCUTANEOUS at 11:25

## 2018-03-27 RX ADMIN — Medication 15 MILLIGRAM(S): at 03:27

## 2018-03-27 NOTE — PROGRESS NOTE ADULT - SUBJECTIVE AND OBJECTIVE BOX
Overnight Events: Vancomycin trough was 22 at 8PM, redosed at 1250mg q12h. Overnight, patient experienced R shoulder pain as well as R sided chest wall pain. She received 15mg IVP of Ketoralac with improvement in symptoms.   Subjective/ROS: Patient reports ongoing R shoulder pain, though the pain is much improved compared to overnight. She appears withdrawn and disinterested in answering ROS questions. Denies headache, dizziness, lightheadedness, chest pain, shortness of breath, palpitations, abdominal pain, nausea, vomiting, diarrhea, fevers, and chills.     VITAL SIGNS:  Vital Signs Last 24 Hrs  T(C): 36.8 (27 Mar 2018 05:37), Max: 37.6 (26 Mar 2018 21:03)  T(F): 98.3 (27 Mar 2018 05:37), Max: 99.7 (26 Mar 2018 21:03)  HR: 65 (27 Mar 2018 05:37) (63 - 112)  BP: 125/80 (27 Mar 2018 05:37) (125/80 - 138/90)  BP(mean): --  RR: 16 (27 Mar 2018 05:37) (16 - 16)  SpO2: 96% (27 Mar 2018 05:37) (96% - 98%)    PHYSICAL EXAM:    General: Well nourished middle aged woman resting comfortably in bed; NAD  HEENT: NC/AT; EOMI, PEERLA anicteric sclera; MMM, +thrush (improving)  Neck: supple, palpable R neck mass, non-tender to palpation  Cardiovascular: +S1/S2; RRR, no r/m/g  Respiratory: CTA B/L; no W/R/R; R chest dressing c/d/i s/p chemo-port removal   Gastrointestinal: soft, NT/ND; +BS  Extremities: WWP; RUE > LUE in size, sensation and pulses of RUE intact, non-tender to palpation  Vascular: 2+ radial, DP pulses B/L  Neurological: AAOx3; no focal deficits    MEDICATIONS:  MEDICATIONS  (STANDING):  cefepime Injectable. 2000 milliGRAM(s) IV Push every 8 hours  dexamethasone     Tablet 4 milliGRAM(s) Oral every 12 hours  dextrose 5%. 1000 milliLiter(s) (50 mL/Hr) IV Continuous <Continuous>  dextrose 50% Injectable 12.5 Gram(s) IV Push once  dextrose 50% Injectable 25 Gram(s) IV Push once  dextrose 50% Injectable 25 Gram(s) IV Push once  enoxaparin Injectable 40 milliGRAM(s) SubCutaneous every 24 hours  fluconAZOLE   Tablet 100 milliGRAM(s) Oral daily  folic acid 1 milliGRAM(s) Oral daily  insulin glargine Injectable (LANTUS) 10 Unit(s) SubCutaneous at bedtime  insulin lispro (HumaLOG) corrective regimen sliding scale   SubCutaneous Before meals and at bedtime  potassium chloride   Powder 20 milliEquivalent(s) Oral once  sodium chloride 0.9%. 1000 milliLiter(s) (75 mL/Hr) IV Continuous <Continuous>  vancomycin  IVPB 1250 milliGRAM(s) IV Intermittent every 12 hours    MEDICATIONS  (PRN):  acetaminophen   Tablet 650 milliGRAM(s) Oral every 6 hours PRN For Temp greater than 38 C (100.4 F)  acetaminophen   Tablet. 650 milliGRAM(s) Oral every 6 hours PRN Mild Pain (1 - 3)  dextrose Gel 1 Dose(s) Oral once PRN Blood Glucose LESS THAN 70 milliGRAM(s)/deciliter  glucagon  Injectable 1 milliGRAM(s) IntraMuscular once PRN Glucose LESS THAN 70 milligrams/deciliter  oxyCODONE    IR 5 milliGRAM(s) Oral every 4 hours PRN Breakthrough pain  polyethylene glycol 3350 17 Gram(s) Oral daily PRN Constipation  traMADol 50 milliGRAM(s) Oral every 8 hours PRN Severe Pain (7 - 10)      ALLERGIES:  Allergies    penicillins (Hives)    Intolerances        LABS:                        7.8    22.2  )-----------( 350      ( 27 Mar 2018 07:04 )             25.2     03-27    134<L>  |  96  |  12  ----------------------------<  182<H>  3.8   |  25  |  0.50    Ca    7.8<L>      27 Mar 2018 07:04  Mg     2.0     03-27    TPro  x   /  Alb  x   /  TBili  x   /  DBili  x   /  AST  x   /  ALT  x   /  AlkPhos  135<H>  03-27        CAPILLARY BLOOD GLUCOSE      POCT Blood Glucose.: 283 mg/dL (26 Mar 2018 21:13)      RADIOLOGY & ADDITIONAL TESTS: Reviewed.

## 2018-03-27 NOTE — PROGRESS NOTE ADULT - PROBLEM SELECTOR PLAN 1
Generalized weakness likely multi-factorial.  Daughter concerned pt is depressed, however likely more to it than depression alone.  Could consider starting Ritalin or SSRI to improve mood.  Will discuss with daughter Naomi when she is at bedside, hopefully today Generalized weakness likely multi-factorial.  Daughter concerned pt is depressed, however likely more to it than depression alone.  Could consider starting Ritalin or SSRI to improve mood.  Will discuss with daughter Naomi when she is at bedside Generalized weakness likely multi-factorial.  Daughter concerned pt is depressed, however likely more to it than depression alone.  Will try Ritalin 2.5mg daily to see if this improves mood and somnolence.  Will discuss with daughter Naomi when she is at bedside Generalized weakness likely multi-factorial.  Daughter concerned pt is depressed, however likely more to it than depression alone.  Will try Ritalin 2.5mg daily or BID if tolerated to see if this improves mood and somnolence.  Will discuss with daughter Naomi when she is at bedside

## 2018-03-27 NOTE — PROGRESS NOTE ADULT - SUBJECTIVE AND OBJECTIVE BOX
DORON NUGENT             [id number]    CC: Generalized weakness    HPI:  61 yo AA F presenting to [facility] ED for fever/chills, waxing and waning confusion over past few days associated with right chest and shoulder pain and swelling around the port insertion site, port placed on 3/13 and never used.  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 Patients last chemo was on 3/2/18, and recent admission for toxic metabolic encephalopathy in the setting of pneumonia and discharged home on 3/9, since discharge pt has been progressively more fatigued, including waxing/waning mentation which worsened after port placement two weeks prior.  in the ED pt found to be septic with fever, tachycardia and elevated WBC count/bandemia and slightly elevated lactate at 2.5, was Alert and oriented, mental status intact at the time of interview, denied HD or neck stiffness however says that she has been experiencing HA on and off but it is at baseline, denies SOB/CP/Palpitation/cough/Abdominal Pain/diarrhea/Urinary symptoms/ melena or hematochezia. pt is getting admitted for ? line related sepsis. Had CXR and CTA unremarkable for acute findings.   s/p 2 L IVF, one dose of cefepime and vancomycin. (23 Mar 2018 22:25)    SUBJECTIVE:      ROS:  DYSPNEA:  N	  NAUS/VOM:  N	  SECRETIONS:  N	  AGITATION:  N  Pain (Y/N):  Y  -Provocation/Palliation:  right shoulder  -Quality/Quantity:  dull, achy pain  -Radiating:  no  -Severity:  mild  -Timing/Frequency:  comes and goes  -Impact on ADLs:  mild    PEx:  T(C): 36.9 (03-27-18 @ 11:32), Max: 37.6 (03-26-18 @ 21:03)  HR: 106 (03-27-18 @ 11:32) (63 - 106)  BP: 131/83 (03-27-18 @ 11:32) (125/80 - 136/86)  RR: 14 (03-27-18 @ 11:32) (14 - 16)  SpO2: 98% (03-27-18 @ 11:32) (96% - 98%)  Wt(kg): --    General: patient sleeping comfortably, in no acute distress,   HEENT: NCAT. Moist mucosa. Thrush on tongue.  Neck: Large, immobile, non-tender cervical lymph node on right.   CVS: RRR, no murmur  Resp: CTAB, normal effort, no wheezes/rales/rhonchi  GI: soft, NT, ND, active BS  : voiding freely   Musc: generalized weakness, bedbound, no muscle tenderness  Neuro: no focal deficits  Skin: no rash or erythema   Lymph: cervical lymphadenopathy as above     ALLERGIES: penicillins (Hives)    OPIATE NAÏVE (Y/N):  N    MEDICATIONS: REVIEWED  MEDICATIONS  (STANDING):  cefepime Injectable. 2000 milliGRAM(s) IV Push every 8 hours  dexamethasone     Tablet 4 milliGRAM(s) Oral every 6 hours  dextrose 5%. 1000 milliLiter(s) (50 mL/Hr) IV Continuous <Continuous>  dextrose 50% Injectable 12.5 Gram(s) IV Push once  dextrose 50% Injectable 25 Gram(s) IV Push once  dextrose 50% Injectable 25 Gram(s) IV Push once  enoxaparin Injectable 40 milliGRAM(s) SubCutaneous every 24 hours  fluconAZOLE   Tablet 100 milliGRAM(s) Oral daily  folic acid 1 milliGRAM(s) Oral daily  insulin glargine Injectable (LANTUS) 10 Unit(s) SubCutaneous at bedtime  insulin lispro (HumaLOG) corrective regimen sliding scale   SubCutaneous Before meals and at bedtime  sodium chloride 0.9%. 1000 milliLiter(s) (70 mL/Hr) IV Continuous <Continuous>  vancomycin  IVPB 1250 milliGRAM(s) IV Intermittent every 12 hours    MEDICATIONS  (PRN):  acetaminophen   Tablet 650 milliGRAM(s) Oral every 6 hours PRN For Temp greater than 38 C (100.4 F)  acetaminophen   Tablet. 650 milliGRAM(s) Oral every 6 hours PRN Mild Pain (1 - 3)  dextrose Gel 1 Dose(s) Oral once PRN Blood Glucose LESS THAN 70 milliGRAM(s)/deciliter  glucagon  Injectable 1 milliGRAM(s) IntraMuscular once PRN Glucose LESS THAN 70 milligrams/deciliter  oxyCODONE    IR 5 milliGRAM(s) Oral every 4 hours PRN Breakthrough pain  polyethylene glycol 3350 17 Gram(s) Oral daily PRN Constipation    LABS:              7.8    22.2  )-----------( 350      ( 27 Mar 2018 07:04 )             25.2     03-27    134<L>  |  96  |  12  ----------------------------<  182<H>  3.8   |  25  |  0.50    Ca    7.8<L>      27 Mar 2018 07:04  Mg     2.0     03-27    TPro  x   /  Alb  x   /  TBili  x   /  DBili  x   /  AST  x   /  ALT  x   /  AlkPhos  135<H>  03-27    IMAGING: REVIEWED    ADVANCED DIRECTIVES:  According to medical charts from last admission, pt elected to be DNR/DNI.  MOLST form was given to patient, but not completed during that admission.  We will clarify with daughter when she visits.    DECISION MAKER:  Pt seems to have medical capacity to make complex medical decisions.  LEGAL SURROGATE:  Daughter, Naomi Yeh, is health care proxy.  No documentation in chart [phone].    PSYCHOSOCIAL-SPIRITUAL ASSESSMENT:       Reviewed       Care plan unchanged       Care plan adjusted as above	    GOALS OF CARE DISCUSSION       Palliative care info/counseling provided	          Advanced Directives addressed please see Advance Care Planning Note	           See previous Palliative Medicine Note       Documentation of GOC:  Pending oncology recommendations re: cancer targeted therapies and family meeting with pt and daughter, Naomi, who plans to visit this afternoon.  	      AGENCY CHOICE DISCUSSED:           Homecare        ISSA         REFERRALS	        Palliative Med        Unit SW/Case Mgmt              Speech/Swallow       Patient/Family Support       Massage Therapy       Music Therapy       Nutrition       Ethics       PT/OT        CRITICAL CARE TIME PROVIDED TO UNSTABLE PT W/ ORGAN FAILURE	   Start:               End:  	       Minutes:              > 50% OF THE TIME SPENT IN COUNSELING AND COORDINATING CARE 	   Start:               End:  	       Minutes:      PROLONGED SERVICE             FACE TO FACE:    PT            PT & FAMILY	   Start:               End:  	       Minutes:      Advance Care Planning Time: DORON NUGENT             [id number]    CC: Generalized weakness    HPI:  61 yo AA F presenting to [facility] ED for fever/chills, waxing and waning confusion over past few days associated with right chest and shoulder pain and swelling around the port insertion site, port placed on 3/13 and never used.  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 Patients last chemo was on 3/2/18, and recent admission for toxic metabolic encephalopathy in the setting of pneumonia and discharged home on 3/9, since discharge pt has been progressively more fatigued, including waxing/waning mentation which worsened after port placement two weeks prior.  in the ED pt found to be septic with fever, tachycardia and elevated WBC count/bandemia and slightly elevated lactate at 2.5, was Alert and oriented, mental status intact at the time of interview, denied HD or neck stiffness however says that she has been experiencing HA on and off but it is at baseline, denies SOB/CP/Palpitation/cough/Abdominal Pain/diarrhea/Urinary symptoms/ melena or hematochezia. pt is getting admitted for ? line related sepsis. Had CXR and CTA unremarkable for acute findings.   s/p 2 L IVF, one dose of cefepime and vancomycin. (23 Mar 2018 22:25)    SUBJECTIVE:  Pt lethargic this afternoon.  She says she has not been able to rest for more than an hour due to hospital disruptions.  Also very concerned that she cannot remember who she sees each day or what she does.  She had difficulty remembering what her oncologist told her this morning.  Says she did not have any memory issues prior to this admission.  Pt is perspiring and asking for a cold compress and juice.  Her daughter, Naomi, plans to visit this afternoon.     ROS:  DYSPNEA:  N	  NAUS/VOM:  N	  SECRETIONS:  N	  AGITATION:  N  Pain (Y/N):  Y  -Provocation/Palliation:  right shoulder  -Quality/Quantity:  dull, achy pain  -Radiating:  no  -Severity:  mild  -Timing/Frequency:  comes and goes  -Impact on ADLs:  mild    PEx:  T(C): 36.9 (03-27-18 @ 11:32), Max: 37.6 (03-26-18 @ 21:03)  HR: 106 (03-27-18 @ 11:32) (63 - 106)  BP: 131/83 (03-27-18 @ 11:32) (125/80 - 136/86)  RR: 14 (03-27-18 @ 11:32) (14 - 16)  SpO2: 98% (03-27-18 @ 11:32) (96% - 98%)  Wt(kg): --    General: patient sleeping comfortably, in no acute distress,   HEENT: NCAT. Moist mucosa. Thrush on tongue.  Neck: Large, immobile, non-tender cervical lymph node on right.   CVS: RRR, no murmur  Resp: CTAB, normal effort, no wheezes/rales/rhonchi  GI: soft, NT, ND, active BS  : voiding freely   Musc: generalized weakness, bedbound, no muscle tenderness  Neuro: no focal deficits  Skin: no rash or erythema   Lymph: cervical lymphadenopathy as above     ALLERGIES: penicillins (Hives)    OPIATE NAÏVE (Y/N):  N    MEDICATIONS: REVIEWED  MEDICATIONS  (STANDING):  cefepime Injectable. 2000 milliGRAM(s) IV Push every 8 hours  dexamethasone     Tablet 4 milliGRAM(s) Oral every 6 hours  dextrose 5%. 1000 milliLiter(s) (50 mL/Hr) IV Continuous <Continuous>  dextrose 50% Injectable 12.5 Gram(s) IV Push once  dextrose 50% Injectable 25 Gram(s) IV Push once  dextrose 50% Injectable 25 Gram(s) IV Push once  enoxaparin Injectable 40 milliGRAM(s) SubCutaneous every 24 hours  fluconAZOLE   Tablet 100 milliGRAM(s) Oral daily  folic acid 1 milliGRAM(s) Oral daily  insulin glargine Injectable (LANTUS) 10 Unit(s) SubCutaneous at bedtime  insulin lispro (HumaLOG) corrective regimen sliding scale   SubCutaneous Before meals and at bedtime  sodium chloride 0.9%. 1000 milliLiter(s) (70 mL/Hr) IV Continuous <Continuous>  vancomycin  IVPB 1250 milliGRAM(s) IV Intermittent every 12 hours    MEDICATIONS  (PRN):  acetaminophen   Tablet 650 milliGRAM(s) Oral every 6 hours PRN For Temp greater than 38 C (100.4 F)  acetaminophen   Tablet. 650 milliGRAM(s) Oral every 6 hours PRN Mild Pain (1 - 3)  dextrose Gel 1 Dose(s) Oral once PRN Blood Glucose LESS THAN 70 milliGRAM(s)/deciliter  glucagon  Injectable 1 milliGRAM(s) IntraMuscular once PRN Glucose LESS THAN 70 milligrams/deciliter  oxyCODONE    IR 5 milliGRAM(s) Oral every 4 hours PRN Breakthrough pain  polyethylene glycol 3350 17 Gram(s) Oral daily PRN Constipation    LABS:              7.8    22.2  )-----------( 350      ( 27 Mar 2018 07:04 )             25.2     03-27    134<L>  |  96  |  12  ----------------------------<  182<H>  3.8   |  25  |  0.50    Ca    7.8<L>      27 Mar 2018 07:04  Mg     2.0     03-27    TPro  x   /  Alb  x   /  TBili  x   /  DBili  x   /  AST  x   /  ALT  x   /  AlkPhos  135<H>  03-27    IMAGING: REVIEWED    ADVANCED DIRECTIVES:  According to medical charts from last admission, pt elected to be DNR/DNI.  MOLST form was given to patient, but not completed during that admission.  We will clarify with daughter when she visits.    DECISION MAKER:  Pt seems to have medical capacity to make complex medical decisions.  LEGAL SURROGATE:  Daughter, Naomi Yeh, is health care proxy.  No documentation in chart [phone].    PSYCHOSOCIAL-SPIRITUAL ASSESSMENT:       Reviewed       Care plan unchanged       Care plan adjusted as above	    GOALS OF CARE DISCUSSION       Palliative care info/counseling provided	          Advanced Directives addressed please see Advance Care Planning Note	           See previous Palliative Medicine Note       Documentation of GOC:  Pending oncology recommendations re: cancer targeted therapies and family meeting with pt and daughter, Naomi, who plans to visit this afternoon.  	      AGENCY CHOICE DISCUSSED:           Homecare        ISSA         REFERRALS	        Palliative Med        Unit SW/Case Mgmt              Speech/Swallow       Patient/Family Support       Massage Therapy       Music Therapy       Nutrition       Ethics       PT/OT        CRITICAL CARE TIME PROVIDED TO UNSTABLE PT W/ ORGAN FAILURE	   Start:               End:  	       Minutes:              > 50% OF THE TIME SPENT IN COUNSELING AND COORDINATING CARE 	   Start:               End:  	       Minutes:      PROLONGED SERVICE             FACE TO FACE:    PT            PT & FAMILY	   Start:               End:  	       Minutes:      Advance Care Planning Time: DORON NUGENT             MRN-[id number]    CC: Generalized weakness    HPI:  61 yo AA F presenting to [facility] ED for fever/chills, waxing and waning confusion over past few days associated with right chest and shoulder pain and swelling around the port insertion site, port placed on 3/13 and never used.  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 Patients last chemo was on 3/2/18, and recent admission for toxic metabolic encephalopathy in the setting of pneumonia and discharged home on 3/9, since discharge pt has been progressively more fatigued, including waxing/waning mentation which worsened after port placement two weeks prior.  in the ED pt found to be septic with fever, tachycardia and elevated WBC count/bandemia and slightly elevated lactate at 2.5, was Alert and oriented, mental status intact at the time of interview, denied HD or neck stiffness however says that she has been experiencing HA on and off but it is at baseline, denies SOB/CP/Palpitation/cough/Abdominal Pain/diarrhea/Urinary symptoms/ melena or hematochezia. pt is getting admitted for ? line related sepsis. Had CXR and CTA unremarkable for acute findings.   s/p 2 L IVF, one dose of cefepime and vancomycin. (23 Mar 2018 22:25)    SUBJECTIVE:  Pt lethargic but conversant  this afternoon.  She says she has not been able to rest for more than an hour due to hospital disruptions.  Also very concerned that she cannot remember who she sees each day or what she does.  She had difficulty remembering what her oncologist told her this morning.  Says she did not have any memory issues prior to this admission.  Pt is perspiring and asking for a cold compress and juice. Patient received an initial dose of Ritalin 2.5 mg at 1 PM Her daughter, Naomi, plans to visit this afternoon.     ROS:  DYSPNEA:  N	  NAUS/VOM:  N	  SECRETIONS:  N	  AGITATION:  N  Pain (Y/N):  Y  -Provocation/Palliation:  right shoulder  -Quality/Quantity:  dull, achy pain  -Radiating:  no  -Severity:  mild  -Timing/Frequency:  comes and goes  -Impact on ADLs:  mild    PEx:  T(C): 36.9 (03-27-18 @ 11:32), Max: 37.6 (03-26-18 @ 21:03)  HR: 106 (03-27-18 @ 11:32) (63 - 106)  BP: 131/83 (03-27-18 @ 11:32) (125/80 - 136/86)  RR: 14 (03-27-18 @ 11:32) (14 - 16)  SpO2: 98% (03-27-18 @ 11:32) (96% - 98%)  Wt(kg): --    General: patient sleeping comfortably, in no acute distress,   HEENT: NCAT. Moist mucosa. Thrush on tongue.  Neck: Large, immobile, non-tender cervical lymph node on right.   CVS: RRR, no murmur  Resp: CTAB, normal effort, no wheezes/rales/rhonchi  GI: soft, NT, ND, active BS  : voiding freely   Musc: generalized weakness, bedbound, no muscle tenderness  Neuro: no focal deficits  Skin: no rash or erythema   Lymph: cervical lymphadenopathy as above     ALLERGIES: penicillins (Hives)    OPIATE NAÏVE (Y/N):  N    MEDICATIONS: REVIEWED  MEDICATIONS  (STANDING):  cefepime Injectable. 2000 milliGRAM(s) IV Push every 8 hours  dexamethasone     Tablet 4 milliGRAM(s) Oral every 6 hours  dextrose 5%. 1000 milliLiter(s) (50 mL/Hr) IV Continuous <Continuous>  dextrose 50% Injectable 12.5 Gram(s) IV Push once  dextrose 50% Injectable 25 Gram(s) IV Push once  dextrose 50% Injectable 25 Gram(s) IV Push once  enoxaparin Injectable 40 milliGRAM(s) SubCutaneous every 24 hours  fluconAZOLE   Tablet 100 milliGRAM(s) Oral daily  folic acid 1 milliGRAM(s) Oral daily  insulin glargine Injectable (LANTUS) 10 Unit(s) SubCutaneous at bedtime  insulin lispro (HumaLOG) corrective regimen sliding scale   SubCutaneous Before meals and at bedtime  sodium chloride 0.9%. 1000 milliLiter(s) (70 mL/Hr) IV Continuous <Continuous>  vancomycin  IVPB 1250 milliGRAM(s) IV Intermittent every 12 hours    MEDICATIONS  (PRN):  acetaminophen   Tablet 650 milliGRAM(s) Oral every 6 hours PRN For Temp greater than 38 C (100.4 F)  acetaminophen   Tablet. 650 milliGRAM(s) Oral every 6 hours PRN Mild Pain (1 - 3)  dextrose Gel 1 Dose(s) Oral once PRN Blood Glucose LESS THAN 70 milliGRAM(s)/deciliter  glucagon  Injectable 1 milliGRAM(s) IntraMuscular once PRN Glucose LESS THAN 70 milligrams/deciliter  oxyCODONE    IR 5 milliGRAM(s) Oral every 4 hours PRN Breakthrough pain  polyethylene glycol 3350 17 Gram(s) Oral daily PRN Constipation    LABS:              7.8    22.2  )-----------( 350      ( 27 Mar 2018 07:04 )             25.2     03-27    134<L>  |  96  |  12  ----------------------------<  182<H>  3.8   |  25  |  0.50    Ca    7.8<L>      27 Mar 2018 07:04  Mg     2.0     03-27    TPro  x   /  Alb  x   /  TBili  x   /  DBili  x   /  AST  x   /  ALT  x   /  AlkPhos  135<H>  03-27    IMAGING: REVIEWED    ADVANCED DIRECTIVES:  According to medical charts from last admission, pt elected to be DNR/DNI.  MOLST form was given to patient, but not completed during that admission.  We will clarify with daughter when she visits.    DECISION MAKER:  Pt seems to have medical capacity to make complex medical decisions.  LEGAL SURROGATE:  Daughter, Naomi Yeh, is health care proxy.  No documentation in chart [phone].    PSYCHOSOCIAL-SPIRITUAL ASSESSMENT:       Reviewed       Care plan unchanged       Care plan adjusted as above	    GOALS OF CARE DISCUSSION       Palliative care info/counseling provided	          Advanced Directives addressed please see Advance Care Planning Note	           See previous Palliative Medicine Note       Documentation of GOC:  Pending oncology recommendations re: cancer targeted therapies and family meeting with pt and daughterNaomi, who plans to visit this afternoon.  	      AGENCY CHOICE DISCUSSED:           Homecare        ISSA         REFERRALS	        Palliative Med        Unit SW/Case Mgmt              Speech/Swallow       Patient/Family Support       Massage Therapy       Music Therapy       Nutrition       Ethics       PT/OT

## 2018-03-27 NOTE — PROGRESS NOTE ADULT - SUBJECTIVE AND OBJECTIVE BOX
INTERVAL HPI/OVERNIGHT EVENTS:    Patient was seen and examined at bedside.  No new complaints.  Feels tired    CONSTITUTIONAL:  Negative fever or chills, feels lethargic, good appetite  EYES:  Negative  blurry vision or double vision  CARDIOVASCULAR:  Negative for chest pain or palpitations  RESPIRATORY:  Negative for cough, wheezing, or SOB   GASTROINTESTINAL:  Negative for nausea, vomiting, diarrhea, constipation, or abdominal pain  GENITOURINARY:  Negative frequency, urgency or dysuria  NEUROLOGIC:  No headache, confusion, dizziness, lightheadedness      ANTIBIOTICS/RELEVANT:    MEDICATIONS  (STANDING):  cefepime Injectable. 2000 milliGRAM(s) IV Push every 8 hours  dexamethasone     Tablet 4 milliGRAM(s) Oral every 12 hours  dextrose 5%. 1000 milliLiter(s) (50 mL/Hr) IV Continuous <Continuous>  dextrose 50% Injectable 12.5 Gram(s) IV Push once  dextrose 50% Injectable 25 Gram(s) IV Push once  dextrose 50% Injectable 25 Gram(s) IV Push once  enoxaparin Injectable 40 milliGRAM(s) SubCutaneous every 24 hours  fluconAZOLE   Tablet 100 milliGRAM(s) Oral daily  folic acid 1 milliGRAM(s) Oral daily  insulin glargine Injectable (LANTUS) 10 Unit(s) SubCutaneous at bedtime  insulin lispro (HumaLOG) corrective regimen sliding scale   SubCutaneous Before meals and at bedtime  sodium chloride 0.9%. 1000 milliLiter(s) (75 mL/Hr) IV Continuous <Continuous>  vancomycin  IVPB 1250 milliGRAM(s) IV Intermittent every 12 hours    MEDICATIONS  (PRN):  acetaminophen   Tablet 650 milliGRAM(s) Oral every 6 hours PRN For Temp greater than 38 C (100.4 F)  acetaminophen   Tablet. 650 milliGRAM(s) Oral every 6 hours PRN Mild Pain (1 - 3)  dextrose Gel 1 Dose(s) Oral once PRN Blood Glucose LESS THAN 70 milliGRAM(s)/deciliter  glucagon  Injectable 1 milliGRAM(s) IntraMuscular once PRN Glucose LESS THAN 70 milligrams/deciliter  oxyCODONE    IR 5 milliGRAM(s) Oral every 4 hours PRN Breakthrough pain  polyethylene glycol 3350 17 Gram(s) Oral daily PRN Constipation  traMADol 50 milliGRAM(s) Oral every 8 hours PRN Severe Pain (7 - 10)        Vital Signs Last 24 Hrs  T(C): 36.8 (27 Mar 2018 05:37), Max: 37.6 (26 Mar 2018 21:03)  T(F): 98.3 (27 Mar 2018 05:37), Max: 99.7 (26 Mar 2018 21:03)  HR: 65 (27 Mar 2018 05:37) (63 - 112)  BP: 125/80 (27 Mar 2018 05:37) (125/80 - 138/90)  BP(mean): --  RR: 16 (27 Mar 2018 05:37) (16 - 16)  SpO2: 96% (27 Mar 2018 05:37) (96% - 98%)    PHYSICAL EXAM:  Constitutional:  lethargic, ill appearing   Eyes:  non-toxic   Ear/Nose/Throat:  + thrush, no sinus tenderness on percussion	  Neck:no JVD, no lymphadenopathy, supple  Respiratory: decreased breath sounds bilaterally; s/p central line removal   Cardiovascular: S1S2 RRR, no murmurs  Gastrointestinal:soft, (+) BS, no HSM  Extremities:no edema  Vascular: DP Pulse:	right normal; left normal      LABS:                        7.8    22.2  )-----------( 350      ( 27 Mar 2018 07:04 )             25.2     03-27    134<L>  |  96  |  12  ----------------------------<  182<H>  3.8   |  25  |  0.50    Ca    7.8<L>      27 Mar 2018 07:04  Mg     2.0     03-27    TPro  x   /  Alb  x   /  TBili  x   /  DBili  x   /  AST  x   /  ALT  x   /  AlkPhos  135<H>  03-27          MICROBIOLOGY:  Culture - Catheter (03.26.18 @ 11:12)    Specimen Source: .Catheter PORT TIP    Culture Results:   No growth to date  Culture in progress      Fungus Identification (03.25.18 @ 23:18)    Culture Results:   Candida glabrata isolated  Susceptibility to follow.      RADIOLOGY & ADDITIONAL STUDIES:

## 2018-03-27 NOTE — DIETITIAN INITIAL EVALUATION ADULT. - ENERGY NEEDS
Height: 65" Weight: 142lbs, IBW 125lbs+/-10%, %%, BMI - 23.7   ABW used to calculate energy needs due to pt's current body weight within % IBW   Nutrient needs based on [facility] standards of care for repletion in adults

## 2018-03-27 NOTE — PROGRESS NOTE ADULT - PROBLEM SELECTOR PLAN 10
Currently s/p 2 L IVF, euvolemic and well hydrated on exam  encourage PO intake and Hydrations  Diet: NPO prior to IR removal of chemo-port    Dispo: PT consult

## 2018-03-27 NOTE — PROGRESS NOTE ADULT - ASSESSMENT
IMPRESSION:  Line sepsis; sepsis resolving although WBC still elevated (could be from steroids?).  She has no urinary complaints and UA is negative     Recommend:  1.  Agree with vancomycin 1250 mg IV q12  2.  Continue cefepime   3.  Continue fluconazole for thrush  4.  If cath tip culture remains negative can likely deescalate to PO antibiotics in the next 24 hrs to complete the course.      Will follow

## 2018-03-27 NOTE — PROGRESS NOTE ADULT - PROBLEM SELECTOR PLAN 7
Palliative Recommend starting regular diet in a pt with advanced cancer who is at risk for decreased appetite and weight loss.  Continue sliding scale insulin

## 2018-03-27 NOTE — PROGRESS NOTE ADULT - PROBLEM SELECTOR PLAN 7
Patient found to be hyponatremic to 132 on 3/26 likely in the setting of low PO intake. S/p 75cc/h of NS for 10h during evening of 3/26. Na of 134 on 3/27.   -Continue to monitor  -Will hold off on further workup unless hyponatremia worsens

## 2018-03-27 NOTE — PROGRESS NOTE ADULT - PROBLEM SELECTOR PLAN 5
Continue Dexamethasone, continue to monitor mental status closely Continue Dexamethasone which was recently increased in frequency from Dexa 4mg BID to q6 hr

## 2018-03-27 NOTE — PROGRESS NOTE ADULT - PROBLEM SELECTOR PLAN 4
Awaiting Oncology note and recommendations.  She has been through 2 cycles of chemo and immunotherapy, radiation to brain mets, and bony mets.  Recent CT concerning for new cervical and mediastinal lymphadenopathy/disease progression Pt has been through 2 cycles of chemo and immunotherapy, s/p radiation to brain mets.  Recent CT concerning for new cervical and mediastinal lymphadenopathy/disease progression.  According to pt, oncology hopeful she can receive further cancer targeted therapy.  Awaiting Oncology note and recommendations Pt has been through 2 cycles of chemo and immunotherapy, s/p radiation to brain mets.  Recent CT concerning for new cervical and mediastinal lymphadenopathy/disease progression.  According to pt, oncology hopeful she can receive further cancer targeted therapy.  Urged her "not to give up." Awaiting Oncology note and recommendations

## 2018-03-27 NOTE — PROGRESS NOTE ADULT - PROBLEM SELECTOR PLAN 8
Palliative will continue to have ongoing GOC discussions with pt and family.  According to last admission, pt elected to be DNR/DNI.  Will clarify with daughter when she arrives to the hospital.  Support provided to patient and family. Patient to have access to supportive services during rest of hospital stay as the patient/family deemed necessary ie. Chaplaincy, Massage therapy, Music therapy, Patient and family supportive services, Palliative SW, etc.    As discussed during the palliative IDT meeting and as identified during the patients PSSA screening the patient would benefit from: Chaplaincy, Massage therapy, Music therapy, Patient and family supportive services, Palliative SW

## 2018-03-27 NOTE — PROGRESS NOTE ADULT - ATTENDING COMMENTS
In addition to E and M visit, I spoke with daughter Naomi by telephone.  Discussed trial of Ritalin.  Discussed daughters concern that mother could not even get to oncology office for eval or chemo.  Discussed hospice, which daughter was very open to.  She is not able to get here until this evening.   Rescheduled meeting for 3:30 tomorrow.    In the meantime we can see effect of Ritalin, and see if patient is strong enough for any form of rehab- I doubt she will be.

## 2018-03-27 NOTE — PROGRESS NOTE ADULT - PROBLEM SELECTOR PLAN 3
#Stage IV Lung Adenocarcinoma   Patient with mets to brain and bone, PDL1 40% on Carboplatin, Pemetrexed, Pembrolizumab s/p 2 cycles, last 3/2/18, treatment course complicated by multiple hospitalizations including post obstructive pneumonia, collapsed lob cb +influenza s/p debulking and chemical pneumonitis. Both clinical cervical exam and CT imaging concerning for extensive R cervical lymphadenopathy, imaging also consistent with increasing R mediastinal and paratracheal lymphadenopathy. R hilar and R lower lobe necrotic masses persist, no significant change in size. In addition, patient with recent port placement (2 weeks prev), documented fever, leukocytosis w/bandemia, concerning for possibly catheter related sepsis. Pt [facility] by [facility] in [facility].  [facility] would need to clinically stabilize prior to next treatment (may need to consider alternative regimen)  -CXR from 3/23 c/f right paratracheal mass compressing the trachea which is narrowed to 8 mm in transverse diameter.  - trend CMP/ ALP  - dexamethasone 4mg BID for brain metastasis, CT head negative for acute pathology  -Pain control with acetaminophen, tramadol, and oxycodone PRN  - f/u hematoclogy/oncology recs

## 2018-03-27 NOTE — PROGRESS NOTE ADULT - PROBLEM SELECTOR PLAN 9
DVT ppx: holding lovenox for IR removal of chemo-port  Ulcer Ppx: PPI  OOB   glycemic monitoring  Bowel regimen

## 2018-03-27 NOTE — CHART NOTE - NSCHARTNOTEFT_GEN_A_CORE
Upon Nutritional Assessment by the Registered Dietitian your patient was determined to meet criteria / has evidence of the following diagnosis/diagnoses:          [ ]  Mild Protein Calorie Malnutrition        [ ]  Moderate Protein Calorie Malnutrition        [X] Severe Protein Calorie Malnutrition        [ ] Unspecified Protein Calorie Malnutrition        [ ] Underweight / BMI <19        [ ] Morbid Obesity / BMI > 40      Findings as based on:  •  Comprehensive nutrition assessment and consultation  •  Calorie counts (nutrient intake analysis)  •  Food acceptance and intake status from observations by staff  •  Follow up  •  Patient education  •  Intervention secondary to interdisciplinary rounds  •   concerns      Treatment:    The following diet has been recommended: Rec. Ensure Clear TID       PROVIDER Section:     By signing this assessment you are acknowledging and agree with the diagnosis/diagnoses assigned by the Registered Dietitian    Comments:

## 2018-03-27 NOTE — PROGRESS NOTE ADULT - PROBLEM SELECTOR PLAN 6
PPSV2 30-40%.  She is mostly bedbound and has difficulty lifting her head or sitting up in bed.  Assist with all ADLs for now

## 2018-03-27 NOTE — PROGRESS NOTE ADULT - PROBLEM SELECTOR PLAN 3
Port removed per IR 3/26.  On Cefepime, Vancomycin and Fluconazole for thrush Port removed per IR 3/26.  On Cefepime, Vancomycin for line infection and Fluconazole for thrush

## 2018-03-27 NOTE — PROGRESS NOTE ADULT - ASSESSMENT
61 y/o F with a PMHX of HTN, DM, and Stage IV lung CA w/ mets to brain and bone (Dec 2nd) who underwent RT to bony mets on 8th rib, and had gamma knife tx to brain (Jan 9th) s/p 2 cycles of systemic chemoimmunotherapy and immunotherapy for metastatic disease presenting with increased lethargy and line sepsis.  Palliative care called to see for symptom management and discussion of goals of care.

## 2018-03-27 NOTE — PROGRESS NOTE ADULT - PROBLEM SELECTOR PLAN 6
Patient has a history of DM. She is on dexamethasone 4mg q12h for brain mets, which will make hyperglycemia more difficult to control.   -Lantus 10U at bedtime; adjust accordingly  -mISS  -Monitor fingersticks

## 2018-03-27 NOTE — DIETITIAN INITIAL EVALUATION ADULT. - PROBLEM SELECTOR PLAN 3
#Stage IV Lung Adenocarcinoma   Patient with mets to brain and bone, PDL1 40% on Carboplatin, Pemetrexed, Pembrolizumab s/p 2 cycles, last 3/2/18, treatment course complicated by multiple hospitalizations including post obstructive pneumonia, collapsed lob cb +influenza s/p debulking and chemical pneumonitis. Both clinical cervical exam and CT imaging concerning for extensive R cervical lymphadenopathy, imaging also consistent with increasing R mediastinal and paratracheal lymphadenopathy. R hilar and R lower lobe necrotic masses persist, no significant change in size. In addition, patient with recent port placement (2 weeks prev), documented fever, leukocytosis w/bandemia, concerning for possibly catheter related sepsis.   pt [facility] by [facility] ED.  [facility] would need to clinically stabilize prior to next treatment (may need to consider alternative regimen)  - trend CMP/ AlP  - dexamethasone 4mg BID for brain metastasis, CT head negative for acute pathology  - f/u hematoclogy/oncology recs

## 2018-03-27 NOTE — PROGRESS NOTE ADULT - PROBLEM SELECTOR PLAN 2
May be secondary to new cervical lymphadenopathy vs. infected port (s/p removal yesterday).  Continue oxycodone IR 5mg q4 PRN pain, heat, or ice May be secondary to new cervical lymphadenopathy vs. infected port (s/p removal yesterday).  Continue oxycodone IR 5mg q4 PRN pain, hot or cold compresses

## 2018-03-27 NOTE — PROGRESS NOTE ADULT - ATTENDING COMMENTS
DX  sepsis due to suspected line infection - cont vanc/cefepime.  blood cx and cath tip cx (removed port 3/26/18) are negative thus far.   f/u ID recs.   right arm edema - no DVT seen on u/s  leukocytosis - etiology unclear. doubt it is due to infectious process. will trend.   somnelence - increase dexamethasone to 4mg q6hrs. CT head on admission w/o significant changes from prior scan.   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   thrush - fluconazole  htn - bps near gaol off all anti-hypertensives  dm ii - ssi  anemia - s/p 2 u prbc. suspect anemia of chronic dz + hemodilution. no active bleeding. stool guaic (-)  depression- no si/hi.   stage iv lung cancer with brain metastases - pt is a candidate for hospice. will f/u palliative and onc recommendations.   tracheal compression from tumor/lymphadenopathy - currently w/o sxs. will monitor .

## 2018-03-27 NOTE — PROGRESS NOTE ADULT - PROBLEM SELECTOR PLAN 1
Most likely in the setting of port infection.  - Empiric coverage (3/23-) with vancomycin 1250mg BID, cefepime 2000mg q8h,(pt has penicillin allergy), and fluconazole 100mg q24h  -Monitor vanc trough and adjust dosage PRN  -UCx with yeast, though patient without urinary complaints; likely colonized  - f/u BCx (ngtd) and tailor Abx accordingly  - monitor WBC count and fever  - tylenol PRN for fever    - S/p removal of chemo-port by IR on 3/26; f/u culture of catheter tip  - ID consulted; appreciate recs

## 2018-03-27 NOTE — DIETITIAN INITIAL EVALUATION ADULT. - OTHER INFO
61 yo AAF with presenting to ED for fever/chills, increased lethargy and port site pain and erythema; likely in the setting of chemo port infection. Pt with metastatic cancer. Pt appears very withdrawn at time of interview; answering limited questions and appears to be forgetful (i.e. forgot what she had for breakfast). Pt agreeable to Ensure Clear supplements, which she consumed on last admission (dislikes Ensure Enlive). Pt with significant wt loss over the past few months. No n/v/d/c noted at this time. No pain at this time. NKFA noted.

## 2018-03-27 NOTE — PROGRESS NOTE ADULT - PROBLEM SELECTOR PLAN 4
- Fluconazole 100 mg daily x 7 days (3/23-)    #Urine w/yeast; asymptomatic   -Awaiting susceptibility testing

## 2018-03-27 NOTE — PROGRESS NOTE ADULT - ASSESSMENT
61 yo AAF with presenting to ED for fever/chills, increased lethargy and port site pain and erythema, PMHX of HTN, DM, and Stage IV lung CA w/ mets to brain and bone s/p RTX and gamma knife Rtx to brain and 2 cycles of systemic chemoimmunotherapy for metastatic disease, meeting sepsis criteria in the ED, presumably line-sepsis, s/p removal of chemo-port.

## 2018-03-27 NOTE — PROGRESS NOTE ADULT - PROBLEM SELECTOR PLAN 8
Improved. Swollen RUE; pulses intact, sensation intact;   -No thrombus seen within the right neck and upper arm to the level of   antecubital fossa. The remaining veins were not assessed secondary to   patient refusal to continue the examination.

## 2018-03-28 ENCOUNTER — TRANSCRIPTION ENCOUNTER (OUTPATIENT)
Age: 63
End: 2018-03-28

## 2018-03-28 ENCOUNTER — APPOINTMENT (OUTPATIENT)
Dept: INFUSION THERAPY | Facility: HOSPITAL | Age: 63
End: 2018-03-28

## 2018-03-28 LAB
ANION GAP SERPL CALC-SCNC: 14 MMOL/L — SIGNIFICANT CHANGE UP (ref 5–17)
BUN SERPL-MCNC: 16 MG/DL — SIGNIFICANT CHANGE UP (ref 7–23)
CALCIUM SERPL-MCNC: 7.7 MG/DL — LOW (ref 8.4–10.5)
CHLORIDE SERPL-SCNC: 94 MMOL/L — LOW (ref 96–108)
CO2 SERPL-SCNC: 25 MMOL/L — SIGNIFICANT CHANGE UP (ref 22–31)
CREAT SERPL-MCNC: 0.53 MG/DL — SIGNIFICANT CHANGE UP (ref 0.5–1.3)
CULTURE RESULTS: NO GROWTH — SIGNIFICANT CHANGE UP
CULTURE RESULTS: SIGNIFICANT CHANGE UP
CULTURE RESULTS: SIGNIFICANT CHANGE UP
GLUCOSE BLDC GLUCOMTR-MCNC: 170 MG/DL — HIGH (ref 70–99)
GLUCOSE BLDC GLUCOMTR-MCNC: 210 MG/DL — HIGH (ref 70–99)
GLUCOSE BLDC GLUCOMTR-MCNC: 237 MG/DL — HIGH (ref 70–99)
GLUCOSE BLDC GLUCOMTR-MCNC: 277 MG/DL — HIGH (ref 70–99)
GLUCOSE SERPL-MCNC: 241 MG/DL — HIGH (ref 70–99)
HCT VFR BLD CALC: 25.8 % — LOW (ref 34.5–45)
HGB BLD-MCNC: 7.9 G/DL — LOW (ref 11.5–15.5)
MAGNESIUM SERPL-MCNC: 2 MG/DL — SIGNIFICANT CHANGE UP (ref 1.6–2.6)
MCHC RBC-ENTMCNC: 25.6 PG — LOW (ref 27–34)
MCHC RBC-ENTMCNC: 30.6 G/DL — LOW (ref 32–36)
MCV RBC AUTO: 83.8 FL — SIGNIFICANT CHANGE UP (ref 80–100)
MONOCYTES NFR BLD AUTO: 4 % — SIGNIFICANT CHANGE UP (ref 2–14)
NEUTROPHILS NFR BLD AUTO: 78 % — HIGH (ref 43–77)
PLATELET # BLD AUTO: 361 K/UL — SIGNIFICANT CHANGE UP (ref 150–400)
POTASSIUM SERPL-MCNC: 3.9 MMOL/L — SIGNIFICANT CHANGE UP (ref 3.5–5.3)
POTASSIUM SERPL-SCNC: 3.9 MMOL/L — SIGNIFICANT CHANGE UP (ref 3.5–5.3)
RBC # BLD: 3.08 M/UL — LOW (ref 3.8–5.2)
RBC # FLD: 22.1 % — HIGH (ref 10.3–16.9)
SODIUM SERPL-SCNC: 133 MMOL/L — LOW (ref 135–145)
SPECIMEN SOURCE: SIGNIFICANT CHANGE UP
WBC # BLD: 24.7 K/UL — HIGH (ref 3.8–10.5)
WBC # FLD AUTO: 24.7 K/UL — HIGH (ref 3.8–10.5)

## 2018-03-28 PROCEDURE — 99497 ADVNCD CARE PLAN 30 MIN: CPT | Mod: 25

## 2018-03-28 PROCEDURE — 99233 SBSQ HOSP IP/OBS HIGH 50: CPT | Mod: GC

## 2018-03-28 PROCEDURE — 99232 SBSQ HOSP IP/OBS MODERATE 35: CPT

## 2018-03-28 RX ORDER — INSULIN GLARGINE 100 [IU]/ML
20 INJECTION, SOLUTION SUBCUTANEOUS AT BEDTIME
Qty: 0 | Refills: 0 | Status: DISCONTINUED | OUTPATIENT
Start: 2018-03-28 | End: 2018-03-29

## 2018-03-28 RX ORDER — CEFDINIR 250 MG/5ML
300 POWDER, FOR SUSPENSION ORAL
Qty: 0 | Refills: 0 | Status: DISCONTINUED | OUTPATIENT
Start: 2018-03-28 | End: 2018-03-30

## 2018-03-28 RX ORDER — INSULIN LISPRO 100/ML
3 VIAL (ML) SUBCUTANEOUS
Qty: 0 | Refills: 0 | Status: DISCONTINUED | OUTPATIENT
Start: 2018-03-28 | End: 2018-03-29

## 2018-03-28 RX ORDER — DEXAMETHASONE 0.5 MG/5ML
4 ELIXIR ORAL EVERY 8 HOURS
Qty: 0 | Refills: 0 | Status: DISCONTINUED | OUTPATIENT
Start: 2018-03-28 | End: 2018-04-04

## 2018-03-28 RX ORDER — FLUCONAZOLE 150 MG/1
100 TABLET ORAL DAILY
Qty: 0 | Refills: 0 | Status: COMPLETED | OUTPATIENT
Start: 2018-03-29 | End: 2018-04-02

## 2018-03-28 RX ORDER — POTASSIUM CHLORIDE 20 MEQ
10 PACKET (EA) ORAL ONCE
Qty: 0 | Refills: 0 | Status: COMPLETED | OUTPATIENT
Start: 2018-03-28 | End: 2018-03-28

## 2018-03-28 RX ADMIN — Medication 4: at 18:45

## 2018-03-28 RX ADMIN — Medication 2: at 21:39

## 2018-03-28 RX ADMIN — Medication 100 MILLIGRAM(S): at 12:45

## 2018-03-28 RX ADMIN — OXYCODONE HYDROCHLORIDE 5 MILLIGRAM(S): 5 TABLET ORAL at 22:16

## 2018-03-28 RX ADMIN — OXYCODONE HYDROCHLORIDE 5 MILLIGRAM(S): 5 TABLET ORAL at 00:24

## 2018-03-28 RX ADMIN — ENOXAPARIN SODIUM 40 MILLIGRAM(S): 100 INJECTION SUBCUTANEOUS at 18:46

## 2018-03-28 RX ADMIN — CEFEPIME 2000 MILLIGRAM(S): 1 INJECTION, POWDER, FOR SOLUTION INTRAMUSCULAR; INTRAVENOUS at 00:06

## 2018-03-28 RX ADMIN — ENOXAPARIN SODIUM 40 MILLIGRAM(S): 100 INJECTION SUBCUTANEOUS at 00:07

## 2018-03-28 RX ADMIN — OXYCODONE HYDROCHLORIDE 5 MILLIGRAM(S): 5 TABLET ORAL at 04:27

## 2018-03-28 RX ADMIN — OXYCODONE HYDROCHLORIDE 5 MILLIGRAM(S): 5 TABLET ORAL at 06:28

## 2018-03-28 RX ADMIN — Medication 6: at 12:46

## 2018-03-28 RX ADMIN — CEFDINIR 300 MILLIGRAM(S): 250 POWDER, FOR SUSPENSION ORAL at 21:39

## 2018-03-28 RX ADMIN — CEFEPIME 2000 MILLIGRAM(S): 1 INJECTION, POWDER, FOR SOLUTION INTRAMUSCULAR; INTRAVENOUS at 06:24

## 2018-03-28 RX ADMIN — Medication 4 MILLIGRAM(S): at 00:07

## 2018-03-28 RX ADMIN — FLUCONAZOLE 100 MILLIGRAM(S): 150 TABLET ORAL at 12:45

## 2018-03-28 RX ADMIN — OXYCODONE HYDROCHLORIDE 5 MILLIGRAM(S): 5 TABLET ORAL at 15:15

## 2018-03-28 RX ADMIN — Medication 166.67 MILLIGRAM(S): at 00:29

## 2018-03-28 RX ADMIN — OXYCODONE HYDROCHLORIDE 5 MILLIGRAM(S): 5 TABLET ORAL at 14:42

## 2018-03-28 RX ADMIN — Medication 4: at 09:08

## 2018-03-28 RX ADMIN — Medication 1 MILLIGRAM(S): at 12:45

## 2018-03-28 RX ADMIN — OXYCODONE HYDROCHLORIDE 5 MILLIGRAM(S): 5 TABLET ORAL at 21:39

## 2018-03-28 RX ADMIN — Medication 10 MILLIEQUIVALENT(S): at 12:45

## 2018-03-28 RX ADMIN — Medication 4 MILLIGRAM(S): at 06:22

## 2018-03-28 RX ADMIN — Medication 4 MILLIGRAM(S): at 14:09

## 2018-03-28 RX ADMIN — Medication 4 MILLIGRAM(S): at 21:39

## 2018-03-28 RX ADMIN — Medication 3 UNIT(S): at 18:46

## 2018-03-28 RX ADMIN — CEFDINIR 300 MILLIGRAM(S): 250 POWDER, FOR SUSPENSION ORAL at 14:10

## 2018-03-28 RX ADMIN — OXYCODONE HYDROCHLORIDE 5 MILLIGRAM(S): 5 TABLET ORAL at 07:50

## 2018-03-28 RX ADMIN — INSULIN GLARGINE 20 UNIT(S): 100 INJECTION, SOLUTION SUBCUTANEOUS at 21:39

## 2018-03-28 NOTE — PROGRESS NOTE ADULT - PROBLEM SELECTOR PLAN 7
Patient found to be hyponatremic to 132 on 3/26 likely in the setting of low PO intake. S/p 75cc/h of NS for 10h during evening of 3/26. Na of 134 on 3/27.   -Continue to monitor  -Will hold off on further workup unless hyponatremia worsens Patient found to be hyponatremic to 132 on 3/26 likely in the setting of low PO intake vs. pseudohyponatremia in setting of hyperglycemia. S/p 70cc/h of NS for overnight. Na of 133 on 3/28, when corrected for hyperglycemia, Na is 135-136.  -Continue to monitor  -Will hold off on further workup unless hyponatremia worsens

## 2018-03-28 NOTE — PROGRESS NOTE ADULT - PROBLEM SELECTOR PLAN 10
Fluids: On 70cc/h NS, will reassess needs for IV hydration  Diet: Regular diet (requested per patient), was on a diabetic diet previously    Dispo: 7Wo Fluids: none  Diet: Regular diet (requested per patient), was on a diabetic diet previously    Dispo: 7Wo

## 2018-03-28 NOTE — DISCHARGE NOTE ADULT - MEDICATION SUMMARY - MEDICATIONS TO TAKE
I will START or STAY ON the medications listed below when I get home from the hospital:    dexamethasone 4 mg oral tablet  -- 1 tab(s) by mouth every 6 hours  -- Indication: For Symptom Management     acetaminophen 325 mg oral tablet  -- 2 tab(s) by mouth every 6 hours, As needed, Mild Pain (1 - 3)  -- Indication: For Symptom Management     morphine  -- 2 milligram(s) intravenous every 2 hours, As Needed  -- Indication: For Symptom Management     oxyCODONE 5 mg oral tablet  -- 1 tab(s) by mouth every 6 hours  -- Indication: For Symptom Management     Benadryl 25 mg oral capsule  -- 1 tab(s) by mouth once a day (at bedtime), As Needed  -- Indication: For Symptom Management     lidocaine 5% topical film  -- Apply on skin to affected area once a day  -- Indication: For Symptom Management     polyethylene glycol 3350 oral powder for reconstitution  -- 17 gram(s) by mouth once a day, As needed, Constipation  -- Indication: For Symptom Management     docusate sodium 100 mg oral capsule  -- 1 cap(s) by mouth 2 times a day  -- Indication: For Symptom Management

## 2018-03-28 NOTE — PROGRESS NOTE ADULT - ASSESSMENT
IMPRESSION:  Line sepsis, patient stable.  WBC continues to rise but patient also on steroids.  No fevers, no complaints.  UA is negative and she has no urinary symptoms so I suspect the candida glabrata is a colonizer    Recommend:  1.  Can stop vanco and cefepime given that cath culture is negative and switch to Doxycycline 100 mg PO q12 + Cefpodoxime 200 mg PO q12 x 3 days (patient tolerates cephalosporins)  2.  Continue fluconazole x 10 days total    Will sign off.  Reconsult as needed

## 2018-03-28 NOTE — PROGRESS NOTE ADULT - SUBJECTIVE AND OBJECTIVE BOX
Overnight Events: Patient refused all of her medications early in the night. Night float spoke with patient again around midnight and convinced her to take her medications. She appeared confused and paranoid when seen by night float.   Subjective/ROS: Patient reports ongoing right shoulder pain, well controlled on current analgesic regimen. She did not like how she responded to Ritalin and requests that we speak directly with her about starting any new medications. Endorses cough (present at baseline). Denies headache, dizziness, lightheadedness, chest pain, shortness of breath, palpitations, abdominal pain, nausea, vomiting, diarrhea, fevers, and chills.     VITAL SIGNS:  Vital Signs Last 24 Hrs  T(C): 36.7 (28 Mar 2018 05:36), Max: 37.1 (27 Mar 2018 21:05)  T(F): 98.1 (28 Mar 2018 05:36), Max: 98.8 (27 Mar 2018 21:05)  HR: 114 (28 Mar 2018 05:36) (89 - 114)  BP: 144/81 (28 Mar 2018 05:36) (128/85 - 144/81)  BP(mean): --  RR: 16 (28 Mar 2018 05:36) (14 - 16)  SpO2: 95% (28 Mar 2018 05:36) (95% - 98%)    PHYSICAL EXAM:    General: Well nourished middle aged woman awake and comfortable in bed; NAD  HEENT: NC/AT; EOMI, PEERLA anicteric sclera; MMM, +thrush (improving)  Neck: supple, palpable R neck mass, non-tender to palpation  Cardiovascular: +S1/S2; tachycardic, regular rhythm, no r/m/g  Respiratory: CTA B/L; no W/R/R; R sided dressing from port removal C/D/I, no erythema, swelling, purulence, or tenderness surrounding site  Gastrointestinal: soft, NT/ND; +BS  Extremities: WWP; RUE > LUE in size; RUE edema, sensation and pulses of RUE intact, non-tender to palpation  Vascular: 2+ radial, DP pulses B/L  Neurological: Awake, alert, interactive  Psych: flat affect     MEDICATIONS:  MEDICATIONS  (STANDING):  cefepime Injectable. 2000 milliGRAM(s) IV Push every 8 hours  dexamethasone     Tablet 4 milliGRAM(s) Oral every 6 hours  dextrose 5%. 1000 milliLiter(s) (50 mL/Hr) IV Continuous <Continuous>  dextrose 50% Injectable 12.5 Gram(s) IV Push once  dextrose 50% Injectable 25 Gram(s) IV Push once  dextrose 50% Injectable 25 Gram(s) IV Push once  enoxaparin Injectable 40 milliGRAM(s) SubCutaneous every 24 hours  fluconAZOLE   Tablet 100 milliGRAM(s) Oral daily  folic acid 1 milliGRAM(s) Oral daily  insulin glargine Injectable (LANTUS) 15 Unit(s) SubCutaneous at bedtime  insulin lispro (HumaLOG) corrective regimen sliding scale   SubCutaneous Before meals and at bedtime  methylphenidate 2.5 milliGRAM(s) Oral daily  sodium chloride 0.9%. 1000 milliLiter(s) (70 mL/Hr) IV Continuous <Continuous>  vancomycin  IVPB 1250 milliGRAM(s) IV Intermittent every 12 hours    MEDICATIONS  (PRN):  acetaminophen   Tablet 650 milliGRAM(s) Oral every 6 hours PRN For Temp greater than 38 C (100.4 F)  acetaminophen   Tablet. 650 milliGRAM(s) Oral every 6 hours PRN Mild Pain (1 - 3)  dextrose Gel 1 Dose(s) Oral once PRN Blood Glucose LESS THAN 70 milliGRAM(s)/deciliter  glucagon  Injectable 1 milliGRAM(s) IntraMuscular once PRN Glucose LESS THAN 70 milligrams/deciliter  oxyCODONE    IR 5 milliGRAM(s) Oral every 4 hours PRN Breakthrough pain  polyethylene glycol 3350 17 Gram(s) Oral daily PRN Constipation      ALLERGIES:  Allergies    penicillins (Hives)    Intolerances        LABS:                        7.8    22.2  )-----------( 350      ( 27 Mar 2018 07:04 )             25.2     03-27    134<L>  |  96  |  12  ----------------------------<  182<H>  3.8   |  25  |  0.50    Ca    7.8<L>      27 Mar 2018 07:04  Mg     2.0     03-27    TPro  x   /  Alb  x   /  TBili  x   /  DBili  x   /  AST  x   /  ALT  x   /  AlkPhos  135<H>  03-27        CAPILLARY BLOOD GLUCOSE      POCT Blood Glucose.: 234 mg/dL (27 Mar 2018 21:26)      RADIOLOGY & ADDITIONAL TESTS: Reviewed.

## 2018-03-28 NOTE — DISCHARGE NOTE ADULT - PATIENT PORTAL LINK FT
You can access the [facility], offered by [facility], by registering with the following website: [email or web address]

## 2018-03-28 NOTE — PROGRESS NOTE ADULT - ATTENDING COMMENTS
DX  sepsis due to suspected line infection - changed to doxy/cefdinir .  blood cx and cath tip cx (removed port 3/26/18) are negative thus far.   f/u ID recs.   right arm edema - no DVT seen on u/s  leukocytosis - etiology unclear. doubt it is due to infectious process. will trend.   somnelence -improved with increased dexamethasone to 4mg q6hrs. however pt insists that she only wants q8hrs dexamethasone. can trial q8 dose , monitor .  CT head on admission w/o significant changes from prior scan.   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   thrush - fluconazole  htn - bps near gaol off all anti-hypertensives  dm ii - ssi  anemia - s/p 2 u prbc. suspect anemia of chronic dz + hemodilution. no active bleeding. stool guaic (-)  depression- no si/hi.   stage iv lung cancer with brain metastases - pt is a candidate for hospice. will f/u palliative and onc recommendations.   tracheal compression from tumor/lymphadenopathy - currently w/o sxs. will monitor .

## 2018-03-28 NOTE — PROGRESS NOTE ADULT - SUBJECTIVE AND OBJECTIVE BOX
In addition to E and M visit documented above Advance care planning meeting was held later this afternoon conducted by Dr. Krishnan  Start time: 4:10 PM  End time: 4:45 PM  Total time: 35 minutes    A face to face meeting to discuss advance care planning was held today regarding: DORON NUGENT  Primary decision maker: Patient in discussion with her HCP and daughter Naomi Yeh at the bedside, and patient's  Fco  Alternate/surrogate:  Discussed advance directives including, but not limited to, healthcare proxy and code status. Details of MOLST discussed and documented.  Patient to go to rehab in effort to get stronger, but if segundo condition declines she will be transferred to hospice care  Decision regarding code status: DNR, DNI  Documentation completed today: HEIDI

## 2018-03-28 NOTE — PROGRESS NOTE ADULT - PROBLEM SELECTOR PLAN 3
#Stage IV Lung Adenocarcinoma   Patient with mets to brain and bone, PDL1 40% on Carboplatin, Pemetrexed, Pembrolizumab s/p 2 cycles, last 3/2/18, treatment course complicated by multiple hospitalizations including post obstructive pneumonia, collapsed lob cb +influenza s/p debulking and chemical pneumonitis. Both clinical cervical exam and CT imaging concerning for extensive R cervical lymphadenopathy, imaging also consistent with increasing R mediastinal and paratracheal lymphadenopathy. R hilar and R lower lobe necrotic masses persist, no significant change in size. In addition, patient with recent port placement (2 weeks prev), documented fever, leukocytosis w/bandemia, concerning for possibly catheter related sepsis. Pt seen by [facility] in the ED.  [facility] would need to clinically stabilize prior to next treatment (may need to consider alternative regimen)  -CXR from 3/23 c/f right paratracheal mass compressing the trachea which is narrowed to 8 mm in transverse diameter.  - trend CMP/ ALP  - dexamethasone 4mg q6h for brain metastasis, CT head negative for acute pathology  -Pain control with acetaminophen, tramadol, and oxycodone PRN  - f/u hematoclogy/oncology recs

## 2018-03-28 NOTE — PROGRESS NOTE ADULT - SUBJECTIVE AND OBJECTIVE BOX
INTERVAL HPI/OVERNIGHT EVENTS:    Patient was seen and examined at bedside.  Feels weak.  No other complaints     CONSTITUTIONAL:  Negative fever or chills, feels weak, good appetite  EYES:  Negative  blurry vision or double vision  CARDIOVASCULAR:  Negative for chest pain or palpitations  RESPIRATORY:  Negative for cough, wheezing, or SOB   GASTROINTESTINAL:  Negative for nausea, vomiting, diarrhea, constipation, or abdominal pain  GENITOURINARY:  Negative frequency, urgency or dysuria  NEUROLOGIC:  No headache, confusion, dizziness, lightheadedness      ANTIBIOTICS/RELEVANT:    MEDICATIONS  (STANDING):  cefdinir 300 milliGRAM(s) Oral two times a day  dexamethasone     Tablet 4 milliGRAM(s) Oral every 8 hours  dextrose 5%. 1000 milliLiter(s) (50 mL/Hr) IV Continuous <Continuous>  dextrose 50% Injectable 12.5 Gram(s) IV Push once  dextrose 50% Injectable 25 Gram(s) IV Push once  dextrose 50% Injectable 25 Gram(s) IV Push once  doxycycline hyclate Capsule 100 milliGRAM(s) Oral every 12 hours  enoxaparin Injectable 40 milliGRAM(s) SubCutaneous every 24 hours  fluconAZOLE   Tablet 100 milliGRAM(s) Oral daily  folic acid 1 milliGRAM(s) Oral daily  insulin glargine Injectable (LANTUS) 15 Unit(s) SubCutaneous at bedtime  insulin lispro (HumaLOG) corrective regimen sliding scale   SubCutaneous Before meals and at bedtime    MEDICATIONS  (PRN):  acetaminophen   Tablet 650 milliGRAM(s) Oral every 6 hours PRN For Temp greater than 38 C (100.4 F)  acetaminophen   Tablet. 650 milliGRAM(s) Oral every 6 hours PRN Mild Pain (1 - 3)  dextrose Gel 1 Dose(s) Oral once PRN Blood Glucose LESS THAN 70 milliGRAM(s)/deciliter  glucagon  Injectable 1 milliGRAM(s) IntraMuscular once PRN Glucose LESS THAN 70 milligrams/deciliter  oxyCODONE    IR 5 milliGRAM(s) Oral every 4 hours PRN Breakthrough pain  polyethylene glycol 3350 17 Gram(s) Oral daily PRN Constipation        Vital Signs Last 24 Hrs  T(C): 36.9 (28 Mar 2018 11:12), Max: 37.1 (27 Mar 2018 21:05)  T(F): 98.5 (28 Mar 2018 11:12), Max: 98.8 (27 Mar 2018 21:05)  HR: 102 (28 Mar 2018 11:12) (89 - 114)  BP: 109/76 (28 Mar 2018 11:12) (109/76 - 144/81)  BP(mean): --  RR: 16 (28 Mar 2018 11:12) (15 - 16)  SpO2: 98% (28 Mar 2018 11:12) (95% - 98%)    PHYSICAL EXAM:  Constitutional:  lethargic, ill appearing  Eyes:  no icterus   Ear/Nose/Throat: no oral lesion,  Neck:  supple  Respiratory: decreased breath sounds bilaterally   Cardiovascular: S1S2 RRR, no murmurs  Gastrointestinal:soft, (+) BS, no HSM  Extremities:no edema   Vascular: DP Pulse:	right normal; left normal      LABS:                        7.9    24.7  )-----------( 361      ( 28 Mar 2018 08:25 )             25.8     03-28    133<L>  |  94<L>  |  16  ----------------------------<  241<H>  3.9   |  25  |  0.53    Ca    7.7<L>      28 Mar 2018 08:25  Mg     2.0     03-28    TPro  x   /  Alb  x   /  TBili  x   /  DBili  x   /  AST  x   /  ALT  x   /  AlkPhos  135<H>  03-27          MICROBIOLOGY:'    Culture - Catheter (03.26.18 @ 11:12)    Specimen Source: .Catheter PORT TIP    Culture Results:   No growth    Culture - Urine (03.23.18 @ 16:18)    Specimen Source: .Urine Clean Catch (Midstream)    Culture Results:   >100,000 CFU/ml Yeast  Pending further identification at [facility]  Antibiotic susceptibility testing is performed at request only by calling  24/7 at [phone]        RADIOLOGY & ADDITIONAL STUDIES:

## 2018-03-28 NOTE — PROGRESS NOTE ADULT - ASSESSMENT
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

## 2018-03-28 NOTE — PROGRESS NOTE ADULT - PROBLEM SELECTOR PLAN 1
We attempted trial of low dose methylphenidate 2.5mg yesterday, but pt felt very warm, sweaty and refused dose today. Today, pt appears more alert, less fatigued.  Continue PT/OT We attempted trial of low dose methylphenidate 2.5mg yesterday, but pt felt very warm, sweaty and refused dose today.  Discontinued medication.  Today, pt appears more alert, less fatigued.  Continue PT as pt will likely be discharged to [facility] We attempted trial of low dose methylphenidate 2.5mg yesterday, but pt felt very warm, sweaty and refused dose today.  Discontinued medication.  Today, pt appears more alert, less fatigued.  Continue PT as pt will likely be discharged to [facility] Would continue present dose of steroids as this may be reason for improvement

## 2018-03-28 NOTE — PROGRESS NOTE ADULT - PROBLEM SELECTOR PLAN 8
According to last admission, pt elected to be DNR/DNI.  Will clarify with daughter, she was not able to come in yesterday but rescheduled for today 3:30PM.  Support provided to patient and family. Patient to have access to supportive services during rest of hospital stay as the patient/family deemed necessary ie. Chaplaincy, Massage therapy, Music therapy, Patient and family supportive services, Palliative SW, etc.    As discussed during the palliative IDT meeting and as identified during the patients PSSA screening the patient would benefit from: Chaplaincy, Massage therapy, Music therapy, Patient and family supportive services, Palliative SW Pt and daughter confirm pt is DNR/DNI.  Palliative SW working on dispo to possible [facility] at [facility].  See advanced care planning note for details.    Support provided to patient and family. Patient to have access to supportive services during rest of hospital stay as the patient/family deemed necessary ie. Chaplaincy, Massage therapy, Music therapy, Patient and family supportive services, Palliative SW, etc.  As discussed during the palliative IDT meeting and as identified during the patients PSSA screening the patient would benefit from: Chaplaincy, Massage therapy, Music therapy, Patient and family supportive services, Palliative SW Pt and daughter confirm pt is DNR/DNI.  Palliative SW working on dispo to possible [facility] at [facility].  See advanced care planning note for details.    Support provided to patient and family. Patient to have access to supportive services during rest of hospital stay as the patient/family deemed necessary ie. Chaplaincy, Massage therapy, Music therapy, Patient and family supportive services, Palliative SW, etc.  As discussed during the palliative IDT meeting and as identified during the patients PSSA screening the patient would benefit from: Chaplaincy, Massage therapy, Music therapy, Patient and family supportive services, Palliative SW    Patient likely will go to [facility] for rehab.  If she becomes worse, with increased symptoms, plan is for hospice

## 2018-03-28 NOTE — PROGRESS NOTE ADULT - PROBLEM SELECTOR PLAN 1
Most likely in the setting of port infection.  - Empiric coverage (3/23-) with vancomycin 1250mg BID, cefepime 2000mg q8h,(pt has penicillin allergy), and fluconazole 100mg q24h  -Monitor vanc trough (next on 3/28 at noon) and adjust dosage PRN  -UCx with yeast, though patient without urinary complaints; likely colonized  - f/u BCx (ngtd) and tailor Abx accordingly  - monitor WBC count and fever  - tylenol PRN for fever    - S/p removal of chemo-port by IR on 3/26; f/u culture of catheter tip  - ID consulted; appreciate recs

## 2018-03-28 NOTE — PROGRESS NOTE ADULT - PROBLEM SELECTOR PLAN 4
Pt has been through 2 cycles of chemo and immunotherapy, s/p radiation to brain mets.  Recent CT concerning for new cervical and mediastinal lymphadenopathy/disease progression.  According to pt, her primary oncologist visited Tuesday and hopeful she can receive further cancer targeted therapy.  Will meet with pt and daughter, Naomi, today to discuss goals of care and tx options moving forward Pt has been through 2 cycles of chemo and immunotherapy, s/p radiation to brain mets.  Recent CT concerning for new cervical and mediastinal lymphadenopathy/disease progression.  Today, pt states she does not want anymore chemo right now because it knocked her down the last time.  She is agreeable to [facility] and working with PT to see if she can gain some strength to be able to sit in a chair, get OOB, and spend quality time with her family

## 2018-03-28 NOTE — DISCHARGE NOTE ADULT - PLAN OF CARE
Resolution with outpatient followup You were admitted to the hospital with sepsis of unknown source. Given the timeline of events, you likely had an infection around your chemo-port. Interventional radiology removed your chemo-port and you were treated with antibiotics with clinical improvement. Please take your course of antibiotics as prescribed and followup with your primary care provider for continued management. If you experience worsening weakness, fevers, chills, or other signs of infection, please seek medical attention. You have a history of metastatic cancer to your brain. CT of the head showed no acute intracranial abnormality. No changes were made to your dexamethasone for symptomatic treatment. Please followup with Dr. Noonan for continued management. Outpatient followup Please consume a consistent carbohydrate diet and take your insulin as directed. Please followup with your primary care provider for continued management. Please followup with your primary care provider for continued management of your hypertension. You have a history of stage IV lung cancer and receive care with Dr. Noonan. CT PE protocol of the chest on admission demonstrated no central pulmonary embolus, extensive right hilar lymphadenopathy, right cervical and paratracheal lymphadenopathy. Right hilar and right lower lobe necrotic masses again seen. You were admitted to the hospital with sepsis of unknown source. Given the timeline of events, you likely had an infection around your chemo-port. Interventional radiology removed your chemo-port and you were treated with antibiotics with clinical improvement. Please defer to your in-patient Hospice for further management You have a history of metastatic cancer to your brain. CT of the head showed no acute intracranial abnormality. No changes were made to your dexamethasone for symptomatic treatment. Please defer to your in-patient Hospice for further management Please defer to your in-patient Hospice for further management You have a history of stage IV lung cancer and receive care with Dr. Noonan. CT PE protocol of the chest on admission demonstrated no central pulmonary embolus, extensive right hilar lymphadenopathy, right cervical and paratracheal lymphadenopathy. Right hilar and right lower lobe necrotic masses again seen.  Please defer to your in-patient Hospice for further management

## 2018-03-28 NOTE — PROVIDER CONTACT NOTE (OTHER) - ASSESSMENT
Upon morning assessment, patients IV infiltrated from IVF. IV heplock removed, extremity elevated, heat packs applied. RN x2 attempted to locate new place for heplock but unable to find viable vein. MD Dunn made aware, as per team to change IV antibiotics to PO form and okay to leave patient without IV heplock access at this time.

## 2018-03-28 NOTE — DISCHARGE NOTE ADULT - ADDITIONAL INSTRUCTIONS
Please followup with your primary care physician within a week of discharge. You should also followup with Dr. Noonan at your earliest convenience. Please defer to your in-patient Hospice for further management

## 2018-03-28 NOTE — PROGRESS NOTE ADULT - PROBLEM SELECTOR PLAN 2
ontinue oxycodone IR 5mg q4 PRN pain, hot or cold compresses Continue oxycodone IR 5mg q4 PRN pain, hot or cold compresses

## 2018-03-28 NOTE — PROGRESS NOTE ADULT - PROBLEM SELECTOR PLAN 6
Patient has a history of DM. She is on dexamethasone 4mg q6h for brain mets, which will make hyperglycemia more difficult to control.   -Lantus 15U at bedtime; adjust accordingly  -mISS  -Monitor fingersticks

## 2018-03-28 NOTE — PROVIDER CONTACT NOTE (OTHER) - BACKGROUND
Patient Magy in room 745-1 here with lung cancer with mets to the brain and bone presenting with central line infection.

## 2018-03-28 NOTE — DISCHARGE NOTE ADULT - CARE PROVIDER_API CALL
Afshan Noonan), Internal Medicine; Medical Oncology  [address] [place] [address]  Phone: [phone]  Fax: [phone]

## 2018-03-28 NOTE — DISCHARGE NOTE ADULT - HOSPITAL COURSE
62F presenting to [facility] ED for fever/chills, waxing and waning confusion over past few days associated with right chest and shoulder pain and swelling around the port insertion site, port placed on 3/13 and never used. PMHX of HTN, DM, and Stage IV lung CA w/ mets to brain and bone (Dec 2nd) s/p RTx to bony mets on 8th rib, and gamma knife Rtx to brain (Jan 9th) s/p 2 cycles of systemic chemoimmunotherapy for metastatic disease complicated by post obstructive pneumonia, +influenza and confusion. Patients last chemo was on 3/2/18, and recent admission for toxic metabolic encephalopathy in the setting of pneumonia and discharged home on 3/9, since discharge pt has been progressively more fatigued, including waxing/waning mentation which worsened after port placement two weeks prior. In the ED pt found to be septic with fever, tachycardia and elevated WBC count/bandemia and slightly elevated lactate at 2.5. Had CXR and CTA unremarkable for acute findings. Patient received IVF resuscitation and was started on empiric antibiotic coverage of sepsis with vancomycin and cefepime.     Patient's hospital course was complicated by anemia to 5.9 requiring transfusion of 1u of PRBCs with subsequent improvement. IR removed chemo-port and sent tip for culture, pending results. Blood cultures were 62F presenting to [facility] ED for fever/chills, waxing and waning confusion over past few days associated with right chest and shoulder pain and swelling around the port insertion site, port placed on 3/13 and never used. PMHX of HTN, DM, and Stage IV lung CA w/ mets to brain and bone (Dec 2nd) s/p RTx to bony mets on 8th rib, and gamma knife Rtx to brain (Jan 9th) s/p 2 cycles of systemic chemoimmunotherapy for metastatic disease complicated by post obstructive pneumonia, +influenza and confusion. Patients last chemo was on 3/2/18, and recent admission for toxic metabolic encephalopathy in the setting of pneumonia and discharged home on 3/9, since discharge pt has been progressively more fatigued, including waxing/waning mentation which worsened after port placement two weeks prior. In the ED pt found to be septic with fever, tachycardia and elevated WBC count/bandemia and slightly elevated lactate at 2.5. Had CXR and CTA unremarkable for acute findings. Patient received IVF resuscitation and was started on empiric antibiotic coverage of sepsis with vancomycin and cefepime.     Patient's hospital course was complicated by anemia to 5.9 requiring transfusion of 1u of PRBCs with subsequent improvement. IR removed chemo-port and sent tip for culture, which was negative for growth. Blood cultures were negative as well. Urine culture grew Candida, though patient was likely colonized and remained asymptomatic. US of the RUE was negative for DVT though study was limited as patient refused to complete full exam. The palliative care team spoke with the patient extensively during her hospital stay. 62F presenting to [facility] ED for fever/chills, waxing and waning confusion over past few days associated with right chest and shoulder pain and swelling around the port insertion site, port placed on 3/13 and never used. PMHX of HTN, DM, and Stage IV lung CA w/ mets to brain and bone (Dec 2nd) s/p RTx to bony mets on 8th rib, and gamma knife Rtx to brain (Jan 9th) s/p 2 cycles of systemic chemoimmunotherapy for metastatic disease complicated by post obstructive pneumonia, +influenza and confusion. Patients last chemo was on 3/2/18, and recent admission for toxic metabolic encephalopathy in the setting of pneumonia and discharged home on 3/9, since discharge pt has been progressively more fatigued, including waxing/waning mentation which worsened after port placement two weeks prior. In the ED pt found to be septic with fever, tachycardia and elevated WBC count/bandemia and slightly elevated lactate at 2.5. Had CXR and CTA unremarkable for acute findings. Patient received IVF resuscitation and was started on empiric antibiotic coverage of sepsis with vancomycin and cefepime, later transitioned to PO antibiotics.     Patient's hospital course was complicated by anemia to 5.9 requiring transfusion of 1u of PRBCs with subsequent improvement. IR removed chemo-port and sent tip for culture, which was negative for growth. Blood cultures were negative as well. Urine culture grew Candida, though patient was likely colonized and remained asymptomatic. Patient had swelling of the RUE and US evaluation was negative for DVT though study was limited as patient refused to complete full exam. The palliative care team spoke with the patient extensively during her hospital stay. 62F presenting to [facility] ED for fever/chills, waxing and waning confusion over past few days associated with right chest and shoulder pain and swelling around the port insertion site, port placed on 3/13 and never used. PMHX of HTN, DM, and Stage IV lung CA w/ mets to brain and bone (Dec 2nd) s/p RTx to bony mets on 8th rib, and gamma knife Rtx to brain (Jan 9th) s/p 2 cycles of systemic chemoimmunotherapy for metastatic disease complicated by post obstructive pneumonia, +influenza and confusion. Patients last chemo was on 3/2/18, and recent admission for toxic metabolic encephalopathy in the setting of pneumonia and discharged home on 3/9, since discharge pt has been progressively more fatigued, including waxing/waning mentation which worsened after port placement two weeks prior. In the ED pt found to be septic with fever, tachycardia and elevated WBC count/bandemia and slightly elevated lactate at 2.5. Had CXR and CTA unremarkable for acute findings. Patient received IVF resuscitation and was started on empiric antibiotic coverage of sepsis with vancomycin and cefepime, later transitioned to PO antibiotics.     Patient's hospital course was complicated by anemia to 5.9 requiring transfusion of 1u of PRBCs with subsequent improvement. IR removed chemo-port and sent tip for culture, which was negative for growth. Blood cultures were negative as well. Urine culture grew Candida, though patient was likely colonized and remained asymptomatic. Patient had swelling of the RUE and US evaluation was negative for DVT though study was limited as patient refused to complete full exam. The palliative care team spoke with the patient extensively during her hospital stay. Patient was seen by the palliative care team and wished to be DNR/DNI (MOLST in chart). Patient is hemodynamically stable for transfer to ____. 62F presenting to [facility] ED for fever/chills, waxing and waning confusion over past few days associated with right chest and shoulder pain and swelling around the port insertion site, port placed on 3/13 and never used. PMHX of HTN, DM, and Stage IV lung CA w/ mets to brain and bone (Dec 2nd) s/p RTx to bony mets on 8th rib, and gamma knife Rtx to brain (Jan 9th) s/p 2 cycles of systemic chemoimmunotherapy for metastatic disease complicated by post obstructive pneumonia, +influenza and confusion. Patients last chemo was on 3/2/18, and recent admission for toxic metabolic encephalopathy in the setting of pneumonia and discharged home on 3/9, since discharge pt has been progressively more fatigued, including waxing/waning mentation which worsened after port placement two weeks prior. In the ED pt found to be septic with fever, tachycardia and elevated WBC count/bandemia and slightly elevated lactate at 2.5. Had CXR and CTA unremarkable for acute findings. Patient received IVF resuscitation and was started on empiric antibiotic coverage of sepsis with vancomycin and cefepime, later transitioned to PO antibiotics.     Patient's hospital course was complicated by anemia to 5.9 requiring transfusion of 1u of PRBCs with subsequent improvement. IR removed chemo-port and sent tip for culture, which was negative for growth. Blood cultures were negative as well. Urine culture grew Candida, though patient was likely colonized and remained asymptomatic. Patient had swelling of the RUE and US evaluation was negative for DVT though study was limited as patient refused to complete full exam. The palliative care team spoke with the patient extensively during her hospital stay. Patient was seen by the palliative care team and wished to be DNR/DNI (MOLST in chart). Patient is hemodynamically stable for transfer to Hospice.

## 2018-03-28 NOTE — PROGRESS NOTE ADULT - SUBJECTIVE AND OBJECTIVE BOX
DORON NUGENT             [id number]    CC: Generalized weakness    HPI:  63 yo AA F presenting to [facility] ED for fever/chills, waxing and waning confusion over past few days associated with right chest and shoulder pain and swelling around the port insertion site, port placed on 3/13 and never used.  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 Patients last chemo was on 3/2/18, and recent admission for toxic metabolic encephalopathy in the setting of pneumonia and discharged home on 3/9, since discharge pt has been progressively more fatigued, including waxing/waning mentation which worsened after port placement two weeks prior.  in the ED pt found to be septic with fever, tachycardia and elevated WBC count/bandemia and slightly elevated lactate at 2.5, was Alert and oriented, mental status intact at the time of interview, denied HD or neck stiffness however says that she has been experiencing HA on and off but it is at baseline, denies SOB/CP/Palpitation/cough/Abdominal Pain/diarrhea/Urinary symptoms/ melena or hematochezia. pt is getting admitted for ? line related sepsis. Had CXR and CTA unremarkable for acute findings.   s/p 2 L IVF, one dose of cefepime and vancomycin. (23 Mar 2018 22:25)    SUBJECTIVE:  Pt sitting in chair this morning.  She says PT worked with her and she was able to walk a few steps in the room.  She appears to have dyspnea with minimal exertion, but denies feeling short of breath.  Per nursing staff, she did not want Ritalin this morning due to possible adverse side effects.  Says her daughter will be visiting around 3PM today.    ROS:  DYSPNEA:  N	  NAUS/VOM:  N	  SECRETIONS:  N	  AGITATION:  N  Pain (Y/N):  Y  -Provocation/Palliation:  right shoulder  -Quality/Quantity:  dull, achy pain  -Radiating:  no  -Severity:  mild  -Timing/Frequency:  comes and goes  -Impact on ADLs:  mild    PEx:  Vital Signs Last 24 Hrs  T(C): 36.9 (28 Mar 2018 11:12), Max: 37.1 (27 Mar 2018 21:05)  T(F): 98.5 (28 Mar 2018 11:12), Max: 98.8 (27 Mar 2018 21:05)  HR: 102 (28 Mar 2018 11:12) (89 - 114)  BP: 109/76 (28 Mar 2018 11:12) (109/76 - 144/81)  BP(mean): --  RR: 16 (28 Mar 2018 11:12) (15 - 16)  SpO2: 98% (28 Mar 2018 11:12) (95% - 98%)    General: patient sleeping comfortably, in no acute distress,   HEENT: NCAT. Moist mucosa. Thrush on tongue.  Neck: Large, immobile, non-tender cervical lymph node on right.   CVS: RRR, no murmur  Resp: CTAB, normal effort, no wheezes/rales/rhonchi  GI: soft, NT, ND, active BS  : voiding freely   Musc: generalized weakness, bedbound, no muscle tenderness  Neuro: no focal deficits  Skin: no rash or erythema   Lymph: cervical lymphadenopathy as above     ALLERGIES: penicillins (Hives)    OPIATE NAÏVE (Y/N):  N    MEDICATIONS  (STANDING):  cefdinir 300 milliGRAM(s) Oral two times a day  dexamethasone     Tablet 4 milliGRAM(s) Oral every 8 hours  dextrose 5%. 1000 milliLiter(s) (50 mL/Hr) IV Continuous <Continuous>  dextrose 50% Injectable 12.5 Gram(s) IV Push once  dextrose 50% Injectable 25 Gram(s) IV Push once  dextrose 50% Injectable 25 Gram(s) IV Push once  doxycycline hyclate Capsule 100 milliGRAM(s) Oral every 12 hours  enoxaparin Injectable 40 milliGRAM(s) SubCutaneous every 24 hours  fluconAZOLE   Tablet 100 milliGRAM(s) Oral daily  folic acid 1 milliGRAM(s) Oral daily  insulin glargine Injectable (LANTUS) 15 Unit(s) SubCutaneous at bedtime  insulin lispro (HumaLOG) corrective regimen sliding scale   SubCutaneous Before meals and at bedtime    MEDICATIONS  (PRN):  acetaminophen   Tablet 650 milliGRAM(s) Oral every 6 hours PRN For Temp greater than 38 C (100.4 F)  acetaminophen   Tablet. 650 milliGRAM(s) Oral every 6 hours PRN Mild Pain (1 - 3)  dextrose Gel 1 Dose(s) Oral once PRN Blood Glucose LESS THAN 70 milliGRAM(s)/deciliter  glucagon  Injectable 1 milliGRAM(s) IntraMuscular once PRN Glucose LESS THAN 70 milligrams/deciliter  oxyCODONE    IR 5 milliGRAM(s) Oral every 4 hours PRN Breakthrough pain  polyethylene glycol 3350 17 Gram(s) Oral daily PRN Constipation    LABS:                         7.9    24.7  )-----------( 361      ( 28 Mar 2018 08:25 )             25.8   03-28    133<L>  |  94<L>  |  16  ----------------------------<  241<H>  3.9   |  25  |  0.53    Ca    7.7<L>      28 Mar 2018 08:25  Mg     2.0     03-28    TPro  x   /  Alb  x   /  TBili  x   /  DBili  x   /  AST  x   /  ALT  x   /  AlkPhos  135<H>  03-27      IMAGING: REVIEWED    ADVANCED DIRECTIVES:  According to medical charts from last admission, pt elected to be DNR/DNI.  MOLST form was given to patient, but not completed during that admission.  We will clarify with daughter when she visits today.    DECISION MAKER:  Pt seems to have medical capacity to make complex medical decisions.  LEGAL SURROGATE:  Daughter, Naomi Yeh, is health care proxy.  No documentation in chart [phone].    PSYCHOSOCIAL-SPIRITUAL ASSESSMENT:       Reviewed       Care plan unchanged       Care plan adjusted as above	    GOALS OF CARE DISCUSSION       Palliative care info/counseling provided	          Advanced Directives addressed please see Advance Care Planning Note	           See previous Palliative Medicine Note       Documentation of GOC:  Pending oncology recommendations re: cancer targeted therapies and family meeting with pt and daughter, Naomi, who plans to visit this afternoon.  	      AGENCY CHOICE DISCUSSED:           Homecare        [facility]         REFERRALS	        Palliative Med        Unit SW/Case Mgmt       Patient/Family Support       Massage Therapy       Nutrition       PT/OT DORON NUGENT             [id number]    CC: Generalized weakness    HPI:  61 yo AA F presenting to [facility] ED for fever/chills, waxing and waning confusion over past few days associated with right chest and shoulder pain and swelling around the port insertion site, port placed on 3/13 and never used.  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 Patients last chemo was on 3/2/18, and recent admission for toxic metabolic encephalopathy in the setting of pneumonia and discharged home on 3/9, since discharge pt has been progressively more fatigued, including waxing/waning mentation which worsened after port placement two weeks prior.  in the ED pt found to be septic with fever, tachycardia and elevated WBC count/bandemia and slightly elevated lactate at 2.5, was Alert and oriented, mental status intact at the time of interview, denied HD or neck stiffness however says that she has been experiencing HA on and off but it is at baseline, denies SOB/CP/Palpitation/cough/Abdominal Pain/diarrhea/Urinary symptoms/ melena or hematochezia. pt is getting admitted for ? line related sepsis. Had CXR and CTA unremarkable for acute findings.   s/p 2 L IVF, one dose of cefepime and vancomycin. (23 Mar 2018 22:25)    SUBJECTIVE:  Pt sitting in chair this morning.  She says PT worked with her and she was able to walk a few steps in the room.  After sitting in a chair for a couple hours, she tried to stand to get back in bed and felt unbalanced.  She appears to have dyspnea with minimal exertion, but denies feeling short of breath.  Per nursing staff, she did not want Ritalin this morning due to possible adverse side effects.  Daughter, Naomi, and  at bedside.     ROS:  DYSPNEA:  N	  NAUS/VOM:  N	  SECRETIONS:  N	  AGITATION:  N  Pain (Y/N):  Y  -Provocation/Palliation:  right shoulder  -Quality/Quantity:  dull, achy pain  -Radiating:  no  -Severity:  mild  -Timing/Frequency:  comes and goes  -Impact on ADLs:  mild    PEx:  Vital Signs Last 24 Hrs  T(C): 36.9 (28 Mar 2018 11:12), Max: 37.1 (27 Mar 2018 21:05)  T(F): 98.5 (28 Mar 2018 11:12), Max: 98.8 (27 Mar 2018 21:05)  HR: 102 (28 Mar 2018 11:12) (89 - 114)  BP: 109/76 (28 Mar 2018 11:12) (109/76 - 144/81)  BP(mean): --  RR: 16 (28 Mar 2018 11:12) (15 - 16)  SpO2: 98% (28 Mar 2018 11:12) (95% - 98%)    General: patient sleeping comfortably, in no acute distress,   HEENT: NCAT. Moist mucosa. Thrush on tongue.  Neck: Large, immobile, non-tender cervical lymph node on right.   CVS: RRR, no murmur  Resp: CTAB, normal effort, no wheezes/rales/rhonchi  GI: soft, NT, ND, active BS  : voiding freely   Musc: generalized weakness, bedbound, no muscle tenderness  Neuro: no focal deficits  Skin: no rash or erythema   Lymph: cervical lymphadenopathy on right side of neck as well as fullness/mass-like lesion lateral to sternum     ALLERGIES: penicillins (Hives)    OPIATE NAÏVE (Y/N):  N    MEDICATIONS  (STANDING):  cefdinir 300 milliGRAM(s) Oral two times a day  dexamethasone     Tablet 4 milliGRAM(s) Oral every 8 hours  dextrose 5%. 1000 milliLiter(s) (50 mL/Hr) IV Continuous <Continuous>  dextrose 50% Injectable 12.5 Gram(s) IV Push once  dextrose 50% Injectable 25 Gram(s) IV Push once  dextrose 50% Injectable 25 Gram(s) IV Push once  doxycycline hyclate Capsule 100 milliGRAM(s) Oral every 12 hours  enoxaparin Injectable 40 milliGRAM(s) SubCutaneous every 24 hours  fluconAZOLE   Tablet 100 milliGRAM(s) Oral daily  folic acid 1 milliGRAM(s) Oral daily  insulin glargine Injectable (LANTUS) 15 Unit(s) SubCutaneous at bedtime  insulin lispro (HumaLOG) corrective regimen sliding scale   SubCutaneous Before meals and at bedtime    MEDICATIONS  (PRN):  acetaminophen   Tablet 650 milliGRAM(s) Oral every 6 hours PRN For Temp greater than 38 C (100.4 F)  acetaminophen   Tablet. 650 milliGRAM(s) Oral every 6 hours PRN Mild Pain (1 - 3)  dextrose Gel 1 Dose(s) Oral once PRN Blood Glucose LESS THAN 70 milliGRAM(s)/deciliter  glucagon  Injectable 1 milliGRAM(s) IntraMuscular once PRN Glucose LESS THAN 70 milligrams/deciliter  oxyCODONE    IR 5 milliGRAM(s) Oral every 4 hours PRN Breakthrough pain  polyethylene glycol 3350 17 Gram(s) Oral daily PRN Constipation    LABS:                         7.9    24.7  )-----------( 361      ( 28 Mar 2018 08:25 )             25.8   03-28    133<L>  |  94<L>  |  16  ----------------------------<  241<H>  3.9   |  25  |  0.53    Ca    7.7<L>      28 Mar 2018 08:25  Mg     2.0     03-28    TPro  x   /  Alb  x   /  TBili  x   /  DBili  x   /  AST  x   /  ALT  x   /  AlkPhos  135<H>  03-27      IMAGING: REVIEWED    ADVANCED DIRECTIVES:    DNR/DNI (confirmed by patient and daughter 3/28)  MOLST form completed 3/28    DECISION MAKER:  Pt seems to have medical capacity to make complex medical decisions.  LEGAL SURROGATE:  Daughter, Naomi Yeh, is health care proxy.  No documentation in chart [phone].    PSYCHOSOCIAL-SPIRITUAL ASSESSMENT:       Reviewed       Care plan unchanged       Care plan adjusted as above	    GOALS OF CARE DISCUSSION       Palliative care info/counseling provided	          Advanced Directives addressed please see Advance Care Planning Note 3/28	           See previous Palliative Medicine Note       Documentation of GOC:  See GOC below  	      AGENCY CHOICE DISCUSSED:           [facility]         REFERRALS	        Palliative Med        Unit SW/Case Mgmt       Patient/Family Support       Massage Therapy       Nutrition       PT/OT DORON NUGENT             [id number]    CC: Generalized weakness    HPI:  63 yo AA F presenting to [facility] ED for fever/chills, waxing and waning confusion over past few days associated with right chest and shoulder pain and swelling around the port insertion site, port placed on 3/13 and never used.  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 Patients last chemo was on 3/2/18, and recent admission for toxic metabolic encephalopathy in the setting of pneumonia and discharged home on 3/9, since discharge pt has been progressively more fatigued, including waxing/waning mentation which worsened after port placement two weeks prior.  in the ED pt found to be septic with fever, tachycardia and elevated WBC count/bandemia and slightly elevated lactate at 2.5, was Alert and oriented, mental status intact at the time of interview, denied HD or neck stiffness however says that she has been experiencing HA on and off but it is at baseline, denies SOB/CP/Palpitation/cough/Abdominal Pain/diarrhea/Urinary symptoms/ melena or hematochezia. pt is getting admitted for ? line related sepsis. Had CXR and CTA unremarkable for acute findings.   s/p 2 L IVF, one dose of cefepime and vancomycin. (23 Mar 2018 22:25)    SUBJECTIVE:  Pt sitting in chair this morning.  She says PT worked with her and she was able to walk a few steps in the room.  After sitting in a chair for a couple hours, she tried to stand to get back in bed and felt unbalanced.  She appears to have dyspnea with minimal exertion, but denies feeling short of breath.  Per nursing staff, she did not want Ritalin this morning due to possible adverse side effects.  Daughter, Naomi, and  at bedside.     ROS:  DYSPNEA:  denies although appears dyspneic	  NAUS/VOM:  N	  SECRETIONS:  N	  AGITATION:  N  Pain (Y/N):  Y  -Provocation/Palliation:  right shoulder  -Quality/Quantity:  dull, achy pain  -Radiating:  no  -Severity:  mild  -Timing/Frequency:  comes and goes  -Impact on ADLs:  mild    PEx:  Vital Signs Last 24 Hrs  T(C): 36.9 (28 Mar 2018 11:12), Max: 37.1 (27 Mar 2018 21:05)  T(F): 98.5 (28 Mar 2018 11:12), Max: 98.8 (27 Mar 2018 21:05)  HR: 102 (28 Mar 2018 11:12) (89 - 114)  BP: 109/76 (28 Mar 2018 11:12) (109/76 - 144/81)  BP(mean): --  RR: 16 (28 Mar 2018 11:12) (15 - 16)  SpO2: 98% (28 Mar 2018 11:12) (95% - 98%)    General: patient  in no acute distress,   HEENT: NCAT. Moist mucosa. Thrush on tongue.  Neck: Large, immobile, non-tender cervical lymph node on right.   CVS: RRR, no murmur  Resp: CTAB, some increased effort, no wheezes/rales/rhonchi  GI: soft, NT, ND, active BS  : voiding freely   Musc: generalized weakness, bedbound, no muscle tenderness  Neuro: no focal deficits  Skin: no rash or erythema   Lymph: cervical lymphadenopathy on right side of neck as well as fullness/mass-like lesion lateral to sternum     ALLERGIES: penicillins (Hives)    OPIATE NAÏVE (Y/N):  N    MEDICATIONS  (STANDING):  cefdinir 300 milliGRAM(s) Oral two times a day  dexamethasone     Tablet 4 milliGRAM(s) Oral every 8 hours  dextrose 5%. 1000 milliLiter(s) (50 mL/Hr) IV Continuous <Continuous>  dextrose 50% Injectable 12.5 Gram(s) IV Push once  dextrose 50% Injectable 25 Gram(s) IV Push once  dextrose 50% Injectable 25 Gram(s) IV Push once  doxycycline hyclate Capsule 100 milliGRAM(s) Oral every 12 hours  enoxaparin Injectable 40 milliGRAM(s) SubCutaneous every 24 hours  fluconAZOLE   Tablet 100 milliGRAM(s) Oral daily  folic acid 1 milliGRAM(s) Oral daily  insulin glargine Injectable (LANTUS) 15 Unit(s) SubCutaneous at bedtime  insulin lispro (HumaLOG) corrective regimen sliding scale   SubCutaneous Before meals and at bedtime    MEDICATIONS  (PRN):  acetaminophen   Tablet 650 milliGRAM(s) Oral every 6 hours PRN For Temp greater than 38 C (100.4 F)  acetaminophen   Tablet. 650 milliGRAM(s) Oral every 6 hours PRN Mild Pain (1 - 3)  dextrose Gel 1 Dose(s) Oral once PRN Blood Glucose LESS THAN 70 milliGRAM(s)/deciliter  glucagon  Injectable 1 milliGRAM(s) IntraMuscular once PRN Glucose LESS THAN 70 milligrams/deciliter  oxyCODONE    IR 5 milliGRAM(s) Oral every 4 hours PRN Breakthrough pain  polyethylene glycol 3350 17 Gram(s) Oral daily PRN Constipation    LABS:                         7.9    24.7  )-----------( 361      ( 28 Mar 2018 08:25 )             25.8   03-28    133<L>  |  94<L>  |  16  ----------------------------<  241<H>  3.9   |  25  |  0.53    Ca    7.7<L>      28 Mar 2018 08:25  Mg     2.0     03-28    TPro  x   /  Alb  x   /  TBili  x   /  DBili  x   /  AST  x   /  ALT  x   /  AlkPhos  135<H>  03-27      IMAGING: REVIEWED    ADVANCED DIRECTIVES:  see advance care planning note  DNR/DNI (confirmed by patient and daughter 3/28)  MOLST form completed 3/28    DECISION MAKER:  Pt seems to have medical capacity to make complex medical decisions.  LEGAL SURROGATE:  Daughter, Naomi Yeh, is health care proxy.  No documentation in chart [phone].    PSYCHOSOCIAL-SPIRITUAL ASSESSMENT:       Reviewed       Care plan unchanged       Care plan adjusted as below	    GOALS OF CARE DISCUSSION       Palliative care info/counseling provided	          Advanced Directives addressed please see Advance Care Planning Note 3/28	           See previous Palliative Medicine Note       Documentation of GOC:  See GOC below  	      AGENCY CHOICE DISCUSSED:           [facility]         REFERRALS	        Palliative Med        Unit SW/Case Mgmt       Patient/Family Support       Massage Therapy       Nutrition       PT/OT

## 2018-03-29 LAB
-  5-FLUCYTOSINE: SIGNIFICANT CHANGE UP
-  AMPHOTERICIN B: SIGNIFICANT CHANGE UP
-  ANIDULAFUNGIN: SIGNIFICANT CHANGE UP
-  CASPOFUNGIN: SIGNIFICANT CHANGE UP
-  FLUCONAZOLE: SIGNIFICANT CHANGE UP
-  ITRACONAZOLE: SIGNIFICANT CHANGE UP
-  MICAFUNGIN: SIGNIFICANT CHANGE UP
-  POSACONAZOLE: SIGNIFICANT CHANGE UP
-  VORICONAZOLE: SIGNIFICANT CHANGE UP
CULTURE RESULTS: SIGNIFICANT CHANGE UP
GLUCOSE BLDC GLUCOMTR-MCNC: 120 MG/DL — HIGH (ref 70–99)
GLUCOSE BLDC GLUCOMTR-MCNC: 142 MG/DL — HIGH (ref 70–99)
GLUCOSE BLDC GLUCOMTR-MCNC: 232 MG/DL — HIGH (ref 70–99)
GLUCOSE BLDC GLUCOMTR-MCNC: 251 MG/DL — HIGH (ref 70–99)
METHOD TYPE: SIGNIFICANT CHANGE UP
ORGANISM # SPEC MICROSCOPIC CNT: SIGNIFICANT CHANGE UP
ORGANISM # SPEC MICROSCOPIC CNT: SIGNIFICANT CHANGE UP

## 2018-03-29 PROCEDURE — 99233 SBSQ HOSP IP/OBS HIGH 50: CPT | Mod: GC

## 2018-03-29 RX ORDER — INSULIN GLARGINE 100 [IU]/ML
24 INJECTION, SOLUTION SUBCUTANEOUS AT BEDTIME
Qty: 0 | Refills: 0 | Status: DISCONTINUED | OUTPATIENT
Start: 2018-03-29 | End: 2018-03-30

## 2018-03-29 RX ORDER — INSULIN LISPRO 100/ML
7 VIAL (ML) SUBCUTANEOUS
Qty: 0 | Refills: 0 | Status: DISCONTINUED | OUTPATIENT
Start: 2018-03-29 | End: 2018-03-30

## 2018-03-29 RX ORDER — PANTOPRAZOLE SODIUM 20 MG/1
40 TABLET, DELAYED RELEASE ORAL
Qty: 0 | Refills: 0 | Status: DISCONTINUED | OUTPATIENT
Start: 2018-03-29 | End: 2018-04-04

## 2018-03-29 RX ORDER — ONDANSETRON 8 MG/1
4 TABLET, FILM COATED ORAL EVERY 6 HOURS
Qty: 0 | Refills: 0 | Status: DISCONTINUED | OUTPATIENT
Start: 2018-03-29 | End: 2018-04-04

## 2018-03-29 RX ADMIN — OXYCODONE HYDROCHLORIDE 5 MILLIGRAM(S): 5 TABLET ORAL at 13:53

## 2018-03-29 RX ADMIN — ENOXAPARIN SODIUM 40 MILLIGRAM(S): 100 INJECTION SUBCUTANEOUS at 17:38

## 2018-03-29 RX ADMIN — FLUCONAZOLE 100 MILLIGRAM(S): 150 TABLET ORAL at 12:19

## 2018-03-29 RX ADMIN — OXYCODONE HYDROCHLORIDE 5 MILLIGRAM(S): 5 TABLET ORAL at 06:36

## 2018-03-29 RX ADMIN — OXYCODONE HYDROCHLORIDE 5 MILLIGRAM(S): 5 TABLET ORAL at 21:56

## 2018-03-29 RX ADMIN — Medication 3 UNIT(S): at 12:29

## 2018-03-29 RX ADMIN — Medication 4: at 17:38

## 2018-03-29 RX ADMIN — Medication 4 MILLIGRAM(S): at 21:56

## 2018-03-29 RX ADMIN — OXYCODONE HYDROCHLORIDE 5 MILLIGRAM(S): 5 TABLET ORAL at 15:36

## 2018-03-29 RX ADMIN — Medication 4 MILLIGRAM(S): at 06:36

## 2018-03-29 RX ADMIN — Medication 100 MILLIGRAM(S): at 12:19

## 2018-03-29 RX ADMIN — INSULIN GLARGINE 24 UNIT(S): 100 INJECTION, SOLUTION SUBCUTANEOUS at 21:56

## 2018-03-29 RX ADMIN — PANTOPRAZOLE SODIUM 40 MILLIGRAM(S): 20 TABLET, DELAYED RELEASE ORAL at 12:19

## 2018-03-29 RX ADMIN — Medication 3 UNIT(S): at 08:48

## 2018-03-29 RX ADMIN — CEFDINIR 300 MILLIGRAM(S): 250 POWDER, FOR SUSPENSION ORAL at 17:38

## 2018-03-29 RX ADMIN — Medication 6: at 12:29

## 2018-03-29 RX ADMIN — Medication 100 MILLIGRAM(S): at 00:16

## 2018-03-29 RX ADMIN — Medication 1 MILLIGRAM(S): at 12:19

## 2018-03-29 RX ADMIN — Medication 4 MILLIGRAM(S): at 13:53

## 2018-03-29 RX ADMIN — Medication 7 UNIT(S): at 17:38

## 2018-03-29 RX ADMIN — OXYCODONE HYDROCHLORIDE 5 MILLIGRAM(S): 5 TABLET ORAL at 23:55

## 2018-03-29 RX ADMIN — CEFDINIR 300 MILLIGRAM(S): 250 POWDER, FOR SUSPENSION ORAL at 06:36

## 2018-03-29 NOTE — PROGRESS NOTE ADULT - ASSESSMENT
63 y/o F with a PMHX of HTN, DM, and Stage IV lung CA w/ mets to brain and bone (Dec 2nd) who underwent RT to bony mets on 8th rib, and had gamma knife tx to brain (Jan 9th) s/p 2 cycles of systemic chemoimmunotherapy and immunotherapy for metastatic disease presenting with increased lethargy and line sepsis.  Palliative care called to see for symptom management and discussion of goals of care.

## 2018-03-29 NOTE — PROGRESS NOTE ADULT - PROBLEM SELECTOR PLAN 7
Patient found to be hyponatremic to 132 on 3/26 likely in the setting of low PO intake vs. pseudohyponatremia in setting of hyperglycemia. S/p 70cc/h of NS for overnight. Na of 133 on 3/28, when corrected for hyperglycemia, Na is 135-136.  -Continue to monitor  -Will hold off on further workup unless hyponatremia worsens

## 2018-03-29 NOTE — PROGRESS NOTE ADULT - SUBJECTIVE AND OBJECTIVE BOX
Heme/Onc Progress Note (Dr. Noonan)     Interval History: Patient now DNR, palliative consulted. Patient with residual shoulder pains, remains fatigued, slightly improved in steroid q8. Patient otherwise denies acute bleeding or bruising including brbpr, melena or hematuria.     ROS is otherwise negative.      Allergies    penicillins (Hives)    Intolerances        Medications:  MEDICATIONS  (STANDING):  cefdinir 300 milliGRAM(s) Oral two times a day  dexamethasone     Tablet 4 milliGRAM(s) Oral every 8 hours  dextrose 5%. 1000 milliLiter(s) (50 mL/Hr) IV Continuous <Continuous>  dextrose 50% Injectable 12.5 Gram(s) IV Push once  dextrose 50% Injectable 25 Gram(s) IV Push once  dextrose 50% Injectable 25 Gram(s) IV Push once  doxycycline hyclate Capsule 100 milliGRAM(s) Oral every 12 hours  enoxaparin Injectable 40 milliGRAM(s) SubCutaneous every 24 hours  fluconAZOLE   Tablet 100 milliGRAM(s) Oral daily  folic acid 1 milliGRAM(s) Oral daily  insulin glargine Injectable (LANTUS) 20 Unit(s) SubCutaneous at bedtime  insulin lispro (HumaLOG) corrective regimen sliding scale   SubCutaneous Before meals and at bedtime  insulin lispro Injectable (HumaLOG) 3 Unit(s) SubCutaneous three times a day before meals  pantoprazole    Tablet 40 milliGRAM(s) Oral before breakfast    MEDICATIONS  (PRN):  acetaminophen   Tablet 650 milliGRAM(s) Oral every 6 hours PRN For Temp greater than 38 C (100.4 F)  acetaminophen   Tablet. 650 milliGRAM(s) Oral every 6 hours PRN Mild Pain (1 - 3)  dextrose Gel 1 Dose(s) Oral once PRN Blood Glucose LESS THAN 70 milliGRAM(s)/deciliter  glucagon  Injectable 1 milliGRAM(s) IntraMuscular once PRN Glucose LESS THAN 70 milligrams/deciliter  ondansetron    Tablet 4 milliGRAM(s) Oral every 6 hours PRN Nausea and/or Vomiting  oxyCODONE    IR 5 milliGRAM(s) Oral every 4 hours PRN Breakthrough pain  polyethylene glycol 3350 17 Gram(s) Oral daily PRN Constipation    enoxaparin Injectable 40 milliGRAM(s) SubCutaneous every 24 hours          PHYSICAL EXAM:    T(F): 98.2 (03-29-18 @ 05:37), Max: 98.2 (03-29-18 @ 05:37)  HR: 103 (03-29-18 @ 05:37) (90 - 103)  BP: 145/85 (03-29-18 @ 05:37) (127/75 - 145/85)  RR: 15 (03-29-18 @ 05:37) (15 - 16)  SpO2: 96% (03-29-18 @ 05:37) (96% - 97%)  Wt(kg): --    Daily     Daily     GEN: tired appearing, arousable to voice   HEENT: AT/NC, EOMI, no oral lesions   NECK: bilateral cervical adenopathy R>L, non tender   CVS: S1S2 RRR   LUNG: Decreased inspiratory effort b/l  ABD: +BS, NT, ND   EXT: no c/c/e  NEURO: AAOx3        Labs:                          7.9    24.7  )-----------( 361      ( 28 Mar 2018 08:25 )             25.8     CBC Full  -  ( 28 Mar 2018 08:25 )  WBC Count : 24.7 K/uL  Hemoglobin : 7.9 g/dL  Hematocrit : 25.8 %  Platelet Count - Automated : 361 K/uL  Mean Cell Volume : 83.8 fL  Mean Cell Hemoglobin : 25.6 pg  Mean Cell Hemoglobin Concentration : 30.6 g/dL  Auto Neutrophil # : x  Auto Lymphocyte # : x  Auto Monocyte # : x  Auto Eosinophil # : x  Auto Basophil # : x  Auto Neutrophil % : 78.0 %  Auto Lymphocyte % : x  Auto Monocyte % : 4.0 %  Auto Eosinophil % : x  Auto Basophil % : x        03-28    133<L>  |  94<L>  |  16  ----------------------------<  241<H>  3.9   |  25  |  0.53    Ca    7.7<L>      28 Mar 2018 08:25  Mg     2.0     03-28

## 2018-03-29 NOTE — PROGRESS NOTE ADULT - PROBLEM SELECTOR PLAN 4
- Fluconazole 100 mg daily x 10 days (3/23-)    #Urine w/yeast; asymptomatic   -Awaiting susceptibility testing

## 2018-03-29 NOTE — PROGRESS NOTE ADULT - PROBLEM SELECTOR PLAN 8
Pt and daughter confirm pt is DNR/DNI.  Palliative SW working on dispo to [facility] for [facility] when bed available.  Support provided to patient and family. Patient to have access to supportive services during rest of hospital stay as the patient/family deemed necessary ie. Chaplaincy, Massage therapy, Music therapy, Patient and family supportive services, Palliative SW, etc.  As discussed during the palliative IDT meeting and as identified during the patients PSSA screening the patient would benefit from: Chaplaincy, Massage therapy, Music therapy, Patient and family supportive services, Palliative SW

## 2018-03-29 NOTE — PROGRESS NOTE ADULT - SUBJECTIVE AND OBJECTIVE BOX
Overnight Events: ANGELIKA  Subjective/ROS: Patient endorses headache, shoulder pain, and nausea overnight. Denies dizziness, lightheadedness, chest pain, shortness of breath, palpitations, abdominal pain, vomiting, diarrhea, fevers, and chills.     VITAL SIGNS:  Vital Signs Last 24 Hrs  T(C): 36.8 (29 Mar 2018 05:37), Max: 36.9 (28 Mar 2018 11:12)  T(F): 98.2 (29 Mar 2018 05:37), Max: 98.5 (28 Mar 2018 11:12)  HR: 103 (29 Mar 2018 05:37) (90 - 103)  BP: 145/85 (29 Mar 2018 05:37) (109/76 - 145/85)  BP(mean): --  RR: 15 (29 Mar 2018 05:37) (15 - 16)  SpO2: 96% (29 Mar 2018 05:37) (96% - 98%)    PHYSICAL EXAM:    General: Well nourished middle aged woman awake and comfortable in bed; NAD  HEENT: NC/AT; EOMI, PEERLA anicteric sclera; MMM, +thrush (improving)  Neck: supple, palpable R neck mass, non-tender to palpation  Cardiovascular: +S1/S2; RRR, no r/m/g  Respiratory: CTA B/L; no W/R/R; R sided dressing from port removal C/D/I, no erythema, swelling, purulence, or tenderness surrounding site  Gastrointestinal: soft, NT/ND; +BS  Extremities: WWP; RUE > LUE in size; RUE edema, sensation and pulses of RUE intact, non-tender to palpation  Vascular: 2+ radial, DP pulses B/L  Neurological: Awake, alert, interactive  Psych: flat affect     MEDICATIONS:  MEDICATIONS  (STANDING):  cefdinir 300 milliGRAM(s) Oral two times a day  dexamethasone     Tablet 4 milliGRAM(s) Oral every 8 hours  dextrose 5%. 1000 milliLiter(s) (50 mL/Hr) IV Continuous <Continuous>  dextrose 50% Injectable 12.5 Gram(s) IV Push once  dextrose 50% Injectable 25 Gram(s) IV Push once  dextrose 50% Injectable 25 Gram(s) IV Push once  doxycycline hyclate Capsule 100 milliGRAM(s) Oral every 12 hours  enoxaparin Injectable 40 milliGRAM(s) SubCutaneous every 24 hours  fluconAZOLE   Tablet 100 milliGRAM(s) Oral daily  folic acid 1 milliGRAM(s) Oral daily  insulin glargine Injectable (LANTUS) 20 Unit(s) SubCutaneous at bedtime  insulin lispro (HumaLOG) corrective regimen sliding scale   SubCutaneous Before meals and at bedtime  insulin lispro Injectable (HumaLOG) 3 Unit(s) SubCutaneous three times a day before meals    MEDICATIONS  (PRN):  acetaminophen   Tablet 650 milliGRAM(s) Oral every 6 hours PRN For Temp greater than 38 C (100.4 F)  acetaminophen   Tablet. 650 milliGRAM(s) Oral every 6 hours PRN Mild Pain (1 - 3)  dextrose Gel 1 Dose(s) Oral once PRN Blood Glucose LESS THAN 70 milliGRAM(s)/deciliter  glucagon  Injectable 1 milliGRAM(s) IntraMuscular once PRN Glucose LESS THAN 70 milligrams/deciliter  ondansetron    Tablet 4 milliGRAM(s) Oral every 6 hours PRN Nausea and/or Vomiting  oxyCODONE    IR 5 milliGRAM(s) Oral every 4 hours PRN Breakthrough pain  polyethylene glycol 3350 17 Gram(s) Oral daily PRN Constipation      ALLERGIES:  Allergies    penicillins (Hives)    Intolerances        LABS:                        7.9    24.7  )-----------( 361      ( 28 Mar 2018 08:25 )             25.8     03-28    133<L>  |  94<L>  |  16  ----------------------------<  241<H>  3.9   |  25  |  0.53    Ca    7.7<L>      28 Mar 2018 08:25  Mg     2.0     03-28          CAPILLARY BLOOD GLUCOSE      POCT Blood Glucose.: 142 mg/dL (29 Mar 2018 07:58)      RADIOLOGY & ADDITIONAL TESTS: Reviewed. Overnight Events: ANGELIKA  Subjective/ROS: Patient endorses headache, shoulder pain, and nausea overnight. Denies dizziness, lightheadedness, chest pain, shortness of breath, palpitations, abdominal pain, vomiting, diarrhea, fevers, and chills.     VITAL SIGNS:  Vital Signs Last 24 Hrs  T(C): 36.8 (29 Mar 2018 05:37), Max: 36.9 (28 Mar 2018 11:12)  T(F): 98.2 (29 Mar 2018 05:37), Max: 98.5 (28 Mar 2018 11:12)  HR: 103 (29 Mar 2018 05:37) (90 - 103)  BP: 145/85 (29 Mar 2018 05:37) (109/76 - 145/85)  BP(mean): --  RR: 15 (29 Mar 2018 05:37) (15 - 16)  SpO2: 96% (29 Mar 2018 05:37) (96% - 98%)    PHYSICAL EXAM:    General: Well nourished middle aged woman awake and comfortable in bed; NAD  HEENT: NC/AT; EOMI, PEERLA anicteric sclera; MMM, +thrush (improving)  Neck: supple, palpable R neck mass, non-tender to palpation  Cardiovascular: +S1/S2; RRR, no r/m/g  Respiratory: CTA B/L; no W/R/R; R sided dressing from port removal C/D/I, no erythema, swelling, purulence, or tenderness surrounding site  Gastrointestinal: soft, NT/ND; +BS  Extremities: WWP; edematous RUE; sensation and pulses of RUE intact, non-tender to palpation  Vascular: 2+ radial, DP pulses B/L  Neurological: Awake, alert, interactive  Psych: flat affect     MEDICATIONS:  MEDICATIONS  (STANDING):  cefdinir 300 milliGRAM(s) Oral two times a day  dexamethasone     Tablet 4 milliGRAM(s) Oral every 8 hours  dextrose 5%. 1000 milliLiter(s) (50 mL/Hr) IV Continuous <Continuous>  dextrose 50% Injectable 12.5 Gram(s) IV Push once  dextrose 50% Injectable 25 Gram(s) IV Push once  dextrose 50% Injectable 25 Gram(s) IV Push once  doxycycline hyclate Capsule 100 milliGRAM(s) Oral every 12 hours  enoxaparin Injectable 40 milliGRAM(s) SubCutaneous every 24 hours  fluconAZOLE   Tablet 100 milliGRAM(s) Oral daily  folic acid 1 milliGRAM(s) Oral daily  insulin glargine Injectable (LANTUS) 20 Unit(s) SubCutaneous at bedtime  insulin lispro (HumaLOG) corrective regimen sliding scale   SubCutaneous Before meals and at bedtime  insulin lispro Injectable (HumaLOG) 3 Unit(s) SubCutaneous three times a day before meals    MEDICATIONS  (PRN):  acetaminophen   Tablet 650 milliGRAM(s) Oral every 6 hours PRN For Temp greater than 38 C (100.4 F)  acetaminophen   Tablet. 650 milliGRAM(s) Oral every 6 hours PRN Mild Pain (1 - 3)  dextrose Gel 1 Dose(s) Oral once PRN Blood Glucose LESS THAN 70 milliGRAM(s)/deciliter  glucagon  Injectable 1 milliGRAM(s) IntraMuscular once PRN Glucose LESS THAN 70 milligrams/deciliter  ondansetron    Tablet 4 milliGRAM(s) Oral every 6 hours PRN Nausea and/or Vomiting  oxyCODONE    IR 5 milliGRAM(s) Oral every 4 hours PRN Breakthrough pain  polyethylene glycol 3350 17 Gram(s) Oral daily PRN Constipation      ALLERGIES:  Allergies    penicillins (Hives)    Intolerances        LABS:                        7.9    24.7  )-----------( 361      ( 28 Mar 2018 08:25 )             25.8     03-28    133<L>  |  94<L>  |  16  ----------------------------<  241<H>  3.9   |  25  |  0.53    Ca    7.7<L>      28 Mar 2018 08:25  Mg     2.0     03-28          CAPILLARY BLOOD GLUCOSE      POCT Blood Glucose.: 142 mg/dL (29 Mar 2018 07:58)      RADIOLOGY & ADDITIONAL TESTS: Reviewed.

## 2018-03-29 NOTE — PROGRESS NOTE ADULT - SUBJECTIVE AND OBJECTIVE BOX
DORON NUGENT             [id number]    CC: Generalized weakness    HPI:  61 yo AA F presenting to [facility] ED for fever/chills, waxing and waning confusion over past few days associated with right chest and shoulder pain and swelling around the port insertion site, port placed on 3/13 and never used.  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 Patients last chemo was on 3/2/18, and recent admission for toxic metabolic encephalopathy in the setting of pneumonia and discharged home on 3/9, since discharge pt has been progressively more fatigued, including waxing/waning mentation which worsened after port placement two weeks prior.  in the ED pt found to be septic with fever, tachycardia and elevated WBC count/bandemia and slightly elevated lactate at 2.5, was Alert and oriented, mental status intact at the time of interview, denied HD or neck stiffness however says that she has been experiencing HA on and off but it is at baseline, denies SOB/CP/Palpitation/cough/Abdominal Pain/diarrhea/Urinary symptoms/ melena or hematochezia. pt is getting admitted for ? line related sepsis. Had CXR and CTA unremarkable for acute findings.   s/p 2 L IVF, one dose of cefepime and vancomycin. (23 Mar 2018 22:25)    SUBJECTIVE:  Pt has been resting in bed all day.  Says she does not feel like getting OOB today.  She cannot remember what she ate for lunch today, but does remember eating a meal.  Denies dyspnea or SOB.  Does admit that the lymph node on the right side of the neck is annoying, but not painful.  No family at bedside currently.    ROS:  DYSPNEA:  denies although appears dyspneic	  NAUS/VOM:  N	  SECRETIONS:  N	  AGITATION:  N  Pain (Y/N):  Y  -Provocation/Palliation:  right shoulder  -Quality/Quantity:  dull, achy pain  -Radiating:  no  -Severity:  mild  -Timing/Frequency:  comes and goes  -Impact on ADLs: pain does not impact ADLs, but she is very weak in general and spends >90% time in bed    PEx:  Vital Signs Last 24 Hrs  T(C): 36.8 (29 Mar 2018 05:37), Max: 36.8 (29 Mar 2018 05:37)  T(F): 98.2 (29 Mar 2018 05:37), Max: 98.2 (29 Mar 2018 05:37)  HR: 103 (29 Mar 2018 05:37) (90 - 103)  BP: 145/85 (29 Mar 2018 05:37) (127/75 - 145/85)  BP(mean): --  RR: 15 (29 Mar 2018 05:37) (15 - 16)  SpO2: 96% (29 Mar 2018 05:37) (96% - 97%)    General: patient in no acute distress,   HEENT: NCAT. Moist mucosa.  Neck: Large, immobile, non-tender cervical lymph node on right  CVS: RRR, no murmur  Resp: CTAB, some increased effort, no wheezes/rales/rhonchi  GI: soft, NT, ND, active BS  : voiding freely   Musc: generalized weakness, bedbound, no muscle tenderness  Neuro: no focal deficits  Skin: no rash or erythema   Lymph: cervical lymphadenopathy on right side of neck as well as fullness/mass-like lesion lateral to sternum     ALLERGIES: penicillins (Hives)    OPIATE NAÏVE (Y/N):  N    MEDICATIONS  (STANDING):  cefdinir 300 milliGRAM(s) Oral two times a day  dexamethasone     Tablet 4 milliGRAM(s) Oral every 8 hours  dextrose 5%. 1000 milliLiter(s) (50 mL/Hr) IV Continuous <Continuous>  dextrose 50% Injectable 12.5 Gram(s) IV Push once  dextrose 50% Injectable 25 Gram(s) IV Push once  dextrose 50% Injectable 25 Gram(s) IV Push once  doxycycline hyclate Capsule 100 milliGRAM(s) Oral every 12 hours  enoxaparin Injectable 40 milliGRAM(s) SubCutaneous every 24 hours  fluconAZOLE   Tablet 100 milliGRAM(s) Oral daily  folic acid 1 milliGRAM(s) Oral daily  insulin glargine Injectable (LANTUS) 20 Unit(s) SubCutaneous at bedtime  insulin lispro (HumaLOG) corrective regimen sliding scale   SubCutaneous Before meals and at bedtime  insulin lispro Injectable (HumaLOG) 3 Unit(s) SubCutaneous three times a day before meals  pantoprazole    Tablet 40 milliGRAM(s) Oral before breakfast    MEDICATIONS  (PRN):  acetaminophen   Tablet 650 milliGRAM(s) Oral every 6 hours PRN For Temp greater than 38 C (100.4 F)  acetaminophen   Tablet. 650 milliGRAM(s) Oral every 6 hours PRN Mild Pain (1 - 3)  dextrose Gel 1 Dose(s) Oral once PRN Blood Glucose LESS THAN 70 milliGRAM(s)/deciliter  glucagon  Injectable 1 milliGRAM(s) IntraMuscular once PRN Glucose LESS THAN 70 milligrams/deciliter  ondansetron    Tablet 4 milliGRAM(s) Oral every 6 hours PRN Nausea and/or Vomiting  oxyCODONE    IR 5 milliGRAM(s) Oral every 4 hours PRN Breakthrough pain  polyethylene glycol 3350 17 Gram(s) Oral daily PRN Constipation      LABS: REVIEWED                        7.9    24.7  )-----------( 361      ( 28 Mar 2018 08:25 )             25.8   03-28    133<L>  |  94<L>  |  16  ----------------------------<  241<H>  3.9   |  25  |  0.53    Ca    7.7<L>      28 Mar 2018 08:25  Mg     2.0     03-28      IMAGING: No new studies today.    ADVANCED DIRECTIVES: see advance care planning note  DNR/DNI (confirmed by patient and daughter 3/28)  MOLST form completed 3/28    DECISION MAKER:  Pt seems to have medical capacity to make complex medical decisions.  LEGAL SURROGATE:  Daughter, Naomi Yeh, is health care proxy.  No documentation in chart [phone].    PSYCHOSOCIAL-SPIRITUAL ASSESSMENT:       Reviewed       Care plan adjusted as below	    GOALS OF CARE DISCUSSION       Palliative care info/counseling provided	          Advanced Directives addressed please see Advance Care Planning Note 3/28	           See previous Palliative Medicine Note       Documentation of GOC:  See GOC below  	      AGENCY CHOICE DISCUSSED:           [facility]         REFERRALS	        Palliative Med        Unit SW/Case Mgmt       Patient/Family Support       Massage Therapy       Nutrition       PT/OT

## 2018-03-29 NOTE — PROGRESS NOTE ADULT - PROBLEM SELECTOR PLAN 4
Recent CT concerning for new cervical and mediastinal lymphadenopathy/disease progression.  Yesterday, pt stated she does not want to undergo chemo at this time.  She hopes to go to [facility] to gain some strength, but understands if functional status declines, she may be moved to hospice

## 2018-03-29 NOTE — PROGRESS NOTE ADULT - PROBLEM SELECTOR PLAN 1
Most likely in the setting of port infection.  - Empiric coverage (3/23-3/28) with vancomycin 1250mg BID, cefepime 2000mg q8h,(pt has penicillin allergy), and fluconazole 100mg q24h; now on doxycyline 100mg BID and cefdinir 300mg BID (3/28-3/30)  -UCx with yeast, though patient without urinary complaints; likely colonized  - f/u BCx (ngtd)  - monitor WBC count and fever  - tylenol PRN for fever    - S/p removal of chemo-port by IR on 3/26; NGTD of cultured catheter tip  - ID consulted; appreciate recs

## 2018-03-29 NOTE — PROGRESS NOTE ADULT - PROBLEM SELECTOR PLAN 6
Patient has a history of DM. She is on dexamethasone 4mg q8h for brain mets, which will make hyperglycemia more difficult to control.   -Lantus 20U at bedtime; adjust accordingly  -Premeal lispro 3U; adjust accordingly   -mISS  -Monitor fingersticks

## 2018-03-29 NOTE — PROGRESS NOTE ADULT - PROBLEM SELECTOR PLAN 3
#Stage IV Lung Adenocarcinoma   Patient with mets to brain and bone, PDL1 40% on Carboplatin, Pemetrexed, Pembrolizumab s/p 2 cycles, last 3/2/18, treatment course complicated by multiple hospitalizations including post obstructive pneumonia, collapsed lob cb +influenza s/p debulking and chemical pneumonitis. Both clinical cervical exam and CT imaging concerning for extensive R cervical lymphadenopathy, imaging also consistent with increasing R mediastinal and paratracheal lymphadenopathy. R hilar and R lower lobe necrotic masses persist, no significant change in size. In addition, patient with recent port placement (2 weeks prev), documented fever, leukocytosis w/bandemia, concerning for possibly catheter related sepsis. Pt seen by [facility] in the ED.  - per [facility] recs would need to clinically stabilize prior to next treatment (may need to consider alternative regimen)  -CXR from 3/23 c/f right paratracheal mass compressing the trachea which is narrowed to 8 mm in transverse diameter.  - dexamethasone 4mg q8h for brain metastasis, CT head negative for acute pathology  -Pain control with acetaminophen, tramadol, and oxycodone PRN  - f/u hematoclogy/oncology recs

## 2018-03-29 NOTE — PROGRESS NOTE ADULT - PROBLEM SELECTOR PLAN 1
Pt alert and conversant, but not interested in getting out of bed today.  She will likely be discharged to [facility] when bed available.  PT following.  Would continue present dose of steroids as this may be reason for improvement Pt alert and conversant, but not interested in getting out of bed today.  She will likely be discharged to [facility] when bed available.  PT following.  Would continue present dose of steroids as this may be reason for improvement  patient did not like how she felt on very small dose of Ritalin

## 2018-03-29 NOTE — PROGRESS NOTE ADULT - PROBLEM SELECTOR PLAN 10
Fluids: none  Diet: Regular diet (requested per patient), was on a diabetic diet previously    Dispo: [facility] Fluids: none  Diet: Regular diet (requested per patient), was on a diabetic diet previously  DNR/DNI  Dispo: [facility]

## 2018-03-29 NOTE — CHART NOTE - NSCHARTNOTEFT_GEN_A_CORE
Admitting Diagnosis:   Patient is a 62y old  Female who presents with a chief complaint of fever/Generalized weakness/ and pain at port site (28 Mar 2018 10:43)      PAST MEDICAL & SURGICAL HISTORY:  Brain metastases  Bone metastasis  Lung cancer  Hypertension  Diabetes  History of radiation therapy  Status post gamma knife treatment      Current Nutrition Order: Regular + Ensure Enlive Clear TID     PO Intake: Good (%) [ X  ]  Fair (50-75%) [   ] Poor (<25%) [   ]    GI Issues: No n/v/d/c noted     Pain: Pain controlled     Skin Integrity: Intact     Labs:   03-28    133<L>  |  94<L>  |  16  ----------------------------<  241<H>  3.9   |  25  |  0.53    Ca    7.7<L>      28 Mar 2018 08:25  Mg     2.0     03-28      CAPILLARY BLOOD GLUCOSE      POCT Blood Glucose.: 251 mg/dL (29 Mar 2018 12:09)  POCT Blood Glucose.: 142 mg/dL (29 Mar 2018 07:58)  POCT Blood Glucose.: 170 mg/dL (28 Mar 2018 21:24)  POCT Blood Glucose.: 210 mg/dL (28 Mar 2018 17:15)      Medications:  MEDICATIONS  (STANDING):  cefdinir 300 milliGRAM(s) Oral two times a day  dexamethasone     Tablet 4 milliGRAM(s) Oral every 8 hours  dextrose 5%. 1000 milliLiter(s) (50 mL/Hr) IV Continuous <Continuous>  dextrose 50% Injectable 12.5 Gram(s) IV Push once  dextrose 50% Injectable 25 Gram(s) IV Push once  dextrose 50% Injectable 25 Gram(s) IV Push once  doxycycline hyclate Capsule 100 milliGRAM(s) Oral every 12 hours  enoxaparin Injectable 40 milliGRAM(s) SubCutaneous every 24 hours  fluconAZOLE   Tablet 100 milliGRAM(s) Oral daily  folic acid 1 milliGRAM(s) Oral daily  insulin glargine Injectable (LANTUS) 20 Unit(s) SubCutaneous at bedtime  insulin lispro (HumaLOG) corrective regimen sliding scale   SubCutaneous Before meals and at bedtime  insulin lispro Injectable (HumaLOG) 3 Unit(s) SubCutaneous three times a day before meals  pantoprazole    Tablet 40 milliGRAM(s) Oral before breakfast    MEDICATIONS  (PRN):  acetaminophen   Tablet 650 milliGRAM(s) Oral every 6 hours PRN For Temp greater than 38 C (100.4 F)  acetaminophen   Tablet. 650 milliGRAM(s) Oral every 6 hours PRN Mild Pain (1 - 3)  dextrose Gel 1 Dose(s) Oral once PRN Blood Glucose LESS THAN 70 milliGRAM(s)/deciliter  glucagon  Injectable 1 milliGRAM(s) IntraMuscular once PRN Glucose LESS THAN 70 milligrams/deciliter  ondansetron    Tablet 4 milliGRAM(s) Oral every 6 hours PRN Nausea and/or Vomiting  oxyCODONE    IR 5 milliGRAM(s) Oral every 4 hours PRN Breakthrough pain  polyethylene glycol 3350 17 Gram(s) Oral daily PRN Constipation      Weight: 64.5 kg (3/24/18)    Weight Change:  No new wt to assess     Estimated energy needs:    Estimated Energy Needs (30-35 calories/kg):  · Weight  (lbs)	142 lb  · Weight (kg)	64.4 kg  · From (30 suni/kg)	1932  · To (35 calories/kg)	2254     Estimated Protein Needs (1.2-1.4 gm/kg):  · Weight  (lbs)	142  · Weight (kg)	64.4 kg  · From (1.2 g/kg)	77.28  · To (1.4 g/kg)	90.16     Estimated Fluid Needs (25-30 ml/kg):  · Weight  (lbs)	142  · Weight (kg)	64.4  · From (25 ml/kg)	1610  · To (30 ml/kg)	1932      Subjective: Pt tolerating regular diet with fair intake of breakfast as per RN. Pt sleeping at this time     Previous Nutrition Diagnosis: Unintended weight loss r/t increased nutrient needs, decreased intake AEB 30 lb wt loss over the past few months      Active [ X  ]  Resolved [   ]    If resolved, new PES:     Goal: 1.) Pt to meet 75% of needs PO     Recommendations: Rec. continuing current diet     Education: Unable to educate this visit; pt sleeping      Risk Level: High [ X ] Moderate [   ] Low [   ]

## 2018-03-29 NOTE — PROGRESS NOTE ADULT - ASSESSMENT
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    #Stage IV Lung Adenocarcinoma   Patient with mets to brain and bone, PDL1 40% on Carboplatin, Pemetrexed, Pembrolizumab s/p 2 cycles, last 3/2/18, treatment course complicated by multiple hospitalizations including post obstructive pneumonia, collapsed lobe, +influenza, chemical pneumonitis. Both clinical cervical exam and CT imaging concerning for extensive R cervical lymphadenopathy, imaging also consistent with increasing R mediastinal and paratracheal lymphadenopathy. R hilar and R lower lobe necrotic masses persist, no significant change in size. In addition, patient with recent port placement (2 weeks prev), documented fever, leukocytosis w/bandemia, concerning for possibly catheter related sepsis    Pt also had stereotactic radiosurgery for her brain met. She has had no follow up MRI head since december.    -Both clinical exam and imaging concerning for progressive disease, due to complications patient only able to complete 2 cycles of therapy, normally would assess response after 4 cycles, less likely however may consider a component of pseudo-progression 2/2 immunotherapy   -progressive b/l cervical adenopathy L>R, closely monitor, imaging negative hematoma, abscess  -leukocytosis with bandemia, line sepsis?   -clinically worse compared to baseline, at this time ecog atleast 3 patient in bed >+50% of time, would need to clinically stabilize prior to next treatment (may need to consider alternative regimen), will discuss with Dr Noonan.  -AST improved, TSH (low w/ normal Free T4) may be related to AE of pembro, elevated alk phos c/w bone disease  -dexamethasone 4mg increased to q8, CT head negative for acute pathology  -fluconazole for thrush  -follow up ID recs   -palliative care consult   -pain control  -bowel regimen  -PT/OT placement for [facility]    Normocytic Anemia   Likely multifactorial in nature, given exposure to chemo, poor nutrition, currently patient is not acutely bleeding or bruising. Iron panel from outpatient consistent with anemia of chronic inflammation, elevated ferritin may also be related to transfusion dependence. Retic 1.8. B12/Folate supplemented based on labs from 3/9/18, required supplementation while getting Pemetrexed.     -Monitor for clinical signs of bleeding, consider occult blood  -obtain LDH  -folic acid/B12 supplementation given treatment with pemetrexed       Coagulopathy   Patient chronically with elevated INR and PT, likely related to infection/sepsis, factor 7 shortest half life and decreases in sepsis, patient also with poor nutritional status albumin 2.7 so likely has decreased vit K dependent clotting factors.     -monitor for bleeding  -would hold off on product replacement (vit K) as patient is immobile with solid tumor thus increased hypercoag risk      DVT ppx - lovenox ppx    Discussed with Dr. Noonan

## 2018-03-30 DIAGNOSIS — R11.0 NAUSEA: ICD-10-CM

## 2018-03-30 DIAGNOSIS — Z78.9 OTHER SPECIFIED HEALTH STATUS: ICD-10-CM

## 2018-03-30 DIAGNOSIS — Z66 DO NOT RESUSCITATE: ICD-10-CM

## 2018-03-30 LAB
GLUCOSE BLDC GLUCOMTR-MCNC: 119 MG/DL — HIGH (ref 70–99)
GLUCOSE BLDC GLUCOMTR-MCNC: 123 MG/DL — HIGH (ref 70–99)
GLUCOSE BLDC GLUCOMTR-MCNC: 123 MG/DL — HIGH (ref 70–99)
GLUCOSE BLDC GLUCOMTR-MCNC: 76 MG/DL — SIGNIFICANT CHANGE UP (ref 70–99)

## 2018-03-30 PROCEDURE — 99233 SBSQ HOSP IP/OBS HIGH 50: CPT | Mod: GC

## 2018-03-30 RX ORDER — LIDOCAINE 4 G/100G
1 CREAM TOPICAL DAILY
Qty: 0 | Refills: 0 | Status: DISCONTINUED | OUTPATIENT
Start: 2018-03-30 | End: 2018-04-04

## 2018-03-30 RX ORDER — INSULIN GLARGINE 100 [IU]/ML
20 INJECTION, SOLUTION SUBCUTANEOUS AT BEDTIME
Qty: 0 | Refills: 0 | Status: DISCONTINUED | OUTPATIENT
Start: 2018-03-30 | End: 2018-04-04

## 2018-03-30 RX ORDER — OXYCODONE HYDROCHLORIDE 5 MG/1
5 TABLET ORAL EVERY 4 HOURS
Qty: 0 | Refills: 0 | Status: DISCONTINUED | OUTPATIENT
Start: 2018-03-30 | End: 2018-04-02

## 2018-03-30 RX ORDER — SENNA PLUS 8.6 MG/1
1 TABLET ORAL DAILY
Qty: 0 | Refills: 0 | Status: DISCONTINUED | OUTPATIENT
Start: 2018-03-30 | End: 2018-04-04

## 2018-03-30 RX ORDER — CEFDINIR 250 MG/5ML
300 POWDER, FOR SUSPENSION ORAL ONCE
Qty: 0 | Refills: 0 | Status: COMPLETED | OUTPATIENT
Start: 2018-03-30 | End: 2018-03-30

## 2018-03-30 RX ORDER — INSULIN LISPRO 100/ML
5 VIAL (ML) SUBCUTANEOUS
Qty: 0 | Refills: 0 | Status: DISCONTINUED | OUTPATIENT
Start: 2018-03-30 | End: 2018-04-01

## 2018-03-30 RX ADMIN — Medication 1 MILLIGRAM(S): at 11:16

## 2018-03-30 RX ADMIN — OXYCODONE HYDROCHLORIDE 5 MILLIGRAM(S): 5 TABLET ORAL at 08:38

## 2018-03-30 RX ADMIN — ENOXAPARIN SODIUM 40 MILLIGRAM(S): 100 INJECTION SUBCUTANEOUS at 18:04

## 2018-03-30 RX ADMIN — Medication 100 MILLIGRAM(S): at 11:16

## 2018-03-30 RX ADMIN — PANTOPRAZOLE SODIUM 40 MILLIGRAM(S): 20 TABLET, DELAYED RELEASE ORAL at 06:47

## 2018-03-30 RX ADMIN — Medication 100 MILLIGRAM(S): at 00:16

## 2018-03-30 RX ADMIN — Medication 4 MILLIGRAM(S): at 21:59

## 2018-03-30 RX ADMIN — FLUCONAZOLE 100 MILLIGRAM(S): 150 TABLET ORAL at 11:17

## 2018-03-30 RX ADMIN — LIDOCAINE 1 PATCH: 4 CREAM TOPICAL at 16:07

## 2018-03-30 RX ADMIN — Medication 5 UNIT(S): at 18:05

## 2018-03-30 RX ADMIN — SENNA PLUS 1 TABLET(S): 8.6 TABLET ORAL at 11:28

## 2018-03-30 RX ADMIN — Medication 4 MILLIGRAM(S): at 06:47

## 2018-03-30 RX ADMIN — Medication 5 UNIT(S): at 13:19

## 2018-03-30 RX ADMIN — CEFDINIR 300 MILLIGRAM(S): 250 POWDER, FOR SUSPENSION ORAL at 06:46

## 2018-03-30 RX ADMIN — Medication 4 MILLIGRAM(S): at 13:22

## 2018-03-30 RX ADMIN — ONDANSETRON 4 MILLIGRAM(S): 8 TABLET, FILM COATED ORAL at 11:16

## 2018-03-30 RX ADMIN — OXYCODONE HYDROCHLORIDE 5 MILLIGRAM(S): 5 TABLET ORAL at 09:17

## 2018-03-30 RX ADMIN — INSULIN GLARGINE 20 UNIT(S): 100 INJECTION, SOLUTION SUBCUTANEOUS at 21:59

## 2018-03-30 RX ADMIN — CEFDINIR 300 MILLIGRAM(S): 250 POWDER, FOR SUSPENSION ORAL at 18:05

## 2018-03-30 NOTE — PROGRESS NOTE ADULT - PROBLEM SELECTOR PLAN 4
Cefdinir for line infection and Fluconazole PO for thrush per ID team Recent CT concerning for new cervical and mediastinal lymphadenopathy/disease progression.  Patient has stated she does not want to undergo chemo at this time.  She hopes to go to [facility] to gain some strength, but understands if functional status declines, she may be moved to hospice.  Pt's daughter and  agree to this plan

## 2018-03-30 NOTE — PROGRESS NOTE ADULT - PROBLEM SELECTOR PLAN 3
Continue oxycodone IR 5mg q4 PRN pain (no use in last 24 hours) Continue APAP 650mg PO q6h PRN Mild pain  Continue Oxycodone IR 5mg q4 PRN breakthrough pain (not used in last 24 hours)

## 2018-03-30 NOTE — PROGRESS NOTE ADULT - ATTENDING COMMENTS
I was physically present for the key portions of the evaluation and management (E/M) service provided. I agree with the above history, physical, and plan which I have reviewed and edited where appropiate.

## 2018-03-30 NOTE — PROGRESS NOTE ADULT - PROBLEM SELECTOR PLAN 5
Recent CT concerning for new cervical and mediastinal lymphadenopathy/disease progression.  Patient has stated she does not want to undergo chemo at this time.  She hopes to go to [facility] to gain some strength, but understands if functional status declines, she may be moved to hospice.  Pt's daughter and  agree to this plan Continue Dexamethasone 4mg PO q8 hr

## 2018-03-30 NOTE — PROGRESS NOTE ADULT - PROBLEM SELECTOR PLAN 6
Patient has a history of DM. She is on dexamethasone 4mg q8h for brain mets, which will make hyperglycemia more difficult to control.   -Lantus 20U at bedtime; adjust accordingly  -Premeal lispro 3U; adjust accordingly   -mISS  -Monitor fingersticks Patient has a history of DM. She is on dexamethasone 4mg q8h for brain mets, which will make hyperglycemia more difficult to control.   -Lantus 20U at bedtime; adjust accordingly  -Premeal lispro 5U; adjust accordingly   -mISS  -Monitor fingersticks

## 2018-03-30 NOTE — PROGRESS NOTE ADULT - ASSESSMENT
63 y/o F with a PMHX of HTN, DM, and Stage IV lung CA w/ mets to brain and bone (Dec 2nd) who underwent RT to bony mets on 8th rib, and had gamma knife tx to brain (Jan 9th) s/p 2 cycles of systemic chemoimmunotherapy and immunotherapy for metastatic disease presenting with increased lethargy and line sepsis.  Palliative care called to see for symptom management and discussion of goals of care.  Plan is for patient to be discharged to [facility] at [facility]. 61 y/o F with a PMHX of HTN, DM, and Stage IV lung CA w/ mets to brain and bone (Dec 2nd) who underwent RT to bony mets on 8th rib, and had gamma knife tx to brain (Jan 9th) s/p 2 cycles of systemic chemoimmunotherapy and immunotherapy for metastatic disease presenting with increased lethargy and line sepsis.  Palliative care called to see for symptom management and discussion of goals of care.  Plan is for patient to be discharged to [facility] at [facility].

## 2018-03-30 NOTE — PROGRESS NOTE ADULT - SUBJECTIVE AND OBJECTIVE BOX
DORON NUGENT             MRN-[id number]    CC: Generalized weakness    HPI:  63 yo AA F presenting to [facility] ED for fever/chills, waxing and waning confusion over past few days associated with right chest and shoulder pain and swelling around the port insertion site, port placed on 3/13 and never used.  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 Patients last chemo was on 3/2/18, and recent admission for toxic metabolic encephalopathy in the setting of pneumonia and discharged home on 3/9, since discharge pt has been progressively more fatigued, including waxing/waning mentation which worsened after port placement two weeks prior.  in the ED pt found to be septic with fever, tachycardia and elevated WBC count/bandemia and slightly elevated lactate at 2.5, was Alert and oriented, mental status intact at the time of interview, denied HD or neck stiffness however says that she has been experiencing HA on and off but it is at baseline, denies SOB/CP/Palpitation/cough/Abdominal Pain/diarrhea/Urinary symptoms/ melena or hematochezia. pt is getting admitted for ? line related sepsis. Had CXR and CTA unremarkable for acute findings.   s/p 2 L IVF, one dose of cefepime and vancomycin. (23 Mar 2018 22:25)    SUBJECTIVE:  Patient seen and examined today.  She says she had a bad night - was unable to sleep much and when she woke this AM, she felt nauseated.  No vomiting.  She did not feel up to working with PT today.  She is agreeable to taking Zofran to see if it helps.  Spoke to  regarding dispo - no beds currently at [facility] and patient will likely be here through the weekend.    ROS:  DYSPNEA:  denies although appears dyspneic	  NAUS/VOM:  N	  SECRETIONS:  N	  AGITATION:  N  Pain (Y/N):  Y  -Provocation/Palliation:  right shoulder  -Quality/Quantity:  dull, achy pain  -Radiating:  no  -Severity:  mild  -Timing/Frequency:  comes and goes  -Impact on ADLs: pain does not impact ADLs, but she is very weak in general and spends >90% time in bed    PEx:  Vital Signs Last 24 Hrs  T(C): 37.2 (30 Mar 2018 05:01), Max: 37.2 (30 Mar 2018 05:01)  T(F): 98.9 (30 Mar 2018 05:01), Max: 98.9 (30 Mar 2018 05:01)  HR: 105 (30 Mar 2018 05:01) (99 - 106)  BP: 144/82 (30 Mar 2018 05:01) (120/77 - 144/82)  BP(mean): --  RR: 16 (30 Mar 2018 05:01) (16 - 20)  SpO2: 97% (30 Mar 2018 05:01) (97% - 98%)    General: patient in no acute distress,   HEENT: NCAT. Moist mucosa.  Neck: Large, immobile, non-tender cervical lymph node on right  CVS: RRR, no murmur  Resp: CTAB, some increased effort, no wheezes/rales/rhonchi  GI: soft, NT, ND, active BS  : voiding freely   Musc: generalized weakness, bedbound, right hand edema resolving  Neuro: no focal deficits  Skin: no rash or erythema   Lymph: cervical lymphadenopathy on right side of neck as well as fullness/mass-like lesion lateral to sternum     ALLERGIES: penicillins (Hives)    OPIATE NAÏVE (Y/N):  N    MEDICATIONS  (STANDING):  cefdinir 300 milliGRAM(s) Oral once  dexamethasone     Tablet 4 milliGRAM(s) Oral every 8 hours  dextrose 5%. 1000 milliLiter(s) (50 mL/Hr) IV Continuous <Continuous>  dextrose 50% Injectable 12.5 Gram(s) IV Push once  dextrose 50% Injectable 25 Gram(s) IV Push once  dextrose 50% Injectable 25 Gram(s) IV Push once  doxycycline hyclate Capsule 100 milliGRAM(s) Oral every 12 hours  enoxaparin Injectable 40 milliGRAM(s) SubCutaneous every 24 hours  fluconAZOLE   Tablet 100 milliGRAM(s) Oral daily  folic acid 1 milliGRAM(s) Oral daily  insulin glargine Injectable (LANTUS) 20 Unit(s) SubCutaneous at bedtime  insulin lispro (HumaLOG) corrective regimen sliding scale   SubCutaneous Before meals and at bedtime  insulin lispro Injectable (HumaLOG) 5 Unit(s) SubCutaneous three times a day before meals  pantoprazole    Tablet 40 milliGRAM(s) Oral before breakfast  senna 1 Tablet(s) Oral daily    MEDICATIONS  (PRN):  acetaminophen   Tablet 650 milliGRAM(s) Oral every 6 hours PRN For Temp greater than 38 C (100.4 F)  acetaminophen   Tablet. 650 milliGRAM(s) Oral every 6 hours PRN Mild Pain (1 - 3)  dextrose Gel 1 Dose(s) Oral once PRN Blood Glucose LESS THAN 70 milliGRAM(s)/deciliter  glucagon  Injectable 1 milliGRAM(s) IntraMuscular once PRN Glucose LESS THAN 70 milligrams/deciliter  ondansetron    Tablet 4 milliGRAM(s) Oral every 6 hours PRN Nausea and/or Vomiting  oxyCODONE    IR 5 milliGRAM(s) Oral every 4 hours PRN Breakthrough pain  polyethylene glycol 3350 17 Gram(s) Oral daily PRN Constipation      LABS:  POCT  Blood Glucose (03.30.18 @ 08:12)    POCT Blood Glucose.: 76 mg/dL      IMAGING: No new studies today.    ADVANCED DIRECTIVES: see advance care planning note  DNR/DNI (confirmed by patient and daughter 3/28)  MOLST form completed 3/28    DECISION MAKER:  Pt seems to have medical capacity to make complex medical decisions.  LEGAL SURROGATE:  Daughter, Naomi Yeh, is health care proxy.  No documentation in chart [phone].    PSYCHOSOCIAL-SPIRITUAL ASSESSMENT:       Reviewed       Care plan adjusted as below	    GOALS OF CARE DISCUSSION       Palliative care info/counseling provided	          Advanced Directives addressed please see Advance Care Planning Note 3/28	           See previous Palliative Medicine Note       Documentation of GOC:  See GOC below  	      AGENCY CHOICE DISCUSSED:           [facility]         REFERRALS	        Palliative Med        Unit SW/Case Mgmt       Patient/Family Support       Nutrition       Massage Therapy       PT/OT DORON NUGENT             MRN-[id number]    CC: Generalized weakness    HPI:  63 yo AA F presenting to [facility] ED for fever/chills, waxing and waning confusion over past few days associated with right chest and shoulder pain and swelling around the port insertion site, port placed on 3/13 and never used.  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 Patients last chemo was on 3/2/18, and recent admission for toxic metabolic encephalopathy in the setting of pneumonia and discharged home on 3/9, since discharge pt has been progressively more fatigued, including waxing/waning mentation which worsened after port placement two weeks prior.  in the ED pt found to be septic with fever, tachycardia and elevated WBC count/bandemia and slightly elevated lactate at 2.5, was Alert and oriented, mental status intact at the time of interview, denied HD or neck stiffness however says that she has been experiencing HA on and off but it is at baseline, denies SOB/CP/Palpitation/cough/Abdominal Pain/diarrhea/Urinary symptoms/ melena or hematochezia. pt is getting admitted for ? line related sepsis. Had CXR and CTA unremarkable for acute findings.   s/p 2 L IVF, one dose of cefepime and vancomycin. (23 Mar 2018 22:25)    SUBJECTIVE:  Patient seen and examined today.  She says she had a bad night - was unable to sleep much and when she woke this AM, she felt nauseated.  No vomiting.  She did not feel up to working with PT today.  She is agreeable to taking Zofran to see if it helps.  Spoke to  regarding dispo - no beds currently at [facility] and patient will likely be here through the weekend.    ROS:  DYSPNEA:  Marlon 4	  NAUS/VOM:  No	  SECRETIONS:  No  AGITATION:  No  Pain (Y/N):  Yes  -Provocation/Palliation:  right shoulder  -Quality/Quantity:  dull, achy pain  Chronic malignant somatic musculoskeletal pain currently controlled  -Radiating:  No  -Severity:  Mild  -Timing/Frequency: Occasional  -Impact on ADLs: pain does not impact ADLs, but she is very weak in general and spends >90% time in bed    PEx:  Vital Signs Last 24 Hrs  T(C): 37.2 (30 Mar 2018 05:01), Max: 37.2 (30 Mar 2018 05:01)  T(F): 98.9 (30 Mar 2018 05:01), Max: 98.9 (30 Mar 2018 05:01)  HR: 105 (30 Mar 2018 05:01) (99 - 106)  BP: 144/82 (30 Mar 2018 05:01) (120/77 - 144/82)  RR: 16 (30 Mar 2018 05:01) (16 - 20)  SpO2: 97% (30 Mar 2018 05:01) (97% - 98%)    General: AAOx3 patient in no acute distress,   HEENT: NCAT. Moist mucosa. EOMI  Neck: Large, immobile, non-tender cervical lymph node on right  CVS: RRR, No M/G/R  Resp: CTAB, some increased effort, no wheezes/rales/rhonchi  GI: soft, NT, ND, active BS  : Normal  Musc: Generalized weakness, bedbound, right hand edema improving  Neuro: No focal deficits  Skin: No rash or erythema   Lymph: cervical lymphadenopathy on right side of neck as well as fullness/mass-like lesion lateral to sternum     ALLERGIES: penicillins (Hives)    OPIATE NAÏVE (Y/N):  No    MEDICATIONS  (STANDING):  cefdinir 300 milliGRAM(s) Oral once  dexamethasone     Tablet 4 milliGRAM(s) Oral every 8 hours  dextrose 5%. 1000 milliLiter(s) (50 mL/Hr) IV Continuous <Continuous>  dextrose 50% Injectable 12.5 Gram(s) IV Push once  dextrose 50% Injectable 25 Gram(s) IV Push once  dextrose 50% Injectable 25 Gram(s) IV Push once  doxycycline hyclate Capsule 100 milliGRAM(s) Oral every 12 hours  enoxaparin Injectable 40 milliGRAM(s) SubCutaneous every 24 hours  fluconAZOLE   Tablet 100 milliGRAM(s) Oral daily  folic acid 1 milliGRAM(s) Oral daily  insulin glargine Injectable (LANTUS) 20 Unit(s) SubCutaneous at bedtime  insulin lispro (HumaLOG) corrective regimen sliding scale   SubCutaneous Before meals and at bedtime  insulin lispro Injectable (HumaLOG) 5 Unit(s) SubCutaneous three times a day before meals  pantoprazole    Tablet 40 milliGRAM(s) Oral before breakfast  senna 1 Tablet(s) Oral daily    MEDICATIONS  (PRN):  acetaminophen   Tablet 650 milliGRAM(s) Oral every 6 hours PRN For Temp greater than 38 C (100.4 F)  acetaminophen   Tablet. 650 milliGRAM(s) Oral every 6 hours PRN Mild Pain (1 - 3)  dextrose Gel 1 Dose(s) Oral once PRN Blood Glucose LESS THAN 70 milliGRAM(s)/deciliter  glucagon  Injectable 1 milliGRAM(s) IntraMuscular once PRN Glucose LESS THAN 70 milligrams/deciliter  ondansetron    Tablet 4 milliGRAM(s) Oral every 6 hours PRN Nausea and/or Vomiting  oxyCODONE    IR 5 milliGRAM(s) Oral every 4 hours PRN Breakthrough pain  polyethylene glycol 3350 17 Gram(s) Oral daily PRN Constipation    LABS:  POCT  Blood Glucose : 76 mg/dL (03.30.18 @ 08:12)    IMAGING: Reviewed.    ADVANCED DIRECTIVES:   DNR/DNI (confirmed by patient and daughter 3/28)  MOLST form completed 3/28    DECISION MAKER:  Pt seems to have medical capacity to make complex medical decisions.  LEGAL SURROGATE:  Daughter, Naomi Yeh, is health care proxy.  No documentation in chart [phone].    PSYCHOSOCIAL-SPIRITUAL ASSESSMENT:       Reviewed       Care plan adjusted as below	    GOALS OF CARE DISCUSSION       Palliative care info/counseling provided	              See previous Palliative Medicine Note       Documentation of GOC: DNR DNI   	      AGENCY CHOICE DISCUSSED:           [facility]         REFERRALS	        Unit SW/Case Mgmt       Nutrition       Massage Therapy       PT/OT

## 2018-03-30 NOTE — PROGRESS NOTE ADULT - PROBLEM SELECTOR PLAN 9
Palliative SW working on dispo to [facility] for [facility] when bed available.  Support provided to patient and family. Patient to have access to supportive services during rest of hospital stay as the patient/family deemed necessary ie. Chaplaincy, Massage therapy, Music therapy, Patient and family supportive services, Palliative SW, etc.  As discussed during the palliative IDT meeting and as identified during the patients PSSA screening the patient would benefit from: Chaplaincy, Massage therapy, Music therapy, Patient and family supportive services, Palliative SW

## 2018-03-30 NOTE — PROGRESS NOTE ADULT - PROBLEM SELECTOR PROBLEM 8
Nutrition, metabolism, and development symptoms Medical orders for life-sustaining treatment (MOLST) form in chart

## 2018-03-30 NOTE — PROGRESS NOTE ADULT - PROBLEM SELECTOR PLAN 1
Nausea started this morning - may be secondary to constipation as no BM has been recorded yet and pt cannot recall last BM date.  Her abdomen is soft, NT, ND.  Senna added today per primary team.  Recommend giving Miralax daily until stools are loose.  Pt received one dose of Zofran @ 11AM.  Continue Zofran 4mg PO q6 PRN nausea or vomiting Nausea started this morning - may be secondary to constipation as no BM has been recorded yet and pt cannot recall last BM date.  Her abdomen is soft, NT, ND.  Senna added today per primary team.  Recommend giving Miralax BID daily until stools are loose.  Pt received one dose of Zofran @ 11AM.  Continue Zofran 4mg IV/PO q6 PRN nausea or vomiting.

## 2018-03-30 NOTE — PROGRESS NOTE ADULT - PROBLEM SELECTOR PLAN 2
Patient feels tired today due to lack of sleep and not interested in working with PT.  Attempted Ritalin x 1 but patient did not agree with this.  Recommend steroids at present dose.  She will likely be discharged to [facility] when bed available hopefully ear next week Patient feels tired today due to lack of sleep and not interested in working with PT.  Attempted Ritalin x 1 but patient did not agree to take.  Recommend steroids at present dose.  She will likely be discharged to [facility] when bed available hopefully early next week

## 2018-03-30 NOTE — PROGRESS NOTE ADULT - ATTENDING COMMENTS
dx:  sepsis due to suspected line infection - changed to doxy/cefdinir , course to be completed april 1st.  blood cx and cath tip cx (removed port 3/26/18) are negative thus far.     right arm edema - no DVT seen on u/s  leukocytosis - etiology unclear. doubt it is due to infectious process. possibly related to malignancy.   somnolence -improved with increased dexamethasone to 4mg q6hrs. however pt insists that she only wants q8hrs dexamethasone.  will cont q8 dose , monitor .  CT head on admission w/o significant changes from prior scan. will check MRI brain.   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   thrush - fluconazole x 10 days total  htn - bps near goal off all anti-hypertensives  dm ii - ssi  anemia - s/p 2 u prbc. suspect anemia of chronic dz + hemodilution. no active bleeding. stool guaic (-)  depression- no si/hi.   stage iv lung cancer with brain metastases - pt is a candidate for hospice. will f/u palliative and onc recommendations.   tracheal compression from tumor/lymphadenopathy - currently w/o sxs. will monitor

## 2018-03-30 NOTE — PROGRESS NOTE ADULT - PROBLEM SELECTOR PLAN 10
Fluids: none  Diet: Regular diet (requested per patient), was on a diabetic diet previously  DNR/DNI  Dispo: [facility]

## 2018-03-30 NOTE — PROGRESS NOTE ADULT - ASSESSMENT
61 y/o F with a PMHX of HTN, DM, and Stage IV lung CA w/ mets to brain and bone (Dec 2nd) who underwent RT to bony mets on 8th rib, and had gamma knife tx to brain (Jan 9th) s/p 2 cycles of systemic chemoimmunotherapy for metastatic disease presenting with increased lethargy and line sepsis.    #Stage IV Lung Adenocarcinoma   Patient with mets to brain and bone, PDL1 40% on Carboplatin, Pemetrexed, Pembrolizumab s/p 2 cycles, last 3/2/18, treatment course complicated by multiple hospitalizations including post obstructive pneumonia, collapsed lobe, +influenza, chemical pneumonitis. Both clinical cervical exam and CT imaging concerning for extensive R cervical lymphadenopathy, imaging also consistent with increasing R mediastinal and paratracheal lymphadenopathy. R hilar and R lower lobe necrotic masses persist, no significant change in size. In addition, patient with recent port placement (2 weeks prev), documented fever, leukocytosis w/bandemia, concerning for possibly catheter related sepsis.    Pt also had stereotactic radiosurgery for her brain met. She has had no follow up MRI head since december.    -Both clinical exam and imaging concerning for progressive disease, due to complications patient only able to complete 2 cycles of therapy, normally would assess response after 4 cycles, less likely however may consider a component of pseudo-progression 2/2 immunotherapy   -progressive b/l cervical adenopathy L>R, closely monitor, imaging negative hematoma, abscess  -leukocytosis with bandemia, line sepsis, c/w doxycycline   -clinically worse compared to baseline, at this time ecog atleast 3 patient in bed >+50% of time, not currently candidate for chemoimmunotherapy  -AST improved, TSH (low w/ normal Free T4) may be related to AE of pembro, elevated alk phos c/w bone disease  -dexamethasone 4mg increased to q8, CT head negative for acute pathology  -fluconazole for thrush  -follow up ID recs   -palliative care consult   -pain control  -bowel regimen  -PT/OT placement for [facility]    Normocytic Anemia   Likely multifactorial in nature, given exposure to chemo, poor nutrition, currently patient is not acutely bleeding or bruising. Iron panel from outpatient consistent with anemia of chronic inflammation, elevated ferritin may also be related to transfusion dependence. Retic 1.8. B12/Folate supplemented based on labs from 3/9/18, required supplementation while getting Pemetrexed.     -Monitor for clinical signs of bleeding, consider occult blood  -obtain LDH  -folic acid/B12 supplementation given treatment with pemetrexed       Coagulopathy   Patient chronically with elevated INR and PT, likely related to infection/sepsis, factor 7 shortest half life and decreases in sepsis, patient also with poor nutritional status albumin likely has decreased vit K dependent clotting factors.     -monitor for bleeding  -would hold off on product replacement (vit K) as patient is immobile with solid tumor thus increased hypercoag risk      DVT ppx - lovenox ppx    Discussed with Dr. Noonan

## 2018-03-30 NOTE — PROGRESS NOTE ADULT - SUBJECTIVE AND OBJECTIVE BOX
Heme/Onc Progress Note (Dr. Noonan)    Interval History: Patient with worsening nausea overnight, otherwise no acute change in respiratory status or energy levels. Patient denies any acute bleeding or bruising including brbpr, melena or hematuria. Patient does not want to work with PT/OT today.     ROS is otherwise negative.      Allergies    penicillins (Hives)    Intolerances        Medications:  MEDICATIONS  (STANDING):  cefdinir 300 milliGRAM(s) Oral once  dexamethasone     Tablet 4 milliGRAM(s) Oral every 8 hours  dextrose 5%. 1000 milliLiter(s) (50 mL/Hr) IV Continuous <Continuous>  dextrose 50% Injectable 12.5 Gram(s) IV Push once  dextrose 50% Injectable 25 Gram(s) IV Push once  dextrose 50% Injectable 25 Gram(s) IV Push once  doxycycline hyclate Capsule 100 milliGRAM(s) Oral every 12 hours  enoxaparin Injectable 40 milliGRAM(s) SubCutaneous every 24 hours  fluconAZOLE   Tablet 100 milliGRAM(s) Oral daily  folic acid 1 milliGRAM(s) Oral daily  insulin glargine Injectable (LANTUS) 20 Unit(s) SubCutaneous at bedtime  insulin lispro (HumaLOG) corrective regimen sliding scale   SubCutaneous Before meals and at bedtime  insulin lispro Injectable (HumaLOG) 5 Unit(s) SubCutaneous three times a day before meals  pantoprazole    Tablet 40 milliGRAM(s) Oral before breakfast  senna 1 Tablet(s) Oral daily    MEDICATIONS  (PRN):  acetaminophen   Tablet 650 milliGRAM(s) Oral every 6 hours PRN For Temp greater than 38 C (100.4 F)  acetaminophen   Tablet. 650 milliGRAM(s) Oral every 6 hours PRN Mild Pain (1 - 3)  dextrose Gel 1 Dose(s) Oral once PRN Blood Glucose LESS THAN 70 milliGRAM(s)/deciliter  glucagon  Injectable 1 milliGRAM(s) IntraMuscular once PRN Glucose LESS THAN 70 milligrams/deciliter  ondansetron    Tablet 4 milliGRAM(s) Oral every 6 hours PRN Nausea and/or Vomiting  oxyCODONE    IR 5 milliGRAM(s) Oral every 4 hours PRN Breakthrough pain  polyethylene glycol 3350 17 Gram(s) Oral daily PRN Constipation    enoxaparin Injectable 40 milliGRAM(s) SubCutaneous every 24 hours          PHYSICAL EXAM:    T(F): 98.9 (03-30-18 @ 05:01), Max: 98.9 (03-30-18 @ 05:01)  HR: 105 (03-30-18 @ 05:01) (99 - 106)  BP: 144/82 (03-30-18 @ 05:01) (120/77 - 144/82)  RR: 16 (03-30-18 @ 05:01) (16 - 20)  SpO2: 97% (03-30-18 @ 05:01) (97% - 98%)  Wt(kg): --    Daily     Daily     GEN: tired appearing, arousable to voice   HEENT: AT/NC, EOMI, no oral lesions   NECK: bilateral cervical adenopathy R>L, non tender   CVS: S1S2 RRR   LUNG: Decreased inspiratory effort b/l  ABD: +BS, NT, ND   EXT: no c/c/e  NEURO: AAOx3

## 2018-03-30 NOTE — PROGRESS NOTE ADULT - PROBLEM SELECTOR PLAN 3
#Stage IV Lung Adenocarcinoma   Patient with mets to brain and bone, PDL1 40% on Carboplatin, Pemetrexed, Pembrolizumab s/p 2 cycles, last 3/2/18, treatment course complicated by multiple hospitalizations including post obstructive pneumonia, collapsed lob cb +influenza s/p debulking and chemical pneumonitis. Both clinical cervical exam and CT imaging concerning for extensive R cervical lymphadenopathy, imaging also consistent with increasing R mediastinal and paratracheal lymphadenopathy. R hilar and R lower lobe necrotic masses persist, no significant change in size. In addition, patient with recent port placement (2 weeks prev), documented fever, leukocytosis w/bandemia, concerning for possibly catheter related sepsis. Pt seen by [facility] in the ED.  - per [facility] recs would need to clinically stabilize prior to next treatment (may need to consider alternative regimen)  -CXR from 3/23 c/f right paratracheal mass compressing the trachea which is narrowed to 8 mm in transverse diameter.  - dexamethasone 4mg q8h for brain metastasis, CT head negative for acute pathology  -Pain control with acetaminophen, tramadol, and oxycodone PRN  - f/u hematoclogy/oncology recs

## 2018-03-30 NOTE — PROGRESS NOTE ADULT - SUBJECTIVE AND OBJECTIVE BOX
Overnight Events: ANGELIKA  Subjective/ROS: Patient reports ongoing R-shoulder pain and poor sleep overnight due to interruptions. Denies headache, dizziness, lightheadedness, chest pain, shortness of breath, palpitations, abdominal pain, nausea, vomiting, diarrhea, fevers, and chills.     VITAL SIGNS:  Vital Signs Last 24 Hrs  T(C): 37.2 (30 Mar 2018 05:01), Max: 37.2 (30 Mar 2018 05:01)  T(F): 98.9 (30 Mar 2018 05:01), Max: 98.9 (30 Mar 2018 05:01)  HR: 105 (30 Mar 2018 05:01) (99 - 106)  BP: 144/82 (30 Mar 2018 05:01) (120/77 - 144/82)  BP(mean): --  RR: 16 (30 Mar 2018 05:01) (16 - 20)  SpO2: 97% (30 Mar 2018 05:01) (97% - 98%)    PHYSICAL EXAM:    General: Well nourished middle aged woman awake and comfortable in bed; NAD  HEENT: NC/AT; EOMI, PEERLA anicteric sclera; MMM, +thrush on posterior tongue (improving)  Neck: supple, palpable R neck mass, non-tender to palpation  Cardiovascular: +S1/S2; RRR, no r/m/g  Respiratory: CTA B/L; no W/R/R; R sided dressing from port removal C/D/I, no erythema, swelling, purulence, or tenderness surrounding site  Gastrointestinal: soft, NT/ND; +BS  Extremities: WWP; edematous RUE; sensation and pulses of RUE intact, non-tender to palpation  Vascular: 2+ radial, DP pulses B/L  Neurological: Awake, alert, interactive  Psych: flat affect    MEDICATIONS:  MEDICATIONS  (STANDING):  cefdinir 300 milliGRAM(s) Oral two times a day  dexamethasone     Tablet 4 milliGRAM(s) Oral every 8 hours  dextrose 5%. 1000 milliLiter(s) (50 mL/Hr) IV Continuous <Continuous>  dextrose 50% Injectable 12.5 Gram(s) IV Push once  dextrose 50% Injectable 25 Gram(s) IV Push once  dextrose 50% Injectable 25 Gram(s) IV Push once  doxycycline hyclate Capsule 100 milliGRAM(s) Oral every 12 hours  enoxaparin Injectable 40 milliGRAM(s) SubCutaneous every 24 hours  fluconAZOLE   Tablet 100 milliGRAM(s) Oral daily  folic acid 1 milliGRAM(s) Oral daily  insulin glargine Injectable (LANTUS) 24 Unit(s) SubCutaneous at bedtime  insulin lispro (HumaLOG) corrective regimen sliding scale   SubCutaneous Before meals and at bedtime  insulin lispro Injectable (HumaLOG) 5 Unit(s) SubCutaneous three times a day before meals  pantoprazole    Tablet 40 milliGRAM(s) Oral before breakfast  senna 1 Tablet(s) Oral daily    MEDICATIONS  (PRN):  acetaminophen   Tablet 650 milliGRAM(s) Oral every 6 hours PRN For Temp greater than 38 C (100.4 F)  acetaminophen   Tablet. 650 milliGRAM(s) Oral every 6 hours PRN Mild Pain (1 - 3)  dextrose Gel 1 Dose(s) Oral once PRN Blood Glucose LESS THAN 70 milliGRAM(s)/deciliter  glucagon  Injectable 1 milliGRAM(s) IntraMuscular once PRN Glucose LESS THAN 70 milligrams/deciliter  ondansetron    Tablet 4 milliGRAM(s) Oral every 6 hours PRN Nausea and/or Vomiting  oxyCODONE    IR 5 milliGRAM(s) Oral every 4 hours PRN Breakthrough pain  polyethylene glycol 3350 17 Gram(s) Oral daily PRN Constipation      ALLERGIES:  Allergies    penicillins (Hives)    Intolerances        LABS:              CAPILLARY BLOOD GLUCOSE      POCT Blood Glucose.: 76 mg/dL (30 Mar 2018 08:12)      RADIOLOGY & ADDITIONAL TESTS: Reviewed.

## 2018-03-31 LAB
ANION GAP SERPL CALC-SCNC: 15 MMOL/L — SIGNIFICANT CHANGE UP (ref 5–17)
ANISOCYTOSIS BLD QL: SLIGHT — SIGNIFICANT CHANGE UP
BUN SERPL-MCNC: 21 MG/DL — SIGNIFICANT CHANGE UP (ref 7–23)
CALCIUM SERPL-MCNC: 8.6 MG/DL — SIGNIFICANT CHANGE UP (ref 8.4–10.5)
CHLORIDE SERPL-SCNC: 97 MMOL/L — SIGNIFICANT CHANGE UP (ref 96–108)
CO2 SERPL-SCNC: 25 MMOL/L — SIGNIFICANT CHANGE UP (ref 22–31)
CREAT SERPL-MCNC: 0.65 MG/DL — SIGNIFICANT CHANGE UP (ref 0.5–1.3)
GLUCOSE BLDC GLUCOMTR-MCNC: 132 MG/DL — HIGH (ref 70–99)
GLUCOSE BLDC GLUCOMTR-MCNC: 206 MG/DL — HIGH (ref 70–99)
GLUCOSE BLDC GLUCOMTR-MCNC: 261 MG/DL — HIGH (ref 70–99)
GLUCOSE BLDC GLUCOMTR-MCNC: 345 MG/DL — HIGH (ref 70–99)
GLUCOSE SERPL-MCNC: 139 MG/DL — HIGH (ref 70–99)
HCT VFR BLD CALC: 25 % — LOW (ref 34.5–45)
HGB BLD-MCNC: 7.5 G/DL — LOW (ref 11.5–15.5)
LG PLATELETS BLD QL AUTO: PRESENT — SIGNIFICANT CHANGE UP
LYMPHOCYTES # BLD AUTO: 8 % — LOW (ref 13–44)
MACROCYTES BLD QL: SLIGHT — SIGNIFICANT CHANGE UP
MANUAL DIF COMMENT BLD-IMP: SIGNIFICANT CHANGE UP
MANUAL SMEAR VERIFICATION: SIGNIFICANT CHANGE UP
MCHC RBC-ENTMCNC: 25.5 PG — LOW (ref 27–34)
MCHC RBC-ENTMCNC: 30 G/DL — LOW (ref 32–36)
MCV RBC AUTO: 85 FL — SIGNIFICANT CHANGE UP (ref 80–100)
MICROCYTES BLD QL: SLIGHT — SIGNIFICANT CHANGE UP
MONOCYTES NFR BLD AUTO: 3 % — SIGNIFICANT CHANGE UP (ref 2–14)
NEUTROPHILS NFR BLD AUTO: 81 % — HIGH (ref 43–77)
NEUTS BAND # BLD: 8 % — SIGNIFICANT CHANGE UP
NEUTS VAC BLD QL SMEAR: PRESENT — SIGNIFICANT CHANGE UP
PLAT MORPH BLD: (no result)
PLATELET # BLD AUTO: 387 K/UL — SIGNIFICANT CHANGE UP (ref 150–400)
POLYCHROMASIA BLD QL SMEAR: SLIGHT — SIGNIFICANT CHANGE UP
POTASSIUM SERPL-MCNC: 3.8 MMOL/L — SIGNIFICANT CHANGE UP (ref 3.5–5.3)
POTASSIUM SERPL-SCNC: 3.8 MMOL/L — SIGNIFICANT CHANGE UP (ref 3.5–5.3)
RBC # BLD: 2.94 M/UL — LOW (ref 3.8–5.2)
RBC # FLD: 22.3 % — HIGH (ref 10.3–16.9)
RBC BLD AUTO: (no result)
SODIUM SERPL-SCNC: 137 MMOL/L — SIGNIFICANT CHANGE UP (ref 135–145)
WBC # BLD: 22 K/UL — HIGH (ref 3.8–10.5)
WBC # FLD AUTO: 22 K/UL — HIGH (ref 3.8–10.5)

## 2018-03-31 PROCEDURE — 99232 SBSQ HOSP IP/OBS MODERATE 35: CPT | Mod: GC

## 2018-03-31 PROCEDURE — 93010 ELECTROCARDIOGRAM REPORT: CPT | Mod: 76

## 2018-03-31 RX ORDER — POTASSIUM CHLORIDE 20 MEQ
20 PACKET (EA) ORAL ONCE
Qty: 0 | Refills: 0 | Status: COMPLETED | OUTPATIENT
Start: 2018-03-31 | End: 2018-03-31

## 2018-03-31 RX ADMIN — Medication 6: at 17:43

## 2018-03-31 RX ADMIN — FLUCONAZOLE 100 MILLIGRAM(S): 150 TABLET ORAL at 13:10

## 2018-03-31 RX ADMIN — Medication 5 UNIT(S): at 09:00

## 2018-03-31 RX ADMIN — Medication 4 MILLIGRAM(S): at 17:42

## 2018-03-31 RX ADMIN — LIDOCAINE 1 PATCH: 4 CREAM TOPICAL at 13:10

## 2018-03-31 RX ADMIN — Medication 4 MILLIGRAM(S): at 21:47

## 2018-03-31 RX ADMIN — Medication 1 MILLIGRAM(S): at 13:10

## 2018-03-31 RX ADMIN — OXYCODONE HYDROCHLORIDE 5 MILLIGRAM(S): 5 TABLET ORAL at 18:15

## 2018-03-31 RX ADMIN — Medication 5 UNIT(S): at 17:43

## 2018-03-31 RX ADMIN — Medication 4 MILLIGRAM(S): at 07:30

## 2018-03-31 RX ADMIN — INSULIN GLARGINE 20 UNIT(S): 100 INJECTION, SOLUTION SUBCUTANEOUS at 21:47

## 2018-03-31 RX ADMIN — PANTOPRAZOLE SODIUM 40 MILLIGRAM(S): 20 TABLET, DELAYED RELEASE ORAL at 07:30

## 2018-03-31 RX ADMIN — LIDOCAINE 1 PATCH: 4 CREAM TOPICAL at 04:47

## 2018-03-31 RX ADMIN — ENOXAPARIN SODIUM 40 MILLIGRAM(S): 100 INJECTION SUBCUTANEOUS at 17:43

## 2018-03-31 RX ADMIN — Medication 4: at 13:10

## 2018-03-31 RX ADMIN — OXYCODONE HYDROCHLORIDE 5 MILLIGRAM(S): 5 TABLET ORAL at 17:42

## 2018-03-31 RX ADMIN — Medication 8: at 21:48

## 2018-03-31 RX ADMIN — Medication 5 UNIT(S): at 13:09

## 2018-03-31 RX ADMIN — Medication 100 MILLIGRAM(S): at 07:29

## 2018-03-31 NOTE — PROGRESS NOTE ADULT - PROBLEM SELECTOR PLAN 6
Patient has a history of DM. She is on dexamethasone 4mg q8h for brain mets, which will make hyperglycemia more difficult to control.   -Lantus 20U at bedtime; adjust accordingly  -Premeal lispro 5U; adjust accordingly   -mISS  -Monitor fingersticks

## 2018-03-31 NOTE — PROGRESS NOTE ADULT - PROBLEM SELECTOR PLAN 3
#Stage IV Lung Adenocarcinoma   Patient with mets to brain and bone, PDL1 40% on Carboplatin, Pemetrexed, Pembrolizumab s/p 2 cycles, last 3/2/18, treatment course complicated by multiple hospitalizations including post obstructive pneumonia, collapsed lob cb +influenza s/p debulking and chemical pneumonitis. Both clinical cervical exam and CT imaging concerning for extensive R cervical lymphadenopathy, imaging also consistent with increasing R mediastinal and paratracheal lymphadenopathy. R hilar and R lower lobe necrotic masses persist, no significant change in size. In addition, patient with recent port placement (2 weeks prev), documented fever, leukocytosis w/bandemia, concerning for possibly catheter related sepsis. Pt seen by [facility] in the ED.  - per [facility] recs would need to clinically stabilize prior to next treatment (may need to consider alternative regimen)  -CXR from 3/23 c/f right paratracheal mass compressing the trachea which is narrowed to 8 mm in transverse diameter.  - dexamethasone 4mg q8h for brain metastasis, CT head negative for acute pathology  -Pain control with acetaminophen, tramadol, and oxycodone PRN  - f/u hematoclogy/oncology recs

## 2018-03-31 NOTE — PROGRESS NOTE ADULT - PROBLEM SELECTOR PLAN 4
- Fluconazole 100 mg daily x 10 days (3/23-4/2)    #Urine w/yeast; asymptomatic   -Awaiting susceptibility testing

## 2018-03-31 NOTE — PROGRESS NOTE ADULT - SUBJECTIVE AND OBJECTIVE BOX
Overnight Events: ANGELIKA  Subjective/ROS: Patient reports improvement in headache and nausea from yesterday. Endorses ongoing R shoulder pain, better today. Denies dizziness, lightheadedness, chest pain, shortness of breath, palpitations, abdominal pain, nausea, vomiting, diarrhea, fevers, and chills.     Interval events: Placed an US guided 20gauge L antecubital IV. QTc of 497.     VITAL SIGNS:  Vital Signs Last 24 Hrs  T(C): 36.9 (31 Mar 2018 11:09), Max: 36.9 (31 Mar 2018 11:09)  T(F): 98.5 (31 Mar 2018 11:09), Max: 98.5 (31 Mar 2018 11:09)  HR: 111 (31 Mar 2018 11:09) (100 - 111)  BP: 130/85 (31 Mar 2018 11:09) (130/85 - 134/79)  BP(mean): --  RR: 16 (31 Mar 2018 11:09) (16 - 16)  SpO2: 99% (31 Mar 2018 11:09) (97% - 99%)    PHYSICAL EXAM:    General: Well nourished middle aged woman sleeping and comfortable in bed; NAD  HEENT: NC/AT; EOMI, PEERLA anicteric sclera; MMM  Neck: supple, palpable R neck mass, non-tender to palpation  Cardiovascular: +S1/S2; RRR, no r/m/g  Respiratory: CTA B/L; no W/R/R; R sided dressing from port removal C/D/I, no erythema, swelling, purulence, or tenderness surrounding site  Gastrointestinal: soft, NT/ND; +BS  Extremities: WWP; edematous RUE; sensation and pulses of RUE intact, non-tender to palpation  Vascular: 2+ radial, DP pulses B/L  Neurological: Awake, alert, interactive  Psych: flat affect    MEDICATIONS:  MEDICATIONS  (STANDING):  dexamethasone     Tablet 4 milliGRAM(s) Oral every 8 hours  dextrose 5%. 1000 milliLiter(s) (50 mL/Hr) IV Continuous <Continuous>  dextrose 50% Injectable 12.5 Gram(s) IV Push once  dextrose 50% Injectable 25 Gram(s) IV Push once  dextrose 50% Injectable 25 Gram(s) IV Push once  enoxaparin Injectable 40 milliGRAM(s) SubCutaneous every 24 hours  fluconAZOLE   Tablet 100 milliGRAM(s) Oral daily  folic acid 1 milliGRAM(s) Oral daily  insulin glargine Injectable (LANTUS) 20 Unit(s) SubCutaneous at bedtime  insulin lispro (HumaLOG) corrective regimen sliding scale   SubCutaneous Before meals and at bedtime  insulin lispro Injectable (HumaLOG) 5 Unit(s) SubCutaneous three times a day before meals  lidocaine   Patch 1 Patch Transdermal daily  pantoprazole    Tablet 40 milliGRAM(s) Oral before breakfast  senna 1 Tablet(s) Oral daily    MEDICATIONS  (PRN):  acetaminophen   Tablet 650 milliGRAM(s) Oral every 6 hours PRN For Temp greater than 38 C (100.4 F)  acetaminophen   Tablet. 650 milliGRAM(s) Oral every 6 hours PRN Mild Pain (1 - 3)  dextrose Gel 1 Dose(s) Oral once PRN Blood Glucose LESS THAN 70 milliGRAM(s)/deciliter  glucagon  Injectable 1 milliGRAM(s) IntraMuscular once PRN Glucose LESS THAN 70 milligrams/deciliter  ondansetron    Tablet 4 milliGRAM(s) Oral every 6 hours PRN Nausea and/or Vomiting  oxyCODONE    IR 5 milliGRAM(s) Oral every 4 hours PRN Breakthrough pain  polyethylene glycol 3350 17 Gram(s) Oral daily PRN Constipation      ALLERGIES:  Allergies    penicillins (Hives)    Intolerances        LABS:                        7.5    22.0  )-----------( 387      ( 31 Mar 2018 07:51 )             25.0     03-31    137  |  97  |  21  ----------------------------<  139<H>  3.8   |  25  |  0.65    Ca    8.6      31 Mar 2018 07:51          CAPILLARY BLOOD GLUCOSE      POCT Blood Glucose.: 206 mg/dL (31 Mar 2018 12:03)      RADIOLOGY & ADDITIONAL TESTS: Reviewed.

## 2018-03-31 NOTE — PROGRESS NOTE ADULT - PROBLEM SELECTOR PLAN 1
Most likely in the setting of port infection.  - Empiric coverage (3/23-3/28) with vancomycin 1250mg BID, cefepime 2000mg q8h,(pt has penicillin allergy), and fluconazole 100mg q24h (will auto-discontinue on 4/2); s/p doxycyline 100mg BID and cefdinir 300mg BID (3/28-3/30)  -UCx with yeast, though patient without urinary complaints; likely colonized  - f/u BCx (ngtd)  - monitor WBC count and fever  - tylenol PRN for fever    - S/p removal of chemo-port by IR on 3/26; NGTD of cultured catheter tip  - ID signed off

## 2018-04-01 LAB
ANION GAP SERPL CALC-SCNC: 15 MMOL/L — SIGNIFICANT CHANGE UP (ref 5–17)
BLD GP AB SCN SERPL QL: POSITIVE — SIGNIFICANT CHANGE UP
BUN SERPL-MCNC: 24 MG/DL — HIGH (ref 7–23)
CALCIUM SERPL-MCNC: 9 MG/DL — SIGNIFICANT CHANGE UP (ref 8.4–10.5)
CHLORIDE SERPL-SCNC: 96 MMOL/L — SIGNIFICANT CHANGE UP (ref 96–108)
CO2 SERPL-SCNC: 26 MMOL/L — SIGNIFICANT CHANGE UP (ref 22–31)
CREAT SERPL-MCNC: 0.68 MG/DL — SIGNIFICANT CHANGE UP (ref 0.5–1.3)
GLUCOSE BLDC GLUCOMTR-MCNC: 225 MG/DL — HIGH (ref 70–99)
GLUCOSE BLDC GLUCOMTR-MCNC: 232 MG/DL — HIGH (ref 70–99)
GLUCOSE BLDC GLUCOMTR-MCNC: 307 MG/DL — HIGH (ref 70–99)
GLUCOSE BLDC GLUCOMTR-MCNC: 320 MG/DL — HIGH (ref 70–99)
GLUCOSE SERPL-MCNC: 230 MG/DL — HIGH (ref 70–99)
HCT VFR BLD CALC: 25.4 % — LOW (ref 34.5–45)
HGB BLD-MCNC: 7.5 G/DL — LOW (ref 11.5–15.5)
MAGNESIUM SERPL-MCNC: 1.9 MG/DL — SIGNIFICANT CHANGE UP (ref 1.6–2.6)
MCHC RBC-ENTMCNC: 25.1 PG — LOW (ref 27–34)
MCHC RBC-ENTMCNC: 29.5 G/DL — LOW (ref 32–36)
MCV RBC AUTO: 84.9 FL — SIGNIFICANT CHANGE UP (ref 80–100)
PLATELET # BLD AUTO: 400 K/UL — SIGNIFICANT CHANGE UP (ref 150–400)
POTASSIUM SERPL-MCNC: 4.2 MMOL/L — SIGNIFICANT CHANGE UP (ref 3.5–5.3)
POTASSIUM SERPL-SCNC: 4.2 MMOL/L — SIGNIFICANT CHANGE UP (ref 3.5–5.3)
RBC # BLD: 2.99 M/UL — LOW (ref 3.8–5.2)
RBC # FLD: 22.1 % — HIGH (ref 10.3–16.9)
RH IG SCN BLD-IMP: NEGATIVE — SIGNIFICANT CHANGE UP
SODIUM SERPL-SCNC: 137 MMOL/L — SIGNIFICANT CHANGE UP (ref 135–145)
WBC # BLD: 22.2 K/UL — HIGH (ref 3.8–10.5)
WBC # FLD AUTO: 22.2 K/UL — HIGH (ref 3.8–10.5)

## 2018-04-01 PROCEDURE — 99232 SBSQ HOSP IP/OBS MODERATE 35: CPT | Mod: GC

## 2018-04-01 RX ORDER — INSULIN LISPRO 100/ML
7 VIAL (ML) SUBCUTANEOUS
Qty: 0 | Refills: 0 | Status: DISCONTINUED | OUTPATIENT
Start: 2018-04-01 | End: 2018-04-04

## 2018-04-01 RX ORDER — OXYCODONE AND ACETAMINOPHEN 5; 325 MG/1; MG/1
1 TABLET ORAL ONCE
Qty: 0 | Refills: 0 | Status: DISCONTINUED | OUTPATIENT
Start: 2018-04-01 | End: 2018-04-01

## 2018-04-01 RX ORDER — MAGNESIUM SULFATE 500 MG/ML
1 VIAL (ML) INJECTION ONCE
Qty: 0 | Refills: 0 | Status: COMPLETED | OUTPATIENT
Start: 2018-04-01 | End: 2018-04-01

## 2018-04-01 RX ADMIN — Medication 1 MILLIGRAM(S): at 12:39

## 2018-04-01 RX ADMIN — ENOXAPARIN SODIUM 40 MILLIGRAM(S): 100 INJECTION SUBCUTANEOUS at 18:17

## 2018-04-01 RX ADMIN — Medication 8: at 18:17

## 2018-04-01 RX ADMIN — Medication 4 MILLIGRAM(S): at 06:50

## 2018-04-01 RX ADMIN — Medication 100 GRAM(S): at 09:04

## 2018-04-01 RX ADMIN — OXYCODONE HYDROCHLORIDE 5 MILLIGRAM(S): 5 TABLET ORAL at 18:18

## 2018-04-01 RX ADMIN — LIDOCAINE 1 PATCH: 4 CREAM TOPICAL at 12:40

## 2018-04-01 RX ADMIN — INSULIN GLARGINE 20 UNIT(S): 100 INJECTION, SOLUTION SUBCUTANEOUS at 21:31

## 2018-04-01 RX ADMIN — Medication 5 UNIT(S): at 09:03

## 2018-04-01 RX ADMIN — Medication 4: at 09:04

## 2018-04-01 RX ADMIN — PANTOPRAZOLE SODIUM 40 MILLIGRAM(S): 20 TABLET, DELAYED RELEASE ORAL at 06:50

## 2018-04-01 RX ADMIN — FLUCONAZOLE 100 MILLIGRAM(S): 150 TABLET ORAL at 12:39

## 2018-04-01 RX ADMIN — Medication 8: at 21:31

## 2018-04-01 RX ADMIN — Medication 7 UNIT(S): at 18:17

## 2018-04-01 RX ADMIN — Medication 7 UNIT(S): at 12:39

## 2018-04-01 RX ADMIN — OXYCODONE HYDROCHLORIDE 5 MILLIGRAM(S): 5 TABLET ORAL at 18:45

## 2018-04-01 RX ADMIN — LIDOCAINE 1 PATCH: 4 CREAM TOPICAL at 01:33

## 2018-04-01 RX ADMIN — OXYCODONE AND ACETAMINOPHEN 1 TABLET(S): 5; 325 TABLET ORAL at 19:35

## 2018-04-01 RX ADMIN — Medication 4: at 12:40

## 2018-04-01 RX ADMIN — OXYCODONE AND ACETAMINOPHEN 1 TABLET(S): 5; 325 TABLET ORAL at 20:30

## 2018-04-01 RX ADMIN — Medication 4 MILLIGRAM(S): at 21:30

## 2018-04-01 RX ADMIN — Medication 4 MILLIGRAM(S): at 15:36

## 2018-04-01 NOTE — PROGRESS NOTE ADULT - ASSESSMENT
61 yo AAF with presenting to ED for fever/chills, increased lethargy and port site pain and erythema, PMHX of HTN, DM, and Stage IV lung CA w/ mets to brain and bone s/p RTX and gamma knife Rtx to brain and 2 cycles of systemic chemoimmunotherapy for metastatic disease, meeting sepsis criteria in the ED, presumably line-sepsis, s/p removal of chemo-port, stable, awaiting ISSA    #Sepsis  Likely 2/2 line infection  - completed abx   - empiric coverage (3/23-3/28) with vancomycin 1250mg BID, cefepime 2000mg q8h,(pt has penicillin allergy), and fluconazole 100mg q24h (will auto-discontinue on 4/2); s/p doxycyline 100mg BID and cefdinir 300mg BID (3/28-3/30)    #Lung CA c/b bone and brain mets  - c/w dexamethasone  - f/u heme/onc  - refusing MRI brain for worsening HAs, reporting HA somewhat improved today    #DM  - c/w lantus and lispro    #HI  - DNR/DNI  - d/c png ISSA/inpatient hospice

## 2018-04-02 ENCOUNTER — APPOINTMENT (OUTPATIENT)
Dept: PULMONOLOGY | Facility: CLINIC | Age: 63
End: 2018-04-02

## 2018-04-02 DIAGNOSIS — M25.511 PAIN IN RIGHT SHOULDER: ICD-10-CM

## 2018-04-02 DIAGNOSIS — Z51.5 ENCOUNTER FOR PALLIATIVE CARE: ICD-10-CM

## 2018-04-02 LAB
GLUCOSE BLDC GLUCOMTR-MCNC: 106 MG/DL — HIGH (ref 70–99)
GLUCOSE BLDC GLUCOMTR-MCNC: 153 MG/DL — HIGH (ref 70–99)
GLUCOSE BLDC GLUCOMTR-MCNC: 175 MG/DL — HIGH (ref 70–99)
GLUCOSE BLDC GLUCOMTR-MCNC: 95 MG/DL — SIGNIFICANT CHANGE UP (ref 70–99)

## 2018-04-02 PROCEDURE — 99233 SBSQ HOSP IP/OBS HIGH 50: CPT | Mod: GC

## 2018-04-02 PROCEDURE — 99233 SBSQ HOSP IP/OBS HIGH 50: CPT

## 2018-04-02 RX ORDER — MORPHINE SULFATE 50 MG/1
2 CAPSULE, EXTENDED RELEASE ORAL EVERY 4 HOURS
Qty: 0 | Refills: 0 | Status: DISCONTINUED | OUTPATIENT
Start: 2018-04-02 | End: 2018-04-03

## 2018-04-02 RX ORDER — MORPHINE SULFATE 50 MG/1
2 CAPSULE, EXTENDED RELEASE ORAL
Qty: 0 | Refills: 0 | Status: DISCONTINUED | OUTPATIENT
Start: 2018-04-02 | End: 2018-04-02

## 2018-04-02 RX ORDER — MORPHINE SULFATE 50 MG/1
2 CAPSULE, EXTENDED RELEASE ORAL
Qty: 0 | Refills: 0 | Status: DISCONTINUED | OUTPATIENT
Start: 2018-04-03 | End: 2018-04-03

## 2018-04-02 RX ORDER — MORPHINE SULFATE 50 MG/1
2 CAPSULE, EXTENDED RELEASE ORAL EVERY 4 HOURS
Qty: 0 | Refills: 0 | Status: DISCONTINUED | OUTPATIENT
Start: 2018-04-02 | End: 2018-04-02

## 2018-04-02 RX ORDER — OXYCODONE HYDROCHLORIDE 5 MG/1
5 TABLET ORAL EVERY 6 HOURS
Qty: 0 | Refills: 0 | Status: DISCONTINUED | OUTPATIENT
Start: 2018-04-02 | End: 2018-04-04

## 2018-04-02 RX ORDER — MORPHINE SULFATE 50 MG/1
2 CAPSULE, EXTENDED RELEASE ORAL ONCE
Qty: 0 | Refills: 0 | Status: DISCONTINUED | OUTPATIENT
Start: 2018-04-02 | End: 2018-04-02

## 2018-04-02 RX ADMIN — OXYCODONE HYDROCHLORIDE 5 MILLIGRAM(S): 5 TABLET ORAL at 16:24

## 2018-04-02 RX ADMIN — MORPHINE SULFATE 2 MILLIGRAM(S): 50 CAPSULE, EXTENDED RELEASE ORAL at 13:12

## 2018-04-02 RX ADMIN — Medication 7 UNIT(S): at 13:10

## 2018-04-02 RX ADMIN — LIDOCAINE 1 PATCH: 4 CREAM TOPICAL at 11:49

## 2018-04-02 RX ADMIN — SENNA PLUS 1 TABLET(S): 8.6 TABLET ORAL at 11:49

## 2018-04-02 RX ADMIN — INSULIN GLARGINE 20 UNIT(S): 100 INJECTION, SOLUTION SUBCUTANEOUS at 22:16

## 2018-04-02 RX ADMIN — Medication 4 MILLIGRAM(S): at 22:16

## 2018-04-02 RX ADMIN — MORPHINE SULFATE 2 MILLIGRAM(S): 50 CAPSULE, EXTENDED RELEASE ORAL at 22:16

## 2018-04-02 RX ADMIN — Medication 2: at 13:10

## 2018-04-02 RX ADMIN — ENOXAPARIN SODIUM 40 MILLIGRAM(S): 100 INJECTION SUBCUTANEOUS at 17:53

## 2018-04-02 RX ADMIN — MORPHINE SULFATE 2 MILLIGRAM(S): 50 CAPSULE, EXTENDED RELEASE ORAL at 16:58

## 2018-04-02 RX ADMIN — FLUCONAZOLE 100 MILLIGRAM(S): 150 TABLET ORAL at 11:49

## 2018-04-02 RX ADMIN — MORPHINE SULFATE 2 MILLIGRAM(S): 50 CAPSULE, EXTENDED RELEASE ORAL at 22:30

## 2018-04-02 RX ADMIN — Medication 4 MILLIGRAM(S): at 13:58

## 2018-04-02 RX ADMIN — MORPHINE SULFATE 2 MILLIGRAM(S): 50 CAPSULE, EXTENDED RELEASE ORAL at 16:29

## 2018-04-02 RX ADMIN — PANTOPRAZOLE SODIUM 40 MILLIGRAM(S): 20 TABLET, DELAYED RELEASE ORAL at 07:03

## 2018-04-02 RX ADMIN — Medication 7 UNIT(S): at 08:42

## 2018-04-02 RX ADMIN — Medication 7 UNIT(S): at 17:53

## 2018-04-02 RX ADMIN — Medication 2: at 08:42

## 2018-04-02 RX ADMIN — Medication 4 MILLIGRAM(S): at 07:24

## 2018-04-02 RX ADMIN — OXYCODONE HYDROCHLORIDE 5 MILLIGRAM(S): 5 TABLET ORAL at 15:35

## 2018-04-02 RX ADMIN — Medication 1 MILLIGRAM(S): at 11:49

## 2018-04-02 RX ADMIN — LIDOCAINE 1 PATCH: 4 CREAM TOPICAL at 23:00

## 2018-04-02 RX ADMIN — MORPHINE SULFATE 2 MILLIGRAM(S): 50 CAPSULE, EXTENDED RELEASE ORAL at 13:56

## 2018-04-02 NOTE — PROGRESS NOTE ADULT - ATTENDING COMMENTS
dx:  tracheal compression from tumor/lymphadenopathy - currently w/o sxs. will monitor .     somnolence -improved with increased dexamethasone to 4mg q6hrs. however pt insists that she only wants q8hrs dexamethasone.  will cont q8 Dexameth dose , monitor .  CT head on admission w/o significant changes from prior scan. pt refused MRI brain while in radiology and cont's to decline MRI today. does not give a reason, but says she "doesn't want it".       tachycardia - no PE on CTA chest (admission). no evidence of ongoing infection. suspect due to underlying tumor (burden)    sepsis due to suspected line infection - completed abx.   blood cx and cath tip cx (removed port 3/26/18) negative.       right arm edema - no DVT seen on u/s    leukocytosis - etiology unclear. doubt it is due to infectious process. possibly related to malignancy.     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     thrush - to complete fluconazole x 10 days      htn - bps near goal off all anti-hypertensives    dm ii - cont to titrate lantus + lispro.  ssi    anemia - s/p 2 u prbc. suspect anemia of chronic dz + hemodilution. no active bleeding. stool guaic (-)    depression- no si/hi.     stage iv lung cancer with brain metastases - pt is a candidate for hospice. will f/u palliative and onc recommendations.

## 2018-04-02 NOTE — PROGRESS NOTE ADULT - PROBLEM SELECTOR PLAN 6
Patient has a history of DM. She is on dexamethasone 4mg q8h for brain mets, which will make hyperglycemia more difficult to control.   -Lantus 20U at bedtime; adjust accordingly  -Premeal lispro 5U; adjust accordingly   -mISS  -Monitor fingersticks Patient has a history of DM. She is on dexamethasone 4mg q8h for brain mets, which will make hyperglycemia more difficult to control.   -Lantus 20U at bedtime; adjust accordingly  -Premeal lispro 7U; adjust accordingly   -mISS  -Monitor fingersticks

## 2018-04-02 NOTE — PROGRESS NOTE ADULT - PROBLEM SELECTOR PLAN 4
- Fluconazole 100 mg daily x 10 days (3/23-4/2)    #Urine w/yeast; asymptomatic   -Awaiting susceptibility testing -S/p fluconazole 100 mg daily x 10 days (3/23-4/2)    #Urine w/yeast; asymptomatic   -Awaiting susceptibility testing

## 2018-04-02 NOTE — PROGRESS NOTE ADULT - PROBLEM SELECTOR PLAN 1
Acute exacerbation of right neck and shoulder pain.  Inability to hold up her head.  Related to large neck mass.  Given morphine acutely, increased oxycodone to a standing dose, discussed at length with patient.  Also has new order for PRN morphine.

## 2018-04-02 NOTE — PROGRESS NOTE ADULT - PROBLEM SELECTOR PLAN 3
#Stage IV Lung Adenocarcinoma   Patient with mets to brain and bone, PDL1 40% on Carboplatin, Pemetrexed, Pembrolizumab s/p 2 cycles, last 3/2/18, treatment course complicated by multiple hospitalizations including post obstructive pneumonia, collapsed lob cb +influenza s/p debulking and chemical pneumonitis. Both clinical cervical exam and CT imaging concerning for extensive R cervical lymphadenopathy, imaging also consistent with increasing R mediastinal and paratracheal lymphadenopathy. R hilar and R lower lobe necrotic masses persist, no significant change in size. In addition, patient with recent port placement (2 weeks prev), documented fever, leukocytosis w/bandemia, concerning for possibly catheter related sepsis. Pt seen by [facility] in the ED.  - per [facility] recs would need to clinically stabilize prior to next treatment (may need to consider alternative regimen)  -CXR from 3/23 c/f right paratracheal mass compressing the trachea which is narrowed to 8 mm in transverse diameter.  - dexamethasone 4mg q8h for brain metastasis, CT head negative for acute pathology  -Pain control with acetaminophen, tramadol, and oxycodone PRN  - f/u hematoclogy/oncology recs

## 2018-04-02 NOTE — PROGRESS NOTE ADULT - PROBLEM SELECTOR PLAN 2
Recent CT concerning for new cervical and mediastinal lymphadenopathy/disease progression.  Patient has stated she does not want to undergo chemo at this time.  Plan had been for ISSA, but patient now acutely symptomatic with pain and intermittent SOB.  SOB should  respond to opiates as well.  Patient entering the last phase of her illness

## 2018-04-02 NOTE — PROGRESS NOTE ADULT - PROBLEM SELECTOR PLAN 7
patient with worsening symptoms entering the last stge of illness.   Not appropriate for ISSA.  Needs in-patient hospice for symptom management

## 2018-04-02 NOTE — PROGRESS NOTE ADULT - PROBLEM SELECTOR PLAN 7
Patient found to be hyponatremic to 132 on 3/26 likely in the setting of low PO intake vs. pseudohyponatremia in setting of hyperglycemia. S/p 70cc/h of NS for overnight. Na of 133 on 3/28, when corrected for hyperglycemia, Na is 135-136.  -Continue to monitor  -Will hold off on further workup unless hyponatremia worsens Resolved. Patient found to be hyponatremic to 132 on 3/26 likely in the setting of low PO intake vs. pseudohyponatremia in setting of hyperglycemia. S/p 70cc/h of NS for overnight. Na of 137 on 4/1.  -Continue to monitor

## 2018-04-02 NOTE — PROGRESS NOTE ADULT - PROBLEM SELECTOR PLAN 8
Improved. Swollen RUE; pulses intact, sensation intact;   -No thrombus seen within the right neck and upper arm to the level of   antecubital fossa. The remaining veins were not assessed secondary to   patient refusal to continue the examination. Improved. Mildly edematous UE; pulses intact, sensation intact;   -No thrombus seen within the right neck and upper arm to the level of   antecubital fossa. The remaining veins were not assessed secondary to   patient refusal to continue the examination.

## 2018-04-02 NOTE — PROGRESS NOTE ADULT - PROBLEM SELECTOR PLAN 4
patient with more difficulty eating secondary to overall weakness, and likely tracheal, esophageal compression.  No parenteral interventions.

## 2018-04-02 NOTE — PROGRESS NOTE ADULT - SUBJECTIVE AND OBJECTIVE BOX
DORON NUGENT   MRN-[id number]    CC: Pain, SOB, Difficulty swallowing    Subjective: Patient with stage IV metastatic lung ca with extensive mediastinal adenopathy and large lisandro mass easily visible in right neck., admitted with line sepsis.  Course notable for profound weakness.  Has decided not to have further chemo.  Over the weekend patient has had increasing pain across her right shoulder, increasing periods of SOB. extreme weakness and difficulty holding up her head.  Also notes some difficulty in swallowing.  Team offered her an MRI over the weekend to more fully evaluate- but she refused.  She has oxycodone ordered, but hasn't taken it today.  Took only once dose yesterday  Doesn't like how she feels.  During our interview patient developed severe pain in her right shoulder and neck and was screaming in pain.  Morphine ordered- had to be given subcutaneously because she has no IV access.    No fever or chills    DYSPNEA: Y / N        Yes intermittent no cough	  NAUS/VOM: Y / N	N  SECRETIONS: Y / N	N  AGITATION: Y / N      N  Pain (Y/N):     yes as described above  no further details obtainable because the patient was in such distress  -Provocation/Palliation:  -Quality/Quantity:  -Radiating:  -Severity:  -Timing/Frequency:  -Impact on ADLs:    OTHER REVIEW OF SYSTEMS:  UNABLE TO OBTAIN  due to: patient distress    PEx:  T(C): 37.1 (04-02-18 @ 11:19), Max: 37.1 (04-02-18 @ 11:19)  HR: 116 (04-02-18 @ 11:19) (111 - 117)  BP: 152/92 (04-02-18 @ 11:19) (115/91 - 152/92)  RR: 18 (04-02-18 @ 11:19) (16 - 18)  SpO2: 98% (04-02-18 @ 11:19) (98% - 100%)  Wt(kg): --    GENERAL: woman in acute distress, calling out.     HEENT:    Unable to hold her head up, put in supine position and more comfortable  	  NECK:     large mass right neck      CVS:         tachycardia  	  RESP:      generally clear  	  GI:         abd benign    	  :            	  MUSC:    generalized weakness   	  NEURO:    difficult to assess, but appears non focal 	  PSYCH:        in distress  SKIN:         	   LYMPH:      	     penicillins (Hives)      OPIATE NAÏVE (Y/N): no      MEDICATIONS: REVIEWED  MEDICATIONS  (STANDING):  dexamethasone     Tablet 4 milliGRAM(s) Oral every 8 hours  dextrose 5%. 1000 milliLiter(s) (50 mL/Hr) IV Continuous <Continuous>  dextrose 50% Injectable 12.5 Gram(s) IV Push once  dextrose 50% Injectable 25 Gram(s) IV Push once  dextrose 50% Injectable 25 Gram(s) IV Push once  enoxaparin Injectable 40 milliGRAM(s) SubCutaneous every 24 hours  folic acid 1 milliGRAM(s) Oral daily  insulin glargine Injectable (LANTUS) 20 Unit(s) SubCutaneous at bedtime  insulin lispro (HumaLOG) corrective regimen sliding scale   SubCutaneous Before meals and at bedtime  insulin lispro Injectable (HumaLOG) 7 Unit(s) SubCutaneous three times a day before meals  lidocaine   Patch 1 Patch Transdermal daily  oxyCODONE    IR 5 milliGRAM(s) Oral every 6 hours  pantoprazole    Tablet 40 milliGRAM(s) Oral before breakfast  senna 1 Tablet(s) Oral daily    MEDICATIONS  (PRN):  acetaminophen   Tablet 650 milliGRAM(s) Oral every 6 hours PRN For Temp greater than 38 C (100.4 F)  acetaminophen   Tablet. 650 milliGRAM(s) Oral every 6 hours PRN Mild Pain (1 - 3)  dextrose Gel 1 Dose(s) Oral once PRN Blood Glucose LESS THAN 70 milliGRAM(s)/deciliter  glucagon  Injectable 1 milliGRAM(s) IntraMuscular once PRN Glucose LESS THAN 70 milligrams/deciliter  morphine  - Injectable 2 milliGRAM(s) SubCutaneous every 2 hours PRN Moderate Pain (4 - 6)  ondansetron    Tablet 4 milliGRAM(s) Oral every 6 hours PRN Nausea and/or Vomiting  polyethylene glycol 3350 17 Gram(s) Oral daily PRN Constipation      LABS: REVIEWED                        7.5    22.2  )-----------( 400      ( 01 Apr 2018 06:42 )             25.4   04-01    137  |  96  |  24<H>  ----------------------------<  230<H>  4.2   |  26  |  0.68    Ca    9.0      01 Apr 2018 06:42  Mg     1.9     04-01            IMAGING: REVIEWED none new    ADVANCED DIRECTIVES:         DNR     DNI         DECISION MAKER: patient   LEGAL SURROGATE:  Naomi Yeh    PSYCHOSOCIAL-SPIRITUAL ASSESSMENT:       Reviewed         Care plan adjusted as below    GOALS OF CARE DISCUSSION            Documentation of GOC: Patient wants to be comfortable- with onset of symptoms this has become a priority for patient and family  	      AGENCY CHOICE DISCUSSED:     in-patient hospice    REFERRALS	        Palliative Med

## 2018-04-02 NOTE — PROGRESS NOTE ADULT - ASSESSMENT
63 y/o F with a PMHX of HTN, DM, and Stage IV lung CA w/ mets to brain and bone (Dec 2nd) who underwent RT to bony mets on 8th rib, and had gamma knife tx to brain (Jan 9th) s/p 2 cycles of systemic chemoimmunotherapy and immunotherapy for metastatic disease presenting with increased lethargy and line sepsis.  Palliative care called to see for symptom management and discussion of goals of care.  Escalation of symptoms over the weekend, with acute worsening of pain this am.

## 2018-04-02 NOTE — PROGRESS NOTE ADULT - SUBJECTIVE AND OBJECTIVE BOX
Overnight Events: ANGELIKA  Subjective/ROS: Patient reports ongoing R shoulder pain, with relief provided by analgesics and ice packs. Denies headache, dizziness, lightheadedness, chest pain, shortness of breath, palpitations, abdominal pain, nausea, vomiting, diarrhea, fevers, and chills.     VITAL SIGNS:  Vital Signs Last 24 Hrs  T(C): 36.9 (02 Apr 2018 05:24), Max: 36.9 (02 Apr 2018 05:24)  T(F): 98.4 (02 Apr 2018 05:24), Max: 98.4 (02 Apr 2018 05:24)  HR: 111 (02 Apr 2018 05:24) (111 - 119)  BP: 115/91 (02 Apr 2018 05:24) (115/91 - 142/87)  BP(mean): --  RR: 16 (02 Apr 2018 05:24) (16 - 16)  SpO2: 100% (02 Apr 2018 05:24) (98% - 100%)    PHYSICAL EXAM:    General: Well nourished middle aged woman awake and comfortable in bed; NAD  HEENT: NC/AT; EOMI, PERRLA anicteric sclera; MMM  Neck: supple, palpable R neck mass, non-tender to palpation  Cardiovascular: +S1/S2; tachycardic, no r/m/g  Respiratory: CTA B/L; no W/R/R; R sided dressing from port removal C/D/I, no erythema, swelling, purulence, or tenderness surrounding site  Gastrointestinal: soft, NT/ND; +BS  Extremities: WWP; slight edema in upper extremities; sensation and pulses intact, non-tender to palpation; clubbing of fingernails  Vascular: 2+ radial, DP pulses B/L  Neurological: Awake, alert, interactive  Psych: flat affect      MEDICATIONS:  MEDICATIONS  (STANDING):  dexamethasone     Tablet 4 milliGRAM(s) Oral every 8 hours  dextrose 5%. 1000 milliLiter(s) (50 mL/Hr) IV Continuous <Continuous>  dextrose 50% Injectable 12.5 Gram(s) IV Push once  dextrose 50% Injectable 25 Gram(s) IV Push once  dextrose 50% Injectable 25 Gram(s) IV Push once  enoxaparin Injectable 40 milliGRAM(s) SubCutaneous every 24 hours  fluconAZOLE   Tablet 100 milliGRAM(s) Oral daily  folic acid 1 milliGRAM(s) Oral daily  insulin glargine Injectable (LANTUS) 20 Unit(s) SubCutaneous at bedtime  insulin lispro (HumaLOG) corrective regimen sliding scale   SubCutaneous Before meals and at bedtime  insulin lispro Injectable (HumaLOG) 7 Unit(s) SubCutaneous three times a day before meals  lidocaine   Patch 1 Patch Transdermal daily  pantoprazole    Tablet 40 milliGRAM(s) Oral before breakfast  senna 1 Tablet(s) Oral daily    MEDICATIONS  (PRN):  acetaminophen   Tablet 650 milliGRAM(s) Oral every 6 hours PRN For Temp greater than 38 C (100.4 F)  acetaminophen   Tablet. 650 milliGRAM(s) Oral every 6 hours PRN Mild Pain (1 - 3)  dextrose Gel 1 Dose(s) Oral once PRN Blood Glucose LESS THAN 70 milliGRAM(s)/deciliter  glucagon  Injectable 1 milliGRAM(s) IntraMuscular once PRN Glucose LESS THAN 70 milligrams/deciliter  ondansetron    Tablet 4 milliGRAM(s) Oral every 6 hours PRN Nausea and/or Vomiting  oxyCODONE    IR 5 milliGRAM(s) Oral every 4 hours PRN Breakthrough pain  polyethylene glycol 3350 17 Gram(s) Oral daily PRN Constipation      ALLERGIES:  Allergies    penicillins (Hives)    Intolerances        LABS:                        7.5    22.2  )-----------( 400      ( 01 Apr 2018 06:42 )             25.4     04-01    137  |  96  |  24<H>  ----------------------------<  230<H>  4.2   |  26  |  0.68    Ca    9.0      01 Apr 2018 06:42  Mg     1.9     04-01          CAPILLARY BLOOD GLUCOSE      POCT Blood Glucose.: 320 mg/dL (01 Apr 2018 21:06)      RADIOLOGY & ADDITIONAL TESTS: Reviewed. Overnight Events: ANGELIKA  Subjective/ROS: Patient reports ongoing R shoulder pain, with relief provided by analgesics and ice packs. complains of dyspnea.   Denies headache, dizziness, lightheadedness, chest pain,  palpitations, abdominal pain, nausea, vomiting, diarrhea, fevers, and chills.     VITAL SIGNS:  Vital Signs Last 24 Hrs  T(C): 36.9 (02 Apr 2018 05:24), Max: 36.9 (02 Apr 2018 05:24)  T(F): 98.4 (02 Apr 2018 05:24), Max: 98.4 (02 Apr 2018 05:24)  HR: 111 (02 Apr 2018 05:24) (111 - 119)  BP: 115/91 (02 Apr 2018 05:24) (115/91 - 142/87)  BP(mean): --  RR: 16 (02 Apr 2018 05:24) (16 - 16)  SpO2: 100% (02 Apr 2018 05:24) (98% - 100%)    PHYSICAL EXAM:    General: Well nourished middle aged woman awake and comfortable in bed; NAD  HEENT: NC/AT; EOMI, PERRLA anicteric sclera; MMM  Neck: supple, palpable R neck mass, non-tender to palpation  Cardiovascular: +S1/S2; tachycardic, no r/m/g  Respiratory: CTA B/L; no W/R/R; R sided dressing from port removal C/D/I, no erythema, swelling, purulence, or tenderness surrounding site  Gastrointestinal: soft, NT/ND; +BS  Extremities: WWP; slight edema in upper extremities; sensation and pulses intact, non-tender to palpation; clubbing of fingernails  Vascular: 2+ radial, DP pulses B/L  Neurological: Awake, alert, interactive  Psych: flat affect      MEDICATIONS:  MEDICATIONS  (STANDING):  dexamethasone     Tablet 4 milliGRAM(s) Oral every 8 hours  dextrose 5%. 1000 milliLiter(s) (50 mL/Hr) IV Continuous <Continuous>  dextrose 50% Injectable 12.5 Gram(s) IV Push once  dextrose 50% Injectable 25 Gram(s) IV Push once  dextrose 50% Injectable 25 Gram(s) IV Push once  enoxaparin Injectable 40 milliGRAM(s) SubCutaneous every 24 hours  fluconAZOLE   Tablet 100 milliGRAM(s) Oral daily  folic acid 1 milliGRAM(s) Oral daily  insulin glargine Injectable (LANTUS) 20 Unit(s) SubCutaneous at bedtime  insulin lispro (HumaLOG) corrective regimen sliding scale   SubCutaneous Before meals and at bedtime  insulin lispro Injectable (HumaLOG) 7 Unit(s) SubCutaneous three times a day before meals  lidocaine   Patch 1 Patch Transdermal daily  pantoprazole    Tablet 40 milliGRAM(s) Oral before breakfast  senna 1 Tablet(s) Oral daily    MEDICATIONS  (PRN):  acetaminophen   Tablet 650 milliGRAM(s) Oral every 6 hours PRN For Temp greater than 38 C (100.4 F)  acetaminophen   Tablet. 650 milliGRAM(s) Oral every 6 hours PRN Mild Pain (1 - 3)  dextrose Gel 1 Dose(s) Oral once PRN Blood Glucose LESS THAN 70 milliGRAM(s)/deciliter  glucagon  Injectable 1 milliGRAM(s) IntraMuscular once PRN Glucose LESS THAN 70 milligrams/deciliter  ondansetron    Tablet 4 milliGRAM(s) Oral every 6 hours PRN Nausea and/or Vomiting  oxyCODONE    IR 5 milliGRAM(s) Oral every 4 hours PRN Breakthrough pain  polyethylene glycol 3350 17 Gram(s) Oral daily PRN Constipation      ALLERGIES:  Allergies    penicillins (Hives)    Intolerances        LABS:                        7.5    22.2  )-----------( 400      ( 01 Apr 2018 06:42 )             25.4     04-01    137  |  96  |  24<H>  ----------------------------<  230<H>  4.2   |  26  |  0.68    Ca    9.0      01 Apr 2018 06:42  Mg     1.9     04-01          CAPILLARY BLOOD GLUCOSE      POCT Blood Glucose.: 320 mg/dL (01 Apr 2018 21:06)      RADIOLOGY & ADDITIONAL TESTS: Reviewed.

## 2018-04-03 DIAGNOSIS — R00.0 TACHYCARDIA, UNSPECIFIED: ICD-10-CM

## 2018-04-03 LAB
GLUCOSE BLDC GLUCOMTR-MCNC: 125 MG/DL — HIGH (ref 70–99)
GLUCOSE BLDC GLUCOMTR-MCNC: 128 MG/DL — HIGH (ref 70–99)
GLUCOSE BLDC GLUCOMTR-MCNC: 136 MG/DL — HIGH (ref 70–99)
GLUCOSE BLDC GLUCOMTR-MCNC: 148 MG/DL — HIGH (ref 70–99)

## 2018-04-03 PROCEDURE — 99233 SBSQ HOSP IP/OBS HIGH 50: CPT | Mod: GC

## 2018-04-03 PROCEDURE — 36569 INSJ PICC 5 YR+ W/O IMAGING: CPT

## 2018-04-03 PROCEDURE — 93010 ELECTROCARDIOGRAM REPORT: CPT

## 2018-04-03 PROCEDURE — 76937 US GUIDE VASCULAR ACCESS: CPT | Mod: 26

## 2018-04-03 PROCEDURE — 99233 SBSQ HOSP IP/OBS HIGH 50: CPT

## 2018-04-03 RX ORDER — MORPHINE SULFATE 50 MG/1
2 CAPSULE, EXTENDED RELEASE ORAL
Qty: 0 | Refills: 0 | Status: DISCONTINUED | OUTPATIENT
Start: 2018-04-03 | End: 2018-04-04

## 2018-04-03 RX ORDER — SODIUM CHLORIDE 9 MG/ML
20 INJECTION INTRAMUSCULAR; INTRAVENOUS; SUBCUTANEOUS ONCE
Qty: 0 | Refills: 0 | Status: DISCONTINUED | OUTPATIENT
Start: 2018-04-03 | End: 2018-04-04

## 2018-04-03 RX ORDER — SODIUM CHLORIDE 9 MG/ML
10 INJECTION INTRAMUSCULAR; INTRAVENOUS; SUBCUTANEOUS EVERY 12 HOURS
Qty: 0 | Refills: 0 | Status: DISCONTINUED | OUTPATIENT
Start: 2018-04-03 | End: 2018-04-04

## 2018-04-03 RX ORDER — SODIUM CHLORIDE 9 MG/ML
10 INJECTION INTRAMUSCULAR; INTRAVENOUS; SUBCUTANEOUS
Qty: 0 | Refills: 0 | Status: DISCONTINUED | OUTPATIENT
Start: 2018-04-03 | End: 2018-04-04

## 2018-04-03 RX ADMIN — OXYCODONE HYDROCHLORIDE 5 MILLIGRAM(S): 5 TABLET ORAL at 19:30

## 2018-04-03 RX ADMIN — Medication 4 MILLIGRAM(S): at 06:48

## 2018-04-03 RX ADMIN — LIDOCAINE 1 PATCH: 4 CREAM TOPICAL at 12:05

## 2018-04-03 RX ADMIN — OXYCODONE HYDROCHLORIDE 5 MILLIGRAM(S): 5 TABLET ORAL at 12:35

## 2018-04-03 RX ADMIN — OXYCODONE HYDROCHLORIDE 5 MILLIGRAM(S): 5 TABLET ORAL at 12:04

## 2018-04-03 RX ADMIN — Medication 4 MILLIGRAM(S): at 21:20

## 2018-04-03 RX ADMIN — Medication 4 MILLIGRAM(S): at 14:39

## 2018-04-03 RX ADMIN — ENOXAPARIN SODIUM 40 MILLIGRAM(S): 100 INJECTION SUBCUTANEOUS at 18:59

## 2018-04-03 RX ADMIN — MORPHINE SULFATE 2 MILLIGRAM(S): 50 CAPSULE, EXTENDED RELEASE ORAL at 07:30

## 2018-04-03 RX ADMIN — OXYCODONE HYDROCHLORIDE 5 MILLIGRAM(S): 5 TABLET ORAL at 07:30

## 2018-04-03 RX ADMIN — MORPHINE SULFATE 2 MILLIGRAM(S): 50 CAPSULE, EXTENDED RELEASE ORAL at 02:45

## 2018-04-03 RX ADMIN — MORPHINE SULFATE 2 MILLIGRAM(S): 50 CAPSULE, EXTENDED RELEASE ORAL at 03:00

## 2018-04-03 RX ADMIN — OXYCODONE HYDROCHLORIDE 5 MILLIGRAM(S): 5 TABLET ORAL at 06:47

## 2018-04-03 RX ADMIN — INSULIN GLARGINE 20 UNIT(S): 100 INJECTION, SOLUTION SUBCUTANEOUS at 21:43

## 2018-04-03 RX ADMIN — Medication 7 UNIT(S): at 17:53

## 2018-04-03 RX ADMIN — Medication 1 MILLIGRAM(S): at 12:05

## 2018-04-03 RX ADMIN — OXYCODONE HYDROCHLORIDE 5 MILLIGRAM(S): 5 TABLET ORAL at 00:00

## 2018-04-03 RX ADMIN — PANTOPRAZOLE SODIUM 40 MILLIGRAM(S): 20 TABLET, DELAYED RELEASE ORAL at 06:48

## 2018-04-03 RX ADMIN — MORPHINE SULFATE 2 MILLIGRAM(S): 50 CAPSULE, EXTENDED RELEASE ORAL at 06:48

## 2018-04-03 RX ADMIN — Medication 7 UNIT(S): at 08:58

## 2018-04-03 RX ADMIN — Medication 7 UNIT(S): at 12:52

## 2018-04-03 RX ADMIN — OXYCODONE HYDROCHLORIDE 5 MILLIGRAM(S): 5 TABLET ORAL at 00:35

## 2018-04-03 RX ADMIN — OXYCODONE HYDROCHLORIDE 5 MILLIGRAM(S): 5 TABLET ORAL at 18:59

## 2018-04-03 NOTE — PROGRESS NOTE ADULT - ATTENDING COMMENTS
dx:  stage iv lung cancer with brain metastases - disease is progressive. case d/w oncology today. per Dr Noonan -- pt has <1month prognosis. pt is bedbound, becoming more altered, w/ worsening dyspnea.  is a candidate for hospice. plan is inpt hospice at [facility].     tracheal compression from tumor/lymphadenopathy - currently w/o sxs. will monitor .   encephaloapthy  - pt waxes and wanes. cont w/  q8hrs dexamethasone .  CT head on admission w/o significant changes from prior scan. pt refused MRI brain.   sinus tachycardia - no PE on CTA chest (admission). no evidence of ongoing infection. suspect due to underlying tumor (burden)  sepsis due to suspected line infection - completed abx.   blood cx and cath tip cx (removed port 3/26/18) negative.     right arm edema - no DVT seen on u/s  leukocytosis - etiology unclear. doubt it is due to infectious process. possibly related to malignancy.   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   thrush - to complete fluconazole x 10 days    htn - bps near goal off all anti-hypertensives  dm ii - cont to titrate lantus + lispro.  ssi  anemia - s/p 2 u prbc. suspect anemia of chronic dz + hemodilution. no active bleeding. stool guaic (-)  depression- no si/hi.

## 2018-04-03 NOTE — PROGRESS NOTE ADULT - SUBJECTIVE AND OBJECTIVE BOX
DORON NUGENT   MRN-[id number]    CC:    CC: Pain, SOB, Difficulty swallowing    Subjective: Patient with stage IV metastatic lung ca with extensive mediastinal adenopathy and large lisandro mass easily visible in right neck., admitted with line sepsis.  Course notable for profound weakness.  Has decided not to have further chemo.  Over the weekend patient has had increasing pain across her right shoulder, increasing periods of SOB. extreme weakness and difficulty holding up her head.  Also notes some difficulty in swallowing.  Team offered her an MRI over the weekend to more fully evaluate- but she refused.  She has oxycodone ordered, but hasn't taken it today.  Took only once dose yesterday  Doesn't like how she feels.  During our interview patient developed severe pain in her right shoulder and neck and was screaming in pain.  Morphine ordered- had to be given subcutaneously because she has no IV access.    No fever or chills    DYSPNEA: Y / N        Yes intermittent no cough	  NAUS/VOM: Y / N	N  SECRETIONS: Y / N	N  AGITATION: Y / N      N  Pain (Y/N):     yes as described above  no further details obtainable because the patient was in such distress  -Provocation/Palliation:  -Quality/Quantity:  -Radiating:  -Severity:  -Timing/Frequency:  -Impact on ADLs:    OTHER REVIEW OF SYSTEMS:  UNABLE TO OBTAIN  due to:    PEx:  T(C): 36.6 (04-03-18 @ 11:22), Max: 37.1 (04-02-18 @ 21:15)  HR: 117 (04-03-18 @ 11:22) (117 - 121)  BP: 138/87 (04-03-18 @ 11:22) (135/87 - 151/99)  RR: 16 (04-03-18 @ 11:22) (16 - 16)  SpO2: 98% (04-03-18 @ 11:22) (97% - 99%)  Wt(kg): --    GENERAL:    HEENT:      	  NECK:           CVS:           	  RESP:        	  GI:             	  :            	  MUSC:       	  NEURO:     	  PSYCH:          SKIN:         	   LYMPH:      	     penicillins (Hives)      OPIATE NAÏVE (Y/N):  iSTOP REVIEWED (Y/N):     MEDICATIONS: REVIEWED  MEDICATIONS  (STANDING):  dexamethasone     Tablet 4 milliGRAM(s) Oral every 8 hours  dextrose 5%. 1000 milliLiter(s) (50 mL/Hr) IV Continuous <Continuous>  dextrose 50% Injectable 12.5 Gram(s) IV Push once  dextrose 50% Injectable 25 Gram(s) IV Push once  dextrose 50% Injectable 25 Gram(s) IV Push once  enoxaparin Injectable 40 milliGRAM(s) SubCutaneous every 24 hours  folic acid 1 milliGRAM(s) Oral daily  insulin glargine Injectable (LANTUS) 20 Unit(s) SubCutaneous at bedtime  insulin lispro (HumaLOG) corrective regimen sliding scale   SubCutaneous Before meals and at bedtime  insulin lispro Injectable (HumaLOG) 7 Unit(s) SubCutaneous three times a day before meals  lidocaine   Patch 1 Patch Transdermal daily  oxyCODONE    IR 5 milliGRAM(s) Oral every 6 hours  pantoprazole    Tablet 40 milliGRAM(s) Oral before breakfast  senna 1 Tablet(s) Oral daily  sodium chloride 0.9% lock flush 20 milliLiter(s) IV Push once    MEDICATIONS  (PRN):  acetaminophen   Tablet 650 milliGRAM(s) Oral every 6 hours PRN For Temp greater than 38 C (100.4 F)  acetaminophen   Tablet. 650 milliGRAM(s) Oral every 6 hours PRN Mild Pain (1 - 3)  dextrose Gel 1 Dose(s) Oral once PRN Blood Glucose LESS THAN 70 milliGRAM(s)/deciliter  glucagon  Injectable 1 milliGRAM(s) IntraMuscular once PRN Glucose LESS THAN 70 milligrams/deciliter  morphine  - Injectable 2 milliGRAM(s) SubCutaneous every 2 hours PRN Moderate Pain (4 - 6)  ondansetron    Tablet 4 milliGRAM(s) Oral every 6 hours PRN Nausea and/or Vomiting  polyethylene glycol 3350 17 Gram(s) Oral daily PRN Constipation  sodium chloride 0.9% lock flush 10 milliLiter(s) IV Push every 1 hour PRN After each medication administration  sodium chloride 0.9% lock flush 10 milliLiter(s) IV Push every 12 hours PRN Lumen of catheter NOT used      LABS: REVIEWED        IMAGING: REVIEWED    ADVANCED DIRECTIVES:     FULL CODE     DNR     DNI     LIVING WILL     MOLST    DECISION MAKER:   LEGAL SURROGATE:    PSYCHOSOCIAL-SPIRITUAL ASSESSMENT:       Reviewed       Care plan unchanged       Care plan adjusted as above	    GOALS OF CARE DISCUSSION       Palliative care info/counseling provided	           Family meeting       Advanced Directives addressed please see Advance Care Planning Note	           See previous Palliative Medicine Note       Documentation of GOC:   	      AGENCY CHOICE DISCUSSED:     [facility]  HOSPICE     [facility]  [facility]     OTHER:    REFERRALS	        Palliative Med        Unit SW/Case Mgmt              Speech/Swallow       Patient/Family Support       Massage Therapy       Music Therapy       Hospice       Nutrition       Ethics       PT/OT        CRITICAL CARE TIME PROVIDED TO UNSTABLE PT W/ ORGAN FAILURE	   Start:               End:  	       Minutes:              > 50% OF THE TIME SPENT IN COUNSELING AND COORDINATING CARE 	   Start:               End:  	       Minutes:      PROLONGED SERVICE             FACE TO FACE:    PT            PT & FAMILY	   Start:               End:  	       Minutes:      Advance Care Planning Time: DORON NUGENT   MRN-[id number]    CC:    CC: Pain, SOB, Difficulty swallowing    Subjective: Patient with stage IV metastatic lung ca with extensive mediastinal adenopathy and large lisandro mass easily visible in right neck., admitted with line sepsis.  Course notable for profound weakness.  Has decided not to have further chemo.  Over the weekend patient has had increasing pain across her right shoulder, increasing periods of SOB. extreme weakness and difficulty holding up her head.  Also notes some difficulty in swallowing.  Team offered her an MRI over the weekend to more fully evaluate- but she refused.  She has oxycodone ordered, on a regular schedule since yesterday with some improvement but she has subcutaneous morphine for breakthrough and does not want to use it because it is an injection.  No IV access.    She seems to be a bit better today than yesterday according to nursing, but patient does not really remember.  Not eating- too difficult to swallow.  Drinking some Ensure with a straw.  Some SOB.  Tried to wear neck collar yesterday- but too painful and too claustrophobic  Remains bedbound     No fever or chills    DYSPNEA: Y / N        Yes intermittent no cough	  NAUS/VOM: Y / N	N  SECRETIONS: Y / N	N  AGITATION: Y / N      N  Pain (Y/N):     yes  mostly right shoulder and neck.  Cannot characterize.  generally throbbing  -Provocation/Palliation:  -Quality/Quantity:  -Radiating:  -Severity:  -Timing/Frequency:  -Impact on ADLs:    OTHER REVIEW OF SYSTEMS:  UNABLE TO OBTAIN  due to: patient with poor memory for events, mod cognitive dysfuntion    PEx:  T(C): 36.6 (04-03-18 @ 11:22), Max: 37.1 (04-02-18 @ 21:15)  HR: 117 (04-03-18 @ 11:22) (117 - 121)  BP: 138/87 (04-03-18 @ 11:22) (135/87 - 151/99)  RR: 16 (04-03-18 @ 11:22) (16 - 16)  SpO2: 98% (04-03-18 @ 11:22) (97% - 99%)  Wt(kg): --    GENERAL: calmer than yesterday, more lethargic.  Seems to have some head control     HEENT:    in supine position and more comfortable  	  NECK:    extremely  large firm  mass right neck      CVS:         tachycardia , tenderness across sternum 	  RESP:      generally clear  	  GI:         abd benign    	  :            	  MUSC:    generalized profound  weakness   	  NEURO:    difficult to assess, but appears non focal 	  PSYCH:        in distress  SKIN:         	   LYMPH     	     penicillins (Hives)      OPIATE NAÏVE (Y/N): no      MEDICATIONS: REVIEWED  MEDICATIONS  (STANDING):  dexamethasone     Tablet 4 milliGRAM(s) Oral every 8 hours  dextrose 5%. 1000 milliLiter(s) (50 mL/Hr) IV Continuous <Continuous>  dextrose 50% Injectable 12.5 Gram(s) IV Push once  dextrose 50% Injectable 25 Gram(s) IV Push once  dextrose 50% Injectable 25 Gram(s) IV Push once  enoxaparin Injectable 40 milliGRAM(s) SubCutaneous every 24 hours  folic acid 1 milliGRAM(s) Oral daily  insulin glargine Injectable (LANTUS) 20 Unit(s) SubCutaneous at bedtime  insulin lispro (HumaLOG) corrective regimen sliding scale   SubCutaneous Before meals and at bedtime  insulin lispro Injectable (HumaLOG) 7 Unit(s) SubCutaneous three times a day before meals  lidocaine   Patch 1 Patch Transdermal daily  oxyCODONE    IR 5 milliGRAM(s) Oral every 6 hours  pantoprazole    Tablet 40 milliGRAM(s) Oral before breakfast  senna 1 Tablet(s) Oral daily  sodium chloride 0.9% lock flush 20 milliLiter(s) IV Push once    MEDICATIONS  (PRN):  acetaminophen   Tablet 650 milliGRAM(s) Oral every 6 hours PRN For Temp greater than 38 C (100.4 F)  acetaminophen   Tablet. 650 milliGRAM(s) Oral every 6 hours PRN Mild Pain (1 - 3)  dextrose Gel 1 Dose(s) Oral once PRN Blood Glucose LESS THAN 70 milliGRAM(s)/deciliter  glucagon  Injectable 1 milliGRAM(s) IntraMuscular once PRN Glucose LESS THAN 70 milligrams/deciliter  morphine  - Injectable 2 milliGRAM(s) SubCutaneous every 2 hours PRN Moderate Pain (4 - 6)  ondansetron    Tablet 4 milliGRAM(s) Oral every 6 hours PRN Nausea and/or Vomiting  polyethylene glycol 3350 17 Gram(s) Oral daily PRN Constipation  sodium chloride 0.9% lock flush 10 milliLiter(s) IV Push every 1 hour PRN After each medication administration  sodium chloride 0.9% lock flush 10 milliLiter(s) IV Push every 12 hours PRN Lumen of catheter NOT used      LABS: REVIEWED  none recently        IMAGING: REVIEWED    none recently    ADVANCED DIRECTIVES:          DNR     DNI         MOLST    DECISION MAKER: patient  LEGAL SURROGATE: daughter Naomi Yeh    PSYCHOSOCIAL-SPIRITUAL ASSESSMENT:       Reviewed       Care plan unchanged      GOALS OF CARE DISCUSSION         Documentation of GOC: comfort in setting of rapidly declining functional staus and increasing pain syndrome  	      AGENCY CHOICE DISCUSSED:       HOSPICE   [facility]

## 2018-04-03 NOTE — PROGRESS NOTE ADULT - SUBJECTIVE AND OBJECTIVE BOX
Hospital Course:   62F presenting to [facility] ED for fever/chills, waxing and waning confusion over past few days associated with right chest and shoulder pain and swelling around the port insertion site, port placed on 3/13 and never used. PMHX of HTN, DM, and Stage IV lung CA w/ mets to brain and bone (Dec 2nd) s/p RTx to bony mets on 8th rib, and gamma knife Rtx to brain (Jan 9th) s/p 2 cycles of systemic chemoimmunotherapy for metastatic disease complicated by post obstructive pneumonia, +influenza and confusion. Patients last chemo was on 3/2/18, and recent admission for toxic metabolic encephalopathy in the setting of pneumonia and discharged home on 3/9, since discharge pt has been progressively more fatigued, including waxing/waning mentation which worsened after port placement two weeks prior. In the ED pt found to be septic with fever, tachycardia and elevated WBC count/bandemia and slightly elevated lactate at 2.5. Had CXR and CTA unremarkable for acute findings. Patient received IVF resuscitation and was started on empiric antibiotic coverage of sepsis with vancomycin and cefepime, later transitioned to PO antibiotics for full course of treatment.     Patient's hospital course was complicated by anemia to 5.9 requiring transfusion of 1u of PRBCs with subsequent improvement. IR removed chemo-port and sent tip for culture, which was negative for growth. Blood cultures were negative as well. Urine culture grew Candida, though patient was likely colonized and remained asymptomatic. Patient had swelling of the RUE and US evaluation was negative for DVT though study was limited as patient refused to complete full exam. The palliative care team spoke with the patient extensively during her hospital stay. Patient was seen by the palliative care team and wished to be DNR/DNI (MOLST in chart). Patient is in the last stage of her terminal illness.     Overnight Events: ANGELIKA  Subjective/ROS: Patient states she does not recall last night's events. Unsure if she experienced headache, nausea, vomiting, etc. Currently endorses R shoulder pain. Denies headache, dizziness, lightheadedness, chest pain, shortness of breath, palpitations, abdominal pain, nausea, vomiting, diarrhea, fevers, and chills.     VITAL SIGNS:  Vital Signs Last 24 Hrs  T(C): 36.6 (03 Apr 2018 05:44), Max: 37.1 (02 Apr 2018 11:19)  T(F): 97.8 (03 Apr 2018 05:44), Max: 98.7 (02 Apr 2018 11:19)  HR: 119 (03 Apr 2018 05:44) (116 - 121)  BP: 135/87 (03 Apr 2018 05:44) (135/87 - 152/92)  BP(mean): --  RR: 16 (03 Apr 2018 05:44) (16 - 18)  SpO2: 99% (03 Apr 2018 05:44) (97% - 99%)    PHYSICAL EXAM:    General: Well nourished middle aged woman awake and comfortable in bed; NAD  HEENT: NC/AT; EOMI, PERRLA anicteric sclera; MMM  Neck: supple, palpable R neck mass, non-tender to palpation  Cardiovascular: +S1/S2; tachycardic, no r/m/g  Respiratory: CTA B/L; no W/R/R; R sided dressing from port removal C/D/I, no erythema, swelling, purulence, or tenderness surrounding site  Gastrointestinal: soft, NT/ND; +BS  Extremities: WWP; slight edema in upper extremities; sensation and pulses intact, non-tender to palpation; clubbing of fingernails  Vascular: 2+ radial, DP pulses B/L  Neurological: Waxing and waning mental status; patient does not recall night's events. Progressive decline in patient's mental status over course of hospital stay concerning for worsening disease progression.  Psych: flat affect    MEDICATIONS:  MEDICATIONS  (STANDING):  dexamethasone     Tablet 4 milliGRAM(s) Oral every 8 hours  dextrose 5%. 1000 milliLiter(s) (50 mL/Hr) IV Continuous <Continuous>  dextrose 50% Injectable 12.5 Gram(s) IV Push once  dextrose 50% Injectable 25 Gram(s) IV Push once  dextrose 50% Injectable 25 Gram(s) IV Push once  enoxaparin Injectable 40 milliGRAM(s) SubCutaneous every 24 hours  folic acid 1 milliGRAM(s) Oral daily  insulin glargine Injectable (LANTUS) 20 Unit(s) SubCutaneous at bedtime  insulin lispro (HumaLOG) corrective regimen sliding scale   SubCutaneous Before meals and at bedtime  insulin lispro Injectable (HumaLOG) 7 Unit(s) SubCutaneous three times a day before meals  lidocaine   Patch 1 Patch Transdermal daily  oxyCODONE    IR 5 milliGRAM(s) Oral every 6 hours  pantoprazole    Tablet 40 milliGRAM(s) Oral before breakfast  senna 1 Tablet(s) Oral daily    MEDICATIONS  (PRN):  acetaminophen   Tablet 650 milliGRAM(s) Oral every 6 hours PRN For Temp greater than 38 C (100.4 F)  acetaminophen   Tablet. 650 milliGRAM(s) Oral every 6 hours PRN Mild Pain (1 - 3)  dextrose Gel 1 Dose(s) Oral once PRN Blood Glucose LESS THAN 70 milliGRAM(s)/deciliter  glucagon  Injectable 1 milliGRAM(s) IntraMuscular once PRN Glucose LESS THAN 70 milligrams/deciliter  morphine  - Injectable 2 milliGRAM(s) SubCutaneous every 2 hours PRN Moderate Pain (4 - 6)  ondansetron    Tablet 4 milliGRAM(s) Oral every 6 hours PRN Nausea and/or Vomiting  polyethylene glycol 3350 17 Gram(s) Oral daily PRN Constipation      ALLERGIES:  Allergies    penicillins (Hives)    Intolerances        LABS:              CAPILLARY BLOOD GLUCOSE      POCT Blood Glucose.: 95 mg/dL (02 Apr 2018 21:12)      RADIOLOGY & ADDITIONAL TESTS: Reviewed.

## 2018-04-03 NOTE — PROGRESS NOTE ADULT - PROBLEM SELECTOR PLAN 3
#Stage IV Lung Adenocarcinoma   Patient with mets to brain and bone, PDL1 40% on Carboplatin, Pemetrexed, Pembrolizumab s/p 2 cycles, last 3/2/18, treatment course complicated by multiple hospitalizations including post obstructive pneumonia, collapsed lob cb +influenza s/p debulking and chemical pneumonitis. Both clinical cervical exam and CT imaging concerning for extensive R cervical lymphadenopathy, imaging also consistent with increasing R mediastinal and paratracheal lymphadenopathy. R hilar and R lower lobe necrotic masses persist, no significant change in size. In addition, patient with recent port placement (2 weeks prev), documented fever, leukocytosis w/bandemia, concerning for possibly catheter related sepsis. Pt seen by [facility] in the ED.  - per [facility] recs would need to clinically stabilize prior to next treatment (may need to consider alternative regimen)  -CXR from 3/23 c/f right paratracheal mass compressing the trachea which is narrowed to 8 mm in transverse diameter.  - dexamethasone 4mg q8h for brain metastasis, CT head negative for acute pathology  -Pain control with acetaminophen, tramadol, and oxycodone PRN  - f/u hematoclogy/oncology recs Patient has a history of DM. She is on dexamethasone 4mg q8h for brain mets, which will make hyperglycemia more difficult to control.   -Lantus 20U at bedtime; adjust accordingly  -Premeal lispro 7U; adjust accordingly   -mISS  -Monitor fingersticks

## 2018-04-03 NOTE — PROGRESS NOTE ADULT - ASSESSMENT
61 y/o F with a PMHX of HTN, DM, and Stage IV lung CA w/ mets to brain and bone (Dec 2nd) who underwent RT to bony mets on 8th rib, and had gamma knife tx to brain (Jan 9th) s/p 2 cycles of systemic chemoimmunotherapy and immunotherapy for metastatic disease presenting with increased lethargy and line sepsis.  Palliative care called to see for symptom management and discussion of goals of care.  Escalation of symptoms over the weekend, with acute worsening of pain this am. 63 y/o F with a PMHX of HTN, DM, and Stage IV lung CA w/ mets to brain and bone (Dec 2nd) who underwent RT to bony mets on 8th rib, and had gamma knife tx to brain (Jan 9th) s/p 2 cycles of systemic chemoimmunotherapy and immunotherapy for metastatic disease presenting with increased lethargy and line sepsis.  Palliative care called to see for symptom management and discussion of goals of care.  Escalation of symptoms over the weekend, with acute worsening of pain yesterday.  Difficulty managing symptoms because of lack of IV access

## 2018-04-03 NOTE — PROGRESS NOTE ADULT - PROBLEM SELECTOR PLAN 1
Acute exacerbation of right neck and shoulder pain.  Inability to hold up her head.  Related to large neck mass.  Given morphine acutely, increased oxycodone to a standing dose, discussed at length with patient.  Also has new order for PRN morphine. Acute exacerbation of right neck and shoulder pain.  Inability to hold up her head.  Related to large neck mass.  Given morphine acutely, increased oxycodone to a standing dose, discussed at length with patient.  Also has new order for PRN morphine.  Midline IV access to be placed today to allow for improved pain control

## 2018-04-03 NOTE — PROGRESS NOTE ADULT - PROBLEM SELECTOR PLAN 4
Improved. Mildly edematous UE; pulses intact, sensation intact;   -No thrombus seen within the right neck and upper arm to the level of   antecubital fossa. The remaining veins were not assessed secondary to   patient refusal to continue the examination.

## 2018-04-03 NOTE — PROGRESS NOTE ADULT - ATTENDING COMMENTS
Patient continues to have severe pain and anxiety.  She is currently on Oxycodone and Morphine ATC for pain control but refusing medication for breakthrough.  Patient is refusing to work with PT as well and has significantly decompensated over the past week.  She is not candidate for further therapy, and family has decided to move patient to inpatient hospice.  She is good candidate for hospice due to her very poor prognosis and inability to receive further therapy at this time.  Will have discussion with her family as well.  Case management and Dr. Gama informed and agree with plan.

## 2018-04-03 NOTE — PROGRESS NOTE ADULT - PROBLEM SELECTOR PLAN 7
patient with worsening symptoms entering the last stge of illness.   Not appropriate for ISSA.  Needs in-patient hospice for symptom management patient with worsening symptoms entering the last stage of illness.   Not appropriate for ISSA.  Needs in-patient hospice for symptom management

## 2018-04-03 NOTE — PROGRESS NOTE ADULT - ASSESSMENT
61 y/o F with a PMHX of HTN, DM, and Stage IV lung CA w/ mets to brain and bone (Dec 2nd) who underwent RT to bony mets on 8th rib, and had gamma knife tx to brain (Jan 9th) s/p 2 cycles of systemic chemoimmunotherapy for metastatic disease presenting with increased lethargy and line sepsis.    #Stage IV Lung Adenocarcinoma   Patient with mets to brain and bone, PDL1 40% on Carboplatin, Pemetrexed, Pembrolizumab s/p 2 cycles, last 3/2/18, treatment course complicated by multiple hospitalizations including post obstructive pneumonia, collapsed lobe, +influenza, chemical pneumonitis. Both clinical cervical exam and CT imaging concerning for extensive R cervical lymphadenopathy, imaging also consistent with increasing R mediastinal and paratracheal lymphadenopathy. R hilar and R lower lobe necrotic masses persist, no significant change in size. In addition, patient with recent port placement (2 weeks prev), documented fever, leukocytosis w/bandemia, concerning for possibly catheter related sepsis.    Pt also had stereotactic radiosurgery for her brain met. She has had no follow up MRI head since december.    -Both clinical exam and imaging concerning for progressive disease, due to complications patient only able to complete 2 cycles of therapy, normally would assess response after 4 cycles, less likely however may consider a component of pseudo-progression 2/2 immunotherapy   -progressive b/l cervical adenopathy L>R, closely monitor, imaging negative hematoma, abscess  -leukocytosis with bandemia, line sepsis, c/w doxycycline   -clinically worse compared to baseline, at this time ecog atleast 3 patient in bed >+50% of time, not currently candidate for chemoimmunotherapy  -AST improved, TSH (low w/ normal Free T4) may be related to AE of pembro, elevated alk phos c/w bone disease  -dexamethasone 4mg increased to q8, CT head negative for acute pathology  -fluconazole for thrush  -follow up ID recs   -palliative care consult   -pain control  -bowel regimen  -PT/OT placement for ISSA    Normocytic Anemia   Likely multifactorial in nature, given exposure to chemo, poor nutrition, currently patient is not acutely bleeding or bruising. Iron panel from outpatient consistent with anemia of chronic inflammation, elevated ferritin may also be related to transfusion dependence. Retic 1.8. B12/Folate supplemented based on labs from 3/9/18, required supplementation while getting Pemetrexed.     -Monitor for clinical signs of bleeding, consider occult blood  -obtain LDH  -folic acid/B12 supplementation given treatment with pemetrexed       Coagulopathy   Patient chronically with elevated INR and PT, likely related to infection/sepsis, factor 7 shortest half life and decreases in sepsis, patient also with poor nutritional status albumin likely has decreased vit K dependent clotting factors.     -monitor for bleeding  -would hold off on product replacement (vit K) as patient is immobile with solid tumor thus increased hypercoag risk      DVT ppx - lovenox ppx    Discussed with Dr. Noonan 63 y/o F with a PMHX of HTN, DM, and Stage IV lung CA w/ mets to brain and bone (Dec 2nd) who underwent RT to bony mets on 8th rib, and had gamma knife tx to brain (Jan 9th) s/p 2 cycles of systemic chemoimmunotherapy for metastatic disease presenting with increased lethargy and line sepsis.    #Stage IV Lung Adenocarcinoma   Patient with mets to brain and bone, PDL1 40% on Carboplatin, Pemetrexed, Pembrolizumab s/p 2 cycles, last 3/2/18, treatment course complicated by multiple hospitalizations including post obstructive pneumonia, collapsed lobe, +influenza, chemical pneumonitis. Both clinical cervical exam and CT imaging concerning for extensive R cervical lymphadenopathy, imaging also consistent with increasing R mediastinal and paratracheal lymphadenopathy. R hilar and R lower lobe necrotic masses persist, no significant change in size. In addition, patient with recent port placement (2 weeks prev), documented fever, leukocytosis w/bandemia, concerning for possibly catheter related sepsis.    Pt also had stereotactic radiosurgery for her brain met. She has had no follow up MRI head since december.    -Both clinical exam and imaging concerning for progressive disease, due to complications patient only able to complete 2 cycles of therapy, normally would assess response after 4 cycles, less likely however may consider a component of pseudo-progression 2/2 immunotherapy   -progressive b/l cervical adenopathy L>R, closely monitor, imaging negative hematoma, abscess  -leukocytosis with bandemia, line sepsis, c/w doxycycline   -clinically worse compared to baseline, at this time ecog atleast 3 patient in bed >+50% of time, not currently candidate for chemoimmunotherapy  -AST improved, TSH (low w/ normal Free T4) may be related to AE of pembro, elevated alk phos c/w bone disease  -dexamethasone 4mg increased to q8, CT head negative for acute pathology  -fluconazole for thrush  -appreciate palliative care consult: after discussion with SUKI, she is planned for inpt hospice at [place] or [facility] - given this plan, would minimize or discontinue labs unless medically necessary  -pain control  -bowel regimen  -PT/OT placement for [facility]    Normocytic Anemia   Likely multifactorial in nature, given exposure to chemo, poor nutrition, currently patient is not acutely bleeding or bruising. Iron panel from outpatient consistent with anemia of chronic inflammation, elevated ferritin may also be related to transfusion dependence. Retic 1.8. B12/Folate supplemented based on labs from 3/9/18, required supplementation while getting Pemetrexed.     -Monitor for clinical signs of bleeding, consider occult blood - Hb has been stable, so can stop labs - if any acute hemodynamic instability or signs of bleeding would then check CBC  -obtain LDH  -folic acid/B12 supplementation given treatment with pemetrexed       Coagulopathy   Patient chronically with elevated INR and PT, likely related to infection/sepsis, factor 7 shortest half life and decreases in sepsis, patient also with poor nutritional status albumin likely has decreased vit K dependent clotting factors.     -monitor for bleeding  -would hold off on product replacement (vit K) as patient is immobile with solid tumor thus increased hypercoag risk      DVT ppx - lovenox ppx    Discussed with Dr. Noonan

## 2018-04-03 NOTE — PROCEDURE NOTE - NSPROCDETAILS_GEN_ALL_CORE
location identified, draped/prepped, sterile technique used/ultrasound assessment/sterile dressing applied/supine position/sterile technique, catheter placed/ultrasound guidance

## 2018-04-03 NOTE — CHART NOTE - NSCHARTNOTEFT_GEN_A_CORE
Admitting Diagnosis:   61 y/o F with a PMHX of HTN, DM, and Stage IV lung CA w/ mets to brain and bone (Dec 2nd) who underwent RT to bony mets on 8th rib, and had gamma knife tx to brain (Jan 9th) s/p 2 cycles of systemic chemoimmunotherapy for metastatic disease presenting with increased lethargy and line sepsis.        PAST MEDICAL & SURGICAL HISTORY:  Brain metastases  Bone metastasis  Lung cancer  Hypertension  Diabetes  History of radiation therapy  Status post gamma knife treatment      Current Nutrition Order: Regular Consistent Carb Diet (No Snacks) + Ensure Clear TID     PO Intake: Good (%) [ X  ]  Fair (50-75%) [   ] Poor (<25%) [ X  ]    GI Issues: No n/v/d/c noted     Pain: No pain at time of visit     Skin Integrity:Intact     Labs:         CAPILLARY BLOOD GLUCOSE      POCT Blood Glucose.: 125 mg/dL (03 Apr 2018 12:26)  POCT Blood Glucose.: 148 mg/dL (03 Apr 2018 08:30)  POCT Blood Glucose.: 95 mg/dL (02 Apr 2018 21:12)  POCT Blood Glucose.: 106 mg/dL (02 Apr 2018 17:22)      Medications:  MEDICATIONS  (STANDING):  dexamethasone     Tablet 4 milliGRAM(s) Oral every 8 hours  dextrose 5%. 1000 milliLiter(s) (50 mL/Hr) IV Continuous <Continuous>  dextrose 50% Injectable 12.5 Gram(s) IV Push once  dextrose 50% Injectable 25 Gram(s) IV Push once  dextrose 50% Injectable 25 Gram(s) IV Push once  enoxaparin Injectable 40 milliGRAM(s) SubCutaneous every 24 hours  folic acid 1 milliGRAM(s) Oral daily  insulin glargine Injectable (LANTUS) 20 Unit(s) SubCutaneous at bedtime  insulin lispro (HumaLOG) corrective regimen sliding scale   SubCutaneous Before meals and at bedtime  insulin lispro Injectable (HumaLOG) 7 Unit(s) SubCutaneous three times a day before meals  lidocaine   Patch 1 Patch Transdermal daily  oxyCODONE    IR 5 milliGRAM(s) Oral every 6 hours  pantoprazole    Tablet 40 milliGRAM(s) Oral before breakfast  senna 1 Tablet(s) Oral daily    MEDICATIONS  (PRN):  acetaminophen   Tablet 650 milliGRAM(s) Oral every 6 hours PRN For Temp greater than 38 C (100.4 F)  acetaminophen   Tablet. 650 milliGRAM(s) Oral every 6 hours PRN Mild Pain (1 - 3)  dextrose Gel 1 Dose(s) Oral once PRN Blood Glucose LESS THAN 70 milliGRAM(s)/deciliter  glucagon  Injectable 1 milliGRAM(s) IntraMuscular once PRN Glucose LESS THAN 70 milligrams/deciliter  morphine  - Injectable 2 milliGRAM(s) SubCutaneous every 2 hours PRN Moderate Pain (4 - 6)  ondansetron    Tablet 4 milliGRAM(s) Oral every 6 hours PRN Nausea and/or Vomiting  polyethylene glycol 3350 17 Gram(s) Oral daily PRN Constipation    Weight: 64.5 kg (3/24/18)    Weight Change:  No new wt to assess     Estimated energy needs:    Estimated Energy Needs (30-35 calories/kg):  · Weight  (lbs)	142 lb  · Weight (kg)	64.4 kg  · From (30 suni/kg)	1932  · To (35 calories/kg)	2254     Estimated Protein Needs (1.2-1.4 gm/kg):  · Weight  (lbs)	142  · Weight (kg)	64.4 kg  · From (1.2 g/kg)	77.28  · To (1.4 g/kg)	90.16     Estimated Fluid Needs (25-30 ml/kg):  · Weight  (lbs)	142  · Weight (kg)	64.4  · From (25 ml/kg)	1610  · To (30 ml/kg)	1932      Subjective: Pt with poor PO intake at this time; very lethargic/withdrawn at time of visit. Pt with little intake of Ensure supplements. Pt is planned to d/c to inpatient hospice.     Previous Nutrition Diagnosis: Unintended weight loss r/t increased nutrient needs, decreased intake AEB 30 lb wt loss over the past few months      Active [ X  ]  Resolved [   ]    If resolved, new PES:     Goal: 1.) Pt to meet 75% of needs PO     Recommendations: Rec. continuing current diet + Ensure supplements         Education: Encouraged intake.     Risk Level: High [   ] Moderate [ X  ] Low [   ]

## 2018-04-03 NOTE — PROGRESS NOTE ADULT - PROBLEM SELECTOR PLAN 5
-S/p fluconazole 100 mg daily x 10 days (3/23-4/2)    #Urine w/yeast; asymptomatic   -Awaiting susceptibility testing Resolved. Most likely in the setting of port infection.  - Empiric coverage (3/23-3/28) with vancomycin 1250mg BID, cefepime 2000mg q8h,(pt has penicillin allergy), and fluconazole 100mg q24h (3/23-4/2); s/p doxycyline 100mg BID and cefdinir 300mg BID (3/28-3/30)  -UCx with yeast, though patient without urinary complaints; likely colonized  - BCx without growth   - monitor WBC count and fever  - tylenol PRN for fever    - S/p removal of chemo-port by IR on 3/26; No growth of cultured catheter tip  - ID signed off

## 2018-04-03 NOTE — PROGRESS NOTE ADULT - PROBLEM SELECTOR PLAN 7
Patient has a history of DM. She is on dexamethasone 4mg q8h for brain mets, which will make hyperglycemia more difficult to control.   -Lantus 20U at bedtime; adjust accordingly  -Premeal lispro 7U; adjust accordingly   -mISS  -Monitor fingersticks -S/p fluconazole 100 mg daily x 10 days (3/23-4/2)    #Urine w/yeast; asymptomatic   -Awaiting susceptibility testing

## 2018-04-03 NOTE — PROGRESS NOTE ADULT - SUBJECTIVE AND OBJECTIVE BOX
Heme/Onc Progress Note (Dr. Noonan)  Pt seen with Dr. Noonan, not examined d/t pt anxiety.      Interval History: Patient with continued discomfort on around the clock narcotic treatment per palliative care reccs but currently refusing oral medications; No acute change in respiratory status or energy levels. Patient denies any acute bleeding or bruising including brbpr, melena or hematuria. Patient does not want to work with PT/OT today.     ROS is otherwise negative.      Allergies    penicillins (Hives)    Intolerances        Medications:  MEDICATIONS  (STANDING):  cefdinir 300 milliGRAM(s) Oral once  dexamethasone     Tablet 4 milliGRAM(s) Oral every 8 hours  dextrose 5%. 1000 milliLiter(s) (50 mL/Hr) IV Continuous <Continuous>  dextrose 50% Injectable 12.5 Gram(s) IV Push once  dextrose 50% Injectable 25 Gram(s) IV Push once  dextrose 50% Injectable 25 Gram(s) IV Push once  doxycycline hyclate Capsule 100 milliGRAM(s) Oral every 12 hours  enoxaparin Injectable 40 milliGRAM(s) SubCutaneous every 24 hours  fluconAZOLE   Tablet 100 milliGRAM(s) Oral daily  folic acid 1 milliGRAM(s) Oral daily  insulin glargine Injectable (LANTUS) 20 Unit(s) SubCutaneous at bedtime  insulin lispro (HumaLOG) corrective regimen sliding scale   SubCutaneous Before meals and at bedtime  insulin lispro Injectable (HumaLOG) 5 Unit(s) SubCutaneous three times a day before meals  pantoprazole    Tablet 40 milliGRAM(s) Oral before breakfast  senna 1 Tablet(s) Oral daily    MEDICATIONS  (PRN):  acetaminophen   Tablet 650 milliGRAM(s) Oral every 6 hours PRN For Temp greater than 38 C (100.4 F)  acetaminophen   Tablet. 650 milliGRAM(s) Oral every 6 hours PRN Mild Pain (1 - 3)  dextrose Gel 1 Dose(s) Oral once PRN Blood Glucose LESS THAN 70 milliGRAM(s)/deciliter  glucagon  Injectable 1 milliGRAM(s) IntraMuscular once PRN Glucose LESS THAN 70 milligrams/deciliter  ondansetron    Tablet 4 milliGRAM(s) Oral every 6 hours PRN Nausea and/or Vomiting  oxyCODONE    IR 5 milliGRAM(s) Oral every 4 hours PRN Breakthrough pain  polyethylene glycol 3350 17 Gram(s) Oral daily PRN Constipation    enoxaparin Injectable 40 milliGRAM(s) SubCutaneous every 24 hours          PHYSICAL EXAM:    T(F): 98.9 (03-30-18 @ 05:01), Max: 98.9 (03-30-18 @ 05:01)  HR: 105 (03-30-18 @ 05:01) (99 - 106)  BP: 144/82 (03-30-18 @ 05:01) (120/77 - 144/82)  RR: 16 (03-30-18 @ 05:01) (16 - 20)  SpO2: 97% (03-30-18 @ 05:01) (97% - 98%)  Wt(kg): --    Daily     Daily     GEN: tired appearing, arousable to voice   HEENT: AT/NC, EOMI, no oral lesions   NECK: bilateral cervical adenopathy R>L, non tender   CVS: S1S2 RRR   LUNG: Decreased inspiratory effort b/l  ABD: +BS, NT, ND   EXT: no c/c/e  NEURO: AAOx3 Heme/Onc Progress Note (Dr. Noonan)  Pt seen with Dr. Noonan, not examined d/t pt anxiety.      Interval History: Patient with continued discomfort on around the clock narcotic treatment per palliative care reccs but currently refusing oral medications.  Per RN, she has been "screaming" in pain, but when addressed by staf refusing intervention.  No acute change in respiratory status or energy levels.Patient does not want to work with PT/OT today.     ROS is otherwise negative.      MEDICATIONS  (STANDING):  dexamethasone     Tablet 4 milliGRAM(s) Oral every 8 hours  dextrose 5%. 1000 milliLiter(s) (50 mL/Hr) IV Continuous <Continuous>  dextrose 50% Injectable 12.5 Gram(s) IV Push once  dextrose 50% Injectable 25 Gram(s) IV Push once  dextrose 50% Injectable 25 Gram(s) IV Push once  enoxaparin Injectable 40 milliGRAM(s) SubCutaneous every 24 hours  folic acid 1 milliGRAM(s) Oral daily  insulin glargine Injectable (LANTUS) 20 Unit(s) SubCutaneous at bedtime  insulin lispro (HumaLOG) corrective regimen sliding scale   SubCutaneous Before meals and at bedtime  insulin lispro Injectable (HumaLOG) 7 Unit(s) SubCutaneous three times a day before meals  lidocaine   Patch 1 Patch Transdermal daily  oxyCODONE    IR 5 milliGRAM(s) Oral every 6 hours  pantoprazole    Tablet 40 milliGRAM(s) Oral before breakfast  senna 1 Tablet(s) Oral daily    MEDICATIONS  (PRN):  acetaminophen   Tablet 650 milliGRAM(s) Oral every 6 hours PRN For Temp greater than 38 C (100.4 F)  acetaminophen   Tablet. 650 milliGRAM(s) Oral every 6 hours PRN Mild Pain (1 - 3)  dextrose Gel 1 Dose(s) Oral once PRN Blood Glucose LESS THAN 70 milliGRAM(s)/deciliter  glucagon  Injectable 1 milliGRAM(s) IntraMuscular once PRN Glucose LESS THAN 70 milligrams/deciliter  morphine  - Injectable 2 milliGRAM(s) SubCutaneous every 2 hours PRN Moderate Pain (4 - 6)  ondansetron    Tablet 4 milliGRAM(s) Oral every 6 hours PRN Nausea and/or Vomiting  polyethylene glycol 3350 17 Gram(s) Oral daily PRN Constipation      Vital Signs Last 24 Hrs  T(C): 36.6 (03 Apr 2018 05:44), Max: 37.1 (02 Apr 2018 11:19)  T(F): 97.8 (03 Apr 2018 05:44), Max: 98.7 (02 Apr 2018 11:19)  HR: 119 (03 Apr 2018 05:44) (116 - 121)  BP: 135/87 (03 Apr 2018 05:44) (135/87 - 152/92)  BP(mean): --  RR: 16 (03 Apr 2018 05:44) (16 - 18)  SpO2: 99% (03 Apr 2018 05:44) (97% - 99%)  Daily     Daily     GEN: tired appearing, seemed comfortable until started talking to her - now visibly anxious and uncomfortable appearing  Pulm: using nasal cannula

## 2018-04-03 NOTE — PROGRESS NOTE ADULT - PROBLEM SELECTOR PLAN 2
Recent CT concerning for new cervical and mediastinal lymphadenopathy/disease progression.  Patient has stated she does not want to undergo chemo at this time.  Plan had been for ISSA, but patient now acutely symptomatic with pain and intermittent SOB.  SOB should  respond to opiates as well.  Patient entering the last phase of her illness Recent CT concerning for new cervical and mediastinal lymphadenopathy/disease progression.  Patient has stated she does not want to undergo chemo at this time.  Plan had been for ISSA, but patient now acutely symptomatic with pain and intermittent SOB.  SOB should  respond to opiates as well.  Patient entering the last phase of her illness Prognosis days- a couple of weeks

## 2018-04-03 NOTE — PROGRESS NOTE ADULT - PROBLEM SELECTOR PLAN 8
Improved. Mildly edematous UE; pulses intact, sensation intact;   -No thrombus seen within the right neck and upper arm to the level of   antecubital fossa. The remaining veins were not assessed secondary to   patient refusal to continue the examination. bp WNL  - will hold of antihypertensive meds in the setting of acute sepsis

## 2018-04-03 NOTE — PROGRESS NOTE ADULT - ASSESSMENT
63 yo AAF with presenting to ED for fever/chills, increased lethargy and port site pain and erythema, PMHX of HTN, DM, and Stage IV lung CA w/ mets to brain and bone s/p RTX and gamma knife Rtx to brain and 2 cycles of systemic chemoimmunotherapy for metastatic disease, meeting sepsis criteria in the ED, presumably line-sepsis, s/p removal of chemo-port, now entering the last phase of her terminal illness.

## 2018-04-03 NOTE — PROGRESS NOTE ADULT - PROBLEM SELECTOR PLAN 1
Most likely in the setting of port infection.  - Empiric coverage (3/23-3/28) with vancomycin 1250mg BID, cefepime 2000mg q8h,(pt has penicillin allergy), and fluconazole 100mg q24h (will auto-discontinue on 4/2); s/p doxycyline 100mg BID and cefdinir 300mg BID (3/28-3/30)  -UCx with yeast, though patient without urinary complaints; likely colonized  - f/u BCx (ngtd)  - monitor WBC count and fever  - tylenol PRN for fever    - S/p removal of chemo-port by IR on 3/26; NGTD of cultured catheter tip  - ID signed off #Stage IV Lung Adenocarcinoma   Patient with mets to brain and bone, PDL1 40% on Carboplatin, Pemetrexed, Pembrolizumab s/p 2 cycles, last 3/2/18, treatment course complicated by multiple hospitalizations including post obstructive pneumonia, collapsed lob cb +influenza s/p debulking and chemical pneumonitis. Both clinical cervical exam and CT imaging concerning for extensive R cervical lymphadenopathy, imaging also consistent with increasing R mediastinal and paratracheal lymphadenopathy. R hilar and R lower lobe necrotic masses persist, no significant change in size. In addition, patient with recent port placement (2 weeks prev), documented fever, leukocytosis w/bandemia, concerning for possibly catheter related sepsis. Patient entering the last phase of her terminal illness with waxing/waning mental status, persistent tachycardia not responsive to IVF, increase analgesic requirements, and progressively declining functional status. Given disease burden, patient is an ideal candidate for hospice.   -CXR from 3/23 c/f right paratracheal mass compressing the trachea which is narrowed to 8 mm in transverse diameter.  -Dexamethasone 4mg q8h for brain metastasis, CT head negative for acute pathology; patient refused MRI brain w/wo contrast for further workup  -Oxycodone 5mg IR q6h standing, 2mg morphine subq q2h PRN for breakthrough pain, lidocaine patch for R shoulder pain  -F/u palliative recs   -F/u hematoclogy/oncology recs

## 2018-04-03 NOTE — PROGRESS NOTE ADULT - PROBLEM SELECTOR PLAN 2
As per above Patient has been persistently tachycardic during her hospital stay, unresponsive to IVF boluses. Likely multifactorial in the setting of worsening disease burden, pain, recent infection. EKG  w/sinus tachycardia.   -Patient asymptomatic   -F/u EKG  -Routine monitoring of VS

## 2018-04-03 NOTE — PROGRESS NOTE ADULT - PROBLEM SELECTOR PLAN 4
patient with more difficulty eating secondary to overall weakness, and likely tracheal, esophageal compression.  No parenteral interventions. patient with more difficulty eating secondary to overall weakness, and likely tracheal, esophageal compression. Concerned that she will not be able to take PO meds- midline IV being placed for IV access for morphine  No parenteral interventions.

## 2018-04-03 NOTE — CONSULT NOTE ADULT - SUBJECTIVE AND OBJECTIVE BOX
Vascular Access Service Consult Note    62yFemaleHEALTH ISSUES - PROBLEM Dx:  Tachycardia, unspecified: Tachycardia, unspecified  Palliative care by specialist: Palliative care by specialist  Right shoulder pain, unspecified chronicity: Right shoulder pain, unspecified chronicity  Medical orders for life-sustaining treatment (MOLST) form in chart: Medical orders for life-sustaining treatment (MOLST) form in chart  DNR (do not resuscitate): DNR (do not resuscitate)  Nausea: Nausea  Brain metastases: Brain metastases  Hyponatremia: Hyponatremia  Palliative care encounter: Palliative care encounter  Debility: Debility  Line sepsis, initial encounter: Line sepsis, initial encounter  Weakness: Weakness  Shoulder pain: Shoulder pain  Extremity edema: Extremity edema  Port or reservoir infection, sequela: Port or reservoir infection, sequela  Oral thrush: Oral thrush  Nutrition, metabolism, and development symptoms: Nutrition, metabolism, and development symptoms  Prophylactic measure: Prophylactic measure  Diabetes: Diabetes  Hypertension: Hypertension  Lung cancer: Lung cancer  Sepsis: Sepsis             Diagnosis: palliative, lung ca    Indications for Vascular Access (Check all that apply)  [  ]  Antibiotic Therapy       Antibiotic Prescribed:                                                                             Expected Duration of Therapy:               [  ]  IV Hydration  [  ]  Total Parenteral Nutrition  [  ]  Chemotherapy  [  ]  Difficult Venous Access  [  ]  CVP monitoring  [  ]  Medications with high potential for tissue necrosis on extravasation  [ X]  Other, morphine     Screening (Check all that apply)  Previous Radiation to chest  [  ] Yes      [ X ]  No  Breast Cancer                          [  ] Left     [  ]  Right    [  X]  No  Lymph Node Dissection         [  ] Left     [  ]  Right    [X  ]  No  Pacemaker or ICD                   [  ] Left     [  ]  Right    [ X ]  No  Upper Extremity DVT             [  ] Left     [  ]  Right    [ X ]  No  Chronic Kidney Disease         [  ]  Yes     [X  ]  No  Hemodialysis                           [  ]  Yes     [  X]  No  AV Fistula/ Graft                     [  ]  Left    [  ]  Right    [ X ]  No  Temp>101F in past 24 H       [  ]  Yes     [X  ]  No  H/O PICC/Midline                   [  ]  Yes     [ X ]  No    Lab data:                    I have reviewed the chart, interviewed and examined the patient and determined that this patient:  [  ] Is a candidate for a PICC line  [ X ] Is a candidate for a Midline  [  ] Is not a candidate for vascular access device (reason)    Lumens:    [ X ] Single  [  ] Double

## 2018-04-04 VITALS
DIASTOLIC BLOOD PRESSURE: 86 MMHG | HEART RATE: 118 BPM | OXYGEN SATURATION: 98 % | RESPIRATION RATE: 16 BRPM | TEMPERATURE: 98 F | SYSTOLIC BLOOD PRESSURE: 145 MMHG

## 2018-04-04 DIAGNOSIS — R53.2 FUNCTIONAL QUADRIPLEGIA: ICD-10-CM

## 2018-04-04 DIAGNOSIS — R59.1 GENERALIZED ENLARGED LYMPH NODES: ICD-10-CM

## 2018-04-04 LAB
GLUCOSE BLDC GLUCOMTR-MCNC: 107 MG/DL — HIGH (ref 70–99)
GLUCOSE BLDC GLUCOMTR-MCNC: 180 MG/DL — HIGH (ref 70–99)

## 2018-04-04 PROCEDURE — 80053 COMPREHEN METABOLIC PANEL: CPT

## 2018-04-04 PROCEDURE — 71045 X-RAY EXAM CHEST 1 VIEW: CPT

## 2018-04-04 PROCEDURE — 85730 THROMBOPLASTIN TIME PARTIAL: CPT

## 2018-04-04 PROCEDURE — 83625 ASSAY OF LDH ENZYMES: CPT

## 2018-04-04 PROCEDURE — 87106 FUNGI IDENTIFICATION YEAST: CPT

## 2018-04-04 PROCEDURE — 99285 EMERGENCY DEPT VISIT HI MDM: CPT | Mod: 25

## 2018-04-04 PROCEDURE — 87181 SC STD AGAR DILUTION PER AGT: CPT

## 2018-04-04 PROCEDURE — C1751: CPT

## 2018-04-04 PROCEDURE — 86850 RBC ANTIBODY SCREEN: CPT

## 2018-04-04 PROCEDURE — 87086 URINE CULTURE/COLONY COUNT: CPT

## 2018-04-04 PROCEDURE — P9016: CPT

## 2018-04-04 PROCEDURE — 36430 TRANSFUSION BLD/BLD COMPNT: CPT

## 2018-04-04 PROCEDURE — 83605 ASSAY OF LACTIC ACID: CPT

## 2018-04-04 PROCEDURE — 83615 LACTATE (LD) (LDH) ENZYME: CPT

## 2018-04-04 PROCEDURE — 36590 REMOVAL TUNNELED CV CATH: CPT

## 2018-04-04 PROCEDURE — 86880 COOMBS TEST DIRECT: CPT

## 2018-04-04 PROCEDURE — 84443 ASSAY THYROID STIM HORMONE: CPT

## 2018-04-04 PROCEDURE — 86870 RBC ANTIBODY IDENTIFICATION: CPT

## 2018-04-04 PROCEDURE — 86900 BLOOD TYPING SEROLOGIC ABO: CPT

## 2018-04-04 PROCEDURE — 87070 CULTURE OTHR SPECIMN AEROBIC: CPT

## 2018-04-04 PROCEDURE — 97162 PT EVAL MOD COMPLEX 30 MIN: CPT

## 2018-04-04 PROCEDURE — 84100 ASSAY OF PHOSPHORUS: CPT

## 2018-04-04 PROCEDURE — 86921 COMPATIBILITY TEST INCUBATE: CPT

## 2018-04-04 PROCEDURE — 97530 THERAPEUTIC ACTIVITIES: CPT

## 2018-04-04 PROCEDURE — 99233 SBSQ HOSP IP/OBS HIGH 50: CPT

## 2018-04-04 PROCEDURE — 76937 US GUIDE VASCULAR ACCESS: CPT

## 2018-04-04 PROCEDURE — 84075 ASSAY ALKALINE PHOSPHATASE: CPT

## 2018-04-04 PROCEDURE — 85045 AUTOMATED RETICULOCYTE COUNT: CPT

## 2018-04-04 PROCEDURE — 82962 GLUCOSE BLOOD TEST: CPT

## 2018-04-04 PROCEDURE — 93970 EXTREMITY STUDY: CPT

## 2018-04-04 PROCEDURE — 71275 CT ANGIOGRAPHY CHEST: CPT

## 2018-04-04 PROCEDURE — 80048 BASIC METABOLIC PNL TOTAL CA: CPT

## 2018-04-04 PROCEDURE — 96376 TX/PRO/DX INJ SAME DRUG ADON: CPT

## 2018-04-04 PROCEDURE — 84439 ASSAY OF FREE THYROXINE: CPT

## 2018-04-04 PROCEDURE — 86901 BLOOD TYPING SEROLOGIC RH(D): CPT

## 2018-04-04 PROCEDURE — 83735 ASSAY OF MAGNESIUM: CPT

## 2018-04-04 PROCEDURE — 86922 COMPATIBILITY TEST ANTIGLOB: CPT

## 2018-04-04 PROCEDURE — 99239 HOSP IP/OBS DSCHRG MGMT >30: CPT

## 2018-04-04 PROCEDURE — 96375 TX/PRO/DX INJ NEW DRUG ADDON: CPT | Mod: XU

## 2018-04-04 PROCEDURE — 93005 ELECTROCARDIOGRAM TRACING: CPT

## 2018-04-04 PROCEDURE — 85025 COMPLETE CBC W/AUTO DIFF WBC: CPT

## 2018-04-04 PROCEDURE — 36569 INSJ PICC 5 YR+ W/O IMAGING: CPT

## 2018-04-04 PROCEDURE — 70450 CT HEAD/BRAIN W/O DYE: CPT

## 2018-04-04 PROCEDURE — 85610 PROTHROMBIN TIME: CPT

## 2018-04-04 PROCEDURE — 87040 BLOOD CULTURE FOR BACTERIA: CPT

## 2018-04-04 PROCEDURE — 96374 THER/PROPH/DIAG INJ IV PUSH: CPT | Mod: XU

## 2018-04-04 PROCEDURE — 36415 COLL VENOUS BLD VENIPUNCTURE: CPT

## 2018-04-04 PROCEDURE — 85027 COMPLETE CBC AUTOMATED: CPT

## 2018-04-04 PROCEDURE — 81001 URINALYSIS AUTO W/SCOPE: CPT

## 2018-04-04 PROCEDURE — 80202 ASSAY OF VANCOMYCIN: CPT

## 2018-04-04 RX ORDER — MORPHINE SULFATE 50 MG/1
2 CAPSULE, EXTENDED RELEASE ORAL
Qty: 0 | Refills: 0 | COMMUNITY
Start: 2018-04-04

## 2018-04-04 RX ORDER — FOLIC ACID 0.8 MG
1 TABLET ORAL
Qty: 0 | Refills: 0 | COMMUNITY

## 2018-04-04 RX ORDER — POLYETHYLENE GLYCOL 3350 17 G/17G
17 POWDER, FOR SOLUTION ORAL
Qty: 0 | Refills: 0 | COMMUNITY
Start: 2018-04-04

## 2018-04-04 RX ORDER — OXYCODONE HYDROCHLORIDE 5 MG/1
1 TABLET ORAL
Qty: 0 | Refills: 0 | COMMUNITY
Start: 2018-04-04

## 2018-04-04 RX ORDER — LIDOCAINE 4 G/100G
1 CREAM TOPICAL
Qty: 0 | Refills: 0 | COMMUNITY
Start: 2018-04-04

## 2018-04-04 RX ORDER — ACETAMINOPHEN 500 MG
2 TABLET ORAL
Qty: 0 | Refills: 0 | COMMUNITY
Start: 2018-04-04

## 2018-04-04 RX ADMIN — OXYCODONE HYDROCHLORIDE 5 MILLIGRAM(S): 5 TABLET ORAL at 08:02

## 2018-04-04 RX ADMIN — PANTOPRAZOLE SODIUM 40 MILLIGRAM(S): 20 TABLET, DELAYED RELEASE ORAL at 06:36

## 2018-04-04 RX ADMIN — OXYCODONE HYDROCHLORIDE 5 MILLIGRAM(S): 5 TABLET ORAL at 01:30

## 2018-04-04 RX ADMIN — Medication 0.5 MILLIGRAM(S): at 12:52

## 2018-04-04 RX ADMIN — OXYCODONE HYDROCHLORIDE 5 MILLIGRAM(S): 5 TABLET ORAL at 00:35

## 2018-04-04 RX ADMIN — Medication 4 MILLIGRAM(S): at 06:36

## 2018-04-04 RX ADMIN — LIDOCAINE 1 PATCH: 4 CREAM TOPICAL at 00:37

## 2018-04-04 RX ADMIN — OXYCODONE HYDROCHLORIDE 5 MILLIGRAM(S): 5 TABLET ORAL at 06:36

## 2018-04-04 RX ADMIN — OXYCODONE HYDROCHLORIDE 5 MILLIGRAM(S): 5 TABLET ORAL at 13:35

## 2018-04-04 RX ADMIN — OXYCODONE HYDROCHLORIDE 5 MILLIGRAM(S): 5 TABLET ORAL at 13:08

## 2018-04-04 RX ADMIN — LIDOCAINE 1 PATCH: 4 CREAM TOPICAL at 09:33

## 2018-04-04 NOTE — PROGRESS NOTE ADULT - PROBLEM SELECTOR PLAN 8
bp elevated 140-150s, likely in setting of pain as underlying cause. limited role for anti-HTN at this time.   -

## 2018-04-04 NOTE — PROGRESS NOTE ADULT - PROBLEM SELECTOR PLAN 3
Patient has a history of DM. She is on dexamethasone 4mg q8h for brain mets, which will make hyperglycemia more difficult to control. FS <150  - Lantus 20U at bedtime; adjust accordingly  - Premeal lispro 7U; adjust accordingly   - mISS  -Monitor fingersticks

## 2018-04-04 NOTE — PROGRESS NOTE ADULT - PROBLEM SELECTOR PLAN 2
Continue Dexamethasone 4mg PO q8 hr  Mental status waxes and wanes, even some paranoia likely in part to severe medical illness

## 2018-04-04 NOTE — PROGRESS NOTE ADULT - PROBLEM SELECTOR PLAN 5
Resolved. Most likely in the setting of port infection.  - Empiric coverage (3/23-3/28) with vancomycin 1250mg BID, cefepime 2000mg q8h,(pt has penicillin allergy), and fluconazole 100mg q24h (3/23-4/2); s/p doxycyline 100mg BID and cefdinir 300mg BID (3/28-3/30)  -UCx with yeast, though patient without urinary complaints; likely colonized  - BCx without growth   - tylenol PRN for fever    - S/p removal of chemo-port by IR on 3/26; No growth of cultured catheter tip - midline placed 3/3  - ID signed off

## 2018-04-04 NOTE — PROGRESS NOTE ADULT - PROBLEM SELECTOR PLAN 4
Progressive lymphadenopathy, even since yesterday.  Now with evidence of collateral venous drainage using superficial veins of chest wall and neck consistent with superior vena caval syndrome

## 2018-04-04 NOTE — PROGRESS NOTE ADULT - PROBLEM SELECTOR PLAN 1
#Stage IV Lung Adenocarcinoma   c/b mets to brain and bone, PDL1 40% on Carboplatin, Pemetrexed, Pembrolizumab s/p 2 cycles, last 3/2/18, treatment course c/b by multiple hospitalizations including post obstructive pneumonia, collapsed lob cb +influenza s/p debulking and chemical pneumonitis. Both clinical cervical exam and CT imaging concerning for extensive R cervical lymphadenopathy, imaging also consistent with increasing R mediastinal and paratracheal lymphadenopathy. R hilar and R lower lobe necrotic masses persist, no significant change in size. In addition, patient with recent port placement (2 weeks prev), documented fever, leukocytosis w/bandemia, concerning for possibly catheter related sepsis. Patient entering the last phase of her terminal illness with waxing/waning mental status, persistent tachycardia not responsive to IVF, increase analgesic requirements, and progressively declining functional status. Given disease burden, patient is an ideal candidate for hospice.   - CXR from 3/23 c/f right paratracheal mass compressing the trachea which is narrowed to 8 mm in transverse diameter.  -Dexamethasone 4mg q8h for brain metastasis, CT head negative for acute pathology; patient refused MRI brain w/wo contrast for further workup  -Oxycodone 5mg IR q6h standing, 2mg morphine subq q2h PRN for breakthrough pain, lidocaine patch for R shoulder pain  -F/u palliative recs   - s/p midline placement 3/3  - pending hospice placment   -F/u hematoclogy/oncology recs

## 2018-04-04 NOTE — PROGRESS NOTE ADULT - ASSESSMENT
63 yo AAF with presenting to ED for fever/chills, increased lethargy and port site pain and erythema, PMHX of HTN, DM, and Stage IV lung CA w/ mets to brain and bone s/p RTX and gamma knife Rtx to brain and 2 cycles of systemic chemoimmunotherapy for metastatic disease, meeting sepsis criteria in the ED, presumably line-sepsis, s/p removal of chemo-port s/p midline placement 3/3, now entering the last phase of her terminal illness, pending Hospice placement (palliative following).

## 2018-04-04 NOTE — PROGRESS NOTE ADULT - SUBJECTIVE AND OBJECTIVE BOX
OVERNIGHT EVENTS:  ANGELIKA overnight     SUBJECTIVE / INTERVAL HPI: Patient seen and examined at bedside.   Patient c/o shoulder pain, no change in quality of pain, just increase severity.    ROS otherwise negative.   N    VITAL SIGNS:  Vital Signs Last 24 Hrs  T(C): 36.6 (04 Apr 2018 05:37), Max: 37.1 (03 Apr 2018 21:05)  T(F): 97.8 (04 Apr 2018 05:37), Max: 98.7 (03 Apr 2018 21:05)  HR: 101 (04 Apr 2018 05:37) (101 - 118)  BP: 156/94 (04 Apr 2018 05:37) (138/87 - 156/94)  BP(mean): --  RR: 16 (04 Apr 2018 05:37) (16 - 16)  SpO2: 96% (04 Apr 2018 05:37) (96% - 99%)    PHYSICAL EXAM:    General: Well nourished middle aged woman awake and comfortable in bed; appears uncomfortable.   HEENT: NC/AT; EOMI, PERRLA anicteric sclera; MMM  Neck: supple, palpable R neck mass, mild-tender to palpation  Cardiovascular: +S1/S2; tachycardic, no r/m/g  Respiratory: CTA B/L; no W/R/R; R sided dressing from port removal C/D/I  Gastrointestinal: soft, NT/ND; +BS  Extremities: WWP; slight edema in upper extremities; sensation and pulses intact, non-tender to palpation; clubbing of fingernails. Midline CDI  Vascular: 2+ radial, DP pulses B/L  Neurological: Waxing and waning mental status.  Progressive decline in patient's mental status over course of hospital stay concerning for worsening disease progression.  Psych: flat affect    MEDICATIONS:  MEDICATIONS  (STANDING):  dexamethasone     Tablet 4 milliGRAM(s) Oral every 8 hours  dextrose 5%. 1000 milliLiter(s) (50 mL/Hr) IV Continuous <Continuous>  dextrose 50% Injectable 12.5 Gram(s) IV Push once  dextrose 50% Injectable 25 Gram(s) IV Push once  dextrose 50% Injectable 25 Gram(s) IV Push once  enoxaparin Injectable 40 milliGRAM(s) SubCutaneous every 24 hours  folic acid 1 milliGRAM(s) Oral daily  insulin glargine Injectable (LANTUS) 20 Unit(s) SubCutaneous at bedtime  insulin lispro (HumaLOG) corrective regimen sliding scale   SubCutaneous Before meals and at bedtime  insulin lispro Injectable (HumaLOG) 7 Unit(s) SubCutaneous three times a day before meals  lidocaine   Patch 1 Patch Transdermal daily  oxyCODONE    IR 5 milliGRAM(s) Oral every 6 hours  pantoprazole    Tablet 40 milliGRAM(s) Oral before breakfast  senna 1 Tablet(s) Oral daily  sodium chloride 0.9% lock flush 20 milliLiter(s) IV Push once    MEDICATIONS  (PRN):  acetaminophen   Tablet 650 milliGRAM(s) Oral every 6 hours PRN For Temp greater than 38 C (100.4 F)  acetaminophen   Tablet. 650 milliGRAM(s) Oral every 6 hours PRN Mild Pain (1 - 3)  dextrose Gel 1 Dose(s) Oral once PRN Blood Glucose LESS THAN 70 milliGRAM(s)/deciliter  glucagon  Injectable 1 milliGRAM(s) IntraMuscular once PRN Glucose LESS THAN 70 milligrams/deciliter  morphine  - Injectable 2 milliGRAM(s) IV Push every 2 hours PRN Moderate Pain (4 - 6)  ondansetron    Tablet 4 milliGRAM(s) Oral every 6 hours PRN Nausea and/or Vomiting  polyethylene glycol 3350 17 Gram(s) Oral daily PRN Constipation  sodium chloride 0.9% lock flush 10 milliLiter(s) IV Push every 1 hour PRN After each medication administration  sodium chloride 0.9% lock flush 10 milliLiter(s) IV Push every 12 hours PRN Lumen of catheter NOT used      ALLERGIES:  Allergies    penicillins (Hives)    Intolerances

## 2018-04-04 NOTE — PROGRESS NOTE ADULT - PROBLEM SELECTOR PLAN 5
patient with worsening symptoms entering the last stage of illness.  Prognosis perhaps days.  Encouraged to take pain meds which she did with my assistance.  Discussed at length with daughter

## 2018-04-04 NOTE — PROGRESS NOTE ADULT - PROBLEM SELECTOR PLAN 10
Fluids: none  Diet: Regular diet (requested per patient), was on a diabetic diet previously  DNR/DNI  Dispo: Hospice

## 2018-04-04 NOTE — PROGRESS NOTE ADULT - PROBLEM SELECTOR PLAN 2
Patient has been persistently tachycardic during her hospital stay, unresponsive to IVF boluses. Likely multifactorial in the setting of worsening disease burden, pain, recent infection. EKG  w/sinus tachycardia.   -Patient asymptomatic   -Routine monitoring of VS

## 2018-04-04 NOTE — PROGRESS NOTE ADULT - NSHPATTENDINGPLANDISCUSS_GEN_ALL_CORE
Primary Team.
Dr. Espinoza
Dr. Jessica Gama
Pall [facility] Social Work, Maryjo Liarosaline
Primary Team, Dr. Gama, Patient's  at bedside
HCP Naomi Yeh, family at bedside, primary team, Dr. Gama
Social Work, Primary Team, Patient's daughter Naomi
Dr. Colon
HS
Primary Team, Dr. Noonan
housestaff
house staff
Primary Team, Dr. Gama
house staff
house staff

## 2018-04-04 NOTE — PROGRESS NOTE ADULT - PROBLEM SELECTOR PLAN 7
-S/p fluconazole 100 mg daily x 10 days (3/23-4/2)    #Urine w/yeast; asymptomatic   -Awaiting susceptibility testing

## 2018-04-04 NOTE — PROGRESS NOTE ADULT - ASSESSMENT
63 y/o F with a PMHX of HTN, DM, and Stage IV lung CA w/ mets to brain and bone (Dec 2nd) who underwent RT to bony mets on 8th rib, and had gamma knife tx to brain (Jan 9th) s/p 2 cycles of systemic chemoimmunotherapy and immunotherapy for metastatic disease presenting with increased lethargy and line sepsis.  Palliative care called to see for symptom management and discussion of goals of care.  Escalation of symptoms over the weekend, with acute worsening of pain yesterday.  transfer to hospice today

## 2018-04-04 NOTE — PROGRESS NOTE ADULT - PROBLEM SELECTOR PROBLEM 9
Prophylactic measure
Palliative care encounter
Prophylactic measure
Nutrition, metabolism, and development symptoms
Nutrition, metabolism, and development symptoms
Prophylactic measure

## 2018-04-04 NOTE — PROGRESS NOTE ADULT - PROBLEM SELECTOR PLAN 1
Recent CT concerning for new cervical and mediastinal lymphadenopathy/disease progression.  Patient has stated she does not want to undergo chemo at this time.  Plan had been for ISSA, but patient now acutely symptomatic with pain and intermittent SOB.  SOB should  respond to opiates as well.  Patient entering the last phase of her illness Prognosis days- a couple of weeks

## 2018-04-04 NOTE — PROGRESS NOTE ADULT - PROBLEM SELECTOR PROBLEM 10
Nutrition, metabolism, and development symptoms
DNR (do not resuscitate)
Nutrition, metabolism, and development symptoms

## 2018-04-04 NOTE — PROGRESS NOTE ADULT - PROVIDER SPECIALTY LIST ADULT
Heme/Onc
Hospitalist
Hospitalist
Infectious Disease
Internal Medicine
Palliative Care
Heme/Onc
Internal Medicine
Internal Medicine
Palliative Care
Palliative Care
Internal Medicine

## 2018-04-04 NOTE — PROGRESS NOTE ADULT - SUBJECTIVE AND OBJECTIVE BOX
DORON NUGENT   MRN-[id number]    CC: Pain, SOB, Difficulty swallowing    Subjective: Patient with stage IV metastatic lung ca with extensive mediastinal adenopathy and large lisandro mass easily visible in right neck., admitted with line sepsis.  Course notable for profound weakness.  Has decided not to have further chemo.  Over the weekend patient has had increasing pain across her right shoulder, increasing periods of SOB. extreme weakness and difficulty holding up her head.  Also notes some difficulty in swallowing.  Team offered her an MRI over the weekend to more fully evaluate- but she refused.  She has oxycodone ordered, on a regular schedule  with some improvement yesterday, but has refused some doses today, saying "I don't trust them".  Midline IV access placed yesterday     Not eating- too difficult to swallow.  Drinking some Ensure with a straw.  Some SOB.  Couldn't wear neck collar yesterday- but too painful and too claustrophobic  Remains bedbound   Going to [facility] today  Admits to severe anxiety.  Dr. Gama has ordered Ativan 0.5 mg PO x 1 for anxiety, which she did take.    No fever or chills    Daughter's sister-in-law at bedside    DYSPNEA: Y / N        Yes intermittent no cough	  NAUS/VOM: Y / N	N  SECRETIONS: Y / N	N  AGITATION: Y / N      N  Pain (Y/N):     yes  mostly right shoulder and neck.  Cannot characterize.  generally throbbing afraid to take pain meds  -Provocation/Palliation:  -Quality/Quantity:  -Radiating:  -Severity:  -Timing/Frequency:  -Impact on ADLs:        OTHER REVIEW OF SYSTEMS:  UNABLE TO OBTAIN  due to: patient with very poor memory for events    PEx:  T(C): 36.7 (04-04-18 @ 11:08), Max: 37.1 (04-03-18 @ 21:05)  HR: 118 (04-04-18 @ 11:08) (101 - 118)  BP: 145/86 (04-04-18 @ 11:08) (145/86 - 156/94)  RR: 16 (04-04-18 @ 11:08) (16 - 16)  SpO2: 98% (04-04-18 @ 11:08) (96% - 99%)  Wt(kg): --    GENERAL:, more lethargic, somewhat paranoid, intermittent anxiety.  Seems to have some head control     HEENT:    in supine position and more comfortable  	  NECK:    extremely  large firm  mass right neck  appears larger than yesterday    CVS:         tachycardia , tenderness across sternum 	  RESP:      generally clear  	  GI:         abd benign    	  :            	  MUSC:    generalized profound  weakness   	  NEURO:    difficult to assess, but appears non focal 	  PSYCH:        in distress  SKIN:      development of prominent venous pattern across right neck mass, supraclavicular area and sternum   	   LYMPH         	     penicillins (Hives)      OPIATE NAÏVE (Y/N):  iSTOP REVIEWED (Y/N):     MEDICATIONS: REVIEWED  MEDICATIONS  (STANDING):  dexamethasone     Tablet 4 milliGRAM(s) Oral every 8 hours  dextrose 5%. 1000 milliLiter(s) (50 mL/Hr) IV Continuous <Continuous>  dextrose 50% Injectable 12.5 Gram(s) IV Push once  dextrose 50% Injectable 25 Gram(s) IV Push once  dextrose 50% Injectable 25 Gram(s) IV Push once  enoxaparin Injectable 40 milliGRAM(s) SubCutaneous every 24 hours  folic acid 1 milliGRAM(s) Oral daily  insulin glargine Injectable (LANTUS) 20 Unit(s) SubCutaneous at bedtime  insulin lispro (HumaLOG) corrective regimen sliding scale   SubCutaneous Before meals and at bedtime  insulin lispro Injectable (HumaLOG) 7 Unit(s) SubCutaneous three times a day before meals  lidocaine   Patch 1 Patch Transdermal daily  oxyCODONE    IR 5 milliGRAM(s) Oral every 6 hours  pantoprazole    Tablet 40 milliGRAM(s) Oral before breakfast  senna 1 Tablet(s) Oral daily  sodium chloride 0.9% lock flush 20 milliLiter(s) IV Push once    MEDICATIONS  (PRN):  acetaminophen   Tablet 650 milliGRAM(s) Oral every 6 hours PRN For Temp greater than 38 C (100.4 F)  acetaminophen   Tablet. 650 milliGRAM(s) Oral every 6 hours PRN Mild Pain (1 - 3)  dextrose Gel 1 Dose(s) Oral once PRN Blood Glucose LESS THAN 70 milliGRAM(s)/deciliter  glucagon  Injectable 1 milliGRAM(s) IntraMuscular once PRN Glucose LESS THAN 70 milligrams/deciliter  morphine  - Injectable 2 milliGRAM(s) IV Push every 2 hours PRN Moderate Pain (4 - 6)  ondansetron    Tablet 4 milliGRAM(s) Oral every 6 hours PRN Nausea and/or Vomiting  polyethylene glycol 3350 17 Gram(s) Oral daily PRN Constipation  sodium chloride 0.9% lock flush 10 milliLiter(s) IV Push every 1 hour PRN After each medication administration  sodium chloride 0.9% lock flush 10 milliLiter(s) IV Push every 12 hours PRN Lumen of catheter NOT used      LABS: REVIEWED  none new        IMAGING: REVIEWED  none new    ADVANCED DIRECTIVES:        DNR     DNI     LIVING WILL     MOLST    DECISION MAKER: ALMAS Yeh  LEGAL SURROGATE:    PSYCHOSOCIAL-SPIRITUAL ASSESSMENT:       Reviewed       Care plan unchanged      GOALS OF CARE DISCUSSION       Palliative care info/counseling provided	                Documentation of GOC: comfort at the EOL  	      AGENCY CHOICE DISCUSSED:     being discharged to [facility]

## 2018-04-06 LAB
LDH PNL SERPL: 239 — SIGNIFICANT CHANGE UP
LDH1 SERPL-CCNC: SIGNIFICANT CHANGE UP
LDH3 SERPL-CCNC: 21 — SIGNIFICANT CHANGE UP
LDH3 SERPL-CCNC: 34 — SIGNIFICANT CHANGE UP
LDH4 SERPL-CCNC: SIGNIFICANT CHANGE UP
LDH5 SERPL-CCNC: SIGNIFICANT CHANGE UP

## 2018-04-10 DIAGNOSIS — I10 ESSENTIAL (PRIMARY) HYPERTENSION: ICD-10-CM

## 2018-04-10 DIAGNOSIS — Z79.84 LONG TERM (CURRENT) USE OF ORAL HYPOGLYCEMIC DRUGS: ICD-10-CM

## 2018-04-10 DIAGNOSIS — J39.8 OTHER SPECIFIED DISEASES OF UPPER RESPIRATORY TRACT: ICD-10-CM

## 2018-04-10 DIAGNOSIS — Z92.21 PERSONAL HISTORY OF ANTINEOPLASTIC CHEMOTHERAPY: ICD-10-CM

## 2018-04-10 DIAGNOSIS — E87.1 HYPO-OSMOLALITY AND HYPONATREMIA: ICD-10-CM

## 2018-04-10 DIAGNOSIS — C79.31 SECONDARY MALIGNANT NEOPLASM OF BRAIN: ICD-10-CM

## 2018-04-10 DIAGNOSIS — E11.9 TYPE 2 DIABETES MELLITUS WITHOUT COMPLICATIONS: ICD-10-CM

## 2018-04-10 DIAGNOSIS — T85.79XA INFECTION AND INFLAMMATORY REACTION DUE TO OTHER INTERNAL PROSTHETIC DEVICES, IMPLANTS AND GRAFTS, INITIAL ENCOUNTER: ICD-10-CM

## 2018-04-10 DIAGNOSIS — C79.51 SECONDARY MALIGNANT NEOPLASM OF BONE: ICD-10-CM

## 2018-04-10 DIAGNOSIS — G92 TOXIC ENCEPHALOPATHY: ICD-10-CM

## 2018-04-10 DIAGNOSIS — Z88.0 ALLERGY STATUS TO PENICILLIN: ICD-10-CM

## 2018-04-10 DIAGNOSIS — C34.31 MALIGNANT NEOPLASM OF LOWER LOBE, RIGHT BRONCHUS OR LUNG: ICD-10-CM

## 2018-04-10 DIAGNOSIS — A41.9 SEPSIS, UNSPECIFIED ORGANISM: ICD-10-CM

## 2018-04-10 DIAGNOSIS — Z51.5 ENCOUNTER FOR PALLIATIVE CARE: ICD-10-CM

## 2018-04-10 DIAGNOSIS — E43 UNSPECIFIED SEVERE PROTEIN-CALORIE MALNUTRITION: ICD-10-CM

## 2018-04-10 DIAGNOSIS — R00.0 TACHYCARDIA, UNSPECIFIED: ICD-10-CM

## 2018-04-10 DIAGNOSIS — T80.211A BLOODSTREAM INFECTION DUE TO CENTRAL VENOUS CATHETER, INITIAL ENCOUNTER: ICD-10-CM

## 2018-04-10 DIAGNOSIS — R53.2 FUNCTIONAL QUADRIPLEGIA: ICD-10-CM

## 2018-04-10 DIAGNOSIS — Z92.3 PERSONAL HISTORY OF IRRADIATION: ICD-10-CM

## 2018-04-10 DIAGNOSIS — R59.0 LOCALIZED ENLARGED LYMPH NODES: ICD-10-CM

## 2018-04-10 DIAGNOSIS — B37.0 CANDIDAL STOMATITIS: ICD-10-CM
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
## 2018-04-10 DIAGNOSIS — D63.8 ANEMIA IN OTHER CHRONIC DISEASES CLASSIFIED ELSEWHERE: ICD-10-CM

## 2018-07-21 NOTE — PROGRESS NOTE ADULT - PROBLEM SELECTOR PLAN 2
RECEIVING UNIT ED HANDOFF REVIEW    ED Nurse Handoff Report was reviewed by: Yari Kamara on July 21, 2018 at 11:07 AM          -patient had a fall on2/14 when getting out of bed. Patient fell on her behind, no head trauma, no LOC  -likely 2/2 orthostatic hypotension vs sepsis vs TEM from sepsis  -CTH was negative for hemorrhage or fracture  -patient normally uses walker at home, fall occurred will walking on her own  -consult PT for reevaluation once respiratory status has improved -patient had a fall on 2/14 when getting out of bed. Patient fell on her behind, no head trauma, no LOC  -likely 2/2 deconditioning vs orthostatic hypotension vs sepsis vs TEM from sepsis  -CTH was negative for hemorrhage or fracture  -patient normally uses walker at home, fall occurred will walking on her own  -consult PT for reevaluation once respiratory status has improved

## 2018-10-05 NOTE — PROVIDER CONTACT NOTE (OTHER) - SITUATION
64 yo F sp bronchcoscopy which was complicated by hemorrhage, here for observation. Currently having temp, elevated HR and bp trending down
RN felt patient's skin to be warm.  Temporal temp 100.8.  Patient s/p bronchoscopy.
Weakness

## 2018-12-17 NOTE — PROGRESS NOTE ADULT - PROBLEM SELECTOR PROBLEM 4
December 17, 2018      [facility]  [address]  [facility] [address]  Phone: [phone]       Patient: Spencer Carrizales   YOB: 2006  Date of Visit: 12/17/2018    To Whom It May Concern:    Mil Carrizales  was at [facility] on 12/17/2018. He was absent 12/14 and may return to work/school on 12/19/2018. If you have any questions or concerns, or if I can be of further assistance, please do not hesitate to contact me.    Sincerely,    Linda Doll MA      Lung cancer

## 2019-02-19 NOTE — ED ADULT TRIAGE NOTE - MODE OF ARRIVAL
No answer/No VM- need to move up echo and TDK appt to this week in order to get clearance for surgery on Friday. Will review with mom when she calls.   Walk in

## 2019-08-29 NOTE — PATIENT PROFILE ADULT. - PROVIDER NOTIFICATION NAME
L radius fracture - DOI 1/6/19  Called and scheduled for today at 10:30 with Dr. Baron  
September 3, 2019    Jayme Luque MD  [address] [place] [address] [address]  [address] [address]    Patient: Lola Mack   YOB: 1962   Date of Visit: 2019     Encounter Diagnosis     ICD-10-CM    1  Weakness of both legs R29 898    2  Lower extremity edema R60 0        Dear Dr Dariel Sharma:    Thank you for your recent referral of Lola Mack  Please review the attached evaluation summary from Tre's recent visit  Please verify that you agree with the plan of care by signing the attached order  If you have any questions or concerns, please do not hesitate to call  I sincerely appreciate the opportunity to share in the care of one of your patients and hope to have another opportunity to work with you in the near future  Sincerely,    Hernandez Ga, PT      Referring Provider:      I certify that I have read the below Plan of Care and certify the need for these services furnished under this plan of treatment while under my care  Jayme Luque MD  [address] [place] [address]  [address] [facility] [address]  VIA Facsimile: [phone]          PT Evaluation     Today's date: 2019  Patient name: Lola Mack  : 1962  MRN: [id number]  Referring provider: Kimberly Quezada MD  Dx:   Encounter Diagnosis     ICD-10-CM    1  Weakness of both legs R29 898    2  Lower extremity edema R60 0        Start Time: 1015  Stop Time: 1115  Total time in clinic (min): 60 minutes    Assessment  Assessment details: Lola Mack is a 61 y o  male who presents with pain, decreased strength, decreased ROM and ambulatory dysfunction  Due to these impairments, Patient has difficulty performing a/iadls  Patient's clinical presentation is consistent with their referring diagnosis of LE weakness with swelling in both LE's  Therapist called and spoke with referring MD regarding patient symptoms and presentation today  MD to see patient this afternoon   Recommended patient to
lymphedema therapy and wound care as patient developed another wound on right lateral lower leg  Patient has small blister anterior left lower leg, and weeping of serous fluid right lower leg noted  Patient's RLE is red, hard from increased fluid which limits his ROM and ability to walk and move  Patient was given some HEP in supine, sitting and standing to perform as able  At this time, I feel patient is not appropriate for OPT and informed MD of such via phone conversation  Impairments: abnormal gait, abnormal or restricted ROM, activity intolerance, impaired balance, impaired physical strength, lacks appropriate home exercise program, pain with function, safety issue, weight-bearing intolerance, poor posture  and poor body mechanics  Understanding of Dx/Px/POC: fair   Prognosis: poor    Goals  ST-3 WEEKS  1  Decrease pain in groin by 2 points on VAS at its worst   2   Increase ROM BLE's to [facility]  3  Decrease Swelling in LE's to minimal       LTG 4-6 WEEKS    1  Patient to be independent with a/iadls  2  Increase functional activities for leisure and home activities to previous LOF  3  Independent with HEP and/or fitness program     Plan  Plan details: Spoke with MD regarding patient symptoms with advise to refer to wound care and lymphedema therapy  Patient has a large copayment for OPT also  Patient would benefit from: lymphedema eval  Planned modality interventions: cryotherapy and thermotherapy: hydrocollator packs  Planned therapy interventions: activity modification, body mechanics training, flexibility, functional ROM exercises, home exercise program, IADL retraining, joint mobilization, manual therapy, neuromuscular re-education, patient education, postural training, strengthening, stretching, therapeutic activities, therapeutic exercise and balance/weight bearing training  Frequency: 2-3x week    Duration in weeks: 4  Plan of Care beginning date: 2019  Plan of Care expiration date:
2019  Treatment plan discussed with: patient and family        Subjective Evaluation    History of Present Illness  Mechanism of injury: Patient reports he developed swelling and fluid retention about 2 weeks ago, and was in the hospital but unable to control his swelling  Patient got a wound posterior left lower leg treated in hospital  Patient c/o weakness in both legs and decreased balance  Not a recurrent problem   Pain  Current pain ratin  Location: groin     Social Support  Steps to enter house: yes  3  Stairs in house: no   Lives in: [facility] [place] ([place])  Lives with: spouse    Employment status: not working  Treatments  Discharged from (in last 30 days): home health care  Patient Goals  Patient goals for therapy: decreased edema, increased strength, increased motion and improved balance  Patient goal: walk better, return to prior life        Objective     Static Posture   General Observations  Asymmetrical weight bearing, flexed and out toed  Comments  Patient obese  See gait section    Postural Observations  Seated posture: poor  Standing posture: poor        Strength/Myotome Testing     Left Hip   Planes of Motion   Flexion: 2+  Abduction: 2  Adduction: 3-    Right Hip   Planes of Motion   Flexion: 2  Abduction: 2  Adduction: 3-    Left Knee   Flexion: 4-  Extension: 4    Right Knee   Flexion: 4-  Extension: 4    Ambulation   Weight-Bearing Status   Weight-Bearing Status (Left): weight-bearing as tolerated   Weight-Bearing Status (Right): weight-bearing as tolerated    Distance in feet: 50  Assistive device used: two-wheeled walker    Ambulation: Level Surfaces   Ambulation with assistive device: contact guard assist    Observational Gait   Gait: antalgic, circumduction and crouched   Decreased walking speed and stride length  Additional Observational Gait Details  LE's open wide due to swelling in groin area    Quality of Movement During Gait   Trunk  Forward lean       Comments
Poor technique with rollator with legs open wide, decreased knee flexion  General Comments:      Lumbar Comments  Patient has multiple wounds on lower legs with weeping skin on lower legs  Patient has large amount of fluid in LE's and especially his testicles making it difficult for patient to sit, or to stand with legs together  Knee Comments  ROM limited due to size but [facility] for ambulation and transfers  Flowsheet Rows      Most Recent Value   PT/OT G-Codes   Current Score  12   Projected Score  44             Precautions: HTN, wounds BLE's,           Exercise Diary  8/29            HEP 15                                                                                                                                                                                                                                                                     Patient instructed in HEP as he is able with blue Tband given for UE's and LE's, sitting, supine and standing  Advised patient to perform as able and to be safe with standing exercises 
Afshan Cedillo

## 2019-11-15 NOTE — PATIENT PROFILE ADULT. - NS TRANSFER DENTURES
On reassessment, patient endorsing pain return, will give Toradol and discharge home with pain control in setting of nephrolithiasis w/ follow-up to Urology.   Daysi Tse D.O. (PGY-1)
Lower/Partial/Upper

## 2020-03-16 NOTE — PHYSICAL THERAPY INITIAL EVALUATION ADULT - MANUAL MUSCLE TESTING RESULTS, REHAB EVAL
Escribed request Grossly assessed with functional movement, bilateral UE/LE greater than or equal to 3+/5

## 2020-08-28 NOTE — CONSULT NOTE ADULT - ASSESSMENT
#Adenocarcinoma of rt lung - patient s/p bronchoscopic evaluation today which revealed occlusion of RLL with oozing and slight hemorrhage.   - Interventional pulm procedure tomorrow  - NPO after midnight  - f/u pulm recommendations    #Post-obstructive PNA - 2/2 RLL endobronchial lesion  - c/w Vancomycin (1/14- ), meropenem (1/14- ), and IV Levaquin (1/16- )    Dispo: [facility] to [facility]

## 2021-01-06 NOTE — PATIENT PROFILE ADULT. - NSTOBACCO TYPE_GEN_A_CORE_RD
Spoke with patient's mom and assisted in scheduling appointment.     Future Appointments   Date Time Provider Department Center   1/20/2021  9:00 AM Jeferson Camargo MD [facility]        Cigarettes

## 2021-01-13 NOTE — DIETITIAN INITIAL EVALUATION ADULT. - PROBLEM SELECTOR PROBLEM 4
Lung cancer Fluconazole Counseling:  Patient counseled regarding adverse effects of fluconazole including but not limited to headache, diarrhea, nausea, upset stomach, liver function test abnormalities, taste disturbance, and stomach pain.  There is a rare possibility of liver failure that can occur when taking fluconazole.  The patient understands that monitoring of LFTs and kidney function test may be required, especially at baseline. The patient verbalized understanding of the proper use and possible adverse effects of fluconazole.  All of the patient's questions and concerns were addressed.

## 2021-06-28 NOTE — PHYSICAL THERAPY INITIAL EVALUATION ADULT - AMBULATION SKILLS, REHAB EVAL
Data: [facility]: [facility] 12:46 PM.  Reason for maternal/fetal assessment is leaking vaginal fluid. Patient reports a gush of fluid around 0400 and continued leaking whenever she moves of clear fluid.  Patient is a .  Prenatal record reviewed. Pregnancy  has been complicated by occasional marijuana use.  Gestational Age 35w0d. VSS. Fetal movement active. Patient denies vaginal bleeding, abdominal pain, pelvic pressure, nausea, vomiting, headache, visual disturbances, epigastric or URQ pain, significant edema. Support person is present.   Action: Verbal consent for EFM. Triage assessment completed. Bill of rights reviewed.  Response: Patient verbalized agreement with plan. Will contact Dr John Kim with update and for further orders.      independent/needs device

## 2021-08-05 NOTE — PROGRESS NOTE ADULT - PROBLEM SELECTOR PLAN 5
"Occupational Therapy   Initial Evaluation     Patient Name: Roxana Cuba  Age:  60 y.o., Sex:  female  Medical Record #: [id number]  Today's Date: 8/5/2021     Precautions  Precautions: Fall Risk    Assessment  Patient is 60 y.o. female admitted with HA and vision changes after fall, found to have left SDH, now s/p craniotomy. Pt with PMHx of COPD, pulmonary HTN, liver transplantation in 2011 for etoh cirrhosis, granulomatous hepatitis/sarcoid, gastric bypass 2018, MGUS, CKD, adrenal insufficiency, and hypothyroidism. Pt presents to OT eval with impaired activity tolerance and impaired balance. She was able to demo seated ADLs with spv and ADL transfers with Sol with no AD. Pt will benefit from continued acute OT tx to address ADL transfers, standing ADLs, and functional mobility. Anticipate she will need 1-2 more treatment sessions as today was her first time up since surgery. Anticipate she will improve with further mobility in house. She has good family support at home for IADLs but will need to demo spv for ADLs.       Plan    Recommend Occupational Therapy 4 times per week until therapy goals are met for the following treatments:  Adaptive Equipment, Manual Therapy Techniques, Neuro Re-Education / Balance, Self Care/Activities of Daily Living, Therapeutic Activities and Therapeutic Exercises.    DC Equipment Recommendations: Front-Wheel Walker  Discharge Recommendations: Anticipate that the patient will have no further occupational therapy needs after discharge from the hospital.     Subjective    Pt alert, pleasant, and cooperative. Pt c/o headache but states, \"Every day I get a little better.\"     Objective       08/05/21 1018   Prior Living Situation   Prior Services Home-Independent   Housing / Facility 1 Story Apartment / Condo   Steps Into Home 0   Steps In Home 0   Bathroom Set up Bathtub / Shower Combination;Shower Chair   Equipment Owned Tub / Shower Seat  (had 4WW but airport confiscated it, was " using for ~5 wks)   Lives with - Patient's Self Care Capacity Spouse   Comments Pt lives with spouse who she reports is 15 years older than her so his assistance is limited. He is able to drive and grocery shop and prepare light microwable meals.   Prior Level of ADL Function   Self Feeding Independent   Grooming / Hygiene Independent   Bathing Independent   Dressing Independent   Toileting Independent   Prior Level of IADL Function   Medication Management Independent   Laundry Independent   Kitchen Mobility Independent   Finances Independent   Home Management Independent   Shopping Independent   Prior Level Of Mobility Independent With Device in Community;Independent With Device in Home  (has been using 4WW for past 5 wks, prior no AD)   Driving / Transportation Relatives / Others Provide Transportation  (spouse drives, pt has h/o epilepsy)   Occupation (Pre-Hospital Vocational) Retired Due To Age   Leisure Interests Travel   History of Falls   History of Falls Yes   Date of Last Fall   (5 weeks ago, reason for admit)   Vitals   O2 (LPM) 3.5   O2 Delivery Device Nasal Cannula   Vitals Comments spO2 intermittently desaturating to high 80s. Left on 4L and improved to 93%. Pt reports she uses 3-5L O2 at baseline.   Cognition    Cognition / Consciousness WDL   Level of Consciousness Alert   Comments pleasant, cooperative   Balance Assessment   Sitting Balance (Static) Fair   Sitting Balance (Dynamic) Fair   Standing Balance (Static) Fair   Standing Balance (Dynamic) Fair -   Weight Shift Sitting Good   Weight Shift Standing Fair   Comments seated with spv, standing with CGA/Sol, no AD, no overt LOB   Bed Mobility    Supine to Sit Supervised   ADL Assessment   Grooming Supervision;Seated  (oral care)   Lower Body Dressing Supervision   Modified [place] (mRS)   Modified Martin Score 1   Functional Mobility   Sit to Stand Supervised   Bed, Chair, Wheelchair Transfer Minimal Assist   Transfer Method Stand Step   Mobility  EOB, around bed to other side with Sol   Distance (Feet) 10   # of Times Distance was Traveled 1   ICU Target Mobility Level   ICU Mobility - Targeted Level Level 4   Visual Perception   Comments reports symptoms have resolved   Edema / Skin Assessment   Comments drain in place   Activity Tolerance   Comments no signs/symptoms or c/o fatigue   Patient / Family Goals   Patient / Family Goal #1 To get better   Short Term Goals   Short Term Goal # 1 Pt will complete toilet transfer with spv by discharge.   Short Term Goal # 2 Pt will complete toileting with spv by discharge.   Short Term Goal # 3 Pt will complete standing grooming/hygiene with spv by discharge.                  holding Norvasc in setting of sepsis; restart as BP allows

## 2021-08-11 NOTE — PROGRESS NOTE ADULT - SUBJECTIVE AND OBJECTIVE BOX
OVERNIGHT EVENTS:   No acute events overnight.    VITALS:  T(C): , Max: 38.8 (01-22-18 @ 05:05)  HR:  (108 - 118)  BP:  (107/55 - 128/56)  ABP: --  RR:  (15 - 24)  SpO2:  (86% - 96%)  Wt(kg): --      01-21-18 @ 07:01  -  01-22-18 @ 07:00  --------------------------------------------------------  IN: 357 mL / OUT: 400 mL / NET: -43 mL      LABS:  Na: 134 (01-22 @ 06:11), 135 (01-21 @ 04:20), 133 (01-20 @ 04:52)  K: 3.9 (01-22 @ 06:11), 3.9 (01-21 @ 04:20), 4.0 (01-20 @ 04:52)  Cl: 95 (01-22 @ 06:11), 97 (01-21 @ 04:20), 95 (01-20 @ 04:52)  CO2: 26 (01-22 @ 06:11), 26 (01-21 @ 04:20), 25 (01-20 @ 04:52)  BUN: 13 (01-22 @ 06:11), 11 (01-21 @ 04:20), 10 (01-20 @ 04:52)  Cr: 1.03 (01-22 @ 06:11), 1.00 (01-21 @ 04:20), 0.96 (01-20 @ 04:52)  Glu: 116(01-22 @ 06:11), 113(01-21 @ 04:20), 112(01-20 @ 04:52)    Hgb: 7.0 (01-22 @ 06:11), 7.2 (01-21 @ 04:20), 7.5 (01-20 @ 04:52)  Hct: 22.1 (01-22 @ 06:11), 23.1 (01-21 @ 04:20), 23.4 (01-20 @ 04:52)  WBC: 12.9 (01-22 @ 06:11), 13.6 (01-21 @ 04:20), 11.8 (01-20 @ 04:52)  Plt: 552 (01-22 @ 06:11), 606 (01-21 @ 04:20), 520 (01-20 @ 04:52)    INR:   PTT:     IMAGING:   Recent imaging studies were reviewed.    MEDICATIONS:  acetaminophen   Tablet 650 milliGRAM(s) Oral every 6 hours PRN  ALBUTerol/ipratropium for Nebulization 3 milliLiter(s) Nebulizer every 4 hours  ALPRAZolam 0.25 milliGRAM(s) Oral at bedtime  amLODIPine   Tablet 10 milliGRAM(s) Oral daily  benzonatate 100 milliGRAM(s) Oral every 8 hours  bisacodyl Suppository 10 milliGRAM(s) Rectal daily  dextrose 5%. 1000 milliLiter(s) IV Continuous <Continuous>  dextrose 50% Injectable 12.5 Gram(s) IV Push once  dextrose 50% Injectable 25 Gram(s) IV Push once  dextrose 50% Injectable 25 Gram(s) IV Push once  dextrose Gel 1 Dose(s) Oral once PRN  glucagon  Injectable 1 milliGRAM(s) IntraMuscular once PRN  insulin lispro (HumaLOG) corrective regimen sliding scale   SubCutaneous Before meals and at bedtime  loperamide 2 milliGRAM(s) Oral every 2 hours PRN  magnesium sulfate  IVPB 2 Gram(s) IV Intermittent once  oxyCODONE    IR 10 milliGRAM(s) Oral every 6 hours  oxyCODONE    IR 5 milliGRAM(s) Oral every 4 hours PRN  polyethylene glycol 3350 17 Gram(s) Oral at bedtime  psyllium Powder 1 Packet(s) Oral two times a day  senna 2 Tablet(s) Oral at bedtime  trimethobenzamide 300 milliGRAM(s) Oral two times a day PRN    EXAMINATION:  General:  NAD  HEENT: MP: Neck is supple, No JVD  Cardio: s1s2 RRR. No murmurs.   Respiratory: decreased BS at the right base.   Adomen:  soft, NT  Extremities:  no edema  Skin:  warm/dry  Neuro:  awake, alert, oriented x 3, follows commands, moves all extremities denies pain/discomfort

## 2022-06-06 NOTE — PHYSICAL THERAPY INITIAL EVALUATION ADULT - STANDING BALANCE: STATIC
close supervision Detail Level: Generalized Detail Level: Zone Detail Level: Detailed Patient Specific Counseling (Will Not Stick From Patient To Patient): Pt will dry toenails with alcohol after shower.  Can use either listening soaks, vinegar soaks, or dilute bleach soaks

## 2022-12-08 NOTE — CHART NOTE - NSCHARTNOTESELECT_GEN_ALL_CORE
#Abdominal bloating - Maalox and simethicone    DVT ppx: Heparin drip to be held prior to procedure on 12/5  Diet: Regular  Disposition: Pending clinical course  CODE status: FULL Code
Nutrition Follow-up/Nutrition Services
Nutrition Follow-up/Nutrition Services
Nutrition Services/Malnutrition Alert
#Abdominal bloating - Maalox and simethicone    DVT ppx: Heparin drip to be held prior to procedure on 12/5  Diet: Regular  Disposition: Pending clinical course  CODE status: FULL Code

## 2023-01-05 NOTE — DIETITIAN INITIAL EVALUATION ADULT. - PHYSICAL APPEARANCE
PCP:Salinas Manrique MD      Rooming documentation of social history includes tobacco screening only          BMI 29.1/overweight

## 2023-03-08 NOTE — PROGRESS NOTE ADULT - PROBLEM SELECTOR PROBLEM 5
[facility]el  Practice Specialty: Hematology & Oncology   [address] [address]   Phone: [phone]           Please do not take any NSAIDS for 7 days prior to scheduled surgery including (Ibuprofen, Advil, Naproxen, Aleve).    Please discontinue all multivitamins 7 days prior to your scheduled surgery.     Hold Aspirin for 7 days prior to scheduled surgery.    SIRS (systemic inflammatory response syndrome)

## 2023-10-04 NOTE — PATIENT PROFILE ADULT. - MEDICATION ADMINISTRATION INFO, PROFILE
Patient called and was spoken to directly.  She states she is attempting to progress through physical therapy but is having aggravation of her right upper extremity symptoms.  She states she is now having pain that radiates from her neck all the way down the length of her left arm to her thumb.  She states that her axial neck pain is relieved when taking baclofen.    Due to the new onset  left upper extremity pain we will be obtaining an MRI of her cervical spine.  
no concerns

## 2023-10-09 NOTE — PROGRESS NOTE ADULT - PROBLEM SELECTOR PROBLEM 3
What Type Of Note Output Would You Prefer (Optional)?: Standard Output Hpi Title: Evaluation of a Skin Lesion How Severe Are Your Spot(S)?: moderate Functional quadriplegia

## 2023-10-09 NOTE — PHYSICAL THERAPY INITIAL EVALUATION ADULT - GAIT DISTANCE, PT EVAL
1) Ensure you are getting adequate amounts of protein consistently throughout the day.  Your daily protein goal is 120-140g.  Aim for a minimum of 40g protein at each meal, striving for 3 meals a day.    2) When you are making an omelet for breakfast, continue to use 2 eggs mixed with vegetables, but try to add 3oz of lean protein (leftover from dinner) or low-fat cottage cheese to this meal to help you reach your protein goal for this meal.  Also, consider adding a source of fiber and healthy fat such as sliced avocado.    3) When making homemade smoothies, consider switching from the vegan Ghost protein powder (uses sucralose and thickeners) to using a product from the company [employer].  They offer grass fed beef protein powders or vegan protein powders.    4) Try switching to a higher quality protein bar than Quest.  One option is Aloha plant based protein bars; they do provide 26g carbohydrates.  Other low carb higher quality protein bar options are: MariGold protein bars, Tobin bars, EPIC meat bars.    5) Limit tuna to 2 cans per week due to high mercury levels.  Try using alternative canned fish options such as canned salmon, sardines, anchovies.     6) Aim for at least 30g fiber daily.  Continue to include non-starchy vegetables with most of your meals.  Consider adding beans or lentils to your salads.  Also, try using oatmeal as a breakfast option once a week.  Be sure to include adequate protein when consuming oatmeal, such as adding protein powder.     7) On the days that you are fasting, try adding a fiber supplement such as [employer] Sunfiber.    8) Supplements to help with stress and sleep:  -Pure Encapsulations magnesium glycinate; can titrate up to 400-500mg at bedtime  -[employer] Cortisol Manager, 2 capsules at bedtime  -[employer] HPA Adapt, 2 capsules in the afternoon on an empty stomach    9) Be sure to take your Vitamin D3 supplement with food.  So  on the days that you are fasting, take this supplement with dinner.   40 feet x 1

## 2024-10-27 NOTE — H&P ADULT - ASSESSMENT
Patient with history of vascular dementia. Reports that he lives in an apartment complex with his son.  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

## 2025-02-16 NOTE — PROGRESS NOTE ADULT - PROBLEM SELECTOR PLAN 2
-patient had a fall on 2/14 when getting out of bed. Patient fell on her behind, no head trauma, no LOC  -likely 2/2 deconditioning vs orthostatic hypotension vs sepsis vs TEM from sepsis  -CTH was negative for hemorrhage or fracture  -patient normally uses walker at home, fall occurred will walking on her own  -consult PT for reevaluation once respiratory status has improved Never

## 2025-04-20 NOTE — CONSULT NOTE ADULT - ASSESSMENT
Post Operative Check    Patient is post op from a right femoral nail and is recovering well. Patient denies chest pain, shortness of breath, headache, nausea/vomiting. Denies any numbness or tingling    Vitals    T(C): 37 (04-20-25 @ 23:15), Max: 37 (04-20-25 @ 23:15)  HR: 93 (04-20-25 @ 23:15) (78 - 115)  BP: 104/59 (04-20-25 @ 23:15) (104/59 - 245/112)  RR: 18 (04-20-25 @ 23:15) (16 - 27)  SpO2: 99% (04-20-25 @ 23:15) (94% - 100%)      04-20 @ 07:01  -  04-20 @ 23:54  --------------------------------------------------------  IN:    IV PiggyBack: 100 mL    sodium chloride 0.9%: 250 mL  Total IN: 350 mL    OUT:    Indwelling Catheter - Urethral (mL): 150 mL  Total OUT: 150 mL    Total NET: 200 mL          Labs                        12.2   11.79 )-----------( 213      ( 20 Apr 2025 21:54 )             36.9       CBC Full  -  ( 20 Apr 2025 21:54 )  WBC Count : 11.79 K/uL  Hemoglobin : 12.2 g/dL  Hematocrit : 36.9 %  Platelet Count - Automated : 213 K/uL  Mean Cell Volume : 90.9 fl  Mean Cell Hemoglobin : 30.0 pg  Mean Cell Hemoglobin Concentration : 33.1 g/dL  Auto Neutrophil # : x  Auto Lymphocyte # : x  Auto Monocyte # : x  Auto Eosinophil # : x  Auto Basophil # : x  Auto Neutrophil % : x  Auto Lymphocyte % : x  Auto Monocyte % : x  Auto Eosinophil % : x  Auto Basophil % : x      Physical Exam  Gen: NAD, alert and oriented  Resp: Unlabored breathing  RLE: Skin intact, no ecchymosis,   Middle dressing saturated, changed. Proximal and distal dressings clean dry intact       SILT DP/SP/ Leann/Saph/tib       +EHL/FHL/TA/Gastroc,        DP+,        soft compartments, no calf ttp       Patient is a 79y old Female s/p right femoral shaft IMN. Recovering well   WBAT RLE  PT/OT  Multimodal analgesia  DVT ppt: Xarelto 62 year old female with stage 4 NSCL adenocarcinoma and POD related pain syndrome currently uncontrolled. 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 Consult for pain management. Patient due to continue cancer targeted treatments.

## 2025-04-22 NOTE — DISCHARGE NOTE ADULT - NS AS DC PROVIDER CONTACT Y/N MULTI
General Sunscreen Counseling: I recommended a broad spectrum sunscreen with a SPF of 30 or higher.  I explained that SPF 30 sunscreens block approximately 97 percent of the sun's harmful rays.  Sunscreens should be applied at least 15 minutes prior to expected sun exposure and then every 2 hours after that as long as sun exposure continues. If swimming or exercising sunscreen should be reapplied every 45 minutes to an hour after getting wet or sweating.  One ounce, or the equivalent of a shot glass full of sunscreen, is adequate to protect the skin not covered by a bathing suit. I also recommended a lip balm with a sunscreen as well. Sun protective clothing can be used in lieu of sunscreen but must be worn the entire time you are exposed to the sun's rays. Detail Level: Detailed Products Recommended: Eucerin Sensitive skin Mineral with 24% Zinc oxide\\nElta MD\\nZinc based sunscreens Yes

## 2025-07-29 NOTE — PROGRESS NOTE ADULT - ASSESSMENT
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 Hal Sumner [phone] ()